# Patient Record
Sex: FEMALE | Race: WHITE | NOT HISPANIC OR LATINO | URBAN - METROPOLITAN AREA
[De-identification: names, ages, dates, MRNs, and addresses within clinical notes are randomized per-mention and may not be internally consistent; named-entity substitution may affect disease eponyms.]

---

## 2016-11-29 RX ORDER — BACLOFEN 100 %
1 POWDER (GRAM) MISCELLANEOUS
Qty: 0 | Refills: 0 | COMMUNITY
Start: 2016-11-29

## 2017-03-22 ENCOUNTER — OUTPATIENT (OUTPATIENT)
Dept: OUTPATIENT SERVICES | Facility: HOSPITAL | Age: 67
LOS: 1 days | End: 2017-03-22
Payer: MEDICARE

## 2017-03-22 DIAGNOSIS — S91.309A UNSPECIFIED OPEN WOUND, UNSPECIFIED FOOT, INITIAL ENCOUNTER: ICD-10-CM

## 2017-03-22 DIAGNOSIS — Z98.890 OTHER SPECIFIED POSTPROCEDURAL STATES: Chronic | ICD-10-CM

## 2017-03-22 PROCEDURE — 97602 WOUND(S) CARE NON-SELECTIVE: CPT

## 2017-03-22 PROCEDURE — G0463: CPT | Mod: 25

## 2017-03-23 DIAGNOSIS — Z88.8 ALLERGY STATUS TO OTHER DRUGS, MEDICAMENTS AND BIOLOGICAL SUBSTANCES STATUS: ICD-10-CM

## 2017-03-23 DIAGNOSIS — Z79.899 OTHER LONG TERM (CURRENT) DRUG THERAPY: ICD-10-CM

## 2017-03-23 DIAGNOSIS — L84 CORNS AND CALLOSITIES: ICD-10-CM

## 2017-03-23 DIAGNOSIS — L89.622 PRESSURE ULCER OF LEFT HEEL, STAGE 2: ICD-10-CM

## 2017-03-23 DIAGNOSIS — Z87.891 PERSONAL HISTORY OF NICOTINE DEPENDENCE: ICD-10-CM

## 2017-03-23 DIAGNOSIS — Z90.11 ACQUIRED ABSENCE OF RIGHT BREAST AND NIPPLE: ICD-10-CM

## 2017-03-23 DIAGNOSIS — R01.1 CARDIAC MURMUR, UNSPECIFIED: ICD-10-CM

## 2017-03-23 DIAGNOSIS — G35 MULTIPLE SCLEROSIS: ICD-10-CM

## 2017-03-23 DIAGNOSIS — M81.0 AGE-RELATED OSTEOPOROSIS WITHOUT CURRENT PATHOLOGICAL FRACTURE: ICD-10-CM

## 2017-03-23 DIAGNOSIS — Z85.3 PERSONAL HISTORY OF MALIGNANT NEOPLASM OF BREAST: ICD-10-CM

## 2017-03-23 DIAGNOSIS — Z90.49 ACQUIRED ABSENCE OF OTHER SPECIFIED PARTS OF DIGESTIVE TRACT: ICD-10-CM

## 2017-03-23 DIAGNOSIS — I10 ESSENTIAL (PRIMARY) HYPERTENSION: ICD-10-CM

## 2017-03-23 DIAGNOSIS — Z98.890 OTHER SPECIFIED POSTPROCEDURAL STATES: ICD-10-CM

## 2017-04-07 ENCOUNTER — OUTPATIENT (OUTPATIENT)
Dept: OUTPATIENT SERVICES | Facility: HOSPITAL | Age: 67
LOS: 1 days | End: 2017-04-07
Payer: MEDICARE

## 2017-04-07 DIAGNOSIS — E11.69 TYPE 2 DIABETES MELLITUS WITH OTHER SPECIFIED COMPLICATION: ICD-10-CM

## 2017-04-07 DIAGNOSIS — Z98.890 OTHER SPECIFIED POSTPROCEDURAL STATES: Chronic | ICD-10-CM

## 2017-04-07 DIAGNOSIS — L89.622 PRESSURE ULCER OF LEFT HEEL, STAGE 2: ICD-10-CM

## 2017-04-07 LAB
ALBUMIN SERPL ELPH-MCNC: 3.4 G/DL — SIGNIFICANT CHANGE UP (ref 3.3–5)
ALP SERPL-CCNC: 59 U/L — SIGNIFICANT CHANGE UP (ref 40–120)
ALT FLD-CCNC: 23 U/L — SIGNIFICANT CHANGE UP (ref 12–78)
ANION GAP SERPL CALC-SCNC: 8 MMOL/L — SIGNIFICANT CHANGE UP (ref 5–17)
AST SERPL-CCNC: 20 U/L — SIGNIFICANT CHANGE UP (ref 15–37)
BASOPHILS # BLD AUTO: 0.1 K/UL — SIGNIFICANT CHANGE UP (ref 0–0.2)
BASOPHILS NFR BLD AUTO: 1.2 % — SIGNIFICANT CHANGE UP (ref 0–2)
BILIRUB SERPL-MCNC: 0.2 MG/DL — SIGNIFICANT CHANGE UP (ref 0.2–1.2)
BUN SERPL-MCNC: 37 MG/DL — HIGH (ref 7–23)
CALCIUM SERPL-MCNC: 9.3 MG/DL — SIGNIFICANT CHANGE UP (ref 8.5–10.1)
CHLORIDE SERPL-SCNC: 101 MMOL/L — SIGNIFICANT CHANGE UP (ref 96–108)
CO2 SERPL-SCNC: 29 MMOL/L — SIGNIFICANT CHANGE UP (ref 22–31)
CREAT SERPL-MCNC: 0.91 MG/DL — SIGNIFICANT CHANGE UP (ref 0.5–1.3)
EOSINOPHIL # BLD AUTO: 0.1 K/UL — SIGNIFICANT CHANGE UP (ref 0–0.5)
EOSINOPHIL NFR BLD AUTO: 1.5 % — SIGNIFICANT CHANGE UP (ref 0–6)
ERYTHROCYTE [SEDIMENTATION RATE] IN BLOOD: 27 MM/HR — HIGH (ref 0–20)
GLUCOSE SERPL-MCNC: 96 MG/DL — SIGNIFICANT CHANGE UP (ref 70–99)
HCT VFR BLD CALC: 36 % — SIGNIFICANT CHANGE UP (ref 34.5–45)
HGB BLD-MCNC: 11.5 G/DL — SIGNIFICANT CHANGE UP (ref 11.5–15.5)
LYMPHOCYTES # BLD AUTO: 2.6 K/UL — SIGNIFICANT CHANGE UP (ref 1–3.3)
LYMPHOCYTES # BLD AUTO: 41.8 % — SIGNIFICANT CHANGE UP (ref 13–44)
MCHC RBC-ENTMCNC: 27.8 PG — SIGNIFICANT CHANGE UP (ref 27–34)
MCHC RBC-ENTMCNC: 32 GM/DL — SIGNIFICANT CHANGE UP (ref 32–36)
MCV RBC AUTO: 86.9 FL — SIGNIFICANT CHANGE UP (ref 80–100)
MONOCYTES # BLD AUTO: 0.5 K/UL — SIGNIFICANT CHANGE UP (ref 0–0.9)
MONOCYTES NFR BLD AUTO: 7.7 % — SIGNIFICANT CHANGE UP (ref 1–9)
NEUTROPHILS # BLD AUTO: 3 K/UL — SIGNIFICANT CHANGE UP (ref 1.8–7.4)
NEUTROPHILS NFR BLD AUTO: 47.8 % — SIGNIFICANT CHANGE UP (ref 43–77)
PLATELET # BLD AUTO: 398 K/UL — SIGNIFICANT CHANGE UP (ref 150–400)
POTASSIUM SERPL-MCNC: 3.9 MMOL/L — SIGNIFICANT CHANGE UP (ref 3.5–5.3)
POTASSIUM SERPL-SCNC: 3.9 MMOL/L — SIGNIFICANT CHANGE UP (ref 3.5–5.3)
PREALB SERPL-MCNC: 27.6 MG/DL — SIGNIFICANT CHANGE UP (ref 20–40)
PROT SERPL-MCNC: 7.1 G/DL — SIGNIFICANT CHANGE UP (ref 6–8.3)
RBC # BLD: 4.15 M/UL — SIGNIFICANT CHANGE UP (ref 3.8–5.2)
RBC # FLD: 16 % — HIGH (ref 10.3–14.5)
SODIUM SERPL-SCNC: 138 MMOL/L — SIGNIFICANT CHANGE UP (ref 135–145)
WBC # BLD: 6.2 K/UL — SIGNIFICANT CHANGE UP (ref 3.8–10.5)
WBC # FLD AUTO: 6.2 K/UL — SIGNIFICANT CHANGE UP (ref 3.8–10.5)

## 2017-04-07 PROCEDURE — 85027 COMPLETE CBC AUTOMATED: CPT

## 2017-04-07 PROCEDURE — 85652 RBC SED RATE AUTOMATED: CPT

## 2017-04-07 PROCEDURE — 97602 WOUND(S) CARE NON-SELECTIVE: CPT

## 2017-04-07 PROCEDURE — 80053 COMPREHEN METABOLIC PANEL: CPT

## 2017-04-07 PROCEDURE — 83036 HEMOGLOBIN GLYCOSYLATED A1C: CPT

## 2017-04-07 PROCEDURE — 84134 ASSAY OF PREALBUMIN: CPT

## 2017-04-08 DIAGNOSIS — Z90.11 ACQUIRED ABSENCE OF RIGHT BREAST AND NIPPLE: ICD-10-CM

## 2017-04-08 DIAGNOSIS — L89.622 PRESSURE ULCER OF LEFT HEEL, STAGE 2: ICD-10-CM

## 2017-04-08 DIAGNOSIS — M81.0 AGE-RELATED OSTEOPOROSIS WITHOUT CURRENT PATHOLOGICAL FRACTURE: ICD-10-CM

## 2017-04-08 DIAGNOSIS — Z85.3 PERSONAL HISTORY OF MALIGNANT NEOPLASM OF BREAST: ICD-10-CM

## 2017-04-08 DIAGNOSIS — R01.1 CARDIAC MURMUR, UNSPECIFIED: ICD-10-CM

## 2017-04-08 DIAGNOSIS — Z98.890 OTHER SPECIFIED POSTPROCEDURAL STATES: ICD-10-CM

## 2017-04-08 DIAGNOSIS — I10 ESSENTIAL (PRIMARY) HYPERTENSION: ICD-10-CM

## 2017-04-08 DIAGNOSIS — Z88.8 ALLERGY STATUS TO OTHER DRUGS, MEDICAMENTS AND BIOLOGICAL SUBSTANCES STATUS: ICD-10-CM

## 2017-04-08 DIAGNOSIS — Z79.899 OTHER LONG TERM (CURRENT) DRUG THERAPY: ICD-10-CM

## 2017-04-08 DIAGNOSIS — Z90.49 ACQUIRED ABSENCE OF OTHER SPECIFIED PARTS OF DIGESTIVE TRACT: ICD-10-CM

## 2017-04-08 DIAGNOSIS — G35 MULTIPLE SCLEROSIS: ICD-10-CM

## 2017-04-08 DIAGNOSIS — Z87.891 PERSONAL HISTORY OF NICOTINE DEPENDENCE: ICD-10-CM

## 2017-04-08 LAB — HBA1C BLD-MCNC: 5.5 % — SIGNIFICANT CHANGE UP (ref 4–5.6)

## 2017-04-11 ENCOUNTER — OUTPATIENT (OUTPATIENT)
Dept: OUTPATIENT SERVICES | Facility: HOSPITAL | Age: 67
LOS: 1 days | End: 2017-04-11
Payer: MEDICARE

## 2017-04-11 DIAGNOSIS — Z98.890 OTHER SPECIFIED POSTPROCEDURAL STATES: Chronic | ICD-10-CM

## 2017-04-11 DIAGNOSIS — M86.9 OSTEOMYELITIS, UNSPECIFIED: ICD-10-CM

## 2017-04-11 PROCEDURE — 73718 MRI LOWER EXTREMITY W/O DYE: CPT

## 2017-04-11 PROCEDURE — A9579: CPT

## 2017-04-11 PROCEDURE — 73720 MRI LWR EXTREMITY W/O&W/DYE: CPT

## 2017-04-11 PROCEDURE — 73720 MRI LWR EXTREMITY W/O&W/DYE: CPT | Mod: 26,LT

## 2017-04-14 ENCOUNTER — OUTPATIENT (OUTPATIENT)
Dept: OUTPATIENT SERVICES | Facility: HOSPITAL | Age: 67
LOS: 1 days | End: 2017-04-14
Payer: MEDICARE

## 2017-04-14 DIAGNOSIS — Z98.890 OTHER SPECIFIED POSTPROCEDURAL STATES: Chronic | ICD-10-CM

## 2017-04-14 DIAGNOSIS — L89.622 PRESSURE ULCER OF LEFT HEEL, STAGE 2: ICD-10-CM

## 2017-04-14 PROCEDURE — G0463: CPT

## 2017-04-15 DIAGNOSIS — R01.1 CARDIAC MURMUR, UNSPECIFIED: ICD-10-CM

## 2017-04-15 DIAGNOSIS — L89.622 PRESSURE ULCER OF LEFT HEEL, STAGE 2: ICD-10-CM

## 2017-04-15 DIAGNOSIS — Z90.49 ACQUIRED ABSENCE OF OTHER SPECIFIED PARTS OF DIGESTIVE TRACT: ICD-10-CM

## 2017-04-15 DIAGNOSIS — Z79.899 OTHER LONG TERM (CURRENT) DRUG THERAPY: ICD-10-CM

## 2017-04-15 DIAGNOSIS — I10 ESSENTIAL (PRIMARY) HYPERTENSION: ICD-10-CM

## 2017-04-15 DIAGNOSIS — Z98.890 OTHER SPECIFIED POSTPROCEDURAL STATES: ICD-10-CM

## 2017-04-15 DIAGNOSIS — Z90.11 ACQUIRED ABSENCE OF RIGHT BREAST AND NIPPLE: ICD-10-CM

## 2017-04-15 DIAGNOSIS — Z88.8 ALLERGY STATUS TO OTHER DRUGS, MEDICAMENTS AND BIOLOGICAL SUBSTANCES STATUS: ICD-10-CM

## 2017-04-15 DIAGNOSIS — G35 MULTIPLE SCLEROSIS: ICD-10-CM

## 2017-04-15 DIAGNOSIS — Z85.3 PERSONAL HISTORY OF MALIGNANT NEOPLASM OF BREAST: ICD-10-CM

## 2017-04-15 DIAGNOSIS — M81.0 AGE-RELATED OSTEOPOROSIS WITHOUT CURRENT PATHOLOGICAL FRACTURE: ICD-10-CM

## 2017-04-15 DIAGNOSIS — Z87.891 PERSONAL HISTORY OF NICOTINE DEPENDENCE: ICD-10-CM

## 2017-05-04 ENCOUNTER — OUTPATIENT (OUTPATIENT)
Dept: OUTPATIENT SERVICES | Facility: HOSPITAL | Age: 67
LOS: 1 days | End: 2017-05-04
Payer: MEDICARE

## 2017-05-04 DIAGNOSIS — L89.622 PRESSURE ULCER OF LEFT HEEL, STAGE 2: ICD-10-CM

## 2017-05-04 DIAGNOSIS — Z98.890 OTHER SPECIFIED POSTPROCEDURAL STATES: Chronic | ICD-10-CM

## 2017-05-04 PROCEDURE — G0463: CPT

## 2017-05-06 DIAGNOSIS — L89.622 PRESSURE ULCER OF LEFT HEEL, STAGE 2: ICD-10-CM

## 2017-05-06 DIAGNOSIS — R01.1 CARDIAC MURMUR, UNSPECIFIED: ICD-10-CM

## 2017-05-06 DIAGNOSIS — Z87.891 PERSONAL HISTORY OF NICOTINE DEPENDENCE: ICD-10-CM

## 2017-05-06 DIAGNOSIS — Z90.11 ACQUIRED ABSENCE OF RIGHT BREAST AND NIPPLE: ICD-10-CM

## 2017-05-06 DIAGNOSIS — Z79.899 OTHER LONG TERM (CURRENT) DRUG THERAPY: ICD-10-CM

## 2017-05-06 DIAGNOSIS — Z85.3 PERSONAL HISTORY OF MALIGNANT NEOPLASM OF BREAST: ICD-10-CM

## 2017-05-06 DIAGNOSIS — Z98.890 OTHER SPECIFIED POSTPROCEDURAL STATES: ICD-10-CM

## 2017-05-06 DIAGNOSIS — M81.0 AGE-RELATED OSTEOPOROSIS WITHOUT CURRENT PATHOLOGICAL FRACTURE: ICD-10-CM

## 2017-05-06 DIAGNOSIS — Z90.49 ACQUIRED ABSENCE OF OTHER SPECIFIED PARTS OF DIGESTIVE TRACT: ICD-10-CM

## 2017-05-06 DIAGNOSIS — G35 MULTIPLE SCLEROSIS: ICD-10-CM

## 2017-05-06 DIAGNOSIS — Z88.8 ALLERGY STATUS TO OTHER DRUGS, MEDICAMENTS AND BIOLOGICAL SUBSTANCES STATUS: ICD-10-CM

## 2017-05-06 DIAGNOSIS — I10 ESSENTIAL (PRIMARY) HYPERTENSION: ICD-10-CM

## 2017-05-11 ENCOUNTER — INPATIENT (INPATIENT)
Facility: HOSPITAL | Age: 67
LOS: 5 days | Discharge: HOME HEALTH | DRG: 389 | End: 2017-05-17
Attending: INTERNAL MEDICINE | Admitting: INTERNAL MEDICINE
Payer: MEDICARE

## 2017-05-11 VITALS
HEART RATE: 70 BPM | RESPIRATION RATE: 16 BRPM | TEMPERATURE: 97 F | OXYGEN SATURATION: 98 % | SYSTOLIC BLOOD PRESSURE: 124 MMHG | DIASTOLIC BLOOD PRESSURE: 64 MMHG

## 2017-05-11 DIAGNOSIS — Z98.890 OTHER SPECIFIED POSTPROCEDURAL STATES: Chronic | ICD-10-CM

## 2017-05-11 LAB
ALBUMIN SERPL ELPH-MCNC: 3.2 G/DL — LOW (ref 3.3–5)
ALP SERPL-CCNC: 63 U/L — SIGNIFICANT CHANGE UP (ref 40–120)
ALT FLD-CCNC: 23 U/L — SIGNIFICANT CHANGE UP (ref 12–78)
ANION GAP SERPL CALC-SCNC: 9 MMOL/L — SIGNIFICANT CHANGE UP (ref 5–17)
AST SERPL-CCNC: 33 U/L — SIGNIFICANT CHANGE UP (ref 15–37)
BASOPHILS # BLD AUTO: 0.1 K/UL — SIGNIFICANT CHANGE UP (ref 0–0.2)
BASOPHILS NFR BLD AUTO: 0.6 % — SIGNIFICANT CHANGE UP (ref 0–2)
BILIRUB SERPL-MCNC: 0.2 MG/DL — SIGNIFICANT CHANGE UP (ref 0.2–1.2)
BUN SERPL-MCNC: 42 MG/DL — HIGH (ref 7–23)
CALCIUM SERPL-MCNC: 9.7 MG/DL — SIGNIFICANT CHANGE UP (ref 8.5–10.1)
CHLORIDE SERPL-SCNC: 97 MMOL/L — SIGNIFICANT CHANGE UP (ref 96–108)
CO2 SERPL-SCNC: 29 MMOL/L — SIGNIFICANT CHANGE UP (ref 22–31)
CREAT SERPL-MCNC: 0.97 MG/DL — SIGNIFICANT CHANGE UP (ref 0.5–1.3)
EOSINOPHIL # BLD AUTO: 0 K/UL — SIGNIFICANT CHANGE UP (ref 0–0.5)
EOSINOPHIL NFR BLD AUTO: 0 % — SIGNIFICANT CHANGE UP (ref 0–6)
GLUCOSE SERPL-MCNC: 117 MG/DL — HIGH (ref 70–99)
HCT VFR BLD CALC: 37.3 % — SIGNIFICANT CHANGE UP (ref 34.5–45)
HGB BLD-MCNC: 12.8 G/DL — SIGNIFICANT CHANGE UP (ref 11.5–15.5)
LIDOCAIN IGE QN: 217 U/L — SIGNIFICANT CHANGE UP (ref 73–393)
LYMPHOCYTES # BLD AUTO: 0.8 K/UL — LOW (ref 1–3.3)
LYMPHOCYTES # BLD AUTO: 6.6 % — LOW (ref 13–44)
MCHC RBC-ENTMCNC: 28.4 PG — SIGNIFICANT CHANGE UP (ref 27–34)
MCHC RBC-ENTMCNC: 34.3 GM/DL — SIGNIFICANT CHANGE UP (ref 32–36)
MCV RBC AUTO: 82.9 FL — SIGNIFICANT CHANGE UP (ref 80–100)
MONOCYTES # BLD AUTO: 0.8 K/UL — SIGNIFICANT CHANGE UP (ref 0–0.9)
MONOCYTES NFR BLD AUTO: 7 % — SIGNIFICANT CHANGE UP (ref 1–9)
NEUTROPHILS # BLD AUTO: 10.3 K/UL — HIGH (ref 1.8–7.4)
NEUTROPHILS NFR BLD AUTO: 85.7 % — HIGH (ref 43–77)
PLATELET # BLD AUTO: 404 K/UL — HIGH (ref 150–400)
POTASSIUM SERPL-MCNC: 4 MMOL/L — SIGNIFICANT CHANGE UP (ref 3.5–5.3)
POTASSIUM SERPL-SCNC: 4 MMOL/L — SIGNIFICANT CHANGE UP (ref 3.5–5.3)
PROT SERPL-MCNC: 7 G/DL — SIGNIFICANT CHANGE UP (ref 6–8.3)
RBC # BLD: 4.5 M/UL — SIGNIFICANT CHANGE UP (ref 3.8–5.2)
RBC # FLD: 15.1 % — HIGH (ref 10.3–14.5)
SODIUM SERPL-SCNC: 135 MMOL/L — SIGNIFICANT CHANGE UP (ref 135–145)
WBC # BLD: 12 K/UL — HIGH (ref 3.8–10.5)
WBC # FLD AUTO: 12 K/UL — HIGH (ref 3.8–10.5)

## 2017-05-11 PROCEDURE — 74020: CPT | Mod: 26

## 2017-05-11 RX ORDER — SODIUM CHLORIDE 9 MG/ML
1000 INJECTION INTRAMUSCULAR; INTRAVENOUS; SUBCUTANEOUS ONCE
Qty: 0 | Refills: 0 | Status: COMPLETED | OUTPATIENT
Start: 2017-05-11 | End: 2017-05-11

## 2017-05-11 RX ORDER — ONDANSETRON 8 MG/1
4 TABLET, FILM COATED ORAL ONCE
Qty: 0 | Refills: 0 | Status: COMPLETED | OUTPATIENT
Start: 2017-05-11 | End: 2017-05-11

## 2017-05-11 RX ORDER — METOCLOPRAMIDE HCL 10 MG
10 TABLET ORAL ONCE
Qty: 0 | Refills: 0 | Status: COMPLETED | OUTPATIENT
Start: 2017-05-11 | End: 2017-05-11

## 2017-05-11 RX ADMIN — SODIUM CHLORIDE 2000 MILLILITER(S): 9 INJECTION INTRAMUSCULAR; INTRAVENOUS; SUBCUTANEOUS at 22:38

## 2017-05-11 RX ADMIN — Medication 10 MILLIGRAM(S): at 23:54

## 2017-05-11 RX ADMIN — ONDANSETRON 4 MILLIGRAM(S): 8 TABLET, FILM COATED ORAL at 22:38

## 2017-05-11 NOTE — ED ADULT NURSE NOTE - OBJECTIVE STATEMENT
pt c/o n/v, pelvic pain, dysuria and frquency x a few days. seen at md today, started on ceftin for a UTI, took first dose at 6pm tonite. wasn't given anything for nausea.

## 2017-05-12 DIAGNOSIS — K56.69 OTHER INTESTINAL OBSTRUCTION: ICD-10-CM

## 2017-05-12 DIAGNOSIS — Z29.9 ENCOUNTER FOR PROPHYLACTIC MEASURES, UNSPECIFIED: ICD-10-CM

## 2017-05-12 DIAGNOSIS — I10 ESSENTIAL (PRIMARY) HYPERTENSION: ICD-10-CM

## 2017-05-12 DIAGNOSIS — D72.829 ELEVATED WHITE BLOOD CELL COUNT, UNSPECIFIED: ICD-10-CM

## 2017-05-12 DIAGNOSIS — G35 MULTIPLE SCLEROSIS: ICD-10-CM

## 2017-05-12 DIAGNOSIS — Z92.89 PERSONAL HISTORY OF OTHER MEDICAL TREATMENT: Chronic | ICD-10-CM

## 2017-05-12 DIAGNOSIS — N39.0 URINARY TRACT INFECTION, SITE NOT SPECIFIED: ICD-10-CM

## 2017-05-12 DIAGNOSIS — R33.9 RETENTION OF URINE, UNSPECIFIED: ICD-10-CM

## 2017-05-12 LAB
APPEARANCE UR: CLEAR — SIGNIFICANT CHANGE UP
BILIRUB UR-MCNC: NEGATIVE — SIGNIFICANT CHANGE UP
COLOR SPEC: YELLOW — SIGNIFICANT CHANGE UP
DIFF PNL FLD: ABNORMAL
GLUCOSE UR QL: NEGATIVE — SIGNIFICANT CHANGE UP
KETONES UR-MCNC: NEGATIVE — SIGNIFICANT CHANGE UP
LEUKOCYTE ESTERASE UR-ACNC: NEGATIVE — SIGNIFICANT CHANGE UP
NITRITE UR-MCNC: NEGATIVE — SIGNIFICANT CHANGE UP
PH UR: 5 — SIGNIFICANT CHANGE UP (ref 5–8)
PROT UR-MCNC: 25 MG/DL
SP GR SPEC: 1.01 — SIGNIFICANT CHANGE UP (ref 1.01–1.02)
UROBILINOGEN FLD QL: NEGATIVE — SIGNIFICANT CHANGE UP

## 2017-05-12 PROCEDURE — 99281 EMR DPT VST MAYX REQ PHY/QHP: CPT

## 2017-05-12 PROCEDURE — 74177 CT ABD & PELVIS W/CONTRAST: CPT | Mod: 26

## 2017-05-12 PROCEDURE — 93010 ELECTROCARDIOGRAM REPORT: CPT

## 2017-05-12 RX ORDER — LATANOPROST 0.05 MG/ML
1 SOLUTION/ DROPS OPHTHALMIC; TOPICAL AT BEDTIME
Qty: 0 | Refills: 0 | Status: DISCONTINUED | OUTPATIENT
Start: 2017-05-12 | End: 2017-05-17

## 2017-05-12 RX ORDER — BACLOFEN 100 %
10 POWDER (GRAM) MISCELLANEOUS
Qty: 0 | Refills: 0 | Status: DISCONTINUED | OUTPATIENT
Start: 2017-05-12 | End: 2017-05-12

## 2017-05-12 RX ORDER — SODIUM CHLORIDE 9 MG/ML
1000 INJECTION INTRAMUSCULAR; INTRAVENOUS; SUBCUTANEOUS
Qty: 0 | Refills: 0 | Status: DISCONTINUED | OUTPATIENT
Start: 2017-05-12 | End: 2017-05-16

## 2017-05-12 RX ORDER — INTERFERON BETA-1A 22 UG/.5ML
30 INJECTION, SOLUTION SUBCUTANEOUS
Qty: 0 | Refills: 0 | Status: DISCONTINUED | OUTPATIENT
Start: 2017-05-15 | End: 2017-05-16

## 2017-05-12 RX ORDER — CEFUROXIME AXETIL 250 MG
0 TABLET ORAL
Qty: 0 | Refills: 0 | COMMUNITY

## 2017-05-12 RX ORDER — INTERFERON BETA-1A 22 UG/.5ML
30 INJECTION, SOLUTION SUBCUTANEOUS
Qty: 0 | Refills: 0 | Status: DISCONTINUED | OUTPATIENT
Start: 2017-05-12 | End: 2017-05-12

## 2017-05-12 RX ORDER — LISINOPRIL 2.5 MG/1
20 TABLET ORAL DAILY
Qty: 0 | Refills: 0 | Status: DISCONTINUED | OUTPATIENT
Start: 2017-05-12 | End: 2017-05-12

## 2017-05-12 RX ORDER — OXYBUTYNIN CHLORIDE 5 MG
10 TABLET ORAL DAILY
Qty: 0 | Refills: 0 | Status: DISCONTINUED | OUTPATIENT
Start: 2017-05-12 | End: 2017-05-12

## 2017-05-12 RX ORDER — SODIUM CHLORIDE 9 MG/ML
1000 INJECTION INTRAMUSCULAR; INTRAVENOUS; SUBCUTANEOUS
Qty: 0 | Refills: 0 | Status: DISCONTINUED | OUTPATIENT
Start: 2017-05-12 | End: 2017-05-12

## 2017-05-12 RX ORDER — IOHEXOL 300 MG/ML
30 INJECTION, SOLUTION INTRAVENOUS ONCE
Qty: 0 | Refills: 0 | Status: COMPLETED | OUTPATIENT
Start: 2017-05-12 | End: 2017-05-12

## 2017-05-12 RX ADMIN — SODIUM CHLORIDE 125 MILLILITER(S): 9 INJECTION INTRAMUSCULAR; INTRAVENOUS; SUBCUTANEOUS at 09:54

## 2017-05-12 RX ADMIN — LATANOPROST 1 DROP(S): 0.05 SOLUTION/ DROPS OPHTHALMIC; TOPICAL at 22:18

## 2017-05-12 RX ADMIN — IOHEXOL 30 MILLILITER(S): 300 INJECTION, SOLUTION INTRAVENOUS at 01:15

## 2017-05-12 RX ADMIN — SODIUM CHLORIDE 75 MILLILITER(S): 9 INJECTION INTRAMUSCULAR; INTRAVENOUS; SUBCUTANEOUS at 22:19

## 2017-05-12 NOTE — ED PROVIDER NOTE - MEDICAL DECISION MAKING DETAILS
Pt is a 68 yo female hx ms, bowel resection sp sbo in past. pt pw 2 days of no bm and since yesterday at 4pm, n/v, obstipation and abd pain.  ct with sbo, labs mild wbc,  ivf, surg consult, no vomiting in ed, hold ngt pending surg eval as per dr tam

## 2017-05-12 NOTE — H&P ADULT - PROBLEM SELECTOR PLAN 2
-chronic   cont baclofen, avonex ( patient takes it weekly, last dose taken this Monday) -chronic   -hold the PO meds while on NG tube suctioning - as per Dr Oropeza   - ones off suctioning will cont baclofen, avonex ( patient takes it weekly, last dose taken this Monday)

## 2017-05-12 NOTE — ED PROVIDER NOTE - ENMT, MLM
Airway patent, Nasal mucosa clear. Mm dry. Throat has no vesicles, no oropharyngeal exudates and uvula is midline.

## 2017-05-12 NOTE — ED ADULT NURSE REASSESSMENT NOTE - ANCILLARY STATUS
awaiting radiology/started drinking po contrast for abd ct
bed assignment
pt taken for CT/awaiting radiology/radiology results pending

## 2017-05-12 NOTE — H&P ADULT - HISTORY OF PRESENT ILLNESS
A 66 y.o female with PMH of R Breast CA s/p R Mastectomy, MS, Small bowel resection in 2016 2/2 SBO, Osteoporosis is presenting today with an abdominal pain, nausea and vomiting since last night. Pt states that yesterday at aroud 4 pm, she started having a stomach ache, reports laying down and feeling nauseous and vomiting brown fluid. States that the vomiting was copious, liquidy, brown color, non-bloody, non-projectile, non-billious. Pt states that symptoms continued for the whole night  and than continuing in the am .Hence patient decided to go to ER. Denies fever, admits to chills, states that her pain was in mid abd, 5/10, dull, achy, intermittent pain. Admits to mild radiation to the back, states that nothing was making the pain worse, states that "burping was easing the pain for a few seconds ". Pt also admits to constipation, states that her last bm on Tuesday. Denies any abd pain at the moment. Denies HA, cp, sob, dizziness or lightheadedness. Furthermore, this week patient was diagnosed with UTI by her PCP and was started on cefuroxime 500 1 tab po bid, reports taking one tablet so far. A 66 y.o female with PMH of R Breast CA s/p R Mastectomy, MS, Small bowel resection in 2016 2/2 SBO, Osteoporosis is presenting today with an abdominal pain, nausea and vomiting since last night. Pt states that yesterday at aroud 4 pm, she started having a stomach ache, reports laying down and feeling nauseous and vomiting brown fluid. States that the vomiting was copious, liquidy, brown color, non-bloody, non-projectile, non-billious. Pt states that symptoms continued for the whole night  and than continuing in the am .Hence patient decided to go to ER. Denies fever, admits to chills, states that her pain was in mid abd, 5/10, dull, achy, intermittent pain. Admits to mild radiation to the back, states that nothing was making the pain worse, states that "burping was easing the pain for a few seconds ". Pt also admits to constipation, states that her last bm on Tuesday. Denies any abd pain at the moment. Denies HA, cp, sob, dizziness or lightheadedness. Furthermore, this week patient was diagnosed with UTI by her PCP and was started on cefuroxime 500 1 tab po bid, reports taking one tablet so far.   Med rec has been done after reviewing and verifying the med list, including the dosages that the patient had with her.

## 2017-05-12 NOTE — H&P ADULT - NSHPPHYSICALEXAM_GEN_ALL_CORE
Constitutional: WDWN resting comfortably in bed; NAD  Head: NC/AT  Eyes: PERRL, EOMI, anicteric sclera  ENT: no nasal discharge; uvula midline, no oropharyngeal erythema or exudates; MMM  Neck: supple; no JVD or thyromegaly  Respiratory: CTA B/L; no W/R/R, no retractions  Cardiac: +S1/S2; RRR; no M/R/G; PMI non-displaced  Gastrointestinal: soft, NT/ND; , absent bowel sounds, midline scar from a previous surgery noted   Back: spine midline, no bony tenderness or step-offs; no CVAT B/L  Extremities: WWP, no clubbing or cyanosis; no peripheral edema  Musculoskeletal:; no joint swelling, tenderness or erythema  Vascular: 2+ radial, femoral, DP/PT pulses B/L  Dermatologic: skin warm, dry and intact; no rashes, wounds, or scars  Lymphatic: no submandibular or cervical LAD  Neurologic: AAOx3; CNII-XII grossly intact; no focal deficits  Psychiatric: affect and characteristics of appearance, verbalizations, behaviors are appropriate

## 2017-05-12 NOTE — H&P ADULT - ASSESSMENT
A 66 y.o female with PMH of R Breast CA s/p R Mastectomy, MS, Small bowel resection in 2016 2/2 SBO, Osteoporosis is being admitted for an SBO

## 2017-05-12 NOTE — ED ADULT NURSE REASSESSMENT NOTE - GENERAL PATIENT STATE
comfortable appearance
comfortable appearance/no change observed/cooperative
comfortable appearance/cooperative/no change observed
cooperative/comfortable appearance

## 2017-05-12 NOTE — H&P ADULT - PROBLEM SELECTOR PLAN 1
Admit to F  Surgery consult Dr Gee saw the patient   as per Surgery :Will watch her closely, no abd pain, no tachycardia at the moment  -NGT to LWS  -IVF  -morning labs Admit to F  Surgery consult Dr Gee saw the patient   as per Surgery :Will watch her closely, no abd pain, no tachycardia at the moment  -NGT to LWS  -IVF  - Will keep the patient NPO including the meds while NG tube is connected to suctioning  -morning labs

## 2017-05-12 NOTE — H&P ADULT - PROBLEM SELECTOR PLAN 3
chronic  cont lisinopril, chlorthalidone   BP checks per routine chronic  hold the PO meds while on NG tube suctioning - as per Dr Oropeza   will order vasotec 1.5 IV push Q8 prn for sbp above 140- discussed with pharmacy    BP checks per routine

## 2017-05-12 NOTE — H&P ADULT - NSHPLABSRESULTS_GEN_ALL_CORE
12.8   12.0  )-----------( 404      ( 11 May 2017 23:22 )             37.3   05-11    135  |  97  |  42<H>  ----------------------------<  117<H>  4.0   |  29  |  0.97    Ca    9.7      11 May 2017 23:22    TPro  7.0  /  Alb  3.2<L>  /  TBili  0.2  /  DBili  x   /  AST  33  /  ALT  23  /  AlkPhos  63  05-11     CT ABDOMEN AND PELVIS OC IC:  Proximal small bowel obstruction with transition in the proximal jejunum   in the right hemiabdomen, adjacent to a small bowel anastomosis.

## 2017-05-12 NOTE — ED ADULT NURSE REASSESSMENT NOTE - NS ED NURSE REASSESS COMMENT FT1
Dr Frederick aware of pts temp of 100.6 orally and that pt needs to have order for straight cath as she self catherizes at home for urination
called intern Dr Canales to ask about orders, he is writing them now. also made him aware pt has started on ceftin yesterday for UTI
pt taken for U/S.
NGT placed to lws. pt tolerated procedure well. approximately 1.5 liters of dark brown liquid return to suction container.

## 2017-05-12 NOTE — H&P ADULT - NSHPREVIEWOFSYSTEMS_GEN_ALL_CORE
CONSTITUTIONAL: No weakness, fevers or chills  EYES/ENT: No visual changes;  No vertigo or throat pain   NECK: No pain or stiffness  RESPIRATORY: No cough, wheezing, hemoptysis; No shortness of breath  CARDIOVASCULAR: No chest pain or palpitations  GASTROINTESTINAL:  Abd pain, nausea, vomiting,   GENITOURINARY: No dysuria, frequency or hematuria  NEUROLOGICAL: No numbness or weakness  SKIN: No itching, burning, rashes, or lesions   All other review of systems is negative unless indicated above.

## 2017-05-12 NOTE — CONSULT NOTE ADULT - ASSESSMENT
67 yo fem with MS, SBO  -Will watch her closely, no abd pain, no tachycardia at the moment  -NGT to LWS  -IVF  -NPO

## 2017-05-12 NOTE — ED ADULT NURSE REASSESSMENT NOTE - COMFORT CARE
plan of care explained/wait time explained
wait time explained/plan of care explained
plan of care explained/wait time explained
wait time explained

## 2017-05-13 LAB
ANION GAP SERPL CALC-SCNC: 10 MMOL/L — SIGNIFICANT CHANGE UP (ref 5–17)
BUN SERPL-MCNC: 25 MG/DL — HIGH (ref 7–23)
CALCIUM SERPL-MCNC: 8.7 MG/DL — SIGNIFICANT CHANGE UP (ref 8.5–10.1)
CHLORIDE SERPL-SCNC: 100 MMOL/L — SIGNIFICANT CHANGE UP (ref 96–108)
CO2 SERPL-SCNC: 31 MMOL/L — SIGNIFICANT CHANGE UP (ref 22–31)
CREAT SERPL-MCNC: 0.83 MG/DL — SIGNIFICANT CHANGE UP (ref 0.5–1.3)
CULTURE RESULTS: NO GROWTH — SIGNIFICANT CHANGE UP
GLUCOSE SERPL-MCNC: 71 MG/DL — SIGNIFICANT CHANGE UP (ref 70–99)
HCT VFR BLD CALC: 31.4 % — LOW (ref 34.5–45)
HGB BLD-MCNC: 10.3 G/DL — LOW (ref 11.5–15.5)
LACTATE SERPL-SCNC: 0.7 MMOL/L — SIGNIFICANT CHANGE UP (ref 0.7–2)
MCHC RBC-ENTMCNC: 27.9 PG — SIGNIFICANT CHANGE UP (ref 27–34)
MCHC RBC-ENTMCNC: 32.7 GM/DL — SIGNIFICANT CHANGE UP (ref 32–36)
MCV RBC AUTO: 85.2 FL — SIGNIFICANT CHANGE UP (ref 80–100)
PLATELET # BLD AUTO: 278 K/UL — SIGNIFICANT CHANGE UP (ref 150–400)
POTASSIUM SERPL-MCNC: 3.1 MMOL/L — LOW (ref 3.5–5.3)
POTASSIUM SERPL-SCNC: 3.1 MMOL/L — LOW (ref 3.5–5.3)
RBC # BLD: 3.69 M/UL — LOW (ref 3.8–5.2)
RBC # FLD: 15.3 % — HIGH (ref 10.3–14.5)
SODIUM SERPL-SCNC: 141 MMOL/L — SIGNIFICANT CHANGE UP (ref 135–145)
SPECIMEN SOURCE: SIGNIFICANT CHANGE UP
WBC # BLD: 7.9 K/UL — SIGNIFICANT CHANGE UP (ref 3.8–10.5)
WBC # FLD AUTO: 7.9 K/UL — SIGNIFICANT CHANGE UP (ref 3.8–10.5)

## 2017-05-13 RX ORDER — ENOXAPARIN SODIUM 100 MG/ML
40 INJECTION SUBCUTANEOUS DAILY
Qty: 0 | Refills: 0 | Status: DISCONTINUED | OUTPATIENT
Start: 2017-05-13 | End: 2017-05-17

## 2017-05-13 RX ORDER — POTASSIUM CHLORIDE 20 MEQ
10 PACKET (EA) ORAL
Qty: 0 | Refills: 0 | Status: COMPLETED | OUTPATIENT
Start: 2017-05-13 | End: 2017-05-13

## 2017-05-13 RX ORDER — CEFTRIAXONE 500 MG/1
INJECTION, POWDER, FOR SOLUTION INTRAMUSCULAR; INTRAVENOUS
Qty: 0 | Refills: 0 | Status: DISCONTINUED | OUTPATIENT
Start: 2017-05-13 | End: 2017-05-15

## 2017-05-13 RX ORDER — CEFTRIAXONE 500 MG/1
1 INJECTION, POWDER, FOR SOLUTION INTRAMUSCULAR; INTRAVENOUS EVERY 24 HOURS
Qty: 0 | Refills: 0 | Status: DISCONTINUED | OUTPATIENT
Start: 2017-05-14 | End: 2017-05-15

## 2017-05-13 RX ORDER — CEFTRIAXONE 500 MG/1
1 INJECTION, POWDER, FOR SOLUTION INTRAMUSCULAR; INTRAVENOUS ONCE
Qty: 0 | Refills: 0 | Status: COMPLETED | OUTPATIENT
Start: 2017-05-13 | End: 2017-05-13

## 2017-05-13 RX ADMIN — Medication 100 MILLIEQUIVALENT(S): at 16:58

## 2017-05-13 RX ADMIN — Medication 100 MILLIEQUIVALENT(S): at 11:48

## 2017-05-13 RX ADMIN — ENOXAPARIN SODIUM 40 MILLIGRAM(S): 100 INJECTION SUBCUTANEOUS at 11:48

## 2017-05-13 RX ADMIN — CEFTRIAXONE 100 GRAM(S): 500 INJECTION, POWDER, FOR SOLUTION INTRAMUSCULAR; INTRAVENOUS at 10:24

## 2017-05-13 RX ADMIN — SODIUM CHLORIDE 75 MILLILITER(S): 9 INJECTION INTRAMUSCULAR; INTRAVENOUS; SUBCUTANEOUS at 13:15

## 2017-05-13 RX ADMIN — LATANOPROST 1 DROP(S): 0.05 SOLUTION/ DROPS OPHTHALMIC; TOPICAL at 21:47

## 2017-05-13 RX ADMIN — Medication 100 MILLIEQUIVALENT(S): at 14:03

## 2017-05-13 NOTE — CONSULT NOTE ADULT - ASSESSMENT
The patient is a 66 year old female with a history of HTN, HL, multiple sclerosis, recent hospitalization for SBO with course c/b MICH, anemia, elevated cardiac enzymes who is admitted with recurrent SBO.    Plan:  - Continue enalaprilat IV 1.25 mg q8h prn. Transition to lisinopril 20 mg daily when able to take PO.  - Holding chlorthalidone  - There are no active cardiac issues at the current time. If plan is for surgery to relieve SBO, then patient may proceed to OR without additional cardiac testing/intervention.

## 2017-05-13 NOTE — PROGRESS NOTE ADULT - PROBLEM SELECTOR PLAN 3
chronic  well controlled  hold the PO meds while on NG tube suctioning    will order vasotec 1.5 IV push Q8 prn for sbp above 140- discussed with pharmacy    BP checks per routine

## 2017-05-13 NOTE — PROGRESS NOTE ADULT - SUBJECTIVE AND OBJECTIVE BOX
65 yo fem wit SBO, no abd pain, no flatus, NGT 600ml    Abd soft, NT  NGT in place              Vital Signs Last 24 Hrs  T(C): 36.9, Max: 38.1 (05-12 @ 11:14)  T(F): 98.4, Max: 100.6 (05-12 @ 11:14)  HR: 64 (64 - 93)  BP: 112/68 (99/56 - 145/78)  BP(mean): --  RR: 17 (15 - 17)  SpO2: 99% (93% - 99%)                          10.3   7.9   )-----------( 278      ( 13 May 2017 06:33 )             31.4       05-13    141  |  100  |  25<H>  ----------------------------<  71  3.1<L>   |  31  |  0.83    Ca    8.7      13 May 2017 06:33    TPro  7.0  /  Alb  3.2<L>  /  TBili  0.2  /  DBili  x   /  AST  33  /  ALT  23  /  AlkPhos  63  05-11      LIVER FUNCTIONS - ( 11 May 2017 23:22 )  Alb: 3.2 g/dL / Pro: 7.0 g/dL / ALK PHOS: 63 U/L / ALT: 23 U/L / AST: 33 U/L / GGT: x             MEDICATIONS  (STANDING):  latanoprost 0.005% Ophthalmic Solution 1Drop(s) Both EYES at bedtime  interferon beta-1a Injectable (AVONEX) 30MICROGram(s) IntraMuscular every 7 days  sodium chloride 0.9%. 1000milliLiter(s) IV Continuous <Continuous>  cefTRIAXone   IVPB  IV Intermittent   cefTRIAXone   IVPB 1Gram(s) IV Intermittent once    MEDICATIONS  (PRN):  enalaprilat Injectable 1.25milliGRAM(s) IV Push every 8 hours PRN To be given for SBP above 140      MEDICATIONS  (STANDING):  latanoprost 0.005% Ophthalmic Solution 1Drop(s) Both EYES at bedtime  interferon beta-1a Injectable (AVONEX) 30MICROGram(s) IntraMuscular every 7 days  sodium chloride 0.9%. 1000milliLiter(s) IV Continuous <Continuous>  cefTRIAXone   IVPB  IV Intermittent   cefTRIAXone   IVPB 1Gram(s) IV Intermittent once

## 2017-05-13 NOTE — CONSULT NOTE ADULT - SUBJECTIVE AND OBJECTIVE BOX
65 yo fem h/o multiple sclerosis, SBO with 240cm of dead small bowel due to internal hernia Nov 2016 who started vomiting and having abd pain yesterday. Abdominal pain resolved after placing NGT. No fever.    HPI:      PAST MEDICAL & SURGICAL HISTORY:  Multiple sclerosis  S/P mastectomy, right  Breast CA  Osteoporosis  MS (mitral stenosis)  H/O breast surgery    PSH: small bowel resection, SBO NOV 2016      REVIEW OF SYSTEMS:    CONSTITUTIONAL: No weakness, fevers or chills  EYES/ENT: No visual changes;  No vertigo or throat pain   NECK: No pain or stiffness  RESPIRATORY: No cough, wheezing, hemoptysis; No shortness of breath  CARDIOVASCULAR: No chest pain or palpitations  GASTROINTESTINAL: Abd pain, nausea, vomiting,   GENITOURINARY: No dysuria, frequency or hematuria  NEUROLOGICAL: No numbness or weakness  SKIN: No itching, burning, rashes, or lesions   All other review of systems is negative unless indicated above.    MEDICATIONS  (STANDING):    See chart      Allergies    Sudafed (Other)    Intolerances        SOCIAL HISTORY:    FAMILY HISTORY:  No pertinent family history in first degree relatives      Vital Signs Last 24 Hrs  T(C): 37.4, Max: 37.4 (05-12 @ 07:52)  T(F): 99.3, Max: 99.3 (05-12 @ 07:52)  HR: 86 (68 - 86)  BP: 108/68 (108/68 - 124/64)  BP(mean): --  RR: 15 (14 - 16)  SpO2: 95% (95% - 98%)    .  VITAL SIGNS:  T(C): 37.4, Max: 37.4 (05-12 @ 07:52)  T(F): 99.3, Max: 99.3 (05-12 @ 07:52)  HR: 86 (68 - 86)  BP: 108/68 (108/68 - 124/64)  BP(mean): --  RR: 15 (14 - 16)  SpO2: 95% (95% - 98%)  Wt(kg): --    PHYSICAL EXAM:    Constitutional: WDWN resting comfortably in bed; NAD  Head: NC/AT  Eyes: PERRL, EOMI, anicteric sclera  ENT: no nasal discharge; uvula midline, no oropharyngeal erythema or exudates; MMM  Neck: supple; no JVD or thyromegaly  Respiratory: CTA B/L; no W/R/R, no retractions  Cardiac: +S1/S2; RRR; no M/R/G; PMI non-displaced  Gastrointestinal: soft, NT/ND; , midline scar  Back: spine midline, no bony tenderness or step-offs; no CVAT B/L  Extremities: WWP, no clubbing or cyanosis; no peripheral edema  Musculoskeletal:; no joint swelling, tenderness or erythema  Vascular: 2+ radial, femoral, DP/PT pulses B/L  Dermatologic: skin warm, dry and intact; no rashes, wounds, or scars  Lymphatic: no submandibular or cervical LAD  Neurologic: AAOx3; CNII-XII grossly intact; no focal deficits  Psychiatric: affect and characteristics of appearance, verbalizations, behaviors are appropriate    LABS:                        12.8   12.0  )-----------( 404      ( 11 May 2017 23:22 )             37.3       05-11    135  |  97  |  42<H>  ----------------------------<  117<H>  4.0   |  29  |  0.97    Ca    9.7      11 May 2017 23:22    TPro  7.0  /  Alb  3.2<L>  /  TBili  0.2  /  DBili  x   /  AST  33  /  ALT  23  /  AlkPhos  63  05-11              RADIOLOGY & ADDITIONAL STUDIES:  FINDINGS:    LOWER CHEST: Bibasilar subsegmental atelectasis. Status post right   mastectomy.    LIVER: Within normal limits.  BILE DUCTS: Normal caliber.  GALLBLADDER: Cholelithiasis.  SPLEEN: Within normal limits.  PANCREAS: Within normal limits.  ADRENALS: Within normal limits.  KIDNEYS/URETERS: Low-attenuation lesion within the right kidney too small   to accurately characterize. No hydronephrosis. Symmetric parenchymal   enhancement.    BLADDER: Within normal limits.  REPRODUCTIVE ORGANS: Calcified uterine fibroid.    BOWEL: Marked fluid distention of the stomach as well as dilatation of   the duodenum, and proximal jejunum with transition in the right   hemiabdomen, adjacent to a small bowel anastomosis. No pneumatosis or   portal venous gas. Appendix normal. Small hiatal hernia.  PERITONEUM: No ascites or pneumoperitoneum.  VESSELS:  Mild calcified atherosclerotic disease of the abdominal aorta.  RETROPERITONEUM: No lymphadenopathy.    ABDOMINAL WALL: Midline anterior abdominal wall postsurgical change.   BONES: Multilevel degenerative changes of the spine. Healed right   superior and inferior pubic rami fractures.    IMPRESSION:    Proximal small bowel obstruction with transition in the proximal jejunum   in the right hemiabdomen, adjacent to a small bowel anastomosis.
History of Present Illness: The patient is a 66 year old female with a history of HTN, HL, multiple sclerosis, recent hospitalization for SBO with course c/b MICH, anemia, elevated cardiac enzymes who presents with recurrent SBO. She notes improvement in abdominal pain, nausea with NGT placed. She denies chest pain or shortness of breath. Her last hospitalization she had mildly elevated cardiac enzymes in the setting of demand ischemia. An echocardiogram revealed normal LV systolic function and no significant valvular abnormalities.    Past Medical/Surgical History:  HTN, HL, MS, SBO    Medications:  MEDICATIONS  (STANDING):  latanoprost 0.005% Ophthalmic Solution 1Drop(s) Both EYES at bedtime  interferon beta-1a Injectable (AVONEX) 30MICROGram(s) IntraMuscular every 7 days  sodium chloride 0.9%. 1000milliLiter(s) IV Continuous <Continuous>  cefTRIAXone   IVPB  IV Intermittent   cefTRIAXone   IVPB 1Gram(s) IV Intermittent once    MEDICATIONS  (PRN):  enalaprilat Injectable 1.25milliGRAM(s) IV Push every 8 hours PRN To be given for SBP above 140      Family History: Non-contributory family history of premature cardiovascular atherosclerotic disease    Social History: No tobacco, alcohol or drug use    Review of Systems:  General: No fevers, chills, weight loss or gain  Skin: No rashes, color changes  Cardiovascular: No chest pain, orthopnea  Respiratory: No shortness of breath, cough  Gastrointestinal: No nausea, abdominal pain  Genitourinary: No incontinence, pain with urination  Musculoskeletal: No pain, swelling, decreased range of motion  Neurological: No headache, weakness  Psychiatric: No depression, anxiety  Endocrine: No weight loss or gain, increased thirst  All other systems are comprehensively negative.    Physical Exam:  Vitals:        Vital Signs Last 24 Hrs  T(C): 36.9, Max: 38.1 (05-12 @ 11:14)  T(F): 98.4, Max: 100.6 (05-12 @ 11:14)  HR: 64 (64 - 93)  BP: 112/68 (99/56 - 145/78)  BP(mean): --  RR: 17 (15 - 17)  SpO2: 99% (93% - 99%)  General: NAD  Neck: No JVD, no carotid bruit  Lungs: CTAB  CV: RRR, nl S1/S2, no M/R/G  Abdomen: S/NT/ND, +BS  Extremities: No LE edema, no cyanosis    Labs:                        10.3   7.9   )-----------( 278      ( 13 May 2017 06:33 )             31.4     05-13    141  |  100  |  25<H>  ----------------------------<  71  3.1<L>   |  31  |  0.83    Ca    8.7      13 May 2017 06:33    TPro  7.0  /  Alb  3.2<L>  /  TBili  0.2  /  DBili  x   /  AST  33  /  ALT  23  /  AlkPhos  63  05-11    ECG: NSR, normal axis, T wave flattening lateral leads

## 2017-05-13 NOTE — PROGRESS NOTE ADULT - PROBLEM SELECTOR PLAN 1
-Continue NGT to LWS  -IVF  - Will keep the patient NPO including the meds while NG tube is connected to suctioning  -morning labs  -AXR in AM

## 2017-05-13 NOTE — PROGRESS NOTE ADULT - SUBJECTIVE AND OBJECTIVE BOX
Patient is a 66y old  Female who presents with a chief complaint of Nausea and vomiting (12 May 2017 16:16)      INTERVAL HPI/OVERNIGHT EVENTS: Patient seen and examined. NAD. No complaints. No n/v. No flatus/BM.      Vital Signs Last 24 Hrs  T(C): 36.9, Max: 37.9 ( @ 13:31)  T(F): 98.4, Max: 100.3 ( @ 13:31)  HR: 64 (64 - 78)  BP: 112/68 (112/68 - 145/78)  BP(mean): --  RR: 17 (17 - 17)  SpO2: 99% (95% - 99%)I&O's Summary  I & Os for 24h ending 13 May 2017 07:00  =============================================  IN: 1500 ml / OUT: 1800 ml / NET: -300 ml    I & Os for current day (as of 13 May 2017 12:48)  =============================================  IN: 240 ml / OUT: 0 ml / NET: 240 ml      LABS:                        10.3   7.9   )-----------( 278      ( 13 May 2017 06:33 )             31.4     05-13    141  |  100  |  25<H>  ----------------------------<  71  3.1<L>   |  31  |  0.83    Ca    8.7      13 May 2017 06:33    TPro  7.0  /  Alb  3.2<L>  /  TBili  0.2  /  DBili  x   /  AST  33  /  ALT  23  /  AlkPhos  63  05-11      Urinalysis Basic - ( 12 May 2017 20:42 )    Color: Yellow / Appearance: Clear / S.015 / pH: x  Gluc: x / Ketone: Negative  / Bili: Negative / Urobili: Negative   Blood: x / Protein: 25 mg/dL / Nitrite: Negative   Leuk Esterase: Negative / RBC: 0-2 /HPF / WBC 6-10   Sq Epi: x / Non Sq Epi: Few / Bacteria: Occasional      CAPILLARY BLOOD GLUCOSE          latanoprost 0.005% Ophthalmic Solution 1Drop(s) Both EYES at bedtime  interferon beta-1a Injectable (AVONEX) 30MICROGram(s) IntraMuscular every 7 days  sodium chloride 0.9%. 1000milliLiter(s) IV Continuous <Continuous>  enalaprilat Injectable 1.25milliGRAM(s) IV Push every 8 hours PRN  cefTRIAXone   IVPB  IV Intermittent   enoxaparin Injectable 40milliGRAM(s) SubCutaneous daily  potassium chloride  10 mEq/100 mL IVPB 10milliEquivalent(s) IV Intermittent every 1 hour      REVIEW OF SYSTEMS:  CONSTITUTIONAL: No fever, weight loss, or fatigue  NECK: No pain or stiffness  RESPIRATORY: No cough, wheezing, chills or hemoptysis; No shortness of breath  CARDIOVASCULAR: No chest pain, palpitations, dizziness, or leg swelling  GASTROINTESTINAL: No abdominal or epigastric pain. No nausea, vomiting, or hematemesis; No diarrhea or constipation. No melena or hematochezia.  GENITOURINARY: No dysuria, frequency, hematuria, or incontinence  NEUROLOGICAL: No headaches, loss of strength, numbness, or tremors  SKIN: No itching, burning  MUSCULOSKELETAL: No joint pain or swelling; No muscle, back, or extremity pain  PSYCHIATRIC: No depression, mood swings, HEME/LYMPH: No easy bruising, or bleeding gums  ALLERY AND IMMUNOLOGIC: No hives     RADIOLOGY & ADDITIONAL TESTS:    Imaging Personally Reviewed:  [ ] YES  [ ] NO    Consultant(s) Notes Reviewed:  [x ] YES  [ ] NO    PHYSICAL EXAM:  GENERAL: NAD, well-groomed, well-developed  HEAD:  Atraumatic, Normocephalic, + NGT  EYES: EOMI, PERRLA, conjunctiva and sclera clear  ENMT: No tonsillar erythema, exudates, or enlargement; Moist mucous membranes  NECK: Supple, No JVD  NERVOUS SYSTEM:  Awake & alert, Good concentration;  CHEST/LUNG: Clear to auscultation bilaterally; No rales, rhonchi, wheezing,  HEART: Regular rate and rhythm  ABDOMEN: Soft, Nontender, Nondistended; Bowel sounds hypoactive  EXTREMITIES:  No clubbing, cyanosis, or edema  LYMPH: No lymphadenopathy noted  SKIN: No rashes    Care Discussed with Consultants/Other Providers [ ] YES  [ ] NO

## 2017-05-13 NOTE — PROGRESS NOTE ADULT - PROBLEM SELECTOR PLAN 2
-chronic   -hold the PO meds while on NG tube suctioning  - ones off suctioning will cont baclofen, avonex ( patient takes it weekly, last dose taken this Monday)

## 2017-05-13 NOTE — PROGRESS NOTE ADULT - ASSESSMENT
65 yo fem SBO, MS  -Awaiting for bowel function  -IVF  -OK ice chips  -AXR in am  -KCL 65 yo fem SBO, MS  -Awaiting for bowel function  -IVF  -OK ice chips  -AXR in am  -KCL 10meq kcl  -Lovenox

## 2017-05-14 LAB
ANION GAP SERPL CALC-SCNC: 13 MMOL/L — SIGNIFICANT CHANGE UP (ref 5–17)
BUN SERPL-MCNC: 26 MG/DL — HIGH (ref 7–23)
CALCIUM SERPL-MCNC: 8.9 MG/DL — SIGNIFICANT CHANGE UP (ref 8.5–10.1)
CHLORIDE SERPL-SCNC: 99 MMOL/L — SIGNIFICANT CHANGE UP (ref 96–108)
CO2 SERPL-SCNC: 29 MMOL/L — SIGNIFICANT CHANGE UP (ref 22–31)
CREAT SERPL-MCNC: 0.77 MG/DL — SIGNIFICANT CHANGE UP (ref 0.5–1.3)
GLUCOSE SERPL-MCNC: 78 MG/DL — SIGNIFICANT CHANGE UP (ref 70–99)
HCT VFR BLD CALC: 32.4 % — LOW (ref 34.5–45)
HGB BLD-MCNC: 10.3 G/DL — LOW (ref 11.5–15.5)
MAGNESIUM SERPL-MCNC: 1.8 MG/DL — SIGNIFICANT CHANGE UP (ref 1.6–2.6)
MCHC RBC-ENTMCNC: 26.7 PG — LOW (ref 27–34)
MCHC RBC-ENTMCNC: 30.9 GM/DL — LOW (ref 32–36)
MCV RBC AUTO: 86.3 FL — SIGNIFICANT CHANGE UP (ref 80–100)
PLATELET # BLD AUTO: 319 K/UL — SIGNIFICANT CHANGE UP (ref 150–400)
POTASSIUM SERPL-MCNC: 3.2 MMOL/L — LOW (ref 3.5–5.3)
POTASSIUM SERPL-MCNC: 3.5 MMOL/L — SIGNIFICANT CHANGE UP (ref 3.5–5.3)
POTASSIUM SERPL-SCNC: 3.2 MMOL/L — LOW (ref 3.5–5.3)
POTASSIUM SERPL-SCNC: 3.5 MMOL/L — SIGNIFICANT CHANGE UP (ref 3.5–5.3)
RBC # BLD: 3.75 M/UL — LOW (ref 3.8–5.2)
RBC # FLD: 15.6 % — HIGH (ref 10.3–14.5)
SODIUM SERPL-SCNC: 141 MMOL/L — SIGNIFICANT CHANGE UP (ref 135–145)
WBC # BLD: 8.7 K/UL — SIGNIFICANT CHANGE UP (ref 3.8–10.5)
WBC # FLD AUTO: 8.7 K/UL — SIGNIFICANT CHANGE UP (ref 3.8–10.5)

## 2017-05-14 PROCEDURE — 74020: CPT | Mod: 26

## 2017-05-14 RX ORDER — POTASSIUM CHLORIDE 20 MEQ
10 PACKET (EA) ORAL
Qty: 0 | Refills: 0 | Status: COMPLETED | OUTPATIENT
Start: 2017-05-14 | End: 2017-05-14

## 2017-05-14 RX ORDER — MAGNESIUM SULFATE 500 MG/ML
2 VIAL (ML) INJECTION ONCE
Qty: 0 | Refills: 0 | Status: COMPLETED | OUTPATIENT
Start: 2017-05-14 | End: 2017-05-15

## 2017-05-14 RX ADMIN — Medication 100 MILLIEQUIVALENT(S): at 21:37

## 2017-05-14 RX ADMIN — ENOXAPARIN SODIUM 40 MILLIGRAM(S): 100 INJECTION SUBCUTANEOUS at 11:32

## 2017-05-14 RX ADMIN — Medication 100 MILLIEQUIVALENT(S): at 23:39

## 2017-05-14 RX ADMIN — Medication 100 MILLIEQUIVALENT(S): at 12:16

## 2017-05-14 RX ADMIN — Medication 100 MILLIEQUIVALENT(S): at 11:32

## 2017-05-14 RX ADMIN — LATANOPROST 1 DROP(S): 0.05 SOLUTION/ DROPS OPHTHALMIC; TOPICAL at 22:38

## 2017-05-14 RX ADMIN — Medication 100 MILLIEQUIVALENT(S): at 20:49

## 2017-05-14 RX ADMIN — CEFTRIAXONE 100 GRAM(S): 500 INJECTION, POWDER, FOR SOLUTION INTRAMUSCULAR; INTRAVENOUS at 05:58

## 2017-05-14 RX ADMIN — Medication 100 MILLIEQUIVALENT(S): at 15:31

## 2017-05-14 NOTE — PROGRESS NOTE ADULT - SUBJECTIVE AND OBJECTIVE BOX
Chief Complaint: SBO    Interval Events: No complaints. No events.    Review of Systems:  General: No fevers, chills, weight loss or gain  Skin: No rashes, color changes  Cardiovascular: No chest pain, orthopnea  Respiratory: No shortness of breath, cough  Gastrointestinal: No nausea, abdominal pain  Genitourinary: No incontinence, pain with urination  Musculoskeletal: No pain, swelling, decreased range of motion  Neurological: No headache, weakness  Psychiatric: No depression, anxiety  Endocrine: No weight loss or gain, increased thirst  All other systems are comprehensively negative.    Physical Exam:  Vitals:        Vital Signs Last 24 Hrs  T(C): 36.9, Max: 37.4 (05-13 @ 22:09)  T(F): 98.4, Max: 99.3 (05-13 @ 22:09)  HR: 78 (73 - 82)  BP: 133/64 (118/66 - 134/64)  BP(mean): --  RR: 17 (16 - 17)  SpO2: 98% (92% - 99%)  General: NAD  Neck: No JVD, no carotid bruit  Lungs: CTAB  CV: RRR, nl S1/S2, no M/R/G  Abdomen: S/NT/ND, +BS  Extremities: No LE edema, no cyanosis    Labs:                        10.3   8.7   )-----------( 319      ( 14 May 2017 07:42 )             32.4     05-13    141  |  100  |  25<H>  ----------------------------<  71  3.1<L>   |  31  |  0.83    Ca    8.7      13 May 2017 06:33

## 2017-05-14 NOTE — PROGRESS NOTE ADULT - SUBJECTIVE AND OBJECTIVE BOX
67 yo fem MS, SBO, NGT in place, no flatus, no BM    Hypokalemia    Abd soft, NT              Vital Signs Last 24 Hrs  T(C): 36.6, Max: 37.4 (05-13 @ 22:09)  T(F): 97.9, Max: 99.3 (05-13 @ 22:09)  HR: 60 (60 - 82)  BP: 122/69 (122/69 - 134/64)  BP(mean): --  RR: 18 (16 - 18)  SpO2: 98% (92% - 98%)                          10.3   8.7   )-----------( 319      ( 14 May 2017 07:42 )             32.4       05-14    x   |  x   |  x   ----------------------------<  x   3.5   |  x   |  x     Ca    8.9      14 May 2017 07:42  Mg     1.8     05-14            MEDICATIONS  (STANDING):  latanoprost 0.005% Ophthalmic Solution 1Drop(s) Both EYES at bedtime  interferon beta-1a Injectable (AVONEX) 30MICROGram(s) IntraMuscular every 7 days  sodium chloride 0.9%. 1000milliLiter(s) IV Continuous <Continuous>  cefTRIAXone   IVPB  IV Intermittent   cefTRIAXone   IVPB 1Gram(s) IV Intermittent every 24 hours  enoxaparin Injectable 40milliGRAM(s) SubCutaneous daily  potassium chloride  10 mEq/100 mL IVPB 10milliEquivalent(s) IV Intermittent every 1 hour  magnesium sulfate  IVPB 2Gram(s) IV Intermittent once    MEDICATIONS  (PRN):  enalaprilat Injectable 1.25milliGRAM(s) IV Push every 8 hours PRN To be given for SBP above 140      MEDICATIONS  (STANDING):  latanoprost 0.005% Ophthalmic Solution 1Drop(s) Both EYES at bedtime  interferon beta-1a Injectable (AVONEX) 30MICROGram(s) IntraMuscular every 7 days  sodium chloride 0.9%. 1000milliLiter(s) IV Continuous <Continuous>  cefTRIAXone   IVPB  IV Intermittent   cefTRIAXone   IVPB 1Gram(s) IV Intermittent every 24 hours  enoxaparin Injectable 40milliGRAM(s) SubCutaneous daily  potassium chloride  10 mEq/100 mL IVPB 10milliEquivalent(s) IV Intermittent every 1 hour  magnesium sulfate  IVPB 2Gram(s) IV Intermittent once

## 2017-05-14 NOTE — PROGRESS NOTE ADULT - ASSESSMENT
65 yo fem SBO  -KCL 10meq x3  -Mg sulfate 2g  -Awaiting for bowel function  -Might do ex lap in the next 24-48hrs if no improvement seen

## 2017-05-14 NOTE — PROGRESS NOTE ADULT - PROBLEM SELECTOR PLAN 1
-Continue NGT to LWS  -IVF  - Will keep the patient NPO including the meds while NG tube is connected to suctioning  -morning labs  -AXR in AM  -Surgery f/u -Continue NGT to LWS  -IVF  - Will keep the patient NPO including the meds while NG tube is connected to suctioning  -morning labs  -AXR in AM  -Surgery f/u  -May proceed to the OR if needed

## 2017-05-14 NOTE — PROGRESS NOTE ADULT - ASSESSMENT
The patient is a 66 year old female with a history of HTN, HL, multiple sclerosis, recent hospitalization for SBO with course c/b MICH, anemia, elevated cardiac enzymes who is admitted with recurrent SBO.    Plan:  - BP well-controlled. Continue enalaprilat IV 1.25 mg q8h prn. Transition to lisinopril 20 mg daily when able to take PO.  - Holding chlorthalidone  - There are no active cardiac issues at the current time. If plan is for surgery, then patient may proceed to OR without additional cardiac testing/intervention.

## 2017-05-15 LAB
ANION GAP SERPL CALC-SCNC: 14 MMOL/L — SIGNIFICANT CHANGE UP (ref 5–17)
BUN SERPL-MCNC: 24 MG/DL — HIGH (ref 7–23)
CALCIUM SERPL-MCNC: 9.1 MG/DL — SIGNIFICANT CHANGE UP (ref 8.5–10.1)
CHLORIDE SERPL-SCNC: 96 MMOL/L — SIGNIFICANT CHANGE UP (ref 96–108)
CO2 SERPL-SCNC: 29 MMOL/L — SIGNIFICANT CHANGE UP (ref 22–31)
CREAT SERPL-MCNC: 0.62 MG/DL — SIGNIFICANT CHANGE UP (ref 0.5–1.3)
GLUCOSE SERPL-MCNC: 84 MG/DL — SIGNIFICANT CHANGE UP (ref 70–99)
HCT VFR BLD CALC: 33.1 % — LOW (ref 34.5–45)
HGB BLD-MCNC: 10.9 G/DL — LOW (ref 11.5–15.5)
INR BLD: 0.94 RATIO — SIGNIFICANT CHANGE UP (ref 0.88–1.16)
MAGNESIUM SERPL-MCNC: 2.7 MG/DL — HIGH (ref 1.6–2.6)
MCHC RBC-ENTMCNC: 28.1 PG — SIGNIFICANT CHANGE UP (ref 27–34)
MCHC RBC-ENTMCNC: 32.8 GM/DL — SIGNIFICANT CHANGE UP (ref 32–36)
MCV RBC AUTO: 85.6 FL — SIGNIFICANT CHANGE UP (ref 80–100)
PLATELET # BLD AUTO: 319 K/UL — SIGNIFICANT CHANGE UP (ref 150–400)
POTASSIUM SERPL-MCNC: 3.4 MMOL/L — LOW (ref 3.5–5.3)
POTASSIUM SERPL-SCNC: 3.4 MMOL/L — LOW (ref 3.5–5.3)
PROTHROM AB SERPL-ACNC: 10.2 SEC — SIGNIFICANT CHANGE UP (ref 9.8–12.7)
RBC # BLD: 3.86 M/UL — SIGNIFICANT CHANGE UP (ref 3.8–5.2)
RBC # FLD: 15 % — HIGH (ref 10.3–14.5)
SODIUM SERPL-SCNC: 139 MMOL/L — SIGNIFICANT CHANGE UP (ref 135–145)
WBC # BLD: 9.9 K/UL — SIGNIFICANT CHANGE UP (ref 3.8–10.5)
WBC # FLD AUTO: 9.9 K/UL — SIGNIFICANT CHANGE UP (ref 3.8–10.5)

## 2017-05-15 PROCEDURE — 74178 CT ABD&PLV WO CNTR FLWD CNTR: CPT | Mod: 26

## 2017-05-15 RX ORDER — POTASSIUM CHLORIDE 20 MEQ
20 PACKET (EA) ORAL ONCE
Qty: 0 | Refills: 0 | Status: DISCONTINUED | OUTPATIENT
Start: 2017-05-15 | End: 2017-05-15

## 2017-05-15 RX ORDER — BACLOFEN 100 %
10 POWDER (GRAM) MISCELLANEOUS DAILY
Qty: 0 | Refills: 0 | Status: DISCONTINUED | OUTPATIENT
Start: 2017-05-15 | End: 2017-05-17

## 2017-05-15 RX ORDER — POTASSIUM CHLORIDE 20 MEQ
10 PACKET (EA) ORAL
Qty: 0 | Refills: 0 | Status: COMPLETED | OUTPATIENT
Start: 2017-05-15 | End: 2017-05-15

## 2017-05-15 RX ORDER — IOHEXOL 300 MG/ML
500 INJECTION, SOLUTION INTRAVENOUS
Qty: 0 | Refills: 0 | Status: COMPLETED | OUTPATIENT
Start: 2017-05-15 | End: 2017-05-15

## 2017-05-15 RX ORDER — MAGNESIUM HYDROXIDE 400 MG/1
30 TABLET, CHEWABLE ORAL ONCE
Qty: 0 | Refills: 0 | Status: COMPLETED | OUTPATIENT
Start: 2017-05-15 | End: 2017-05-15

## 2017-05-15 RX ADMIN — MAGNESIUM HYDROXIDE 30 MILLILITER(S): 400 TABLET, CHEWABLE ORAL at 18:48

## 2017-05-15 RX ADMIN — Medication 10 MILLIGRAM(S): at 13:30

## 2017-05-15 RX ADMIN — IOHEXOL 500 MILLILITER(S): 300 INJECTION, SOLUTION INTRAVENOUS at 13:19

## 2017-05-15 RX ADMIN — CEFTRIAXONE 100 GRAM(S): 500 INJECTION, POWDER, FOR SOLUTION INTRAMUSCULAR; INTRAVENOUS at 06:06

## 2017-05-15 RX ADMIN — LATANOPROST 1 DROP(S): 0.05 SOLUTION/ DROPS OPHTHALMIC; TOPICAL at 21:07

## 2017-05-15 RX ADMIN — SODIUM CHLORIDE 75 MILLILITER(S): 9 INJECTION INTRAMUSCULAR; INTRAVENOUS; SUBCUTANEOUS at 06:07

## 2017-05-15 RX ADMIN — Medication 50 GRAM(S): at 01:09

## 2017-05-15 RX ADMIN — Medication 100 MILLIEQUIVALENT(S): at 09:55

## 2017-05-15 RX ADMIN — IOHEXOL 500 MILLILITER(S): 300 INJECTION, SOLUTION INTRAVENOUS at 14:26

## 2017-05-15 RX ADMIN — ENOXAPARIN SODIUM 40 MILLIGRAM(S): 100 INJECTION SUBCUTANEOUS at 11:46

## 2017-05-15 RX ADMIN — Medication 100 MILLIEQUIVALENT(S): at 14:26

## 2017-05-15 NOTE — CHART NOTE - NSCHARTNOTEFT_GEN_A_CORE
Called by RN for "weird dreams." Patient states she has been having weird dreams that wake her from sleep x 2 weeks. No changes in homes medications in the past 2 weeks. Denies taking ambien or opoids. Admits being under a lot of stress lately at home the last 6 months. Denies HA, SOB, fever, chills, change in vision, CP, abd pain. Patient is AAO x3. These dreams likely 2/2 stress less likely hospital or medication induced.

## 2017-05-15 NOTE — DIETITIAN INITIAL EVALUATION ADULT. - OTHER INFO
patient is NPO with NGT. states regular diet followed at home with episode of SBO in Nov-December. weight stable PTA. from wound care with scab ulcer on toe

## 2017-05-15 NOTE — PROGRESS NOTE ADULT - PROBLEM SELECTOR PLAN 1
- Continue NGT to LWS  - IVF  - Patient NPO. NG tube drained 500ml Bile colored fluid.   - AXR - some persistent, dilated central, air-filled loops of small bowel, with air and stool in the colon, nonspecific may represent partial small bowel obstruction.  - Repeat CT abdomen today .  - Possible OR tomorrow if obstruction still persists.   - 's(surgery) recs appreciated.

## 2017-05-15 NOTE — PROGRESS NOTE ADULT - PROBLEM SELECTOR PLAN 2
- Chronic   - Hold the PO meds while on NG tube suctioning  - ones off suctioning will cont baclofen, avonex ( patient takes it weekly, last dose taken this Monday) - Chronic   resume baclofen

## 2017-05-15 NOTE — PROGRESS NOTE ADULT - SUBJECTIVE AND OBJECTIVE BOX
Chief Complaint: SBO    Interval Events: No significant changes.    Review of Systems:  General: No fevers, chills, weight loss or gain  Skin: No rashes, color changes  Cardiovascular: No chest pain, orthopnea  Respiratory: No shortness of breath, cough  Gastrointestinal: No nausea, abdominal pain  Genitourinary: No incontinence, pain with urination  Musculoskeletal: No pain, swelling, decreased range of motion  Neurological: No headache, weakness  Psychiatric: No depression, anxiety  Endocrine: No weight loss or gain, increased thirst  All other systems are comprehensively negative.    Physical Exam:  Vital Signs Last 24 Hrs  T(C): 36.8, Max: 36.8 (05-15 @ 04:46)  T(F): 98.3, Max: 98.3 (05-15 @ 04:46)  HR: 61 (60 - 87)  BP: 153/85 (122/69 - 153/85)  BP(mean): --  RR: 17 (17 - 18)  SpO2: 99% (94% - 99%)  General: NAD  Neck: No JVD, no carotid bruit  Lungs: CTAB  CV: RRR, nl S1/S2, no M/R/G  Abdomen: S/NT/ND, +BS  Extremities: No LE edema, no cyanosis    Labs:             05-15    139  |  96  |  24<H>  ----------------------------<  84  3.4<L>   |  29  |  0.62    Ca    9.1      15 May 2017 06:01  Mg     2.7     05-15                          10.9   9.9   )-----------( 319      ( 15 May 2017 06:01 )             33.1

## 2017-05-15 NOTE — PROGRESS NOTE ADULT - PROBLEM SELECTOR PLAN 3
chronic  well controlled  holding the PO meds while on NG tube suctioning    Vasotec 1.5 IV push Q8 prn for SBP above 140- discussed with pharmacy    BP checks per routine chronic  well controlled    Vasotec 1.5 IV push Q8 prn for SBP above 140- discussed with pharmacy    BP checks per routine

## 2017-05-15 NOTE — PROGRESS NOTE ADULT - ASSESSMENT
66 y.o female with PMH of R Breast CA s/p R Mastectomy, MS, Small bowel resection in 2016 2/2 SBO, Osteoporosis is presented with abdominal pain , nausea , vomiting and admitted for recurrent SBO.

## 2017-05-15 NOTE — PROGRESS NOTE ADULT - PROBLEM SELECTOR PLAN 5
patient is currently asymptomatic    IV Rocephin (? D/C since cultures are negative.   Urine cultures negative.

## 2017-05-15 NOTE — PROGRESS NOTE ADULT - ASSESSMENT
The patient is a 66 year old female with a history of HTN, HL, multiple sclerosis, recent hospitalization for SBO with course c/b MICH, anemia, elevated cardiac enzymes who is admitted with recurrent SBO.    Plan:  - Continue enalaprilat IV 1.25 mg q8h prn. Transition to lisinopril 20 mg daily when able to take PO.  - Holding chlorthalidone  - There are no active cardiac issues at the current time. If plan is for surgery, then patient may proceed to OR without additional cardiac testing/intervention.

## 2017-05-15 NOTE — PROGRESS NOTE ADULT - SUBJECTIVE AND OBJECTIVE BOX
68 yo fem with SBO, feels OK, no flatus, NGT output 500ml    Abd soft, NT +Bowel sounds              Vital Signs Last 24 Hrs  T(C): 36.8, Max: 36.8 (05-15 @ 04:46)  T(F): 98.3, Max: 98.3 (05-15 @ 04:46)  HR: 61 (60 - 87)  BP: 153/85 (122/69 - 153/85)  BP(mean): --  RR: 17 (17 - 18)  SpO2: 99% (94% - 99%)                          10.9   9.9   )-----------( 319      ( 15 May 2017 06:01 )             33.1       05-15    139  |  96  |  24<H>  ----------------------------<  84  3.4<L>   |  29  |  0.62    Ca    9.1      15 May 2017 06:01  Mg     2.7     05-15            MEDICATIONS  (STANDING):  latanoprost 0.005% Ophthalmic Solution 1Drop(s) Both EYES at bedtime  interferon beta-1a Injectable (AVONEX) 30MICROGram(s) IntraMuscular every 7 days  sodium chloride 0.9%. 1000milliLiter(s) IV Continuous <Continuous>  cefTRIAXone   IVPB  IV Intermittent   cefTRIAXone   IVPB 1Gram(s) IV Intermittent every 24 hours  enoxaparin Injectable 40milliGRAM(s) SubCutaneous daily  potassium chloride  10 mEq/100 mL IVPB 10milliEquivalent(s) IV Intermittent every 1 hour    MEDICATIONS  (PRN):  enalaprilat Injectable 1.25milliGRAM(s) IV Push every 8 hours PRN To be given for SBP above 140      MEDICATIONS  (STANDING):  latanoprost 0.005% Ophthalmic Solution 1Drop(s) Both EYES at bedtime  interferon beta-1a Injectable (AVONEX) 30MICROGram(s) IntraMuscular every 7 days  sodium chloride 0.9%. 1000milliLiter(s) IV Continuous <Continuous>  cefTRIAXone   IVPB  IV Intermittent   cefTRIAXone   IVPB 1Gram(s) IV Intermittent every 24 hours  enoxaparin Injectable 40milliGRAM(s) SubCutaneous daily  potassium chloride  10 mEq/100 mL IVPB 10milliEquivalent(s) IV Intermittent every 1 hour

## 2017-05-15 NOTE — PROGRESS NOTE ADULT - SUBJECTIVE AND OBJECTIVE BOX
Patient is a 67y old  Female who presents with a chief complaint of Nausea and vomiting (12 May 2017 16:16)      INTERVAL HPI/OVERNIGHT EVENTS: 66 y.o female with PMH of R Breast CA s/p R Mastectomy, MS, Small bowel resection in 2016 2/2 SBO, Osteoporosis is presented with abdominal pain , nausea , vomiting and admitted for recurrent SBO. NG tube in place.     No acute events overnight. Patient seen and examined bedside. Denies nausea, vomiting, abdominal pain. Last BM- Last Tuesday No flatus. NG tube drained 500ml bile colored fluid.       T(C): 36.8, Max: 36.8 (05-15 @ 04:46)  HR: 61 (60 - 87)  BP: 153/85 (122/69 - 153/85)  RR: 17 (17 - 18)  SpO2: 99% (94% - 99%)  Wt(kg): --  I&O's Summary  I & Os for 24h ending 15 May 2017 07:00  =============================================  IN: 1050 ml / OUT: 1900 ml / NET: -850 ml    I & Os for current day (as of 15 May 2017 10:49)  =============================================  IN: 100 ml / OUT: 0 ml / NET: 100 ml      LABS:                        10.9   9.9   )-----------( 319      ( 15 May 2017 06:01 )             33.1     05-15    139  |  96  |  24<H>  ----------------------------<  84  3.4<L>   |  29  |  0.62    Ca    9.1      15 May 2017 06:01  Mg     2.7     05-15      PT/INR - ( 15 May 2017 06:01 )   PT: 10.2 sec;   INR: 0.94 ratio             CAPILLARY BLOOD GLUCOSE            MEDICATIONS  (STANDING):  latanoprost 0.005% Ophthalmic Solution 1Drop(s) Both EYES at bedtime  interferon beta-1a Injectable (AVONEX) 30MICROGram(s) IntraMuscular every 7 days  sodium chloride 0.9%. 1000milliLiter(s) IV Continuous <Continuous>  cefTRIAXone   IVPB  IV Intermittent   cefTRIAXone   IVPB 1Gram(s) IV Intermittent every 24 hours  enoxaparin Injectable 40milliGRAM(s) SubCutaneous daily  potassium chloride  10 mEq/100 mL IVPB 10milliEquivalent(s) IV Intermittent every 1 hour    MEDICATIONS  (PRN):  enalaprilat Injectable 1.25milliGRAM(s) IV Push every 8 hours PRN To be given for SBP above 140      REVIEW OF SYSTEMS:  CONSTITUTIONAL: No fever, weight loss, or fatigue  EYES: No eye pain, visual disturbances, or discharge  ENMT:  No difficulty hearing, tinnitus, vertigo; No sinus or throat pain  NECK: No pain or stiffness  RESPIRATORY: No cough, wheezing, chills or hemoptysis; No shortness of breath  CARDIOVASCULAR: No chest pain, palpitations, dizziness, or leg swelling  GASTROINTESTINAL: No abdominal or epigastric pain. No nausea, vomiting, or hematemesis; No diarrhea or constipation. No melena or hematochezia.  GENITOURINARY: No dysuria, frequency, hematuria, or incontinence  NEUROLOGICAL: No headaches, memory loss, loss of strength, numbness, or tremors  SKIN: No itching, burning, rashes, or lesions   LYMPH NODES: No enlarged glands  ENDOCRINE: No heat or cold intolerance; No hair loss  MUSCULOSKELETAL: No joint pain or swelling; No muscle, back, or extremity pain  PSYCHIATRIC: No depression, anxiety, mood swings, or difficulty sleeping  HEME/LYMPH: No easy bruising, or bleeding gums  ALLERY AND IMMUNOLOGIC: No hives or eczema    RADIOLOGY & ADDITIONAL TESTS:    Imaging Personally Reviewed:  [x ] YES  [ ] NO    Consultant(s) Notes Reviewed:  [x ] YES  [ ] NO    PHYSICAL EXAM:  GENERAL: NAD, well-groomed, well-developed  HEAD:  Atraumatic, Normocephalic  EYES: EOMI, PERRLA, conjunctiva and sclera clear  ENMT: No tonsillar erythema, exudates, or enlargement; Moist mucous membranes, Good dentition, No lesions. NG tube in place.   NECK: Supple, No JVD, Normal thyroid  NERVOUS SYSTEM:  Alert & Oriented X3, Good concentration; Motor Strength 5/5 B/L upper and lower extremities; DTRs 2+ intact and symmetric  CHEST/LUNG: Clear to percussion bilaterally; No rales, rhonchi, wheezing, or rubs  HEART: Regular rate and rhythm; No murmurs, rubs, or gallops  ABDOMEN: Soft, Nontender, Nondistended; positive bowel sounds. Laparoscopic scars in the abdomen.   EXTREMITIES:  2+ Peripheral Pulses, No clubbing, cyanosis, or edema  LYMPH: No lymphadenopathy noted  SKIN: No rashes or lesions    Care Discussed with Consultants/Other Providers [x ] YES  [ ] NO

## 2017-05-16 LAB
ANION GAP SERPL CALC-SCNC: 5 MMOL/L — SIGNIFICANT CHANGE UP (ref 5–17)
ANION GAP SERPL CALC-SCNC: 9 MMOL/L — SIGNIFICANT CHANGE UP (ref 5–17)
BUN SERPL-MCNC: 15 MG/DL — SIGNIFICANT CHANGE UP (ref 7–23)
BUN SERPL-MCNC: 17 MG/DL — SIGNIFICANT CHANGE UP (ref 7–23)
CALCIUM SERPL-MCNC: 8.4 MG/DL — LOW (ref 8.5–10.1)
CALCIUM SERPL-MCNC: 8.6 MG/DL — SIGNIFICANT CHANGE UP (ref 8.5–10.1)
CHLORIDE SERPL-SCNC: 94 MMOL/L — LOW (ref 96–108)
CHLORIDE SERPL-SCNC: 94 MMOL/L — LOW (ref 96–108)
CO2 SERPL-SCNC: 34 MMOL/L — HIGH (ref 22–31)
CO2 SERPL-SCNC: 35 MMOL/L — HIGH (ref 22–31)
CREAT SERPL-MCNC: 0.58 MG/DL — SIGNIFICANT CHANGE UP (ref 0.5–1.3)
CREAT SERPL-MCNC: 0.89 MG/DL — SIGNIFICANT CHANGE UP (ref 0.5–1.3)
GLUCOSE SERPL-MCNC: 125 MG/DL — HIGH (ref 70–99)
GLUCOSE SERPL-MCNC: 99 MG/DL — SIGNIFICANT CHANGE UP (ref 70–99)
HCT VFR BLD CALC: 36.6 % — SIGNIFICANT CHANGE UP (ref 34.5–45)
HGB BLD-MCNC: 12.3 G/DL — SIGNIFICANT CHANGE UP (ref 11.5–15.5)
MCHC RBC-ENTMCNC: 28.4 PG — SIGNIFICANT CHANGE UP (ref 27–34)
MCHC RBC-ENTMCNC: 33.6 GM/DL — SIGNIFICANT CHANGE UP (ref 32–36)
MCV RBC AUTO: 84.7 FL — SIGNIFICANT CHANGE UP (ref 80–100)
PLATELET # BLD AUTO: 406 K/UL — HIGH (ref 150–400)
POTASSIUM SERPL-MCNC: 3.2 MMOL/L — LOW (ref 3.5–5.3)
POTASSIUM SERPL-MCNC: 4 MMOL/L — SIGNIFICANT CHANGE UP (ref 3.5–5.3)
POTASSIUM SERPL-SCNC: 3.2 MMOL/L — LOW (ref 3.5–5.3)
POTASSIUM SERPL-SCNC: 4 MMOL/L — SIGNIFICANT CHANGE UP (ref 3.5–5.3)
RBC # BLD: 4.33 M/UL — SIGNIFICANT CHANGE UP (ref 3.8–5.2)
RBC # FLD: 14.8 % — HIGH (ref 10.3–14.5)
SODIUM SERPL-SCNC: 134 MMOL/L — LOW (ref 135–145)
SODIUM SERPL-SCNC: 137 MMOL/L — SIGNIFICANT CHANGE UP (ref 135–145)
WBC # BLD: 10.4 K/UL — SIGNIFICANT CHANGE UP (ref 3.8–10.5)
WBC # FLD AUTO: 10.4 K/UL — SIGNIFICANT CHANGE UP (ref 3.8–10.5)

## 2017-05-16 PROCEDURE — 74020: CPT | Mod: 26

## 2017-05-16 PROCEDURE — 71020: CPT | Mod: 26

## 2017-05-16 RX ORDER — INTERFERON BETA-1A 22 UG/.5ML
30 INJECTION, SOLUTION SUBCUTANEOUS
Qty: 0 | Refills: 0 | Status: DISCONTINUED | OUTPATIENT
Start: 2017-05-16 | End: 2017-05-17

## 2017-05-16 RX ORDER — OXYBUTYNIN CHLORIDE 5 MG
10 TABLET ORAL DAILY
Qty: 0 | Refills: 0 | Status: DISCONTINUED | OUTPATIENT
Start: 2017-05-16 | End: 2017-05-17

## 2017-05-16 RX ORDER — CEFUROXIME AXETIL 250 MG
1 TABLET ORAL
Qty: 0 | Refills: 0 | COMMUNITY

## 2017-05-16 RX ORDER — LISINOPRIL 2.5 MG/1
20 TABLET ORAL DAILY
Qty: 0 | Refills: 0 | Status: DISCONTINUED | OUTPATIENT
Start: 2017-05-16 | End: 2017-05-17

## 2017-05-16 RX ORDER — SODIUM CHLORIDE 9 MG/ML
1000 INJECTION INTRAMUSCULAR; INTRAVENOUS; SUBCUTANEOUS
Qty: 0 | Refills: 0 | Status: DISCONTINUED | OUTPATIENT
Start: 2017-05-16 | End: 2017-05-16

## 2017-05-16 RX ORDER — POTASSIUM CHLORIDE 20 MEQ
10 PACKET (EA) ORAL
Qty: 0 | Refills: 0 | Status: COMPLETED | OUTPATIENT
Start: 2017-05-16 | End: 2017-05-16

## 2017-05-16 RX ADMIN — ENOXAPARIN SODIUM 40 MILLIGRAM(S): 100 INJECTION SUBCUTANEOUS at 12:00

## 2017-05-16 RX ADMIN — LATANOPROST 1 DROP(S): 0.05 SOLUTION/ DROPS OPHTHALMIC; TOPICAL at 22:20

## 2017-05-16 RX ADMIN — Medication 10 MILLIGRAM(S): at 13:40

## 2017-05-16 RX ADMIN — Medication 100 MILLIEQUIVALENT(S): at 11:00

## 2017-05-16 RX ADMIN — Medication 100 MILLIEQUIVALENT(S): at 13:00

## 2017-05-16 RX ADMIN — Medication 1.25 MILLIGRAM(S): at 06:15

## 2017-05-16 RX ADMIN — Medication 100 MILLIEQUIVALENT(S): at 15:00

## 2017-05-16 RX ADMIN — SODIUM CHLORIDE 75 MILLILITER(S): 9 INJECTION INTRAMUSCULAR; INTRAVENOUS; SUBCUTANEOUS at 13:48

## 2017-05-16 RX ADMIN — LISINOPRIL 20 MILLIGRAM(S): 2.5 TABLET ORAL at 18:02

## 2017-05-16 NOTE — DISCHARGE NOTE ADULT - CARE PLAN
Principal Discharge DX:	SBO (small bowel obstruction)  Goal:	prevent recurrence  Secondary Diagnosis:	Essential hypertension  Instructions for follow-up, activity and diet:	Continue lisinopril and chlorthalidone as before  Secondary Diagnosis:	Multiple sclerosis  Instructions for follow-up, activity and diet:	Continue baclofen , Interferon as before  Secondary Diagnosis:	Urinary retention  Instructions for follow-up, activity and diet:	continue oxybutnin Principal Discharge DX:	SBO (small bowel obstruction)  Goal:	prevent recurrence  Secondary Diagnosis:	Essential hypertension  Instructions for follow-up, activity and diet:	Continue lisinopril and chlorthalidone as before  Secondary Diagnosis:	Multiple sclerosis  Instructions for follow-up, activity and diet:	Continue baclofen , Interferon as before  Secondary Diagnosis:	Urinary retention  Instructions for follow-up, activity and diet:	continue oxybutynin Principal Discharge DX:	SBO (small bowel obstruction)  Goal:	continue improvment  Instructions for follow-up, activity and diet:	diet as above  follow up as out pt with dr tam  if any nausea or vomiting go right to emergency room  Secondary Diagnosis:	Essential hypertension  Instructions for follow-up, activity and diet:	Continue lisinopril and chlorthalidone as before  Secondary Diagnosis:	Multiple sclerosis  Instructions for follow-up, activity and diet:	Continue baclofen , Interferon as before   all meds as above  Secondary Diagnosis:	Urinary retention  Instructions for follow-up, activity and diet:	chronic urinary retention  continue to catheterize your self at home as before  continue oxybutynin Principal Discharge DX:	SBO (small bowel obstruction)  Goal:	continue improvement  Instructions for follow-up, activity and diet:	diet as above  follow up as out pt with dr tam  if any nausea or vomiting go right to emergency room  Secondary Diagnosis:	Essential hypertension  Instructions for follow-up, activity and diet:	Continue lisinopril and chlorthalidone as before  Secondary Diagnosis:	Multiple sclerosis  Instructions for follow-up, activity and diet:	Continue baclofen , Interferon as before   all meds as above  Secondary Diagnosis:	Urinary retention  Instructions for follow-up, activity and diet:	chronic urinary retention  continue to catheterize your self at home as before  continue oxybutynin  Secondary Diagnosis:	Abnormal CAT scan  Instructions for follow-up, activity and diet:	? pneumonia  Take Levaquin 500mg per mouth for 6 more days.  Follow up with PMD after discharge. Principal Discharge DX:	SBO (small bowel obstruction)  Goal:	continue improvement  Instructions for follow-up, activity and diet:	diet as above  follow up as out pt with dr tam  if any nausea or vomiting go right to emergency room  Secondary Diagnosis:	Essential hypertension  Instructions for follow-up, activity and diet:	Continue lisinopril and chlorthalidone as before  Secondary Diagnosis:	Multiple sclerosis  Instructions for follow-up, activity and diet:	all meds as above  Secondary Diagnosis:	Urinary retention  Instructions for follow-up, activity and diet:	chronic urinary retention  continue to catheterize your self at home as before  continue oxybutynin  Secondary Diagnosis:	Abnormal CAT scan  Instructions for follow-up, activity and diet:	possible pneumonia  Take Levaquin 500mg per mouth for 6 more days.  Follow up with PMD after discharge.  Secondary Diagnosis:	Advanced care planning/counseling discussion  Instructions for follow-up, activity and diet:	health care proxy in place always bring copy of it with you to hospital

## 2017-05-16 NOTE — PROGRESS NOTE ADULT - ASSESSMENT
66 yo fem SBO/ Right lower lobe consolidation on CT (pneumonia?)  -Cont full liquid diet  -Will remove NGT if she has another BM  -High risk of developing pneumonia having NGT, will leave it up to medical doctor to decide if she needs more antibiotics  -KCL 10meq x3

## 2017-05-16 NOTE — DISCHARGE NOTE ADULT - SECONDARY DIAGNOSIS.
Essential hypertension Multiple sclerosis Urinary retention Abnormal CAT scan Advanced care planning/counseling discussion

## 2017-05-16 NOTE — DISCHARGE NOTE ADULT - MEDICATION SUMMARY - MEDICATIONS TO TAKE
I will START or STAY ON the medications listed below when I get home from the hospital:    Tylenol 500 mg oral tablet  -- 1  by mouth every 4 hours, As Needed for pain  -- Indication: For Pain    lisinopril 20 mg oral tablet  -- 1 tab(s) by mouth once a day  -- Indication: For HTN (hypertension)    loperamide 2 mg oral capsule  -- 1 cap(s) by mouth every 8 hours prn diarrhea  -- Indication: For diarrhea    Santyl 250 units/g topical ointment  -- Apply on skin to affected area once a day  -- Indication: For topical    chlorthalidone 25 mg oral tablet  -- 1 tab(s) by mouth once a day  -- Indication: For HTN (hypertension)    Avonex Prefilled Syringe 30 mcg/0.5 mL intramuscular kit  -- 30 microgram(s) intramuscular once a week on Tuesday  -- last dose given 11/15/16, missed this week's  -- Indication: For Multiple sclerosis    tiZANidine 2 mg oral tablet  -- 1 tab(s) by mouth once a day  -- check frequency  -- Indication: For Muscle relaxant    baclofen 10 mg oral tablet  -- 1 tab(s) by mouth once a day  -- Indication: For Muscle relaxant    latanoprost 0.005% ophthalmic solution  -- 1 drop(s) to each affected eye once a day (in the evening)  -- Indication: For glaucoma    methenamine hippurate 1 g oral tablet  -- 1 tab(s) by mouth 2 times a day  -- Indication: For Urinary infection prevention    oxybutynin 10 mg/24 hr oral tablet, extended release  -- 1 tab(s) by mouth once a day  -- Indication: For Urinary retention I will START or STAY ON the medications listed below when I get home from the hospital:    Tylenol 500 mg oral tablet  -- 1  by mouth every 4 hours, As Needed for pain  -- Indication: For Pain    lisinopril 20 mg oral tablet  -- 1 tab(s) by mouth once a day  -- Indication: For HTN (hypertension)    loperamide 2 mg oral capsule  -- 1 cap(s) by mouth every 8 hours prn diarrhea  -- Indication: For diarrhea    Santyl 250 units/g topical ointment  -- Apply on skin to affected area once a day  -- Indication: For topical    chlorthalidone 25 mg oral tablet  -- 1 tab(s) by mouth once a day  -- Indication: For HTN (hypertension)    Avonex Prefilled Syringe 30 mcg/0.5 mL intramuscular kit  -- 30 microgram(s) intramuscular once a week on Tuesday  -- last dose given 11/15/16, missed this week's  -- Indication: For Multiple sclerosis    tiZANidine 2 mg oral tablet  -- 1 tab(s) by mouth once a day  -- check frequency  -- Indication: For Muscle relaxant    baclofen 10 mg oral tablet  -- 1 tab(s) by mouth once a day  -- Indication: For Muscle relaxant    latanoprost 0.005% ophthalmic solution  -- 1 drop(s) to both eyes once a day (in the evening)  -- Indication: For Home med    methenamine hippurate 1 g oral tablet  -- 1 tab(s) by mouth 2 times a day  -- Indication: For Urinary infection prevention    oxybutynin 10 mg/24 hr oral tablet, extended release  -- 1 tab(s) by mouth once a day  -- Indication: For Urinary retention chronic I will START or STAY ON the medications listed below when I get home from the hospital:    Tylenol 500 mg oral tablet  -- 1  by mouth every 4 hours, As Needed for pain  -- Indication: For Pain    lisinopril 20 mg oral tablet  -- 1 tab(s) by mouth once a day  -- Indication: For HTN (hypertension)    loperamide 2 mg oral capsule  -- 1 cap(s) by mouth every 8 hours prn diarrhea  -- Indication: For diarrhea    Santyl 250 units/g topical ointment  -- Apply on skin to affected area once a day  -- Indication: For topical    chlorthalidone 25 mg oral tablet  -- 1 tab(s) by mouth once a day  -- Indication: For HTN (hypertension)    Avonex Prefilled Syringe 30 mcg/0.5 mL intramuscular kit  -- 30 microgram(s) intramuscular once a week on Tuesday  -- last dose given 11/15/16, missed this week's  -- Indication: For Multiple sclerosis    tiZANidine 2 mg oral tablet  -- 1 tab(s) by mouth once a day  -- check frequency  -- Indication: For Muscle relaxant    baclofen 10 mg oral tablet  -- 1 tab(s) by mouth once a day  -- Indication: For Muscle relaxant    latanoprost 0.005% ophthalmic solution  -- 1 drop(s) to both eyes once a day (in the evening)  -- Indication: For Home med    levoFLOXacin 500 mg oral tablet  -- 1 tab(s) by mouth every 24 hours  -- Indication: For Pneumonia/atelectasis    methenamine hippurate 1 g oral tablet  -- 1 tab(s) by mouth 2 times a day  -- Indication: For Urinary infection prevention    oxybutynin 10 mg/24 hr oral tablet, extended release  -- 1 tab(s) by mouth once a day  -- Indication: For Urinary retention chronic

## 2017-05-16 NOTE — DISCHARGE NOTE ADULT - CARE PROVIDER_API CALL
Milton Cervantes), ColonRectal Surgery; Surgery  [address] [place] [address]  Phone: [phone]  Fax: [phone]    ruth ann delgado  Phone: (   )    -  Fax: (   )    -

## 2017-05-16 NOTE — DISCHARGE NOTE ADULT - HOSPITAL COURSE
66 y.o female with PMH of R Breast CA s/p R Mastectomy, MS, Small bowel resection in 2016 2/2 SBO, Osteoporosis  presented with abdominal pain , nausea , vomiting and admitted for recurrent SBO. Ct abdomen was done which showed Proximal small bowel obstruction with transition in the proximal jejunum in the right joseph abdomen adjacent to a small bowel anastomosis. Patient NPO. NG tube placed with LSW. Draining bile stained fluid. Repeat CT scan 2 days later showed partial bowel obstruction with contrast in colon. Patient tolerated liquid diet and had BM. Patient's home meds restarted.  Incidental new consolidation right lower lung noted.  consulted. 66 y.o female with PMH of R Breast CA s/p R Mastectomy, MS, Small bowel resection in 2016 2/2 SBO, Osteoporosis  presented with abdominal pain , nausea , vomiting and admitted for recurrent SBO. Ct abdomen was done which showed Proximal small bowel obstruction with transition in the proximal jejunum in the right joseph abdomen adjacent to a small bowel anastomosis. Patient made NPO and  NG tube placed with LSW. Draining bile stained fluid. Repeat CT scan 2 days later showed partial bowel obstruction with contrast in colon. condition slowly improved with conservative measures. diet slowly advanced and pt subsequently  had BM. Patient's home meds restarted.  Incidental new consolidation right lower lung noted on ct a/p in absence of any pulm sx.     consulted. advised likely atelectasis and not pneumonia   encouraged incentive spirometer.    after seen by phys therapy and once cleared for dc by dr tam pt was dc home. 66 y.o female with PMH of R Breast CA s/p R Mastectomy, MS, Small bowel resection in 2016 2/2 SBO, Osteoporosis  presented with abdominal pain , nausea , vomiting and admitted for recurrent SBO. Ct abdomen was done which showed Proximal small bowel obstruction with transition in the proximal jejunum in the right joseph abdomen adjacent to a small bowel anastomosis. Patient made NPO and  NG tube placed with LSW. Draining bile stained fluid. Repeat CT scan 2 days later showed partial bowel obstruction with contrast in colon. condition slowly improved with conservative measures. diet slowly advanced and pt subsequently  had BM. Patient's home meds restarted.  Incidental new consolidation right lower lung noted on ct a/p in absence of any pulm sx.    consulted. Started on IV Levaquin and encouraged incentive spirometer. Patient is stable for discharge and sent home on PO Levaquin for 6 more days. Patient cleared by  for discharge. Patient to follow up with  and PMD after discharge.

## 2017-05-16 NOTE — DISCHARGE NOTE ADULT - MEDICATION SUMMARY - MEDICATIONS TO STOP TAKING
I will STOP taking the medications listed below when I get home from the hospital:    Macrobid  --  by mouth    ciprofloxacin 500 mg oral tablet  -- 1 tab(s) by mouth every 12 hours    amoxicillin-clavulanate 875 mg-125 mg oral tablet  -- 1 tab(s) by mouth 2 times a day for 7 days    Bacid (LAC) oral capsule  -- 1 tab(s) by mouth 3 times a day for 2 weeks    Ceftin 125 mg oral tablet  --  by mouth    cefuroxime 500 mg oral tablet  -- 1 tab(s) by mouth every 12 hours

## 2017-05-16 NOTE — PROGRESS NOTE ADULT - PROBLEM SELECTOR PLAN 8
New consolidation RLL on CT abdomen done yesterday.   Afebrile and Normal WBC count.  Aspiration precautions- Head end elevated.  Will Monitor. New consolidation RLL on CT abdomen done yesterday.   Afebrile and Normal WBC count.  Aspiration precautions- Head end elevated.   consulted.  Will Monitor. New consolidation RLL on CT abdomen done yesterday.   Afebrile and Normal WBC count.  Aspiration precautions- Head end elevated.   consulted discussed with him. not likely pneumonia  monitor off antibitoics  follow up cxr  Will Monitor.

## 2017-05-16 NOTE — DISCHARGE NOTE ADULT - ADDITIONAL INSTRUCTIONS
1 dr Mclaughlin 1 week  2 dr logan 2-3 days have potassium level checked 1 dr Mclaughlin 1 week  2 dr logan 2-3 days have potassium level checked . 1 dr Mclaughlin 1 week  2 dr logan 2-3 days have potassium level checked .  show cxr to dr logan if follow up is needed you saw dr gotti in the hospital

## 2017-05-16 NOTE — PROGRESS NOTE ADULT - SUBJECTIVE AND OBJECTIVE BOX
Chief Complaint: SBO    Interval Events: No significant changes.    Review of Systems:  General: No fevers, chills, weight loss or gain  Skin: No rashes, color changes  Cardiovascular: No chest pain, orthopnea  Respiratory: No shortness of breath, cough  Gastrointestinal: No nausea, abdominal pain  Genitourinary: No incontinence, pain with urination  Musculoskeletal: No pain, swelling, decreased range of motion  Neurological: No headache, weakness  Psychiatric: No depression, anxiety  Endocrine: No weight loss or gain, increased thirst  All other systems are comprehensively negative.    Physical Exam:  Vital Signs Last 24 Hrs  T(C): 36.8, Max: 37 (05-15 @ 19:27)  T(F): 98.3, Max: 98.6 (05-15 @ 19:27)  HR: 89 (64 - 89)  BP: 161/76 (130/74 - 161/76)  BP(mean): --  RR: 17 (17 - 18)  SpO2: 95% (94% - 98%)  General: NAD  Neck: No JVD, no carotid bruit  Lungs: CTAB  CV: RRR, nl S1/S2, no M/R/G  Abdomen: S/NT/ND, +BS  Extremities: No LE edema, no cyanosis    Labs:             05-15    139  |  96  |  24<H>  ----------------------------<  84  3.4<L>   |  29  |  0.62    Ca    9.1      15 May 2017 06:01  Mg     2.7     05-15                          10.9   9.9   )-----------( 319      ( 15 May 2017 06:01 )             33.1

## 2017-05-16 NOTE — PROGRESS NOTE ADULT - SUBJECTIVE AND OBJECTIVE BOX
66 yo fem with SBO, repeat CT showed contrast in colon, had a BM last night on liquid diet    Abd soft, NT              Vital Signs Last 24 Hrs  T(C): 36.8, Max: 37 (05-15 @ 19:27)  T(F): 98.3, Max: 98.6 (05-15 @ 19:27)  HR: 74 (64 - 74)  BP: 148/86 (130/74 - 150/90)  BP(mean): --  RR: 18 (18 - 18)  SpO2: 94% (94% - 98%)                          12.3   10.4  )-----------( 406      ( 16 May 2017 07:04 )             36.6       05-16    137  |  94<L>  |  17  ----------------------------<  125<H>  3.2<L>   |  34<H>  |  0.58    Ca    8.6      16 May 2017 07:04  Mg     2.7     05-15            MEDICATIONS  (STANDING):  latanoprost 0.005% Ophthalmic Solution 1Drop(s) Both EYES at bedtime  interferon beta-1a Injectable (AVONEX) 30MICROGram(s) IntraMuscular every 7 days  sodium chloride 0.9%. 1000milliLiter(s) IV Continuous <Continuous>  enoxaparin Injectable 40milliGRAM(s) SubCutaneous daily  baclofen 10milliGRAM(s) Oral daily  potassium chloride  10 mEq/100 mL IVPB 10milliEquivalent(s) IV Intermittent every 1 hour    MEDICATIONS  (PRN):  enalaprilat Injectable 1.25milliGRAM(s) IV Push every 8 hours PRN To be given for SBP above 140      MEDICATIONS  (STANDING):  latanoprost 0.005% Ophthalmic Solution 1Drop(s) Both EYES at bedtime  interferon beta-1a Injectable (AVONEX) 30MICROGram(s) IntraMuscular every 7 days  sodium chloride 0.9%. 1000milliLiter(s) IV Continuous <Continuous>  enoxaparin Injectable 40milliGRAM(s) SubCutaneous daily  baclofen 10milliGRAM(s) Oral daily  potassium chloride  10 mEq/100 mL IVPB 10milliEquivalent(s) IV Intermittent every 1 hour

## 2017-05-16 NOTE — DISCHARGE NOTE ADULT - PATIENT PORTAL LINK FT
“You can access the [employer] Patient Portal, offered by [facility], by registering with the following website: [email or web address]”

## 2017-05-16 NOTE — CHART NOTE - NSCHARTNOTEFT_GEN_A_CORE
consult dictated:    Impression:    RLL infiltrate - no clinical evidence of pneumonia  Bowel Obstruction  Hx Multiple Sclerosis  Hx Breast CA - S/P Right Mastectomy    Plan:    Chest xray PA and lateral ordered  No indication for antibiotics

## 2017-05-16 NOTE — PROGRESS NOTE ADULT - PROBLEM SELECTOR PLAN 1
- Repeat CT abdomen- Partial SBO with contrast in colon.   - Patient Tolerating liquid diet . Had One BM. NG tube to be clamped after one more   BM   - Hypokalemia repleted.   - 's(surgery) recs appreciated. - Repeat CT abdomen- Partial SBO with contrast in colon.   - Patient Tolerating liquid diet . Had One BM. NG tube to be d/demarco after one more   BM   - Hypokalemia repleted.   - 's(surgery) recs appreciated.

## 2017-05-16 NOTE — PROGRESS NOTE ADULT - PROBLEM SELECTOR PLAN 3
chronic  - Switching to PO meds as patient tolerating diet.  -  Lisinopril 20 added ( chlorthalidone 25- patient Home med)  - IV Vasoctec PRN for SBP > 140     Vasotec 1.5 IV push Q8 prn for SBP above 140- discussed with pharmacy    BP checks per routine chronic  - Switching to PO meds as patient tolerating diet.  -  Lisinopril 20 added ( chlorthalidone 25- patient Home med- not added)  - IV Vasoctec PRN for SBP > 140     Vasotec 1.5 IV push Q8 prn for SBP above 140- discussed with pharmacy    BP checks per routine

## 2017-05-16 NOTE — DISCHARGE NOTE ADULT - PLAN OF CARE
prevent recurrence Continue lisinopril and chlorthalidone as before Continue baclofen , Interferon as before continue oxybutnin continue oxybutynin diet as above  follow up as out pt with dr tam  if any nausea or vomiting go right to emergency room continue improvment Continue baclofen , Interferon as before   all meds as above chronic urinary retention  continue to catheterize your self at home as before  continue oxybutynin ? pneumonia  Take Levaquin 500mg per mouth for 6 more days.  Follow up with PMD after discharge. continue improvement health care proxy in place always bring copy of it with you to hospital all meds as above possible pneumonia  Take Levaquin 500mg per mouth for 6 more days.  Follow up with PMD after discharge.

## 2017-05-16 NOTE — PROGRESS NOTE ADULT - SUBJECTIVE AND OBJECTIVE BOX
Patient is a 67y old  Female who presents with a chief complaint of Nausea and vomiting (12 May 2017 16:16)      INTERVAL HPI/OVERNIGHT EVENTS: 66 y.o female with PMH of R Breast CA s/p R Mastectomy, MS, Small bowel resection in 2016 2/2 SBO, Osteoporosis is presented with abdominal pain , nausea , vomiting and admitted for recurrent SBO. NG tube in place.     No acute events overnight. Patient seen and examined bedside. Denies nausea, vomiting, abdominal pain. One bowel movement yesterday on liquid diet.     MEDICATIONS  (STANDING):  latanoprost 0.005% Ophthalmic Solution 1Drop(s) Both EYES at bedtime  interferon beta-1a Injectable (AVONEX) 30MICROGram(s) IntraMuscular every 7 days  sodium chloride 0.9%. 1000milliLiter(s) IV Continuous <Continuous>  enoxaparin Injectable 40milliGRAM(s) SubCutaneous daily  baclofen 10milliGRAM(s) Oral daily  potassium chloride  10 mEq/100 mL IVPB 10milliEquivalent(s) IV Intermittent every 1 hour    MEDICATIONS  (PRN):  enalaprilat Injectable 1.25milliGRAM(s) IV Push every 8 hours PRN To be given for SBP above 140      Allergies    Sudafed (Other)    Intolerances        REVIEW OF SYSTEMS:  CONSTITUTIONAL: No fever, No chills, No fatigue, No myalgia,No Body ache  EYES: No eye pain, visual disturbances, or discharge  ENMT:  No ear pain, No nose bleed, No vertigo; No sinus or throat pain  NECK: No pain, No stiffness  RESPIRATORY: No cough, wheezing, No  hemoptysis; No shortness of breath  CARDIOVASCULAR: No chest pain, palpitations, leg swelling  GASTROINTESTINAL: No abdominal or epigastric pain. No nausea, No vomiting; No diarrhea or constipation. [x ] BM  GENITOURINARY: No dysuria, No frequency, No urgency, No hematuria, or incontinence  NEUROLOGICAL: alert and oriented x 3,  No headaches, No dizziness, No numbness,  SKIN:   No itching, burning, rashes, or lesions   MUSCULOSKELETAL: No joint pain or swelling; No muscle pain, No back pain, No extremity pain  PSYCHIATRIC: No depression, anxiety, mood swings, or difficulty sleeping  ROS  [ ] Unable to obtain   REST OF REVIEW Of SYSTEM - [ x] Normal       Vital Signs Last 24 Hrs  T(C): 36.8, Max: 37 (05-15 @ 19:27)  T(F): 98.3, Max: 98.6 (05-15 @ 19:27)  HR: 74 (64 - 74)  BP: 148/86 (130/74 - 150/90)  BP(mean): --  RR: 18 (18 - 18)  SpO2: 94% (94% - 98%)  [ ] room air   [ ] 02    PHYSICAL EXAM:  GENERAL:  No acute distress well appearing, [ ] Agitated, [ ] Lethargy, [ ] confused   HEAD:  normal  ENMT: normal  NECK:  normal    NERVOUS SYSTEM:  Alert & Oriented X3, no focal deficits [ ]Confusion  [ ] Encephalopathic [ ] Sedated [ ] Other  CHEST/LUNG: Clear to auscultation bilaterally,  [ ] decreased breath sounds at bases  [ ] wheezing   [ ] rhonchi  [ ] crackles  HEART:  Regular rate and rhythm, No murmurs, rubs, or gallops,  [ ] irregular   ABDOMEN:  soft, nontender, nondistended, positive bowel sounds   [ ] obese  EXTREMITIES: No clubbing, cyanosis or edema  SKIN: [ ] venous stasis skin changes    LABS:                        12.3   10.4  )-----------( 406      ( 16 May 2017 07:04 )             36.6     16 May 2017 07:04    137    |  94     |  17     ----------------------------<  125    3.2     |  34     |  0.58     Ca    8.6        16 May 2017 07:04      PT/INR - ( 15 May 2017 06:01 )   PT: 10.2 sec;   INR: 0.94 ratio           Hemoglobin A1C, Whole Blood: 5.5 % (04-08 @ 01:57)      CAPILLARY BLOOD GLUCOSE    Cultures  Culture Results:   No growth (05-13 @ 00:05)          RADIOLOGY & ADDITIONAL TESTS:   EXAM:  CT ABDOMEN AND PELVIS [facility]                            PROCEDURE DATE:  05/15/2017        INTERPRETATION:  EXAMINATION DATE: May 15, 2017 at 1554 hours.  CLINICAL INFORMATION: Bowel obstruction, history of small bowel resection   6 monthsago.    COMPARISON: May 12, 2017.    PROCEDURE:   CT of the Abdomen and Pelvis was performed without intravenous contrast.   Intravenous contrast: None.  Oral contrast: positive contrast was administered.  Sagittal and coronal reformats were performed.    FINDINGS:    LOWER CHEST: Trace bilateral pleural effusions. New consolidation in the   right lower lobe, suspicious for pneumonia. Trace pericardial effusion.    LIVER: Within normal limits.  BILE DUCTS: Normal caliber.   GALLBLADDER: Within normal limits.  SPLEEN: Within normal limits.  PANCREAS: Within normal limits.  ADRENALS: Within normal limits.  KIDNEYS/URETERS: Within normal limits.     BLADDER: Contains air and a Starks catheter balloon.  REPRODUCTIVE ORGANS: Calcified uterine leiomyoma.    BOWEL: Enteric tube with tip in the stomach. Duodenum and proximal   jejunum with change in caliber in the proximal jejunum in the mid   abdomen. Contrast is noted within the more distal small bowel, suggestive   of partial small bowel obstruction. Contrast is present within the colon,   probably from prior contrast administration. Small bowel anastomotic   sutures. Normal appendix.  PERITONEUM: No ascites.  VESSELS:  Atherosclerotic change of the abdominal aorta and its branches.  RETROPERITONEUM: No lymphadenopathy.    ABDOMINAL WALL: Small fat-containing umbilical hernia with ventral wall   defect measuring 0.8 cm in transverse dimension.  BONES: Old fractures of the right superior and inferior pubic rami.    IMPRESSION:   1.  Partial small bowel obstruction with change in caliber in the   proximal jejunum in the mid abdomen.  2.  Since May 12, 2017, new consolidation in the right lower lobe,   suspicious for pneumonia.        LILIANA HERNANDEZ M.D., ATTENDING RADIOLOGIST  This document has been electronically signed. May 15 2017  4:10PM                  Care Discussed with [X] Consultants  [x ] Patient  [ ] Family  [X]   /   [ ] Other; RN  DVT prophylaxis [x ] lovenox   [ ] subq heparin  [ ] coumadin  [ ] venodynes [ ] ambulating frequently at how risk for vte and no pharm         or  mechanical prophylaxis required    [ ] other   Advanced directive:    [ ]pt has hcp     [ ] pt declined to assign hcp  Discussed with pt @ bedside Patient is a 67y old  Female who presents with a chief complaint of Nausea and vomiting (12 May 2017 16:16)      INTERVAL HPI/OVERNIGHT EVENTS: 66 y.o female with PMH of R Breast CA s/p R Mastectomy, MS, Small bowel resection in 2016 2/2 SBO, Osteoporosis is presented with abdominal pain , nausea , vomiting and admitted for recurrent SBO. NG tube clamped.     No acute events overnight. Patient seen and examined bedside. Denies nausea, vomiting, abdominal pain. One bowel movement yesterday on liquid diet.     MEDICATIONS  (STANDING):  latanoprost 0.005% Ophthalmic Solution 1Drop(s) Both EYES at bedtime  interferon beta-1a Injectable (AVONEX) 30MICROGram(s) IntraMuscular every 7 days  sodium chloride 0.9%. 1000milliLiter(s) IV Continuous <Continuous>  enoxaparin Injectable 40milliGRAM(s) SubCutaneous daily  baclofen 10milliGRAM(s) Oral daily  potassium chloride  10 mEq/100 mL IVPB 10milliEquivalent(s) IV Intermittent every 1 hour    MEDICATIONS  (PRN):  enalaprilat Injectable 1.25milliGRAM(s) IV Push every 8 hours PRN To be given for SBP above 140      Allergies    Sudafed (Other)    Intolerances        REVIEW OF SYSTEMS:  CONSTITUTIONAL: No fever, No chills, No fatigue, No myalgia,No Body ache  EYES: No eye pain, visual disturbances, or discharge  ENMT:  No ear pain, No nose bleed, No vertigo; No sinus or throat pain  NECK: No pain, No stiffness  RESPIRATORY: No cough, wheezing, No  hemoptysis; No shortness of breath  CARDIOVASCULAR: No chest pain, palpitations, leg swelling  GASTROINTESTINAL: No abdominal or epigastric pain. No nausea, No vomiting; No diarrhea or constipation. [x ] BM  GENITOURINARY: No dysuria, No frequency, No urgency, No hematuria, or incontinence  NEUROLOGICAL: alert and oriented x 3,  No headaches, No dizziness, No numbness,  SKIN:   No itching, burning, rashes, or lesions   MUSCULOSKELETAL: No joint pain or swelling; No muscle pain, No back pain, No extremity pain  PSYCHIATRIC: No depression, anxiety, mood swings, or difficulty sleeping  ROS  [ ] Unable to obtain   REST OF REVIEW Of SYSTEM - [ x] Normal       Vital Signs Last 24 Hrs  T(C): 36.8, Max: 37 (05-15 @ 19:27)  T(F): 98.3, Max: 98.6 (05-15 @ 19:27)  HR: 74 (64 - 74)  BP: 148/86 (130/74 - 150/90)  BP(mean): --  RR: 18 (18 - 18)  SpO2: 94% (94% - 98%)  [ ] room air   [ ] 02    PHYSICAL EXAM:  GENERAL:  No acute distress well appearing, [ ] Agitated, [ ] Lethargy, [ ] confused   HEAD:  normal  ENMT: normal  NECK:  normal    NERVOUS SYSTEM:  Alert & Oriented X3, no focal deficits [ ]Confusion  [ ] Encephalopathic [ ] Sedated [ ] Other  CHEST/LUNG: Clear to auscultation bilaterally,  [ ] decreased breath sounds at bases  [ ] wheezing   [ ] rhonchi  [ ] crackles  HEART:  Regular rate and rhythm, No murmurs, rubs, or gallops,  [ ] irregular   ABDOMEN:  soft, nontender, nondistended, positive bowel sounds   [ ] obese  EXTREMITIES: No clubbing, cyanosis or edema  SKIN: [ ] venous stasis skin changes    LABS:                        12.3   10.4  )-----------( 406      ( 16 May 2017 07:04 )             36.6     16 May 2017 07:04    137    |  94     |  17     ----------------------------<  125    3.2     |  34     |  0.58     Ca    8.6        16 May 2017 07:04      PT/INR - ( 15 May 2017 06:01 )   PT: 10.2 sec;   INR: 0.94 ratio           Hemoglobin A1C, Whole Blood: 5.5 % (04-08 @ 01:57)      CAPILLARY BLOOD GLUCOSE    Cultures  Culture Results:   No growth (05-13 @ 00:05)          RADIOLOGY & ADDITIONAL TESTS:   EXAM:  CT ABDOMEN AND PELVIS [facility]                            PROCEDURE DATE:  05/15/2017        INTERPRETATION:  EXAMINATION DATE: May 15, 2017 at 1554 hours.  CLINICAL INFORMATION: Bowel obstruction, history of small bowel resection   6 monthsago.    COMPARISON: May 12, 2017.    PROCEDURE:   CT of the Abdomen and Pelvis was performed without intravenous contrast.   Intravenous contrast: None.  Oral contrast: positive contrast was administered.  Sagittal and coronal reformats were performed.    FINDINGS:    LOWER CHEST: Trace bilateral pleural effusions. New consolidation in the   right lower lobe, suspicious for pneumonia. Trace pericardial effusion.    LIVER: Within normal limits.  BILE DUCTS: Normal caliber.   GALLBLADDER: Within normal limits.  SPLEEN: Within normal limits.  PANCREAS: Within normal limits.  ADRENALS: Within normal limits.  KIDNEYS/URETERS: Within normal limits.     BLADDER: Contains air and a Starks catheter balloon.  REPRODUCTIVE ORGANS: Calcified uterine leiomyoma.    BOWEL: Enteric tube with tip in the stomach. Duodenum and proximal   jejunum with change in caliber in the proximal jejunum in the mid   abdomen. Contrast is noted within the more distal small bowel, suggestive   of partial small bowel obstruction. Contrast is present within the colon,   probably from prior contrast administration. Small bowel anastomotic   sutures. Normal appendix.  PERITONEUM: No ascites.  VESSELS:  Atherosclerotic change of the abdominal aorta and its branches.  RETROPERITONEUM: No lymphadenopathy.    ABDOMINAL WALL: Small fat-containing umbilical hernia with ventral wall   defect measuring 0.8 cm in transverse dimension.  BONES: Old fractures of the right superior and inferior pubic rami.    IMPRESSION:   1.  Partial small bowel obstruction with change in caliber in the   proximal jejunum in the mid abdomen.  2.  Since May 12, 2017, new consolidation in the right lower lobe,   suspicious for pneumonia.        LILIANA HERNANDEZ M.D., ATTENDING RADIOLOGIST  This document has been electronically signed. May 15 2017  4:10PM                  Care Discussed with [X] Consultants  [x ] Patient  [ ] Family  [X]   /   [ ] Other; RN  DVT prophylaxis [x ] lovenox   [ ] subq heparin  [ ] coumadin  [ ] venodynes [ ] ambulating frequently at how risk for vte and no pharm         or  mechanical prophylaxis required    [ ] other   Advanced directive:    [ ]pt has hcp     [ ] pt declined to assign hcp  Discussed with pt @ bedside Patient is a 67y old  Female who presents with a chief complaint of Nausea and vomiting (12 May 2017 16:16)      INTERVAL HPI/OVERNIGHT EVENTS: 66 y.o female with PMH of R Breast CA s/p R Mastectomy, MS, Small bowel resection in 2016 2/2 SBO, Osteoporosis is presented with abdominal pain , nausea , vomiting and admitted for recurrent SBO. NG tube clamped.     No acute events overnight. Patient seen and examined bedside. Denies nausea, vomiting, abdominal pain. One bowel movement yesterday on liquid diet. c/o mild discomfort in her throat and hoarsness in voice.     MEDICATIONS  (STANDING):  latanoprost 0.005% Ophthalmic Solution 1Drop(s) Both EYES at bedtime  interferon beta-1a Injectable (AVONEX) 30MICROGram(s) IntraMuscular every 7 days  sodium chloride 0.9%. 1000milliLiter(s) IV Continuous <Continuous>  enoxaparin Injectable 40milliGRAM(s) SubCutaneous daily  baclofen 10milliGRAM(s) Oral daily  potassium chloride  10 mEq/100 mL IVPB 10milliEquivalent(s) IV Intermittent every 1 hour    MEDICATIONS  (PRN):  enalaprilat Injectable 1.25milliGRAM(s) IV Push every 8 hours PRN To be given for SBP above 140      Allergies    Sudafed (Other)    Intolerances        REVIEW OF SYSTEMS:  CONSTITUTIONAL: No fever, No chills, No fatigue, No myalgia,No Body ache  EYES: No eye pain, visual disturbances, or discharge  ENMT:  No ear pain, No nose bleed, No vertigo; Throat pain present.   NECK: No pain, No stiffness  RESPIRATORY: No cough, wheezing, No  hemoptysis; No shortness of breath  CARDIOVASCULAR: No chest pain, palpitations, leg swelling  GASTROINTESTINAL: No abdominal or epigastric pain. No nausea, No vomiting; No diarrhea or constipation. [x ] BM  GENITOURINARY: No dysuria, No frequency, No urgency, No hematuria, or incontinence  NEUROLOGICAL: alert and oriented x 3,  No headaches, No dizziness, No numbness,  SKIN:   No itching, burning, rashes, or lesions   MUSCULOSKELETAL: No joint pain or swelling; No muscle pain, No back pain, No extremity pain  PSYCHIATRIC: No depression, anxiety, mood swings, or difficulty sleeping  ROS  [ ] Unable to obtain   REST OF REVIEW Of SYSTEM - [ x] Normal       Vital Signs Last 24 Hrs  T(C): 36.8, Max: 37 (05-15 @ 19:27)  T(F): 98.3, Max: 98.6 (05-15 @ 19:27)  HR: 74 (64 - 74)  BP: 148/86 (130/74 - 150/90)  BP(mean): --  RR: 18 (18 - 18)  SpO2: 94% (94% - 98%)  [ ] room air   [ ] 02    PHYSICAL EXAM:  GENERAL:  No acute distress well appearing, [ ] Agitated, [ ] Lethargy, [ ] confused   HEAD:  normal  ENMT: normal  NECK:  normal    NERVOUS SYSTEM:  Alert & Oriented X3, no focal deficits [ ]Confusion  [ ] Encephalopathic [ ] Sedated [ ] Other  CHEST/LUNG: Clear to auscultation bilaterally,  [ ] decreased breath sounds at bases  [ ] wheezing   [ ] rhonchi  [ ] crackles  HEART:  Regular rate and rhythm, No murmurs, rubs, or gallops,  [ ] irregular   ABDOMEN:  soft, nontender, nondistended, positive bowel sounds   [ ] obese  EXTREMITIES: No clubbing, cyanosis or edema  SKIN: [ ] venous stasis skin changes    LABS:                        12.3   10.4  )-----------( 406      ( 16 May 2017 07:04 )             36.6     16 May 2017 07:04    137    |  94     |  17     ----------------------------<  125    3.2     |  34     |  0.58     Ca    8.6        16 May 2017 07:04      PT/INR - ( 15 May 2017 06:01 )   PT: 10.2 sec;   INR: 0.94 ratio           Hemoglobin A1C, Whole Blood: 5.5 % (04-08 @ 01:57)      CAPILLARY BLOOD GLUCOSE    Cultures  Culture Results:   No growth (05-13 @ 00:05)          RADIOLOGY & ADDITIONAL TESTS:   EXAM:  CT ABDOMEN AND PELVIS [facility]                            PROCEDURE DATE:  05/15/2017        INTERPRETATION:  EXAMINATION DATE: May 15, 2017 at 1554 hours.  CLINICAL INFORMATION: Bowel obstruction, history of small bowel resection   6 monthsago.    COMPARISON: May 12, 2017.    PROCEDURE:   CT of the Abdomen and Pelvis was performed without intravenous contrast.   Intravenous contrast: None.  Oral contrast: positive contrast was administered.  Sagittal and coronal reformats were performed.    FINDINGS:    LOWER CHEST: Trace bilateral pleural effusions. New consolidation in the   right lower lobe, suspicious for pneumonia. Trace pericardial effusion.    LIVER: Within normal limits.  BILE DUCTS: Normal caliber.   GALLBLADDER: Within normal limits.  SPLEEN: Within normal limits.  PANCREAS: Within normal limits.  ADRENALS: Within normal limits.  KIDNEYS/URETERS: Within normal limits.     BLADDER: Contains air and a Starks catheter balloon.  REPRODUCTIVE ORGANS: Calcified uterine leiomyoma.    BOWEL: Enteric tube with tip in the stomach. Duodenum and proximal   jejunum with change in caliber in the proximal jejunum in the mid   abdomen. Contrast is noted within the more distal small bowel, suggestive   of partial small bowel obstruction. Contrast is present within the colon,   probably from prior contrast administration. Small bowel anastomotic   sutures. Normal appendix.  PERITONEUM: No ascites.  VESSELS:  Atherosclerotic change of the abdominal aorta and its branches.  RETROPERITONEUM: No lymphadenopathy.    ABDOMINAL WALL: Small fat-containing umbilical hernia with ventral wall   defect measuring 0.8 cm in transverse dimension.  BONES: Old fractures of the right superior and inferior pubic rami.    IMPRESSION:   1.  Partial small bowel obstruction with change in caliber in the   proximal jejunum in the mid abdomen.  2.  Since May 12, 2017, new consolidation in the right lower lobe,   suspicious for pneumonia.        LILIANA HERNANDEZ M.D., ATTENDING RADIOLOGIST  This document has been electronically signed. May 15 2017  4:10PM                  Care Discussed with [X] Consultants  [x ] Patient  [ ] Family  [X]   /   [ ] Other; RN  DVT prophylaxis [x ] lovenox   [ ] subq heparin  [ ] coumadin  [ ] venodynes [ ] ambulating frequently at how risk for vte and no pharm         or  mechanical prophylaxis required    [ ] other   Advanced directive:    [ ]pt has hcp     [ ] pt declined to assign hcp  Discussed with pt @ bedside Patient is a 67y old  Female who presents with a chief complaint of Nausea and vomiting (12 May 2017 16:16)      INTERVAL HPI/OVERNIGHT EVENTS: 66 y.o female with PMH of R Breast CA s/p R Mastectomy, MS, Small bowel resection in 2016 2/2 SBO, Osteoporosis is presented with abdominal pain , nausea , vomiting and admitted for recurrent SBO. NG tube clamped.     No acute events overnight. Patient seen and examined bedside. Denies nausea, vomiting, abdominal pain. One bowel movement yesterday on liquid diet and states that she might have had bowel movement this AM as her sheets were dirty. c/o mild discomfort in her throat and hoarseness in voice.     MEDICATIONS  (STANDING):  latanoprost 0.005% Ophthalmic Solution 1Drop(s) Both EYES at bedtime  interferon beta-1a Injectable (AVONEX) 30MICROGram(s) IntraMuscular every 7 days  sodium chloride 0.9%. 1000milliLiter(s) IV Continuous <Continuous>  enoxaparin Injectable 40milliGRAM(s) SubCutaneous daily  baclofen 10milliGRAM(s) Oral daily  potassium chloride  10 mEq/100 mL IVPB 10milliEquivalent(s) IV Intermittent every 1 hour    MEDICATIONS  (PRN):  enalaprilat Injectable 1.25milliGRAM(s) IV Push every 8 hours PRN To be given for SBP above 140      Allergies    Sudafed (Other)    Intolerances        REVIEW OF SYSTEMS:  CONSTITUTIONAL: No fever, No chills, No fatigue, No myalgia,No Body ache  EYES: No eye pain, visual disturbances, or discharge  ENMT:  No ear pain, No nose bleed, No vertigo; Throat pain present.   NECK: No pain, No stiffness  RESPIRATORY: No cough, wheezing, No  hemoptysis; No shortness of breath  CARDIOVASCULAR: No chest pain, palpitations, leg swelling  GASTROINTESTINAL: No abdominal or epigastric pain. No nausea, No vomiting; No diarrhea or constipation. [x ] BM  GENITOURINARY: No dysuria, No frequency, No urgency, No hematuria, or incontinence  NEUROLOGICAL: alert and oriented x 3,  No headaches, No dizziness, No numbness,  SKIN:   No itching, burning, rashes, or lesions   MUSCULOSKELETAL: No joint pain or swelling; No muscle pain, No back pain, No extremity pain  PSYCHIATRIC: No depression, anxiety, mood swings, or difficulty sleeping  ROS  [ ] Unable to obtain   REST OF REVIEW Of SYSTEM - [ x] Normal       Vital Signs Last 24 Hrs  T(C): 36.8, Max: 37 (05-15 @ 19:27)  T(F): 98.3, Max: 98.6 (05-15 @ 19:27)  HR: 74 (64 - 74)  BP: 148/86 (130/74 - 150/90)  BP(mean): --  RR: 18 (18 - 18)  SpO2: 94% (94% - 98%)  [ ] room air   [ ] 02    PHYSICAL EXAM:  GENERAL:  No acute distress well appearing, [ ] Agitated, [ ] Lethargy, [ ] confused   HEAD:  normal  ENMT: normal  NECK:  normal    NERVOUS SYSTEM:  Alert & Oriented X3, no focal deficits [ ]Confusion  [ ] Encephalopathic [ ] Sedated [ ] Other  CHEST/LUNG: Clear to auscultation bilaterally,  [ ] decreased breath sounds at bases  [ ] wheezing   [ ] rhonchi  [ ] crackles  HEART:  Regular rate and rhythm, No murmurs, rubs, or gallops,  [ ] irregular   ABDOMEN:  soft, nontender, nondistended, positive bowel sounds   [ ] obese  EXTREMITIES: No clubbing, cyanosis or edema  SKIN: [ ] venous stasis skin changes    LABS:                        12.3   10.4  )-----------( 406      ( 16 May 2017 07:04 )             36.6     16 May 2017 07:04    137    |  94     |  17     ----------------------------<  125    3.2     |  34     |  0.58     Ca    8.6        16 May 2017 07:04      PT/INR - ( 15 May 2017 06:01 )   PT: 10.2 sec;   INR: 0.94 ratio           Hemoglobin A1C, Whole Blood: 5.5 % (04-08 @ 01:57)      CAPILLARY BLOOD GLUCOSE    Cultures  Culture Results:   No growth (05-13 @ 00:05)          RADIOLOGY & ADDITIONAL TESTS:   EXAM:  CT ABDOMEN AND PELVIS [facility]                            PROCEDURE DATE:  05/15/2017        INTERPRETATION:  EXAMINATION DATE: May 15, 2017 at 1554 hours.  CLINICAL INFORMATION: Bowel obstruction, history of small bowel resection   6 monthsago.    COMPARISON: May 12, 2017.    PROCEDURE:   CT of the Abdomen and Pelvis was performed without intravenous contrast.   Intravenous contrast: None.  Oral contrast: positive contrast was administered.  Sagittal and coronal reformats were performed.    FINDINGS:    LOWER CHEST: Trace bilateral pleural effusions. New consolidation in the   right lower lobe, suspicious for pneumonia. Trace pericardial effusion.    LIVER: Within normal limits.  BILE DUCTS: Normal caliber.   GALLBLADDER: Within normal limits.  SPLEEN: Within normal limits.  PANCREAS: Within normal limits.  ADRENALS: Within normal limits.  KIDNEYS/URETERS: Within normal limits.     BLADDER: Contains air and a Starks catheter balloon.  REPRODUCTIVE ORGANS: Calcified uterine leiomyoma.    BOWEL: Enteric tube with tip in the stomach. Duodenum and proximal   jejunum with change in caliber in the proximal jejunum in the mid   abdomen. Contrast is noted within the more distal small bowel, suggestive   of partial small bowel obstruction. Contrast is present within the colon,   probably from prior contrast administration. Small bowel anastomotic   sutures. Normal appendix.  PERITONEUM: No ascites.  VESSELS:  Atherosclerotic change of the abdominal aorta and its branches.  RETROPERITONEUM: No lymphadenopathy.    ABDOMINAL WALL: Small fat-containing umbilical hernia with ventral wall   defect measuring 0.8 cm in transverse dimension.  BONES: Old fractures of the right superior and inferior pubic rami.    IMPRESSION:   1.  Partial small bowel obstruction with change in caliber in the   proximal jejunum in the mid abdomen.  2.  Since May 12, 2017, new consolidation in the right lower lobe,   suspicious for pneumonia.        LILIANA HERNANDEZ M.D., ATTENDING RADIOLOGIST  This document has been electronically signed. May 15 2017  4:10PM                  Care Discussed with [X] Consultants  [x ] Patient  [ ] Family  [X]   /   [ ] Other; RN  DVT prophylaxis [x ] lovenox   [ ] subq heparin  [ ] coumadin  [ ] venodynes [ ] ambulating frequently at how risk for vte and no pharm         or  mechanical prophylaxis required    [ ] other   Advanced directive:    [ ]pt has hcp     [ ] pt declined to assign hcp  Discussed with pt @ bedside Patient is a 67y old  Female who presents with a chief complaint of Nausea and vomiting (12 May 2017 16:16)      INTERVAL HPI/OVERNIGHT EVENTS: 66 y.o female with PMH of R Breast CA s/p R Mastectomy, MS, Small bowel resection in 2016 2/2 SBO, Osteoporosis is presented with abdominal pain , nausea , vomiting and admitted for recurrent SBO. NG tube clamped.     No acute events overnight. Patient seen and examined bedside. Denies nausea, vomiting, abdominal pain. One bowel movement yesterday on liquid diet and states that she might have had bowel movement this AM as her sheets were dirty. c/o mild discomfort in her throat and hoarseness in voice.     MEDICATIONS  (STANDING):  latanoprost 0.005% Ophthalmic Solution 1Drop(s) Both EYES at bedtime  interferon beta-1a Injectable (AVONEX) 30MICROGram(s) IntraMuscular every 7 days  sodium chloride 0.9%. 1000milliLiter(s) IV Continuous <Continuous>  enoxaparin Injectable 40milliGRAM(s) SubCutaneous daily  baclofen 10milliGRAM(s) Oral daily  potassium chloride  10 mEq/100 mL IVPB 10milliEquivalent(s) IV Intermittent every 1 hour    MEDICATIONS  (PRN):  enalaprilat Injectable 1.25milliGRAM(s) IV Push every 8 hours PRN To be given for SBP above 140      Allergies    Sudafed (Other)    Intolerances        REVIEW OF SYSTEMS:  CONSTITUTIONAL: No fever, No chills, No fatigue, No myalgia,No Body ache  EYES: No eye pain, visual disturbances, or discharge  ENMT:  No ear pain, No nose bleed, No vertigo; Throat pain present.   NECK: No pain, No stiffness  RESPIRATORY: No cough, wheezing, No  hemoptysis; No shortness of breath  CARDIOVASCULAR: No chest pain, palpitations, leg swelling  GASTROINTESTINAL: No abdominal or epigastric pain. No nausea, No vomiting; No diarrhea or constipation. [x ] BM  GENITOURINARY: No dysuria, No frequency, No urgency, No hematuria, or incontinence  NEUROLOGICAL: alert and oriented x 3,  No headaches, No dizziness, No numbness,  SKIN:   No itching, burning, rashes, or lesions   MUSCULOSKELETAL: No joint pain or swelling; No muscle pain, No back pain, No extremity pain  PSYCHIATRIC: No depression, anxiety, mood swings, or difficulty sleeping  ROS  [ ] Unable to obtain   REST OF REVIEW Of SYSTEM - [ x] Normal       Vital Signs Last 24 Hrs  T(C): 36.8, Max: 37 (05-15 @ 19:27)  T(F): 98.3, Max: 98.6 (05-15 @ 19:27)  HR: 74 (64 - 74)  BP: 148/86 (130/74 - 150/90)  BP(mean): --  RR: 18 (18 - 18)  SpO2: 94% (94% - 98%)  [ x] room air   [ ] 02    PHYSICAL EXAM:  GENERAL:  No acute distress well appearing, [ ] Agitated, [ ] Lethargy, [ ] confused   HEAD:  normal  ENMT: normal  NECK:  normal    NERVOUS SYSTEM:  Alert & Oriented X3, no focal deficits [ ]Confusion  [ ] Encephalopathic [ ] Sedated [ ] Other  CHEST/LUNG: Clear to auscultation bilaterally,  [ ] decreased breath sounds at bases  [ ] wheezing   [ ] rhonchi  [ ] crackles  HEART:  Regular rate and rhythm, No murmurs, rubs, or gallops,  [ ] irregular   ABDOMEN:  soft, nontender, nondistended, positive bowel sounds   [ ] obese  EXTREMITIES: No clubbing, cyanosis or edema  SKIN: [ ] venous stasis skin changes    LABS:                        12.3   10.4  )-----------( 406      ( 16 May 2017 07:04 )             36.6     16 May 2017 07:04    137    |  94     |  17     ----------------------------<  125    3.2     |  34     |  0.58     Ca    8.6        16 May 2017 07:04      PT/INR - ( 15 May 2017 06:01 )   PT: 10.2 sec;   INR: 0.94 ratio           Hemoglobin A1C, Whole Blood: 5.5 % (04-08 @ 01:57)      CAPILLARY BLOOD GLUCOSE    Cultures  Culture Results:   No growth (05-13 @ 00:05)          RADIOLOGY & ADDITIONAL TESTS:   EXAM:  CT ABDOMEN AND PELVIS [facility]                            PROCEDURE DATE:  05/15/2017        INTERPRETATION:  EXAMINATION DATE: May 15, 2017 at 1554 hours.  CLINICAL INFORMATION: Bowel obstruction, history of small bowel resection   6 monthsago.    COMPARISON: May 12, 2017.    PROCEDURE:   CT of the Abdomen and Pelvis was performed without intravenous contrast.   Intravenous contrast: None.  Oral contrast: positive contrast was administered.  Sagittal and coronal reformats were performed.    FINDINGS:    LOWER CHEST: Trace bilateral pleural effusions. New consolidation in the   right lower lobe, suspicious for pneumonia. Trace pericardial effusion.    LIVER: Within normal limits.  BILE DUCTS: Normal caliber.   GALLBLADDER: Within normal limits.  SPLEEN: Within normal limits.  PANCREAS: Within normal limits.  ADRENALS: Within normal limits.  KIDNEYS/URETERS: Within normal limits.     BLADDER: Contains air and a Starks catheter balloon.  REPRODUCTIVE ORGANS: Calcified uterine leiomyoma.    BOWEL: Enteric tube with tip in the stomach. Duodenum and proximal   jejunum with change in caliber in the proximal jejunum in the mid   abdomen. Contrast is noted within the more distal small bowel, suggestive   of partial small bowel obstruction. Contrast is present within the colon,   probably from prior contrast administration. Small bowel anastomotic   sutures. Normal appendix.  PERITONEUM: No ascites.  VESSELS:  Atherosclerotic change of the abdominal aorta and its branches.  RETROPERITONEUM: No lymphadenopathy.    ABDOMINAL WALL: Small fat-containing umbilical hernia with ventral wall   defect measuring 0.8 cm in transverse dimension.  BONES: Old fractures of the right superior and inferior pubic rami.    IMPRESSION:   1.  Partial small bowel obstruction with change in caliber in the   proximal jejunum in the mid abdomen.  2.  Since May 12, 2017, new consolidation in the right lower lobe,   suspicious for pneumonia.        LILIANA HERNANDEZ M.D., ATTENDING RADIOLOGIST  This document has been electronically signed. May 15 2017  4:10PM                  Care Discussed with [X] Consultants  [x ] Patient  [ ] Family  [X]   /   [ ] Other; RN  DVT prophylaxis [x ] lovenox   [ ] subq heparin  [ ] coumadin  [ ] venodynes [ ] ambulating frequently at how risk for vte and no pharm         or  mechanical prophylaxis required    [ ] other   Advanced directive:    [ ]pt has hcp     [ ] pt declined to assign hcp  Discussed with pt @ bedside

## 2017-05-16 NOTE — DISCHARGE NOTE ADULT - NS AS ACTIVITY OBS
Walking-Indoors allowed/Walking-Outdoors allowed/Showering allowed Do not make important decisions/Do not drive or operate machinery/No Heavy lifting/straining Do not make important decisions/Do not drive or operate machinery/No Heavy lifting/straining/w assist/Bathing allowed

## 2017-05-16 NOTE — DISCHARGE NOTE ADULT - INSTRUCTIONS
advance diet as tolerated, soft foods. advance diet as tolerated, soft foods.  low salt 2000 miligrams of sodium a day advance diet as tolerated, soft foods.  low salt 2000 milligrams of sodium a day

## 2017-05-16 NOTE — PROGRESS NOTE ADULT - PROBLEM SELECTOR PLAN 5
patient is currently asymptomatic   Urine cultures negative.  Rocephin D/demarco patient is currently asymptomatic   Urine cultures negative but was on antbx out pt  now completed course of tx antbx dc

## 2017-05-16 NOTE — PROGRESS NOTE ADULT - ASSESSMENT
The patient is a 66 year old female with a history of HTN, HL, multiple sclerosis, recent hospitalization for SBO with course c/b MICH, anemia, elevated cardiac enzymes who is admitted with recurrent SBO.    Plan:  - Continue enalaprilat IV 1.25 mg q8h prn. If NGT removed, discontinue enalapril and start lisinopril 20 mg PO daily  - Holding chlorthalidone. Can restart at discharge.  - No plan for surgery at the current time. If course changes and plan is for surgery, then patient may proceed to OR without additional cardiac testing/intervention.

## 2017-05-17 VITALS
TEMPERATURE: 98 F | SYSTOLIC BLOOD PRESSURE: 128 MMHG | DIASTOLIC BLOOD PRESSURE: 84 MMHG | RESPIRATION RATE: 16 BRPM | OXYGEN SATURATION: 95 % | HEART RATE: 74 BPM

## 2017-05-17 LAB
ANION GAP SERPL CALC-SCNC: 4 MMOL/L — LOW (ref 5–17)
BUN SERPL-MCNC: 16 MG/DL — SIGNIFICANT CHANGE UP (ref 7–23)
CALCIUM SERPL-MCNC: 8.5 MG/DL — SIGNIFICANT CHANGE UP (ref 8.5–10.1)
CHLORIDE SERPL-SCNC: 98 MMOL/L — SIGNIFICANT CHANGE UP (ref 96–108)
CO2 SERPL-SCNC: 35 MMOL/L — HIGH (ref 22–31)
CREAT SERPL-MCNC: 0.84 MG/DL — SIGNIFICANT CHANGE UP (ref 0.5–1.3)
GLUCOSE SERPL-MCNC: 96 MG/DL — SIGNIFICANT CHANGE UP (ref 70–99)
HCT VFR BLD CALC: 31.8 % — LOW (ref 34.5–45)
HGB BLD-MCNC: 10.3 G/DL — LOW (ref 11.5–15.5)
MAGNESIUM SERPL-MCNC: 1.7 MG/DL — SIGNIFICANT CHANGE UP (ref 1.6–2.6)
MCHC RBC-ENTMCNC: 27.8 PG — SIGNIFICANT CHANGE UP (ref 27–34)
MCHC RBC-ENTMCNC: 32.6 GM/DL — SIGNIFICANT CHANGE UP (ref 32–36)
MCV RBC AUTO: 85.6 FL — SIGNIFICANT CHANGE UP (ref 80–100)
PLATELET # BLD AUTO: 345 K/UL — SIGNIFICANT CHANGE UP (ref 150–400)
POTASSIUM SERPL-MCNC: 5.1 MMOL/L — SIGNIFICANT CHANGE UP (ref 3.5–5.3)
POTASSIUM SERPL-SCNC: 5.1 MMOL/L — SIGNIFICANT CHANGE UP (ref 3.5–5.3)
RBC # BLD: 3.71 M/UL — LOW (ref 3.8–5.2)
RBC # FLD: 15.2 % — HIGH (ref 10.3–14.5)
SODIUM SERPL-SCNC: 137 MMOL/L — SIGNIFICANT CHANGE UP (ref 135–145)
WBC # BLD: 11.8 K/UL — HIGH (ref 3.8–10.5)
WBC # FLD AUTO: 11.8 K/UL — HIGH (ref 3.8–10.5)

## 2017-05-17 PROCEDURE — 83735 ASSAY OF MAGNESIUM: CPT

## 2017-05-17 PROCEDURE — 74177 CT ABD & PELVIS W/CONTRAST: CPT

## 2017-05-17 PROCEDURE — 81001 URINALYSIS AUTO W/SCOPE: CPT

## 2017-05-17 PROCEDURE — 71046 X-RAY EXAM CHEST 2 VIEWS: CPT

## 2017-05-17 PROCEDURE — 83605 ASSAY OF LACTIC ACID: CPT

## 2017-05-17 PROCEDURE — 74020: CPT

## 2017-05-17 PROCEDURE — 85610 PROTHROMBIN TIME: CPT

## 2017-05-17 PROCEDURE — 96376 TX/PRO/DX INJ SAME DRUG ADON: CPT

## 2017-05-17 PROCEDURE — 96374 THER/PROPH/DIAG INJ IV PUSH: CPT | Mod: 59

## 2017-05-17 PROCEDURE — 80053 COMPREHEN METABOLIC PANEL: CPT

## 2017-05-17 PROCEDURE — 96375 TX/PRO/DX INJ NEW DRUG ADDON: CPT

## 2017-05-17 PROCEDURE — 74178 CT ABD&PLV WO CNTR FLWD CNTR: CPT

## 2017-05-17 PROCEDURE — 87086 URINE CULTURE/COLONY COUNT: CPT

## 2017-05-17 PROCEDURE — 93005 ELECTROCARDIOGRAM TRACING: CPT

## 2017-05-17 PROCEDURE — 85027 COMPLETE CBC AUTOMATED: CPT

## 2017-05-17 PROCEDURE — 83690 ASSAY OF LIPASE: CPT

## 2017-05-17 PROCEDURE — 99285 EMERGENCY DEPT VISIT HI MDM: CPT | Mod: 25

## 2017-05-17 PROCEDURE — 84132 ASSAY OF SERUM POTASSIUM: CPT

## 2017-05-17 PROCEDURE — 80048 BASIC METABOLIC PNL TOTAL CA: CPT

## 2017-05-17 RX ADMIN — Medication 10 MILLIGRAM(S): at 13:33

## 2017-05-17 RX ADMIN — ENOXAPARIN SODIUM 40 MILLIGRAM(S): 100 INJECTION SUBCUTANEOUS at 13:33

## 2017-05-17 NOTE — PHYSICAL THERAPY INITIAL EVALUATION ADULT - ADDITIONAL COMMENTS
Patient lives in a private home with ramp to get in home. Patient is able to transfer herself using furniture to pull herself up from the bed and pivot over to her motorized w/c. Son assist with getting into the shower. Son does the shopping and errands.

## 2017-05-17 NOTE — PROGRESS NOTE ADULT - PROBLEM SELECTOR PLAN 1
- Repeat CT abdomen- Partial SBO with contrast in colon.   - Patient Tolerating soft  diet . NG tube removed. Patient having BM's  - Hypokalemia resolved.  - 's(surgery) recs appreciated.

## 2017-05-17 NOTE — PROGRESS NOTE ADULT - ATTENDING COMMENTS
pt seen w housestaff and agree w above  cardio and surg f/u noted  for repeat ct today  no medical contraindications to OR
pt seen and examined  follow up w dr gotti noted  added levaquin for possible pneumonia  see surmaximilian dc for full a/p for today and full dc note
pt seen and examined with resident.  agree with above as edited and outlined together with her.

## 2017-05-17 NOTE — PROGRESS NOTE ADULT - SUBJECTIVE AND OBJECTIVE BOX
Patient is a 67y old  Female who presents with a chief complaint of Nausea and vomiting (16 May 2017 11:15)      INTERVAL HPI/OVERNIGHT EVENTS:    Denies cough, shortness of breath, or sputum production. Chest xray reveals patchy RLL infiltrate  MEDICATIONS  (STANDING):  latanoprost 0.005% Ophthalmic Solution 1Drop(s) Both EYES at bedtime  enoxaparin Injectable 40milliGRAM(s) SubCutaneous daily  baclofen 10milliGRAM(s) Oral daily  lisinopril 20milliGRAM(s) Oral daily  oxybutynin 10milliGRAM(s) Oral daily  interferon beta-1a Injectable (AVONEX) 30MICROGram(s) IntraMuscular every 7 days  levoFLOXacin IVPB  IV Intermittent       MEDICATIONS  (PRN):      Allergies    Sudafed (Other)    Intolerances        PAST MEDICAL & SURGICAL HISTORY:  Multiple sclerosis  S/P mastectomy, right  Breast CA  Osteoporosis  MS (mitral stenosis)  History of bowel resection  H/O breast surgery      Vital Signs Last 24 Hrs  T(C): 37, Max: 37.7 (05-16 @ 13:29)  T(F): 98.6, Max: 99.9 (05-16 @ 13:29)  HR: 78 (70 - 83)  BP: 104/68 (98/62 - 158/82)  BP(mean): --  RR: 17 (16 - 18)  SpO2: 97% (96% - 97%)    PHYSICAL EXAMINATION:    GENERAL: The patient is awake and alert in no apparent distress.     HEENT: Head is normocephalic and atraumatic. Extraocular muscles are intact. Mucous membranes are moist.    NECK: Supple.    LUNGS: Clear to auscultation without wheezing, rales or rhonchi; respirations unlabored    HEART: Regular rate and rhythm without murmur.    ABDOMEN: Soft, nontender, and nondistended.      EXTREMITIES: Without any cyanosis, clubbing, rash, lesions or edema.    NEUROLOGIC: Grossly intact.    SKIN: No ulceration or induration present.      LABS:                        10.3   11.8  )-----------( 345      ( 17 May 2017 06:15 )             31.8     05-17    137  |  98  |  16  ----------------------------<  96  5.1   |  35<H>  |  0.84    Ca    8.5      17 May 2017 06:15  Mg     1.7     05-17                          MICROBIOLOGY:      RADIOLOGY & ADDITIONAL STUDIES:    Assessment:    S/P Bowel Obstruction  RLL infiltrate with no clinical signs of pneumonia  Multiple Sclerosis    Plan:    Discharge with oral Levaquin  Repeat Chest xray following discharge

## 2017-05-17 NOTE — PROGRESS NOTE ADULT - PROBLEM SELECTOR PLAN 5
patient is currently asymptomatic   Urine cultures negative but was on antbx out pt  now completed course of tx antbx dc

## 2017-05-17 NOTE — PROGRESS NOTE ADULT - PROBLEM SELECTOR PLAN 4
2/2 MS   patient self-catheterizes at home   will order straight cath while inpatient
2/2 MS   patient self-catheterizes at home   will order straight cath while inpatient
2/2 MS   patient self-catheterizes at home   Starks in place.

## 2017-05-17 NOTE — PROGRESS NOTE ADULT - ASSESSMENT
The patient is a 66 year old female with a history of HTN, HL, multiple sclerosis, recent hospitalization for SBO with course c/b MICH, anemia, elevated cardiac enzymes who is admitted with recurrent SBO, improved.    Plan:  - Continue lisinopril 20 mg daily  - Holding chlorthalidone (BP on low side). Can restart at discharge.  - Pulmonary follow-up  - No plan for surgery at the current time. If course changes and plan is for surgery, then patient may proceed to OR without additional cardiac testing/intervention.

## 2017-05-17 NOTE — PROGRESS NOTE ADULT - PROBLEM SELECTOR PROBLEM 5
UTI (urinary tract infection)

## 2017-05-17 NOTE — PROGRESS NOTE ADULT - PROBLEM SELECTOR PLAN 3
- Chronic , controlled.  - continue Lisinopril 20  ( chlorthalidone 25- patient Home med- not added)

## 2017-05-17 NOTE — PROGRESS NOTE ADULT - PROBLEM/PLAN-1
DISPLAY PLAN FREE TEXT
Breath sounds clear and equal bilaterally.

## 2017-05-17 NOTE — PROGRESS NOTE ADULT - PROBLEM SELECTOR PROBLEM 1
SBO (small bowel obstruction)

## 2017-05-17 NOTE — PHYSICAL THERAPY INITIAL EVALUATION ADULT - RANGE OF MOTION EXAMINATION, REHAB EVAL
Left LE ROM was WFL (within functional limits)/bilateral upper extremity ROM was WFL (within functional limits)

## 2017-05-17 NOTE — PROGRESS NOTE ADULT - SUBJECTIVE AND OBJECTIVE BOX
Patient is a 67y old  Female who presents with a chief complaint of Nausea and vomiting (16 May 2017 11:15)      INTERVAL HPI/OVERNIGHT EVENTS: 66 y.o female with PMH of R Breast CA s/p R Mastectomy, MS, Small bowel resection in 2016 2/2 SBO, Osteoporosis is presented with abdominal pain , nausea , vomiting and admitted for recurrent SBO. NG tube removed. Patient has been having Bowel movements.     No acute events overnight. Patient seen and examined bedside. Denies nausea, vomiting, abdominal pain. Mild discomfort in her throat.    MEDICATIONS  (STANDING):  latanoprost 0.005% Ophthalmic Solution 1Drop(s) Both EYES at bedtime  enoxaparin Injectable 40milliGRAM(s) SubCutaneous daily  baclofen 10milliGRAM(s) Oral daily  lisinopril 20milliGRAM(s) Oral daily  oxybutynin 10milliGRAM(s) Oral daily  interferon beta-1a Injectable (AVONEX) 30MICROGram(s) IntraMuscular every 7 days  levoFLOXacin IVPB  IV Intermittent     MEDICATIONS  (PRN):  enalaprilat Injectable 1.25milliGRAM(s) IV Push every 8 hours PRN To be given for SBP above 140      Allergies    Sudafed (Other)    Intolerances        REVIEW OF SYSTEMS:  CONSTITUTIONAL: No fever, No chills, No fatigue, No myalgia, No Body ache  EYES: No eye pain, visual disturbances, or discharge  ENMT:  No ear pain, No nose bleed, No vertigo; mild throat pain.  NECK: No pain, No stiffness  RESPIRATORY: No cough, wheezing, No  hemoptysis; No shortness of breath  CARDIOVASCULAR: No chest pain, palpitations, leg swelling  GASTROINTESTINAL: No abdominal or epigastric pain. No nausea, No vomiting; No diarrhea or constipation. [x ] BM  GENITOURINARY: No dysuria, No frequency, No urgency, No hematuria, or incontinence  NEUROLOGICAL: alert and oriented x 3,  No headaches, No dizziness, No numbness,  SKIN:   No itching, burning, rashes, or lesions   MUSCULOSKELETAL: No joint pain or swelling; No muscle pain, No back pain, No extremity pain  PSYCHIATRIC: No depression, anxiety, mood swings, or difficulty sleeping  ROS  [ ] Unable to obtain   REST OF REVIEW Of SYSTEM - [ x] Normal       Vital Signs Last 24 Hrs  T(C): 37, Max: 37.7 (05-16 @ 13:29)  T(F): 98.6, Max: 99.9 (05-16 @ 13:29)  HR: 78 (70 - 83)  BP: 104/68 (98/62 - 158/82)  BP(mean): --  RR: 17 (16 - 18)  SpO2: 97% (96% - 97%)  [ ] room air   [ ] 02    PHYSICAL EXAM:  GENERAL:  No acute distress well appearing, [ ] Agitated, [ ] Lethargy, [ ] confused   HEAD:  normal  ENMT: normal  NECK:  normal    NERVOUS SYSTEM:  Alert & Oriented X3, no focal deficits [ ]Confusion  [ ] Encephalopathic [ ] Sedated [ ] Other  CHEST/LUNG: Clear to auscultation bilaterally,  [ ] decreased breath sounds at bases  [ ] wheezing   [ ] rhonchi  [ ] crackles  HEART:  Regular rate and rhythm, No murmurs, rubs, or gallops,  [ ] irregular   ABDOMEN:  soft, nontender, nondistended, positive bowel sounds   [ ] obese  EXTREMITIES: No clubbing, cyanosis or edema  SKIN: [ ] venous stasis skin changes    LABS:                        10.3   11.8  )-----------( 345      ( 17 May 2017 06:15 )             31.8     17 May 2017 06:15    137    |  98     |  16     ----------------------------<  96     5.1     |  35     |  0.84     Ca    8.5        17 May 2017 06:15  Mg     1.7       17 May 2017 06:15        Hemoglobin A1C, Whole Blood: 5.5 % (04-08 @ 01:57)      CAPILLARY BLOOD GLUCOSE    Cultures  Culture Results:   No growth (05-13 @ 00:05)          RADIOLOGY & ADDITIONAL TESTS:    EXAM:  CHEST PA & LAT                            PROCEDURE DATE:  05/16/2017        INTERPRETATION:  Right lower lobe infiltrate.  PA lateral. Prior 12/1/2016.  Nasogastric tube in position. Borderline to mild cardiomegaly.   Symmetrically hyperinflated lungs. Patchy airspace infiltrate right lower   lobe presumptive pneumonia in the appropriate clinical setting. No   pleural collection. Compared to prior study there is interval clearing at   the left base    Impression: Right lower lobe infiltrate. Please follow to resolution.   Hyperinflated lungs.              RENA WADE M.D., ATTENDING RADIOLOGIST  This document has been electronically signed. May 16 2017  2:23PM      Care Discussed with [X] Consultants  [ x] Patient  [ ] Family  [X]   /   [ ] Other; RN  DVT prophylaxis [x ] lovenox   [ ] subq heparin  [ ] coumadin  [ ] venodynes [ ] ambulating frequently at how risk for vte and no pharm         or  mechanical prophylaxis required    [ ] other   Advanced directive:    [ ]pt has hcp     [ ] pt declined to assign hcp  Discussed with pt @ bedside Patient is a 67y old  Female who presents with a chief complaint of Nausea and vomiting (16 May 2017 11:15)      INTERVAL HPI/OVERNIGHT EVENTS: 66 y.o female with PMH of R Breast CA s/p R Mastectomy, MS, Small bowel resection in 2016 2/2 SBO, Osteoporosis is presented with abdominal pain , nausea , vomiting and admitted for recurrent SBO. NG tube removed. Patient has been having Bowel movements.     No acute events overnight. Patient seen and examined bedside. Denies nausea, vomiting, abdominal pain. Mild discomfort in her throat.    MEDICATIONS  (STANDING):  latanoprost 0.005% Ophthalmic Solution 1Drop(s) Both EYES at bedtime  enoxaparin Injectable 40milliGRAM(s) SubCutaneous daily  baclofen 10milliGRAM(s) Oral daily  lisinopril 20milliGRAM(s) Oral daily  oxybutynin 10milliGRAM(s) Oral daily  interferon beta-1a Injectable (AVONEX) 30MICROGram(s) IntraMuscular every 7 days  levoFLOXacin IVPB  IV Intermittent     MEDICATIONS  (PRN):  enalaprilat Injectable 1.25milliGRAM(s) IV Push every 8 hours PRN To be given for SBP above 140      Allergies    Sudafed (Other)    Intolerances        REVIEW OF SYSTEMS:  CONSTITUTIONAL: No fever, No chills, No fatigue, No myalgia, No Body ache  EYES: No eye pain, visual disturbances, or discharge  ENMT:  No ear pain, No nose bleed, No vertigo; mild throat pain.  NECK: No pain, No stiffness  RESPIRATORY: No cough, wheezing, No  hemoptysis; No shortness of breath  CARDIOVASCULAR: No chest pain, palpitations, leg swelling  GASTROINTESTINAL: No abdominal or epigastric pain. No nausea, No vomiting; No diarrhea or constipation. [x ] BM  GENITOURINARY: No dysuria, No frequency, No urgency, No hematuria, or incontinence  NEUROLOGICAL: alert and oriented x 3,  No headaches, No dizziness, No numbness,  SKIN:   No itching, burning, rashes, or lesions   MUSCULOSKELETAL: No joint pain or swelling; No muscle pain, No back pain, No extremity pain  PSYCHIATRIC: No depression, anxiety, mood swings, or difficulty sleeping  ROS  [ ] Unable to obtain   REST OF REVIEW Of SYSTEM - [ x] Normal       Vital Signs Last 24 Hrs  T(C): 37, Max: 37.7 (05-16 @ 13:29)  T(F): 98.6, Max: 99.9 (05-16 @ 13:29)  HR: 78 (70 - 83)  BP: 104/68 (98/62 - 158/82)  BP(mean): --  RR: 17 (16 - 18)  SpO2: 97% (96% - 97%)  [x ] room air   [ ] 02    PHYSICAL EXAM:  GENERAL:  No acute distress well appearing, [ ] Agitated, [ ] Lethargy, [ ] confused   HEAD:  normal  ENMT: normal  NECK:  normal    NERVOUS SYSTEM:  Alert & Oriented X3, no focal deficits [ ]Confusion  [ ] Encephalopathic [ ] Sedated [ ] Other  CHEST/LUNG: Clear to auscultation bilaterally,  [ ] decreased breath sounds at bases  [ ] wheezing   [ ] rhonchi  [ ] crackles  HEART:  Regular rate and rhythm, No murmurs, rubs, or gallops,  [ ] irregular   ABDOMEN:  soft, nontender, nondistended, positive bowel sounds   [ ] obese  EXTREMITIES: No clubbing, cyanosis or edema  SKIN: [ ] venous stasis skin changes    LABS:                        10.3   11.8  )-----------( 345      ( 17 May 2017 06:15 )             31.8     17 May 2017 06:15    137    |  98     |  16     ----------------------------<  96     5.1     |  35     |  0.84     Ca    8.5        17 May 2017 06:15  Mg     1.7       17 May 2017 06:15        Hemoglobin A1C, Whole Blood: 5.5 % (04-08 @ 01:57)      CAPILLARY BLOOD GLUCOSE    Cultures  Culture Results:   No growth (05-13 @ 00:05)          RADIOLOGY & ADDITIONAL TESTS:    EXAM:  CHEST PA & LAT                            PROCEDURE DATE:  05/16/2017        INTERPRETATION:  Right lower lobe infiltrate.  PA lateral. Prior 12/1/2016.  Nasogastric tube in position. Borderline to mild cardiomegaly.   Symmetrically hyperinflated lungs. Patchy airspace infiltrate right lower   lobe presumptive pneumonia in the appropriate clinical setting. No   pleural collection. Compared to prior study there is interval clearing at   the left base    Impression: Right lower lobe infiltrate. Please follow to resolution.   Hyperinflated lungs.              RENA WADE M.D., ATTENDING RADIOLOGIST  This document has been electronically signed. May 16 2017  2:23PM      Care Discussed with [X] Consultants  [ x] Patient  [ ] Family  [X]   /   [ ] Other; RN  DVT prophylaxis [x ] lovenox   [ ] subq heparin  [ ] coumadin  [ ] venodynes [ ] ambulating frequently at how risk for vte and no pharm         or  mechanical prophylaxis required    [ ] other   Advanced directive:    [ ]pt has hcp     [ ] pt declined to assign hcp  Discussed with pt @ bedside

## 2017-05-17 NOTE — PROGRESS NOTE ADULT - ASSESSMENT
66 y.o female with PMH of R Breast CA s/p R Mastectomy, MS, Small bowel resection in 2016 2/2 SBO, Osteoporosis is presented with abdominal pain , nausea , vomiting and admitted for recurrent SBO. NG tube removed. Tolerating soft diet well. 66 y.o female with PMH of R Breast CA s/p R Mastectomy, MS, Small bowel resection in 2016 2/2 SBO, Osteoporosis is presented with abdominal pain , nausea , vomiting and admitted for recurrent SBO. NG tube removed. Tolerating soft diet well. D/c today.

## 2017-05-17 NOTE — PROGRESS NOTE ADULT - SUBJECTIVE AND OBJECTIVE BOX
Chief Complaint: SBO    Interval Events: NGT removed.    Review of Systems:  General: No fevers, chills, weight loss or gain  Skin: No rashes, color changes  Cardiovascular: No chest pain, orthopnea  Respiratory: No shortness of breath, cough  Gastrointestinal: No nausea, abdominal pain  Genitourinary: No incontinence, pain with urination  Musculoskeletal: No pain, swelling, decreased range of motion  Neurological: No headache, weakness  Psychiatric: No depression, anxiety  Endocrine: No weight loss or gain, increased thirst  All other systems are comprehensively negative.    Physical Exam:  Vital Signs Last 24 Hrs  T(C): 37, Max: 37.7 (05-16 @ 13:29)  T(F): 98.6, Max: 99.9 (05-16 @ 13:29)  HR: 78 (70 - 89)  BP: 104/68 (98/62 - 161/76)  BP(mean): --  RR: 17 (16 - 18)  SpO2: 97% (95% - 97%)  General: NAD  Neck: No JVD, no carotid bruit  Lungs: CTAB  CV: RRR, nl S1/S2, no M/R/G  Abdomen: S/NT/ND, +BS  Extremities: No LE edema, no cyanosis    Labs:             05-17    137  |  98  |  16  ----------------------------<  96  5.1   |  35<H>  |  0.84    Ca    8.5      17 May 2017 06:15  Mg     1.7     05-17                          10.3   11.8  )-----------( 345      ( 17 May 2017 06:15 )             31.8

## 2017-05-17 NOTE — PROGRESS NOTE ADULT - PROBLEM SELECTOR PLAN 8
New consolidation RLL on CT abdomen done.   Afebrile, WBC count- 11.8  Aspiration precautions- Head end elevated.  Dr.Gordon corona appreciated. Started on  Levoquin IV.    Will Monitor. New consolidation RLL on CT abdomen done.   Afebrile, WBC count- 11.8  Aspiration precautions- Head end elevated.   recs appreciated. Started on  Levaquin IV.  D/c today on PO Levaquin.  Will Monitor. New consolidation RLL on CT abdomen done.   Afebrile, WBC count- 11.8  Aspiration precautions- Head end elevated.   recs appreciated. Started on  Levaquin IV.  D/c today on PO Levaquin.  out pt follow up with dr marie

## 2017-05-17 NOTE — PROGRESS NOTE ADULT - SUBJECTIVE AND OBJECTIVE BOX
66 yo fem with resolved SBO, tolerating diet, having BMs, no abdominal pain    Abd soft, NT, ND              Vital Signs Last 24 Hrs  T(C): 37, Max: 37.7 (05-16 @ 13:29)  T(F): 98.6, Max: 99.9 (05-16 @ 13:29)  HR: 78 (70 - 89)  BP: 104/68 (98/62 - 161/76)  BP(mean): --  RR: 17 (16 - 18)  SpO2: 97% (95% - 97%)                          10.3   11.8  )-----------( 345      ( 17 May 2017 06:15 )             31.8       05-17    137  |  98  |  16  ----------------------------<  96  5.1   |  35<H>  |  0.84    Ca    8.5      17 May 2017 06:15  Mg     1.7     05-17            MEDICATIONS  (STANDING):  latanoprost 0.005% Ophthalmic Solution 1Drop(s) Both EYES at bedtime  enoxaparin Injectable 40milliGRAM(s) SubCutaneous daily  baclofen 10milliGRAM(s) Oral daily  lisinopril 20milliGRAM(s) Oral daily  oxybutynin 10milliGRAM(s) Oral daily  interferon beta-1a Injectable (AVONEX) 30MICROGram(s) IntraMuscular every 7 days  levoFLOXacin IVPB  IV Intermittent     MEDICATIONS  (PRN):  enalaprilat Injectable 1.25milliGRAM(s) IV Push every 8 hours PRN To be given for SBP above 140      MEDICATIONS  (STANDING):  latanoprost 0.005% Ophthalmic Solution 1Drop(s) Both EYES at bedtime  enoxaparin Injectable 40milliGRAM(s) SubCutaneous daily  baclofen 10milliGRAM(s) Oral daily  lisinopril 20milliGRAM(s) Oral daily  oxybutynin 10milliGRAM(s) Oral daily  interferon beta-1a Injectable (AVONEX) 30MICROGram(s) IntraMuscular every 7 days  levoFLOXacin IVPB  IV Intermittent

## 2017-05-17 NOTE — PROGRESS NOTE ADULT - PROVIDER SPECIALTY LIST ADULT
Cardiology
Hospitalist
Internal Medicine
Pulmonology
Surgery
Hospitalist

## 2017-05-19 DIAGNOSIS — I10 ESSENTIAL (PRIMARY) HYPERTENSION: ICD-10-CM

## 2017-05-19 DIAGNOSIS — E87.6 HYPOKALEMIA: ICD-10-CM

## 2017-05-19 DIAGNOSIS — Z85.3 PERSONAL HISTORY OF MALIGNANT NEOPLASM OF BREAST: ICD-10-CM

## 2017-05-19 DIAGNOSIS — D64.9 ANEMIA, UNSPECIFIED: ICD-10-CM

## 2017-05-19 DIAGNOSIS — I05.0 RHEUMATIC MITRAL STENOSIS: ICD-10-CM

## 2017-05-19 DIAGNOSIS — R49.0 DYSPHONIA: ICD-10-CM

## 2017-05-19 DIAGNOSIS — G35 MULTIPLE SCLEROSIS: ICD-10-CM

## 2017-05-19 DIAGNOSIS — J98.11 ATELECTASIS: ICD-10-CM

## 2017-05-19 DIAGNOSIS — Z90.11 ACQUIRED ABSENCE OF RIGHT BREAST AND NIPPLE: ICD-10-CM

## 2017-05-19 DIAGNOSIS — K59.00 CONSTIPATION, UNSPECIFIED: ICD-10-CM

## 2017-05-19 DIAGNOSIS — Z88.6 ALLERGY STATUS TO ANALGESIC AGENT: ICD-10-CM

## 2017-05-19 DIAGNOSIS — R33.9 RETENTION OF URINE, UNSPECIFIED: ICD-10-CM

## 2017-05-19 DIAGNOSIS — M81.0 AGE-RELATED OSTEOPOROSIS WITHOUT CURRENT PATHOLOGICAL FRACTURE: ICD-10-CM

## 2017-05-19 DIAGNOSIS — K56.60 UNSPECIFIED INTESTINAL OBSTRUCTION: ICD-10-CM

## 2017-05-19 DIAGNOSIS — F43.9 REACTION TO SEVERE STRESS, UNSPECIFIED: ICD-10-CM

## 2017-05-19 DIAGNOSIS — N39.0 URINARY TRACT INFECTION, SITE NOT SPECIFIED: ICD-10-CM

## 2017-05-27 ENCOUNTER — INPATIENT (INPATIENT)
Facility: HOSPITAL | Age: 67
LOS: 5 days | Discharge: NURSING, REHAB OR LONG-TERM CARE | DRG: 871 | End: 2017-06-02
Attending: INTERNAL MEDICINE | Admitting: INTERNAL MEDICINE
Payer: MEDICARE

## 2017-05-27 VITALS
WEIGHT: 145.06 LBS | TEMPERATURE: 98 F | HEIGHT: 66 IN | HEART RATE: 96 BPM | DIASTOLIC BLOOD PRESSURE: 34 MMHG | SYSTOLIC BLOOD PRESSURE: 60 MMHG | RESPIRATION RATE: 14 BRPM | OXYGEN SATURATION: 98 %

## 2017-05-27 DIAGNOSIS — D72.820 LYMPHOCYTOSIS (SYMPTOMATIC): ICD-10-CM

## 2017-05-27 DIAGNOSIS — I95.9 HYPOTENSION, UNSPECIFIED: ICD-10-CM

## 2017-05-27 DIAGNOSIS — Z92.89 PERSONAL HISTORY OF OTHER MEDICAL TREATMENT: Chronic | ICD-10-CM

## 2017-05-27 DIAGNOSIS — Z98.890 OTHER SPECIFIED POSTPROCEDURAL STATES: Chronic | ICD-10-CM

## 2017-05-27 DIAGNOSIS — G35 MULTIPLE SCLEROSIS: ICD-10-CM

## 2017-05-27 DIAGNOSIS — R33.9 RETENTION OF URINE, UNSPECIFIED: ICD-10-CM

## 2017-05-27 DIAGNOSIS — A41.9 SEPSIS, UNSPECIFIED ORGANISM: ICD-10-CM

## 2017-05-27 LAB
ALBUMIN SERPL ELPH-MCNC: 2.6 G/DL — LOW (ref 3.3–5)
ALP SERPL-CCNC: 59 U/L — SIGNIFICANT CHANGE UP (ref 40–120)
ALT FLD-CCNC: 18 U/L — SIGNIFICANT CHANGE UP (ref 12–78)
ANION GAP SERPL CALC-SCNC: 11 MMOL/L — SIGNIFICANT CHANGE UP (ref 5–17)
APPEARANCE UR: ABNORMAL
APTT BLD: 25.4 SEC — LOW (ref 27.5–37.4)
AST SERPL-CCNC: 24 U/L — SIGNIFICANT CHANGE UP (ref 15–37)
BILIRUB SERPL-MCNC: 0.3 MG/DL — SIGNIFICANT CHANGE UP (ref 0.2–1.2)
BILIRUB UR-MCNC: NEGATIVE — SIGNIFICANT CHANGE UP
BUN SERPL-MCNC: 27 MG/DL — HIGH (ref 7–23)
CALCIUM SERPL-MCNC: 8.7 MG/DL — SIGNIFICANT CHANGE UP (ref 8.5–10.1)
CHLORIDE SERPL-SCNC: 105 MMOL/L — SIGNIFICANT CHANGE UP (ref 96–108)
CO2 SERPL-SCNC: 23 MMOL/L — SIGNIFICANT CHANGE UP (ref 22–31)
COLOR SPEC: SIGNIFICANT CHANGE UP
CREAT SERPL-MCNC: 1 MG/DL — SIGNIFICANT CHANGE UP (ref 0.5–1.3)
DIFF PNL FLD: NEGATIVE — SIGNIFICANT CHANGE UP
EPI CELLS # UR: SIGNIFICANT CHANGE UP
GLUCOSE SERPL-MCNC: 90 MG/DL — SIGNIFICANT CHANGE UP (ref 70–99)
GLUCOSE UR QL: NEGATIVE — SIGNIFICANT CHANGE UP
HCT VFR BLD CALC: 32.8 % — LOW (ref 34.5–45)
HGB BLD-MCNC: 10.5 G/DL — LOW (ref 11.5–15.5)
INR BLD: 0.98 RATIO — SIGNIFICANT CHANGE UP (ref 0.88–1.16)
KETONES UR-MCNC: NEGATIVE — SIGNIFICANT CHANGE UP
LACTATE SERPL-SCNC: 1.6 MMOL/L — SIGNIFICANT CHANGE UP (ref 0.7–2)
LACTATE SERPL-SCNC: 3.4 MMOL/L — HIGH (ref 0.7–2)
LEUKOCYTE ESTERASE UR-ACNC: NEGATIVE — SIGNIFICANT CHANGE UP
MCHC RBC-ENTMCNC: 28 PG — SIGNIFICANT CHANGE UP (ref 27–34)
MCHC RBC-ENTMCNC: 32.1 GM/DL — SIGNIFICANT CHANGE UP (ref 32–36)
MCV RBC AUTO: 87.2 FL — SIGNIFICANT CHANGE UP (ref 80–100)
NITRITE UR-MCNC: NEGATIVE — SIGNIFICANT CHANGE UP
PH UR: 7 — SIGNIFICANT CHANGE UP (ref 5–8)
PLATELET # BLD AUTO: 508 K/UL — HIGH (ref 150–400)
POTASSIUM SERPL-MCNC: 3.9 MMOL/L — SIGNIFICANT CHANGE UP (ref 3.5–5.3)
POTASSIUM SERPL-SCNC: 3.9 MMOL/L — SIGNIFICANT CHANGE UP (ref 3.5–5.3)
PROT SERPL-MCNC: 6.2 G/DL — SIGNIFICANT CHANGE UP (ref 6–8.3)
PROT UR-MCNC: NEGATIVE — SIGNIFICANT CHANGE UP
PROTHROM AB SERPL-ACNC: 10.7 SEC — SIGNIFICANT CHANGE UP (ref 9.8–12.7)
RAPID RVP RESULT: SIGNIFICANT CHANGE UP
RBC # BLD: 3.76 M/UL — LOW (ref 3.8–5.2)
RBC # FLD: 16.1 % — HIGH (ref 10.3–14.5)
SODIUM SERPL-SCNC: 139 MMOL/L — SIGNIFICANT CHANGE UP (ref 135–145)
SP GR SPEC: 1 — LOW (ref 1.01–1.02)
UROBILINOGEN FLD QL: NEGATIVE — SIGNIFICANT CHANGE UP
WBC # BLD: 18.9 K/UL — HIGH (ref 3.8–10.5)
WBC # FLD AUTO: 18.9 K/UL — HIGH (ref 3.8–10.5)
WBC UR QL: SIGNIFICANT CHANGE UP

## 2017-05-27 PROCEDURE — 71010: CPT | Mod: 26

## 2017-05-27 PROCEDURE — 93010 ELECTROCARDIOGRAM REPORT: CPT

## 2017-05-27 PROCEDURE — 93970 EXTREMITY STUDY: CPT | Mod: 26

## 2017-05-27 PROCEDURE — 99285 EMERGENCY DEPT VISIT HI MDM: CPT

## 2017-05-27 RX ORDER — SODIUM CHLORIDE 9 MG/ML
1000 INJECTION INTRAMUSCULAR; INTRAVENOUS; SUBCUTANEOUS ONCE
Qty: 0 | Refills: 0 | Status: COMPLETED | OUTPATIENT
Start: 2017-05-27 | End: 2017-05-27

## 2017-05-27 RX ORDER — LATANOPROST 0.05 MG/ML
1 SOLUTION/ DROPS OPHTHALMIC; TOPICAL AT BEDTIME
Qty: 0 | Refills: 0 | Status: DISCONTINUED | OUTPATIENT
Start: 2017-05-27 | End: 2017-06-02

## 2017-05-27 RX ORDER — ENOXAPARIN SODIUM 100 MG/ML
40 INJECTION SUBCUTANEOUS DAILY
Qty: 0 | Refills: 0 | Status: DISCONTINUED | OUTPATIENT
Start: 2017-05-27 | End: 2017-06-02

## 2017-05-27 RX ORDER — MIDODRINE HYDROCHLORIDE 2.5 MG/1
5 TABLET ORAL ONCE
Qty: 0 | Refills: 0 | Status: DISCONTINUED | OUTPATIENT
Start: 2017-05-27 | End: 2017-05-27

## 2017-05-27 RX ORDER — PIPERACILLIN AND TAZOBACTAM 4; .5 G/20ML; G/20ML
3.38 INJECTION, POWDER, LYOPHILIZED, FOR SOLUTION INTRAVENOUS EVERY 8 HOURS
Qty: 0 | Refills: 0 | Status: DISCONTINUED | OUTPATIENT
Start: 2017-05-27 | End: 2017-05-30

## 2017-05-27 RX ORDER — MIDODRINE HYDROCHLORIDE 2.5 MG/1
10 TABLET ORAL ONCE
Qty: 0 | Refills: 0 | Status: COMPLETED | OUTPATIENT
Start: 2017-05-27 | End: 2017-05-27

## 2017-05-27 RX ORDER — VANCOMYCIN HCL 1 G
1000 VIAL (EA) INTRAVENOUS EVERY 12 HOURS
Qty: 0 | Refills: 0 | Status: DISCONTINUED | OUTPATIENT
Start: 2017-05-28 | End: 2017-05-30

## 2017-05-27 RX ORDER — MIDODRINE HYDROCHLORIDE 2.5 MG/1
5 TABLET ORAL EVERY 8 HOURS
Qty: 0 | Refills: 0 | Status: DISCONTINUED | OUTPATIENT
Start: 2017-05-27 | End: 2017-05-27

## 2017-05-27 RX ORDER — ACETAMINOPHEN 500 MG
650 TABLET ORAL EVERY 6 HOURS
Qty: 0 | Refills: 0 | Status: DISCONTINUED | OUTPATIENT
Start: 2017-05-27 | End: 2017-06-02

## 2017-05-27 RX ORDER — SODIUM CHLORIDE 9 MG/ML
1000 INJECTION INTRAMUSCULAR; INTRAVENOUS; SUBCUTANEOUS
Qty: 0 | Refills: 0 | Status: DISCONTINUED | OUTPATIENT
Start: 2017-05-27 | End: 2017-05-31

## 2017-05-27 RX ORDER — VANCOMYCIN HCL 1 G
1000 VIAL (EA) INTRAVENOUS ONCE
Qty: 0 | Refills: 0 | Status: COMPLETED | OUTPATIENT
Start: 2017-05-27 | End: 2017-05-27

## 2017-05-27 RX ORDER — MIDODRINE HYDROCHLORIDE 2.5 MG/1
10 TABLET ORAL EVERY 8 HOURS
Qty: 0 | Refills: 0 | Status: DISCONTINUED | OUTPATIENT
Start: 2017-05-27 | End: 2017-06-01

## 2017-05-27 RX ORDER — BACLOFEN 100 %
10 POWDER (GRAM) MISCELLANEOUS DAILY
Qty: 0 | Refills: 0 | Status: DISCONTINUED | OUTPATIENT
Start: 2017-05-27 | End: 2017-06-02

## 2017-05-27 RX ORDER — OXYBUTYNIN CHLORIDE 5 MG
5 TABLET ORAL
Qty: 0 | Refills: 0 | Status: DISCONTINUED | OUTPATIENT
Start: 2017-05-27 | End: 2017-06-02

## 2017-05-27 RX ORDER — VANCOMYCIN HCL 1 G
VIAL (EA) INTRAVENOUS
Qty: 0 | Refills: 0 | Status: DISCONTINUED | OUTPATIENT
Start: 2017-05-28 | End: 2017-05-30

## 2017-05-27 RX ORDER — SODIUM CHLORIDE 9 MG/ML
1000 INJECTION INTRAMUSCULAR; INTRAVENOUS; SUBCUTANEOUS ONCE
Qty: 0 | Refills: 0 | Status: DISCONTINUED | OUTPATIENT
Start: 2017-05-27 | End: 2017-06-02

## 2017-05-27 RX ORDER — PIPERACILLIN AND TAZOBACTAM 4; .5 G/20ML; G/20ML
3.38 INJECTION, POWDER, LYOPHILIZED, FOR SOLUTION INTRAVENOUS ONCE
Qty: 0 | Refills: 0 | Status: COMPLETED | OUTPATIENT
Start: 2017-05-27 | End: 2017-05-27

## 2017-05-27 RX ADMIN — SODIUM CHLORIDE 2000 MILLILITER(S): 9 INJECTION INTRAMUSCULAR; INTRAVENOUS; SUBCUTANEOUS at 14:21

## 2017-05-27 RX ADMIN — SODIUM CHLORIDE 100 MILLILITER(S): 9 INJECTION INTRAMUSCULAR; INTRAVENOUS; SUBCUTANEOUS at 18:07

## 2017-05-27 RX ADMIN — SODIUM CHLORIDE 2000 MILLILITER(S): 9 INJECTION INTRAMUSCULAR; INTRAVENOUS; SUBCUTANEOUS at 21:12

## 2017-05-27 RX ADMIN — PIPERACILLIN AND TAZOBACTAM 200 GRAM(S): 4; .5 INJECTION, POWDER, LYOPHILIZED, FOR SOLUTION INTRAVENOUS at 15:25

## 2017-05-27 RX ADMIN — SODIUM CHLORIDE 2000 MILLILITER(S): 9 INJECTION INTRAMUSCULAR; INTRAVENOUS; SUBCUTANEOUS at 14:00

## 2017-05-27 RX ADMIN — MIDODRINE HYDROCHLORIDE 10 MILLIGRAM(S): 2.5 TABLET ORAL at 22:52

## 2017-05-27 RX ADMIN — ENOXAPARIN SODIUM 40 MILLIGRAM(S): 100 INJECTION SUBCUTANEOUS at 20:08

## 2017-05-27 NOTE — ED PROVIDER NOTE - CONSTITUTIONAL, MLM
normal... Well appearing, well nourished, awake, alert, oriented to person, place, time/situation and in no apparent distress. +hypotension BP 77 systolic.

## 2017-05-27 NOTE — ED ADULT NURSE REASSESSMENT NOTE - NS ED NURSE REASSESS COMMENT FT1
cath drained 1200 cc urine .  500cc drained  500cc drained then 200cc drained to prevent further hypotension.  Comfort measures taken.  pt inco of stool skin care provided stage 1 on butt present

## 2017-05-27 NOTE — ED PROVIDER NOTE - CARE PLAN
Principal Discharge DX:	Urinary retention  Secondary Diagnosis:	Hypotension Principal Discharge DX:	Urinary retention  Secondary Diagnosis:	Hypotension  Secondary Diagnosis:	Leukocytosis

## 2017-05-27 NOTE — CONSULT NOTE ADULT - PROBLEM SELECTOR RECOMMENDATION 3
all seems likely related to infection-will follow cultures and may require abd imaging if remains unclear etiology.    Thank you for consulting us and involving us in the management of this most interesting and challenging case.     We will follow along in the care of this patient.

## 2017-05-27 NOTE — CONSULT NOTE ADULT - PROBLEM SELECTOR RECOMMENDATION 9
no clear localization but very concerning with elevated lactate, leukocytosis and hypotension    -will add Vanco to abx, patient will need close monitoring and may require a higher level of care.  Discussed with nurse having ICU evaluate patient and will communicate with Dr Muñoz and ICU physician

## 2017-05-27 NOTE — ED PROVIDER NOTE - OBJECTIVE STATEMENT
Patient came into the ED c/o weakness x 2 days, unable to urinate (patient cath's herself secondary to MS and couldn't pass the catheter today). has urge to urinate. no fever. no n/v/d. no cough. +appetite, ate normally yesterday. no travel/sick contacts.

## 2017-05-27 NOTE — ED ADULT NURSE REASSESSMENT NOTE - NS ED NURSE REASSESS COMMENT FT1
Dr Barrientos ordered foly cath. He will eval catheter in am.  14f foly catheter draining cl yellow urine

## 2017-05-27 NOTE — CHART NOTE - NSCHARTNOTEFT_GEN_A_CORE
S: RR was called due to hypotensive 72/42.  RR team and ICU presented at bed side. Pt was asymptomatic denies of any CP/palpitation/SOB/dizziness/blurry vision. Bed side Ultrasound was performed by ICU PA which showed around  2cm IVC and lower volume of LV. After started fluid, Pt 's repeat BP came back to 86/53     CONSTITUTIONAL: No weakness, fevers or chills  EYES/ENT: No visual changes;  No vertigo or throat pain   NECK: No pain or stiffness  RESPIRATORY: No cough, wheezing, hemoptysis; No shortness of breath  CARDIOVASCULAR: No chest pain or palpitations  GASTROINTESTINAL: No abdominal or epigastric pain. No nausea, vomiting, or hematemesis; No diarrhea or constipation. No melena or hematochezia.  GENITOURINARY: No dysuria, frequency or hematuria  NEUROLOGICAL: No numbness or weakness  All other review of systems is negative unless indicated above.      O: General: Well developed, well nourished, NAD, pale  HEENT: PERRLA, EOMI bl, moist mucous membranes   Neck: Supple, nontender, no mass  Neurology: A&Ox3, nonfocal, CN II-XII grossly intact, sensation intact, no gait abnormalities   Respiratory: CTA B/L, No W/R/R  CV: RRR, +S1/S2, no murmurs, rubs or gallops  Abdominal: Soft, NT, ND +BSx4  Extremities: No C/C/E, + peripheral pulses  Vascular: capillary refelx <2      Vital Signs Last 24 Hrs  T(C): 36.4, Max: 36.8 ( @ 13:55)  T(F): 97.5, Max: 98.3 ( @ 13:55)  HR: 90 (87 - 105)  BP: 70/58 (60/34 - 97/46)  BP(mean): --  RR: 19 (14 - 19)  SpO2: 95% (95% - 100%)                          10.5   18.9  )-----------( 508      ( 27 May 2017 14:09 )             32.8         139  |  105  |  27<H>  ----------------------------<  90  3.9   |  23  |  1.00    Ca    8.7      27 May 2017 15:10    TPro  6.2  /  Alb  2.6<L>  /  TBili  0.3  /  DBili  x   /  AST  24  /  ALT  18  /  AlkPhos  59  05-    PT/INR - ( 27 May 2017 15:10 )   PT: 10.7 sec;   INR: 0.98 ratio         PTT - ( 27 May 2017 15:10 )  PTT:25.4 sec  Urinalysis Basic - ( 27 May 2017 14:12 )    Color: Pale Yellow / Appearance: Slightly Turbid / S.005 / pH: x  Gluc: x / Ketone: Negative  / Bili: Negative / Urobili: Negative   Blood: x / Protein: Negative / Nitrite: Negative   Leuk Esterase: Negative / RBC: x / WBC 0-2   Sq Epi: x / Non Sq Epi: Occasional / Bacteria: x            A/P: 67 F with PMH of MS, HTN, self-catherterizes who came in c/o weakness x 2 days.  Unable to urinate (patient cath's herself secondary to MS and couldn't pass the catheter today) had hypotension  -stat lactate and procalcitonin  -1L NS bolus  -Midodrine 10mg x1 stat  -repeat BP within 1 hrs  -US LE to r/o DVT  -continue with monitor

## 2017-05-27 NOTE — ED ADULT NURSE REASSESSMENT NOTE - NS ED NURSE REASSESS COMMENT FT1
reportede BP 82/45 to Dr LARA Barrientos he sttses give 250cc NS bolus now. Pt can go to tele with bP in the 80s

## 2017-05-27 NOTE — ED PROVIDER NOTE - PROGRESS NOTE DETAILS
results reviewed with patient and daughter at bedside. BP now upper 90s systolic. Patient states she had been on levaquin as an outpatient -- possibly masking infection vs. viral cause vs. bacteremia. Case d/w Dr. Muñoz for medical admission.

## 2017-05-27 NOTE — H&P ADULT - HISTORY OF PRESENT ILLNESS
This is a 67 F with PMH of MS, HTN, self-catherterizes who came in c/o weakness x 2 days.  Unable to urinate (patient cath's herself secondary to MS and couldn't pass the catheter today). Has urge to urinate. No fever. No n/v/d. No cough. +appetite, ate normally yesterday. no travel/sick contacts. + Chills. Patient was admitted a couple of weeks ago with SBO (which resolved without surgery). At that time she was discharged home on levaquin for possible PNA. She was also started on two anti-hypertensives. Visiting RN this week had also noted low BP and advised her to stop taking one of them. She stopped only for 1 day and then resumed both meds.

## 2017-05-28 DIAGNOSIS — Z71.89 OTHER SPECIFIED COUNSELING: ICD-10-CM

## 2017-05-28 LAB
ANION GAP SERPL CALC-SCNC: 10 MMOL/L — SIGNIFICANT CHANGE UP (ref 5–17)
BUN SERPL-MCNC: 29 MG/DL — HIGH (ref 7–23)
CALCIUM SERPL-MCNC: 8.3 MG/DL — LOW (ref 8.5–10.1)
CHLORIDE SERPL-SCNC: 109 MMOL/L — HIGH (ref 96–108)
CO2 SERPL-SCNC: 23 MMOL/L — SIGNIFICANT CHANGE UP (ref 22–31)
CREAT SERPL-MCNC: 1.1 MG/DL — SIGNIFICANT CHANGE UP (ref 0.5–1.3)
CULTURE RESULTS: NO GROWTH — SIGNIFICANT CHANGE UP
GLUCOSE SERPL-MCNC: 107 MG/DL — HIGH (ref 70–99)
HCT VFR BLD CALC: 29.3 % — LOW (ref 34.5–45)
HGB BLD-MCNC: 9.6 G/DL — LOW (ref 11.5–15.5)
LACTATE SERPL-SCNC: 1.9 MMOL/L — SIGNIFICANT CHANGE UP (ref 0.7–2)
MAGNESIUM SERPL-MCNC: 1.9 MG/DL — SIGNIFICANT CHANGE UP (ref 1.6–2.6)
MCHC RBC-ENTMCNC: 28.6 PG — SIGNIFICANT CHANGE UP (ref 27–34)
MCHC RBC-ENTMCNC: 32.9 GM/DL — SIGNIFICANT CHANGE UP (ref 32–36)
MCV RBC AUTO: 86.9 FL — SIGNIFICANT CHANGE UP (ref 80–100)
PLATELET # BLD AUTO: 446 K/UL — HIGH (ref 150–400)
POTASSIUM SERPL-MCNC: 4 MMOL/L — SIGNIFICANT CHANGE UP (ref 3.5–5.3)
POTASSIUM SERPL-SCNC: 4 MMOL/L — SIGNIFICANT CHANGE UP (ref 3.5–5.3)
PROCALCITONIN SERPL-MCNC: 0.27 NG/ML — HIGH (ref 0–0.05)
RBC # BLD: 3.37 M/UL — LOW (ref 3.8–5.2)
RBC # FLD: 16.2 % — HIGH (ref 10.3–14.5)
SODIUM SERPL-SCNC: 142 MMOL/L — SIGNIFICANT CHANGE UP (ref 135–145)
SPECIMEN SOURCE: SIGNIFICANT CHANGE UP
WBC # BLD: 8.9 K/UL — SIGNIFICANT CHANGE UP (ref 3.8–10.5)
WBC # FLD AUTO: 8.9 K/UL — SIGNIFICANT CHANGE UP (ref 3.8–10.5)

## 2017-05-28 RX ORDER — CALCIUM CARBONATE 500(1250)
1 TABLET ORAL DAILY
Qty: 0 | Refills: 0 | Status: DISCONTINUED | OUTPATIENT
Start: 2017-05-28 | End: 2017-06-02

## 2017-05-28 RX ADMIN — Medication 5 MILLIGRAM(S): at 17:52

## 2017-05-28 RX ADMIN — PIPERACILLIN AND TAZOBACTAM 25 GRAM(S): 4; .5 INJECTION, POWDER, LYOPHILIZED, FOR SOLUTION INTRAVENOUS at 21:26

## 2017-05-28 RX ADMIN — PIPERACILLIN AND TAZOBACTAM 25 GRAM(S): 4; .5 INJECTION, POWDER, LYOPHILIZED, FOR SOLUTION INTRAVENOUS at 09:02

## 2017-05-28 RX ADMIN — SODIUM CHLORIDE 100 MILLILITER(S): 9 INJECTION INTRAMUSCULAR; INTRAVENOUS; SUBCUTANEOUS at 05:45

## 2017-05-28 RX ADMIN — MIDODRINE HYDROCHLORIDE 10 MILLIGRAM(S): 2.5 TABLET ORAL at 14:04

## 2017-05-28 RX ADMIN — LATANOPROST 1 DROP(S): 0.05 SOLUTION/ DROPS OPHTHALMIC; TOPICAL at 21:26

## 2017-05-28 RX ADMIN — Medication 10 MILLIGRAM(S): at 12:12

## 2017-05-28 RX ADMIN — PIPERACILLIN AND TAZOBACTAM 25 GRAM(S): 4; .5 INJECTION, POWDER, LYOPHILIZED, FOR SOLUTION INTRAVENOUS at 01:41

## 2017-05-28 RX ADMIN — MIDODRINE HYDROCHLORIDE 10 MILLIGRAM(S): 2.5 TABLET ORAL at 05:42

## 2017-05-28 RX ADMIN — PIPERACILLIN AND TAZOBACTAM 25 GRAM(S): 4; .5 INJECTION, POWDER, LYOPHILIZED, FOR SOLUTION INTRAVENOUS at 14:04

## 2017-05-28 RX ADMIN — LATANOPROST 1 DROP(S): 0.05 SOLUTION/ DROPS OPHTHALMIC; TOPICAL at 00:50

## 2017-05-28 RX ADMIN — ENOXAPARIN SODIUM 40 MILLIGRAM(S): 100 INJECTION SUBCUTANEOUS at 12:13

## 2017-05-28 RX ADMIN — SODIUM CHLORIDE 100 MILLILITER(S): 9 INJECTION INTRAMUSCULAR; INTRAVENOUS; SUBCUTANEOUS at 00:18

## 2017-05-28 RX ADMIN — MIDODRINE HYDROCHLORIDE 10 MILLIGRAM(S): 2.5 TABLET ORAL at 21:26

## 2017-05-28 RX ADMIN — Medication 1 TABLET(S): at 23:00

## 2017-05-28 RX ADMIN — Medication 250 MILLIGRAM(S): at 12:13

## 2017-05-28 RX ADMIN — Medication 5 MILLIGRAM(S): at 05:45

## 2017-05-28 RX ADMIN — Medication 250 MILLIGRAM(S): at 23:01

## 2017-05-28 NOTE — PROGRESS NOTE ADULT - SUBJECTIVE AND OBJECTIVE BOX
Patient is a 67y old  Female who presents with a chief complaint of light headed and weak (27 May 2017 20:46)    PAST MEDICAL & SURGICAL HISTORY:  Multiple sclerosis  S/P mastectomy, right  Breast CA  Osteoporosis  MS (mitral stenosis)  History of bowel resection  H/O breast surgery    GEORGE STANLEY   67y    Female    BRIEF HOSPITAL COURSE:    Admit with sepsis criteria to floor.  Rapid response for low BP      Review of Systems:  Leg weakness chronic    Dizziness PTA not now    All other ROS are negative.    ICU Vital Signs Last 24 Hrs  T(C): 36.7, Max: 36.8 ( @ 13:55)  T(F): 98.1, Max: 98.3 ( @ 13:55)  HR: 87 (87 - 105)  BP: 80/50 (60/34 - 97/46)  BP(mean): --  ABP: --  ABP(mean): --  RR: 17 (14 - 19)  SpO2: 97% (95% - 100%)    Physical Examination:    General:   No distress    HEENT: no JVD or icterus    PULM: bilateral BS clear lungs    CVS: s1 s2 reg    ABD: soft NT BS pos     EXT: no edema     SKIN:  Warm sscab L leg not infected    Neuro: Alert awake Legs weak/speech somewhat mildly garbled  (chronic) otherwise non focal. upper ext ok.        LABS:                        10.5   18.9  )-----------( 508      ( 27 May 2017 14:09 )             32.8         139  |  105  |  27<H>  ----------------------------<  90  3.9   |  23  |  1.00    Ca    8.7      27 May 2017 15:10    TPro  6.2  /  Alb  2.6<L>  /  TBili  0.3  /  DBili  x   /  AST  24  /  ALT  18  /  AlkPhos  59        PT/INR - ( 27 May 2017 15:10 )   PT: 10.7 sec;   INR: 0.98 ratio         PTT - ( 27 May 2017 15:10 )  PTT:25.4 sec  Urinalysis Basic - ( 27 May 2017 14:12 )    Color: Pale Yellow / Appearance: Slightly Turbid / S.005 / pH: x  Gluc: x / Ketone: Negative  / Bili: Negative / Urobili: Negative   Blood: x / Protein: Negative / Nitrite: Negative   Leuk Esterase: Negative / RBC: x / WBC 0-2   Sq Epi: x / Non Sq Epi: Occasional / Bacteria: x      CULTURES:  Rapid RVP Result: [employer] ( @ 16:29)      Medications:  piperacillin/tazobactam IVPB. 3.375Gram(s) IV Intermittent every 8 hours  vancomycin  IVPB 1000milliGRAM(s) IV Intermittent every 12 hours  vancomycin  IVPB  IV Intermittent   midodrine 10milliGRAM(s) Oral every 8 hours  baclofen 10milliGRAM(s) Oral daily  acetaminophen   Tablet 650milliGRAM(s) Oral every 6 hours PRN  acetaminophen   Tablet. 650milliGRAM(s) Oral every 6 hours PRN  enoxaparin Injectable 40milliGRAM(s) SubCutaneous daily  oxybutynin 5milliGRAM(s) Oral two times a day  sodium chloride 0.9%. 1000milliLiter(s) IV Continuous <Continuous>  sodium chloride 0.9% Bolus 1000milliLiter(s) IV Bolus once  latanoprost 0.005% Ophthalmic Solution 1Drop(s) Both EYES at bedtime      RADIOLOGY/IMAGING/ECHO      CXR without infiltrate.        Critical care point of care ultrasound:     IVC > 2cm       Assessment/Plan:    67 F with PMH of MS, HTN, self-catherterizes does not walk, gets around in a scooter,  who came in c/o weakness x 2 days.  Unable to urinate not able to catheterize due to weakness, Recently admitted,  few weeks ago with SBO (which resolved without surgery).  She was discharged home on levaquin for possible PNA.. Visiting RN this week had also noted low BP and advised her to stop taking one of them. She stopped only for 1 day and then resumed both meds.    Persistent hypotension on the floor rapid response called  patient does not look unwell she has no complaints.  Put on standing midodrine.  May give more volume    Elevated WBC without fever + PCT with lactic acidosis.    No obvious source,  CXR is clear u/u unimpressive, but was on ABX PTA ? partially treated uti?? on Vanco/ Zosyn.  If BP not improving, CT abd pelvis     Now s/p 4 liters of crystalloid and a dose of oral midodrine.  BP purported to be in low 80's    No exposure to steroids in years.    Admit to ICU     Minutes of Critical Care time: 42  (Reviewing data, imaging, discussing with multidisciplinary team, non inclusive of procedures, discussing goals of care with patient/family)

## 2017-05-28 NOTE — PROGRESS NOTE ADULT - SUBJECTIVE AND OBJECTIVE BOX
infectious diseases progress note:    GEORGE STANLEY is a 67y y. o. Female patient    Patient reports: she feels much better    ROS:    EYES:  Negative  blurry vision or double vision  GASTROINTESTINAL:  Negative for nausea, vomiting, diarrhea  -otherwise negative except for subjective    Allergies    Sudafed (Other)    Intolerances        ANTIBIOTICS/RELEVANT:  antimicrobials  piperacillin/tazobactam IVPB. 3.375Gram(s) IV Intermittent every 8 hours  vancomycin  IVPB 1000milliGRAM(s) IV Intermittent every 12 hours  vancomycin  IVPB  IV Intermittent     immunologic:    OTHER:  baclofen 10milliGRAM(s) Oral daily  latanoprost 0.005% Ophthalmic Solution 1Drop(s) Both EYES at bedtime  oxybutynin 5milliGRAM(s) Oral two times a day  sodium chloride 0.9%. 1000milliLiter(s) IV Continuous <Continuous>  acetaminophen   Tablet 650milliGRAM(s) Oral every 6 hours PRN  acetaminophen   Tablet. 650milliGRAM(s) Oral every 6 hours PRN  enoxaparin Injectable 40milliGRAM(s) SubCutaneous daily  sodium chloride 0.9% Bolus 1000milliLiter(s) IV Bolus once  midodrine 10milliGRAM(s) Oral every 8 hours      Objective:  Last 24-Vital Signs Last 24 Hrs  T(C): 36.6, Max: 36.9 ( @ 04:01)  T(F): 97.9, Max: 98.4 ( @ 04:01)  HR: 75 (74 - 105)  BP: 94/65 (60/34 - 107/59)  BP(mean): 68 (60 - 74)  RR: 16 (8 - 19)  SpO2: 95% (94% - 100%)    T(C): 36.6, Max: 36.9 ( @ 04:01)  T(F): 97.9, Max: 98.4 ( 04:01)  T(C): 36.6, Max: 36.9 ( @ 04:01)  T(F): 97.9, Max: 98.4 ( 04:01)  T(C): 36.6, Max: 36.9 ( 04:01)  T(F): 97.9, Max: 98.4 (05-28 @ 04:01)    PHYSICAL EXAM:  Constitutional:Well-developed, well nourished  Eyes:PERRLA, EOMI  Ear/Nose/Throat: oropharynx normal	  Neck:no JVD, no lymphadenopathy, supple  Respiratory: no accessory muscle use, lung fields bilaterally clear  Cardiovascular:RRR, normal S1, S2 no m/r/g  Gastrointestinal:soft, NT, no HSM, BS-normal  Extremities:no clubbing, no cyanosis, edema absent  Neuro-patient alert, oriented and appropriate  Skin-no sig lesions      LABS:                        9.6    8.9   )-----------( 446      ( 28 May 2017 05:55 )             29.3       WBC 8.9   @ 05:55  WBC 18.9   @ 14:09          142  |  109<H>  |  29<H>  ----------------------------<  107<H>  4.0   |  23  |  1.10    Ca    8.3<L>      28 May 2017 05:55  Mg     1.9         TPro  6.2  /  Alb  2.6<L>  /  TBili  0.3  /  DBili  x   /  AST  24  /  ALT  18  /  AlkPhos  59      PT/INR - ( 27 May 2017 15:10 )   PT: 10.7 sec;   INR: 0.98 ratio         PTT - ( 27 May 2017 15:10 )  PTT:25.4 sec  Urinalysis Basic - ( 27 May 2017 14:12 )    Color: Pale Yellow / Appearance: Slightly Turbid / S.005 / pH: x  Gluc: x / Ketone: Negative  / Bili: Negative / Urobili: Negative   Blood: x / Protein: Negative / Nitrite: Negative   Leuk Esterase: Negative / RBC: x / WBC 0-2   Sq Epi: x / Non Sq Epi: Occasional / Bacteria: x          MICROBIOLOGY:    neg to date    RADIOLOGY & ADDITIONAL STUDIES:

## 2017-05-28 NOTE — PROGRESS NOTE ADULT - SUBJECTIVE AND OBJECTIVE BOX
f/u    lactate normalized.  BP stable for hours.    Transfer back to [facility].

## 2017-05-28 NOTE — CHART NOTE - NSCHARTNOTEFT_GEN_A_CORE
S:Pt seen and examed at bedside. Pt had no complaints. Denies of CP/SOB/DIZZINESS/PALPITATION/BLURRY VISION/ NUMBNESS OF TINGLING OF EXTs.        CONSTITUTIONAL: No weakness, fevers or chills  EYES/ENT: No visual changes;  No vertigo or throat pain   NECK: No pain or stiffness  RESPIRATORY: No cough, wheezing, hemoptysis; No shortness of breath  CARDIOVASCULAR: No chest pain or palpitations  GASTROINTESTINAL: No abdominal or epigastric pain. No nausea, vomiting, or hematemesis; No diarrhea or constipation. No melena or hematochezia.  GENITOURINARY: No dysuria, frequency or hematuria  NEUROLOGICAL: No numbness or weakness  SKIN: No itching, burning, rashes, or lesions   All other review of systems is negative unless indicated above.    O:General: Well developed, well nourished, NAD  HEENT: NCAT, PERRLA, EOMI bl, moist mucous membranes   Neck: Supple, nontender, no mass  Neurology: A&Ox3, nonfocal, CN II-XII grossly intact, sensation intact, no gait abnormalities   Respiratory: CTA B/L, No W/R/R  CV: RRR, +S1/S2, no murmurs, rubs or gallops  Abdominal: Soft, NT, ND +BSx4  Extremities: No C/C/E, + peripheral pulses  MSK: Normal ROM, no joint erythema or warmth, no joint swelling   Skin: warm, dry, normal color, no rash or abnormal lesions      Vital Signs Last 24 Hrs  T(C): 36.7, Max: 36.8 ( @ 13:55)  T(F): 98.1, Max: 98.3 ( @ 13:55)  HR: 87 (87 - 105)  BP: 80/50 (60/34 - 97/46)  BP(mean): --  RR: 17 (14 - 19)  SpO2: 97% (95% - 100%)                            10.5   18.9  )-----------( 508      ( 27 May 2017 14:09 )             32.8         139  |  105  |  27<H>  ----------------------------<  90  3.9   |  23  |  1.00    Ca    8.7      27 May 2017 15:10    TPro  6.2  /  Alb  2.6<L>  /  TBili  0.3  /  DBili  x   /  AST  24  /  ALT  18  /  AlkPhos  59  -    PT/INR - ( 27 May 2017 15:10 )   PT: 10.7 sec;   INR: 0.98 ratio           PTT - ( 27 May 2017 15:10 )  PTT:25.4 sec  Urinalysis Basic - ( 27 May 2017 14:12 )    Color: Pale Yellow / Appearance: Slightly Turbid / S.005 / pH: x  Gluc: x / Ketone: Negative  / Bili: Negative / Urobili: Negative   Blood: x / Protein: Negative / Nitrite: Negative   Leuk Esterase: Negative / RBC: x / WBC 0-2   Sq Epi: x / Non Sq Epi: Occasional / Bacteria: x    Lactate 1.6, Procalcitonin 0.27     US Lower extremities no DVT       A/P: 7 F with PMH of MS, HTN, self-catherterizes who came in c/o weakness x 2 days.  Unable to urinate (patient cath's herself secondary to MS and couldn't pass the catheter today)  hypotension mildly improved    -S/p 1 L NS bolus and 10 mg Midodrinex1, Pt 's hypotension mildly improved but still low  -case discussed with ICU PA, ICU PA recommended pt to be transfer to ICU  -continue with monitor         A/P

## 2017-05-28 NOTE — PROGRESS NOTE ADULT - SUBJECTIVE AND OBJECTIVE BOX
Patient is a 67y old  Female who presents with a chief complaint of light headed and weak (27 May 2017 20:46)      INTERVAL HPI/OVERNIGHT EVENTS: events noted.  feels well currently  T(C): 36.6, Max: 36.9 ( @ 04:01)  HR: 75 (74 - 105)  BP: 94/65 (70/58 - 107/59)  RR: 16 (8 - 19)  SpO2: 95% (94% - 100%)  Wt(kg): --  I&O's Summary    I & Os for current day (as of 28 May 2017 13:45)  =============================================  IN: 1050 ml / OUT: 750 ml / NET: 300 ml      LABS:                        9.6    8.9   )-----------( 446      ( 28 May 2017 05:55 )             29.3         142  |  109<H>  |  29<H>  ----------------------------<  107<H>  4.0   |  23  |  1.10    Ca    8.3<L>      28 May 2017 05:55  Mg     1.9         TPro  6.2  /  Alb  2.6<L>  /  TBili  0.3  /  DBili  x   /  AST  24  /  ALT  18  /  AlkPhos  59      PT/INR - ( 27 May 2017 15:10 )   PT: 10.7 sec;   INR: 0.98 ratio         PTT - ( 27 May 2017 15:10 )  PTT:25.4 sec  Urinalysis Basic - ( 27 May 2017 14:12 )    Color: Pale Yellow / Appearance: Slightly Turbid / S.005 / pH: x  Gluc: x / Ketone: Negative  / Bili: Negative / Urobili: Negative   Blood: x / Protein: Negative / Nitrite: Negative   Leuk Esterase: Negative / RBC: x / WBC 0-2   Sq Epi: x / Non Sq Epi: Occasional / Bacteria: x      CAPILLARY BLOOD GLUCOSE        Urinalysis Basic - ( 27 May 2017 14:12 )    Color: Pale Yellow / Appearance: Slightly Turbid / S.005 / pH: x  Gluc: x / Ketone: Negative  / Bili: Negative / Urobili: Negative   Blood: x / Protein: Negative / Nitrite: Negative   Leuk Esterase: Negative / RBC: x / WBC 0-2   Sq Epi: x / Non Sq Epi: Occasional / Bacteria: x        MEDICATIONS  (STANDING):  baclofen 10milliGRAM(s) Oral daily  latanoprost 0.005% Ophthalmic Solution 1Drop(s) Both EYES at bedtime  oxybutynin 5milliGRAM(s) Oral two times a day  sodium chloride 0.9%. 1000milliLiter(s) IV Continuous <Continuous>  enoxaparin Injectable 40milliGRAM(s) SubCutaneous daily  piperacillin/tazobactam IVPB. 3.375Gram(s) IV Intermittent every 8 hours  vancomycin  IVPB 1000milliGRAM(s) IV Intermittent every 12 hours  vancomycin  IVPB  IV Intermittent   sodium chloride 0.9% Bolus 1000milliLiter(s) IV Bolus once  midodrine 10milliGRAM(s) Oral every 8 hours    MEDICATIONS  (PRN):  acetaminophen   Tablet 650milliGRAM(s) Oral every 6 hours PRN For Temp greater than 38 C (100.4 F)  acetaminophen   Tablet. 650milliGRAM(s) Oral every 6 hours PRN Mild Pain (1 - 3)      REVIEW OF SYSTEMS:  CONSTITUTIONAL: No fever, weight loss, or fatigue  EYES: No eye pain, visual disturbances, or discharge  ENMT:  No difficulty hearing, tinnitus, vertigo; No sinus or throat pain  NECK: No pain or stiffness  RESPIRATORY: No cough, wheezing, chills or hemoptysis; No shortness of breath  CARDIOVASCULAR: No chest pain, palpitations, dizziness, or leg swelling  GASTROINTESTINAL: No abdominal or epigastric pain. No nausea, vomiting, or hematemesis; No diarrhea or constipation. No melena or hematochezia.  GENITOURINARY: No dysuria, frequency, hematuria, or incontinence  NEUROLOGICAL: No headaches, memory loss,  SKIN: No itching, burning, rashes, or lesions   LYMPH NODES: No enlarged glands  ENDOCRINE: No heat or cold intolerance; No hair loss  MUSCULOSKELETAL: No joint pain or swelling; No muscle, back, or extremity pain  PSYCHIATRIC: No depression, anxiety, mood swings, or difficulty sleeping  HEME/LYMPH: No easy bruising, or bleeding gums  ALLERY AND IMMUNOLOGIC: No hives or eczema    RADIOLOGY & ADDITIONAL TESTS:    Imaging Personally Reviewed:  [ x] YES  [ ] NO    Consultant(s) Notes Reviewed:  x[ ] YES  [ ] NO    PHYSICAL EXAM:  GENERAL: NAD, well-groomed, well-developed  HEAD:  Atraumatic, Normocephalic  EYES: EOMI, PERRLA, conjunctiva and sclera clear  ENMT: No tonsillar erythema, exudates, or enlargement; Moist mucous membranes, Good dentition, No lesions  NECK: Supple, No JVD, Normal thyroid  NERVOUS SYSTEM:  Alert & Oriented X3, Good concentration; Motor Strength 5/5 B/L upper and lower extremities; DTRs 2+ intact and symmetric  CHEST/LUNG: Clear to percussion bilaterally; No rales, rhonchi, wheezing, or rubs  HEART: Regular rate and rhythm; No murmurs, rubs, or gallops  ABDOMEN: Soft, Nontender, Nondistended; Bowel sounds present  EXTREMITIES:  2+ Peripheral Pulses, No clubbing, cyanosis, or edema  LYMPH: No lymphadenopathy noted  SKIN: No rashes or lesions    Care Discussed with Consultants/Other Providers [ ] YES  [ ] NO

## 2017-05-29 LAB
ALBUMIN SERPL ELPH-MCNC: 1.9 G/DL — LOW (ref 3.3–5)
ALP SERPL-CCNC: 95 U/L — SIGNIFICANT CHANGE UP (ref 40–120)
ALT FLD-CCNC: 48 U/L — SIGNIFICANT CHANGE UP (ref 12–78)
ANION GAP SERPL CALC-SCNC: 11 MMOL/L — SIGNIFICANT CHANGE UP (ref 5–17)
AST SERPL-CCNC: 44 U/L — HIGH (ref 15–37)
BASOPHILS # BLD AUTO: 0 K/UL — SIGNIFICANT CHANGE UP (ref 0–0.2)
BASOPHILS NFR BLD AUTO: 0.4 % — SIGNIFICANT CHANGE UP (ref 0–2)
BILIRUB SERPL-MCNC: 0.4 MG/DL — SIGNIFICANT CHANGE UP (ref 0.2–1.2)
BUN SERPL-MCNC: 15 MG/DL — SIGNIFICANT CHANGE UP (ref 7–23)
CALCIUM SERPL-MCNC: 7.1 MG/DL — LOW (ref 8.5–10.1)
CHLORIDE SERPL-SCNC: 107 MMOL/L — SIGNIFICANT CHANGE UP (ref 96–108)
CO2 SERPL-SCNC: 22 MMOL/L — SIGNIFICANT CHANGE UP (ref 22–31)
CREAT SERPL-MCNC: 0.86 MG/DL — SIGNIFICANT CHANGE UP (ref 0.5–1.3)
EOSINOPHIL # BLD AUTO: 0 K/UL — SIGNIFICANT CHANGE UP (ref 0–0.5)
EOSINOPHIL NFR BLD AUTO: 0.2 % — SIGNIFICANT CHANGE UP (ref 0–6)
GLUCOSE SERPL-MCNC: 205 MG/DL — HIGH (ref 70–99)
HCT VFR BLD CALC: 27.1 % — LOW (ref 34.5–45)
HGB BLD-MCNC: 8.8 G/DL — LOW (ref 11.5–15.5)
LYMPHOCYTES # BLD AUTO: 1.1 K/UL — SIGNIFICANT CHANGE UP (ref 1–3.3)
LYMPHOCYTES # BLD AUTO: 10.5 % — LOW (ref 13–44)
MCHC RBC-ENTMCNC: 28.2 PG — SIGNIFICANT CHANGE UP (ref 27–34)
MCHC RBC-ENTMCNC: 32.5 GM/DL — SIGNIFICANT CHANGE UP (ref 32–36)
MCV RBC AUTO: 86.9 FL — SIGNIFICANT CHANGE UP (ref 80–100)
MONOCYTES # BLD AUTO: 0.7 K/UL — SIGNIFICANT CHANGE UP (ref 0–0.9)
MONOCYTES NFR BLD AUTO: 6.4 % — SIGNIFICANT CHANGE UP (ref 1–9)
NEUTROPHILS # BLD AUTO: 8.9 K/UL — HIGH (ref 1.8–7.4)
NEUTROPHILS NFR BLD AUTO: 82.5 % — HIGH (ref 43–77)
PLATELET # BLD AUTO: 422 K/UL — HIGH (ref 150–400)
POTASSIUM SERPL-MCNC: 3.6 MMOL/L — SIGNIFICANT CHANGE UP (ref 3.5–5.3)
POTASSIUM SERPL-SCNC: 3.6 MMOL/L — SIGNIFICANT CHANGE UP (ref 3.5–5.3)
PROT SERPL-MCNC: 5.2 G/DL — LOW (ref 6–8.3)
RBC # BLD: 3.12 M/UL — LOW (ref 3.8–5.2)
RBC # FLD: 16.2 % — HIGH (ref 10.3–14.5)
SODIUM SERPL-SCNC: 140 MMOL/L — SIGNIFICANT CHANGE UP (ref 135–145)
VANCOMYCIN TROUGH SERPL-MCNC: 18.5 UG/ML — SIGNIFICANT CHANGE UP (ref 10–20)
WBC # BLD: 10.8 K/UL — HIGH (ref 3.8–10.5)
WBC # FLD AUTO: 10.8 K/UL — HIGH (ref 3.8–10.5)

## 2017-05-29 RX ADMIN — PIPERACILLIN AND TAZOBACTAM 25 GRAM(S): 4; .5 INJECTION, POWDER, LYOPHILIZED, FOR SOLUTION INTRAVENOUS at 13:22

## 2017-05-29 RX ADMIN — MIDODRINE HYDROCHLORIDE 10 MILLIGRAM(S): 2.5 TABLET ORAL at 21:17

## 2017-05-29 RX ADMIN — ENOXAPARIN SODIUM 40 MILLIGRAM(S): 100 INJECTION SUBCUTANEOUS at 11:08

## 2017-05-29 RX ADMIN — Medication 5 MILLIGRAM(S): at 05:36

## 2017-05-29 RX ADMIN — Medication 10 MILLIGRAM(S): at 11:08

## 2017-05-29 RX ADMIN — Medication 5 MILLIGRAM(S): at 17:14

## 2017-05-29 RX ADMIN — Medication 250 MILLIGRAM(S): at 12:44

## 2017-05-29 RX ADMIN — LATANOPROST 1 DROP(S): 0.05 SOLUTION/ DROPS OPHTHALMIC; TOPICAL at 21:18

## 2017-05-29 RX ADMIN — MIDODRINE HYDROCHLORIDE 10 MILLIGRAM(S): 2.5 TABLET ORAL at 05:36

## 2017-05-29 RX ADMIN — PIPERACILLIN AND TAZOBACTAM 25 GRAM(S): 4; .5 INJECTION, POWDER, LYOPHILIZED, FOR SOLUTION INTRAVENOUS at 21:17

## 2017-05-29 RX ADMIN — PIPERACILLIN AND TAZOBACTAM 25 GRAM(S): 4; .5 INJECTION, POWDER, LYOPHILIZED, FOR SOLUTION INTRAVENOUS at 05:36

## 2017-05-29 RX ADMIN — SODIUM CHLORIDE 100 MILLILITER(S): 9 INJECTION INTRAMUSCULAR; INTRAVENOUS; SUBCUTANEOUS at 12:45

## 2017-05-29 RX ADMIN — MIDODRINE HYDROCHLORIDE 10 MILLIGRAM(S): 2.5 TABLET ORAL at 13:19

## 2017-05-29 NOTE — PROGRESS NOTE ADULT - SUBJECTIVE AND OBJECTIVE BOX
Patient is a 67y old  Female who presents with a chief complaint of light headed and weak (27 May 2017 20:46)      INTERVAL HPI/OVERNIGHT EVENTS: no distress. complains of mild upper back discomfort, positional  T(C): 37.6, Max: 37.6 ( @ 11:55)  HR: 88 (67 - 88)  BP: 108/72 (100/72 - 136/81)  RR: 17 (16 - 17)  SpO2: 94% (90% - 98%)  Wt(kg): --  I&O's Summary  I & Os for 24h ending 29 May 2017 07:00  =============================================  IN: 445 ml / OUT: 1750 ml / NET: -1305 ml    I & Os for current day (as of 29 May 2017 13:52)  =============================================  IN: 100 ml / OUT: 0 ml / NET: 100 ml      LABS:                        8.8    10.8  )-----------( 422      ( 29 May 2017 07:04 )             27.1         140  |  107  |  15  ----------------------------<  205<H>  3.6   |  22  |  0.86    Ca    7.1<L>      29 May 2017 07:04  Mg     1.9         TPro  5.2<L>  /  Alb  1.9<L>  /  TBili  0.4  /  DBili  x   /  AST  44<H>  /  ALT  48  /  AlkPhos  95      PT/INR - ( 27 May 2017 15:10 )   PT: 10.7 sec;   INR: 0.98 ratio         PTT - ( 27 May 2017 15:10 )  PTT:25.4 sec  Urinalysis Basic - ( 27 May 2017 14:12 )    Color: Pale Yellow / Appearance: Slightly Turbid / S.005 / pH: x  Gluc: x / Ketone: Negative  / Bili: Negative / Urobili: Negative   Blood: x / Protein: Negative / Nitrite: Negative   Leuk Esterase: Negative / RBC: x / WBC 0-2   Sq Epi: x / Non Sq Epi: Occasional / Bacteria: x      CAPILLARY BLOOD GLUCOSE        Urinalysis Basic - ( 27 May 2017 14:12 )    Color: Pale Yellow / Appearance: Slightly Turbid / S.005 / pH: x  Gluc: x / Ketone: Negative  / Bili: Negative / Urobili: Negative   Blood: x / Protein: Negative / Nitrite: Negative   Leuk Esterase: Negative / RBC: x / WBC 0-2   Sq Epi: x / Non Sq Epi: Occasional / Bacteria: x        MEDICATIONS  (STANDING):  baclofen 10milliGRAM(s) Oral daily  latanoprost 0.005% Ophthalmic Solution 1Drop(s) Both EYES at bedtime  oxybutynin 5milliGRAM(s) Oral two times a day  sodium chloride 0.9%. 1000milliLiter(s) IV Continuous <Continuous>  enoxaparin Injectable 40milliGRAM(s) SubCutaneous daily  piperacillin/tazobactam IVPB. 3.375Gram(s) IV Intermittent every 8 hours  vancomycin  IVPB 1000milliGRAM(s) IV Intermittent every 12 hours  vancomycin  IVPB  IV Intermittent   sodium chloride 0.9% Bolus 1000milliLiter(s) IV Bolus once  midodrine 10milliGRAM(s) Oral every 8 hours    MEDICATIONS  (PRN):  acetaminophen   Tablet 650milliGRAM(s) Oral every 6 hours PRN For Temp greater than 38 C (100.4 F)  acetaminophen   Tablet. 650milliGRAM(s) Oral every 6 hours PRN Mild Pain (1 - 3)  calcium carbonate 500 mG (Tums) Chewable 1Tablet(s) Chew daily PRN Gas      REVIEW OF SYSTEMS:  CONSTITUTIONAL: No fever, weight loss, or fatigue  EYES: No eye pain, visual disturbances, or discharge  ENMT:  No difficulty hearing, tinnitus, vertigo; No sinus or throat pain  NECK: No pain or stiffness  RESPIRATORY: No cough, wheezing, chills or hemoptysis; No shortness of breath  CARDIOVASCULAR: No chest pain, palpitations, dizziness, or leg swelling  GASTROINTESTINAL: No abdominal or epigastric pain. No nausea, vomiting, or hematemesis; No diarrhea or constipation. No melena or hematochezia.  GENITOURINARY: No dysuria, frequency, hematuria, or incontinence  NEUROLOGICAL: weakness related to MS  SKIN: No itching, burning, rashes, or lesions   LYMPH NODES: No enlarged glands  ENDOCRINE: No heat or cold intolerance; No hair loss  MUSCULOSKELETAL: No joint pain or swelling; No muscle, mild upper back discomfort  PSYCHIATRIC: No depression, anxiety, mood swings, or difficulty sleeping  HEME/LYMPH: No easy bruising, or bleeding gums  ALLERY AND IMMUNOLOGIC: No hives or eczema    RADIOLOGY & ADDITIONAL TESTS:    Imaging Personally Reviewed:  [xx ] YES  [ ] NO    Consultant(s) Notes Reviewed:  [x ] YES  [ ] NO    PHYSICAL EXAM:  GENERAL: NAD, well-groomed, well-developed  HEAD:  Atraumatic, Normocephalic  EYES: EOMI, PERRLA, conjunctiva and sclera clear  ENMT: No tonsillar erythema, exudates, or enlargement; Moist mucous membranes, Good dentition, No lesions  NECK: Supple, No JVD, Normal thyroid  NERVOUS SYSTEM:  Alert & Oriented X3, Good concentration; CHEST/LUNG: Clear to percussion bilaterally; No rales, rhonchi, wheezing, or rubs  HEART: Regular rate and rhythm; No murmurs, rubs, or gallops  ABDOMEN: Soft, Nontender, Nondistended; Bowel sounds present  EXTREMITIES:  2+ Peripheral Pulses, No clubbing, cyanosis, or edema  LYMPH: No lymphadenopathy noted  SKIN: No rashes or lesions    Care Discussed with Consultants/Other Providers [x ] YES  [ ] NO

## 2017-05-29 NOTE — PROGRESS NOTE ADULT - SUBJECTIVE AND OBJECTIVE BOX
infectious diseases progress note:    GEORGE STANLEY is a 67y y. o. Female patient    Patient reports: no new issues    ROS:    EYES:  Negative  blurry vision or double vision  GASTROINTESTINAL:  Negative for nausea, vomiting, diarrhea  -otherwise negative except for subjective    Allergies    Sudafed (Other)    Intolerances        ANTIBIOTICS/RELEVANT:  antimicrobials  piperacillin/tazobactam IVPB. 3.375Gram(s) IV Intermittent every 8 hours  vancomycin  IVPB 1000milliGRAM(s) IV Intermittent every 12 hours  vancomycin  IVPB  IV Intermittent     immunologic:    OTHER:  baclofen 10milliGRAM(s) Oral daily  latanoprost 0.005% Ophthalmic Solution 1Drop(s) Both EYES at bedtime  oxybutynin 5milliGRAM(s) Oral two times a day  sodium chloride 0.9%. 1000milliLiter(s) IV Continuous <Continuous>  acetaminophen   Tablet 650milliGRAM(s) Oral every 6 hours PRN  acetaminophen   Tablet. 650milliGRAM(s) Oral every 6 hours PRN  enoxaparin Injectable 40milliGRAM(s) SubCutaneous daily  sodium chloride 0.9% Bolus 1000milliLiter(s) IV Bolus once  midodrine 10milliGRAM(s) Oral every 8 hours  calcium carbonate 500 mG (Tums) Chewable 1Tablet(s) Chew daily PRN      Objective:  Last 24-Vital Signs Last 24 Hrs  T(C): 37.6, Max: 37.6 (-29 @ 11:55)  T(F): 99.7, Max: 99.7 (05-29 @ 11:55)  HR: 88 (67 - 88)  BP: 108/72 (100/72 - 136/81)  BP(mean): --  RR: 17 (16 - 17)  SpO2: 94% (90% - 98%)    T(C): 37.6, Max: 37.6 (05-29 @ 11:55)  T(F): 99.7, Max: 99.7 (-29 @ 11:55)  T(C): 37.6, Max: 37.6 (-29 @ 11:55)  T(F): 99.7, Max: 99.7 (-29 @ 11:55)  T(C): 37.6, Max: 37.6 (05-29 @ 11:55)  T(F): 99.7, Max: 99.7 ( @ 11:55)    PHYSICAL EXAM:  Constitutional:Well-developed, well nourished  Eyes:PERRLA, EOMI  Ear/Nose/Throat: oropharynx normal	  Neck:no JVD, no lymphadenopathy, supple  Respiratory: no accessory muscle use, lung fields bilaterally clear  Cardiovascular:RRR, normal S1, S2 no m/r/g  Gastrointestinal:soft, NT, no HSM, BS-normal  Extremities:no clubbing, no cyanosis, edema absent  Neuro-patient alert, oriented and appropriate  Skin-no sig lesions      LABS:                        8.8    10.8  )-----------( 422      ( 29 May 2017 07:04 )             27.1       WBC 10.8   @ 07:04  WBC 8.9   @ 05:55  WBC 18.9   @ 14:09          140  |  107  |  15  ----------------------------<  205<H>  3.6   |  22  |  0.86    Ca    7.1<L>      29 May 2017 07:04  Mg     1.9         TPro  5.2<L>  /  Alb  1.9<L>  /  TBili  0.4  /  DBili  x   /  AST  44<H>  /  ALT  48  /  AlkPhos  95      PT/INR - ( 27 May 2017 15:10 )   PT: 10.7 sec;   INR: 0.98 ratio         PTT - ( 27 May 2017 15:10 )  PTT:25.4 sec  Urinalysis Basic - ( 27 May 2017 14:12 )    Color: Pale Yellow / Appearance: Slightly Turbid / S.005 / pH: x  Gluc: x / Ketone: Negative  / Bili: Negative / Urobili: Negative   Blood: x / Protein: Negative / Nitrite: Negative   Leuk Esterase: Negative / RBC: x / WBC 0-2   Sq Epi: x / Non Sq Epi: Occasional / Bacteria: x          MICROBIOLOGY:        RADIOLOGY & ADDITIONAL STUDIES:

## 2017-05-30 LAB
BASOPHILS # BLD AUTO: 0.1 K/UL — SIGNIFICANT CHANGE UP (ref 0–0.2)
BASOPHILS NFR BLD AUTO: 1.1 % — SIGNIFICANT CHANGE UP (ref 0–2)
EOSINOPHIL # BLD AUTO: 0 K/UL — SIGNIFICANT CHANGE UP (ref 0–0.5)
EOSINOPHIL NFR BLD AUTO: 0.5 % — SIGNIFICANT CHANGE UP (ref 0–6)
HCT VFR BLD CALC: 28 % — LOW (ref 34.5–45)
HGB BLD-MCNC: 8.9 G/DL — LOW (ref 11.5–15.5)
LYMPHOCYTES # BLD AUTO: 2 K/UL — SIGNIFICANT CHANGE UP (ref 1–3.3)
LYMPHOCYTES # BLD AUTO: 22.3 % — SIGNIFICANT CHANGE UP (ref 13–44)
MCHC RBC-ENTMCNC: 27.2 PG — SIGNIFICANT CHANGE UP (ref 27–34)
MCHC RBC-ENTMCNC: 31.7 GM/DL — LOW (ref 32–36)
MCV RBC AUTO: 85.8 FL — SIGNIFICANT CHANGE UP (ref 80–100)
MONOCYTES # BLD AUTO: 0.8 K/UL — SIGNIFICANT CHANGE UP (ref 0–0.9)
MONOCYTES NFR BLD AUTO: 8.5 % — SIGNIFICANT CHANGE UP (ref 1–9)
NEUTROPHILS # BLD AUTO: 6.1 K/UL — SIGNIFICANT CHANGE UP (ref 1.8–7.4)
NEUTROPHILS NFR BLD AUTO: 67.7 % — SIGNIFICANT CHANGE UP (ref 43–77)
PLATELET # BLD AUTO: 428 K/UL — HIGH (ref 150–400)
RBC # BLD: 3.27 M/UL — LOW (ref 3.8–5.2)
RBC # FLD: 16 % — HIGH (ref 10.3–14.5)
WBC # BLD: 9.1 K/UL — SIGNIFICANT CHANGE UP (ref 3.8–10.5)
WBC # FLD AUTO: 9.1 K/UL — SIGNIFICANT CHANGE UP (ref 3.8–10.5)

## 2017-05-30 RX ADMIN — Medication 5 MILLIGRAM(S): at 17:23

## 2017-05-30 RX ADMIN — Medication 1 TABLET(S): at 17:23

## 2017-05-30 RX ADMIN — ENOXAPARIN SODIUM 40 MILLIGRAM(S): 100 INJECTION SUBCUTANEOUS at 11:06

## 2017-05-30 RX ADMIN — MIDODRINE HYDROCHLORIDE 10 MILLIGRAM(S): 2.5 TABLET ORAL at 21:34

## 2017-05-30 RX ADMIN — SODIUM CHLORIDE 100 MILLILITER(S): 9 INJECTION INTRAMUSCULAR; INTRAVENOUS; SUBCUTANEOUS at 11:54

## 2017-05-30 RX ADMIN — Medication 10 MILLIGRAM(S): at 11:06

## 2017-05-30 RX ADMIN — LATANOPROST 1 DROP(S): 0.05 SOLUTION/ DROPS OPHTHALMIC; TOPICAL at 21:34

## 2017-05-30 RX ADMIN — PIPERACILLIN AND TAZOBACTAM 25 GRAM(S): 4; .5 INJECTION, POWDER, LYOPHILIZED, FOR SOLUTION INTRAVENOUS at 05:43

## 2017-05-30 RX ADMIN — Medication 5 MILLIGRAM(S): at 05:42

## 2017-05-30 RX ADMIN — MIDODRINE HYDROCHLORIDE 10 MILLIGRAM(S): 2.5 TABLET ORAL at 05:42

## 2017-05-30 RX ADMIN — Medication 250 MILLIGRAM(S): at 01:05

## 2017-05-30 RX ADMIN — MIDODRINE HYDROCHLORIDE 10 MILLIGRAM(S): 2.5 TABLET ORAL at 13:08

## 2017-05-30 NOTE — PHYSICAL THERAPY INITIAL EVALUATION ADULT - PERTINENT HX OF CURRENT PROBLEM, REHAB EVAL
68 y/o female adm 5/27 with weakness. Pt recently d/c'd home post treatment for PNA. Dopplers: negative DVT BLE.

## 2017-05-30 NOTE — PROGRESS NOTE ADULT - SUBJECTIVE AND OBJECTIVE BOX
[facility], Division of Infectious Diseases  CHRISTEN Werner A. Lee  [phone]    Name: GEORGE STANLEY  Age: 67y  Gender: Female  MRN: [id number]    Interval History--  Notes reviewed  overnight, no complaints,  denies chest pain  no cough, has campos catheter  no diarrhea    Past Medical History--  Multiple sclerosis  S/P mastectomy, right  Breast CA  Osteoporosis  MS (mitral stenosis)  History of bowel resection  H/O breast surgery    Allergies    Sudafed (Other)        Medications--  Antibiotics:  piperacillin/tazobactam IVPB. 3.375Gram(s) IV Intermittent every 8 hours  vancomycin  IVPB 1000milliGRAM(s) IV Intermittent every 12 hours  vancomycin  IVPB  IV Intermittent     Immunologic:    Other:  baclofen  latanoprost 0.005% Ophthalmic Solution  oxybutynin  sodium chloride 0.9%.  acetaminophen   Tablet PRN  acetaminophen   Tablet. PRN  enoxaparin Injectable  sodium chloride 0.9% Bolus  midodrine  calcium carbonate 500 mG (Tums) Chewable PRN      Review of Systems--  unchanged    Physical Examination--  General: Nontoxic-appearing Female in no acute distress.  Vital Signs: T(F): 98.6, Max: 99.9 (05-29 @ 23:35)  HR: 72  BP: 126/80  RR: 16  SpO2: 93%  Wt(kg): --  HEENT: AT/NC. Dentition fair.  Neck: Not rigid. No sense of mass.  Nodes: None palpable.  Lungs: Clear bilaterally without rales, wheezing or rhonchi  Heart: Regular rate and rhythm. No Murmur.   Abdomen: Bowel sounds present and normoactive. Soft. Nondistended.   Extremities: No cyanosis or clubbing. trace edema.   Skin: Warm. Dry. Good turgor. No rash. No vasculitic stigmata.  Psychiatric: Appropriate affect and mood for situation.   Gu campos catheter      Laboratory Studies--  CBC                        8.9    9.1   )-----------( 428      ( 30 May 2017 06:03 )             28.0       Chemistries  05-29    140  |  107  |  15  ----------------------------<  205<H>  3.6   |  22  |  0.86    Ca    7.1<L>      29 May 2017 07:04    TPro  5.2<L>  /  Alb  1.9<L>  /  TBili  0.4  /  DBili  x   /  AST  44<H>  /  ALT  48  /  AlkPhos  95  05-29      Culture Data  .Blood Blood  05-27 @ 18:46   No growth to date.  --  --      .Urine Catheterized  05-27 @ 18:43   No growth  --  --      .Urine Clean Catch (Midstream)  05-13 @ 00:05   No growth  --  --

## 2017-05-30 NOTE — SWALLOW BEDSIDE ASSESSMENT ADULT - COMMENTS
This is a 67 F with PMH of MS, HTN, self-catherterizes who came in c/o weakness x 2 days.  Unable to urinateon levaquin for possible PNA.   no overt si/sx of aspiartion

## 2017-05-30 NOTE — PHYSICAL THERAPY INITIAL EVALUATION ADULT - ADDITIONAL COMMENTS
Pt lives with her son in a house, no steps. Pt ambulates with rolling walker and her home physical therapist and son provides assistance for ADLs.

## 2017-05-30 NOTE — PROGRESS NOTE ADULT - SUBJECTIVE AND OBJECTIVE BOX
Patient is a 67y old  Female who presents with a chief complaint of light headed and weak (27 May 2017 20:46)      INTERVAL HPI/OVERNIGHT EVENTS: feels well, await pt eval.id noted    MEDICATIONS  (STANDING):  baclofen 10milliGRAM(s) Oral daily  latanoprost 0.005% Ophthalmic Solution 1Drop(s) Both EYES at bedtime  oxybutynin 5milliGRAM(s) Oral two times a day  sodium chloride 0.9%. 1000milliLiter(s) IV Continuous <Continuous>  enoxaparin Injectable 40milliGRAM(s) SubCutaneous daily  sodium chloride 0.9% Bolus 1000milliLiter(s) IV Bolus once  midodrine 10milliGRAM(s) Oral every 8 hours  amoxicillin  875 milliGRAM(s)/clavulanate 1Tablet(s) Oral two times a day    MEDICATIONS  (PRN):  acetaminophen   Tablet 650milliGRAM(s) Oral every 6 hours PRN For Temp greater than 38 C (100.4 F)  acetaminophen   Tablet. 650milliGRAM(s) Oral every 6 hours PRN Mild Pain (1 - 3)  calcium carbonate 500 mG (Tums) Chewable 1Tablet(s) Chew daily PRN Gas      Allergies    Sudafed (Other)    Intolerances        REVIEW OF SYSTEMS:  CONSTITUTIONAL: No fever, weight loss, or fatigue  EYES: No eye pain, visual disturbances  ENMT:  No difficulty hearing, tinnitus, vertigo; No sinus or throat pain  NECK: No pain or stiffness  RESPIRATORY: No cough, wheezing, chills or hemoptysis; No shortness of breath  CARDIOVASCULAR: No chest pain, palpitations, dizziness  GASTROINTESTINAL: No abdominal or epigastric pain. No nausea, vomiting, or hematemesis; No diarrhea or constipation. No melena or hematochezia.  GENITOURINARY:wants to straight cath   NEUROLOGICAL: No headaches, memory loss, loss of strength, numbness, or tremors  SKIN: No itching, burning  LYMPH NODES: No enlarged glands  MUSCULOSKELETAL: No joint pain or swelling; No muscle, back, or extremity pain  PSYCHIATRIC: No depression, mood swings  HEME/LYMPH: No easy bruising, or bleeding gums  ALLERGY AND IMMUNOLOGIC: No hives    Vital Signs Last 24 Hrs  T(C): 37, Max: 37.7 (05-29 @ 23:35)  T(F): 98.6, Max: 99.9 (05-29 @ 23:35)  HR: 72 (72 - 87)  BP: 126/80 (106/71 - 129/83)  BP(mean): --  RR: 16 (16 - 18)  SpO2: 93% (92% - 93%)    PHYSICAL EXAM:  GENERAL: NAD, well-groomed, well-developed  HEAD:  Atraumatic, Normocephalic  EYES: EOMI, PERRLA, conjunctiva and sclera clear  ENMT: No tonsillar erythema, exudates, or enlargement   NECK: Supple, No JVD  NERVOUS SYSTEM:  Alert & Oriented X3, Good concentration;   CHEST/LUNG: Clear to auscultation bilaterally; No rales, rhonchi, wheezing  HEART: Regular rate and rhythm  ABDOMEN: Soft, Nontender, Nondistended; Bowel sounds present  EXTREMITIES:  2+ Peripheral Pulses   LYMPH: No lymphadenopathy noted  SKIN: No rashes     LABS:                        8.9    9.1   )-----------( 428      ( 30 May 2017 06:03 )             28.0       Ca    7.1        29 May 2017 07:04          CAPILLARY BLOOD GLUCOSE    blood culture --   No growth to date.   05-27 @ 18:46    blood culture --   No growth   05-27 @ 18:43      urine culture --  05-27 @ 18:46  results   No growth to date. 05-27 @ 18:46  urine culture --  05-27 @ 18:43  results   No growth 05-27 @ 18:43    wound with gram statin --    05-27 @ 18:46  organism  --   05-27 @ 18:46  specimen source .Blood Blood  05-27 @ 18:46  wound with gram statin --    05-27 @ 18:43  organism  --   05-27 @ 18:43  specimen source .Urine Catheterized  05-27 @ 18:43      RADIOLOGY & ADDITIONAL TESTS:    EXAM:  US DPLX LWR EXT VEINS COMPL BI                            PROCEDURE DATE:  05/27/2017        INTERPRETATION:  CLINICAL HISTORY: Lower extremity edema. Rule out DVT.    COMPARISON: None.    TECHNIQUE: Bilateral lower extremity venous duplex ultrasound was   performed and submitted for interpretation. Color and spectral Doppler   images were obtained.    FINDINGS:    RIGHT:    Common femoral vein: Unremarkable  Femoral vein: Unremarkable  Popliteal vein: Unremarkable.    LEFT:    Common femoral vein: Unremarkable  Femoral vein: Unremarkable  Popliteal vein: Unremarkable     Imaged veins demonstrate no evidence of thrombosis by either gray scale   or Doppler evaluation.    IMPRESSION:    No evidence of lower extremity DVT.              ADRYAN ROMO M.D., ATTENDING RADIOLOGIST  This document has been electronically signed. May 28 2017 12:00AM      Consultant(s) Notes Reviewed:  [ x] YES  [ ] NO    Care Discussed with Consultants/Other Providers [x ] YES  [ ] NO

## 2017-05-30 NOTE — SWALLOW BEDSIDE ASSESSMENT ADULT - SWALLOW EVAL: RECOMMENDED FEEDING/EATING TECHNIQUES
alternate food with liquid/check mouth frequently for oral residue/pocketing/crush medication (when feasible)

## 2017-05-31 RX ADMIN — MIDODRINE HYDROCHLORIDE 10 MILLIGRAM(S): 2.5 TABLET ORAL at 14:20

## 2017-05-31 RX ADMIN — Medication 5 MILLIGRAM(S): at 05:36

## 2017-05-31 RX ADMIN — MIDODRINE HYDROCHLORIDE 10 MILLIGRAM(S): 2.5 TABLET ORAL at 05:36

## 2017-05-31 RX ADMIN — Medication 1 TABLET(S): at 18:39

## 2017-05-31 RX ADMIN — MIDODRINE HYDROCHLORIDE 10 MILLIGRAM(S): 2.5 TABLET ORAL at 21:47

## 2017-05-31 RX ADMIN — Medication 1 TABLET(S): at 05:36

## 2017-05-31 RX ADMIN — ENOXAPARIN SODIUM 40 MILLIGRAM(S): 100 INJECTION SUBCUTANEOUS at 11:30

## 2017-05-31 RX ADMIN — Medication 10 MILLIGRAM(S): at 11:30

## 2017-05-31 RX ADMIN — LATANOPROST 1 DROP(S): 0.05 SOLUTION/ DROPS OPHTHALMIC; TOPICAL at 21:47

## 2017-05-31 RX ADMIN — Medication 5 MILLIGRAM(S): at 18:39

## 2017-05-31 NOTE — PROGRESS NOTE ADULT - SUBJECTIVE AND OBJECTIVE BOX
Patient is a 67y old  Female who presents with a chief complaint of light headed and weak (27 May 2017 20:46)      INTERVAL HPI/OVERNIGHT EVENTS: feels well, pt and speech eval noted, pt agrees for dc to jeromy    MEDICATIONS  (STANDING):  baclofen 10milliGRAM(s) Oral daily  latanoprost 0.005% Ophthalmic Solution 1Drop(s) Both EYES at bedtime  oxybutynin 5milliGRAM(s) Oral two times a day  sodium chloride 0.9%. 1000milliLiter(s) IV Continuous <Continuous>  enoxaparin Injectable 40milliGRAM(s) SubCutaneous daily  sodium chloride 0.9% Bolus 1000milliLiter(s) IV Bolus once  midodrine 10milliGRAM(s) Oral every 8 hours  amoxicillin  875 milliGRAM(s)/clavulanate 1Tablet(s) Oral two times a day    MEDICATIONS  (PRN):  acetaminophen   Tablet 650milliGRAM(s) Oral every 6 hours PRN For Temp greater than 38 C (100.4 F)  acetaminophen   Tablet. 650milliGRAM(s) Oral every 6 hours PRN Mild Pain (1 - 3)  calcium carbonate 500 mG (Tums) Chewable 1Tablet(s) Chew daily PRN Gas      Allergies    Sudafed (Other)    Intolerances        REVIEW OF SYSTEMS:  CONSTITUTIONAL: No fever, weight loss, or fatigue  EYES: No eye pain, visual disturbances  ENMT:  No difficulty hearing, tinnitus, vertigo; No sinus or throat pain  NECK: No pain or stiffness  RESPIRATORY: No cough, wheezing, chills or hemoptysis; No shortness of breath  CARDIOVASCULAR: No chest pain, palpitations, dizziness  GASTROINTESTINAL: No abdominal or epigastric pain. No nausea, vomiting, or hematemesis; No diarrhea or constipation. No melena or hematochezia.  GENITOURINARY:incontinent  NEUROLOGICAL: No headaches, memory loss, loss of strength, numbness, or tremors  SKIN: No itching, burning  LYMPH NODES: No enlarged glands  MUSCULOSKELETAL: No joint pain or swelling; No muscle, back, or extremity pain  PSYCHIATRIC: No depression, mood swings  HEME/LYMPH: No easy bruising, or bleeding gums  ALLERGY AND IMMUNOLOGIC: No hives    Vital Signs Last 24 Hrs  T(C): 37.1, Max: 37.7 (05-30 @ 17:30)  T(F): 98.8, Max: 99.8 (05-30 @ 17:30)  HR: 78 (52 - 87)  BP: 132/85 (113/74 - 140/85)  BP(mean): --  RR: 16 (16 - 17)  SpO2: 92% (92% - 95%)    PHYSICAL EXAM:  GENERAL: NAD, well-groomed, well-developed  HEAD:  Atraumatic, Normocephalic  EYES: EOMI, PERRLA, conjunctiva and sclera clear  ENMT: No tonsillar erythema, exudates, or enlargement   NECK: Supple, No JVD  NERVOUS SYSTEM:  Alert & Oriented X3, Good concentration;  CHEST/LUNG: Clear to auscultation bilaterally; No rales, rhonchi, wheezing  HEART: Regular rate and rhythm  ABDOMEN: Soft, Nontender, Nondistended; Bowel sounds present  EXTREMITIES:  2+ Peripheral Pulses   LYMPH: No lymphadenopathy noted  SKIN: No rashes     LABS:              CAPILLARY BLOOD GLUCOSE    blood culture --   No growth to date.   05-27 @ 18:46    blood culture --   No growth   05-27 @ 18:43      urine culture --  05-27 @ 18:46  results   No growth to date. 05-27 @ 18:46  urine culture --  05-27 @ 18:43  results   No growth 05-27 @ 18:43    wound with gram statin --    05-27 @ 18:46  organism  --   05-27 @ 18:46  specimen source .Blood Blood  05-27 @ 18:46  wound with gram statin --    05-27 @ 18:43  organism  --   05-27 @ 18:43  specimen source .Urine Catheterized  05-27 @ 18:43      RADIOLOGY & ADDITIONAL TESTS:  no new      Consultant(s) Notes Reviewed:  [x ] YES  [ ] NO    Care Discussed with Consultants/Other Providers [x ] YES  [ ] NO

## 2017-05-31 NOTE — PROGRESS NOTE ADULT - SUBJECTIVE AND OBJECTIVE BOX
infectious diseases progress note:    GEORGE STANLEY is a 67y y. o. Female patient    Patient reports: she feels fine except she thinks her stools are too soft. Today she would like to get up to the chair    ROS:    EYES:  Negative  blurry vision or double vision  GASTROINTESTINAL:  Negative for nausea, vomiting  -otherwise negative except for subjective    Allergies    Sudafed (Other)    Intolerances        ANTIBIOTICS/RELEVANT:  antimicrobials  amoxicillin  875 milliGRAM(s)/clavulanate 1Tablet(s) Oral two times a day    immunologic:    OTHER:  baclofen 10milliGRAM(s) Oral daily  latanoprost 0.005% Ophthalmic Solution 1Drop(s) Both EYES at bedtime  oxybutynin 5milliGRAM(s) Oral two times a day  sodium chloride 0.9%. 1000milliLiter(s) IV Continuous <Continuous>  acetaminophen   Tablet 650milliGRAM(s) Oral every 6 hours PRN  acetaminophen   Tablet. 650milliGRAM(s) Oral every 6 hours PRN  enoxaparin Injectable 40milliGRAM(s) SubCutaneous daily  sodium chloride 0.9% Bolus 1000milliLiter(s) IV Bolus once  midodrine 10milliGRAM(s) Oral every 8 hours  calcium carbonate 500 mG (Tums) Chewable 1Tablet(s) Chew daily PRN      Objective:  Vital Signs Last 24 Hrs  T(C): 37.1, Max: 37.7 (05-30 @ 17:30)  T(F): 98.8, Max: 99.8 (05-30 @ 17:30)  HR: 78 (52 - 87)  BP: 132/85 (113/74 - 140/85)  BP(mean): --  RR: 16 (16 - 17)  SpO2: 92% (92% - 95%)    T(C): 37.1, Max: 37.7 (05-29 @ 23:35)  T(C): 37.1, Max: 37.7 (05-29 @ 23:35)  T(C): 37.1, Max: 37.7 (05-29 @ 23:35)    PHYSICAL EXAM:  Constitutional:Well-developed, well nourished  Eyes:PERRLA, EOMI  Ear/Nose/Throat: oropharynx normal	  Neck:no JVD, no lymphadenopathy, supple  Respiratory: no accessory muscle use  Cardiovascular:RRR,   Gastrointestinal:soft, NT  Extremities:no clubbing, no cyanosis, edema absent      LABS:                        8.9    9.1   )-----------( 428      ( 30 May 2017 06:03 )             28.0       9.1 05-30 @ 06:03  10.8 05-29 @ 07:04  8.9 05-28 @ 05:55  18.9 05-27 @ 14:09      MICROBIOLOGY:          RADIOLOGY & ADDITIONAL STUDIES:

## 2017-06-01 LAB
CULTURE RESULTS: SIGNIFICANT CHANGE UP
CULTURE RESULTS: SIGNIFICANT CHANGE UP
SPECIMEN SOURCE: SIGNIFICANT CHANGE UP
SPECIMEN SOURCE: SIGNIFICANT CHANGE UP

## 2017-06-01 RX ORDER — ENOXAPARIN SODIUM 100 MG/ML
40 INJECTION SUBCUTANEOUS
Qty: 0 | Refills: 0 | COMMUNITY
Start: 2017-06-01

## 2017-06-01 RX ORDER — ACETAMINOPHEN 500 MG
2 TABLET ORAL
Qty: 0 | Refills: 0 | COMMUNITY
Start: 2017-06-01

## 2017-06-01 RX ORDER — CHLORTHALIDONE 50 MG
1 TABLET ORAL
Qty: 0 | Refills: 0 | COMMUNITY

## 2017-06-01 RX ADMIN — Medication 1 TABLET(S): at 17:50

## 2017-06-01 RX ADMIN — ENOXAPARIN SODIUM 40 MILLIGRAM(S): 100 INJECTION SUBCUTANEOUS at 11:46

## 2017-06-01 RX ADMIN — Medication 10 MILLIGRAM(S): at 11:46

## 2017-06-01 RX ADMIN — LATANOPROST 1 DROP(S): 0.05 SOLUTION/ DROPS OPHTHALMIC; TOPICAL at 21:39

## 2017-06-01 RX ADMIN — Medication 5 MILLIGRAM(S): at 05:12

## 2017-06-01 RX ADMIN — Medication 5 MILLIGRAM(S): at 17:50

## 2017-06-01 RX ADMIN — Medication 1 TABLET(S): at 05:12

## 2017-06-01 NOTE — DISCHARGE NOTE ADULT - HOSPITAL COURSE
recent SBO, treated conservatively, re admitted with hypotension, sepsis syndrome  ?aspiration pneumonitis.  seen by id, to complete augmentin course 6/2.  dc plans to rehab recent SBO, treated conservatively, re admitted with hypotension, sepsis syndrome  ?aspiration pneumonitis.  seen by id, to complete augmentin course 6/2. cultures are negative 5/27/17. Lower ext US was neg for DVT.   dc plans to rehab

## 2017-06-01 NOTE — DISCHARGE NOTE ADULT - PROVIDER TOKENS
FREE:[LAST:[cynthia],FIRST:[ruth ann],PHONE:[(   )    -],FAX:[(   )    -]] FREE:[LAST:[cynthia],FIRST:[ruth ann],PHONE:[phone]],FAX:[(   )    -]]

## 2017-06-01 NOTE — DISCHARGE NOTE ADULT - CONDITIONS AT DISCHARGE
pt is stable for d/c continue meds as writtee pt to follow up with pcp dr kwok after d/c from rehab.  no obvious source of sepsis was detected.   pt completed course of asugmentin as per ID rec

## 2017-06-01 NOTE — PROGRESS NOTE ADULT - PROBLEM SELECTOR PROBLEM 1
R/O Sepsis
Sepsis, due to unspecified organism

## 2017-06-01 NOTE — PROGRESS NOTE ADULT - ASSESSMENT
66 yo female with fever, chills, leukocytosis appearing septic with no clear localization
66 yo female with fever, chills, leukocytosis appearing septic with no clear localization improving now and continuing to do so after change to PO abx.
67F with MS self cath at home was recently discharged here with weakness found to be hypotensive and have leukocytosis  without any clear source as cx neg.  no s/s of pneumonia and chest xray clear.  clinically much improved
68 yo female with fever, chills, leukocytosis appearing septic with no clear localization
This is a 67 F comes with weakness, inability to catherterize and low BP with pna on imaging with hx of ms
This is a 67 F comes with weakness, inability to catherterize and low BP.
This is a 67 F comes with weakness, inability to catherterize and low BP with pna on imaging with hx of ms

## 2017-06-01 NOTE — PROGRESS NOTE ADULT - PROBLEM SELECTOR PLAN 1
-Admit  -pan-culture  -iv zosyn  -ID consult Dr Tabares  -IVF
-Admit  -pan-culture  -iv zosyn  -ID consult Dr Tabares appreciated  -IVF  PT eval, may need rehab
-Admit  id fu noted, off vanco and zosyn not  changed to po abx until 6/2/17  all cultures thus far neg  -IVF  PT eval, may need rehab - reordered
-unknown source; possibly RLL infiltrate as per CT on 5/15--potentially aspiration pneumonitis as per ID eval-- altho no symptoms of pna  -ID f/u noted, pt now off of IV vanco and zosyn   -c/w PO Augmentin, 1 tab BID until 6/2/17  -blood cx x2: NGTD  -UCx: NGTD  -IVF stopped  -D/C planning to [facility]
id yannick noted, off vanco and zosyn now  changed to po abx until 6/2/17  all cultures thus far neg  ivf stopped  dc planning to [facility]
plan to continue Augmentin until 6/2/2017     Further hospitalization based on overall status and timing of safe discharge.
recommend continuing current abx pending clarification of cause of sepsis    if patient continues to improve will look at converting to PO abx on 5/30 as etiology and what we are treating remains unclear. but pt is improving and now hemodynamically stable after hypotension and rapid being called on day of admission
Currently day 4 of antibx  cx negative, leukocytosis resolved. no signs or symptoms of pneumonia  although ct on 5/15 with rll infiltrate.  ? aspiration pneumonitis  consider speech and swallow  encourage out of bed  can d/c vancomycin today  and change to augmentin 875 bid to complete 7 days which will complete 6/2/2017
recommend continuing current abx pending clarification of cause of sepsis

## 2017-06-01 NOTE — DISCHARGE NOTE ADULT - CARE PLAN
Principal Discharge DX:	Sepsis  Goal:	treatment  Instructions for follow-up, activity and diet:	no clear etiology, ?asp pneumonitis  Secondary Diagnosis:	Multiple sclerosis  Instructions for follow-up, activity and diet:	pt follow up, dc planning for [facility] Principal Discharge DX:	Sepsis  Goal:	treatment  Instructions for follow-up, activity and diet:	no clear etiology, possibly RLL infiltrate as per CT on 5/15--potentially aspiration pneumonitis as per ID eval-- altho no symptoms of pna  -ID f/u noted, pt now off of IV vanco and zosyn   -c/w PO Augmentin, 1 tab BID until 6/2/17  -blood cx x2: NGTD  -UCx: NGTD  -IVF stopped  Secondary Diagnosis:	Multiple sclerosis  Goal:	chronic, prevent from worsening  Instructions for follow-up, activity and diet:	con't avonex  - swallow eval noted, no signs of aspiration    pt follow up, dc planning for [facility]  Secondary Diagnosis:	Urinary retention  Goal:	to void  Instructions for follow-up, activity and diet:	-campos out now  -nurse to straight cath pt 4-5 times daily  -c/w Oxybutynin 5mg BID. Principal Discharge DX:	Sepsis  Goal:	treatment  Instructions for follow-up, activity and diet:	no clear etiology, possibly RLL infiltrate as per CT on 5/15--potentially aspiration pneumonitis as per ID eval-- altho no symptoms of pna  -ID f/u noted, pt now off of IV vanco and zosyn   -c/w PO Augmentin, 1 tab BID until 6/2/17 ( done today )  -blood cx x2: NGTD  -UCx: NGTD  -IVF stopped  Secondary Diagnosis:	Multiple sclerosis  Goal:	chronic, prevent from worsening  Instructions for follow-up, activity and diet:	con't avonex  - swallow eval noted, no signs of aspiration    pt follow up, dc planning for [facility]  Secondary Diagnosis:	Urinary retention  Goal:	to void  Instructions for follow-up, activity and diet:	-campos out now  -nurse to straight cath pt 4-5 times daily  -c/w Oxybutynin 5mg BID.

## 2017-06-01 NOTE — PROGRESS NOTE ADULT - PROBLEM SELECTOR PROBLEM 2
Hypotension, unspecified hypotension type
Urinary retention
Lymphocytosis
Urinary retention

## 2017-06-01 NOTE — PROGRESS NOTE ADULT - PROBLEM SELECTOR PLAN 5
discussed with patient by Attending, including long term care plans.  she will bring health care proxy
discussed with patient, including long term care plans.  she will bring health care proxy

## 2017-06-01 NOTE — PROGRESS NOTE ADULT - SUBJECTIVE AND OBJECTIVE BOX
Patient is a 67y old  Female who presents with a chief complaint of light headed and weak (27 May 2017 20:46)      INTERVAL HPI/OVERNIGHT EVENTS:  67 F with PMH of MS, HTN, self-catherterizes who came in c/o weakness x 2 days.    Pt had been admitted for sepsis, unknown etiology.     Today, pt seen and assessed at bedside, pt states that she feels:       MEDICATIONS  (STANDING):  baclofen 10milliGRAM(s) Oral daily  latanoprost 0.005% Ophthalmic Solution 1Drop(s) Both EYES at bedtime  oxybutynin 5milliGRAM(s) Oral two times a day  enoxaparin Injectable 40milliGRAM(s) SubCutaneous daily  sodium chloride 0.9% Bolus 1000milliLiter(s) IV Bolus once  midodrine 10milliGRAM(s) Oral every 8 hours  amoxicillin  875 milliGRAM(s)/clavulanate 1Tablet(s) Oral two times a day    MEDICATIONS  (PRN):  acetaminophen   Tablet 650milliGRAM(s) Oral every 6 hours PRN For Temp greater than 38 C (100.4 F)  acetaminophen   Tablet. 650milliGRAM(s) Oral every 6 hours PRN Mild Pain (1 - 3)  calcium carbonate 500 mG (Tums) Chewable 1Tablet(s) Chew daily PRN Gas      Allergies    Sudafed (Other)    Intolerances        REVIEW OF SYSTEMS:  CONSTITUTIONAL: No fever, No chills, No fatigue, No myalgia, No Body ache  EYES: No eye pain, visual disturbances, or discharge  ENMT:  No ear pain, No nose bleed, No vertigo; No sinus or throat pain  NECK: No pain, No stiffness  RESPIRATORY: No cough, wheezing, No  hemoptysis; No shortness of breath  CARDIOVASCULAR: No chest pain, palpitations, leg swelling  GASTROINTESTINAL: No abdominal or epigastric pain. No nausea, No vomiting; No diarrhea or constipation. [ ] BM  GENITOURINARY: Starks cath in place; No dysuria, No frequency, No urgency, No hematuria, or incontinence  NEUROLOGICAL: alert and oriented x 3,  No headaches, No dizziness, No numbness,  SKIN:   No itching, burning, rashes, or lesions   MUSCULOSKELETAL: No joint pain or swelling; No muscle pain, No back pain, No extremity pain  PSYCHIATRIC: No depression, anxiety, mood swings, or difficulty sleeping  ROS  [ ] Unable to obtain   REST OF REVIEW Of SYSTEM - [x ] Normal     Height (cm): 167.6 (05-28 @ 01:08), 167.6 (05-17 @ 13:22)  Weight (kg): 71.4 (05-28 @ 01:08), 65.8 (05-17 @ 13:22)  BMI (kg/m2): 25.4 (05-28 @ 01:08), 23.4 (05-17 @ 13:22)  BSA (m2): 1.81 (05-28 @ 01:08), 1.74 (05-17 @ 13:22)  Vital Signs Last 24 Hrs  T(C): 37.4, Max: 37.4 (05-31 @ 23:43)  T(F): 99.3, Max: 99.4 (05-31 @ 23:43)  HR: 80 (80 - 86)  BP: 133/84 (117/77 - 161/100)  BP(mean): --  RR: 17 (16 - 19)  SpO2: 92% (90% - 98%)  [ ] room air   [ ] 02    PHYSICAL EXAM:  GENERAL:  No acute distresss, well appearing, [ ] Agitated, [ ] Lethargy, [ ] confused   HEAD:  normal  ENMT: normal  NECK:  normal    NERVOUS SYSTEM:  Alert & Oriented X3, no focal deficits [ ]Confusion  [ ] Encephalopathic [ ] Sedated [ ] Other  CHEST/LUNG: Clear to auscultation bilaterally,  [ ] decreased breath sounds at bases  [ ] wheezing   [ ] rhonchi  [ ] crackles  HEART:  Regular rate and rhythm, No murmurs, rubs, or gallops,  [ ] irregular   ABDOMEN:  soft, nontender, nondistended, positive bowel sounds   [ ] obese  EXTREMITIES: No clubbing, cyanosis or edema  SKIN: [ ] venous stasis skin changes  Starks in place;     LABS:            Hemoglobin A1C, Whole Blood: 5.5 % (04-08 @ 01:57)      CAPILLARY BLOOD GLUCOSE    Cultures  Culture Results:   No growth to date. (05-27 @ 18:46)  Culture Results:   No growth to date. (05-27 @ 18:46)  Culture Results:   No growth (05-27 @ 18:43)          RADIOLOGY & ADDITIONAL TESTS:      Care Discussed with [X] Consultants  [ ] Patient  [ ] Family  [X]   /   [ ] Other; RN  DVT prophylaxis [x ] lovenox   [ ] subq heparin  [ ] coumadin  [ ] venodynes [ ] ambulating frequently at how risk for vte and no pharm         or  mechanical prophylaxis required    [ ] other   Advanced directive:    [ ]pt has hcp     [ ] pt declined to assign hcp  Discussed with pt @ bedside Patient is a 67y old  Female who presents with a chief complaint of light headed and weak (27 May 2017 20:46)      INTERVAL HPI/OVERNIGHT EVENTS:  67 F with PMH of MS, HTN, self-catherterizes who came in c/o weakness x 2 days.    Pt had been admitted for sepsis, unknown etiology.     Today, pt seen and assessed at bedside, pt states that she feels well; pt denied any acute complaints; pt states that she feels read y to go to rehab, but awaiting placement at rehab of choice.  Pt denied any fever, chills, SOB, cough, abd pain, N/V/D/C, urianry sym (get straight cath with RN few times a day), or any other issues.       MEDICATIONS  (STANDING):  baclofen 10milliGRAM(s) Oral daily  latanoprost 0.005% Ophthalmic Solution 1Drop(s) Both EYES at bedtime  oxybutynin 5milliGRAM(s) Oral two times a day  enoxaparin Injectable 40milliGRAM(s) SubCutaneous daily  sodium chloride 0.9% Bolus 1000milliLiter(s) IV Bolus once  midodrine 10milliGRAM(s) Oral every 8 hours  amoxicillin  875 milliGRAM(s)/clavulanate 1Tablet(s) Oral two times a day    MEDICATIONS  (PRN):  acetaminophen   Tablet 650milliGRAM(s) Oral every 6 hours PRN For Temp greater than 38 C (100.4 F)  acetaminophen   Tablet. 650milliGRAM(s) Oral every 6 hours PRN Mild Pain (1 - 3)  calcium carbonate 500 mG (Tums) Chewable 1Tablet(s) Chew daily PRN Gas      Allergies    Sudafed (Other)    Intolerances        REVIEW OF SYSTEMS:  CONSTITUTIONAL: No fever, No chills, No fatigue, No myalgia, No Body ache  EYES: No eye pain, visual disturbances, or discharge  ENMT:  No ear pain, No nose bleed, No vertigo; No sinus or throat pain  NECK: No pain, No stiffness  RESPIRATORY: No cough, wheezing, No  hemoptysis; No shortness of breath  CARDIOVASCULAR: No chest pain, palpitations, leg swelling  GASTROINTESTINAL: No abdominal or epigastric pain. No nausea, No vomiting; No diarrhea or constipation. [ ] BM  GENITOURINARY: No dysuria, No frequency, No urgency, No hematuria, or incontinence  NEUROLOGICAL: alert and oriented x 3,  No headaches, No dizziness, No numbness,  SKIN:   No itching, burning, rashes, or lesions   MUSCULOSKELETAL: No joint pain or swelling; No muscle pain, No back pain, No extremity pain  PSYCHIATRIC: No depression, anxiety, mood swings, or difficulty sleeping  ROS  [ ] Unable to obtain   REST OF REVIEW Of SYSTEM - [x ] Normal     Height (cm): 167.6 (05-28 @ 01:08), 167.6 (05-17 @ 13:22)  Weight (kg): 71.4 (05-28 @ 01:08), 65.8 (05-17 @ 13:22)  BMI (kg/m2): 25.4 (05-28 @ 01:08), 23.4 (05-17 @ 13:22)  BSA (m2): 1.81 (05-28 @ 01:08), 1.74 (05-17 @ 13:22)  Vital Signs Last 24 Hrs  T(C): 37.4, Max: 37.4 (05-31 @ 23:43)  T(F): 99.3, Max: 99.4 (05-31 @ 23:43)  HR: 80 (80 - 86)  BP: 133/84 (117/77 - 161/100)  BP(mean): --  RR: 17 (16 - 19)  SpO2: 92% (90% - 98%)  [ ] room air   [ ] 02    PHYSICAL EXAM:  GENERAL:  Pleasant, No acute distresss, well appearing, [ ] Agitated, [ ] Lethargy, [ ] confused   HEAD:  normal  ENMT: normal  NECK:  normal    NERVOUS SYSTEM:  Alert & Oriented X3, no focal deficits [ ]Confusion  [ ] Encephalopathic [ ] Sedated [ ] Other  CHEST/LUNG: Clear to auscultation bilaterally,  [ ] decreased breath sounds at bases  [ ] wheezing   [ ] rhonchi  [ ] crackles  HEART:  Regular rate and rhythm, No murmurs, rubs, or gallops,  [ ] irregular   ABDOMEN:  soft, nontender, nondistended, positive bowel sounds   [ ] obese  EXTREMITIES: No clubbing, cyanosis or edema  SKIN: [ ] venous stasis skin changes   : pt gets straight cath few times a day by RN; no distention or abd pain   LABS:        Hemoglobin A1C, Whole Blood: 5.5 % (04-08 @ 01:57)      CAPILLARY BLOOD GLUCOSE    Cultures  Culture Results:   No growth to date. (05-27 @ 18:46)  Culture Results:   No growth to date. (05-27 @ 18:46)  Culture Results:   No growth (05-27 @ 18:43)          RADIOLOGY & ADDITIONAL TESTS:      Care Discussed with [X] Consultants  [ ] Patient  [ ] Family  [X]   /   [ ] Other; RN  DVT prophylaxis [x ] lovenox   [ ] subq heparin  [ ] coumadin  [ ] venodynes [ ] ambulating frequently at how risk for vte and no pharm         or  mechanical prophylaxis required    [ ] other   Advanced directive:    [ ]pt has hcp     [ ] pt declined to assign hcp  Discussed with pt @ bedside

## 2017-06-01 NOTE — DISCHARGE NOTE ADULT - MEDICATION SUMMARY - MEDICATIONS TO CHANGE
I will SWITCH the dose or number of times a day I take the medications listed below when I get home from the hospital:    methenamine hippurate 1 g oral tablet  -- 1 tab(s) by mouth 2 times a day

## 2017-06-01 NOTE — PROGRESS NOTE ADULT - PROBLEM SELECTOR PROBLEM 3
Lymphocytosis
Hypotension
Lymphocytosis
Lymphocytosis
Multiple sclerosis

## 2017-06-01 NOTE — PROGRESS NOTE ADULT - PROBLEM SELECTOR PLAN 2
-continue campos cath for now
-campos out now  -nurse to straight cath pt 4-5 times daily  -c/w Oxybutynin 5mg BID
-continue campos cath for now
campos out now  nurse to straight cath pt 4-5 times daily
carli campos today  nurse to straight cath pt
improved fluids and abx
improved fluids and abx
improved
improved fluids and abx

## 2017-06-01 NOTE — PROGRESS NOTE ADULT - PROBLEM SELECTOR PROBLEM 5
Advance care planning

## 2017-06-01 NOTE — DISCHARGE NOTE ADULT - CARE PROVIDER_API CALL
ruth ann marie  Phone: (   )    -  Fax: (   )    - ruth ann marie  Phone: [phone]  Fax: (       -

## 2017-06-01 NOTE — DISCHARGE NOTE ADULT - MEDICATION SUMMARY - MEDICATIONS TO TAKE
I will START or STAY ON the medications listed below when I get home from the hospital:    acetaminophen 325 mg oral tablet  -- 2 tab(s) by mouth every 6 hours, As needed, For Temp greater than 38 C (100.4 F)  -- Indication: For pain    acetaminophen 325 mg oral tablet  -- 2 tab(s) by mouth every 6 hours, As needed, Mild Pain (1 - 3)  -- Indication: For pain    lisinopril 20 mg oral tablet  -- 1 tab(s) by mouth once a day- please continue to hold off this for now, as BP was low due to septic process.  may need to gradually resume  -- Indication: For Htn - on hold for now    enoxaparin  -- 40 milligram(s) subcutaneous once a day  -- Indication: For dvt proph    chlorthalidone 25 mg oral tablet  -- 1 tab(s) by mouth once a day- please continue to hold this as well  -- Indication: For Htn- on hold for now    Avonex Prefilled Syringe 30 mcg/0.5 mL intramuscular kit  -- 30 microgram(s) intramuscular once a week on Tuesday  -- last dose given 11/15/16, missed this week's  -- Indication: For Multiple sclerosis    midodrine 2.5 mg oral tablet  -- 1 tab(s) by mouth 3 times a day- please DC this within next 1-2 days if bp remains stable  -- Indication: For Hypotenion- please dc this if possible     tiZANidine 2 mg oral tablet  -- 1 tab(s) by mouth once a day  -- check frequency  -- Indication: For Multiple sclerosis    baclofen 10 mg oral tablet  -- 1 tab(s) by mouth once a day  -- Indication: For Multiple sclerosis    latanoprost 0.005% ophthalmic solution  -- 1 drop(s) to both eyes once a day (in the evening)  -- Indication: For glaucom    amoxicillin-clavulanate 875 mg-125 mg oral tablet  -- 1 tab(s) by mouth 2 times a day- thru 6/2  -- Indication: For Sepsis    Bacid (LAC) oral capsule  -- 1 cap(s) by mouth 3 times a day for 1 week  -- Indication: For probiotic    oxybutynin 10 mg/24 hr oral tablet, extended release  -- 1 tab(s) by mouth once a day  -- Indication: For Multiple sclerosis I will START or STAY ON the medications listed below when I get home from the hospital:    acetaminophen 325 mg oral tablet  -- 2 tab(s) by mouth every 6 hours, As needed, For Temp greater than 38 C (100.4 F)  -- Indication: For pain    acetaminophen 325 mg oral tablet  -- 2 tab(s) by mouth every 6 hours, As needed, Mild Pain (1 - 3)  -- Indication: For pain    lisinopril 20 mg oral tablet  -- 1 tab(s) by mouth once a day- please continue to hold off this for now, as BP was low due to septic process.  may need to gradually resume  -- Indication: For Htn - on hold for now    enoxaparin  -- 40 milligram(s) subcutaneous once a day  -- Indication: For dvt proph    chlorthalidone 25 mg oral tablet  -- 1 tab(s) by mouth once a day- please continue to hold this as well  -- Indication: For Htn- on hold for now    Avonex Prefilled Syringe 30 mcg/0.5 mL intramuscular kit  -- 30 microgram(s) intramuscular once a week on Tuesday  -- last dose given 11/15/16, missed this week's  -- Indication: For Multiple sclerosis    midodrine 2.5 mg oral tablet  -- 1 tab(s) by mouth 3 times a day- please DC this within next 1-2 days if bp remains stable  -- Indication: For Hypotenion- please dc this if possible     tiZANidine 2 mg oral tablet  -- 1 tab(s) by mouth once a day  -- check frequency  -- Indication: For Multiple sclerosis    baclofen 10 mg oral tablet  -- 1 tab(s) by mouth once a day  -- Indication: For Multiple sclerosis    latanoprost 0.005% ophthalmic solution  -- 1 drop(s) to both eyes once a day (in the evening)  -- Indication: For glaucom    Bacid (LAC) oral capsule  -- 1 cap(s) by mouth 3 times a day for 1 week  -- Indication: For probiotic    oxybutynin 10 mg/24 hr oral tablet, extended release  -- 1 tab(s) by mouth once a day  -- Indication: For Multiple sclerosis    Multiple Vitamins with Minerals oral tablet  -- 1 tab(s) by mouth once a day  -- Indication: For vitamins

## 2017-06-01 NOTE — DISCHARGE NOTE ADULT - PATIENT PORTAL LINK FT
“You can access the [facility] Patient Portal, offered by [facility], by registering with the following website: [email or web address]”

## 2017-06-01 NOTE — DISCHARGE NOTE ADULT - PLAN OF CARE
treatment no clear etiology, ?asp pneumonitis pt follow up, dc planning for BALDO chronic, prevent from worsening to void -campos out now  -nurse to straight cath pt 4-5 times daily  -c/w Oxybutynin 5mg BID. no clear etiology, possibly RLL infiltrate as per CT on 5/15--potentially aspiration pneumonitis as per ID eval-- altho no symptoms of pna  -ID f/u noted, pt now off of IV vanco and zosyn   -c/w PO Augmentin, 1 tab BID until 6/2/17  -blood cx x2: NGTD  -UCx: NGTD  -IVF stopped con't avonex  - swallow eval noted, no signs of aspiration    pt follow up, dc planning for BALDO no clear etiology, possibly RLL infiltrate as per CT on 5/15--potentially aspiration pneumonitis as per ID eval-- altho no symptoms of pna  -ID f/u noted, pt now off of IV vanco and zosyn   -c/w PO Augmentin, 1 tab BID until 6/2/17 ( done today )  -blood cx x2: NGTD  -UCx: NGTD  -IVF stopped

## 2017-06-01 NOTE — DISCHARGE NOTE ADULT - MEDICATION SUMMARY - MEDICATIONS TO STOP TAKING
I will STOP taking the medications listed below when I get home from the hospital:    lisinopril 20 mg oral tablet  -- 1 tab(s) by mouth once a day    chlorthalidone 25 mg oral tablet  -- 1 tab(s) by mouth once a day    levoFLOXacin 500 mg oral tablet  -- 1 tab(s) by mouth every 24 hours

## 2017-06-02 VITALS
RESPIRATION RATE: 17 BRPM | DIASTOLIC BLOOD PRESSURE: 83 MMHG | HEART RATE: 81 BPM | SYSTOLIC BLOOD PRESSURE: 151 MMHG | OXYGEN SATURATION: 96 % | TEMPERATURE: 98 F

## 2017-06-02 PROCEDURE — 97110 THERAPEUTIC EXERCISES: CPT

## 2017-06-02 PROCEDURE — 81001 URINALYSIS AUTO W/SCOPE: CPT

## 2017-06-02 PROCEDURE — 87486 CHLMYD PNEUM DNA AMP PROBE: CPT

## 2017-06-02 PROCEDURE — 87633 RESP VIRUS 12-25 TARGETS: CPT

## 2017-06-02 PROCEDURE — 99285 EMERGENCY DEPT VISIT HI MDM: CPT | Mod: 25

## 2017-06-02 PROCEDURE — 84145 PROCALCITONIN (PCT): CPT

## 2017-06-02 PROCEDURE — 71045 X-RAY EXAM CHEST 1 VIEW: CPT

## 2017-06-02 PROCEDURE — 87798 DETECT AGENT NOS DNA AMP: CPT

## 2017-06-02 PROCEDURE — 83605 ASSAY OF LACTIC ACID: CPT

## 2017-06-02 PROCEDURE — 80048 BASIC METABOLIC PNL TOTAL CA: CPT

## 2017-06-02 PROCEDURE — 97162 PT EVAL MOD COMPLEX 30 MIN: CPT

## 2017-06-02 PROCEDURE — 85027 COMPLETE CBC AUTOMATED: CPT

## 2017-06-02 PROCEDURE — 96365 THER/PROPH/DIAG IV INF INIT: CPT

## 2017-06-02 PROCEDURE — 96372 THER/PROPH/DIAG INJ SC/IM: CPT | Mod: XU

## 2017-06-02 PROCEDURE — 87581 M.PNEUMON DNA AMP PROBE: CPT

## 2017-06-02 PROCEDURE — 87040 BLOOD CULTURE FOR BACTERIA: CPT

## 2017-06-02 PROCEDURE — 80202 ASSAY OF VANCOMYCIN: CPT

## 2017-06-02 PROCEDURE — 85730 THROMBOPLASTIN TIME PARTIAL: CPT

## 2017-06-02 PROCEDURE — 97530 THERAPEUTIC ACTIVITIES: CPT

## 2017-06-02 PROCEDURE — 93970 EXTREMITY STUDY: CPT

## 2017-06-02 PROCEDURE — 93005 ELECTROCARDIOGRAM TRACING: CPT

## 2017-06-02 PROCEDURE — 83735 ASSAY OF MAGNESIUM: CPT

## 2017-06-02 PROCEDURE — 87086 URINE CULTURE/COLONY COUNT: CPT

## 2017-06-02 PROCEDURE — 80053 COMPREHEN METABOLIC PANEL: CPT

## 2017-06-02 PROCEDURE — 85610 PROTHROMBIN TIME: CPT

## 2017-06-02 RX ORDER — MULTIVIT-MIN/FERROUS GLUCONATE 9 MG/15 ML
1 LIQUID (ML) ORAL DAILY
Qty: 0 | Refills: 0 | Status: CANCELLED | OUTPATIENT
Start: 2017-06-02 | End: 2017-06-02

## 2017-06-02 RX ORDER — MULTIVIT-MIN/FERROUS GLUCONATE 9 MG/15 ML
1 LIQUID (ML) ORAL
Qty: 0 | Refills: 0 | COMMUNITY
Start: 2017-06-02

## 2017-06-02 RX ADMIN — Medication 1 TABLET(S): at 17:15

## 2017-06-02 RX ADMIN — Medication 10 MILLIGRAM(S): at 11:06

## 2017-06-02 RX ADMIN — Medication 5 MILLIGRAM(S): at 05:31

## 2017-06-02 RX ADMIN — ENOXAPARIN SODIUM 40 MILLIGRAM(S): 100 INJECTION SUBCUTANEOUS at 11:06

## 2017-06-02 RX ADMIN — Medication 5 MILLIGRAM(S): at 17:15

## 2017-06-02 RX ADMIN — Medication 1 TABLET(S): at 05:31

## 2017-06-02 NOTE — DIETITIAN INITIAL EVALUATION ADULT. - PERTINENT LABORATORY DATA
( 5-27-17) albumin 2.6- c/w moderate depletion likely due to stress of illness. H/H: 10.5/32.8( L) mcv 87.2 - would do an anemia w/u to r/o a nutritional causes. noted, recommendations of slp are for pt to have foods cut-up

## 2017-06-02 NOTE — DIETITIAN INITIAL EVALUATION ADULT. - OTHER INFO
Pt not sure of any recent wt change but suspects not, at home takes VitD, mvi with minerals, fish oil, nka to food, follows a sodium reduced diet, normal BM's at present per CNA. Pt with pmhx as below. Pt admitted for sepsis w/u and treatment for PNA. she is awaiting BALDO placement. she has stage I bilat buttock pressure injury. Pt is WD/WN appearing. MVI with minerals and Vit D are appropriate and should be ordered

## 2017-06-13 DIAGNOSIS — Z85.3 PERSONAL HISTORY OF MALIGNANT NEOPLASM OF BREAST: ICD-10-CM

## 2017-06-13 DIAGNOSIS — A41.9 SEPSIS, UNSPECIFIED ORGANISM: ICD-10-CM

## 2017-06-13 DIAGNOSIS — R33.8 OTHER RETENTION OF URINE: ICD-10-CM

## 2017-06-13 DIAGNOSIS — D72.820 LYMPHOCYTOSIS (SYMPTOMATIC): ICD-10-CM

## 2017-06-13 DIAGNOSIS — Z90.11 ACQUIRED ABSENCE OF RIGHT BREAST AND NIPPLE: ICD-10-CM

## 2017-06-13 DIAGNOSIS — I10 ESSENTIAL (PRIMARY) HYPERTENSION: ICD-10-CM

## 2017-06-13 DIAGNOSIS — J69.0 PNEUMONITIS DUE TO INHALATION OF FOOD AND VOMIT: ICD-10-CM

## 2017-06-13 DIAGNOSIS — I95.9 HYPOTENSION, UNSPECIFIED: ICD-10-CM

## 2017-06-13 DIAGNOSIS — M81.0 AGE-RELATED OSTEOPOROSIS WITHOUT CURRENT PATHOLOGICAL FRACTURE: ICD-10-CM

## 2017-06-13 DIAGNOSIS — G35 MULTIPLE SCLEROSIS: ICD-10-CM

## 2018-01-01 NOTE — ED PROVIDER NOTE - OBJECTIVE STATEMENT
2018 21:48 Pt is a 65 yo female hx of ms, partial bowel resection sp sbo a few years ago. pt pw 2 days of no bm, then yesterday afternoon began with n/v and abd  pain, pt states vomitus brown in color. pt has had multiple espisoded of emesis overnight and today and unable to hold pos.  +obstipation, no f/c, cp, sob

## 2018-08-26 NOTE — ED ADULT NURSE NOTE - ED STAT RN HAND OFF
<Rui Salomon - Last Filed: 08/26/18 16:04>





Subjective





- Date & Time of Evaluation


Date of Evaluation: 08/26/18


Time of Evaluation: 14:42





- Subjective


Subjective: 





Rui Salomon PGY1 Progress Note for Jason Menendez





Ms. Wiley was examined at bedside this morning. She reports having trouble 

sleeping last night due to long standing insomnia. She reports improvement of 

her leg swelling. She has no other complaints. She denies dizziness, shortness 

of breath, abdominal pain, chest pain, nausea, vomiting, diarrhea. 





Objective





- Vital Signs/Intake and Output


Vital Signs (last 24 hours): 


 











Temp Pulse Resp BP Pulse Ox


 


 98.5 F   73   20   144/90   95 


 


 08/26/18 07:00  08/26/18 10:23  08/26/18 07:00  08/26/18 10:23  08/26/18 07:00








Intake and Output: 


 











 08/26/18 08/26/18





 06:59 18:59


 


Intake Total 200 


 


Balance 200 














- Medications


Medications: 


 Current Medications





Amitriptyline HCl (Elavil)  25 mg PO HS [facility]


   Last Admin: 08/25/18 21:06 Dose:  25 mg


Amlodipine Besylate (Norvasc)  5 mg PO DAILY [facility]


   Last Admin: 08/26/18 10:23 Dose:  5 mg


Arformoterol Tartrate (Brovana)  15 mcg IH [id number] [facility]


   Last Admin: 08/26/18 12:10 Dose:  15 mcg


Atenolol (Tenormin)  25 mg PO BID [facility]


   Last Admin: 08/26/18 09:52 Dose:  25 mg


Budesonide (Pulmicort Respules)  0.5 mg IH [id number] [facility]


   Last Admin: 08/26/18 12:10 Dose:  0.5 mg


Docusate Sodium (Colace)  100 mg PO DAILY [facility]


   Last Admin: 08/26/18 09:52 Dose:  100 mg


Enoxaparin Sodium (Lovenox)  100 mg SC Q12H [facility]


   PRN Reason: Protocol


   Last Admin: 08/26/18 06:08 Dose:  100 mg


Cefazolin Sodium/Dextrose (Ancef Iv 2 Gm Duplex)  2 gm in 50 mls @ 50 mls/hr 

IVPB Q8 [facility]


   Last Admin: 08/26/18 13:17 Dose:  50 mls/hr


Lorazepam (Ativan)  0.5 mg PO BID PRN; Protocol


   PRN Reason: Anxiety


   Last Admin: 08/25/18 22:41 Dose:  0.5 mg


Losartan Potassium (Cozaar)  100 mg PO DAILY [facility]


   Last Admin: 08/26/18 09:52 Dose:  100 mg


Montelukast Sodium (Singulair)  10 mg PO DAILY [facility]


   Last Admin: 08/26/18 09:52 Dose:  10 mg


Oxycodone HCl (Oxycodone Immediate Release Tab)  30 mg PO Q8H [facility]


   PRN Reason: Pain, severe (8-10)


   Last Admin: 08/26/18 13:08 Dose:  30 mg


Pantoprazole Sodium (Protonix Ec Tab)  40 mg PO ACB [facility]


   Last Admin: 08/26/18 07:52 Dose:  40 mg


Triamterene/HCTZ (Dyazide 25 Mg-37.5 Mg)  1 cap PO DAILY [facility]


   Last Admin: 08/26/18 09:51 Dose:  1 cap


Zolpidem Tartrate (Ambien)  5 mg PO HS PRN; Protocol


   PRN Reason: Insomnia











- Labs


Labs: 


 





 08/26/18 06:00 





 08/26/18 06:00 











- Constitutional


Appears: Well, No Acute Distress





- Head Exam


Head Exam: ATRAUMATIC, NORMOCEPHALIC





- ENT Exam


ENT Exam: Mucous Membranes Moist





- Respiratory Exam


Respiratory Exam: Clear to Ausculation Bilateral, NORMAL BREATHING PATTERN.  

absent: Rales, Rhonchi, Wheezes





- Cardiovascular Exam


Cardiovascular Exam: REGULAR RHYTHM, +S1, +S2





- GI/Abdominal Exam


GI & Abdominal Exam: Soft, Normal Bowel Sounds.  absent: Tenderness





- Extremities Exam


Extremities Exam: Pedal Edema


Additional comments: 





improved LE edema from yesterday








- Neurological Exam


Neurological Exam: Alert, Awake





- Psychiatric Exam


Psychiatric exam: Normal Affect, Normal Mood





- Skin


Additional comments: 





improved erythematous scaly papules on anterior L upper extremity





Assessment and Plan





- Assessment and Plan (Free Text)


Assessment: 





46 year old female with past medical history of hypertension, gram positive 

coccemia, asthma, obesity, osteoarthritis, chronic back pain, and insomnia 

presents with malfunctioning PICC. Patient will be admitted for ruling out 

thrombus and for replacement of PICC line for MSSA.





Plan: 





MSSA Bacteremia


- On week 4 ancef 1g q8h as per previous d/c plan


- BCx: negative


- rpt BCx: negative


- catheter tip Cx: pending


- pt continues to be afebrile


- contiue ancef as per ID


- d/c with levaquin PO daily x 2weeks


- ID consulted, Dr. Moss - recs appreciated





LUE Thrombus


- LUE US: occlusive thrombus in L vasilic vein above elbow, superficial 

thrombophlebitis


- continue lovenox 100


- Heme/Onc Consulted, Dr. Pisano - f/u recs





LE Edema


- pt reports b/l LE edema prior to admission, improved today


- LE doppler ordered: f/u results





HTN


- BP: 148/94


- Continue home dose of atenolol, losartan, hydrochlorothiazide


- start norvasc 5


- Continue to monitor





Normocytic Anemia 2/2 likely to anemia of chronic disease


- Hb (8/26): 9.1


- Continue to monitor





Asthma


- O2 saturation: 95% on room air


- continue singulair daily


- Advair PRN





Anxiety


- Continue home ativan





Depression


- Continue home amitriptyline





Chronic pain syndrome


- start oxycodone 30 q8h, confirmed with pt pharmacy


- Patient counseled regarding use of opiods





Insomnia


- start ambien 5 qhs 





PPx:


GI: protonix 40 mg daily





Patient plan discussed with Dr. Gomez.





<Kathy Gomez - Last Filed: 08/27/18 09:04>





Objective





- Vital Signs/Intake and Output


Vital Signs (last 24 hours): 


 











Temp Pulse Resp BP Pulse Ox


 


 98.4 F   72   20   158/93 H  100 


 


 08/27/18 06:00  08/27/18 06:00  08/27/18 06:00  08/27/18 06:00  08/27/18 06:00








Intake and Output: 


 











 08/27/18 08/27/18





 06:59 18:59


 


Intake Total 1080 


 


Balance 1080 














- Medications


Medications: 


 Current Medications





Amitriptyline HCl (Elavil)  25 mg PO HS [facility]


   Last Admin: 08/26/18 21:14 Dose:  25 mg


Amlodipine Besylate (Norvasc)  5 mg PO DAILY [facility]


   Last Admin: 08/26/18 10:23 Dose:  5 mg


Arformoterol Tartrate (Brovana)  15 mcg IH [id number] [facility]


   Last Admin: 08/27/18 07:18 Dose:  15 mcg


Atenolol (Tenormin)  25 mg PO BID [facility]


   Last Admin: 08/26/18 17:00 Dose:  25 mg


Budesonide (Pulmicort Respules)  0.5 mg IH [id number] [facility]


   Last Admin: 08/27/18 07:18 Dose:  0.5 mg


Docusate Sodium (Colace)  100 mg PO DAILY [facility]


   Last Admin: 08/26/18 09:52 Dose:  100 mg


Enoxaparin Sodium (Lovenox)  100 mg SC Q12H [facility]


   PRN Reason: Protocol


   Last Admin: 08/27/18 05:07 Dose:  100 mg


Cefazolin Sodium/Dextrose (Ancef Iv 2 Gm Duplex)  2 gm in 50 mls @ 50 mls/hr 

IVPB Q8 [facility]


   Last Admin: 08/27/18 05:08 Dose:  50 mls/hr


Lorazepam (Ativan)  0.5 mg PO BID PRN; Protocol


   PRN Reason: Anxiety


   Last Admin: 08/26/18 19:11 Dose:  0.5 mg


Losartan Potassium (Cozaar)  100 mg PO DAILY [facility]


   Last Admin: 08/26/18 09:52 Dose:  100 mg


Montelukast Sodium (Singulair)  10 mg PO DAILY [facility]


   Last Admin: 08/26/18 09:52 Dose:  10 mg


Oxycodone HCl (Oxycodone Immediate Release Tab)  30 mg PO Q8H [facility]


   PRN Reason: Pain, severe (8-10)


   Last Admin: 08/27/18 05:07 Dose:  30 mg


Pantoprazole Sodium (Protonix Ec Tab)  40 mg PO ACB [facility]


   Last Admin: 08/26/18 07:52 Dose:  40 mg


Triamterene/HCTZ (Dyazide 25 Mg-37.5 Mg)  1 cap PO DAILY [facility]


   Last Admin: 08/26/18 09:51 Dose:  1 cap


Zolpidem Tartrate (Ambien)  5 mg PO HS PRN; Protocol


   PRN Reason: Insomnia


   Last Admin: 08/26/18 21:14 Dose:  5 mg











- Labs


Labs: 


 





 08/27/18 06:30 





 08/27/18 06:30 











Attending/Attestation





- Attestation


I have personally seen and examined this patient.: Yes


I have fully participated in the care of the patient.: Yes


I have reviewed all pertinent clinical information, including history, physical 

exam and plan: Yes


Notes (Text): 


Patient seen and examined by me at 10:00AM with resident 8/26/18. Case 

including HPI, physical exam, and assessment and plan discussed with resident. 

Agree with above with following additions/corrections.


Patient is a 46 year old female with past medical history significant for 

hypertension, asthma, obesity, osteoarthritis, chronic back pain, insomnia, and 

gram positive bacteremia that presented to the emergency room with 

malfunctioning PICC line.  


Patient states is feeling better. Patient states her LUE looks improved. Denies 

any pain in LUE. She denies any chest pain or shortness of breath. No headache 

or dizziness. No fevers or chills. No nausea, vomiting, or abdominal pain. 


Physical exam:


Gen: Awake and alert sitting up in bed in no acute distress


HEENT: Normocephalic, atraumatic. Extraocular muscles intact, pupils equal 

reactive. No scleral icterus.  Oropharynx is pink and moist. Neck is supple. 


Cardiovascular: Normal rhythm.  Normal S1, S2. No murmurs, rubs, or gallops 

appreciated


Pulmonary: Normal respiratory effort. No rhonchi, rales, or wheezing 

appreciated. 


Gastrointestinal: Soft, nontender, nondistended. Positive bowel sounds all 4 

quadrants, no guarding. 


Musculoskeletal: Moves all extremities.  No calf tenderness. Positive hard area 

felt at sight of previous PICC line, improved erythema. Positive bilateral 

lower extremity edema. 


Central nervous system:  AAO x 3. 


Dermatologic:  Skin warm and dry


Assessment and plan: Patient is a 46 year old female with past medical history 

significant for hypertension, asthma, obesity, osteoarthritis, chronic back pain

, insomnia, and gram positive bacteremia that presented to the emergency room 

with malfunctioning PICC line


1. Malfunctioning PICC line. PICC line removed. ID following, recommendations 

appreciated. Patient to complete antibiotics with PO Levaquin per ID.


2. MSSA Bacteremia. One blood culture positive for staph aureus 7/27/18. 

Patient is on week 4 of Ancef. ID following, recommendations appreciated


3. LUE thrombus. LUE ultrasound positive for occlusive thrombus of left basilic 

vein above the left elbow, superficial thrombophlebitis. Continue therapeutic 

lovenox. Hem/onc consulted, follow up recommendations. 


4. Bilateral lower extremity edema. Pending lower extremity dopplers results to 

rule out DVT


5. Hypertension. Continue atenolol, losartan, Triamterene/HCTZ. Will add 

Norvasc. 


6. Anemia. Appears to be chronic. No signs of bleeding. Asymptomatic. Continue 

to monitor.


7. Asthma. Not in acute exacerbation. Continue Brovana, Singulair, and pulmicort


8. Depression and anxiety. Continue  Elavil and ativan prn.


9. Chronic pain syndrome. Continue home oxycodone 


Case was discussed in detail with the patient regarding current diagnosis and 

treatment plan. Handoff

## 2019-03-09 ENCOUNTER — INPATIENT (INPATIENT)
Facility: HOSPITAL | Age: 69
LOS: 9 days | Discharge: NURSING, REHAB OR LONG-TERM CARE | DRG: 603 | End: 2019-03-19
Attending: INTERNAL MEDICINE | Admitting: INTERNAL MEDICINE
Payer: MEDICARE

## 2019-03-09 VITALS
TEMPERATURE: 100 F | OXYGEN SATURATION: 95 % | RESPIRATION RATE: 14 BRPM | DIASTOLIC BLOOD PRESSURE: 80 MMHG | WEIGHT: 145.06 LBS | SYSTOLIC BLOOD PRESSURE: 145 MMHG | HEART RATE: 90 BPM

## 2019-03-09 DIAGNOSIS — R09.89 OTHER SPECIFIED SYMPTOMS AND SIGNS INVOLVING THE CIRCULATORY AND RESPIRATORY SYSTEMS: ICD-10-CM

## 2019-03-09 DIAGNOSIS — S82.891A OTHER FRACTURE OF RIGHT LOWER LEG, INITIAL ENCOUNTER FOR CLOSED FRACTURE: ICD-10-CM

## 2019-03-09 DIAGNOSIS — G35 MULTIPLE SCLEROSIS: ICD-10-CM

## 2019-03-09 DIAGNOSIS — I10 ESSENTIAL (PRIMARY) HYPERTENSION: ICD-10-CM

## 2019-03-09 DIAGNOSIS — L03.115 CELLULITIS OF RIGHT LOWER LIMB: ICD-10-CM

## 2019-03-09 DIAGNOSIS — N32.81 OVERACTIVE BLADDER: ICD-10-CM

## 2019-03-09 DIAGNOSIS — R29.6 REPEATED FALLS: ICD-10-CM

## 2019-03-09 DIAGNOSIS — Z29.9 ENCOUNTER FOR PROPHYLACTIC MEASURES, UNSPECIFIED: ICD-10-CM

## 2019-03-09 DIAGNOSIS — Z92.89 PERSONAL HISTORY OF OTHER MEDICAL TREATMENT: Chronic | ICD-10-CM

## 2019-03-09 DIAGNOSIS — Z98.890 OTHER SPECIFIED POSTPROCEDURAL STATES: Chronic | ICD-10-CM

## 2019-03-09 LAB
ALBUMIN SERPL ELPH-MCNC: 2.7 G/DL — LOW (ref 3.3–5)
ALP SERPL-CCNC: 109 U/L — SIGNIFICANT CHANGE UP (ref 40–120)
ALT FLD-CCNC: 21 U/L — SIGNIFICANT CHANGE UP (ref 12–78)
ANION GAP SERPL CALC-SCNC: 8 MMOL/L — SIGNIFICANT CHANGE UP (ref 5–17)
APPEARANCE UR: CLEAR — SIGNIFICANT CHANGE UP
AST SERPL-CCNC: 42 U/L — HIGH (ref 15–37)
BACTERIA # UR AUTO: ABNORMAL
BASOPHILS # BLD AUTO: 0.02 K/UL — SIGNIFICANT CHANGE UP (ref 0–0.2)
BASOPHILS NFR BLD AUTO: 0.3 % — SIGNIFICANT CHANGE UP (ref 0–2)
BILIRUB SERPL-MCNC: 0.6 MG/DL — SIGNIFICANT CHANGE UP (ref 0.2–1.2)
BILIRUB UR-MCNC: NEGATIVE — SIGNIFICANT CHANGE UP
BUN SERPL-MCNC: 33 MG/DL — HIGH (ref 7–23)
CALCIUM SERPL-MCNC: 8.5 MG/DL — SIGNIFICANT CHANGE UP (ref 8.5–10.1)
CHLORIDE SERPL-SCNC: 102 MMOL/L — SIGNIFICANT CHANGE UP (ref 96–108)
CO2 SERPL-SCNC: 26 MMOL/L — SIGNIFICANT CHANGE UP (ref 22–31)
COLOR SPEC: YELLOW — SIGNIFICANT CHANGE UP
CREAT SERPL-MCNC: 1 MG/DL — SIGNIFICANT CHANGE UP (ref 0.5–1.3)
DIFF PNL FLD: ABNORMAL
EOSINOPHIL # BLD AUTO: 0.04 K/UL — SIGNIFICANT CHANGE UP (ref 0–0.5)
EOSINOPHIL NFR BLD AUTO: 0.6 % — SIGNIFICANT CHANGE UP (ref 0–6)
EPI CELLS # UR: SIGNIFICANT CHANGE UP
GLUCOSE SERPL-MCNC: 96 MG/DL — SIGNIFICANT CHANGE UP (ref 70–99)
GLUCOSE UR QL: NEGATIVE — SIGNIFICANT CHANGE UP
HCT VFR BLD CALC: 34.6 % — SIGNIFICANT CHANGE UP (ref 34.5–45)
HGB BLD-MCNC: 11.5 G/DL — SIGNIFICANT CHANGE UP (ref 11.5–15.5)
IMM GRANULOCYTES NFR BLD AUTO: 0.3 % — SIGNIFICANT CHANGE UP (ref 0–1.5)
KETONES UR-MCNC: ABNORMAL
LACTATE SERPL-SCNC: 0.9 MMOL/L — SIGNIFICANT CHANGE UP (ref 0.7–2)
LACTATE SERPL-SCNC: 2.2 MMOL/L — HIGH (ref 0.7–2)
LEUKOCYTE ESTERASE UR-ACNC: ABNORMAL
LYMPHOCYTES # BLD AUTO: 1.99 K/UL — SIGNIFICANT CHANGE UP (ref 1–3.3)
LYMPHOCYTES # BLD AUTO: 28.5 % — SIGNIFICANT CHANGE UP (ref 13–44)
MCHC RBC-ENTMCNC: 28.6 PG — SIGNIFICANT CHANGE UP (ref 27–34)
MCHC RBC-ENTMCNC: 33.2 GM/DL — SIGNIFICANT CHANGE UP (ref 32–36)
MCV RBC AUTO: 86.1 FL — SIGNIFICANT CHANGE UP (ref 80–100)
MONOCYTES # BLD AUTO: 0.59 K/UL — SIGNIFICANT CHANGE UP (ref 0–0.9)
MONOCYTES NFR BLD AUTO: 8.4 % — SIGNIFICANT CHANGE UP (ref 2–14)
NEUTROPHILS # BLD AUTO: 4.33 K/UL — SIGNIFICANT CHANGE UP (ref 1.8–7.4)
NEUTROPHILS NFR BLD AUTO: 61.9 % — SIGNIFICANT CHANGE UP (ref 43–77)
NITRITE UR-MCNC: NEGATIVE — SIGNIFICANT CHANGE UP
NRBC # BLD: 0 /100 WBCS — SIGNIFICANT CHANGE UP (ref 0–0)
PH UR: 5 — SIGNIFICANT CHANGE UP (ref 5–8)
PLATELET # BLD AUTO: 343 K/UL — SIGNIFICANT CHANGE UP (ref 150–400)
POTASSIUM SERPL-MCNC: 4.1 MMOL/L — SIGNIFICANT CHANGE UP (ref 3.5–5.3)
POTASSIUM SERPL-SCNC: 4.1 MMOL/L — SIGNIFICANT CHANGE UP (ref 3.5–5.3)
PROT SERPL-MCNC: 7.6 G/DL — SIGNIFICANT CHANGE UP (ref 6–8.3)
PROT UR-MCNC: 25 MG/DL
RBC # BLD: 4.02 M/UL — SIGNIFICANT CHANGE UP (ref 3.8–5.2)
RBC # FLD: 13.8 % — SIGNIFICANT CHANGE UP (ref 10.3–14.5)
RBC CASTS # UR COMP ASSIST: ABNORMAL /HPF (ref 0–4)
SODIUM SERPL-SCNC: 136 MMOL/L — SIGNIFICANT CHANGE UP (ref 135–145)
SP GR SPEC: 1.01 — SIGNIFICANT CHANGE UP (ref 1.01–1.02)
UROBILINOGEN FLD QL: NEGATIVE — SIGNIFICANT CHANGE UP
WBC # BLD: 6.99 K/UL — SIGNIFICANT CHANGE UP (ref 3.8–10.5)
WBC # FLD AUTO: 6.99 K/UL — SIGNIFICANT CHANGE UP (ref 3.8–10.5)
WBC UR QL: ABNORMAL

## 2019-03-09 PROCEDURE — 73030 X-RAY EXAM OF SHOULDER: CPT | Mod: 26,50

## 2019-03-09 PROCEDURE — 73630 X-RAY EXAM OF FOOT: CPT | Mod: 26,RT

## 2019-03-09 PROCEDURE — 73562 X-RAY EXAM OF KNEE 3: CPT | Mod: 26,LT

## 2019-03-09 PROCEDURE — 73610 X-RAY EXAM OF ANKLE: CPT | Mod: 26,RT,77

## 2019-03-09 PROCEDURE — 73590 X-RAY EXAM OF LOWER LEG: CPT | Mod: 26,RT

## 2019-03-09 PROCEDURE — 73610 X-RAY EXAM OF ANKLE: CPT | Mod: 26,RT

## 2019-03-09 PROCEDURE — 99285 EMERGENCY DEPT VISIT HI MDM: CPT

## 2019-03-09 PROCEDURE — 93971 EXTREMITY STUDY: CPT | Mod: 26,RT

## 2019-03-09 RX ORDER — LACTOBACILLUS ACIDOPHILUS 100MM CELL
1 CAPSULE ORAL
Qty: 0 | Refills: 0 | COMMUNITY

## 2019-03-09 RX ORDER — VANCOMYCIN HCL 1 G
1000 VIAL (EA) INTRAVENOUS ONCE
Qty: 0 | Refills: 0 | Status: COMPLETED | OUTPATIENT
Start: 2019-03-09 | End: 2019-03-09

## 2019-03-09 RX ORDER — OXYBUTYNIN CHLORIDE 5 MG
10 TABLET ORAL DAILY
Qty: 0 | Refills: 0 | Status: DISCONTINUED | OUTPATIENT
Start: 2019-03-09 | End: 2019-03-19

## 2019-03-09 RX ORDER — VANCOMYCIN HCL 1 G
1000 VIAL (EA) INTRAVENOUS EVERY 12 HOURS
Qty: 0 | Refills: 0 | Status: DISCONTINUED | OUTPATIENT
Start: 2019-03-10 | End: 2019-03-14

## 2019-03-09 RX ORDER — CHLORTHALIDONE 50 MG
1 TABLET ORAL
Qty: 0 | Refills: 0 | COMMUNITY

## 2019-03-09 RX ORDER — TIZANIDINE 4 MG/1
1 TABLET ORAL
Qty: 0 | Refills: 0 | COMMUNITY

## 2019-03-09 RX ORDER — METHENAMINE MANDELATE 1 G
0 TABLET ORAL
Qty: 0 | Refills: 0 | COMMUNITY

## 2019-03-09 RX ORDER — MORPHINE SULFATE 50 MG/1
2 CAPSULE, EXTENDED RELEASE ORAL EVERY 4 HOURS
Qty: 0 | Refills: 0 | Status: DISCONTINUED | OUTPATIENT
Start: 2019-03-09 | End: 2019-03-13

## 2019-03-09 RX ORDER — MULTIVIT-MIN/FERROUS GLUCONATE 9 MG/15 ML
1 LIQUID (ML) ORAL DAILY
Qty: 0 | Refills: 0 | Status: DISCONTINUED | OUTPATIENT
Start: 2019-03-09 | End: 2019-03-19

## 2019-03-09 RX ORDER — TIZANIDINE 4 MG/1
0 TABLET ORAL
Qty: 0 | Refills: 0 | COMMUNITY

## 2019-03-09 RX ORDER — BACLOFEN 100 %
10 POWDER (GRAM) MISCELLANEOUS DAILY
Qty: 0 | Refills: 0 | Status: DISCONTINUED | OUTPATIENT
Start: 2019-03-09 | End: 2019-03-10

## 2019-03-09 RX ORDER — MIDODRINE HYDROCHLORIDE 2.5 MG/1
1 TABLET ORAL
Qty: 0 | Refills: 0 | COMMUNITY

## 2019-03-09 RX ORDER — SODIUM CHLORIDE 9 MG/ML
1000 INJECTION INTRAMUSCULAR; INTRAVENOUS; SUBCUTANEOUS
Qty: 0 | Refills: 0 | Status: COMPLETED | OUTPATIENT
Start: 2019-03-09 | End: 2019-03-09

## 2019-03-09 RX ORDER — LATANOPROST 0.05 MG/ML
1 SOLUTION/ DROPS OPHTHALMIC; TOPICAL AT BEDTIME
Qty: 0 | Refills: 0 | Status: DISCONTINUED | OUTPATIENT
Start: 2019-03-09 | End: 2019-03-19

## 2019-03-09 RX ORDER — ACETAMINOPHEN 500 MG
650 TABLET ORAL EVERY 6 HOURS
Qty: 0 | Refills: 0 | Status: DISCONTINUED | OUTPATIENT
Start: 2019-03-09 | End: 2019-03-14

## 2019-03-09 RX ORDER — LISINOPRIL 2.5 MG/1
20 TABLET ORAL DAILY
Qty: 0 | Refills: 0 | Status: DISCONTINUED | OUTPATIENT
Start: 2019-03-09 | End: 2019-03-19

## 2019-03-09 RX ADMIN — LATANOPROST 1 DROP(S): 0.05 SOLUTION/ DROPS OPHTHALMIC; TOPICAL at 23:05

## 2019-03-09 RX ADMIN — SODIUM CHLORIDE 1000 MILLILITER(S): 9 INJECTION INTRAMUSCULAR; INTRAVENOUS; SUBCUTANEOUS at 17:01

## 2019-03-09 RX ADMIN — Medication 250 MILLIGRAM(S): at 16:04

## 2019-03-09 RX ADMIN — SODIUM CHLORIDE 1000 MILLILITER(S): 9 INJECTION INTRAMUSCULAR; INTRAVENOUS; SUBCUTANEOUS at 17:00

## 2019-03-09 NOTE — H&P ADULT - PROBLEM SELECTOR PLAN 4
Chronic - doubt acute exacerbation, falls likely mechanical  - continue baclofen  - continue tizanidine, avonex (once/week on Tuesday, pt to bring both from home)  - hx of frequent UTI, will hold methenamine for now, monitor for any urinary symptoms while admitted

## 2019-03-09 NOTE — CONSULT NOTE ADULT - ASSESSMENT
A/P: 68y F w/ R Medial Malleolus fracture      Patient seen and examined at bedside with Dr. Reyez. Due to MS history, cellulitis and ambulatory status, patient is not a surgical candidate at this time. Given the fracture is minimally displaced, plan for non operative treatment.   -Patient is to be non weight bearing in posterior slab  -Posterior slab was placed in order to gain access to the anterior skin, to examine and follow the cellulitis along with abx treatment. Ace bandage may be taken off to examine the skin as needed, and put back on  -Once cellulitis is resolving/resolved, plan is to place patient into Right short leg cast.   Analgesia  DVT ppx, per primary team  PT/OT  patient may benefit from rehab placement  No further orthopedic surgical interventions at this time  ortho stable  Attending aware and agrees with plan

## 2019-03-09 NOTE — H&P ADULT - ATTENDING COMMENTS
pt s/p fall with non displace fracture of the right ankle.  also has cellulitios of the the same extremity,  contine Iv abx  ortho conuslt  will continue to follow

## 2019-03-09 NOTE — H&P ADULT - ASSESSMENT
69 yo wm f pmx breast CA, multiple sclerosis, htn presents to the ED s/p multiple falls and weakness. Admitted for cellulitis/right ankle fx

## 2019-03-09 NOTE — H&P ADULT - PROBLEM SELECTOR PLAN 1
Admit med/surg - cellulitis likely 2/2 trauma from fall with some ascending lymphangitis. Mildly elevated lactate with no other evidence of systemic infection. Possible DVT remains a concern, will get dopplers to r/o  - continue vanco  - IVF with ns bolus  - repeat lactate after fluids  - cultures  - Tylenol prn Admit med/surg - cellulitis likely 2/2 trauma from fall with some ascending lymphangitis. Mildly elevated lactate with no other evidence of systemic infection. Possible DVT remains a concern, will get dopplers to r/o  - continue vanco  - IVF with ns bolus  - repeat lactate after fluids  - cultures  - Tylenol prn  - pain regimen

## 2019-03-09 NOTE — H&P ADULT - NSHPSOCIALHISTORY_GEN_ALL_CORE
Lives at home with daughter, wheelchair bound 2/2 MS, has guard railings and shower chair at home, no stairs in house  denies smoking, etoh and drug abuse

## 2019-03-09 NOTE — ED PROVIDER NOTE - PROGRESS NOTE DETAILS
ortho paged ortho paged is in OR, but made aware of patient for consult discussed case with Dr. Oropeza, will admit

## 2019-03-09 NOTE — H&P ADULT - PROBLEM SELECTOR PLAN 2
s/p mechanical fall, imaging reviewed, mildly displaced right mal fx, some periosteum reaction seen, ?surgical intervention given present ambulation status from MS  - ortho consulted, apprec recs  - non weight bearing for now  - will keep NPO for possible OR intervention

## 2019-03-09 NOTE — ED ADULT NURSE NOTE - NSIMPLEMENTINTERV_GEN_ALL_ED
Implemented All Fall with Harm Risk Interventions:  Bourg to call system. Call bell, personal items and telephone within reach. Instruct patient to call for assistance. Room bathroom lighting operational. Non-slip footwear when patient is off stretcher. Physically safe environment: no spills, clutter or unnecessary equipment. Stretcher in lowest position, wheels locked, appropriate side rails in place. Provide visual cue, wrist band, yellow gown, etc. Monitor gait and stability. Monitor for mental status changes and reorient to person, place, and time. Review medications for side effects contributing to fall risk. Reinforce activity limits and safety measures with patient and family. Provide visual clues: red socks.

## 2019-03-09 NOTE — H&P ADULT - PROBLEM SELECTOR PLAN 3
Likely mechanical given hx and dynamics of the falls, tho acute MS exacerbation is possible  - will hold off on steroids for now, can consider along with neuro consult if clinical status warrants  - bed rest for now  - fall risk protocol  - PT consult

## 2019-03-09 NOTE — CONSULT NOTE ADULT - SUBJECTIVE AND OBJECTIVE BOX
68 y F w/ pmh of multiple sclerosis presents the the ED for right ankle pain s/p multiple falls. Pt states that on saturday while going to the bathroom, she missed the railing and landed onto her right ankle/right side. Pt was able to bear weight, however has alot of pain and tenderness. Pt did not see anyone for the right ankle pain and over the course of the week her symptoms increased. Patient does not ambulate much at baseline, uses a wheelchair and assistive devices to perform ADLs. Patient denies any numbness or tingling to the RLE. Denies fever, chills. No other complaints at this time.       PAST MEDICAL & SURGICAL HISTORY:  Multiple sclerosis  S/P mastectomy, right  Breast CA  Osteoporosis  MS (mitral stenosis)  History of bowel resection  H/O breast surgery      Home Medications:  acetaminophen 325 mg oral tablet: 2 tab(s) orally every 6 hours, As needed, For Temp greater than 38 C (100.4 F) (09 Mar 2019 16:58)  baclofen 10 mg oral tablet: 1 tab(s) orally once a day (09 Mar 2019 16:58)  latanoprost 0.005% ophthalmic solution: 1 drop(s) to both eyes once a day (in the evening) (09 Mar 2019 16:58)  lisinopril 20 mg oral tablet: 1 tab(s) orally once a day- please continue to hold off this for now, as BP was low due to septic process.  may need to gradually resume (09 Mar 2019 16:58)  methenamine hippurate 1 g oral tablet: 1 tab(s) orally 2 times a day (09 Mar 2019 16:58)  Multiple Vitamins with Minerals oral tablet: 1 tab(s) orally once a day (09 Mar 2019 16:58)  oxybutynin 10 mg/24 hr oral tablet, extended release: 1 tab(s) orally once a day (09 Mar 2019 16:58)  tiZANidine 4 mg oral tablet: 1 tab(s) orally once a day (09 Mar 2019 16:58)      Allergies    Sudafed (Other)    Intolerances          Imaging;     R ankle Xray: R non displaced medial malleolus fracture             PE:    GEN: A&O x 3, NAD    RLE:  Skin intact, diffuse erythema over the anterior shin from the ankle to just below the knee  SILT L3-S1  pt foot in slight equinus, due to MS chronic contracutres  + foot drop, chronic  FHL/GSC intact 5/5  - EHL  DP pulse 2+  compartments soft and compressible                Procedure:   Posterior slab splint  verbal consent obtained. patient was placed into a posterior slab without any reduction maneuver. patient tolerated well and n/v intact post procedure

## 2019-03-09 NOTE — H&P ADULT - NSHPPHYSICALEXAM_GEN_ALL_CORE
Vital Signs Last 24 Hrs  T(C): 37.7 (09 Mar 2019 13:46), Max: 37.7 (09 Mar 2019 13:46)  T(F): 99.8 (09 Mar 2019 13:46), Max: 99.8 (09 Mar 2019 13:46)  HR: 90 (09 Mar 2019 13:46) (90 - 90)  BP: 145/80 (09 Mar 2019 13:46) (145/80 - 145/80)  BP(mean): --  RR: 14 (09 Mar 2019 13:46) (14 - 14)  SpO2: 95% (09 Mar 2019 13:46) (95% - 95%)    Physical Exam:  General: Well developed, well nourished, NAD  HEENT: NCAT, PERRLA, EOMI bl, dry mucous membranes   Neck: Supple, nontender, no mass  Neurology: A&Ox3, nonfocal, CN II-XII grossly intact, sensation intact  Respiratory: grossly CTA B/L, No W/R/R  CV: RRR, +S1/S2, no murmurs, rubs or gallops  Abdominal: Soft, NT, ND +BSx4  Extremities: right LE warm, TTP, swelling, plantar flexed  MSK: Normal ROM, no joint erythema or warmth, no joint swelling   Skin: warm, dry, normal color expect on right LE as above

## 2019-03-09 NOTE — ED PROVIDER NOTE - NS ED ROS FT
Constitutional: - Fever, - Chills, - Anorexia, - Fatigue, - Night sweats  Eyes: - Discharge, - Irritation, - Redness, - Visual changes, - Light sensitivity, - Pain  EARS: - Ear Pain, - Tinnitus, - Decreased hearing  NOSE: - Congestion, - Bloody nose  MOUTH/THROAT: - Vocal Changes, - Drooling, - Sore throat  NECK: - Lumps, - Stiffness, - Pain  CV: - Palpitations, - Chest Pain, - Edema, - Syncope  RESP:  - Cough, - Shortness of Breath, - Dyspnea on Exertion, - Trouble speaking, - Pleuritic pain - Wheezing  GI: - Diarrhea, - Constipation, - Bloody stools, - Nausea, - Vomiting, - Abdominal Pain  : - Dysuria, -Frequency, - Hematuria, - Hesitancy, - Incontinence, - Saddle Anesthesia, - Abnormal discharge  MSK: - Myalgias, - Arthralgias, - Weakness, - Deformities, +right ankle pain  SKIN: - Color change, + Rash, +Swelling, - Ecchymosis, - Abrasion, - laceration  NEURO: - Change in behavior, - Dec. Alertness, - Headache, - Dizziness, - Change in speech, - Weakness, - Seizure-like activity, - Difficulty ambulating

## 2019-03-09 NOTE — ED ADULT NURSE NOTE - NS ED NOTE ABUSE SUSPICION NEGLECT YN
After Visit Summary   11/27/2018    Yola Amin    MRN: [id number]           Patient Information     Date Of Birth          1963        Visit Information        Provider Department      11/27/2018 2:00 PM Milo Clayton AuD [facility]        Today's Diagnoses     Sensory hearing loss, bilateral    -  1    Hearing loss, unspecified hearing loss type, unspecified laterality           Follow-ups after your visit        Your next 10 appointments already scheduled     Bienvenido 15, 2019  9:30 AM CST   (Arrive by 9:15 AM)   Return Visit with AKIRA Vizcarra MD   [facility] ([facility])    [address]  [address]  [place] [address] [phone]              Who to contact     Please call your clinic at [phone] to:    Ask questions about your health    Make or cancel appointments    Discuss your medicines    Learn about your test results    Speak to your doctor            Additional Information About Your Visit        MyChart Information     [employer] gives you secure access to your electronic health record. If you see a primary care provider, you can also send messages to your care team and make appointments. If you have questions, please call your primary care clinic.  If you do not have a primary care provider, please call [phone] and they will assist you.      [employer] is an electronic gateway that provides easy, online access to your medical records. With [employer], you can request a clinic appointment, read your test results, renew a prescription or communicate with your care team.     To access your existing account, please contact your [facility] or call [phone] for assistance.        Care EveryWhere ID     This is your Care EveryWhere ID. This could be used by other organizations to access your [place] medical records  [id number]        Your Vitals Were     Last Period                    02/01/2015            Blood Pressure from Last 3 Encounters:   10/22/18 134/86   04/03/18 120/79   03/22/18 (!) 140/91    Weight from Last 3 Encounters:   11/27/18 97.5 kg (215 lb)   10/22/18 93.9 kg (207 lb)   04/03/18 90.9 kg (200 lb 8 oz)              We Performed the Following     AUDIOGRAM/TYMPANOGRAM - INTERFACE     AUDIOLOGY ADULT REFERRAL     [facility] ([id number])     Tymps / Reflex   ([id number])        Primary Care Provider Office Phone # Fax #    Falguni GRACIE Kasper PA-C [phone]       [address] [place] [address]        Equal Access to Services     ALFRED WILLIS : Hadii tae fernandezo Sorebecca, waaxda luqadaha, qaybta kaalmada adeegyada, jerod ren . So [facility] [phone].    ATENCIÓN: Si habla español, tiene a augustine disposición servicios gratuitos de asistencia lingüística. [facility] [phone].    We comply with applicable federal civil rights laws and [place] laws. We do not discriminate on the basis of race, color, national origin, age, disability, sex, sexual orientation, or gender identity.            Thank you!     Thank you for choosing [facility]  for your care. Our goal is always to provide you with excellent care. Hearing back from our patients is one way we can continue to improve our services. Please take a few minutes to complete the written survey that you may receive in the mail after your visit with us. Thank you!             Your Updated Medication List - Protect others around you: Learn how to safely use, store and throw away your medicines at www.disposemymeds.org.          This list is accurate as of 11/27/18  4:06 PM.  Always use your most recent med list.                   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           No

## 2019-03-09 NOTE — ED PROVIDER NOTE - CARE PLAN
Principal Discharge DX:	Closed fracture of right ankle, initial encounter  Secondary Diagnosis:	Frequent falls  Secondary Diagnosis:	Cellulitis of right lower extremity

## 2019-03-09 NOTE — ED PROVIDER NOTE - PHYSICAL EXAMINATION
Gen: Alert, NAD  Head/eyes: NC/AT, PERRL, EOMI, normal lids/conjunctiva, no scleral icterus  ENT: airway patent  Neck: supple, no tenderness/meningismus/JVD, Trachea midline  Pulm/lung: Bilateral clear BS, normal resp effort, no wheeze/stridor/retractions  CV/heart: RRR, no M/R/G, +2 dist pulses (radial, pedal DP/PT, popliteal)  GI/Abd: soft, NT/ND, +BS, no guarding/rebound tenderness  Musculoskeletal: no edema/erythema/cyanosis, right ankle, +ttp medial and lateral malleolus, no fibular head ttp, no base of 5th MTP ttp  Skin: +erythema and warmth over right foot/ankle/tib-fib with streaking up to right knee  Neuro: AAOx3, CN 2-12 intact, normal sensation, 5/5 motor strength in all extremities, normal gait, no dysmetria

## 2019-03-09 NOTE — ED PROVIDER NOTE - OBJECTIVE STATEMENT
69 yo female hx of breast CA, multiple sclerosis BIBEMS c/o frequent falls, fell 4x this week, no head injury c/o b/l shoulder pain right lower leg/ankle pain, states her ankle got caught onto something at home during one of her falls.  No fever/chills.  PMD Dr. Andujar

## 2019-03-09 NOTE — ED PROVIDER NOTE - CLINICAL SUMMARY MEDICAL DECISION MAKING FREE TEXT BOX
frequent falls, +right medial malleolus fx on xray with component of cellulitis, IV abx, admit, labs

## 2019-03-09 NOTE — H&P ADULT - HISTORY OF PRESENT ILLNESS
67 yo wm f pmx breast CA, multiple sclerosis, htn presents to the ED s/p multiple falls and weakness. Pt reports last sat while in bathroom was attempting to get after shower and miss the grab rail and fell on her right side/ankle.  Was able to bear weight immediately after but reported tenderness/pain.  Over the week, pt felt symptoms worsen with increased weakness due to pain/poor balance on that side and half multiple soft falls.  No LOC or head trauma in any falls but 2 days ago reported the same ankle was caught during a fall and exacerbated the pain immensely.  Report last day the right LE pain continued to worsen with now warmth and erythema. Denies cp, sob, palpitations, upper extremity weakness, headaches, changes in vision, n/v    In the ED, vitals: stable, labs: lactate 2.2, xray noted for fracture right medial malleolus. Given vanco.

## 2019-03-09 NOTE — H&P ADULT - NSHPREVIEWOFSYSTEMS_GEN_ALL_CORE
Constitutional: denies fever, chills, diaphoresis   HEENT: denies blurry vision, difficulty hearing  Respiratory: denies SOB, JACKSON, cough, sputum production, wheezing, hemoptysis  Cardiovascular: denies CP, palpitations, edema  Gastrointestinal: denies nausea, vomiting, diarrhea, constipation, abdominal pain, melena, hematochezia   Genitourinary: denies dysuria, frequency, urgency, hematuria   Skin/Breast: denies rash, itching  Musculoskeletal: reports right LE pain, warmth, tenderness, denies myalgias, joint swelling  Neurologic: denies headache, weakness, dizziness, paresthesias, numbness/tingling  Psychiatric: denies feeling anxious, depressed, suicidal, homicidal thoughts  Hematology/Oncology: denies bruising, tender or enlarged lymph nodes   ROS negative except as noted above

## 2019-03-09 NOTE — H&P ADULT - PROBLEM SELECTOR PLAN 7
IMPROVE VTE Individual Risk Assessment        RISK                                                          Points  [  ] Previous VTE                                                3    [  ] Thrombophilia                                             2  [ x ] Lower limb paralysis                                   2        (unable to hold up >15 seconds)    [  ] Current Cancer                                             2         (within 6 months)  [ x ] Immobilization > 24 hrs                              1  [  ] ICU/CCU stay > 24 hours                             1  [ x ] Age > 60                                                         1  IMPROVE VTE Score: 4  DVT ppx: will hold AC for now pending possible OR

## 2019-03-10 LAB
ANION GAP SERPL CALC-SCNC: 8 MMOL/L — SIGNIFICANT CHANGE UP (ref 5–17)
BASOPHILS # BLD AUTO: 0.02 K/UL — SIGNIFICANT CHANGE UP (ref 0–0.2)
BASOPHILS NFR BLD AUTO: 0.3 % — SIGNIFICANT CHANGE UP (ref 0–2)
BUN SERPL-MCNC: 25 MG/DL — HIGH (ref 7–23)
CALCIUM SERPL-MCNC: 7.9 MG/DL — LOW (ref 8.5–10.1)
CHLORIDE SERPL-SCNC: 106 MMOL/L — SIGNIFICANT CHANGE UP (ref 96–108)
CO2 SERPL-SCNC: 25 MMOL/L — SIGNIFICANT CHANGE UP (ref 22–31)
CREAT SERPL-MCNC: 0.71 MG/DL — SIGNIFICANT CHANGE UP (ref 0.5–1.3)
EOSINOPHIL # BLD AUTO: 0.17 K/UL — SIGNIFICANT CHANGE UP (ref 0–0.5)
EOSINOPHIL NFR BLD AUTO: 2.9 % — SIGNIFICANT CHANGE UP (ref 0–6)
GLUCOSE SERPL-MCNC: 109 MG/DL — HIGH (ref 70–99)
HCT VFR BLD CALC: 29.5 % — LOW (ref 34.5–45)
HCV AB S/CO SERPL IA: 0.11 S/CO — SIGNIFICANT CHANGE UP (ref 0–0.79)
HCV AB SERPL-IMP: SIGNIFICANT CHANGE UP
HGB BLD-MCNC: 9.6 G/DL — LOW (ref 11.5–15.5)
IMM GRANULOCYTES NFR BLD AUTO: 0.2 % — SIGNIFICANT CHANGE UP (ref 0–1.5)
LYMPHOCYTES # BLD AUTO: 1.8 K/UL — SIGNIFICANT CHANGE UP (ref 1–3.3)
LYMPHOCYTES # BLD AUTO: 31.1 % — SIGNIFICANT CHANGE UP (ref 13–44)
MCHC RBC-ENTMCNC: 28.2 PG — SIGNIFICANT CHANGE UP (ref 27–34)
MCHC RBC-ENTMCNC: 32.5 GM/DL — SIGNIFICANT CHANGE UP (ref 32–36)
MCV RBC AUTO: 86.8 FL — SIGNIFICANT CHANGE UP (ref 80–100)
MONOCYTES # BLD AUTO: 0.57 K/UL — SIGNIFICANT CHANGE UP (ref 0–0.9)
MONOCYTES NFR BLD AUTO: 9.9 % — SIGNIFICANT CHANGE UP (ref 2–14)
NEUTROPHILS # BLD AUTO: 3.21 K/UL — SIGNIFICANT CHANGE UP (ref 1.8–7.4)
NEUTROPHILS NFR BLD AUTO: 55.6 % — SIGNIFICANT CHANGE UP (ref 43–77)
NRBC # BLD: 0 /100 WBCS — SIGNIFICANT CHANGE UP (ref 0–0)
PLATELET # BLD AUTO: 304 K/UL — SIGNIFICANT CHANGE UP (ref 150–400)
POTASSIUM SERPL-MCNC: 3.7 MMOL/L — SIGNIFICANT CHANGE UP (ref 3.5–5.3)
POTASSIUM SERPL-SCNC: 3.7 MMOL/L — SIGNIFICANT CHANGE UP (ref 3.5–5.3)
RBC # BLD: 3.4 M/UL — LOW (ref 3.8–5.2)
RBC # FLD: 13.6 % — SIGNIFICANT CHANGE UP (ref 10.3–14.5)
SODIUM SERPL-SCNC: 139 MMOL/L — SIGNIFICANT CHANGE UP (ref 135–145)
WBC # BLD: 5.78 K/UL — SIGNIFICANT CHANGE UP (ref 3.8–10.5)
WBC # FLD AUTO: 5.78 K/UL — SIGNIFICANT CHANGE UP (ref 3.8–10.5)

## 2019-03-10 RX ORDER — BACLOFEN 100 %
10 POWDER (GRAM) MISCELLANEOUS EVERY 12 HOURS
Qty: 0 | Refills: 0 | Status: DISCONTINUED | OUTPATIENT
Start: 2019-03-10 | End: 2019-03-19

## 2019-03-10 RX ORDER — ZOLPIDEM TARTRATE 10 MG/1
5 TABLET ORAL AT BEDTIME
Qty: 0 | Refills: 0 | Status: DISCONTINUED | OUTPATIENT
Start: 2019-03-10 | End: 2019-03-10

## 2019-03-10 RX ADMIN — Medication 250 MILLIGRAM(S): at 16:28

## 2019-03-10 RX ADMIN — LATANOPROST 1 DROP(S): 0.05 SOLUTION/ DROPS OPHTHALMIC; TOPICAL at 22:51

## 2019-03-10 RX ADMIN — Medication 1 TABLET(S): at 11:28

## 2019-03-10 RX ADMIN — Medication 10 MILLIGRAM(S): at 11:28

## 2019-03-10 RX ADMIN — LISINOPRIL 20 MILLIGRAM(S): 2.5 TABLET ORAL at 05:11

## 2019-03-10 RX ADMIN — Medication 250 MILLIGRAM(S): at 05:11

## 2019-03-10 NOTE — PROGRESS NOTE ADULT - ATTENDING COMMENTS
pt doijng well cellulitis improved as per staff.  continue IV abx  pt ordered non weightr bwearing of the right lower extremitry

## 2019-03-10 NOTE — PROGRESS NOTE ADULT - PROBLEM SELECTOR PLAN 1
Admit med/surg - cellulitis likely 2/2 trauma from fall with some ascending lymphangitis. Mildly elevated lactate with no other evidence of systemic infection. Possible DVT remains a concern, will get dopplers to r/o  - continue vanco  - IVF with ns bolus  - repeat lactate after fluids  - cultures  - Tylenol prn  - pain regimen

## 2019-03-10 NOTE — PROGRESS NOTE ADULT - ASSESSMENT
A/P: 68F w/ R Med Mal Fx  PLan to transition to [facility] on d/c  IV Abx per Med/ID for RLE Cell  Analgesia  DVT ppx  NWB RLE  PT/OT  Encourage incentive spirometry  No acute orthopedic surgical intervention  Ortho stable for d/c  Will discuss with attending and advise if plan changes

## 2019-03-10 NOTE — PHYSICAL THERAPY INITIAL EVALUATION ADULT - CRITERIA FOR SKILLED THERAPEUTIC INTERVENTIONS
impairments found/therapy frequency/anticipated discharge recommendation/anticipated equipment needs at discharge/predicted duration of therapy intervention/risk reduction/prevention/rehab potential/functional limitations in following categories

## 2019-03-10 NOTE — PROGRESS NOTE ADULT - SUBJECTIVE AND OBJECTIVE BOX
Patient seen and examined at bedside. Reports no acute complaints at this time. Pain is well controlled. No acute events overnight.    PHYSICAL EXAM:  Vital Signs Last 24 Hrs  T(C): 37.2 (10 Mar 2019 04:28), Max: 37.7 (09 Mar 2019 13:46)  T(F): 98.9 (10 Mar 2019 04:28), Max: 99.8 (09 Mar 2019 13:46)  HR: 70 (10 Mar 2019 04:28) (70 - 90)  BP: 117/74 (10 Mar 2019 04:28) (117/74 - 145/80)  BP(mean): --  RR: 18 (10 Mar 2019 04:28) (14 - 18)  SpO2: 97% (10 Mar 2019 04:28) (95% - 97%)    Gen: NAD, AAOx3    Right Lower Extremity:  Splint clean dry intact  +EHL/FHL  SILT L3-S1  +Cap Refill < 2 sec  Compartments soft  No calf TTP B/L

## 2019-03-10 NOTE — PHYSICAL THERAPY INITIAL EVALUATION ADULT - PERTINENT HX OF CURRENT PROBLEM, REHAB EVAL
Pt is 68 y.o. F with hx MS who presented s/p multiple falls and weakness, admit for cellulitis and R ankle fx.

## 2019-03-10 NOTE — PROGRESS NOTE ADULT - ASSESSMENT
67 yo wm f pmx breast CA, multiple sclerosis, htn presents to the ED s/p multiple falls and weakness. Admitted for cellulitis/right ankle fx

## 2019-03-10 NOTE — PHYSICAL THERAPY INITIAL EVALUATION ADULT - ADDITIONAL COMMENTS
Pt states she lives with son in [facility] with basement access she does not use, has ramp entrance and has been w/c bound for 2 years. Pt states she was independent with bed mobility and all functional transfers and ADL's and has been unable to ambulate any distances. She states son is on disability and does not have HHA.

## 2019-03-10 NOTE — PHYSICAL THERAPY INITIAL EVALUATION ADULT - RANGE OF MOTION EXAMINATION, REHAB EVAL
except R foot/ankle/bilateral upper extremity ROM was WFL (within functional limits)/bilateral lower extremity ROM was WFL (within functional limits)

## 2019-03-10 NOTE — PROGRESS NOTE ADULT - SUBJECTIVE AND OBJECTIVE BOX
Patient is a 68y old  Female who presents with a chief complaint of cellulitis/ankle fx (10 Mar 2019 07:15)      INTERVAL HPI/OVERNIGHT EVENTS:  T(C): 37.9 (03-10-19 @ 16:33), Max: 37.9 (03-10-19 @ 16:33)  HR: 70 (03-10-19 @ 16:33) (66 - 70)  BP: 125/63 (03-10-19 @ 16:33) (117/74 - 134/76)  RR: 17 (03-10-19 @ 16:33) (16 - 18)  SpO2: 96% (03-10-19 @ 16:33) (96% - 99%)  Wt(kg): --  I&O's Summary    09 Mar 2019 06:  -  10 Mar 2019 07:00  --------------------------------------------------------  IN: 250 mL / OUT: 750 mL / NET: -500 mL    10 Mar 2019 07:01  -  10 Mar 2019 19:53  --------------------------------------------------------  IN: 0 mL / OUT: 500 mL / NET: -500 mL        LABS:                        9.6    5.78  )-----------( 304      ( 10 Mar 2019 08:05 )             29.5     03-10    139  |  106  |  25<H>  ----------------------------<  109<H>  3.7   |  25  |  0.71    Ca    7.9<L>      10 Mar 2019 08:05    TPro  7.6  /  Alb  2.7<L>  /  TBili  0.6  /  DBili  x   /  AST  42<H>  /  ALT  21  /  AlkPhos  109  03-09      Urinalysis Basic - ( 09 Mar 2019 16:43 )    Color: Yellow / Appearance: Clear / S.015 / pH: x  Gluc: x / Ketone: Small  / Bili: Negative / Urobili: Negative   Blood: x / Protein: 25 mg/dL / Nitrite: Negative   Leuk Esterase: Moderate / RBC: 3-5 /HPF / WBC 11-25   Sq Epi: x / Non Sq Epi: Occasional / Bacteria: Moderate      CAPILLARY BLOOD GLUCOSE            Urinalysis Basic - ( 09 Mar 2019 16:43 )    Color: Yellow / Appearance: Clear / S.015 / pH: x  Gluc: x / Ketone: Small  / Bili: Negative / Urobili: Negative   Blood: x / Protein: 25 mg/dL / Nitrite: Negative   Leuk Esterase: Moderate / RBC: 3-5 /HPF / WBC 11-25   Sq Epi: x / Non Sq Epi: Occasional / Bacteria: Moderate        MEDICATIONS  (STANDING):  baclofen 10 milliGRAM(s) Oral every 12 hours  latanoprost 0.005% Ophthalmic Solution 1 Drop(s) Both EYES at bedtime  lisinopril 20 milliGRAM(s) Oral daily  multivitamin/minerals 1 Tablet(s) Oral daily  oxybutynin 10 milliGRAM(s) Oral daily  vancomycin  IVPB 1000 milliGRAM(s) IV Intermittent every 12 hours    MEDICATIONS  (PRN):  acetaminophen   Tablet .. 650 milliGRAM(s) Oral every 6 hours PRN Temp greater or equal to 38C (100.4F)  morphine  - Injectable 2 milliGRAM(s) IV Push every 4 hours PRN Moderate Pain (4 - 6)  zolpidem 5 milliGRAM(s) Oral at bedtime PRN Insomnia      REVIEW OF SYSTEMS:  CONSTITUTIONAL: No fever, weight loss, or fatigue  EYES: No eye pain, visual disturbances, or discharge  ENMT:  No difficulty hearing, tinnitus, vertigo; No sinus or throat pain  NECK: No pain or stiffness  RESPIRATORY: No cough, wheezing, chills or hemoptysis; No shortness of breath  CARDIOVASCULAR: No chest pain, palpitations, dizziness, or leg swelling  GASTROINTESTINAL: No abdominal or epigastric pain. No nausea, vomiting, or hematemesis; No diarrhea or constipation. No melena or hematochezia.  GENITOURINARY: No dysuria, frequency, hematuria, or incontinence  NEUROLOGICAL: No headaches, memory loss, loss of strength, numbness, or tremors  ENDOCRINE: No heat or cold intolerance; No hair loss  MUSCULOSKELETAL: No joint pain or swelling; No muscle, back, or extremity pain      RADIOLOGY & ADDITIONAL TESTS:    Imaging Personally Reviewed:  [ ] YES  [ ] NO    Consultant(s) Notes Reviewed:  [ ] YES  [ ] NO    PHYSICAL EXAM:  GENERAL: NAD, well-groomed, well-developed  HEAD:  Atraumatic, Normocephalic  EYES: EOMI, PERRLA, conjunctiva and sclera clear  ENMT: No tonsillar erythema, exudates, or enlargement; Moist mucous membranes, Good dentition, No lesions  NECK: Supple, No JVD, Normal thyroid  NERVOUS SYSTEM:  Alert & Oriented X3, Good concentration;  CHEST/LUNG: Clear to percussion bilaterally; No rales, rhonchi, wheezing, or rubs  HEART: Regular rate and rhythm; No murmurs, rubs, or gallops  ABDOMEN: Soft, Nontender, Nondistended; Bowel sounds present  EXTREMITIES:  2+ Peripheral Pulses, No clubbing, cyanosis, or edema      Care Discussed with Consultants/Other Providers [ ] YES  [ ] NO

## 2019-03-11 ENCOUNTER — TRANSCRIPTION ENCOUNTER (OUTPATIENT)
Age: 69
End: 2019-03-11

## 2019-03-11 LAB
ANION GAP SERPL CALC-SCNC: 5 MMOL/L — SIGNIFICANT CHANGE UP (ref 5–17)
BUN SERPL-MCNC: 21 MG/DL — SIGNIFICANT CHANGE UP (ref 7–23)
CALCIUM SERPL-MCNC: 8.6 MG/DL — SIGNIFICANT CHANGE UP (ref 8.5–10.1)
CHLORIDE SERPL-SCNC: 107 MMOL/L — SIGNIFICANT CHANGE UP (ref 96–108)
CO2 SERPL-SCNC: 28 MMOL/L — SIGNIFICANT CHANGE UP (ref 22–31)
CREAT SERPL-MCNC: 0.86 MG/DL — SIGNIFICANT CHANGE UP (ref 0.5–1.3)
GLUCOSE SERPL-MCNC: 106 MG/DL — HIGH (ref 70–99)
HCT VFR BLD CALC: 29.7 % — LOW (ref 34.5–45)
HGB BLD-MCNC: 9.8 G/DL — LOW (ref 11.5–15.5)
MCHC RBC-ENTMCNC: 28.5 PG — SIGNIFICANT CHANGE UP (ref 27–34)
MCHC RBC-ENTMCNC: 33 GM/DL — SIGNIFICANT CHANGE UP (ref 32–36)
MCV RBC AUTO: 86.3 FL — SIGNIFICANT CHANGE UP (ref 80–100)
NRBC # BLD: 0 /100 WBCS — SIGNIFICANT CHANGE UP (ref 0–0)
PLATELET # BLD AUTO: 348 K/UL — SIGNIFICANT CHANGE UP (ref 150–400)
POTASSIUM SERPL-MCNC: 4.5 MMOL/L — SIGNIFICANT CHANGE UP (ref 3.5–5.3)
POTASSIUM SERPL-SCNC: 4.5 MMOL/L — SIGNIFICANT CHANGE UP (ref 3.5–5.3)
RBC # BLD: 3.44 M/UL — LOW (ref 3.8–5.2)
RBC # FLD: 13.7 % — SIGNIFICANT CHANGE UP (ref 10.3–14.5)
SODIUM SERPL-SCNC: 140 MMOL/L — SIGNIFICANT CHANGE UP (ref 135–145)
VANCOMYCIN TROUGH SERPL-MCNC: 13.9 UG/ML — SIGNIFICANT CHANGE UP (ref 10–20)
WBC # BLD: 5.93 K/UL — SIGNIFICANT CHANGE UP (ref 3.8–10.5)
WBC # FLD AUTO: 5.93 K/UL — SIGNIFICANT CHANGE UP (ref 3.8–10.5)

## 2019-03-11 RX ORDER — INTERFERON BETA-1A 22 UG/.5ML
30 INJECTION, SOLUTION SUBCUTANEOUS
Qty: 0 | Refills: 0 | Status: DISCONTINUED | OUTPATIENT
Start: 2019-03-11 | End: 2019-03-19

## 2019-03-11 RX ORDER — LISINOPRIL 2.5 MG/1
1 TABLET ORAL
Qty: 0 | Refills: 0 | COMMUNITY

## 2019-03-11 RX ORDER — ACETAMINOPHEN 500 MG
2 TABLET ORAL
Qty: 0 | Refills: 0 | COMMUNITY
Start: 2019-03-11

## 2019-03-11 RX ORDER — LISINOPRIL 2.5 MG/1
1 TABLET ORAL
Qty: 0 | Refills: 0 | DISCHARGE
Start: 2019-03-11

## 2019-03-11 RX ORDER — ENOXAPARIN SODIUM 100 MG/ML
40 INJECTION SUBCUTANEOUS DAILY
Qty: 0 | Refills: 0 | Status: DISCONTINUED | OUTPATIENT
Start: 2019-03-11 | End: 2019-03-19

## 2019-03-11 RX ADMIN — Medication 250 MILLIGRAM(S): at 17:28

## 2019-03-11 RX ADMIN — ENOXAPARIN SODIUM 40 MILLIGRAM(S): 100 INJECTION SUBCUTANEOUS at 19:32

## 2019-03-11 RX ADMIN — Medication 1 TABLET(S): at 14:27

## 2019-03-11 RX ADMIN — LATANOPROST 1 DROP(S): 0.05 SOLUTION/ DROPS OPHTHALMIC; TOPICAL at 21:54

## 2019-03-11 RX ADMIN — Medication 10 MILLIGRAM(S): at 05:24

## 2019-03-11 RX ADMIN — Medication 10 MILLIGRAM(S): at 17:27

## 2019-03-11 RX ADMIN — LISINOPRIL 20 MILLIGRAM(S): 2.5 TABLET ORAL at 05:24

## 2019-03-11 RX ADMIN — Medication 250 MILLIGRAM(S): at 05:24

## 2019-03-11 RX ADMIN — Medication 10 MILLIGRAM(S): at 14:28

## 2019-03-11 NOTE — DISCHARGE NOTE PROVIDER - CARE PROVIDER_API CALL
Gregorio Arshad)  Orthopaedic Surgery  [address] [place] [address]  Phone: [phone]  Fax: [phone]  Follow Up Time:     cynthia,   Phone: [phone]  Fax: (   )    -  Follow Up Time: Gregorio Arshad)  Orthopaedic Surgery  [address] [place] [address]  Phone: [phone]  Fax: [phone]  Follow Up Time:     cynthia,   Phone: [phone]  Fax: (   )    -  Follow Up Time:     Serena Woo)  Hematology; Medical Oncology  [address] [facility], [address] [place] [address]  Phone: [phone]  Fax: [phone]  Follow Up Time:

## 2019-03-11 NOTE — PROGRESS NOTE ADULT - PROBLEM SELECTOR PLAN 2
-s/p mechanical fall, R ankle Xray: nondisplaced fracture at the medial malleolus, age indeterminate. There is mild widening at the medial clear space  -D/C as per ortho, f/u recs   -Seen by PT, awaiting [facility] bed

## 2019-03-11 NOTE — PROGRESS NOTE ADULT - PROBLEM SELECTOR PLAN 1
- Cellulitis likely 2/2 trauma from fall    -Doppler for DVT of RLE negative for DVT   -Vancomycin Day 2, will D/C on Minocycline 100 BID   - Tylenol prn  - Continue pain regimen

## 2019-03-11 NOTE — DISCHARGE NOTE PROVIDER - HOSPITAL COURSE
adm s/p fall, right med mal fracture.  Seen by ortho and considered non operative fracture.  concern for cellulitis of associated le as well.      Per ortho, NWB rle adm s/p fall, right med mal fracture.  Seen by ortho and considered non operative fracture.  concern for cellulitis of associated le as well.      Per ortho, NWB rle        Patient treated for RLE cellulitis with iv abx with improvement.     Cultures negative    Going to [facility]        >30 minutes spent on discharge 67yo female with pmhx of breast ca, MS, HTN presented to ED s/p multiple falls and weakness. Admitted on 3/9/18 for cellulitis of RLE and right ankle fracture. Patient started on IV abx for cellulitis. Evaluated by orthopedics, recommended no surgical intervention at this time, patient placed in posterior slab splint and non weight bearing. Patient refused weekly dose of avonex on 3/12/19 due to fever. Noted patient to have thrombocytosis while in hospital, patient asymptomatic. Patient switched from IV abx to PO abx while in hospital. Cultures were negative to date. Ortho reevaluated, recommended TTWB for transfers with PT. At this time, patient is stable for discharge to [facility] with close outpatient follow up and routine CBC monitoring.

## 2019-03-11 NOTE — DISCHARGE NOTE PROVIDER - NSDCFUADDINST_GEN_ALL_CORE_FT
- follow up with orthopedics outpatient  - patient and family responsible for scheduling all outpatient appointments - follow up with orthopedics outpatient  - patient and family responsible for scheduling all outpatient appointments  -repeat CBC to be done in [facility] for elevated platelets   -F/u outpatient with heme/onc (Dr. Woo) for elevated platelets

## 2019-03-11 NOTE — PROGRESS NOTE ADULT - ASSESSMENT
69 yo wm f pmx breast CA, multiple sclerosis, htn presents to the ED s/p multiple falls and weakness. Admitted for cellulitis/right ankle fx. D/C planning to [facility]. vancomycin Day 3. Will D/C on minocycline 100 BID.

## 2019-03-11 NOTE — DISCHARGE NOTE PROVIDER - PROVIDER TOKENS
PROVIDER:[TOKEN:[[id number]]],FREE:[LAST:[cynthia],PHONE:[phone]],FAX:[(   )    -]] PROVIDER:[TOKEN:[[id number]]],FREE:[LAST:[jankowski],PHONE:[phone]],FAX:[(   )    -]],PROVIDER:[TOKEN:[[id number]]]

## 2019-03-11 NOTE — PROGRESS NOTE ADULT - SUBJECTIVE AND OBJECTIVE BOX
Patient is a 68y old  Female who presents with a chief complaint of cellulitis/ankle fx (11 Mar 2019 12:45)      INTERVAL HPI/OVERNIGHT EVENTS: Patient seen and examined at bedside.  Denies any fevers/chills. States that R ankle pain has improved.      T(C): 36.9 (19 @ 13:14), Max: 37.9 (03-10-19 @ 16:33)  HR: 66 (19 @ 13:14) (62 - 70)  BP: 119/70 (19 @ 13:14) (101/62 - 143/62)  RR: 15 (19 @ 13:14) (15 - 18)  SpO2: 98% (19 @ 13:14) (95% - 98%)  Wt(kg): --  I&O's Summary    10 Mar 2019 07:01  -  11 Mar 2019 07:00  --------------------------------------------------------  IN: 0 mL / OUT: 1450 mL / NET: -1450 mL        LABS:                        9.8    5.93  )-----------( 348      ( 11 Mar 2019 08:13 )             29.7     03-11    140  |  107  |  21  ----------------------------<  106<H>  4.5   |  28  |  0.86    Ca    8.6      11 Mar 2019 08:13    TPro  7.6  /  Alb  2.7<L>  /  TBili  0.6  /  DBili  x   /  AST  42<H>  /  ALT  21  /  AlkPhos  109  03-09      Urinalysis Basic - ( 09 Mar 2019 16:43 )    Color: Yellow / Appearance: Clear / S.015 / pH: x  Gluc: x / Ketone: Small  / Bili: Negative / Urobili: Negative   Blood: x / Protein: 25 mg/dL / Nitrite: Negative   Leuk Esterase: Moderate / RBC: 3-5 /HPF / WBC 11-25   Sq Epi: x / Non Sq Epi: Occasional / Bacteria: Moderate      CAPILLARY BLOOD GLUCOSE    Urinalysis Basic - ( 09 Mar 2019 16:43 )    Color: Yellow / Appearance: Clear / S.015 / pH: x  Gluc: x / Ketone: Small  / Bili: Negative / Urobili: Negative   Blood: x / Protein: 25 mg/dL / Nitrite: Negative   Leuk Esterase: Moderate / RBC: 3-5 /HPF / WBC 11-25   Sq Epi: x / Non Sq Epi: Occasional / Bacteria: Moderate        MEDICATIONS  (STANDING):  baclofen 10 milliGRAM(s) Oral every 12 hours  enoxaparin Injectable 40 milliGRAM(s) SubCutaneous daily  latanoprost 0.005% Ophthalmic Solution 1 Drop(s) Both EYES at bedtime  lisinopril 20 milliGRAM(s) Oral daily  multivitamin/minerals 1 Tablet(s) Oral daily  oxybutynin 10 milliGRAM(s) Oral daily  vancomycin  IVPB 1000 milliGRAM(s) IV Intermittent every 12 hours    MEDICATIONS  (PRN):  acetaminophen   Tablet .. 650 milliGRAM(s) Oral every 6 hours PRN Temp greater or equal to 38C (100.4F)  morphine  - Injectable 2 milliGRAM(s) IV Push every 4 hours PRN Moderate Pain (4 - 6)  zolpidem 5 milliGRAM(s) Oral at bedtime PRN Insomnia      REVIEW OF SYSTEMS:  CONSTITUTIONAL: No fever, weight loss, or fatigue  RESPIRATORY: No cough, wheezing, chills or hemoptysis; No shortness of breath  CARDIOVASCULAR: No chest pain, palpitations, dizziness  GASTROINTESTINAL: No abdominal or epigastric pain. No nausea, vomiting, or hematemesis; No diarrhea or constipation. No melena or hematochezia.  GENITOURINARY: patient self catheterizes for 15 years   NEUROLOGICAL: No headaches, memory loss, loss of strength, numbness, or tremors      RADIOLOGY & ADDITIONAL TESTS:    Imaging Personally Reviewed:  [ ] YES  [ ] NO    Consultant(s) Notes Reviewed:  [ ] YES  [ ] NO    PHYSICAL EXAM:  GENERAL: NAD, well-groomed, well-developed  HEAD:  Atraumatic, Normocephalic  NERVOUS SYSTEM:  Alert & Oriented X3, Good concentration  CHEST/LUNG: Clear to percussion bilaterally; No rales, rhonchi, wheezing, or rubs  HEART: Regular rate and rhythm; No murmurs, rubs, or gallops  ABDOMEN: Soft, Nontender, Nondistended; Bowel sounds present  EXTREMITIES:  R leg in cast, no visible worsening erythema of R malleolus     Care Discussed with Consultants/Other Providers [ ] YES  [ ] NO

## 2019-03-11 NOTE — PROGRESS NOTE ADULT - PROBLEM SELECTOR PLAN 4
Chronic - doubt acute exacerbation, falls likely mechanical  - continue baclofen  - continue tizanidine, avonex (once/week on Tuesday, pt brought medication from home.    - hx of frequent UTI, will hold methenamine for now, monitor for any urinary symptoms while admitted

## 2019-03-11 NOTE — DISCHARGE NOTE PROVIDER - NSDCCPCAREPLAN_GEN_ALL_CORE_FT
PRINCIPAL DISCHARGE DIAGNOSIS  Problem: Closed fracture of right ankle, initial encounter  Assessment and Plan of Treatment: follow up dr Arshad after rehab.  non weight bearing RLE for now      SECONDARY DISCHARGE DIAGNOSES  Problem: Frequent falls  Assessment and Plan of Treatment: PT follow up    Problem: Cellulitis of right lower extremity  Assessment and Plan of Treatment: 3 more days minocycline PRINCIPAL DISCHARGE DIAGNOSIS  Diagnosis: Closed fracture of right ankle, initial encounter  Assessment and Plan of Treatment: follow up dr Arshad after rehab.  non weight bearing RLE for now      SECONDARY DISCHARGE DIAGNOSES  Diagnosis: Cellulitis of right lower extremity  Assessment and Plan of Treatment: Improved. Completed course of vancomycin and minocycline.    Diagnosis: Frequent falls  Assessment and Plan of Treatment: PT follow up PRINCIPAL DISCHARGE DIAGNOSIS  Diagnosis: Closed fracture of right ankle, initial encounter  Assessment and Plan of Treatment: follow up dr Arshad after rehab.  non weight bearing RLE for now      SECONDARY DISCHARGE DIAGNOSES  Diagnosis: Cellulitis of right lower extremity  Assessment and Plan of Treatment: Improved. Completed course of vancomycin and minocycline.    Diagnosis: Thrombocytosis  Assessment and Plan of Treatment: Patient noted to have elevated platlets. Repeat CBC to be done in rehab to follow trend.    Diagnosis: Frequent falls  Assessment and Plan of Treatment: PT follow up PRINCIPAL DISCHARGE DIAGNOSIS  Diagnosis: Closed fracture of right ankle, initial encounter  Assessment and Plan of Treatment: follow up dr Arshad after rehab.  non weight bearing RLE for now  TTWB for transfer with PT      SECONDARY DISCHARGE DIAGNOSES  Diagnosis: Thrombocytosis  Assessment and Plan of Treatment: Patient noted to have elevated platlets. Repeat CBC to be done in rehab to follow trend.    Diagnosis: Cellulitis of right lower extremity  Assessment and Plan of Treatment: Improved. Completed course of vancomycin and minocycline.    Diagnosis: Multiple sclerosis  Assessment and Plan of Treatment: continue home medications avonex weekly  continue baclofen    Diagnosis: HTN (hypertension)  Assessment and Plan of Treatment: continue lisinopril    Diagnosis: Overactive bladder  Assessment and Plan of Treatment: continue oxybutynin    Diagnosis: Frequent falls  Assessment and Plan of Treatment: PT follow up PRINCIPAL DISCHARGE DIAGNOSIS  Diagnosis: Closed fracture of right ankle, initial encounter  Assessment and Plan of Treatment: follow up dr Arshad after rehab.  non weight bearing RLE for now  TTWB for transfer with PT      SECONDARY DISCHARGE DIAGNOSES  Diagnosis: Elevated platelet count  Assessment and Plan of Treatment: Patient noted to have elevated platlets. Repeat CBC to be done in rehab to follow trend.   -Recommend outpatinet follow-up with heme/onc for elevated platlets (Dr. Woo)    Diagnosis: Cellulitis of right lower extremity  Assessment and Plan of Treatment: Improved. Completed course of vancomycin and minocycline.    Diagnosis: Frequent falls  Assessment and Plan of Treatment: PT follow up    Diagnosis: Overactive bladder  Assessment and Plan of Treatment: continue oxybutynin    Diagnosis: HTN (hypertension)  Assessment and Plan of Treatment: continue lisinopril    Diagnosis: Multiple sclerosis  Assessment and Plan of Treatment: continue home medications avonex weekly  continue baclofen

## 2019-03-12 LAB
ANION GAP SERPL CALC-SCNC: 7 MMOL/L — SIGNIFICANT CHANGE UP (ref 5–17)
BUN SERPL-MCNC: 16 MG/DL — SIGNIFICANT CHANGE UP (ref 7–23)
CALCIUM SERPL-MCNC: 8.3 MG/DL — LOW (ref 8.5–10.1)
CHLORIDE SERPL-SCNC: 106 MMOL/L — SIGNIFICANT CHANGE UP (ref 96–108)
CO2 SERPL-SCNC: 26 MMOL/L — SIGNIFICANT CHANGE UP (ref 22–31)
CREAT SERPL-MCNC: 0.79 MG/DL — SIGNIFICANT CHANGE UP (ref 0.5–1.3)
CULTURE RESULTS: SIGNIFICANT CHANGE UP
GLUCOSE SERPL-MCNC: 109 MG/DL — HIGH (ref 70–99)
HCT VFR BLD CALC: 29.7 % — LOW (ref 34.5–45)
HGB BLD-MCNC: 9.7 G/DL — LOW (ref 11.5–15.5)
MCHC RBC-ENTMCNC: 28.4 PG — SIGNIFICANT CHANGE UP (ref 27–34)
MCHC RBC-ENTMCNC: 32.7 GM/DL — SIGNIFICANT CHANGE UP (ref 32–36)
MCV RBC AUTO: 86.8 FL — SIGNIFICANT CHANGE UP (ref 80–100)
NRBC # BLD: 0 /100 WBCS — SIGNIFICANT CHANGE UP (ref 0–0)
PLATELET # BLD AUTO: 423 K/UL — HIGH (ref 150–400)
POTASSIUM SERPL-MCNC: 4.5 MMOL/L — SIGNIFICANT CHANGE UP (ref 3.5–5.3)
POTASSIUM SERPL-SCNC: 4.5 MMOL/L — SIGNIFICANT CHANGE UP (ref 3.5–5.3)
RBC # BLD: 3.42 M/UL — LOW (ref 3.8–5.2)
RBC # FLD: 13.8 % — SIGNIFICANT CHANGE UP (ref 10.3–14.5)
SODIUM SERPL-SCNC: 139 MMOL/L — SIGNIFICANT CHANGE UP (ref 135–145)
SPECIMEN SOURCE: SIGNIFICANT CHANGE UP
WBC # BLD: 6.43 K/UL — SIGNIFICANT CHANGE UP (ref 3.8–10.5)
WBC # FLD AUTO: 6.43 K/UL — SIGNIFICANT CHANGE UP (ref 3.8–10.5)

## 2019-03-12 RX ORDER — INTERFERON BETA-1A 22 UG/.5ML
0.5 INJECTION, SOLUTION SUBCUTANEOUS
Qty: 0 | Refills: 0 | DISCHARGE
Start: 2019-03-12

## 2019-03-12 RX ADMIN — Medication 10 MILLIGRAM(S): at 05:31

## 2019-03-12 RX ADMIN — Medication 1 TABLET(S): at 12:42

## 2019-03-12 RX ADMIN — LISINOPRIL 20 MILLIGRAM(S): 2.5 TABLET ORAL at 05:31

## 2019-03-12 RX ADMIN — Medication 250 MILLIGRAM(S): at 17:15

## 2019-03-12 RX ADMIN — Medication 10 MILLIGRAM(S): at 17:15

## 2019-03-12 RX ADMIN — Medication 250 MILLIGRAM(S): at 06:59

## 2019-03-12 RX ADMIN — Medication 10 MILLIGRAM(S): at 12:41

## 2019-03-12 RX ADMIN — LATANOPROST 1 DROP(S): 0.05 SOLUTION/ DROPS OPHTHALMIC; TOPICAL at 23:08

## 2019-03-12 RX ADMIN — ENOXAPARIN SODIUM 40 MILLIGRAM(S): 100 INJECTION SUBCUTANEOUS at 12:42

## 2019-03-12 NOTE — PROGRESS NOTE ADULT - SUBJECTIVE AND OBJECTIVE BOX
Patient is a 68y old  Female who presents with a chief complaint of cellulitis/ankle fx (11 Mar 2019 14:24)      INTERVAL HPI/OVERNIGHT EVENTS: Patient seen and examined at bedside.  Denies any fevers/chills. Patient had fever of 100.7 yesterday evening, as per patient and RN temperature in room elevated, repeat temperature wnl, patient was given Tylenol at that time. No other new complaints.     T(C): 37.2 (03-12-19 @ 16:06), Max: 38.1 (03-11-19 @ 23:11)  HR: 65 (03-12-19 @ 16:06) (65 - 73)  BP: 133/65 (03-12-19 @ 16:06) (108/61 - 138/69)  RR: 16 (03-12-19 @ 16:06) (15 - 16)  SpO2: 97% (03-12-19 @ 16:06) (96% - 99%)  Wt(kg): --  I&O's Summary    11 Mar 2019 07:01  -  12 Mar 2019 07:00  --------------------------------------------------------  IN: 250 mL / OUT: 1200 mL / NET: -950 mL        LABS:                        9.7    6.43  )-----------( 423      ( 12 Mar 2019 06:32 )             29.7     03-12    139  |  106  |  16  ----------------------------<  109<H>  4.5   |  26  |  0.79    Ca    8.3<L>      12 Mar 2019 06:32          CAPILLARY BLOOD GLUCOSE      MEDICATIONS  (STANDING):  baclofen 10 milliGRAM(s) Oral every 12 hours  enoxaparin Injectable 40 milliGRAM(s) SubCutaneous daily  interferon beta-1a Injectable (AVONEX) 30 MICROGram(s) IntraMuscular every 7 days  latanoprost 0.005% Ophthalmic Solution 1 Drop(s) Both EYES at bedtime  lisinopril 20 milliGRAM(s) Oral daily  multivitamin/minerals 1 Tablet(s) Oral daily  oxybutynin 10 milliGRAM(s) Oral daily  vancomycin  IVPB 1000 milliGRAM(s) IV Intermittent every 12 hours    MEDICATIONS  (PRN):  acetaminophen   Tablet .. 650 milliGRAM(s) Oral every 6 hours PRN Temp greater or equal to 38C (100.4F)  morphine  - Injectable 2 milliGRAM(s) IV Push every 4 hours PRN Moderate Pain (4 - 6)  zolpidem 5 milliGRAM(s) Oral at bedtime PRN Insomnia      REVIEW OF SYSTEMS:  CONSTITUTIONAL: No fever, weight loss, or fatigue  RESPIRATORY: No cough, wheezing, chills or hemoptysis; No shortness of breath  CARDIOVASCULAR: No chest pain, palpitations, dizziness  GASTROINTESTINAL: No abdominal or epigastric pain. No nausea, vomiting, or hematemesis; No diarrhea or constipation. No melena or hematochezia.  GENITOURINARY: patient self catheterizes for 15 years   NEUROLOGICAL: No headaches, memory loss, loss of strength, numbness, or tremors    RADIOLOGY & ADDITIONAL TESTS: NONE     Imaging Personally Reviewed:  [ ] YES  [ ] NO    Consultant(s) Notes Reviewed:  [ ] YES  [ ] NO    PHYSICAL EXAM:  GENERAL: NAD, well-groomed, well-developed  HEAD:  Atraumatic, Normocephalic  NERVOUS SYSTEM:  Alert & Oriented X3, Good concentration  CHEST/LUNG: Clear to percussion bilaterally; No rales, rhonchi, wheezing, or rubs  HEART: Regular rate and rhythm; No murmurs, rubs, or gallops  ABDOMEN: Soft, Nontender, Nondistended; Bowel sounds present  EXTREMITIES:  R leg in cast, erythema or right malleolus and dorsal R calf     Care Discussed with Consultants/Other Providers [ ] YES  [ ] NO

## 2019-03-12 NOTE — PROGRESS NOTE ADULT - ASSESSMENT
67 yo wm f pmx breast CA, multiple sclerosis, htn presents to the ED s/p multiple falls and weakness. Admitted for cellulitis/right ankle fx. D/C planning to [facility].    Problem: Cellulitis of right lower extremity.  Plan: - Cellulitis likely 2/2 trauma from fall    -Doppler for DVT of RLE negative for DVT   -Vancomycin Day 3  - Tylenol prn  - Continue pain regimem  -Patient for D/C to [facility]        ·  Problem: Closed fracture of right ankle, initial encounter.  Plan: -s/p mechanical fall, R ankle Xray: nondisplaced fracture at the medial malleolus, age indeterminate. There is mild widening at the medial clear space  -D/C as per ortho, f/u recs   --Patient for D/C to [facility]        ·  Problem: Frequent falls.  Plan: -Likely mechanical given  history of MS   - fall risk protocol  -Patient for D/C to [facility]        ·  Problem: Multiple sclerosis.  Plan: Chronic - doubt acute exacerbation, falls likely mechanical  - continue baclofen  - continue tizanidine, avonex (once/week on Tuesday, pt brought medication from home.    - hx of frequent UTI, will hold methenamine for now, monitor for any urinary symptoms while admitted.   -Patient for D/C to [facility]        ·  Problem: HTN (hypertension).  Plan: Controlled - continue Lisinopril.       Problem: Overactive bladder. Plan: Likely 2/2 chronic MS  - continue oxybutynin.  -Patient for D/C to [facility]        ·  Problem: Prophylactic measure.  Plan: IMPROVE VTE Individual Risk Assessment        RISK                                                          Points  [  ] Previous VTE                                                3    [  ] Thrombophilia                                             2  [ x ] Lower limb paralysis                                   2        (unable to hold up >15 seconds)    [  ] Current Cancer                                             2         (within 6 months)  [ x ] Immobilization > 24 hrs                              1  [  ] ICU/CCU stay > 24 hours                             1  [ x ] Age > 60                                                         1  IMPROVE VTE Score: 4  DVT Lovenox for DVT ppx

## 2019-03-13 LAB
ANION GAP SERPL CALC-SCNC: 7 MMOL/L — SIGNIFICANT CHANGE UP (ref 5–17)
BUN SERPL-MCNC: 16 MG/DL — SIGNIFICANT CHANGE UP (ref 7–23)
CALCIUM SERPL-MCNC: 8.5 MG/DL — SIGNIFICANT CHANGE UP (ref 8.5–10.1)
CHLORIDE SERPL-SCNC: 104 MMOL/L — SIGNIFICANT CHANGE UP (ref 96–108)
CO2 SERPL-SCNC: 28 MMOL/L — SIGNIFICANT CHANGE UP (ref 22–31)
CREAT SERPL-MCNC: 0.82 MG/DL — SIGNIFICANT CHANGE UP (ref 0.5–1.3)
GLUCOSE SERPL-MCNC: 105 MG/DL — HIGH (ref 70–99)
HCT VFR BLD CALC: 29.6 % — LOW (ref 34.5–45)
HGB BLD-MCNC: 9.6 G/DL — LOW (ref 11.5–15.5)
MCHC RBC-ENTMCNC: 28 PG — SIGNIFICANT CHANGE UP (ref 27–34)
MCHC RBC-ENTMCNC: 32.4 GM/DL — SIGNIFICANT CHANGE UP (ref 32–36)
MCV RBC AUTO: 86.3 FL — SIGNIFICANT CHANGE UP (ref 80–100)
NRBC # BLD: 0 /100 WBCS — SIGNIFICANT CHANGE UP (ref 0–0)
PLATELET # BLD AUTO: 486 K/UL — HIGH (ref 150–400)
POTASSIUM SERPL-MCNC: 4.4 MMOL/L — SIGNIFICANT CHANGE UP (ref 3.5–5.3)
POTASSIUM SERPL-SCNC: 4.4 MMOL/L — SIGNIFICANT CHANGE UP (ref 3.5–5.3)
RBC # BLD: 3.43 M/UL — LOW (ref 3.8–5.2)
RBC # FLD: 13.7 % — SIGNIFICANT CHANGE UP (ref 10.3–14.5)
SODIUM SERPL-SCNC: 139 MMOL/L — SIGNIFICANT CHANGE UP (ref 135–145)
VANCOMYCIN TROUGH SERPL-MCNC: 19.4 UG/ML — SIGNIFICANT CHANGE UP (ref 10–20)
WBC # BLD: 7.14 K/UL — SIGNIFICANT CHANGE UP (ref 3.8–10.5)
WBC # FLD AUTO: 7.14 K/UL — SIGNIFICANT CHANGE UP (ref 3.8–10.5)

## 2019-03-13 RX ADMIN — Medication 1 TABLET(S): at 11:54

## 2019-03-13 RX ADMIN — Medication 650 MILLIGRAM(S): at 21:00

## 2019-03-13 RX ADMIN — Medication 10 MILLIGRAM(S): at 11:54

## 2019-03-13 RX ADMIN — Medication 250 MILLIGRAM(S): at 18:07

## 2019-03-13 RX ADMIN — ENOXAPARIN SODIUM 40 MILLIGRAM(S): 100 INJECTION SUBCUTANEOUS at 11:54

## 2019-03-13 RX ADMIN — LISINOPRIL 20 MILLIGRAM(S): 2.5 TABLET ORAL at 05:20

## 2019-03-13 RX ADMIN — LATANOPROST 1 DROP(S): 0.05 SOLUTION/ DROPS OPHTHALMIC; TOPICAL at 19:39

## 2019-03-13 RX ADMIN — Medication 10 MILLIGRAM(S): at 17:41

## 2019-03-13 RX ADMIN — Medication 10 MILLIGRAM(S): at 05:20

## 2019-03-13 RX ADMIN — Medication 650 MILLIGRAM(S): at 19:42

## 2019-03-13 RX ADMIN — Medication 250 MILLIGRAM(S): at 05:20

## 2019-03-13 NOTE — PHARMACOTHERAPY INTERVENTION NOTE - COMMENTS
s/w Dr. Castaneda and suggested discontinuation of the morphine since the patient had not used it since the 9th of March. MD accepted
Order for vancomycin entered for patient's cellulitis.  Noted no pre-fourth trough in profile.  Spoke with Dr. Perez - he agreed to entering of pre-fourth trough.
s/w Dr. Perlman to see if we can consider doing PO abx for the ptaient but md wants to continue the IV for now due to patient potentially being discharged later today.      Suggested a pre 4th trough be entered in case the patient is still here. Md accepted

## 2019-03-13 NOTE — PROGRESS NOTE ADULT - SUBJECTIVE AND OBJECTIVE BOX
Patient is a 68y old  Female who presents with a chief complaint of cellulitis/ankle fx (12 Mar 2019 18:02)      INTERVAL HPI/OVERNIGHT EVENTS:  Patient seen and examined at bedside. Denies any new complaints. Awaiting [facility] bed.  No other new complaints.         T(C): 37.4 (03-13-19 @ 08:00), Max: 37.6 (03-13-19 @ 04:23)  HR: 61 (03-13-19 @ 08:00) (61 - 73)  BP: 103/61 (03-13-19 @ 08:00) (100/58 - 144/67)  RR: 16 (03-13-19 @ 08:00) (15 - 17)  SpO2: 97% (03-13-19 @ 08:00) (95% - 99%)  Wt(kg): --  I&O's Summary    12 Mar 2019 07:01  -  13 Mar 2019 07:00  --------------------------------------------------------  IN: 0 mL / OUT: 1650 mL / NET: -1650 mL        LABS:                        9.6    7.14  )-----------( 486      ( 13 Mar 2019 06:31 )             29.6     03-13    139  |  104  |  16  ----------------------------<  105<H>  4.4   |  28  |  0.82    Ca    8.5      13 Mar 2019 06:31          CAPILLARY BLOOD GLUCOSE        MEDICATIONS  (STANDING):  baclofen 10 milliGRAM(s) Oral every 12 hours  enoxaparin Injectable 40 milliGRAM(s) SubCutaneous daily  interferon beta-1a Injectable (AVONEX) 30 MICROGram(s) IntraMuscular every 7 days  latanoprost 0.005% Ophthalmic Solution 1 Drop(s) Both EYES at bedtime  lisinopril 20 milliGRAM(s) Oral daily  multivitamin/minerals 1 Tablet(s) Oral daily  oxybutynin 10 milliGRAM(s) Oral daily  vancomycin  IVPB 1000 milliGRAM(s) IV Intermittent every 12 hours    MEDICATIONS  (PRN):  acetaminophen   Tablet .. 650 milliGRAM(s) Oral every 6 hours PRN Temp greater or equal to 38C (100.4F)  morphine  - Injectable 2 milliGRAM(s) IV Push every 4 hours PRN Moderate Pain (4 - 6)  zolpidem 5 milliGRAM(s) Oral at bedtime PRN Insomnia    REVIEW OF SYSTEMS:  CONSTITUTIONAL: No fever, weight loss, or fatigue  RESPIRATORY: No cough, wheezing, chills or hemoptysis; No shortness of breath  CARDIOVASCULAR: No chest pain, palpitations, dizziness  GASTROINTESTINAL: No abdominal or epigastric pain. No nausea, vomiting, or hematemesis; No diarrhea or constipation. No melena or hematochezia.  GENITOURINARY: patient self catheterizes for 15 years   NEUROLOGICAL: No headaches, memory loss, new loss of strength, numbness, or tremors    RADIOLOGY & ADDITIONAL TESTS: NONE     Imaging Personally Reviewed:  [ ] YES  [ ] NO    Consultant(s) Notes Reviewed:  [ ] YES  [ ] NO    PHYSICAL:   GENERAL: NAD, well-groomed, well-developed  HEAD:  Atraumatic, Normocephalic  NERVOUS SYSTEM:  Alert & Oriented X3, Good concentration  CHEST/LUNG: Clear to percussion bilaterally; No rales, rhonchi, wheezing, or rubs  HEART: Regular rate and rhythm; No murmurs, rubs, or gallops  ABDOMEN: Soft, Nontender, Nondistended; Bowel sounds present  EXTREMITIES:  R leg with splint and ace bandage, erythema unchanged from yesterday, improved from initial markings on leg     Care Discussed with Consultants/Other Providers [ ] YES  [ ] NO

## 2019-03-13 NOTE — PROGRESS NOTE ADULT - ASSESSMENT
67 yo wm f pmx breast CA, multiple sclerosis, htn presents to the ED s/p multiple falls and weakness. Admitted for cellulitis/right ankle fx. D/C planning to [facility]. Vancomycin Day 5 (completed 8 doses).      Problem: Cellulitis of right lower extremity.  Plan: - Cellulitis likely 2/2 trauma from fall    -Doppler for DVT of RLE negative for DVT   -Vancomycin Day 5. (completed 8 doses)   - Tylenol prn  - Continue pain regimen  -Patient for D/C to [facility]      ·  Problem: Closed fracture of right ankle, initial encounter.  Plan: -s/p mechanical fall, R ankle Xray: nondisplaced fracture at the medial malleolus, age indeterminate. There is mild widening at the medial clear space  -D/C as per ortho, f/u recs   --Patient for D/C to [facility]        ·  Problem: Frequent falls.  Plan: -Likely mechanical given  history of MS   - fall risk protocol  -Patient for D/C to [facility]        ·  Problem: Multiple sclerosis.  Plan: Chronic - doubt acute exacerbation, falls likely mechanical  - continue baclofen  - continue tizanidine, avonex (once/week on Tuesday, pt brought medication from home.    - hx of frequent UTI, will hold methenamine for now, monitor for any urinary symptoms while admitted.   -Patient for D/C to [facility]        ·  Problem: HTN (hypertension).  Plan: Controlled - continue Lisinopril.       Problem: Overactive bladder. Plan: Likely 2/2 chronic MS  - continue oxybutynin.  -Patient for D/C to [facility]        ·  Problem: Prophylactic measure.  Plan: IMPROVE VTE Individual Risk Assessment        RISK                                                          Points  [  ] Previous VTE                                                3    [  ] Thrombophilia                                             2  [ x ] Lower limb paralysis                                   2        (unable to hold up >15 seconds)    [  ] Current Cancer                                             2         (within 6 months)  [ x ] Immobilization > 24 hrs                              1  [  ] ICU/CCU stay > 24 hours                             1  [ x ] Age > 60                                                         1  IMPROVE VTE Score: 4  DVT Lovenox for DVT ppx

## 2019-03-14 LAB
ANION GAP SERPL CALC-SCNC: 7 MMOL/L — SIGNIFICANT CHANGE UP (ref 5–17)
BUN SERPL-MCNC: 19 MG/DL — SIGNIFICANT CHANGE UP (ref 7–23)
CALCIUM SERPL-MCNC: 8.5 MG/DL — SIGNIFICANT CHANGE UP (ref 8.5–10.1)
CHLORIDE SERPL-SCNC: 104 MMOL/L — SIGNIFICANT CHANGE UP (ref 96–108)
CO2 SERPL-SCNC: 27 MMOL/L — SIGNIFICANT CHANGE UP (ref 22–31)
CREAT SERPL-MCNC: 0.79 MG/DL — SIGNIFICANT CHANGE UP (ref 0.5–1.3)
GLUCOSE SERPL-MCNC: 102 MG/DL — HIGH (ref 70–99)
HCT VFR BLD CALC: 29.1 % — LOW (ref 34.5–45)
HGB BLD-MCNC: 9.5 G/DL — LOW (ref 11.5–15.5)
MCHC RBC-ENTMCNC: 28.1 PG — SIGNIFICANT CHANGE UP (ref 27–34)
MCHC RBC-ENTMCNC: 32.6 GM/DL — SIGNIFICANT CHANGE UP (ref 32–36)
MCV RBC AUTO: 86.1 FL — SIGNIFICANT CHANGE UP (ref 80–100)
NRBC # BLD: 0 /100 WBCS — SIGNIFICANT CHANGE UP (ref 0–0)
PLATELET # BLD AUTO: 555 K/UL — HIGH (ref 150–400)
POTASSIUM SERPL-MCNC: 4.2 MMOL/L — SIGNIFICANT CHANGE UP (ref 3.5–5.3)
POTASSIUM SERPL-SCNC: 4.2 MMOL/L — SIGNIFICANT CHANGE UP (ref 3.5–5.3)
RBC # BLD: 3.38 M/UL — LOW (ref 3.8–5.2)
RBC # FLD: 13.6 % — SIGNIFICANT CHANGE UP (ref 10.3–14.5)
SODIUM SERPL-SCNC: 138 MMOL/L — SIGNIFICANT CHANGE UP (ref 135–145)
WBC # BLD: 7.42 K/UL — SIGNIFICANT CHANGE UP (ref 3.8–10.5)
WBC # FLD AUTO: 7.42 K/UL — SIGNIFICANT CHANGE UP (ref 3.8–10.5)

## 2019-03-14 RX ORDER — LATANOPROST 0.05 MG/ML
1 SOLUTION/ DROPS OPHTHALMIC; TOPICAL
Qty: 0 | Refills: 0 | COMMUNITY

## 2019-03-14 RX ORDER — OXYBUTYNIN CHLORIDE 5 MG
1 TABLET ORAL
Qty: 0 | Refills: 0 | COMMUNITY

## 2019-03-14 RX ORDER — METHENAMINE MANDELATE 1 G
1 TABLET ORAL
Qty: 0 | Refills: 0 | COMMUNITY

## 2019-03-14 RX ORDER — DOCUSATE SODIUM 100 MG
100 CAPSULE ORAL ONCE
Qty: 0 | Refills: 0 | Status: COMPLETED | OUTPATIENT
Start: 2019-03-14 | End: 2019-03-14

## 2019-03-14 RX ORDER — MINOCYCLINE HYDROCHLORIDE 45 MG/1
100 TABLET, EXTENDED RELEASE ORAL EVERY 12 HOURS
Qty: 0 | Refills: 0 | Status: DISCONTINUED | OUTPATIENT
Start: 2019-03-14 | End: 2019-03-18

## 2019-03-14 RX ORDER — TIZANIDINE 4 MG/1
1 TABLET ORAL
Qty: 0 | Refills: 0 | COMMUNITY

## 2019-03-14 RX ORDER — SODIUM CHLORIDE 9 MG/ML
500 INJECTION INTRAMUSCULAR; INTRAVENOUS; SUBCUTANEOUS ONCE
Qty: 0 | Refills: 0 | Status: COMPLETED | OUTPATIENT
Start: 2019-03-14 | End: 2019-03-14

## 2019-03-14 RX ADMIN — Medication 10 MILLIGRAM(S): at 11:30

## 2019-03-14 RX ADMIN — Medication 10 MILLIGRAM(S): at 05:32

## 2019-03-14 RX ADMIN — LATANOPROST 1 DROP(S): 0.05 SOLUTION/ DROPS OPHTHALMIC; TOPICAL at 21:22

## 2019-03-14 RX ADMIN — Medication 10 MILLIGRAM(S): at 17:05

## 2019-03-14 RX ADMIN — Medication 1 TABLET(S): at 11:30

## 2019-03-14 RX ADMIN — Medication 100 MILLIGRAM(S): at 17:05

## 2019-03-14 RX ADMIN — SODIUM CHLORIDE 1000 MILLILITER(S): 9 INJECTION INTRAMUSCULAR; INTRAVENOUS; SUBCUTANEOUS at 20:52

## 2019-03-14 RX ADMIN — ENOXAPARIN SODIUM 40 MILLIGRAM(S): 100 INJECTION SUBCUTANEOUS at 11:30

## 2019-03-14 RX ADMIN — MINOCYCLINE HYDROCHLORIDE 100 MILLIGRAM(S): 45 TABLET, EXTENDED RELEASE ORAL at 17:05

## 2019-03-14 RX ADMIN — Medication 250 MILLIGRAM(S): at 05:32

## 2019-03-14 NOTE — CHART NOTE - NSCHARTNOTEFT_GEN_A_CORE
called by RN for patients hypotension 82/50 manual. Patient's SBP typically runs between 90's-100's.     Patient asymptomatic, no complaints given.    plan:   - STAT 500ml NS bolus  - Check BP after bolus given  - Discussed with senior resident, no further recommendations  - Will follow, RN to call with any changes

## 2019-03-14 NOTE — PROGRESS NOTE ADULT - ASSESSMENT
67 yo wm f pmx breast CA, multiple sclerosis, htn presents to the ED s/p multiple falls and weakness. Admitted for cellulitis/right ankle fx. D/C planning to [facility]. Vancomycin discontinued today, patient now on minocycline.      Problem: Cellulitis of right lower extremity.  Plan: - Cellulitis likely 2/2 trauma from fall    -Doppler for DVT of RLE negative for DVT   -Vancomycin d/c today. Patient started on vancomycin.    - Continue pain regimen  -Patient for D/C to [facility]      ·  Problem: Closed fracture of right ankle, initial encounter.  Plan: -s/p mechanical fall, R ankle Xray: nondisplaced fracture at the medial malleolus, age indeterminate. There is mild widening at the medial clear space  -D/C as per ortho, f/u recs   --Patient for D/C to [facility]        ·  Problem: Frequent falls.  Plan: -Likely mechanical given  history of MS   - fall risk protocol  -Patient for D/C to [facility]        ·  Problem: Multiple sclerosis.  Plan: Chronic - doubt acute exacerbation, falls likely mechanical  - continue baclofen  - continue tizanidine, avonex (once/week on Tuesday, pt brought medication from home.    - hx of frequent UTI, will hold methenamine for now, monitor for any urinary symptoms while admitted.   -Patient for D/C to [facility]        ·  Problem: HTN (hypertension).  Plan: Controlled - continue Lisinopril.       Problem: Overactive bladder. Plan: Likely 2/2 chronic MS  - continue oxybutynin.  -Patient for D/C to [facility]        ·  Problem: Prophylactic measure.  Plan: IMPROVE VTE Individual Risk Assessment        RISK                                                          Points  [  ] Previous VTE                                                3    [  ] Thrombophilia                                             2  [ x ] Lower limb paralysis                                   2        (unable to hold up >15 seconds)    [  ] Current Cancer                                             2         (within 6 months)  [ x ] Immobilization > 24 hrs                              1  [  ] ICU/CCU stay > 24 hours                             1  [ x ] Age > 60                                                         1  IMPROVE VTE Score: 4  DVT Lovenox for DVT ppx

## 2019-03-15 LAB
ANION GAP SERPL CALC-SCNC: 6 MMOL/L — SIGNIFICANT CHANGE UP (ref 5–17)
BUN SERPL-MCNC: 20 MG/DL — SIGNIFICANT CHANGE UP (ref 7–23)
CALCIUM SERPL-MCNC: 8.3 MG/DL — LOW (ref 8.5–10.1)
CHLORIDE SERPL-SCNC: 107 MMOL/L — SIGNIFICANT CHANGE UP (ref 96–108)
CO2 SERPL-SCNC: 28 MMOL/L — SIGNIFICANT CHANGE UP (ref 22–31)
CREAT SERPL-MCNC: 0.82 MG/DL — SIGNIFICANT CHANGE UP (ref 0.5–1.3)
CULTURE RESULTS: SIGNIFICANT CHANGE UP
CULTURE RESULTS: SIGNIFICANT CHANGE UP
GLUCOSE SERPL-MCNC: 95 MG/DL — SIGNIFICANT CHANGE UP (ref 70–99)
HCT VFR BLD CALC: 27.7 % — LOW (ref 34.5–45)
HGB BLD-MCNC: 9 G/DL — LOW (ref 11.5–15.5)
MCHC RBC-ENTMCNC: 28.4 PG — SIGNIFICANT CHANGE UP (ref 27–34)
MCHC RBC-ENTMCNC: 32.5 GM/DL — SIGNIFICANT CHANGE UP (ref 32–36)
MCV RBC AUTO: 87.4 FL — SIGNIFICANT CHANGE UP (ref 80–100)
NRBC # BLD: 0 /100 WBCS — SIGNIFICANT CHANGE UP (ref 0–0)
PLATELET # BLD AUTO: 611 K/UL — HIGH (ref 150–400)
POTASSIUM SERPL-MCNC: 4.5 MMOL/L — SIGNIFICANT CHANGE UP (ref 3.5–5.3)
POTASSIUM SERPL-SCNC: 4.5 MMOL/L — SIGNIFICANT CHANGE UP (ref 3.5–5.3)
RBC # BLD: 3.17 M/UL — LOW (ref 3.8–5.2)
RBC # FLD: 13.6 % — SIGNIFICANT CHANGE UP (ref 10.3–14.5)
SODIUM SERPL-SCNC: 141 MMOL/L — SIGNIFICANT CHANGE UP (ref 135–145)
SPECIMEN SOURCE: SIGNIFICANT CHANGE UP
SPECIMEN SOURCE: SIGNIFICANT CHANGE UP
WBC # BLD: 6.79 K/UL — SIGNIFICANT CHANGE UP (ref 3.8–10.5)
WBC # FLD AUTO: 6.79 K/UL — SIGNIFICANT CHANGE UP (ref 3.8–10.5)

## 2019-03-15 RX ORDER — LATANOPROST 0.05 MG/ML
1 SOLUTION/ DROPS OPHTHALMIC; TOPICAL
Qty: 0 | Refills: 0 | DISCHARGE
Start: 2019-03-15

## 2019-03-15 RX ORDER — ZOLPIDEM TARTRATE 10 MG/1
1 TABLET ORAL
Qty: 0 | Refills: 0 | DISCHARGE
Start: 2019-03-15

## 2019-03-15 RX ORDER — DOCUSATE SODIUM 100 MG
100 CAPSULE ORAL ONCE
Qty: 0 | Refills: 0 | Status: COMPLETED | OUTPATIENT
Start: 2019-03-15 | End: 2019-03-15

## 2019-03-15 RX ORDER — OXYBUTYNIN CHLORIDE 5 MG
1 TABLET ORAL
Qty: 0 | Refills: 0 | DISCHARGE
Start: 2019-03-15

## 2019-03-15 RX ORDER — MULTIVIT-MIN/FERROUS GLUCONATE 9 MG/15 ML
1 LIQUID (ML) ORAL
Qty: 0 | Refills: 0 | DISCHARGE
Start: 2019-03-15

## 2019-03-15 RX ORDER — BACLOFEN 100 %
2 POWDER (GRAM) MISCELLANEOUS
Qty: 0 | Refills: 0 | DISCHARGE
Start: 2019-03-15

## 2019-03-15 RX ORDER — BACLOFEN 100 %
1 POWDER (GRAM) MISCELLANEOUS
Qty: 0 | Refills: 0 | DISCHARGE
Start: 2019-03-15

## 2019-03-15 RX ORDER — METHENAMINE MANDELATE 1 G
1 TABLET ORAL
Qty: 0 | Refills: 0 | DISCHARGE
Start: 2019-03-15

## 2019-03-15 RX ADMIN — LATANOPROST 1 DROP(S): 0.05 SOLUTION/ DROPS OPHTHALMIC; TOPICAL at 21:56

## 2019-03-15 RX ADMIN — Medication 10 MILLIGRAM(S): at 17:14

## 2019-03-15 RX ADMIN — Medication 1 TABLET(S): at 11:33

## 2019-03-15 RX ADMIN — ENOXAPARIN SODIUM 40 MILLIGRAM(S): 100 INJECTION SUBCUTANEOUS at 11:33

## 2019-03-15 RX ADMIN — Medication 10 MILLIGRAM(S): at 11:33

## 2019-03-15 RX ADMIN — Medication 10 MILLIGRAM(S): at 05:29

## 2019-03-15 RX ADMIN — MINOCYCLINE HYDROCHLORIDE 100 MILLIGRAM(S): 45 TABLET, EXTENDED RELEASE ORAL at 05:29

## 2019-03-15 RX ADMIN — MINOCYCLINE HYDROCHLORIDE 100 MILLIGRAM(S): 45 TABLET, EXTENDED RELEASE ORAL at 17:14

## 2019-03-15 RX ADMIN — Medication 100 MILLIGRAM(S): at 22:16

## 2019-03-15 NOTE — PROGRESS NOTE ADULT - ASSESSMENT
67 yo wm f pmx breast CA, multiple sclerosis, htn presents to the ED s/p multiple falls and weakness. Admitted for cellulitis/right ankle fx. D/C planning to [facility]. Vancomycin discontinued today, patient now on minocycline.      Problem: Cellulitis of right lower extremity.  Plan: - Cellulitis likely 2/2 trauma from fall    -Doppler for DVT of RLE negative for DVT   -Vancomycin d/c today. Patient started on minocin.    - Continue pain regimen  -Patient for D/C to [facility]      ·  Problem: Closed fracture of right ankle, initial encounter.  Plan: -s/p mechanical fall, R ankle Xray: nondisplaced fracture at the medial malleolus, age indeterminate. There is mild widening at the medial clear space  -D/C as per ortho, f/u recs   --Patient for D/C to [facility]        ·  Problem: Frequent falls.  Plan: -Likely mechanical given  history of MS   - fall risk protocol  -Patient for D/C to [facility]        ·  Problem: Multiple sclerosis.  Plan: Chronic - doubt acute exacerbation, falls likely mechanical  - continue baclofen  - continue tizanidine, avonex (once/week on Tuesday, pt brought medication from home.    - hx of frequent UTI, will hold methenamine for now, monitor for any urinary symptoms while admitted.   -Patient for D/C to [facility]        ·  Problem: HTN (hypertension).  Plan: Controlled - continue Lisinopril.       Problem: Overactive bladder. Plan: Likely 2/2 chronic MS  - continue oxybutynin.  -Patient for D/C to [facility]        ·  Problem: Prophylactic measure.  Plan: IMPROVE VTE Individual Risk Assessment        RISK                                                          Points  [  ] Previous VTE                                                3    [  ] Thrombophilia                                             2  [ x ] Lower limb paralysis                                   2        (unable to hold up >15 seconds)    [  ] Current Cancer                                             2         (within 6 months)  [ x ] Immobilization > 24 hrs                              1  [  ] ICU/CCU stay > 24 hours                             1  [ x ] Age > 60                                                         1  IMPROVE VTE Score: 4  DVT Lovenox for DVT ppx

## 2019-03-15 NOTE — PROGRESS NOTE ADULT - SUBJECTIVE AND OBJECTIVE BOX
Patient is a 68y old  Female who presents with a chief complaint of cellulitis/ankle fx (13 Mar 2019 10:02)      INTERVAL HPI/OVERNIGHT EVENTS: Patient seen and examined at bedside. Patient had one time fever 100.5 that since resolved. Denies chills, worsening leg pain or any other complaints.      Vital Signs Last 24 Hrs  T(C): 36.9 (15 Mar 2019 08:00), Max: 37.7 (14 Mar 2019 20:07)  T(F): 98.5 (15 Mar 2019 08:00), Max: 99.8 (14 Mar 2019 20:07)  HR: 58 (15 Mar 2019 08:00) (58 - 62)  BP: 122/71 (15 Mar 2019 08:00) (82/50 - 122/71)  BP(mean): --  RR: 16 (15 Mar 2019 08:00) (16 - 17)  SpO2: 97% (15 Mar 2019 08:00) (94% - 97%)    03-15    141  |  107  |  20  ----------------------------<  95  4.5   |  28  |  0.82    Ca    8.3<L>      15 Mar 2019 06:13                            9.0    6.79  )-----------( 611      ( 15 Mar 2019 06:13 )             27.7       CAPILLARY BLOOD GLUCOSE                  baclofen 10 milliGRAM(s) Oral every 12 hours  enoxaparin Injectable 40 milliGRAM(s) SubCutaneous daily  interferon beta-1a Injectable (AVONEX) 30 MICROGram(s) IntraMuscular every 7 days  latanoprost 0.005% Ophthalmic Solution 1 Drop(s) Both EYES at bedtime  lisinopril 20 milliGRAM(s) Oral daily  minocycline 100 milliGRAM(s) Oral every 12 hours  multivitamin/minerals 1 Tablet(s) Oral daily  oxybutynin 10 milliGRAM(s) Oral daily  zolpidem 5 milliGRAM(s) Oral at bedtime PRN        REVIEW OF SYSTEMS:  CONSTITUTIONAL: No fever, weight loss, or fatigue  RESPIRATORY: No cough, wheezing, chills or hemoptysis; No shortness of breath  CARDIOVASCULAR: No chest pain, palpitations, dizziness  GASTROINTESTINAL: No abdominal or epigastric pain. No nausea, vomiting, or hematemesis; No diarrhea or constipation. No melena or hematochezia.  GENITOURINARY: patient self catheterizes for 15 years   NEUROLOGICAL: No headaches, memory loss, new loss of strength, numbness, or tremor    RADIOLOGY & ADDITIONAL TESTS:    Imaging Personally Reviewed:  [ ] YES  [ ] NO    Consultant(s) Notes Reviewed:  [X ] YES  [ ] NO    PHYSICAL EXAM:  GENERAL: NAD, well-groomed, well-developed  HEAD:  Atraumatic, Normocephalic  NERVOUS SYSTEM:  Alert & Oriented X3, Good concentration  CHEST/LUNG: Clear to percussion bilaterally; No rales, rhonchi, wheezing, or rubs  HEART: Regular rate and rhythm; No murmurs, rubs, or gallops  ABDOMEN: Soft, Nontender, Nondistended; Bowel sounds present  EXTREMITIES:  R leg with brace, erythema improved from yesterday  Care Discussed with Consultants/Other Providers [ ] YES  [ ] NO

## 2019-03-16 LAB
ANION GAP SERPL CALC-SCNC: 7 MMOL/L — SIGNIFICANT CHANGE UP (ref 5–17)
BUN SERPL-MCNC: 25 MG/DL — HIGH (ref 7–23)
CALCIUM SERPL-MCNC: 8.5 MG/DL — SIGNIFICANT CHANGE UP (ref 8.5–10.1)
CHLORIDE SERPL-SCNC: 106 MMOL/L — SIGNIFICANT CHANGE UP (ref 96–108)
CO2 SERPL-SCNC: 28 MMOL/L — SIGNIFICANT CHANGE UP (ref 22–31)
CREAT SERPL-MCNC: 0.84 MG/DL — SIGNIFICANT CHANGE UP (ref 0.5–1.3)
GLUCOSE SERPL-MCNC: 87 MG/DL — SIGNIFICANT CHANGE UP (ref 70–99)
HCT VFR BLD CALC: 28.7 % — LOW (ref 34.5–45)
HGB BLD-MCNC: 9.2 G/DL — LOW (ref 11.5–15.5)
MCHC RBC-ENTMCNC: 28.1 PG — SIGNIFICANT CHANGE UP (ref 27–34)
MCHC RBC-ENTMCNC: 32.1 GM/DL — SIGNIFICANT CHANGE UP (ref 32–36)
MCV RBC AUTO: 87.8 FL — SIGNIFICANT CHANGE UP (ref 80–100)
NRBC # BLD: 0 /100 WBCS — SIGNIFICANT CHANGE UP (ref 0–0)
PLATELET # BLD AUTO: 668 K/UL — HIGH (ref 150–400)
POTASSIUM SERPL-MCNC: 4.7 MMOL/L — SIGNIFICANT CHANGE UP (ref 3.5–5.3)
POTASSIUM SERPL-SCNC: 4.7 MMOL/L — SIGNIFICANT CHANGE UP (ref 3.5–5.3)
RBC # BLD: 3.27 M/UL — LOW (ref 3.8–5.2)
RBC # FLD: 13.9 % — SIGNIFICANT CHANGE UP (ref 10.3–14.5)
SODIUM SERPL-SCNC: 141 MMOL/L — SIGNIFICANT CHANGE UP (ref 135–145)
WBC # BLD: 6.47 K/UL — SIGNIFICANT CHANGE UP (ref 3.8–10.5)
WBC # FLD AUTO: 6.47 K/UL — SIGNIFICANT CHANGE UP (ref 3.8–10.5)

## 2019-03-16 RX ORDER — DOCUSATE SODIUM 100 MG
100 CAPSULE ORAL ONCE
Qty: 0 | Refills: 0 | Status: COMPLETED | OUTPATIENT
Start: 2019-03-16 | End: 2019-03-16

## 2019-03-16 RX ADMIN — Medication 1 TABLET(S): at 11:36

## 2019-03-16 RX ADMIN — LISINOPRIL 20 MILLIGRAM(S): 2.5 TABLET ORAL at 06:15

## 2019-03-16 RX ADMIN — Medication 100 MILLIGRAM(S): at 23:05

## 2019-03-16 RX ADMIN — Medication 10 MILLIGRAM(S): at 11:36

## 2019-03-16 RX ADMIN — ENOXAPARIN SODIUM 40 MILLIGRAM(S): 100 INJECTION SUBCUTANEOUS at 11:36

## 2019-03-16 RX ADMIN — MINOCYCLINE HYDROCHLORIDE 100 MILLIGRAM(S): 45 TABLET, EXTENDED RELEASE ORAL at 17:58

## 2019-03-16 RX ADMIN — Medication 10 MILLIGRAM(S): at 17:58

## 2019-03-16 RX ADMIN — MINOCYCLINE HYDROCHLORIDE 100 MILLIGRAM(S): 45 TABLET, EXTENDED RELEASE ORAL at 06:15

## 2019-03-16 RX ADMIN — LATANOPROST 1 DROP(S): 0.05 SOLUTION/ DROPS OPHTHALMIC; TOPICAL at 21:06

## 2019-03-16 RX ADMIN — Medication 10 MILLIGRAM(S): at 06:15

## 2019-03-16 NOTE — PROGRESS NOTE ADULT - SUBJECTIVE AND OBJECTIVE BOX
Patient is a 68y old  Female who presents with a chief complaint of cellulitis/ankle fx (15 Mar 2019 12:01)      INTERVAL HPI/OVERNIGHT EVENTS: Patient seen and examined. NAD. No complaints.    Vital Signs Last 24 Hrs  T(C): 37.2 (16 Mar 2019 08:00), Max: 37.2 (16 Mar 2019 00:14)  T(F): 98.9 (16 Mar 2019 08:00), Max: 99 (16 Mar 2019 00:14)  HR: 55 (16 Mar 2019 08:00) (55 - 70)  BP: 95/56 (16 Mar 2019 08:00) (88/54 - 97/62)  BP(mean): --  RR: 15 (16 Mar 2019 08:00) (15 - 16)  SpO2: 97% (16 Mar 2019 08:00) (93% - 99%)    03-16    141  |  106  |  25<H>  ----------------------------<  87  4.7   |  28  |  0.84    Ca    8.5      16 Mar 2019 06:45                            9.2    6.47  )-----------( 668      ( 16 Mar 2019 06:45 )             28.7       CAPILLARY BLOOD GLUCOSE                  baclofen 10 milliGRAM(s) Oral every 12 hours  enoxaparin Injectable 40 milliGRAM(s) SubCutaneous daily  interferon beta-1a Injectable (AVONEX) 30 MICROGram(s) IntraMuscular every 7 days  latanoprost 0.005% Ophthalmic Solution 1 Drop(s) Both EYES at bedtime  lisinopril 20 milliGRAM(s) Oral daily  minocycline 100 milliGRAM(s) Oral every 12 hours  multivitamin/minerals 1 Tablet(s) Oral daily  oxybutynin 10 milliGRAM(s) Oral daily  zolpidem 5 milliGRAM(s) Oral at bedtime PRN              REVIEW OF SYSTEMS:  CONSTITUTIONAL: No fever, no weight loss, or no fatigue  NECK: No pain, no stiffness  RESPIRATORY: No cough, no wheezing, no chills, no hemoptysis, No shortness of breath  CARDIOVASCULAR: No chest pain, no palpitations, no dizziness, no leg swelling  GASTROINTESTINAL: No abdominal pain. No nausea, no vomiting, no hematemesis; No diarrhea, no constipation. No melena, no hematochezia.  GENITOURINARY: No dysuria, no frequency, no hematuria, no incontinence  NEUROLOGICAL: No headaches, no loss of strength, no numbness, no tremors  SKIN: No itching, no burning  MUSCULOSKELETAL: No joint pain, no swelling; No muscle, no back, no extremity pain  PSYCHIATRIC: No depression, no mood swings,   HEME/LYMPH: No easy bruising, no bleeding gums  ALLERY AND IMMUNOLOGIC: No hives       Consultant(s) Notes Reviewed:  [X] YES  [ ] NO    PHYSICAL EXAM:  GENERAL: NAD  HEAD:  Atraumatic, Normocephalic  EYES: EOMI, PERRLA, conjunctiva and sclera clear  ENMT: No tonsillar erythema, exudates, or enlargement; Moist mucous membranes  NECK: Supple, No JVD  NERVOUS SYSTEM:  Awake & alert  CHEST/LUNG: Clear to auscultation bilaterally; No rales, rhonchi, wheezing,  HEART: Regular rate and rhythm  ABDOMEN: Soft, Nontender, Nondistended; Bowel sounds present  EXTREMITIES:  No clubbing, cyanosis, or edema  LYMPH: No lymphadenopathy noted  SKIN: No rashes      Advanced care planning discussed with patient/family [X] YES   [ ] NO    Advanced care planning discussed with patient/family. Advanced care planning forms reviewed/discussed/completed. 20 minutes spent.

## 2019-03-16 NOTE — PROGRESS NOTE ADULT - ASSESSMENT
69 yo wm f pmx breast CA, multiple sclerosis, htn presents to the ED s/p multiple falls and weakness. Admitted for cellulitis/right ankle fx. D/C planning to [facility]. Vancomycin discontinued today, patient now on minocycline.      Problem: Cellulitis of right lower extremity.  Plan: - Cellulitis likely 2/2 trauma from fall    -Doppler for DVT of RLE negative for DVT   -Vancomycin d/c today. Patient started on minocin.    - Continue pain regimen  -Patient for D/C to [facility]      ·  Problem: Closed fracture of right ankle, initial encounter.  Plan: -s/p mechanical fall, R ankle Xray: nondisplaced fracture at the medial malleolus, age indeterminate. There is mild widening at the medial clear space  -D/C as per ortho, f/u recs   --Patient for D/C to [facility]        ·  Problem: Frequent falls.  Plan: -Likely mechanical given  history of MS   - fall risk protocol  -Patient for D/C to [facility]        ·  Problem: Multiple sclerosis.  Plan: Chronic - doubt acute exacerbation, falls likely mechanical  - continue baclofen  - continue tizanidine, avonex (once/week on Tuesday, pt brought medication from home.    - hx of frequent UTI, will hold methenamine for now, monitor for any urinary symptoms while admitted.   -Patient for D/C to [facility]        ·  Problem: HTN (hypertension).  Plan: Controlled - continue Lisinopril.       Problem: Overactive bladder. Plan: Likely 2/2 chronic MS  - continue oxybutynin.  -Patient for D/C to [facility]        ·  Problem: Prophylactic measure.  Plan: IMPROVE VTE Individual Risk Assessment        RISK                                                          Points  [  ] Previous VTE                                                3    [  ] Thrombophilia                                             2  [ x ] Lower limb paralysis                                   2        (unable to hold up >15 seconds)    [  ] Current Cancer                                             2         (within 6 months)  [ x ] Immobilization > 24 hrs                              1  [  ] ICU/CCU stay > 24 hours                             1  [ x ] Age > 60                                                         1  IMPROVE VTE Score: 4  DVT Lovenox for DVT ppx

## 2019-03-17 RX ADMIN — LATANOPROST 1 DROP(S): 0.05 SOLUTION/ DROPS OPHTHALMIC; TOPICAL at 21:18

## 2019-03-17 RX ADMIN — ENOXAPARIN SODIUM 40 MILLIGRAM(S): 100 INJECTION SUBCUTANEOUS at 11:25

## 2019-03-17 RX ADMIN — Medication 10 MILLIGRAM(S): at 11:26

## 2019-03-17 RX ADMIN — MINOCYCLINE HYDROCHLORIDE 100 MILLIGRAM(S): 45 TABLET, EXTENDED RELEASE ORAL at 05:16

## 2019-03-17 RX ADMIN — Medication 10 MILLIGRAM(S): at 05:16

## 2019-03-17 RX ADMIN — Medication 1 TABLET(S): at 12:41

## 2019-03-17 RX ADMIN — Medication 10 MILLIGRAM(S): at 17:04

## 2019-03-17 RX ADMIN — MINOCYCLINE HYDROCHLORIDE 100 MILLIGRAM(S): 45 TABLET, EXTENDED RELEASE ORAL at 18:36

## 2019-03-17 NOTE — PROGRESS NOTE ADULT - SUBJECTIVE AND OBJECTIVE BOX
Patient is a 68y old  Female who presents with a chief complaint of cellulitis/ankle fx (16 Mar 2019 11:00)      INTERVAL HPI/OVERNIGHT EVENTS: Patient seen and examined. NAD. No complaints.    Vital Signs Last 24 Hrs  T(C): 36.9 (17 Mar 2019 08:55), Max: 37 (17 Mar 2019 04:45)  T(F): 98.5 (17 Mar 2019 08:55), Max: 98.6 (17 Mar 2019 04:45)  HR: 60 (17 Mar 2019 08:55) (52 - 60)  BP: 100/58 (17 Mar 2019 08:55) (90/56 - 122/72)  BP(mean): --  RR: 16 (17 Mar 2019 08:55) (15 - 19)  SpO2: 98% (17 Mar 2019 08:55) (96% - 98%)    03-16    141  |  106  |  25<H>  ----------------------------<  87  4.7   |  28  |  0.84    Ca    8.5      16 Mar 2019 06:45                            9.2    6.47  )-----------( 668      ( 16 Mar 2019 06:45 )             28.7       CAPILLARY BLOOD GLUCOSE                  baclofen 10 milliGRAM(s) Oral every 12 hours  enoxaparin Injectable 40 milliGRAM(s) SubCutaneous daily  interferon beta-1a Injectable (AVONEX) 30 MICROGram(s) IntraMuscular every 7 days  latanoprost 0.005% Ophthalmic Solution 1 Drop(s) Both EYES at bedtime  lisinopril 20 milliGRAM(s) Oral daily  minocycline 100 milliGRAM(s) Oral every 12 hours  multivitamin/minerals 1 Tablet(s) Oral daily  oxybutynin 10 milliGRAM(s) Oral daily  zolpidem 5 milliGRAM(s) Oral at bedtime PRN              REVIEW OF SYSTEMS:  CONSTITUTIONAL: No fever, no weight loss, or no fatigue  NECK: No pain, no stiffness  RESPIRATORY: No cough, no wheezing, no chills, no hemoptysis, No shortness of breath  CARDIOVASCULAR: No chest pain, no palpitations, no dizziness, no leg swelling  GASTROINTESTINAL: No abdominal pain. No nausea, no vomiting, no hematemesis; No diarrhea, no constipation. No melena, no hematochezia.  GENITOURINARY: No dysuria, no frequency, no hematuria, no incontinence  NEUROLOGICAL: No headaches, no loss of strength, no numbness, no tremors  SKIN: No itching, no burning  MUSCULOSKELETAL: No joint pain, no swelling; No muscle, no back, no extremity pain  PSYCHIATRIC: No depression, no mood swings,   HEME/LYMPH: No easy bruising, no bleeding gums  ALLERY AND IMMUNOLOGIC: No hives       Consultant(s) Notes Reviewed:  [X] YES  [ ] NO    PHYSICAL EXAM:  GENERAL: NAD  HEAD:  Atraumatic, Normocephalic  EYES: EOMI, PERRLA, conjunctiva and sclera clear  ENMT: No tonsillar erythema, exudates, or enlargement; Moist mucous membranes  NECK: Supple, No JVD  NERVOUS SYSTEM:  Awake & alert  CHEST/LUNG: Clear to auscultation bilaterally; No rales, rhonchi, wheezing,  HEART: Regular rate and rhythm  ABDOMEN: Soft, Nontender, Nondistended; Bowel sounds present  EXTREMITIES:  No clubbing, cyanosis, or edema  LYMPH: No lymphadenopathy noted  SKIN: No rashes      Advanced care planning discussed with patient/family [X] YES   [ ] NO    Advanced care planning discussed with patient/family. Advanced care planning forms reviewed/discussed/completed. 20 minutes spent.

## 2019-03-18 LAB
ANION GAP SERPL CALC-SCNC: 6 MMOL/L — SIGNIFICANT CHANGE UP (ref 5–17)
BUN SERPL-MCNC: 29 MG/DL — HIGH (ref 7–23)
CALCIUM SERPL-MCNC: 8.8 MG/DL — SIGNIFICANT CHANGE UP (ref 8.5–10.1)
CHLORIDE SERPL-SCNC: 105 MMOL/L — SIGNIFICANT CHANGE UP (ref 96–108)
CO2 SERPL-SCNC: 29 MMOL/L — SIGNIFICANT CHANGE UP (ref 22–31)
CREAT SERPL-MCNC: 0.89 MG/DL — SIGNIFICANT CHANGE UP (ref 0.5–1.3)
GLUCOSE SERPL-MCNC: 92 MG/DL — SIGNIFICANT CHANGE UP (ref 70–99)
HCT VFR BLD CALC: 29.3 % — LOW (ref 34.5–45)
HGB BLD-MCNC: 9.5 G/DL — LOW (ref 11.5–15.5)
MCHC RBC-ENTMCNC: 28.4 PG — SIGNIFICANT CHANGE UP (ref 27–34)
MCHC RBC-ENTMCNC: 32.4 GM/DL — SIGNIFICANT CHANGE UP (ref 32–36)
MCV RBC AUTO: 87.5 FL — SIGNIFICANT CHANGE UP (ref 80–100)
NRBC # BLD: 0 /100 WBCS — SIGNIFICANT CHANGE UP (ref 0–0)
PLATELET # BLD AUTO: 737 K/UL — HIGH (ref 150–400)
POTASSIUM SERPL-MCNC: 4.9 MMOL/L — SIGNIFICANT CHANGE UP (ref 3.5–5.3)
POTASSIUM SERPL-SCNC: 4.9 MMOL/L — SIGNIFICANT CHANGE UP (ref 3.5–5.3)
RBC # BLD: 3.35 M/UL — LOW (ref 3.8–5.2)
RBC # FLD: 13.9 % — SIGNIFICANT CHANGE UP (ref 10.3–14.5)
SODIUM SERPL-SCNC: 140 MMOL/L — SIGNIFICANT CHANGE UP (ref 135–145)
WBC # BLD: 7.14 K/UL — SIGNIFICANT CHANGE UP (ref 3.8–10.5)
WBC # FLD AUTO: 7.14 K/UL — SIGNIFICANT CHANGE UP (ref 3.8–10.5)

## 2019-03-18 RX ORDER — MINOCYCLINE HYDROCHLORIDE 45 MG/1
1 TABLET, EXTENDED RELEASE ORAL
Qty: 0 | Refills: 0 | COMMUNITY

## 2019-03-18 RX ORDER — INTERFERON BETA-1A 22 UG/.5ML
30 INJECTION, SOLUTION SUBCUTANEOUS ONCE
Qty: 0 | Refills: 0 | Status: COMPLETED | OUTPATIENT
Start: 2019-03-18 | End: 2019-03-18

## 2019-03-18 RX ADMIN — MINOCYCLINE HYDROCHLORIDE 100 MILLIGRAM(S): 45 TABLET, EXTENDED RELEASE ORAL at 05:06

## 2019-03-18 RX ADMIN — Medication 10 MILLIGRAM(S): at 05:06

## 2019-03-18 RX ADMIN — Medication 10 MILLIGRAM(S): at 17:35

## 2019-03-18 RX ADMIN — LATANOPROST 1 DROP(S): 0.05 SOLUTION/ DROPS OPHTHALMIC; TOPICAL at 22:36

## 2019-03-18 RX ADMIN — Medication 10 MILLIGRAM(S): at 12:36

## 2019-03-18 RX ADMIN — Medication 1 TABLET(S): at 12:36

## 2019-03-18 RX ADMIN — ENOXAPARIN SODIUM 40 MILLIGRAM(S): 100 INJECTION SUBCUTANEOUS at 12:37

## 2019-03-18 NOTE — PROGRESS NOTE ADULT - ASSESSMENT
69 yo wm f pmx breast CA, multiple sclerosis, htn presents to the ED s/p multiple falls and weakness. Admitted for cellulitis/right ankle fx. D/C planning to [facility]. Vancomycin discontinued today, patient now on minocycline.      Problem: Cellulitis of right lower extremity.  Plan: - Cellulitis likely 2/2 trauma from fall. Improved.    -Doppler for DVT of RLE negative for DVT   -Completed course of vancomycin. Minocycline (Day 4), will discontinue today.     - Continue pain regimen  -Patient for D/C to [facility]      Problem: Closed fracture of right ankle, initial encounter.  Plan: -s/p mechanical fall, R ankle Xray: nondisplaced fracture at the medial malleolus, age indeterminate. There is mild widening at the medial clear space  -D/C as per ortho, f/u recs   --Patient for D/C to [facility]        Problem: Frequent falls.  Plan: -Likely mechanical given  history of MS   - fall risk protocol  -Patient for D/C to [facility]        Problem: Multiple sclerosis.  Plan: Chronic - doubt acute exacerbation, falls likely mechanical  - continue baclofen  - continue tizanidine, avonex (once/week on Tuesday, pt brought medication from home) Will give avnoxex today, patient missed dose previous week due to fever.    - hx of frequent UTI, will hold methenamine for now, monitor for any urinary symptoms while admitted.   -Patient for D/C to [facility]         Problem: HTN (hypertension).  Plan: Controlled - continue Lisinopril.       Problem: Overactive bladder. Plan: Likely 2/2 chronic MS  - continue oxybutynin.  -Patient for D/C to [facility]        Problem: Prophylactic measure.  Plan: IMPROVE VTE Individual Risk Assessment        RISK                                                          Points  [  ] Previous VTE                                                3    [  ] Thrombophilia                                             2  [ x ] Lower limb paralysis                                   2        (unable to hold up >15 seconds)    [  ] Current Cancer                                             2         (within 6 months)  [ x ] Immobilization > 24 hrs                              1  [  ] ICU/CCU stay > 24 hours                             1  [ x ] Age > 60                                                         1  IMPROVE VTE Score: 4  DVT Lovenox for DVT ppx 69 yo wm f pmx breast CA, multiple sclerosis, htn presents to the ED s/p multiple falls and weakness. Admitted for cellulitis/right ankle fx. D/C planning to [facility]. Vancomycin discontinued today, patient now on minocycline, D/C today.     Problem: Cellulitis of right lower extremity.  Plan: - Cellulitis likely 2/2 trauma from fall. Improved.    -Doppler for DVT of RLE negative for DVT   -Completed course of vancomycin. Minocycline (Day 4), will discontinue today.     - Continue pain regimen  -Patient for D/C to [facility]      Problem: Closed fracture of right ankle, initial encounter.  Plan: -s/p mechanical fall, R ankle Xray: nondisplaced fracture at the medial malleolus, age indeterminate. There is mild widening at the medial clear space  -D/C as per ortho, f/u recs   --Patient for D/C to [facility]        Problem: Frequent falls.  Plan: -Likely mechanical given  history of MS   - fall risk protocol  -Patient for D/C to [facility]        Problem: Multiple sclerosis.  Plan: Chronic - doubt acute exacerbation, falls likely mechanical  - continue baclofen  - continue tizanidine, avonex (once/week on Tuesday, pt brought medication from home) Attempted to give avnoxex today, patient missed dose previous week due to fever, patient refused wants Avonex tomorrow   -Patient with thrombocytosis, patient to have repeat CBC at rehab   - hx of frequent UTI, will hold methenamine for now, monitor for any urinary symptoms while admitted.   -Patient for D/C to [facility]         Problem: HTN (hypertension).  Plan: Controlled - continue Lisinopril.       Problem: Overactive bladder. Plan: Likely 2/2 chronic MS  - continue oxybutynin.  -Patient for D/C to [facility]        Problem: Prophylactic measure.  Plan: IMPROVE VTE Individual Risk Assessment        RISK                                                          Points  [  ] Previous VTE                                                3    [  ] Thrombophilia                                             2  [ x ] Lower limb paralysis                                   2        (unable to hold up >15 seconds)    [  ] Current Cancer                                             2         (within 6 months)  [ x ] Immobilization > 24 hrs                              1  [  ] ICU/CCU stay > 24 hours                             1  [ x ] Age > 60                                                         1  IMPROVE VTE Score: 4  DVT Lovenox for DVT ppx

## 2019-03-18 NOTE — PROGRESS NOTE ADULT - SUBJECTIVE AND OBJECTIVE BOX
Patient is a 68y old  Female who presents with a chief complaint of cellulitis/ankle fx (17 Mar 2019 11:08)      INTERVAL HPI/OVERNIGHT EVENTS: Patient seen and examined at bedside. Denies any current complaints. Awaiting [facility].       T(C): 36.6 (03-18-19 @ 08:00), Max: 37.1 (03-17-19 @ 15:36)  HR: 50 (03-18-19 @ 08:00) (50 - 60)  BP: 107/61 (03-18-19 @ 08:00) (99/59 - 110/70)  RR: 15 (03-18-19 @ 08:00) (15 - 19)  SpO2: 98% (03-18-19 @ 08:00) (97% - 99%)  Wt(kg): --  I&O's Summary    17 Mar 2019 07:01  -  18 Mar 2019 07:00  --------------------------------------------------------  IN: 200 mL / OUT: 1900 mL / NET: -1700 mL        LABS:                        9.5    7.14  )-----------( 737      ( 18 Mar 2019 07:35 )             29.3     03-18    140  |  105  |  29<H>  ----------------------------<  92  4.9   |  29  |  0.89    Ca    8.8      18 Mar 2019 07:35          CAPILLARY BLOOD GLUCOSE    MEDICATIONS  (STANDING):  baclofen 10 milliGRAM(s) Oral every 12 hours  enoxaparin Injectable 40 milliGRAM(s) SubCutaneous daily  interferon beta-1a Injectable (AVONEX) 30 MICROGram(s) IntraMuscular every 7 days  latanoprost 0.005% Ophthalmic Solution 1 Drop(s) Both EYES at bedtime  lisinopril 20 milliGRAM(s) Oral daily  minocycline 100 milliGRAM(s) Oral every 12 hours  multivitamin/minerals 1 Tablet(s) Oral daily  oxybutynin 10 milliGRAM(s) Oral daily    MEDICATIONS  (PRN):      REVIEW OF SYSTEMS:  CONSTITUTIONAL: No fever, weight loss, or fatigue  RESPIRATORY: No cough, wheezing, chills or hemoptysis; No shortness of breath  CARDIOVASCULAR: No chest pain, palpitations, dizziness  GASTROINTESTINAL: No abdominal or epigastric pain. No nausea, vomiting, or hematemesis; No diarrhea or constipation. No melena or hematochezia.  GENITOURINARY: patient self catheterizes for 15 years   NEUROLOGICAL: No headaches, memory loss, new loss of strength, numbness, or tremor    RADIOLOGY & ADDITIONAL TESTS: NONE     Imaging Personally Reviewed:  [ ] YES  [ ] NO    Consultant(s) Notes Reviewed:  [X ] YES  [ ] NO    PHYSICAL EXAM:  GENERAL: NAD, well-groomed, well-developed  HEAD:  Atraumatic, Normocephalic  EYES: EOMI, PERRLA, conjunctiva and sclera clear  NERVOUS SYSTEM:  Alert & Oriented X3, Good concentration  CHEST/LUNG: Clear to percussion bilaterally; No rales, rhonchi, wheezing, or rubs  HEART: Regular rate and rhythm; No murmurs, rubs, or gallops  ABDOMEN: Soft, Nontender, Nondistended; Bowel sounds present  EXTREMITIES: No clubbing, cyanosis, or edema, RLE in brace, RLE erythema resolved since admission    Care Discussed with Consultants/Other Providers [ ] YES  [ ] NO

## 2019-03-19 ENCOUNTER — TRANSCRIPTION ENCOUNTER (OUTPATIENT)
Age: 69
End: 2019-03-19

## 2019-03-19 VITALS
RESPIRATION RATE: 17 BRPM | SYSTOLIC BLOOD PRESSURE: 113 MMHG | OXYGEN SATURATION: 99 % | DIASTOLIC BLOOD PRESSURE: 67 MMHG | HEART RATE: 50 BPM | TEMPERATURE: 98 F

## 2019-03-19 LAB
ANION GAP SERPL CALC-SCNC: 9 MMOL/L — SIGNIFICANT CHANGE UP (ref 5–17)
BUN SERPL-MCNC: 31 MG/DL — HIGH (ref 7–23)
CALCIUM SERPL-MCNC: 8.6 MG/DL — SIGNIFICANT CHANGE UP (ref 8.5–10.1)
CHLORIDE SERPL-SCNC: 105 MMOL/L — SIGNIFICANT CHANGE UP (ref 96–108)
CO2 SERPL-SCNC: 26 MMOL/L — SIGNIFICANT CHANGE UP (ref 22–31)
CREAT SERPL-MCNC: 0.83 MG/DL — SIGNIFICANT CHANGE UP (ref 0.5–1.3)
GLUCOSE SERPL-MCNC: 81 MG/DL — SIGNIFICANT CHANGE UP (ref 70–99)
HCT VFR BLD CALC: 30.2 % — LOW (ref 34.5–45)
HGB BLD-MCNC: 9.9 G/DL — LOW (ref 11.5–15.5)
MCHC RBC-ENTMCNC: 28.6 PG — SIGNIFICANT CHANGE UP (ref 27–34)
MCHC RBC-ENTMCNC: 32.8 GM/DL — SIGNIFICANT CHANGE UP (ref 32–36)
MCV RBC AUTO: 87.3 FL — SIGNIFICANT CHANGE UP (ref 80–100)
NRBC # BLD: 0 /100 WBCS — SIGNIFICANT CHANGE UP (ref 0–0)
PLATELET # BLD AUTO: 754 K/UL — HIGH (ref 150–400)
POTASSIUM SERPL-MCNC: 4.6 MMOL/L — SIGNIFICANT CHANGE UP (ref 3.5–5.3)
POTASSIUM SERPL-SCNC: 4.6 MMOL/L — SIGNIFICANT CHANGE UP (ref 3.5–5.3)
RBC # BLD: 3.46 M/UL — LOW (ref 3.8–5.2)
RBC # FLD: 14.1 % — SIGNIFICANT CHANGE UP (ref 10.3–14.5)
SODIUM SERPL-SCNC: 140 MMOL/L — SIGNIFICANT CHANGE UP (ref 135–145)
WBC # BLD: 6.36 K/UL — SIGNIFICANT CHANGE UP (ref 3.8–10.5)
WBC # FLD AUTO: 6.36 K/UL — SIGNIFICANT CHANGE UP (ref 3.8–10.5)

## 2019-03-19 PROCEDURE — 73590 X-RAY EXAM OF LOWER LEG: CPT

## 2019-03-19 PROCEDURE — 85027 COMPLETE CBC AUTOMATED: CPT

## 2019-03-19 PROCEDURE — 73630 X-RAY EXAM OF FOOT: CPT

## 2019-03-19 PROCEDURE — 73030 X-RAY EXAM OF SHOULDER: CPT

## 2019-03-19 PROCEDURE — 80048 BASIC METABOLIC PNL TOTAL CA: CPT

## 2019-03-19 PROCEDURE — 80202 ASSAY OF VANCOMYCIN: CPT

## 2019-03-19 PROCEDURE — 87086 URINE CULTURE/COLONY COUNT: CPT

## 2019-03-19 PROCEDURE — 80053 COMPREHEN METABOLIC PANEL: CPT

## 2019-03-19 PROCEDURE — 86803 HEPATITIS C AB TEST: CPT

## 2019-03-19 PROCEDURE — 97116 GAIT TRAINING THERAPY: CPT

## 2019-03-19 PROCEDURE — 93971 EXTREMITY STUDY: CPT

## 2019-03-19 PROCEDURE — 81001 URINALYSIS AUTO W/SCOPE: CPT

## 2019-03-19 PROCEDURE — 73610 X-RAY EXAM OF ANKLE: CPT

## 2019-03-19 PROCEDURE — 87040 BLOOD CULTURE FOR BACTERIA: CPT

## 2019-03-19 PROCEDURE — 73562 X-RAY EXAM OF KNEE 3: CPT

## 2019-03-19 PROCEDURE — 97530 THERAPEUTIC ACTIVITIES: CPT

## 2019-03-19 PROCEDURE — 99285 EMERGENCY DEPT VISIT HI MDM: CPT | Mod: 25

## 2019-03-19 PROCEDURE — 36415 COLL VENOUS BLD VENIPUNCTURE: CPT

## 2019-03-19 PROCEDURE — 83605 ASSAY OF LACTIC ACID: CPT

## 2019-03-19 RX ADMIN — Medication 10 MILLIGRAM(S): at 05:35

## 2019-03-19 RX ADMIN — Medication 10 MILLIGRAM(S): at 11:45

## 2019-03-19 RX ADMIN — LISINOPRIL 20 MILLIGRAM(S): 2.5 TABLET ORAL at 05:35

## 2019-03-19 RX ADMIN — ENOXAPARIN SODIUM 40 MILLIGRAM(S): 100 INJECTION SUBCUTANEOUS at 11:45

## 2019-03-19 NOTE — PROGRESS NOTE ADULT - NSICDXPROBLEM_GEN_ALL_CORE_FT
PROBLEM DIAGNOSES  Problem: Cellulitis of right lower extremity  Assessment and Plan:     Problem: Closed fracture of right ankle, initial encounter  Assessment and Plan:     Problem: Frequent falls  Assessment and Plan:     Problem: Multiple sclerosis  Assessment and Plan:     Problem: Overactive bladder  Assessment and Plan:     Problem: HTN (hypertension)  Assessment and Plan:     Problem: Prophylactic measure  Assessment and Plan:

## 2019-03-19 NOTE — DISCHARGE NOTE NURSING/CASE MANAGEMENT/SOCIAL WORK - NSDCDPATPORTLINK_GEN_ALL_CORE
You can access the [facility] Patient Portal, offered by [facility], by registering with the following website: [email or web address]

## 2019-03-19 NOTE — PROGRESS NOTE ADULT - REASON FOR ADMISSION
cellulitis/ankle fx

## 2019-03-19 NOTE — PROGRESS NOTE ADULT - ASSESSMENT
69 yo wm f pmx breast CA, multiple sclerosis, htn presents to the ED s/p multiple falls and weakness. Admitted for cellulitis/right ankle fx. D/C planning to [facility]. Completed course of antibiotics.      Problem: Cellulitis of right lower extremity.  Plan: - Cellulitis likely 2/2 trauma from fall. Improved.    -Doppler for DVT of RLE negative for DVT   -Completed course of vancomycin and Minocycline.   - Continue pain regimen  -Patient for D/C to [facility]      Problem: Closed fracture of right ankle, initial encounter.  Plan: -s/p mechanical fall, R ankle Xray: nondisplaced fracture at the medial malleolus, age indeterminate. There is mild widening at the medial clear space  -D/C as per ortho, f/u recs   --Patient for D/C to [facility]        Problem: Frequent falls.  Plan: -Likely mechanical given  history of MS   - fall risk protocol  -Patient for D/C to [facility]        Problem: Multiple sclerosis.  Plan: Chronic - doubt acute exacerbation, falls likely mechanical  - continue baclofen  - continue tizanidine, avonex (once/week on Tuesday, pt brought medication from home)  -Patient with thrombocytosis, patient to have repeat CBC at rehab   - hx of frequent UTI, will hold methenamine for now, monitor for any urinary symptoms while admitted.   -Patient for D/C to [facility]         Problem: HTN (hypertension).  Plan: Controlled - continue Lisinopril.       Problem: Overactive bladder. Plan: Likely 2/2 chronic MS  - continue oxybutynin.  -Patient for D/C to [facility]        Problem: Prophylactic measure.  Plan: IMPROVE VTE Individual Risk Assessment        RISK                                                          Points  [  ] Previous VTE                                                3    [  ] Thrombophilia                                             2  [ x ] Lower limb paralysis                                   2        (unable to hold up >15 seconds)    [  ] Current Cancer                                             2         (within 6 months)  [ x ] Immobilization > 24 hrs                              1  [  ] ICU/CCU stay > 24 hours                             1  [ x ] Age > 60                                                         1  IMPROVE VTE Score: 4  DVT Lovenox for DVT ppx 67 yo wm f pmx breast CA, multiple sclerosis, htn presents to the ED s/p multiple falls and weakness. Admitted for cellulitis/right ankle fx. D/C planning to [facility]. Completed course of antibiotics.      Problem: Cellulitis of right lower extremity.  Plan: - Cellulitis likely 2/2 trauma from fall. Improved.    -Doppler for DVT of RLE negative for DVT   -Completed course of vancomycin and Minocycline.   - Continue pain regimen  -Patient for D/C to [facility]      Problem: Closed fracture of right ankle, initial encounter.  Plan: -s/p mechanical fall, R ankle Xray: nondisplaced fracture at the medial malleolus, age indeterminate. There is mild widening at the medial clear space  -D/C as per ortho, f/u recs   --Patient for D/C to [facility]        Problem: Frequent falls.  Plan: -Likely mechanical given  history of MS   - fall risk protocol  -Patient for D/C to [facility]        Problem: Multiple sclerosis.  Plan: Chronic - doubt acute exacerbation, falls likely mechanical  - continue baclofen  - continue tizanidine, avonex (once/week on Tuesday, pt brought medication from home)  -Patient with thrombocytosis, patient to have repeat CBC at rehab   - hx of frequent UTI, will hold methenamine for now, monitor for any urinary symptoms while admitted.   -Patient for D/C to [facility]         Problem: HTN (hypertension).  Plan: Controlled - continue Lisinopril.       Problem: Overactive bladder. Plan: Likely 2/2 chronic MS  - continue oxybutynin.  -Patient for D/C to [facility]    advance care planning discussed and pt to provide health care proxy forms w appointed health care agent listed        Problem: Prophylactic measure.  Plan: IMPROVE VTE Individual Risk Assessment        RISK                                                          Points  [  ] Previous VTE                                                3    [  ] Thrombophilia                                             2  [ x ] Lower limb paralysis                                   2        (unable to hold up >15 seconds)    [  ] Current Cancer                                             2         (within 6 months)  [ x ] Immobilization > 24 hrs                              1  [  ] ICU/CCU stay > 24 hours                             1  [ x ] Age > 60                                                         1  IMPROVE VTE Score: 4  DVT Lovenox for DVT ppx

## 2019-03-19 NOTE — PROGRESS NOTE ADULT - SUBJECTIVE AND OBJECTIVE BOX
Patient is a 68y old  Female who presents with a chief complaint of cellulitis/ankle fx (18 Mar 2019 08:48)      INTERVAL HPI/OVERNIGHT EVENTS: Patient seen and examined at bedside. Denies any current complaints. For [facility] today.       T(C): 36.6 (03-19-19 @ 08:00), Max: 37.1 (03-19-19 @ 04:57)  HR: 50 (03-19-19 @ 08:00) (50 - 64)  BP: 113/67 (03-19-19 @ 08:00) (94/62 - 115/63)  RR: 17 (03-19-19 @ 08:00) (15 - 18)  SpO2: 99% (03-19-19 @ 08:00) (97% - 99%)  Wt(kg): --  I&O's Summary    18 Mar 2019 07:01  -  19 Mar 2019 07:00  --------------------------------------------------------  IN: 0 mL / OUT: 1825 mL / NET: -1825 mL        LABS:                        9.9    6.36  )-----------( 754      ( 19 Mar 2019 06:50 )             30.2     03-19    140  |  105  |  31<H>  ----------------------------<  81  4.6   |  26  |  0.83    Ca    8.6      19 Mar 2019 06:50          CAPILLARY BLOOD GLUCOSE    MEDICATIONS  (STANDING):  baclofen 10 milliGRAM(s) Oral every 12 hours  enoxaparin Injectable 40 milliGRAM(s) SubCutaneous daily  interferon beta-1a Injectable (AVONEX) 30 MICROGram(s) IntraMuscular every 7 days  latanoprost 0.005% Ophthalmic Solution 1 Drop(s) Both EYES at bedtime  lisinopril 20 milliGRAM(s) Oral daily  multivitamin/minerals 1 Tablet(s) Oral daily  oxybutynin 10 milliGRAM(s) Oral daily    MEDICATIONS  (PRN):      REVIEW OF SYSTEMS:  CONSTITUTIONAL: No fever, weight loss, or fatigue  RESPIRATORY: No cough, wheezing, chills or hemoptysis; No shortness of breath  CARDIOVASCULAR: No chest pain, palpitations, dizziness  GASTROINTESTINAL: No abdominal or epigastric pain. No nausea, vomiting, or hematemesis; No diarrhea or constipation. No melena or hematochezia.  GENITOURINARY: patient self catheterizes for 15 years   NEUROLOGICAL: No headaches, memory loss, new loss of strength, numbness, or tremor    RADIOLOGY & ADDITIONAL TESTS: NONE     Imaging Personally Reviewed:  [ ] YES  [ ] NO    Consultant(s) Notes Reviewed:  [X ] YES  [ ] NO    PHYSICAL EXAM:  GENERAL: NAD, well-groomed, well-developed  HEAD:  Atraumatic, Normocephalic  EYES: EOMI, PERRLA, conjunctiva and sclera clear  NERVOUS SYSTEM:  Alert & Oriented X3, Good concentration  CHEST/LUNG: Clear to percussion bilaterally; No rales, rhonchi, wheezing, or rubs  HEART: Regular rate and rhythm; No murmurs, rubs, or gallops  ABDOMEN: Soft, Nontender, Nondistended; Bowel sounds present  EXTREMITIES: No clubbing, cyanosis, or edema, RLE in brace, RLE erythema resolved since admission    Care Discussed with Consultants/Other Providers [ ] YES  [ ] NO

## 2019-06-12 NOTE — PATIENT PROFILE ADULT. - NS PRO LACT YNNA
Anesthesia POST Procedure Evaluation    Patient: Suhail Mustafa   MRN:     [id number] Gender:   male   Age:    48 year old :      1970        Preoperative Diagnosis: Otorrhea   Procedure(s):  Laser Myringotomy with Diode Laser and T-tube Placement   Postop Comments: No value filed.       Anesthesia Type:  General  No value filed.    Reportable Event: NO     PAIN: Uncomplicated   Sign Out status: Comfortable, Well controlled pain     PONV: No PONV   Sign Out status:  No Nausea or Vomiting     Neuro/Psych: Uneventful perioperative course   Sign Out Status: Preoperative baseline; Age appropriate mentation     Airway/Resp.: Uneventful perioperative course   Sign Out Status: Non labored breathing, age appropriate RR; Resp. Status within EXPECTED Parameters     CV: Uneventful perioperative course   Sign Out status: Appropriate BP and perfusion indices; Appropriate HR/Rhythm     Disposition:   Sign Out in:  PACU  Disposition:  Phase II; Home  Recovery Course: Uneventful  Follow-Up: Not required     Comments/Narrative:  Patient doing well post-operatively.  No significant issues.  Hemodynamically stable, pain well controlled, nausea well controlled.  Stable for discharge from the PACU             Last Anesthesia Record Vitals:  CRNA VITALS  2019 0723 - 2019 0823      2019             Pulse:  79    SpO2:  100 %    Resp Rate (observed):  11          Last PACU Vitals:  Vitals Value Taken Time   /69 2019  8:10 AM   Temp 36.5  C (97.7  F) 2019  8:10 AM   Pulse     Resp 16 2019  8:10 AM   SpO2 99 % 2019  8:10 AM   Temp src     NIBP     Pulse     SpO2     Resp     Temp     Ht Rate     Temp 2           Electronically Signed By: To Granda MD, 2019, 9:46 AM   no

## 2019-06-24 NOTE — ED ADULT TRIAGE NOTE - PAIN: PRESENCE, MLM
"Spoke with patient on the phone on Wednesday, 6/19/19.  She told me that she took \"some pain pill\" that wasn't hers because of a \"bad tooth.\"  When asked about alprazolam, she said that she has been prescribed those for years.  I explained to her that she has to have a clear UDS and only take medicine prescribed to her before we can consider her for treatment.  I tried to arrange appointment with her for repeat UDS and she said she would have to call me back.  I tried contacting patient on 6/20/19,  there was no answer and I could not leave her a message.    "
It is ok for pt to be taking alprazolam if prescribed. It is on [facility] last Rx in April 2019 but she did not report it on med list. Pt can repeat when ready. From your message, it said pt was going to call us back.   
Please inquire with pt about Norco on drug screen but not on list or [facility]. It looks like she is prescribed alprazolam as well, on drug screen but not on our med list.  
complains of pain/discomfort

## 2019-08-31 NOTE — PATIENT PROFILE ADULT. - PRO INTERPRETER NEED 2
Subjective


Date of Service: 08/31/19 (Hospitalist)


Chief Complaint: COPD exacerbation





Pain is 58 years of age COPD is a former smoker admitted with worse dyspnea he 

is seen by a pulmonologist in [place].  Patient is on Symbicort twice a day 

and nebulized ipratropium he quit smoking 3 weeks ago came in became worse did 

not qualify for oxygen





Review of Systems


General: Weakness


Respiratory: Shortness of Breath





Physical Examination





- Vital Signs


Temperature: 98.3 F


Blood Pressure: 135/79


Pulse: 108


Respirations: 16


Pulse Ox (%): 91





- Physical Exam


General: Alert, In no apparent distress, Oriented x3


Respiratory: Expiratory wheezes


Cardiovascular: No edema, Normal pulses





- Studies


Laboratory Data (last 24 hrs)





08/30/19 22:35: PT 13.7 H, INR 1.17


08/30/19 22:35: WBC 8.2, Hgb 16.9, Hct 48.6, Plt Count 135 L


08/30/19 22:35: Sodium 138, Potassium 4.3, BUN 9, Creatinine 1.21, Glucose 135 H

, Magnesium 2.0, Total Bilirubin 2.0 H, AST 33, ALT 31, Alkaline Phosphatase 107








Assessment & Plan





- Problems (Diagnosis)


(1) COPD with acute exacerbation


Current Visit: Yes   Status: Acute   


Plan: 


Patient is 58 years of age admitted with an acute exacerbation of his 

underlying chronic obstructive pulmonary disease history of liver cirrhosis he 

does uses Symbicort at home and not take p.o. prednisone can tolerate IV 

steroids chest x-ray shows COPD changes BNP is normal no evidence of sepsis 

patient has additional Flovent at home he will benefit from an addition of an 

anticholinergic possible discharge tomorrow
English

## 2019-10-09 ENCOUNTER — INPATIENT (INPATIENT)
Facility: HOSPITAL | Age: 69
LOS: 6 days | Discharge: HOME | DRG: 478 | End: 2019-10-16
Attending: INTERNAL MEDICINE | Admitting: INTERNAL MEDICINE
Payer: MEDICARE

## 2019-10-09 VITALS
WEIGHT: 139.99 LBS | TEMPERATURE: 98 F | HEART RATE: 65 BPM | SYSTOLIC BLOOD PRESSURE: 132 MMHG | HEIGHT: 66 IN | OXYGEN SATURATION: 100 % | RESPIRATION RATE: 18 BRPM | DIASTOLIC BLOOD PRESSURE: 82 MMHG

## 2019-10-09 DIAGNOSIS — Z98.890 OTHER SPECIFIED POSTPROCEDURAL STATES: Chronic | ICD-10-CM

## 2019-10-09 DIAGNOSIS — Z92.89 PERSONAL HISTORY OF OTHER MEDICAL TREATMENT: Chronic | ICD-10-CM

## 2019-10-09 DIAGNOSIS — M89.9 DISORDER OF BONE, UNSPECIFIED: ICD-10-CM

## 2019-10-09 DIAGNOSIS — G35 MULTIPLE SCLEROSIS: ICD-10-CM

## 2019-10-09 DIAGNOSIS — N32.81 OVERACTIVE BLADDER: ICD-10-CM

## 2019-10-09 DIAGNOSIS — S32.020A WEDGE COMPRESSION FRACTURE OF SECOND LUMBAR VERTEBRA, INITIAL ENCOUNTER FOR CLOSED FRACTURE: ICD-10-CM

## 2019-10-09 DIAGNOSIS — I10 ESSENTIAL (PRIMARY) HYPERTENSION: ICD-10-CM

## 2019-10-09 DIAGNOSIS — Z29.9 ENCOUNTER FOR PROPHYLACTIC MEASURES, UNSPECIFIED: ICD-10-CM

## 2019-10-09 LAB
ALBUMIN SERPL ELPH-MCNC: 2.5 G/DL — LOW (ref 3.3–5)
ALP SERPL-CCNC: 151 U/L — HIGH (ref 40–120)
ALT FLD-CCNC: 23 U/L — SIGNIFICANT CHANGE UP (ref 12–78)
ANION GAP SERPL CALC-SCNC: 9 MMOL/L — SIGNIFICANT CHANGE UP (ref 5–17)
APTT BLD: 29.9 SEC — SIGNIFICANT CHANGE UP (ref 28.5–37)
AST SERPL-CCNC: 40 U/L — HIGH (ref 15–37)
BASOPHILS # BLD AUTO: 0.04 K/UL — SIGNIFICANT CHANGE UP (ref 0–0.2)
BASOPHILS NFR BLD AUTO: 0.5 % — SIGNIFICANT CHANGE UP (ref 0–2)
BILIRUB SERPL-MCNC: 0.2 MG/DL — SIGNIFICANT CHANGE UP (ref 0.2–1.2)
BUN SERPL-MCNC: 26 MG/DL — HIGH (ref 7–23)
CALCIUM SERPL-MCNC: 9.1 MG/DL — SIGNIFICANT CHANGE UP (ref 8.5–10.1)
CHLORIDE SERPL-SCNC: 106 MMOL/L — SIGNIFICANT CHANGE UP (ref 96–108)
CO2 SERPL-SCNC: 23 MMOL/L — SIGNIFICANT CHANGE UP (ref 22–31)
CREAT SERPL-MCNC: 0.78 MG/DL — SIGNIFICANT CHANGE UP (ref 0.5–1.3)
EOSINOPHIL # BLD AUTO: 0.1 K/UL — SIGNIFICANT CHANGE UP (ref 0–0.5)
EOSINOPHIL NFR BLD AUTO: 1.2 % — SIGNIFICANT CHANGE UP (ref 0–6)
GLUCOSE SERPL-MCNC: 113 MG/DL — HIGH (ref 70–99)
HCT VFR BLD CALC: 28.9 % — LOW (ref 34.5–45)
HGB BLD-MCNC: 9.2 G/DL — LOW (ref 11.5–15.5)
IMM GRANULOCYTES NFR BLD AUTO: 0.4 % — SIGNIFICANT CHANGE UP (ref 0–1.5)
INR BLD: 1.14 RATIO — SIGNIFICANT CHANGE UP (ref 0.88–1.16)
LYMPHOCYTES # BLD AUTO: 2.67 K/UL — SIGNIFICANT CHANGE UP (ref 1–3.3)
LYMPHOCYTES # BLD AUTO: 31.5 % — SIGNIFICANT CHANGE UP (ref 13–44)
MCHC RBC-ENTMCNC: 25.8 PG — LOW (ref 27–34)
MCHC RBC-ENTMCNC: 31.8 GM/DL — LOW (ref 32–36)
MCV RBC AUTO: 81.2 FL — SIGNIFICANT CHANGE UP (ref 80–100)
MONOCYTES # BLD AUTO: 0.6 K/UL — SIGNIFICANT CHANGE UP (ref 0–0.9)
MONOCYTES NFR BLD AUTO: 7.1 % — SIGNIFICANT CHANGE UP (ref 2–14)
NEUTROPHILS # BLD AUTO: 5.03 K/UL — SIGNIFICANT CHANGE UP (ref 1.8–7.4)
NEUTROPHILS NFR BLD AUTO: 59.3 % — SIGNIFICANT CHANGE UP (ref 43–77)
NRBC # BLD: 0 /100 WBCS — SIGNIFICANT CHANGE UP (ref 0–0)
PLATELET # BLD AUTO: 561 K/UL — HIGH (ref 150–400)
POTASSIUM SERPL-MCNC: 4.4 MMOL/L — SIGNIFICANT CHANGE UP (ref 3.5–5.3)
POTASSIUM SERPL-SCNC: 4.4 MMOL/L — SIGNIFICANT CHANGE UP (ref 3.5–5.3)
PROT SERPL-MCNC: 7.3 G/DL — SIGNIFICANT CHANGE UP (ref 6–8.3)
PROTHROM AB SERPL-ACNC: 12.9 SEC — SIGNIFICANT CHANGE UP (ref 10–12.9)
RBC # BLD: 3.56 M/UL — LOW (ref 3.8–5.2)
RBC # FLD: 16.3 % — HIGH (ref 10.3–14.5)
SODIUM SERPL-SCNC: 138 MMOL/L — SIGNIFICANT CHANGE UP (ref 135–145)
WBC # BLD: 8.47 K/UL — SIGNIFICANT CHANGE UP (ref 3.8–10.5)
WBC # FLD AUTO: 8.47 K/UL — SIGNIFICANT CHANGE UP (ref 3.8–10.5)

## 2019-10-09 PROCEDURE — 73610 X-RAY EXAM OF ANKLE: CPT | Mod: 26,RT

## 2019-10-09 PROCEDURE — 99285 EMERGENCY DEPT VISIT HI MDM: CPT

## 2019-10-09 PROCEDURE — 72110 X-RAY EXAM L-2 SPINE 4/>VWS: CPT | Mod: 26

## 2019-10-09 PROCEDURE — 72192 CT PELVIS W/O DYE: CPT | Mod: 26

## 2019-10-09 PROCEDURE — 71045 X-RAY EXAM CHEST 1 VIEW: CPT | Mod: 26

## 2019-10-09 PROCEDURE — 73564 X-RAY EXAM KNEE 4 OR MORE: CPT | Mod: 26,50

## 2019-10-09 PROCEDURE — 93010 ELECTROCARDIOGRAM REPORT: CPT

## 2019-10-09 PROCEDURE — 72131 CT LUMBAR SPINE W/O DYE: CPT | Mod: 26

## 2019-10-09 PROCEDURE — 73522 X-RAY EXAM HIPS BI 3-4 VIEWS: CPT | Mod: 26

## 2019-10-09 RX ORDER — ENOXAPARIN SODIUM 100 MG/ML
40 INJECTION SUBCUTANEOUS
Qty: 0 | Refills: 0 | DISCHARGE

## 2019-10-09 NOTE — H&P ADULT - PROBLEM SELECTOR PLAN 2
XR performed of LS spine, b/l knees, and hip/pelvis revealing L2 compression fracture and Chronic right-sided superior and inferior pubic rami fractures. Also seen multiple lytic bone lesions on all 3 studies c/w metastatic disease  -Patient's chronic gutierrez could be 2/2 metastatic disease  -Heme/Onc - Dr Woo consulted -- appreciate recs

## 2019-10-09 NOTE — ED ADULT NURSE NOTE - CHIEF COMPLAINT QUOTE
pt fell from [facility] 7/17 in [facility] had xray with unknown results. c/o b/l leg back knee pain

## 2019-10-09 NOTE — ED PROVIDER NOTE - PHYSICAL EXAMINATION
Head- NC/AT. No davidson signs, no raccoon eyes, no hemotympanum, no contusions, no hematoma, no dental injuries.  Spine- no midline or paraspinal tenderness of cspine or thoracic spine. +mild midline and BL paraspinal tenderness of lumbar spine. No signs of back trauma, no masses, no abrasions, no lacerations, no redness, no bruising.  Neck- supple, no midline tenderness to palpation, + FROM, NEXUS negative, no abrasions, no ecchymosis.  Neuro- EOM intact, no nystagmus. Pelvis stable. Pt able to straight leg raise BL. NVI, good distal pulses x 4 extremities, capillary refill <2 sec x 4 extremities, sensation intact throughout, 5/5 motor x 4 extremities. DTRs normal x 4 extremities.  Chest- no chest wall tenderness, equal expansion BL. No clavicular tenderness BL. No bruising, no deformity, no redness, no abrasions.  Extremities- No bony tenderness of upper or lower extremities BL except where noted. FROM shoulders, elbows, wrists, hips, knees, ankles. NVI, good distal pulses x 4 extremities, capillary refill <2 sec x 4 extremities, sensation intact throughout, 5/5 motor x 4 extremities. No calf tenderness BL. No swelling, no warmth, no redness, no deformity, no local signs of infection.  BL knees- negative anterior/posterior draw. negative mcmurrays

## 2019-10-09 NOTE — H&P ADULT - PROBLEM SELECTOR PLAN 3
Chronic, stable  -advanced in recent months as patient having frequent falls in 3/2019 per chart review, now has been non-ambulatory  -Interferon - continue

## 2019-10-09 NOTE — H&P ADULT - NSICDXFAMILYHX_GEN_ALL_CORE_FT
FAMILY HISTORY:  No pertinent family history in first degree relatives FAMILY HISTORY:  Mother  Still living? Unknown  FHx: hyperlipidemia, Age at diagnosis: Age Unknown

## 2019-10-09 NOTE — H&P ADULT - NSHPPHYSICALEXAM_GEN_ALL_CORE
Vital Signs Last 24 Hrs  T(C): 36.8 (09 Oct 2019 14:51), Max: 36.8 (09 Oct 2019 14:51)  T(F): 98.2 (09 Oct 2019 14:51), Max: 98.2 (09 Oct 2019 14:51)  HR: 65 (09 Oct 2019 14:51) (65 - 65)  BP: 132/82 (09 Oct 2019 14:51) (132/82 - 132/82)  BP(mean): --  RR: 18 (09 Oct 2019 14:51) (18 - 18)  SpO2: 100% (09 Oct 2019 14:51) (100% - 100%) Vital Signs Last 24 Hrs  T(C): 36.8 (09 Oct 2019 14:51), Max: 36.8 (09 Oct 2019 14:51)  T(F): 98.2 (09 Oct 2019 14:51), Max: 98.2 (09 Oct 2019 14:51)  HR: 65 (09 Oct 2019 14:51) (65 - 65)  BP: 132/82 (09 Oct 2019 14:51) (132/82 - 132/82)  BP(mean): --  RR: 18 (09 Oct 2019 14:51) (18 - 18)  SpO2: 100% (09 Oct 2019 14:51) (100% - 100%)    Physical Exam:  General: Well developed, well nourished, NAD  HEENT: NCAT, PERRLA, EOMI bl, moist mucous membranes   Neck: Supple  Neurology: A&Ox3, nonfocal, CN II-XII grossly intact, sensation  Respiratory: Clear to auscultation b/l  CV: RRR, S1/S2, no murmurs, rubs or gallops  Abdominal: Soft, NT, ND BS in 4 quadrants  Extremities: No LE edema, 2+ peripheral pulses -  b/l radial, PT, DP  MSK: Normal ROM, no joint erythema or warmth, no joint swelling. No midline spinal tenderness, decreased strength in b/l LE  Skin: warm, dry

## 2019-10-09 NOTE — H&P ADULT - NSHPREVIEWOFSYSTEMS_GEN_ALL_CORE
Constitutional: denies fever, chills, diaphoresis   HEENT: denies blurry vision, difficulty hearing  Respiratory: denies SOB, JACKSON, cough, sputum production, wheezing, hemoptysis  Cardiovascular: denies chest pain, palpitations, edema  Gastrointestinal: denies nausea, vomiting, diarrhea,  abdominal pain, admits to constipation  Genitourinary: denies dysuria, frequency, urgency, hematuria, admits to incontinence, admits to campos cath  Skin/Breast: denies rash, itching  Musculoskeletal: denies myalgias, joint swelling, admits to muscle weakness LE>UE. Admits to lower back, hip b/l LE pain  Neurologic: denies headache, dizziness, admits to weakness and paresthesias  Hematology/Oncology: denies bruising, tender or enlarged lymph nodes

## 2019-10-09 NOTE — PATIENT PROFILE ADULT - NSPROSPHOSPCHAPLAINYN_GEN_A_NUR
Pt in clear respiratory distress upon arrival though saturating well on 8L O2 vis trach collar, ENT paged to exchange trach for cuffed trach in order to facilitate BiPAP. Given resp distress and tachycardia w/recent PNA and hospitalization, covering for HCAP w/vanc and zosyn.
no

## 2019-10-09 NOTE — ED ADULT NURSE REASSESSMENT NOTE - NS ED NURSE REASSESS COMMENT FT1
Pt admitted to medicine for L1 compression fx- pt's f/c changed as ordered - pt resting comfortable with family at bedside - awaits orders and room assignment -

## 2019-10-09 NOTE — ED PROVIDER NOTE - ATTENDING CONTRIBUTION TO CARE
68 yo female hx of breast CA, MS, osteoporosis BIB family c/o low back pain b/l hip and knee pain.  Unable to ambulate at baseline.  States she was dropped with jame lift on 7/17 at [facility].  No other trauma since then.  Has had indwelling campos placed since her stay at nursing home. No fever/chills.  No urinary/bowel changes    Gen: Alert, NAD  Head/eyes: NC/AT, PERRL, EOMI  ENT: airway patent  Neck: supple, no tenderness/meningismus/JVD, Trachea midline  Pulm/lung: Bilateral clear BS, normal resp effort, no wheeze/stridor/retractions  CV/heart: RRR, no M/R/G, +2 dist pulses (radial, pedal DP/PT, popliteal)  GI/Abd: soft, NT/ND, +BS, no guarding/rebound tenderness  Musculoskeletal: no edema/erythema/cyanosis, +mild ttp midline lower lumbar spine  Skin: no rash, no vesicles, no petechaie, no ecchymosis, no swelling  Neuro: AAOx3, CN 2-12 intact, normal sensation    Xrays show age indeterminate L2 compression fx, chronic right sided sacral fx and osteoblastic metastatic disease, discussed case with social work and Dr. SONALI Esparza for admission, ortho consulted

## 2019-10-09 NOTE — H&P ADULT - PROBLEM SELECTOR PLAN 1
Patient complaining of lower back, b/l knee pain over 2-3 months, has hx of a fall from jame lift 7/17/2019 at rehab facility  -Found to have age-indeterminate compressive fracture of L2 on XR along with multiple lytic lesions  -Has hx of Breast Ca s/p R mastectomy, ?source/primary?  -Pain control with low dose opioids and tylenol  -PT/OT of limited utility given patient's non-ambulatory status Patient complaining of lower back, b/l knee pain over 2-3 months, has hx of a fall from jame lift 7/17/2019 at rehab facility  -Found to have age-indeterminate compressive fracture of L2 on XR along with multiple lytic lesions  -Has hx of Breast Ca s/p R mastectomy, ?source/primary?  -Pain control with low dose opioids and tylenol. Has received lidocaine topical patch for pain relief in rehab with significant improvement, will trial in hospital.  -PT/OT of limited utility given patient's non-ambulatory status Patient complaining of lower back, b/l knee pain over 2-3 months, has hx of a fall from jame lift 7/17/2019 at rehab facility  -Found to have age-indeterminate compressive fracture of L2 on XR along with multiple lytic lesions  -Has hx of Breast Ca s/p R mastectomy, ?source/primary?  -Pain control with low dose opioids and tylenol. Has received lidocaine topical patch for pain relief in rehab with significant improvement, will trial in hospital.  -Orthopedics following - CT L spine and pelvis, MR Cervical, thoracic, lumbar spine ordered f/u results.  -PT/OT of limited utility given patient's non-ambulatory status

## 2019-10-09 NOTE — H&P ADULT - PROBLEM SELECTOR PLAN 4
Chronic, stable  Home meds include - lisinopril with hold parameters  -Routine hemodynamic monitoring

## 2019-10-09 NOTE — H&P ADULT - HISTORY OF PRESENT ILLNESS
Patient is a 69/F, non-ambulatory, with pmhx significant for multiple sclerosis, Breast Cancer s/p R mastectomy, Osteoporosis, Mitral stenosis, and right ankle fracture presenting to the ED for evaluation of lower back and b/l knee pain over the past 2-3 months. patient was recently discharged from rehab facility s/p right ankle fracture. She notes that she was dropped from [facility] lift 7/17/2019, but denies any resultant LOC, or head trauma. She reports XR were performed at the rehab facility but results were not shared with her/pts family. Patient reports the pain is achey in quality, and rated 5/10 in intensity. No otc medications for pain. Denies any new onset of numbness/tingling in the LE. Patient is non-ambulatory, however since coming home from rehab facility she is unable to transfer and requires assistance. Additionally, she has campos cath for incontinence    ED Vitals T 98.2F HR 65 /82 RR 18 SpO2 100% on room air  Labs significant for anemia, hb 9.2 (near baseline of ~9.5), plt 561, CMP wnl.   XR LS spine - age-indeterminate L2 fx, c/w pathologic fx  XR b/l knees - no fx or dislocation, vague areas of sclerosis and lucency  XR Hip and Pelvis b/l -  Osteoblastic metastatic disease throughout the pelvis and proximal femurs. No acute fractures or dislocations. Chronic right-sided superior and inferior pubic rami fractures.  CXR- no obvious infiltrates/effusions  EKG - Patient is a 69/F, non-ambulatory, with pmhx significant for multiple sclerosis, Breast Cancer s/p R mastectomy, Osteoporosis, Mitral stenosis, and right ankle fracture presenting to the ED for evaluation of lower back and b/l knee pain over the past 2-3 months. patient was recently discharged from rehab facility s/p right ankle fracture. She notes that she was dropped from [facility] 7/17/2019, but denies any resultant LOC, or head trauma. She reports XR were performed at the rehab facility but results were not shared with her/pts family. Patient reports the pain is achey in quality, and rated 5/10 in intensity. Lower back pain worse when attempting to stand.  No otc medications for pain. Denies any new onset of numbness/tingling in the LE. Patient is non-ambulatory, however since coming home from rehab facility she is unable to transfer and requires assistance. Additionally, she has campos cath for incontinence while at [facility].     ED Vitals T 98.2F HR 65 /82 RR 18 SpO2 100% on room air  Labs significant for anemia, hb 9.2 (near baseline of ~9.5), plt 561, CMP wnl.   XR LS spine - age-indeterminate L2 fx, c/w pathologic fx  XR b/l knees - no fx or dislocation, vague areas of sclerosis and lucency  XR Hip and Pelvis b/l -  Osteoblastic metastatic disease throughout the pelvis and proximal femurs. No acute fractures or dislocations. Chronic right-sided superior and inferior pubic rami fractures.  CXR- no obvious infiltrates/effusions  EKG - 63 NSR

## 2019-10-09 NOTE — ED PROVIDER NOTE - OBJECTIVE STATEMENT
68 y/o female presents to ED c/o lower back and BL knee pain s/p fall from jame at nursing home on 7/17/19. Denies head trauma. Denies LOC. As per pt and pts family, pt had xrays done but was never given the results. States she saw her pcp today who advised her to come to ED for xrays. Pt non-ambulatory baseline, PMH MS. Pt rates pain 5/10, worse with movement, no radiation of pain, gradual onset. Denies any other complaints. States otherwise feels good. Denies n/v, f/c, chest pain, sob, numbness, tingling, headache, lightheadedness, dizziness, visual changes.

## 2019-10-09 NOTE — H&P ADULT - PROBLEM SELECTOR PLAN 6
IMPROVE VTE Individual Risk Assessment          RISK                                                          Points  [  ] Previous VTE                                                3  [  ] Thrombophilia                                            2  [x  ] Lower limb paralysis                                  2        (unable to hold up >15 seconds)    [ x ] Current Cancer                                            2         (within 6 months)  [x  ] Immobilization > 24 hrs                             1  [  ] ICU/CCU stay > 24 hours                           1  [ x ] Age > 60                                                        1    IMPROVE VTE Score:  6  VTE ppx with lovenox 40mg qd

## 2019-10-09 NOTE — ED PROVIDER NOTE - CARE PLAN
Principal Discharge DX:	Compression fracture of L2, closed, initial encounter  Secondary Diagnosis:	Metastasis

## 2019-10-09 NOTE — H&P ADULT - NSHPOUTPATIENTPROVIDERS_GEN_ALL_CORE
PMD Dr Andujar  Heme/Onc Dr Woo PMD Dr Andujar  Heme/Onc Dr Woo  Oncologist - Dr Matthew Fairbanks  Neuro - Dr David Philippe

## 2019-10-09 NOTE — H&P ADULT - NSHPSOCIALHISTORY_GEN_ALL_CORE
TobaccO: Denies  EtOH: denies  Recreational drug use: Denies  Lives: with son  Occupation: Real estate (retired)  Ambulates: non-ambulatory  ADLs: requires assistance  Vaccines: declines flu, has received PNA, cannot receive shingles due to immunocompromised status

## 2019-10-09 NOTE — ED PROVIDER NOTE - CHPI ED SYMPTOMS NEG
no confusion/no numbness/no fever/no bleeding/no deformity/no abrasion/no loss of consciousness/no vomiting/no tingling/no weakness

## 2019-10-09 NOTE — H&P ADULT - ASSESSMENT
Patient is a 69/F, non-ambulatory, with pmhx significant for multiple sclerosis, Breast Cancer s/p R mastectomy, Osteoporosis, Mitral stenosis, and right ankle fracture presenting to the ED for evaluation of lower back and b/l knee pain over the past 2-3 months, found to have multiple lytic lesions of the spine, pelvis, admit for further management.

## 2019-10-09 NOTE — ED ADULT NURSE NOTE - NSIMPLEMENTINTERV_GEN_ALL_ED
Implemented All Fall Risk Interventions:  [place] to call system. Call bell, personal items and telephone within reach. Instruct patient to call for assistance. Room bathroom lighting operational. Non-slip footwear when patient is off stretcher. Physically safe environment: no spills, clutter or unnecessary equipment. Stretcher in lowest position, wheels locked, appropriate side rails in place. Provide visual cue, wrist band, yellow gown, etc. Monitor gait and stability. Monitor for mental status changes and reorient to person, place, and time. Review medications for side effects contributing to fall risk. Reinforce activity limits and safety measures with patient and family.
Alert and oriented to person, place, time/situation. normal mood and affect. no apparent risk to self or others.

## 2019-10-09 NOTE — PATIENT PROFILE ADULT - TOBACCO USE
checked as delegate on 1/24/19  Last fill on adderall was 01/07/2019  # 60 for a 30 day supply       Will notify patient he must see new provider for any further refills when I call him to  script Never smoker

## 2019-10-09 NOTE — CONSULT NOTE ADULT - SUBJECTIVE AND OBJECTIVE BOX
68 y/o nonambulatory female patient w/ pmhx significant for right ankle fx (3/17/19), MS (wears BLLE AFO), Breast Ca s/p rsxn + tamoxifen presents to [facility] ED c/o of lower back pain, BLLE pain. Patient's family at bedside. Patient has been in and out of a [facility] facility since March 2019 due to right ankle fx and was sent home 4 days ago. Patient acknowledges that she has had chronic "aching" musculoskeletal pains throughout her BLLE and RUE for years however, she was dropped from a lift at the [facility] in July and has since had worsening pain of the BLLE and back since. Patient localizes pain to BL ankles/knees/hips. Patient wears diaper due to inability to ambulate and transfer to the bathroom and Starks in place due to chronic urinary incontinence otherwise denies changes in bowel or bladder. Denies numbness/tingling/burning or radiation of pain elsewhere. Patient has been Wheelchair bound since 2007 but has been unable to transfer since the fall.    PAST MEDICAL & SURGICAL HISTORY:  Multiple sclerosis  S/P mastectomy, right  Breast CA  Osteoporosis  MS (mitral stenosis)  History of bowel resection  H/O breast surgery    Vital Signs Last 24 Hrs  T(C): 36.8 (09 Oct 2019 14:51), Max: 36.8 (09 Oct 2019 14:51)  T(F): 98.2 (09 Oct 2019 14:51), Max: 98.2 (09 Oct 2019 14:51)  HR: 65 (09 Oct 2019 14:51) (65 - 65)  BP: 132/82 (09 Oct 2019 14:51) (132/82 - 132/82)  BP(mean): --  RR: 18 (09 Oct 2019 14:51) (18 - 18)  SpO2: 100% (09 Oct 2019 14:51) (100% - 100%)  I&O's Detail    PE:  Gen: Awake and alert, Oriented x 3, following commands  Hips/Pelvis:   Able to actively SLR RLE, unable to actively SLR LLE, no pain on passive SLR 0-60 BL.  Negative log roll  No pain to axial load  Spine:  No TTP over spinous processes or paraspinal muscles at C/T/L spine. No palpable step off.              Motor exam:   Right Upper extremity: Delt 5/5, Biceps 5/5, Triceps 5/5, Wrist Ext 5/5, Wrist Flexion 5/5,  Strength 5/5  SILT C5-S1,   +RP, Compartments soft    Left Upper extremity: Delt 5/5, Biceps 5/5, Triceps 5/5, Wrist Ext 5/5, Wrist Flexion 5/5,  Strength 5/5  SILT C5-S1,   +RP, Compartments soft    Right Lower Extremity: Hip Flexion 2/5, Hip Extension 2/5, Knee Flexion 4/5, Knee Extension 4/5, Ankle Dorsiflexion 4/5, Ankle Plantar Flexion 4/5, Extensor hallucis Longus 4/5  SILT L2-S1  No pain with SLR  Negative Clonus  +DP  Compartments soft           Left Lower Extremity: Hip Flexion 4/5, Hip Extension 4/5, Knee Flexion 4/5, Knee Extension 4/5, Ankle Dorsiflexion 4/5, Ankle Plantar Flexion 4/5, Extensor hallucis Longus 4/5  SILT L2-S1  No pain with SLR  Negative Clonus  +DP  Compartments soft    +active/passive rectal tone  Negative saddle anesthesia    Secondary Survey:   No TTP over bony prominences, SILT, palpable pulses, full/painless A/PROM, compartments soft. No other injuries or complaints.                                          Imaging:  XR Pelvis BL hips, numerous blastic lesions throughout the pelvis and chronic inf pub terry fx,   XR LSp demonstrates VCF at L2 of indeterminate age, blastic lesions throughout the lumbar spine

## 2019-10-09 NOTE — ED ADULT TRIAGE NOTE - CHIEF COMPLAINT QUOTE
pt fell from [facility] 7/17 in [facility] had xray with unknown results. c/o b/l leg back knee pain

## 2019-10-09 NOTE — ED PROVIDER NOTE - PROGRESS NOTE DETAILS
pain medication deferred by pt, states she is ok spoke with ortho who will see pt. Dr. Perlman will admit now

## 2019-10-09 NOTE — H&P ADULT - NSICDXPASTMEDICALHX_GEN_ALL_CORE_FT
PAST MEDICAL HISTORY:  Breast CA     MS (mitral stenosis)     Multiple sclerosis     Osteoporosis     S/P mastectomy, right

## 2019-10-09 NOTE — H&P ADULT - PROBLEM SELECTOR PLAN 5
-Continue oxybutynin 10mg ER qd, methenamine hippurate 1g BID  -Starks cath replaced in ED at the time of admission

## 2019-10-10 LAB
ALBUMIN SERPL ELPH-MCNC: 2.4 G/DL — LOW (ref 3.3–5)
ALP SERPL-CCNC: 155 U/L — HIGH (ref 40–120)
ALT FLD-CCNC: 23 U/L — SIGNIFICANT CHANGE UP (ref 12–78)
ANION GAP SERPL CALC-SCNC: 9 MMOL/L — SIGNIFICANT CHANGE UP (ref 5–17)
AST SERPL-CCNC: 33 U/L — SIGNIFICANT CHANGE UP (ref 15–37)
BASOPHILS # BLD AUTO: 0.05 K/UL — SIGNIFICANT CHANGE UP (ref 0–0.2)
BASOPHILS NFR BLD AUTO: 0.6 % — SIGNIFICANT CHANGE UP (ref 0–2)
BILIRUB SERPL-MCNC: 0.2 MG/DL — SIGNIFICANT CHANGE UP (ref 0.2–1.2)
BUN SERPL-MCNC: 25 MG/DL — HIGH (ref 7–23)
CALCIUM SERPL-MCNC: 8.8 MG/DL — SIGNIFICANT CHANGE UP (ref 8.5–10.1)
CHLORIDE SERPL-SCNC: 106 MMOL/L — SIGNIFICANT CHANGE UP (ref 96–108)
CO2 SERPL-SCNC: 25 MMOL/L — SIGNIFICANT CHANGE UP (ref 22–31)
CREAT SERPL-MCNC: 0.84 MG/DL — SIGNIFICANT CHANGE UP (ref 0.5–1.3)
EOSINOPHIL # BLD AUTO: 0.2 K/UL — SIGNIFICANT CHANGE UP (ref 0–0.5)
EOSINOPHIL NFR BLD AUTO: 2.6 % — SIGNIFICANT CHANGE UP (ref 0–6)
GLUCOSE SERPL-MCNC: 109 MG/DL — HIGH (ref 70–99)
HCT VFR BLD CALC: 27.7 % — LOW (ref 34.5–45)
HGB BLD-MCNC: 8.8 G/DL — LOW (ref 11.5–15.5)
IMM GRANULOCYTES NFR BLD AUTO: 0.4 % — SIGNIFICANT CHANGE UP (ref 0–1.5)
LYMPHOCYTES # BLD AUTO: 2.11 K/UL — SIGNIFICANT CHANGE UP (ref 1–3.3)
LYMPHOCYTES # BLD AUTO: 27.1 % — SIGNIFICANT CHANGE UP (ref 13–44)
MCHC RBC-ENTMCNC: 25.7 PG — LOW (ref 27–34)
MCHC RBC-ENTMCNC: 31.8 GM/DL — LOW (ref 32–36)
MCV RBC AUTO: 80.8 FL — SIGNIFICANT CHANGE UP (ref 80–100)
MONOCYTES # BLD AUTO: 0.71 K/UL — SIGNIFICANT CHANGE UP (ref 0–0.9)
MONOCYTES NFR BLD AUTO: 9.1 % — SIGNIFICANT CHANGE UP (ref 2–14)
NEUTROPHILS # BLD AUTO: 4.68 K/UL — SIGNIFICANT CHANGE UP (ref 1.8–7.4)
NEUTROPHILS NFR BLD AUTO: 60.2 % — SIGNIFICANT CHANGE UP (ref 43–77)
NRBC # BLD: 0 /100 WBCS — SIGNIFICANT CHANGE UP (ref 0–0)
PLATELET # BLD AUTO: 524 K/UL — HIGH (ref 150–400)
POTASSIUM SERPL-MCNC: 4.5 MMOL/L — SIGNIFICANT CHANGE UP (ref 3.5–5.3)
POTASSIUM SERPL-SCNC: 4.5 MMOL/L — SIGNIFICANT CHANGE UP (ref 3.5–5.3)
PROT SERPL-MCNC: 7.1 G/DL — SIGNIFICANT CHANGE UP (ref 6–8.3)
RBC # BLD: 3.43 M/UL — LOW (ref 3.8–5.2)
RBC # FLD: 16.4 % — HIGH (ref 10.3–14.5)
SODIUM SERPL-SCNC: 140 MMOL/L — SIGNIFICANT CHANGE UP (ref 135–145)
WBC # BLD: 7.78 K/UL — SIGNIFICANT CHANGE UP (ref 3.8–10.5)
WBC # FLD AUTO: 7.78 K/UL — SIGNIFICANT CHANGE UP (ref 3.8–10.5)

## 2019-10-10 PROCEDURE — 72148 MRI LUMBAR SPINE W/O DYE: CPT | Mod: 26

## 2019-10-10 PROCEDURE — 72146 MRI CHEST SPINE W/O DYE: CPT | Mod: 26

## 2019-10-10 PROCEDURE — 72141 MRI NECK SPINE W/O DYE: CPT | Mod: 26

## 2019-10-10 RX ORDER — LATANOPROST 0.05 MG/ML
1 SOLUTION/ DROPS OPHTHALMIC; TOPICAL AT BEDTIME
Refills: 0 | Status: DISCONTINUED | OUTPATIENT
Start: 2019-10-10 | End: 2019-10-16

## 2019-10-10 RX ORDER — LIDOCAINE 4 G/100G
1 CREAM TOPICAL DAILY
Refills: 0 | Status: DISCONTINUED | OUTPATIENT
Start: 2019-10-10 | End: 2019-10-16

## 2019-10-10 RX ORDER — BACLOFEN 100 %
20 POWDER (GRAM) MISCELLANEOUS
Refills: 0 | Status: DISCONTINUED | OUTPATIENT
Start: 2019-10-10 | End: 2019-10-16

## 2019-10-10 RX ORDER — INTERFERON BETA-1A 22 UG/.5ML
30 INJECTION, SOLUTION SUBCUTANEOUS
Refills: 0 | Status: DISCONTINUED | OUTPATIENT
Start: 2019-10-10 | End: 2019-10-16

## 2019-10-10 RX ORDER — ACETAMINOPHEN 500 MG
650 TABLET ORAL EVERY 6 HOURS
Refills: 0 | Status: DISCONTINUED | OUTPATIENT
Start: 2019-10-10 | End: 2019-10-16

## 2019-10-10 RX ORDER — ENOXAPARIN SODIUM 100 MG/ML
40 INJECTION SUBCUTANEOUS DAILY
Refills: 0 | Status: DISCONTINUED | OUTPATIENT
Start: 2019-10-10 | End: 2019-10-13

## 2019-10-10 RX ORDER — MULTIVIT-MIN/FERROUS GLUCONATE 9 MG/15 ML
1 LIQUID (ML) ORAL DAILY
Refills: 0 | Status: DISCONTINUED | OUTPATIENT
Start: 2019-10-10 | End: 2019-10-16

## 2019-10-10 RX ORDER — OXYCODONE AND ACETAMINOPHEN 5; 325 MG/1; MG/1
1 TABLET ORAL EVERY 4 HOURS
Refills: 0 | Status: DISCONTINUED | OUTPATIENT
Start: 2019-10-10 | End: 2019-10-16

## 2019-10-10 RX ORDER — OXYBUTYNIN CHLORIDE 5 MG
10 TABLET ORAL DAILY
Refills: 0 | Status: DISCONTINUED | OUTPATIENT
Start: 2019-10-10 | End: 2019-10-16

## 2019-10-10 RX ORDER — INTERFERON BETA-1A 22 UG/.5ML
30 INJECTION, SOLUTION SUBCUTANEOUS
Refills: 0 | Status: DISCONTINUED | OUTPATIENT
Start: 2019-10-16 | End: 2019-10-10

## 2019-10-10 RX ORDER — LISINOPRIL 2.5 MG/1
20 TABLET ORAL DAILY
Refills: 0 | Status: DISCONTINUED | OUTPATIENT
Start: 2019-10-10 | End: 2019-10-16

## 2019-10-10 RX ADMIN — Medication 1 TABLET(S): at 12:17

## 2019-10-10 RX ADMIN — LATANOPROST 1 DROP(S): 0.05 SOLUTION/ DROPS OPHTHALMIC; TOPICAL at 21:33

## 2019-10-10 RX ADMIN — LISINOPRIL 20 MILLIGRAM(S): 2.5 TABLET ORAL at 07:09

## 2019-10-10 RX ADMIN — INTERFERON BETA-1A 30 MICROGRAM(S): 22 INJECTION, SOLUTION SUBCUTANEOUS at 16:18

## 2019-10-10 RX ADMIN — Medication 625 MILLIGRAM(S): at 15:37

## 2019-10-10 RX ADMIN — ENOXAPARIN SODIUM 40 MILLIGRAM(S): 100 INJECTION SUBCUTANEOUS at 12:17

## 2019-10-10 RX ADMIN — Medication 625 MILLIGRAM(S): at 16:40

## 2019-10-10 RX ADMIN — Medication 20 MILLIGRAM(S): at 17:39

## 2019-10-10 RX ADMIN — Medication 10 MILLIGRAM(S): at 12:17

## 2019-10-10 RX ADMIN — Medication 20 MILLIGRAM(S): at 07:09

## 2019-10-10 RX ADMIN — LIDOCAINE 1 PATCH: 4 CREAM TOPICAL at 14:44

## 2019-10-10 NOTE — PROGRESS NOTE ADULT - PROBLEM SELECTOR PLAN 5
-Continue oxybutynin 10mg ER qd, methenamine hippurate 1g BID  -Starks cath replaced in ED at the time of admission -Continue oxybutynin 10mg ER qd, methenamine hippurate 1g BID  - C/W Starks cath replaced in ED (patient states she straight caths at home)

## 2019-10-10 NOTE — PROGRESS NOTE ADULT - ASSESSMENT
Patient is a 69/F, non-ambulatory, with pmhx significant for multiple sclerosis, Breast Cancer s/p R mastectomy, Osteoporosis, Mitral stenosis, and right ankle fracture presenting to the ED for evaluation of lower back and b/l knee pain over the past 2-3 months, found to have multiple lytic lesions of the spine, pelvis, admit for further management. Patient is a 69/F, non-ambulatory, with pmhx significant for multiple sclerosis, Breast Cancer s/p R mastectomy, Osteoporosis, Mitral stenosis, and right ankle fracture presenting to the ED for evaluation of lower back and b/l knee pain over the past 2-3 months, found to have multiple lytic lesions of the spine and  pelvis, admit for further management.

## 2019-10-10 NOTE — PROGRESS NOTE ADULT - PROBLEM SELECTOR PLAN 1
Patient complaining of lower back, b/l knee pain over 2-3 months, has hx of a fall from [facility] lift 7/17/2019 at rehab facility  -Found to have age-indeterminate compressive fracture of L2 on XR along with multiple lytic lesions  -Has hx of Breast Ca s/p R mastectomy, ?source/primary?  -Pain control with low dose opioids and tylenol. Has received lidocaine topical patch for pain relief in rehab with significant improvement, will trial in hospital.  -Orthopedics following - CT L spine and pelvis, MR Cervical, thoracic, lumbar spine ordered f/u results.  -PT/OT of limited utility given patient's non-ambulatory status Patient complaining of lower back, b/l knee pain over 2-3 months, has hx of a fall from jame lift 7/17/2019 at rehab facility  -Found to have age-indeterminate compressive fracture of L2 on XR along with multiple lytic lesions  -Has hx of Breast Ca s/p R mastectomy, ?source/primary?  -Pain control with lidocaine patch and oxycodone  -Orthopedics following   - CT L spine and pelvis: Diffuse sclerotic metastatic disease to the lumbar spine, iliac bones, and sacrum. Small sclerotic foci are seen in bilateral femoral bones.  Old fracture of the superior and inferior right pubic rim, stable.  - CT lumbar: Diffuse sclerotic mets and acute fracture of L2. There is   posterior retropulsion resulting in mild-to-moderate spinal canal   stenosis.  - MR Cervical: osseous mets. No cord compression. Multilevel degenerative. Nonspecific high STIR signal surrounding the right lateral paraspinal   space at the level of C1-C2   -  F/u MR thoracic, lumbar spine results

## 2019-10-10 NOTE — PROGRESS NOTE ADULT - PROBLEM SELECTOR PLAN 2
XR performed of LS spine, b/l knees, and hip/pelvis revealing L2 compression fracture and Chronic right-sided superior and inferior pubic rami fractures. Also seen multiple lytic bone lesions on all 3 studies c/w metastatic disease  -Patient's chronic gutierrez could be 2/2 metastatic disease  -Heme/Onc - Dr Woo consulted -- appreciate recs XR performed of LS spine, b/l knees, and hip/pelvis revealing L2 compression fracture and Chronic right-sided superior and inferior pubic rami fractures. Also seen multiple lytic bone lesions on all 3 studies c/w metastatic disease  -Heme/Onc - Dr Woo consulted

## 2019-10-10 NOTE — CONSULT NOTE ADULT - SUBJECTIVE AND OBJECTIVE BOX
INCOMPLETE NOTE.  Documentation in Progress  PT SEEN AND EVALUATED.     FULL/ADDITIONAL RECOMMENDATIONS TO FOLLOW   ***************************************************************  *************************************************************** Patient is a 69y old  Female who presents with a chief complaint of Low back and knee pain (10 Oct 2019 13:57)    HPI:  Patient is a 69/F, non-ambulatory, with pmhx significant for multiple sclerosis, Breast Cancer s/p R mastectomy, Osteoporosis, Mitral stenosis, and right ankle fracture presenting to the ED for evaluation of lower back and b/l knee pain over the past 2-3 months. patient was recently discharged from rehab facility s/p right ankle fracture. She notes that she was dropped from [facility] 7/17/2019, but denies any resultant LOC, or head trauma. She reports XR were performed at the rehab facility but results were not shared with her/pts family. Patient reports the pain is achey in quality, and rated 5/10 in intensity. Lower back pain worse when attempting to stand.  No otc medications for pain. Denies any new onset of numbness/tingling in the LE. Patient is non-ambulatory, however since coming home from rehab facility she is unable to transfer and requires assistance. Additionally, she has campos cath for incontinence while at [facility].     ED Vitals T 98.2F HR 65 /82 RR 18 SpO2 100% on room air  Labs significant for anemia, hb 9.2 (near baseline of ~9.5), plt 561, CMP wnl.   XR LS spine - age-indeterminate L2 fx, c/w pathologic fx  XR b/l knees - no fx or dislocation, vague areas of sclerosis and lucency  XR Hip and Pelvis b/l -  Osteoblastic metastatic disease throughout the pelvis and proximal femurs. No acute fractures or dislocations. Chronic right-sided superior and inferior pubic rami fractures.  CXR- no obvious infiltrates/effusions  EKG - 63 NSR (09 Oct 2019 22:44)    heme-onc asked to see pt for abn bone lesions.   hx of breast cancer, stage I, LN negative, hormone positive, dx ~2007. s/p R mastectomy.   no adjuvant chemo, nor XRT. Was on tamoxifen for 10yrs.   Follows with Dr Matthew Decker.   Last seen >9mo ago.   Had not seen of late due to falls, ankle fx, and had been in rehab.   Last mammogram 08/2018  last Coloscopy ~2016, Cologuard 2018  EGD 2016  Pap smear 08/2018    Pt with hx MS. Wheelchair bound since 2007.  Unable to transfer since fall 2018    Workup now with bone lesions, concerning for metastasis.   Seen by orthopaedics this admission, Dr Desai.         PAST MEDICAL & SURGICAL HISTORY:  Multiple sclerosis  S/P mastectomy, right  Breast CA  Osteoporosis  MS (mitral stenosis)  History of bowel resection  H/O breast surgery      HEALTH ISSUES - PROBLEM Dx:  Need for prophylactic measure: Need for prophylactic measure  Overactive bladder: Overactive bladder  Essential hypertension: Essential hypertension  Multiple sclerosis: Multiple sclerosis  Lytic bone lesions on xray: Lytic bone lesions on xray  Compression fracture of L2, closed, initial encounter: Compression fracture of L2, closed, initial encounter          Wedge compression fracture of second lumbar vertebra, initial encounter for closed fracture (S32.020A)  Multiple sclerosis (G35)  S/P mastectomy, right (Z90.11)  Breast CA (C50.919)  Osteoporosis (M81.0)  MS (mitral stenosis) (I05.0)  History of bowel resection (Z92.89)  H/O breast surgery (Z98.890)  Metastasis (C79.9)      FAMILY HISTORY:  FHx: hyperlipidemia (Mother)        [SOCIAL HISTORY: ]     smoking:  denies     EtOH:  denies     illicit drugs:  denies     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"     marital status:   x19yrs     Other: 1son, 1 dtr      [ALLERGIES/INTOLERANCES:]  Allergies     Sudafed (Other)  Intolerances          [MEDICATIONS]  MEDICATIONS  (STANDING):  baclofen 20 milliGRAM(s) Oral two times a day  enoxaparin Injectable 40 milliGRAM(s) SubCutaneous daily  interferon beta-1a Injectable (AVONEX) 30 MICROGram(s) IntraMuscular every 7 days  latanoprost 0.005% Ophthalmic Solution 1 Drop(s) Both EYES at bedtime  lidocaine   Patch 1 Patch Transdermal daily  lisinopril 20 milliGRAM(s) Oral daily  multivitamin/minerals 1 Tablet(s) Oral daily  oxybutynin 10 milliGRAM(s) Oral daily    MEDICATIONS  (PRN):  acetaminophen   Tablet .. 650 milliGRAM(s) Oral every 6 hours PRN Temp greater or equal to 38C (100.4F), Mild Pain (1 - 3)  oxyCODONE    5 mG/acetaminophen 325 mG 1 Tablet(s) Oral every 4 hours PRN Moderate Pain (4 - 6)        [REVIEW OF SYSTEMS: ]  CONSTITUTIONAL: +pain, no fever, no shakes, no chills   EYES: No eye pain, no visual disturbances, no discharge  ENMT:  no discharge  NECK: No pain, no stiffness  BREASTS: No pain, no masses, no nipple discharge  RESPIRATORY: No cough, no wheezing, no chills, no hemoptysis; No shortness of breath  CARDIOVASCULAR: No chest pain, no palpitations, no dizziness, no leg swelling  GASTROINTESTINAL: No abdominal, no epigastric pain. No nausea, no vomiting, no hematemesis; No diarrhea , no constipation. No melena, no hematochezia.  GENITOURINARY: No dysuria, no frequency, no hematuria, no incontinence  NEUROLOGICAL: No headaches, no memory loss, no loss of strength, no numbness, no tremors  SKIN: No itching, no burning, no rashes, no lesions   LYMPH NODES: No enlarged glands  ENDOCRINE: No heat or cold intolerance; No hair loss  MUSCULOSKELETAL: No joint pain or swelling; No muscle, no back, no extremity pain  PSYCHIATRIC: No depression, no anxiety, no mood swings, no difficulty sleeping  HEME/LYMPH: No easy bruising, no bleeding gums      [VITALS SIGNS 24hrs]  Vital Signs Last 24 Hrs  T(C): 37.1 (10 Oct 2019 15:19), Max: 37.4 (10 Oct 2019 00:00)  T(F): 98.7 (10 Oct 2019 15:19), Max: 99.4 (10 Oct 2019 00:00)  HR: 67 (10 Oct 2019 15:19) (61 - 78)  BP: 160/80 (10 Oct 2019 15:19) (127/59 - 160/80)  BP(mean): --  RR: 18 (10 Oct 2019 15:19) (16 - 19)  SpO2: 98% (10 Oct 2019 16:13) (91% - 100%)    [PHYSICAL EXAM]  General: adult in NAD,  WN,  WD.  HEENT: clear oropharynx, anicteric sclera, pink conjunctivae.  Neck: supple, no masses.  CV: normal S1S2, no murmur, no rubs, no gallops.  Lungs: clear to auscultation, no wheezes, no rales, no rhonchi.  Abdomen: soft, non-tender, non-distended, no hepatosplenomegaly, normal BS, no guarding.  Ext: no clubbing, no cyanosis, no edema.  Skin: no rashes,  no petechiae, no venous stasis changes.  Neuro: alert and oriented X3, no focal motor deficits.  LN: no SC LAURA.      [LABS:]                        8.8    7.78  )-----------( 524      ( 10 Oct 2019 06:48 )             27.7     CBC Full  -  ( 10 Oct 2019 06:48 )  WBC Count : 7.78 K/uL  RBC Count : 3.43 M/uL  Hemoglobin : 8.8 g/dL  Hematocrit : 27.7 %  Platelet Count - Automated : 524 K/uL  Mean Cell Volume : 80.8 fl  Mean Cell Hemoglobin : 25.7 pg  Mean Cell Hemoglobin Concentration : 31.8 gm/dL  Auto Neutrophil # : 4.68 K/uL  Auto Lymphocyte # : 2.11 K/uL  Auto Monocyte # : 0.71 K/uL  Auto Eosinophil # : 0.20 K/uL  Auto Basophil # : 0.05 K/uL  Auto Neutrophil % : 60.2 %  Auto Lymphocyte % : 27.1 %  Auto Monocyte % : 9.1 %  Auto Eosinophil % : 2.6 %  Auto Basophil % : 0.6 %    10-10    140  |  106  |  25<H>  ----------------------------<  109<H>  4.5   |  25  |  0.84    Ca    8.8      10 Oct 2019 06:48    TPro  7.1  /  Alb  2.4<L>  /  TBili  0.2  /  DBili  x   /  AST  33  /  ALT  23  /  AlkPhos  155<H>  10-10    PT/INR - ( 09 Oct 2019 19:28 )   PT: 12.9 sec;   INR: 1.14 ratio         PTT - ( 09 Oct 2019 19:28 )  PTT:29.9 sec  LIVER FUNCTIONS - ( 10 Oct 2019 06:48 )  Alb: 2.4 g/dL / Pro: 7.1 g/dL / ALK PHOS: 155 U/L / ALT: 23 U/L / AST: 33 U/L / GGT: x                   WBC  TREND (5 Days)  WBC Count: 7.78 K/uL (10-10 @ 06:48)  WBC Count: 8.47 K/uL (10-09 @ 19:28)    HGB  TREND (5 Days)  Hemoglobin: 8.8 g/dL (10-10 @ 06:48)  Hemoglobin: 9.2 g/dL (10-09 @ 19:28)    HCT  TREND (5 Days)  Hematocrit: 27.7 % (10-10 @ 06:48)  Hematocrit: 28.9 % (10-09 @ 19:28)    PLT  TREND (5 Days)  Platelet Count - Automated: 524 K/uL (10-10 @ 06:48)  Platelet Count - Automated: 561 K/uL (10-09 @ 19:28)         [RADIOLOGY & ADDITIONAL STUDIES:]    < from: CT Pelvis No Cont (10.09.19 @ 21:12) >  EXAM:  CT PELVIS ONLY                        PROCEDURE DATE:  10/09/2019    INTERPRETATION:  [facility] RADIOLOGIST PRELIMINARY REPORT  PROCEDURE INFORMATION:   Exam: CT Pelvis Without Contrast; Skeletal   Exam date and time: 10/9/2019 8:50 PM   Clinical history: 69 years old, female; Injury or trauma; Fall; Initial encounter; Fracture of pelvis and hip; Bilateral; Pathological fracture due to other disease; Acetabulum; Not specified     TECHNIQUE:   Imaging protocol: Computed tomography images of the pelvis without contrast.   Exam focused on the skeletal structures.     COMPARISON:   CT ABDOMEN AND PELVIS WITH ORAL WITHOUT AND WITH IV CONTRAST 5/15/2017 3:54 PM     FINDINGS:   Stomach and bowel: Large fecal loading in the rectum and moderate fecal loading of the visualized colon.   Bladder: Urinary bladder is decompressed with Campos.   Reproductive: Fibroid uterus.   Lymph nodes: Borderline enlarged bilateral inguinal lymph nodes.   Bones/joints: Diffuse sclerotic metastatic disease to the lumbar spine, iliac bones, and sacrum. Small sclerotic foci are seen in bilateral femoral bones.   Old fracture of the superior and inferior right pubic rim, stable.    IMPRESSION:   Diffuse sclerotic metastatic disease to the lumbar spine, iliac bones, and sacrum. Small sclerotic foci are seen in bilateral femoral bones.   Old fracture of the superior and inferior right pubic rim, stable.  This study was preliminary reported by [facility].  < end of copied text >        < from: CT Lumbar Spine No Cont (10.09.19 @ 21:11) >  EXAM:  CT LUMBAR SPINE                        PROCEDURE DATE:  10/09/2019    INTERPRETATION:  [facility] RADIOLOGIST PRELIMINARY REPORT  PROCEDURE INFORMATION:   Exam: CT Lumbar Spine Without Contrast   Exam date and time: 10/9/2019 8:41 PM   Clinical history: 69 years old, female; Injury or trauma; Fall; Initial encounter; Fracture, pathological and fracture, traumatic injury; Not specified; Cancer or neoplasm   TECHNIQUE:   Imaging protocol: Computed tomography images of the lumbar spine without contrast.     COMPARISON:   CR XR LUMBAR SPINE 10/9/2019 4:06 PM     FINDINGS:   Vertebrae: Diffuse marked sclerotic lesions in the visualized bones including the sacrum, bilateral iliac bones, and bilateral ribs representing metastatic disease.     Burst fracture of the L2 vertebral body with loss of 50% height and 5 mm retropulsion of the superior endplate of L2 vertebral body causing mild narrowing of the central canal. No other fracture is seen. Disc vacuum phenomenon at multiple levels. Loss of disc space at L5/S1. Multilevel facet joint arthropathy.   Discs/Spinal canal/Neural foramina: Multilevel posterior disc bulges causing mild central spinal canal stenosis. Mild to moderate multilevel neural foraminal narrowing.  Lungs: Hyperplasia versus nodule in the left adrenal gland.   Liver: Liver is enlarged.   Kidneys and ureters: Fullness in the right kidney partially seen.   Reproductive: Calcified fibroids in the uterus.   Vasculature: Multiple phleboliths in the pelvis. Atherosclerosis.   Soft tissues: No significant prevertebral soft tissue swelling.     IMPRESSION:   Diffuse marked sclerotic lesions in the visualized bones including the sacrum,   bilateral iliac bones, and bilateral ribs representing metastatic disease.     Burst fracture of the L2 vertebral body with loss of 50% height and 5 mm retropulsion of the superior endplate of L2 vertebral body causing mild narrowing of the central canal. No other fracture is seen. No prevertebral soft tissue swelling.    Official report:  CLINICAL STATEMENT: Metastasis  TECHNIQUE: CT of the lumbar spine was performed without contrast. 3-D MIP images obtained  COMPARISON: None.    FINDINGS:  Diffuse sclerotic metastasis is noted. There is acute fracture of L2 vertebral body resulting in moderate compression deformity. There is posterior retropulsion resulting in mild-to-moderate spinal canal stenosis.  Sagittal alignment intact.  The evaluation of the spinal canal is limited on a CT exam.  Multilevel degenerative changes noted.  Calcifications noted in the uterus which may represent fibroid. Small pelvic free fluid noted. Adrenal nonspecific nodular thickening.   Nonspecific urothelial thickening right extrarenal pelvis. Correlate with CT urogram as clinically warranted    IMPRESSION:  Diffuse sclerotic metastases.  Acute fracture L2 vertebral body resulting in moderate compression deformity and posterior retropulsion resulting in mild-to-moderate spinal canal stenosis.  Additional findings as described above  < end of copied text >            < from: MR Lumbar Spine No Cont (10.10.19 @ 11:26) >  EXAM:  MR SPINE THORACIC                        EXAM:  MR SPINE LUMBAR                        PROCEDURE DATE:  10/10/2019    INTERPRETATION:  CLINICAL INDICATION: Breast cancer metastasis. Lumbar   fracture.    TECHNIQUE: Multiplanar multisequence MRI of the thoracic and lumbar spine   was performed without the administration of intravenous contrast,   according to standard protocol.    COMPARISON: CT lumbar spine 10/9/2019.    FINDINGS:    There are innumerable T1, T2 hypointense lesions throughout the thoracic   and lumbar spine, and also in the sacrum and visualized pelvis,   corresponding to diffuse sclerotic osseous metastases visualized on   recent CT lumbar spine 10/9/2019.     Redemonstrated is a compression deformity ofthe L2 superior endplate,   resulting in approximately 50% vertebral body height loss anteriorly.   There is associated mild posterior retropulsion of the posterior superior   fracture fragment, resulting in focal canal stenosis at this level.     ALIGNMENT: There is a normal mild thoracic kyphosis. There is a mild   dextroconvex curvature to the thoracolumbar spine with the apex at L2   level.    VERTEBRAE: Compression fracture at L2 level. Otherwise, the thoracic and   lumbar vertebral bodies are normal in height. There is no evidence to   suggest acute fracture. Innumerable sclerotic metastases throughout the   thoracic and lumbar spine and also in the sacrum and visualized pelvis.    DISCS: There is mild degenerative loss of intervertebral disc space   height at the mid thoracic spine.    CONUS MEDULLARIS AND CAUDA EQUINA: There is no intrinsic cord signal   abnormality in the thoracic spine. The conus medullaris terminates at L1.   There is normal appearance of the conus medullaris and cauda equina.    PARAVERTEBRAL SOFT TISSUES AND VISUALIZED RETROPERITONEUM: There is fatty   infiltration of the lower lumbosacral paravertebral/paraspinal   musculature, which may be part be related to disuse atrophy.    There is a right-sided extrarenal pelvis, incidental finding.    There is nodularity of the left adrenal gland, incompletely evaluated.    EVALUATION OF INDIVIDUAL LEVELS:     There is no canal stenosis or neural foraminal narrowing on the thoracic   spine.    L1-2: Disc bulgeand degenerative changes of the facet joints resulting   in mild neuroforaminal narrowing bilaterally. No canal stenosis.    L2-3: Degenerative changes of the bilateral facet joints. No spinal canal   or neuroforaminal stenosis.    L3-4: Disc bulge, degenerative changes of the bilateral facet joints and   thickening of the ligamentum flavum, resulting in mild neuroforaminal   narrowing bilaterally. No canal stenosis.    L4-5: Disc bulge eccentric to the left and degenerative changes of the   bilateral facet joints resulting in mild to moderate neuroforaminal   narrowing bilaterally. No canal stenosis.    L5-S1: Change changes of the bilateral facet joints resulting in moderate   left and mild right neural foraminal narrowing. No canal stenosis.    LIMITED EVALUATION OF UPPER SACRUM AND SACROILIAC JOINTS: Mild   degenerative changes of the sacroiliac joints.      IMPRESSION:   Innumerable hypointense lesions throughout the thoracic and lumbar spine, and also the sacrum and visualized pelvis, corresponding to diffuse sclerotic osseous metastases visualized on recent CT lumbar spine 10/9/2019.  Compression fracture of L2 superior endplate, with associated mild retropulsion resulting in focal canal stenosis.  < end of copied text >          < from: MR Cervical Spine No Cont (10.10.19 @ 11:01) >  EXAM:  MR SPINE CERVICAL                        PROCEDURE DATE:  10/10/2019    INTERPRETATION:  CLINICAL STATEMENT: Breast cancer Metastases. Pain. Lumbar fracture  TECHNIQUE: MRI of the cervical spine was performed without gadolinium.   COMPARISON: CT lumbar spine 10/9/2019    FINDINGS:  Cervical spine:  There is diffuse metastases. The cervical vertebral body heights are intact.  Grade 1 retrolisthesis C5 on C6.  There is no cord compression or abnormal cord edema.  Multilevel osteophytes. Multilevel degenerative disc disease with loss of signal. Mild disc space narrowing C4-5. Moderate disc space narrowing C5-C6.  On last image of the STIR sequence series 5 image 14, there is nonspecific high STIR signal surrounding the right lateral paraspinal space at the level of C1-C2 surrounding the right vertebral artery.   Adjacent to the right lateral masses.  C3-4: Mild disc bulge and facet/uncovertebral hypertrophy noted resulting in mild bilateral neural foraminal narrowing. No significant spinal canal stenosis.  C4-5: Disc bulge and facet/uncovertebral hypertrophy noted resulting in mild bilateral neural foraminal narrowing. No significant spinal canal stenosis.  C5-C6: Disc osteophyte complex and facet/uncovertebral hypertrophy noted resulting in mild spinal canal stenosis.. Severe right, moderate left neural foraminal narrowing.  C6-7: Incidental tiny nerve root cysts at the left neural foramen.    IMPRESSION:  Diffuse osseous metastases. No cord compression.  Multilevel degenerative changes as described above.  Nonspecific high STIR signal surrounding the right lateral paraspinal space at the level of C1-C2 as described above. If there is pain in this region, consider CT exam as clinically warranted  < end of copied text >

## 2019-10-10 NOTE — CONSULT NOTE ADULT - ASSESSMENT
A/P:  69y female w/ blastic lesions of the lumbar spine, pelvis and L2 VCF    -Clinical presentation and physical exam not consistent w/ acute cord compression or cauda equina. No red flags, no changes in bowel/bladder, no fevers/chills, +active/passive rectal tone, negative saddle anesthesia.  -Admit to medicine  -Medical management per primary team  -Multimodal analgesia - recommend low dose opioids and acetaminophen as tolerated  -PT/OT  -WBAT  -FU MRI C/T/L sp  -FU CT L sp and pelvis  -FU R ankle xr  -FU am labs  -All patient and families questions answered. Patient acknowledges understanding and agreement w/ above plan.  -Discussed w/ Dr. Desai who is aware and agrees w/ above plan.
[ASSESSMENT and  PLAN]  Hx of breast cancer, early stage, reportedly. Now presenting with bone pain with diffuse bone metastasis  Will need biopsy to confirm dx, of recurrence and re-eval receptor status.     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   Colace BID  Senna QD PRN constipation    Will need biopsy of lesions.     Transfuse PRBC as clinically indicated.   Transfuse PRBC if Hgb <7.0 or if symptomatic.   Follow CBC    Check Anemia studies.   Check SIFE, UIFE  Check B12, Folate, Ferritin    check CEA, CA27-29    DVT Prophyalxis: Lovenox    Thank you for consulting us.     I have discussed the above plan of care with patient/family in detail. They expressed understanding of the treatment plan . Risks, benefits and alternatives discussed in detail. I have asked if they have any questions or concerns and appropriately addressed them; all questions answered to their satisfactions and in lay terms.

## 2019-10-10 NOTE — PROGRESS NOTE ADULT - SUBJECTIVE AND OBJECTIVE BOX
Patient seen and evaluated at bedside, resting comfortably. No acute overnight events. Denies fevers/chills/CP/SOB. No other complaints at this time                          9.2    8.47  )-----------( 561      ( 09 Oct 2019 19:28 )             28.9   10-09    138  |  106  |  26<H>  ----------------------------<  113<H>  4.4   |  23  |  0.78    Ca    9.1      09 Oct 2019 19:28    TPro  7.3  /  Alb  2.5<L>  /  TBili  0.2  /  DBili  x   /  AST  40<H>  /  ALT  23  /  AlkPhos  151<H>  10-09  Vital Signs Last 24 Hrs  T(C): 37.3 (10 Oct 2019 01:59), Max: 37.4 (10 Oct 2019 00:00)  T(F): 99.2 (10 Oct 2019 01:59), Max: 99.4 (10 Oct 2019 00:00)  HR: 68 (10 Oct 2019 01:59) (60 - 70)  BP: 152/63 (10 Oct 2019 01:59) (127/59 - 152/63)  BP(mean): --  RR: 17 (10 Oct 2019 01:59) (16 - 18)  SpO2: 99% (10 Oct 2019 01:59) (98% - 100%)      PE:   Gen: Awake and alert, Oriented x 3, following commands  Spine:  Right Upper extremity: Delt 5/5, Biceps 5/5, Triceps 5/5, Wrist Ext 5/5, Wrist Flexion 5/5,  Strength 5/5  SILT C5-S1,   +RP, Compartments soft    Left Upper extremity: Delt 5/5, Biceps 5/5, Triceps 5/5, Wrist Ext 5/5, Wrist Flexion 5/5,  Strength 5/5  SILT C5-S1,   +RP, Compartments soft    Right Lower Extremity: Hip Flexion 2/5, Hip Extension 2/5, Knee Flexion 4/5, Knee Extension 4/5, Ankle Dorsiflexion 4/5, Ankle Plantar Flexion 4/5, Extensor hallucis Longus 4/5  SILT L2-S1  No pain with SLR  Negative Clonus  +DP  Compartments soft           Left Lower Extremity: Hip Flexion 4/5, Hip Extension 4/5, Knee Flexion 4/5, Knee Extension 4/5, Ankle Dorsiflexion 4/5, Ankle Plantar Flexion 4/5, Extensor hallucis Longus 4/5  SILT L2-S1  No pain with SLR  Negative Clonus  +DP  Compartments soft

## 2019-10-10 NOTE — PROGRESS NOTE ADULT - ASSESSMENT
A/P:  69y female w/ blastic lesions of the lumbar spine, pelvis and L2 VCF    -Medical management per primary team  -Multimodal analgesia - recommend low dose opioids and acetaminophen as tolerated  -DVT ppx per primary  -SCDs  -PT/OT  -WBAT  -FU MRI C/T/L sp  -FU CT L sp and pelvis  -FU R ankle xr  -FU am labs  -Will discuss w/ attending and advise if plan changes

## 2019-10-10 NOTE — PROGRESS NOTE ADULT - SUBJECTIVE AND OBJECTIVE BOX
Patient is a 69y old  Female who presents with a chief complaint of Low back and knee pain (10 Oct 2019 06:19)      INTERVAL HPI/OVERNIGHT EVENTS:    MEDICATIONS  (STANDING):  baclofen 20 milliGRAM(s) Oral two times a day  enoxaparin Injectable 40 milliGRAM(s) SubCutaneous daily  interferon beta-1a Injectable (AVONEX) 30 MICROGram(s) IntraMuscular every 7 days  latanoprost 0.005% Ophthalmic Solution 1 Drop(s) Both EYES at bedtime  lidocaine   Patch 1 Patch Transdermal daily  lisinopril 20 milliGRAM(s) Oral daily  multivitamin/minerals 1 Tablet(s) Oral daily  oxybutynin 10 milliGRAM(s) Oral daily    MEDICATIONS  (PRN):  acetaminophen   Tablet .. 650 milliGRAM(s) Oral every 6 hours PRN Temp greater or equal to 38C (100.4F), Mild Pain (1 - 3)  oxyCODONE    5 mG/acetaminophen 325 mG 1 Tablet(s) Oral every 4 hours PRN Moderate Pain (4 - 6)      Allergies    Sudafed (Other)    Intolerances        REVIEW OF SYSTEMS:  CONSTITUTIONAL: No fever, No chills,No fatigue,No myalgia,No Body ache  EYES: No eye pain, visual disturbances, or discharge  ENMT:  No ear pain, No nose bleed, No vertigo; No sinus or throat pain  NECK: No pain, No stiffness  RESPIRATORY: No cough, wheezing, No  hemoptysis; No shortness of breath  CARDIOVASCULAR: No chest pain, palpitations, leg swelling  GASTROINTESTINAL: No abdominal or epigastric pain. No nausea, No vomiting; No diarrhea or constipation. [ ] BM  GENITOURINARY: No dysuria, No frequency, No urgency, No hematuria, or incontinence  NEUROLOGICAL: alert and oriented x 3,  No headaches, No dizziness, No numbness,  SKIN:   No itching, burning, rashes, or lesions   MUSCULOSKELETAL: No joint pain or swelling; No muscle pain, No back pain, No extremity pain  PSYCHIATRIC: No depression, anxiety, mood swings, or difficulty sleeping  ROS  [ ] Unable to obtain   REST OF REVIEW Of SYSTEM - [ ] Normal     Height (cm): 167.64 (10-09 @ 14:51)  Weight (kg): 63.5 (10-09 @ 14:51)  BMI (kg/m2): 22.6 (10-09 @ 14:51)  BSA (m2): 1.72 (10-09 @ 14:51)  Vital Signs Last 24 Hrs  T(C): 37.3 (10 Oct 2019 07:51), Max: 37.4 (10 Oct 2019 00:00)  T(F): 99.1 (10 Oct 2019 07:51), Max: 99.4 (10 Oct 2019 00:00)  HR: 61 (10 Oct 2019 07:51) (60 - 78)  BP: 138/80 (10 Oct 2019 07:51) (127/59 - 152/63)  BP(mean): --  RR: 19 (10 Oct 2019 07:51) (16 - 19)  SpO2: 100% (10 Oct 2019 07:51) (98% - 100%)  [ ] room air   [ ] 02    PHYSICAL EXAM:  GENERAL:  No acute distresss, well appearing, [ ] Agitated, [ ] Lethargy, [ ] confused   HEAD:  normal  ENMT: normal  NECK:  normal    NERVOUS SYSTEM:  Alert & Oriented X3, no focal deficits [ ]Confusion  [ ] Encephalopathic [ ] Sedated [ ] Other  CHEST/LUNG: Clear to auscultation bilaterally,  [ ] decreased breath sounds at bases  [ ] wheezing   [ ] rhonchi  [ ] crackles  HEART:  Regular rate and rhythm, No murmurs, rubs, or gallops,  [ ] irregular   ABDOMEN:  soft, nontender, nondistended, positive bowel sounds   [ ] obese  EXTREMITIES: No clubbing, cyanosis or edema  SKIN: [ ] venous stasis skin changes    LABS:                        8.8    7.78  )-----------( 524      ( 10 Oct 2019 06:48 )             27.7     10 Oct 2019 06:48    140    |  106    |  25     ----------------------------<  109    4.5     |  25     |  0.84     Ca    8.8        10 Oct 2019 06:48    TPro  7.1    /  Alb  2.4    /  TBili  0.2    /  DBili  x      /  AST  33     /  ALT  23     /  AlkPhos  155    10 Oct 2019 06:48    PT/INR - ( 09 Oct 2019 19:28 )   PT: 12.9 sec;   INR: 1.14 ratio         PTT - ( 09 Oct 2019 19:28 )  PTT:29.9 sec      CAPILLARY BLOOD GLUCOSE        Cultures          RADIOLOGY & ADDITIONAL TESTS:      Care Discussed with [X] Consultants  [ ] Patient  [ ] Family  [X]   /   [ ] Other; RN  DVT prophylaxis [ ] lovenox   [ ] subq heparin  [ ] coumadin  [ ] venodynes [ ] ambulating frequently at how risk for vte and no pharm         or  mechanical prophylaxis required    [ ] other   Advanced directive:    [ ]pt has hcp     [ ] pt declined to assign hcp  Discussed with pt @ bedside Patient is a 69y old  Female who presents with a chief complaint of Low back and knee pain (10 Oct 2019 06:19)      INTERVAL HPI:  Patient seen and examined at beside, appears comfortable. Patient reports discomfort in lower back and b/l knees; pain reported 5/10. Patient reports living with her son. She uses a motorized scooter as her form of ambulation. Needs assistance getting from scooter to bed. Patient denies any chest pain, SOB, N/V, abdominal pain, constipation, and diarrhea.     MEDICATIONS  (STANDING)  baclofen 20 milliGRAM(s) Oral two times a day  enoxaparin Injectable 40 milliGRAM(s) SubCutaneous daily  interferon beta-1a Injectable (AVONEX) 30 MICROGram(s) IntraMuscular every 7 days  latanoprost 0.005% Ophthalmic Solution 1 Drop(s) Both EYES at bedtime  lidocaine   Patch 1 Patch Transdermal daily  lisinopril 20 milliGRAM(s) Oral daily  multivitamin/minerals 1 Tablet(s) Oral daily  oxybutynin 10 milliGRAM(s) Oral daily    MEDICATIONS  (PRN):  acetaminophen   Tablet .. 650 milliGRAM(s) Oral every 6 hours PRN Temp greater or equal to 38C (100.4F), Mild Pain (1 - 3)  oxyCODONE    5 mG/acetaminophen 325 mG 1 Tablet(s) Oral every 4 hours PRN Moderate Pain (4 - 6)      Allergies    Sudafed (Other)    Intolerances        REVIEW OF SYSTEMS:  CONSTITUTIONAL: No fever, No chills, No fatigue, No Body ache  EYES: No eye pain, visual disturbances, or discharge  ENMT:  No ear pain, No sinus or throat pain  NECK: No pain, No stiffness  RESPIRATORY: No cough, wheezing, no shortness of breath  CARDIOVASCULAR: No chest pain, palpitations, leg swelling  GASTROINTESTINAL: No abdominal or epigastric pain. No nausea, No vomiting; No diarrhea or constipation. [ ] BM  GENITOURINARY: No dysuria, No frequency, No urgency, No hematuria, or incontinence  NEUROLOGICAL: alert and oriented x 3,  No headaches, No dizziness, No numbness,  SKIN:   No itching, burning, rashes, or lesions   MUSCULOSKELETAL: +back and b/l knee pain No joint pain or swelling; No muscle pain,  No extremity pain  PSYCHIATRIC: No depression, anxiety, mood swings, or difficulty sleeping  ROS  [ ] Unable to obtain   REST OF REVIEW Of SYSTEM - [ ] Normal     Height (cm): 167.64 (10-09 @ 14:51)  Weight (kg): 63.5 (10-09 @ 14:51)  BMI (kg/m2): 22.6 (10-09 @ 14:51)  BSA (m2): 1.72 (10-09 @ 14:51)  Vital Signs Last 24 Hrs  T(C): 37.3 (10 Oct 2019 07:51), Max: 37.4 (10 Oct 2019 00:00)  T(F): 99.1 (10 Oct 2019 07:51), Max: 99.4 (10 Oct 2019 00:00)  HR: 61 (10 Oct 2019 07:51) (60 - 78)  BP: 138/80 (10 Oct 2019 07:51) (127/59 - 152/63)  BP(mean): --  RR: 19 (10 Oct 2019 07:51) (16 - 19)  SpO2: 100% (10 Oct 2019 07:51) (98% - 100%)  [ ] room air   [ ] 02    PHYSICAL EXAM:  GENERAL:  No acute distress, well appearing  HEAD:  AT/NC  ENMT: EOMI, PERRL, moist mucus membranes  NECK: supple, no JVD   NERVOUS SYSTEM:  Alert & Oriented X3, unable to lift her lower extremities bilaterally (chronic per patient), grossly moves upper extremities  CHEST/LUNG: Clear to auscultation bilaterally, no wheezing  HEART:  Regular rate and rhythm, No murmurs, rubs, or gallops   ABDOMEN:  soft, nontender, nondistended, positive bowel sounds  EXTREMITIES: No clubbing, cyanosis or edema  SKIN: no venous stasis skin changes    LABS:                        8.8    7.78  )-----------( 524      ( 10 Oct 2019 06:48 )             27.7     10 Oct 2019 06:48    140    |  106    |  25     ----------------------------<  109    4.5     |  25     |  0.84     Ca    8.8        10 Oct 2019 06:48    TPro  7.1    /  Alb  2.4    /  TBili  0.2    /  DBili  x      /  AST  33     /  ALT  23     /  AlkPhos  155    10 Oct 2019 06:48    PT/INR - ( 09 Oct 2019 19:28 )   PT: 12.9 sec;   INR: 1.14 ratio         PTT - ( 09 Oct 2019 19:28 )  PTT:29.9 sec      CAPILLARY BLOOD GLUCOSE        Cultures          RADIOLOGY & ADDITIONAL TESTS:      Care Discussed with [X] Consultants  [ ] Patient  [ ] Family  [X]   /   [ ] Other; RN  DVT prophylaxis [x ] lovenox   [ ] subq heparin  [ ] coumadin  [ ] venodynes [ ] ambulating frequently at how risk for vte and no pharm         or  mechanical prophylaxis required    [ ] other   Advanced directive:    [ ]pt has hcp     [ ] pt declined to assign hcp  Discussed with pt @ bedside

## 2019-10-11 ENCOUNTER — TRANSCRIPTION ENCOUNTER (OUTPATIENT)
Age: 69
End: 2019-10-11

## 2019-10-11 DIAGNOSIS — C79.9 SECONDARY MALIGNANT NEOPLASM OF UNSPECIFIED SITE: ICD-10-CM

## 2019-10-11 LAB
ANION GAP SERPL CALC-SCNC: 4 MMOL/L — LOW (ref 5–17)
BCA 255 TISS QL IMSTN: 607 U/ML — HIGH
BUN SERPL-MCNC: 23 MG/DL — SIGNIFICANT CHANGE UP (ref 7–23)
CALCIUM SERPL-MCNC: 8.7 MG/DL — SIGNIFICANT CHANGE UP (ref 8.5–10.1)
CANCER AG125 SERPL-ACNC: 27 U/ML — SIGNIFICANT CHANGE UP
CANCER AG27-29 SERPL-ACNC: 572 U/ML — HIGH (ref 0–38.6)
CEA SERPL-MCNC: 29.6 NG/ML — HIGH (ref 0–3.8)
CHLORIDE SERPL-SCNC: 109 MMOL/L — HIGH (ref 96–108)
CO2 SERPL-SCNC: 26 MMOL/L — SIGNIFICANT CHANGE UP (ref 22–31)
CREAT SERPL-MCNC: 0.86 MG/DL — SIGNIFICANT CHANGE UP (ref 0.5–1.3)
ERYTHROCYTE [SEDIMENTATION RATE] IN BLOOD: 107 MM/HR — HIGH (ref 0–20)
FERRITIN SERPL-MCNC: 447 NG/ML — HIGH (ref 15–150)
FOLATE SERPL-MCNC: 12.2 NG/ML — SIGNIFICANT CHANGE UP
GLUCOSE SERPL-MCNC: 95 MG/DL — SIGNIFICANT CHANGE UP (ref 70–99)
HCT VFR BLD CALC: 28.3 % — LOW (ref 34.5–45)
HGB BLD-MCNC: 8.8 G/DL — LOW (ref 11.5–15.5)
INTERPRETATION SERPL IFE-IMP: SIGNIFICANT CHANGE UP
IRON SATN MFR SERPL: 13 % — LOW (ref 14–50)
IRON SATN MFR SERPL: 27 UG/DL — LOW (ref 30–160)
MCHC RBC-ENTMCNC: 25.6 PG — LOW (ref 27–34)
MCHC RBC-ENTMCNC: 31.1 GM/DL — LOW (ref 32–36)
MCV RBC AUTO: 82.3 FL — SIGNIFICANT CHANGE UP (ref 80–100)
NRBC # BLD: 0 /100 WBCS — SIGNIFICANT CHANGE UP (ref 0–0)
PLATELET # BLD AUTO: 486 K/UL — HIGH (ref 150–400)
POTASSIUM SERPL-MCNC: 4.8 MMOL/L — SIGNIFICANT CHANGE UP (ref 3.5–5.3)
POTASSIUM SERPL-SCNC: 4.8 MMOL/L — SIGNIFICANT CHANGE UP (ref 3.5–5.3)
RBC # BLD: 3.44 M/UL — LOW (ref 3.8–5.2)
RBC # FLD: 16.7 % — HIGH (ref 10.3–14.5)
SODIUM SERPL-SCNC: 139 MMOL/L — SIGNIFICANT CHANGE UP (ref 135–145)
TIBC SERPL-MCNC: 209 UG/DL — LOW (ref 220–430)
UIBC SERPL-MCNC: 182 UG/DL — SIGNIFICANT CHANGE UP (ref 110–370)
VIT B12 SERPL-MCNC: 401 PG/ML — SIGNIFICANT CHANGE UP (ref 232–1245)
WBC # BLD: 5.01 K/UL — SIGNIFICANT CHANGE UP (ref 3.8–10.5)
WBC # FLD AUTO: 5.01 K/UL — SIGNIFICANT CHANGE UP (ref 3.8–10.5)

## 2019-10-11 RX ADMIN — LIDOCAINE 1 PATCH: 4 CREAM TOPICAL at 02:33

## 2019-10-11 RX ADMIN — Medication 20 MILLIGRAM(S): at 06:06

## 2019-10-11 RX ADMIN — Medication 10 MILLIGRAM(S): at 12:18

## 2019-10-11 RX ADMIN — LIDOCAINE 1 PATCH: 4 CREAM TOPICAL at 12:18

## 2019-10-11 RX ADMIN — Medication 1 TABLET(S): at 12:18

## 2019-10-11 RX ADMIN — LIDOCAINE 1 PATCH: 4 CREAM TOPICAL at 06:53

## 2019-10-11 RX ADMIN — ENOXAPARIN SODIUM 40 MILLIGRAM(S): 100 INJECTION SUBCUTANEOUS at 12:21

## 2019-10-11 RX ADMIN — LIDOCAINE 1 PATCH: 4 CREAM TOPICAL at 18:21

## 2019-10-11 RX ADMIN — LATANOPROST 1 DROP(S): 0.05 SOLUTION/ DROPS OPHTHALMIC; TOPICAL at 21:15

## 2019-10-11 RX ADMIN — Medication 20 MILLIGRAM(S): at 18:28

## 2019-10-11 NOTE — CHART NOTE - NSCHARTNOTEFT_GEN_A_CORE
Order received from Dr Desai to provide LSO for patient while OOB for support and comfort.  Pt measured and fit with LSO  Demonstration done for patient and relative present.  Written instructions left at bedside  Follow up if needed        Sony Leyva CO  [place] [phone]

## 2019-10-11 NOTE — DISCHARGE NOTE PROVIDER - NSDCCPCAREPLAN_GEN_ALL_CORE_FT
PRINCIPAL DISCHARGE DIAGNOSIS  Diagnosis: Metastasis  Assessment and Plan of Treatment: You were found to have metastic bone lesions. You had a biopsy which showed  -Follow up with your primary care doctor within the week. Follow up with your hematologist and oncologist within the week.      SECONDARY DISCHARGE DIAGNOSES  Diagnosis: Overactive bladder  Assessment and Plan of Treatment: Continue home medication: Oxybutynin    Diagnosis: Essential hypertension  Assessment and Plan of Treatment: Continue home medication: Lisinopril    Diagnosis: Multiple sclerosis  Assessment and Plan of Treatment: Continue home medication Avonex with Aleve prior to injection. Continue Baclofen. Follow up with your neurologist. PRINCIPAL DISCHARGE DIAGNOSIS  Diagnosis: Metastasis  Assessment and Plan of Treatment: You were found to have metastic bone lesions. You had a biopsy which results are still pending. Please follow up with your heme onc physician Dr. Fairbanks within 1 week.   -Follow up with your primary care doctor within the week.      SECONDARY DISCHARGE DIAGNOSES  Diagnosis: Essential hypertension  Assessment and Plan of Treatment: Continue home medication: Lisinopril, follow up with your primary care doctor within 1 week    Diagnosis: Overactive bladder  Assessment and Plan of Treatment: Continue home medication: Oxybutynin,  follow up with your primary care doctor within 1 week    Diagnosis: Multiple sclerosis  Assessment and Plan of Treatment: Continue home medication Avonex with Aleve prior to injection. Continue Baclofen. Follow up with your neurologist at your next visit PRINCIPAL DISCHARGE DIAGNOSIS  Diagnosis: Metastasis  Assessment and Plan of Treatment: You were found to have metastic bone lesions on imaging. You had a biopsy and the results showed marrow fibrosis/crushed artifact. Your blood work showed high cancer markers. It is very important to follow up with your heme onc physician Dr. Fairbanks within the week to discuss the results and further treatment plan.   -Follow up with your primary care doctor within the week.      SECONDARY DISCHARGE DIAGNOSES  Diagnosis: Overactive bladder  Assessment and Plan of Treatment: Continue home medication: Oxybutynin,  follow up with your primary care doctor within 1 week    Diagnosis: Essential hypertension  Assessment and Plan of Treatment: Continue home medication: Lisinopril, follow up with your primary care doctor within 1 week    Diagnosis: Multiple sclerosis  Assessment and Plan of Treatment: Continue home medication Avonex with Aleve prior to injection. Continue Baclofen. Follow up with your neurologist at your next visit

## 2019-10-11 NOTE — PROGRESS NOTE ADULT - PROBLEM SELECTOR PLAN 2
XR performed of LS spine, b/l knees, and hip/pelvis revealing L2 compression fracture and Chronic right-sided superior and inferior pubic rami fractures. Also seen multiple lytic bone lesions on all 3 studies c/w metastatic disease  -Heme/Onc - Dr Woo consulted, recommendations appreciated  -Management as above

## 2019-10-11 NOTE — PROGRESS NOTE ADULT - PROBLEM SELECTOR PLAN 3
Chronic, stable  Advanced in recent months as patient having frequent falls in 3/2019 per chart review, non-ambulatory, uses motor scooter to ambulate (chronic)  -Continue home medication Interferon q weekly on Wednesdays  -Patient takes Aleve 660mg (3 tabs 220mg) prior to injection

## 2019-10-11 NOTE — PHYSICAL THERAPY INITIAL EVALUATION ADULT - ADDITIONAL COMMENTS
Pt states she lives with son in [facility] with basement access she does not use, has ramp entrance and has been w/c bound for 2 years.  Pt stated that she is non-ambulatory and has been getting to her W/C with her son performing a stand pivot transfer.

## 2019-10-11 NOTE — DISCHARGE NOTE PROVIDER - HOSPITAL COURSE
Patient is a 69/F, non-ambulatory, with pmhx significant for multiple sclerosis, Breast Cancer s/p R mastectomy, Osteoporosis, Mitral stenosis, and right ankle fracture presenting to the ED for evaluation of lower back and b/l knee pain over the past 2-3 months, found to have multiple lytic lesions of the spine, pelvis, admit for further management. Patient with history of MS, wheelchair bound, has had difficulty pivoting and lifting herself from bed/chair while on wheelchair. XR performed of LS spine, b/l knees, and hip/pelvis revealing L2 compression fracture and Chronic right-sided superior and inferior pubic rami fractures. Also seen multiple lytic bone lesions on all 3 studies c/w metastatic disease. Ortho, Dr. Desai, evaluated patient. CT L spine and pelvis: Diffuse sclerotic metastatic disease to the lumbar spine, iliac bones, and sacrum. Small sclerotic foci are seen in bilateral femoral bones.  Old fracture of the superior and inferior right pubic rim, stable. CT lumbar: Diffuse sclerotic mets and acute fracture of L2. There is posterior retropulsion resulting in mild-to-moderate spinal canal stenosis. MR Cervical: osseous mets. No cord compression. Multilevel degenerative. Nonspecific high STIR signal surrounding the right lateral paraspinal space at the level of C1-C2. MRI Lumbar and Thoracic showed Innumerable hypointense lesions throughout the thoracic and lumbar spine, and also the sacrum and visualized pelvis, corresponding to diffuse sclerotic osseous metastases. Compression fracture of L2 superior endplate, with associated mild retropulsion resulting in focal canal stenosis. Heme/Onc, Dr. Perez, evaluated patient recommended bone biopsy. Bone biopsy performed with IR showed ___________. Patient is a 69/F, non-ambulatory, with pmhx significant for multiple sclerosis, breast cancer s/p R mastectomy, Osteoporosis, Mitral stenosis, and right ankle fracture presenting to the ED for evaluation of lower back and b/l knee pain over the past 2-3 months, found to have multiple lytic lesions of the spine, pelvis, admit for further management. Patient with history of MS, wheelchair bound, has had difficulty pivoting and lifting herself from bed/chair while on wheelchair. XR performed of LS spine, b/l knees, and hip/pelvis revealing L2 compression fracture and Chronic right-sided superior and inferior pubic rami fractures. Also seen multiple lytic bone lesions on all 3 studies c/w metastatic disease. Ortho, Dr. Desai, evaluated patient. CT L spine and pelvis: Diffuse sclerotic metastatic disease to the lumbar spine, iliac bones, and sacrum. Small sclerotic foci are seen in bilateral femoral bones. Old fracture of the superior and inferior right pubic rim, stable. CT lumbar: Diffuse sclerotic mets and acute fracture of L2. There is posterior retropulsion resulting in mild-to-moderate spinal canal stenosis. MR Cervical: osseous mets. No cord compression. Multilevel degenerative. Nonspecific high STIR signal surrounding the right lateral paraspinal space at the level of C1-C2. MRI Lumbar and Thoracic showed Innumerable hypointense lesions throughout the thoracic and lumbar spine, and also the sacrum and visualized pelvis, corresponding to diffuse sclerotic osseous metastases. Compression fracture of L2 superior endplate, with associated mild retropulsion resulting in focal canal stenosis. Heme/Onc, Dr. Perez, evaluated patient recommended bone biopsy. Bone biopsy performed with IR. Results pending. Pt will follow up with Dr. Fairbanks within 1 week for results.          Pt saw pt and recommended home with assist and manual lift. Pt safe to discharge home with assist and manual lift. Patient is a 69/F, non-ambulatory, with pmhx significant for multiple sclerosis, breast cancer s/p R mastectomy, Osteoporosis, Mitral stenosis, and right ankle fracture presenting to the ED for evaluation of lower back and b/l knee pain over the past 2-3 months, found to have multiple lytic lesions of the spine, pelvis, admit for further management. Patient with history of MS, wheelchair bound, has had difficulty pivoting and lifting herself from bed/chair while on wheelchair. XR performed of LS spine, b/l knees, and hip/pelvis revealing L2 compression fracture and Chronic right-sided superior and inferior pubic rami fractures. Also seen multiple lytic bone lesions on all 3 studies c/w metastatic disease. Ortho, Dr. Desai, evaluated patient. CT L spine and pelvis: Diffuse sclerotic metastatic disease to the lumbar spine, iliac bones, and sacrum. Small sclerotic foci are seen in bilateral femoral bones. Old fracture of the superior and inferior right pubic rim, stable. CT lumbar: Diffuse sclerotic mets and acute fracture of L2. There is posterior retropulsion resulting in mild-to-moderate spinal canal stenosis. MR Cervical: osseous mets. No cord compression. Multilevel degenerative. Nonspecific high STIR signal surrounding the right lateral paraspinal space at the level of C1-C2. MRI Lumbar and Thoracic showed Innumerable hypointense lesions throughout the thoracic and lumbar spine, and also the sacrum and visualized pelvis, corresponding to diffuse sclerotic osseous metastases. Compression fracture of L2 superior endplate, with associated mild retropulsion resulting in focal canal stenosis. Heme/Onc, Dr. Perez, evaluated patient recommended bone biopsy. Bone biopsy performed with IR. Post IR bx bone, heme/onc spoke w pathology only found w marrow fibrosis/crushed artifact. However, CA 27.29,  and CEA all elevated, in view of hx and bone findings, highly suspicious for met breast ca to bones. Pt not very symptomatic, she prefers to f/u w own Oncologist Dr Matthew Fairbanks. instructed to see him ASAP after discharge.         Patient given copy of labs/pathology/imaging reports.     Pt saw pt and recommended home with assist. Pt safe to discharge home with assist with close outpatient follow up.             Vital Signs Last 24 Hrs    T(C): 36.8 (15 Oct 2019 07:47), Max: 37.7 (14 Oct 2019 23:39)    T(F): 98.2 (15 Oct 2019 07:47), Max: 99.9 (14 Oct 2019 23:39)    HR: 64 (15 Oct 2019 07:47) (58 - 67)    BP: 127/64 (15 Oct 2019 07:47) (109/71 - 151/75)    BP(mean): --    RR: 14 (15 Oct 2019 07:47) (14 - 18)    SpO2: 97% (15 Oct 2019 07:47) (96% - 99%)        PHYSICAL EXAM:    GENERAL: NAD, well-groomed, well-developed, lying in bed comfortably     HEAD: AT/NC    EYES: EOMI, PERRL, conjunctiva and sclera clear    ENMT: No tonsillar erythema, exudates, or enlargement; Moist mucous membranes    NERVOUS SYSTEM: Alert & Oriented X3, Good concentration; Good motor strength in B/L upper extremities, chronic weakness in lower extremities (wheelchair bound)    CHEST/LUNG: Clear to percussion bilaterally; No rales, rhonchi, wheezing, or rubs    HEART: Regular rate and rhythm; No murmurs, rubs, or gallops    ABDOMEN: Soft, NT, Nondistended; Bowel sounds present    EXTREMITIES: 2+ Peripheral Pulses, No clubbing, cyanosis, or edema    SKIN: Warm, dry, well-perfused Patient is a 69/F, non-ambulatory, with pmhx significant for multiple sclerosis, breast cancer s/p R mastectomy, Osteoporosis, Mitral stenosis, and right ankle fracture presenting to the ED for evaluation of lower back and b/l knee pain over the past 2-3 months, found to have multiple lytic lesions of the spine, pelvis, admit for further management. Patient with history of MS, wheelchair bound, has had difficulty pivoting and lifting herself from bed/chair while on wheelchair. XR performed of LS spine, b/l knees, and hip/pelvis revealing L2 compression fracture and Chronic right-sided superior and inferior pubic rami fractures. Also seen multiple lytic bone lesions on all 3 studies c/w metastatic disease. Ortho, Dr. Desai, evaluated patient. CT L spine and pelvis: Diffuse sclerotic metastatic disease to the lumbar spine, iliac bones, and sacrum. Small sclerotic foci are seen in bilateral femoral bones. Old fracture of the superior and inferior right pubic rim, stable. CT lumbar: Diffuse sclerotic mets and acute fracture of L2. There is posterior retropulsion resulting in mild-to-moderate spinal canal stenosis. MR Cervical: osseous mets. No cord compression. Multilevel degenerative. Nonspecific high STIR signal surrounding the right lateral paraspinal space at the level of C1-C2. MRI Lumbar and Thoracic showed Innumerable hypointense lesions throughout the thoracic and lumbar spine, and also the sacrum and visualized pelvis, corresponding to diffuse sclerotic osseous metastases. Compression fracture of L2 superior endplate, with associated mild retropulsion resulting in focal canal stenosis. Heme/Onc, Dr. Perez, evaluated patient recommended bone biopsy. Bone biopsy performed with IR. Post IR bx bone, heme/onc spoke w pathology only found w marrow fibrosis/crushed artifact. However, CA 27.29,  and CEA all elevated, in view of hx and bone findings, highly suspicious for met breast ca to bones. Pt not very symptomatic, she prefers to f/u w own Oncologist Dr Matthew Fairbanks instructed to see him ASAP after discharge.         Patient given copy of labs/pathology/imaging reports.     Physical therapy evaluated patient and recommended home with assist. Pt safe to discharge home with assist with close outpatient follow up.             Vital Signs Last 24 Hrs    T(C): 36.5 (16 Oct 2019 07:27), Max: 36.9 (15 Oct 2019 17:22)    T(F): 97.7 (16 Oct 2019 07:27), Max: 98.5 (15 Oct 2019 17:22)    HR: 54 (16 Oct 2019 07:27) (53 - 65)    BP: 131/70 (16 Oct 2019 07:27) (106/65 - 131/70)    BP(mean): --    RR: 19 (16 Oct 2019 07:27) (16 - 19)    SpO2: 98% (16 Oct 2019 07:27) (97% - 98%)        PHYSICAL EXAM:    GENERAL: NAD, lying in bed comfortably     HEAD: AT/NC    EYES: EOMI, PERRL, conjunctiva and sclera clear    ENMT: No tonsillar erythema, exudates, or enlargement; Moist mucous membranes    NERVOUS SYSTEM: Alert & Oriented X3, Good concentration; Good motor strength in B/L upper extremities, chronic weakness in lower extremities (wheelchair bound)    CHEST/LUNG: Clear to percussion bilaterally; No rales, rhonchi, wheezing, or rubs    HEART: Regular rate and rhythm; No murmurs, rubs, or gallops    ABDOMEN: Soft, NT, Nondistended; Bowel sounds present    EXTREMITIES: 2+ Peripheral Pulses, No clubbing, cyanosis, or edema    SKIN: Warm, dry, well-perfused

## 2019-10-11 NOTE — DISCHARGE NOTE PROVIDER - CARE PROVIDER_API CALL
Juice Andujar)  Physician Assistant Services  [address] [place] [address]  Phone: [phone]  Fax: [phone]  Follow Up Time:     Serena Woo)  Hematology; Medical Oncology  [address] [facility], [address]  [place] [address]  Phone: [phone]  Fax: [phone]  Follow Up Time:     Matthew Fairbanks)  Internal Medicine; Medical Oncology  [address] [facility], [address]  [place] [address]  Phone: [phone]  Fax: [phone]  Follow Up Time:     David Philippe)  Neurology  [address] [place] [id number]  Phone: [phone]  Fax: [phone]  Follow Up Time: Juice Andujar)  Physician Assistant Services  [address] [place] [id number]  Phone: [phone]  Fax: [phone]  Follow Up Time: 1 week    Serena Woo)  Hematology; Medical Oncology  [address] [facility], [address]  [place] [address]  Phone: [phone]  Fax: [phone]  Follow Up Time: 1 week    Matthew Fairbanks)  Internal Medicine; Medical Oncology  [address] [facility], [address]  [place] [address]  Phone: [phone]  Fax: [phone]  Follow Up Time: 1 week    David Philippe)  Neurology  [address] [place] [id number]  Phone: [phone]  Fax: [phone]  Follow Up Time: 1 week Juice Andujar)  Physician Assistant Services  [address] [place] [address]  Phone: [phone]  Fax: [phone]  Follow Up Time: 1 week    Serena Woo)  Hematology; Medical Oncology  [address] [facility], [address]  [place] [address]  Phone: [phone]  Fax: [phone]  Follow Up Time:     Matthew Fairbanks)  Internal Medicine; Medical Oncology  [address] [facility], [address]  [place] [address]  Phone: [phone]  Fax: [phone]  Follow Up Time: 1-3 days    David Philippe)  Neurology  [address] [place] [id number]  Phone: [phone]  Fax: [phone]  Follow Up Time:

## 2019-10-11 NOTE — DISCHARGE NOTE PROVIDER - NSDCFUADDINST_GEN_ALL_CORE_FT
Please follow up with raul Willis/onc ASAP within the week.   Follow up with your Primary care Physician within the week.

## 2019-10-11 NOTE — PROGRESS NOTE ADULT - PROBLEM SELECTOR PLAN 1
Patient complaining of lower back, b/l knee pain over 2-3 months, has hx of a fall from jame lift 7/17/2019 at rehab facility  -Found to have age-indeterminate compressive fracture of L2 on XR along with multiple lytic lesions  -Has hx of Breast Ca s/p R mastectomy, ?source/primary?  -Pain control with lidocaine patch and oxycodone  -Orthopedics following   - CT L spine and pelvis: Diffuse sclerotic metastatic disease to the lumbar spine, iliac bones, and sacrum. Small sclerotic foci are seen in bilateral femoral bones.  Old fracture of the superior and inferior right pubic rim, stable.  - CT lumbar: Diffuse sclerotic mets and acute fracture of L2. There is   posterior retropulsion resulting in mild-to-moderate spinal canal   stenosis.  - MR Cervical: osseous mets. No cord compression. Multilevel degenerative. Nonspecific high STIR signal surrounding the right lateral paraspinal   space at the level of C1-C2. MRI cervical: Innumerable hypointense lesions throughout the thoracic and lumbar spine, and also the sacrum and visualized pelvis, corresponding to diffuse sclerotic osseous metastases  -Plan for bone biopsy with IR, per heme/onc recommendations Patient complaining of lower back, b/l knee pain over 2-3 months, has hx of a fall from jame lift 7/17/2019 at rehab facility  -Found to have age-indeterminate compressive fracture of L2 on XR along with multiple lytic lesions  -Has hx of Breast Ca s/p R mastectomy, ?source/primary?  -Pain control with lidocaine patch and oxycodone  -Orthopedics following   - CT L spine and pelvis: Diffuse sclerotic metastatic disease to the lumbar spine, iliac bones, and sacrum. Small sclerotic foci are seen in bilateral femoral bones.  Old fracture of the superior and inferior right pubic rim, stable.  - CT lumbar: Diffuse sclerotic mets and acute fracture of L2. There is   posterior retropulsion resulting in mild-to-moderate spinal canal   stenosis.  - MR Cervical: osseous mets. No cord compression. Multilevel degenerative. Nonspecific high STIR signal surrounding the right lateral paraspinal   space at the level of C1-C2. MRI cervical: Innumerable hypointense lesions throughout the thoracic and lumbar spine, and also the sacrum and visualized pelvis, corresponding to diffuse sclerotic osseous metastases  -Plan for bone biopsy with IR on Monday, per heme/onc recommendations. NPO after midnight, hold Lovenox on Sunday. Patient aware and in agreement.

## 2019-10-11 NOTE — PROGRESS NOTE ADULT - PROBLEM SELECTOR PLAN 5
-Continue oxybutynin 10mg ER qd, methenamine hippurate 1g BID  - C/W Starks cath replaced in ED (patient states she straight caths at home)

## 2019-10-11 NOTE — PROGRESS NOTE ADULT - SUBJECTIVE AND OBJECTIVE BOX
Patient is a 69y old  Female who presents with a chief complaint of Low back and knee pain (11 Oct 2019 06:25)      INTERVAL HPI/OVERNIGHT EVENTS: Patient seen and examined at bedside, resting comfortable. Wheelchair/bed bound, denies pain. Plan for bone biopsy, per heme/onc recommendation.     MEDICATIONS  (STANDING):  baclofen 20 milliGRAM(s) Oral two times a day  enoxaparin Injectable 40 milliGRAM(s) SubCutaneous daily  interferon beta-1a Injectable (AVONEX) 30 MICROGram(s) IntraMuscular every 7 days  latanoprost 0.005% Ophthalmic Solution 1 Drop(s) Both EYES at bedtime  lidocaine   Patch 1 Patch Transdermal daily  lisinopril 20 milliGRAM(s) Oral daily  multivitamin/minerals 1 Tablet(s) Oral daily  oxybutynin 10 milliGRAM(s) Oral daily    MEDICATIONS  (PRN):  acetaminophen   Tablet .. 650 milliGRAM(s) Oral every 6 hours PRN Temp greater or equal to 38C (100.4F), Mild Pain (1 - 3)  oxyCODONE    5 mG/acetaminophen 325 mG 1 Tablet(s) Oral every 4 hours PRN Moderate Pain (4 - 6)      Allergies    Sudafed (Other)    Intolerances        REVIEW OF SYSTEMS:  CONSTITUTIONAL: No fever, No chills  EYES: No eye pain, visual disturbances, or discharge  ENMT:  No ear pain; No sinus or throat pain  NECK: No pain, No stiffness  RESPIRATORY: No cough, wheezing, No hemoptysis; No shortness of breath  CARDIOVASCULAR: No chest pain, palpitations, leg swelling  GASTROINTESTINAL: No abdominal or epigastric pain. No nausea, No vomiting; No diarrhea or constipation. [ ] BM  GENITOURINARY: No dysuria, No frequency, No urgency, No hematuria, or incontinence  NEUROLOGICAL: alert and oriented x 3,  chronic inability to lift legs  SKIN:  No itching, burning, rashes, or lesions   MUSCULOSKELETAL: No joint pain or swelling, No extremity pain  PSYCHIATRIC: No depression, anxiety, mood swings, or difficulty sleeping  ROS  [ ] Unable to obtain   REST OF REVIEW Of SYSTEM - [ ] Normal     Height (cm): 167.64 (10-09 @ 14:51)  Weight (kg): 63.5 (10-09 @ 14:51)  BMI (kg/m2): 22.6 (10-09 @ 14:51)  BSA (m2): 1.72 (10-09 @ 14:51)  Vital Signs Last 24 Hrs  T(C): 36.6 (11 Oct 2019 08:14), Max: 37.1 (10 Oct 2019 15:19)  T(F): 97.9 (11 Oct 2019 08:14), Max: 98.7 (10 Oct 2019 15:19)  HR: 59 (11 Oct 2019 08:14) (55 - 67)  BP: 127/77 (11 Oct 2019 08:14) (93/56 - 160/80)  BP(mean): --  RR: 18 (11 Oct 2019 08:14) (17 - 18)  SpO2: 100% (11 Oct 2019 08:14) (91% - 100%)  [ ] room air   [ ] 02    PHYSICAL EXAM:  GENERAL:  No acute distress, well appearing   HEAD:  AT/NC  ENMT: EOMI, PERRL, moist mucus membranes  NECK:  supple    NERVOUS SYSTEM:  Alert & Oriented X3, unable to lift her lower extremities bilaterally (chronic per patient), grossly moves upper extremities, able to wiggle her toes  CHEST/LUNG: Clear to auscultation bilaterally, no wheezing  HEART:  Regular rate and rhythm, No murmurs, rubs, or gallops  ABDOMEN:  soft, nontender, nondistended, positive bowel sounds  EXTREMITIES: No clubbing, cyanosis or edema  SKIN: no venous stasis skin changes    LABS:                        8.8    5.01  )-----------( 486      ( 11 Oct 2019 06:45 )             28.3     11 Oct 2019 06:45    139    |  109    |  23     ----------------------------<  95     4.8     |  26     |  0.86     Ca    8.7        11 Oct 2019 06:45      PT/INR - ( 09 Oct 2019 19:28 )   PT: 12.9 sec;   INR: 1.14 ratio         PTT - ( 09 Oct 2019 19:28 )  PTT:29.9 sec      CAPILLARY BLOOD GLUCOSE        Cultures          RADIOLOGY & ADDITIONAL TESTS:      Care Discussed with [X] Consultants  [ ] Patient  [ ] Family  [X]   /   [ ] Other; RN  DVT prophylaxis [x ] lovenox   [ ] subq heparin  [ ] coumadin  [ ] venodynes [ ] ambulating frequently at how risk for vte and no pharm         or  mechanical prophylaxis required    [ ] other   Advanced directive:    [ ]pt has hcp     [ ] pt declined to assign hcp  Discussed with pt @ bedside Patient is a 69y old  Female who presents with a chief complaint of Low back and knee pain (11 Oct 2019 06:25)      INTERVAL HPI/OVERNIGHT EVENTS: Patient seen and examined at bedside, resting comfortable. Wheelchair/bed bound, denies pain. Plan for bone biopsy, per heme/onc recommendation. Discussed with IR Dr Victor, plan for Monday.     MEDICATIONS  (STANDING):  baclofen 20 milliGRAM(s) Oral two times a day  enoxaparin Injectable 40 milliGRAM(s) SubCutaneous daily  interferon beta-1a Injectable (AVONEX) 30 MICROGram(s) IntraMuscular every 7 days  latanoprost 0.005% Ophthalmic Solution 1 Drop(s) Both EYES at bedtime  lidocaine   Patch 1 Patch Transdermal daily  lisinopril 20 milliGRAM(s) Oral daily  multivitamin/minerals 1 Tablet(s) Oral daily  oxybutynin 10 milliGRAM(s) Oral daily    MEDICATIONS  (PRN):  acetaminophen   Tablet .. 650 milliGRAM(s) Oral every 6 hours PRN Temp greater or equal to 38C (100.4F), Mild Pain (1 - 3)  oxyCODONE    5 mG/acetaminophen 325 mG 1 Tablet(s) Oral every 4 hours PRN Moderate Pain (4 - 6)      Allergies    Sudafed (Other)    Intolerances        REVIEW OF SYSTEMS:  CONSTITUTIONAL: No fever, No chills  EYES: No eye pain, visual disturbances, or discharge  ENMT:  No ear pain; No sinus or throat pain  NECK: No pain, No stiffness  RESPIRATORY: No cough, wheezing, No hemoptysis; No shortness of breath  CARDIOVASCULAR: No chest pain, palpitations, leg swelling  GASTROINTESTINAL: No abdominal or epigastric pain. No nausea, No vomiting; No diarrhea or constipation. [ ] BM  GENITOURINARY: No dysuria, No frequency, No urgency, No hematuria, or incontinence  NEUROLOGICAL: alert and oriented x 3,  chronic inability to lift legs  SKIN:  No itching, burning, rashes, or lesions   MUSCULOSKELETAL: No joint pain or swelling, No extremity pain  PSYCHIATRIC: No depression, anxiety, mood swings, or difficulty sleeping  ROS  [ ] Unable to obtain   REST OF REVIEW Of SYSTEM - [ ] Normal     Height (cm): 167.64 (10-09 @ 14:51)  Weight (kg): 63.5 (10-09 @ 14:51)  BMI (kg/m2): 22.6 (10-09 @ 14:51)  BSA (m2): 1.72 (10-09 @ 14:51)  Vital Signs Last 24 Hrs  T(C): 36.6 (11 Oct 2019 08:14), Max: 37.1 (10 Oct 2019 15:19)  T(F): 97.9 (11 Oct 2019 08:14), Max: 98.7 (10 Oct 2019 15:19)  HR: 59 (11 Oct 2019 08:14) (55 - 67)  BP: 127/77 (11 Oct 2019 08:14) (93/56 - 160/80)  BP(mean): --  RR: 18 (11 Oct 2019 08:14) (17 - 18)  SpO2: 100% (11 Oct 2019 08:14) (91% - 100%)  [ ] room air   [ ] 02    PHYSICAL EXAM:  GENERAL:  No acute distress, well appearing   HEAD:  AT/NC  ENMT: EOMI, PERRL, moist mucus membranes  NECK:  supple    NERVOUS SYSTEM:  Alert & Oriented X3, unable to lift her lower extremities bilaterally (chronic per patient), grossly moves upper extremities, able to wiggle her toes  CHEST/LUNG: Clear to auscultation bilaterally, no wheezing  HEART:  Regular rate and rhythm, No murmurs, rubs, or gallops  ABDOMEN:  soft, nontender, nondistended, positive bowel sounds  EXTREMITIES: No clubbing, cyanosis or edema  SKIN: no venous stasis skin changes    LABS:                        8.8    5.01  )-----------( 486      ( 11 Oct 2019 06:45 )             28.3     11 Oct 2019 06:45    139    |  109    |  23     ----------------------------<  95     4.8     |  26     |  0.86     Ca    8.7        11 Oct 2019 06:45      PT/INR - ( 09 Oct 2019 19:28 )   PT: 12.9 sec;   INR: 1.14 ratio         PTT - ( 09 Oct 2019 19:28 )  PTT:29.9 sec      CAPILLARY BLOOD GLUCOSE        Cultures          RADIOLOGY & ADDITIONAL TESTS:      Care Discussed with [X] Consultants  [ ] Patient  [ ] Family  [X]   /   [ ] Other; RN  DVT prophylaxis [x ] lovenox   [ ] subq heparin  [ ] coumadin  [ ] venodynes [ ] ambulating frequently at how risk for vte and no pharm         or  mechanical prophylaxis required    [ ] other   Advanced directive:    [ ]pt has hcp     [ ] pt declined to assign hcp  Discussed with pt @ bedside

## 2019-10-11 NOTE — PROGRESS NOTE ADULT - ASSESSMENT
[ASSESSMENT and  PLAN]  Hx of breast cancer, early stage, reportedly. Now presenting with bone pain with diffuse bone metastasis  Will need biopsy to confirm dx, of recurrence and re-eval receptor status.     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   Colace BID  Senna QD PRN constipation    Will need biopsy of lesions.     Transfuse PRBC as clinically indicated.   Transfuse PRBC if Hgb <7.0 or if symptomatic.   Follow CBC    Check Anemia studies.   Check SIFE, UIFE  Check B12, Folate, Ferritin    check CEA, CA27-29    DVT Prophyalxis: Lovenox    continue present pain management  IR Biopsy scheduled for money

## 2019-10-11 NOTE — PROGRESS NOTE ADULT - ASSESSMENT
A/P:  69y female w/ blastic lesions of the lumbar spine, pelvis and L2 VCF    -Medical management per primary team  -Multimodal analgesia - recommend low dose opioids and acetaminophen as tolerated  -DVT ppx per primary  -SCDs  -Recommend LSO while OOB for comfort  -PT/OT  -WBAT in LSO for comfort  -FU heme/onc recs  -Discharge planning  -Ortho stable for discharge  -All patient's questions answered. Patient acknowledged understanding and agreement w/ plan.  -Follow up w/ Dr. Desai outpatient 1-2 weeks after discharge. Call office to schedule appointment.  -Discussed w/ Dr. Desai who is aware and agrees w/ above plan.

## 2019-10-11 NOTE — PROGRESS NOTE ADULT - SUBJECTIVE AND OBJECTIVE BOX
Patient seen and evaluated at bedside, resting comfortably. Patient feeling well. No acute overnight events. Denies fevers/chills/CP/SOB. No other complaints at this time.                          8.8    7.78  )-----------( 524      ( 10 Oct 2019 06:48 )             27.7   10-10    140  |  106  |  25<H>  ----------------------------<  109<H>  4.5   |  25  |  0.84    Ca    8.8      10 Oct 2019 06:48    TPro  7.1  /  Alb  2.4<L>  /  TBili  0.2  /  DBili  x   /  AST  33  /  ALT  23  /  AlkPhos  155<H>  10-10    Vital Signs Last 24 Hrs  T(C): 36.4 (10 Oct 2019 23:46), Max: 37.3 (10 Oct 2019 07:51)  T(F): 97.6 (10 Oct 2019 23:46), Max: 99.1 (10 Oct 2019 07:51)  HR: 55 (11 Oct 2019 06:05) (55 - 78)  BP: 114/74 (11 Oct 2019 06:05) (93/56 - 160/80)  BP(mean): --  RR: 17 (10 Oct 2019 23:46) (17 - 19)  SpO2: 99% (10 Oct 2019 23:46) (91% - 100%)      PE:   Gen: Awake and alert, Oriented x 3, following commands  Spine:  Right Upper extremity: Delt 5/5, Biceps 5/5, Triceps 5/5, Wrist Ext 5/5, Wrist Flexion 5/5,  Strength 5/5  SILT C5-S1,   +RP, Compartments soft    Left Upper extremity: Delt 5/5, Biceps 5/5, Triceps 5/5, Wrist Ext 5/5, Wrist Flexion 5/5,  Strength 5/5  SILT C5-S1,   +RP, Compartments soft    Right Lower Extremity: Hip Flexion 2/5, Hip Extension 2/5, Knee Flexion 4/5, Knee Extension 4/5, Ankle Dorsiflexion 4/5, Ankle Plantar Flexion 4/5, Extensor hallucis Longus 4/5  SILT L2-S1  No pain with SLR  Negative Clonus  +DP  Compartments soft           Left Lower Extremity: Hip Flexion 4/5, Hip Extension 4/5, Knee Flexion 4/5, Knee Extension 4/5, Ankle Dorsiflexion 4/5, Ankle Plantar Flexion 4/5, Extensor hallucis Longus 4/5  SILT L2-S1  No pain with SLR  Negative Clonus  +DP  Compartments soft

## 2019-10-11 NOTE — DISCHARGE NOTE PROVIDER - PROVIDER TOKENS
PROVIDER:[TOKEN:[[id number]]],PROVIDER:[[id number]]],PROVIDER:[[id number]]],PROVIDER:[TOKEN:[[id number]]] PROVIDER:[TOKEN:[[id number]],FOLLOWUP:[1 week]],PROVIDER:[[id number]],FOLLOWUP:[1 week]],PROVIDER:[[id number]],FOLLOWUP:[1 week]],PROVIDER:[TOKEN:[[id number]],FOLLOWUP:[1 week]] PROVIDER:[TOKEN:[[id number]],FOLLOWUP:[1 week]],PROVIDER:[TOKEN:[[id number]]],PROVIDER:[[id number]],FOLLOWUP:[1-3 days]],PROVIDER:[TOKEN:[[id number]]]

## 2019-10-11 NOTE — PROGRESS NOTE ADULT - SUBJECTIVE AND OBJECTIVE BOX
Interval History:  pain is under control    Chart reviewed and events noted;   Overnight events:    MEDICATIONS  (STANDING):  baclofen 20 milliGRAM(s) Oral two times a day  enoxaparin Injectable 40 milliGRAM(s) SubCutaneous daily  interferon beta-1a Injectable (AVONEX) 30 MICROGram(s) IntraMuscular every 7 days  latanoprost 0.005% Ophthalmic Solution 1 Drop(s) Both EYES at bedtime  lidocaine   Patch 1 Patch Transdermal daily  lisinopril 20 milliGRAM(s) Oral daily  multivitamin/minerals 1 Tablet(s) Oral daily  oxybutynin 10 milliGRAM(s) Oral daily    MEDICATIONS  (PRN):  acetaminophen   Tablet .. 650 milliGRAM(s) Oral every 6 hours PRN Temp greater or equal to 38C (100.4F), Mild Pain (1 - 3)  oxyCODONE    5 mG/acetaminophen 325 mG 1 Tablet(s) Oral every 4 hours PRN Moderate Pain (4 - 6)      Vital Signs Last 24 Hrs  T(C): 36.6 (11 Oct 2019 08:14), Max: 37.1 (10 Oct 2019 15:19)  T(F): 97.9 (11 Oct 2019 08:14), Max: 98.7 (10 Oct 2019 15:19)  HR: 59 (11 Oct 2019 08:14) (55 - 67)  BP: 127/77 (11 Oct 2019 08:14) (93/56 - 160/80)  BP(mean): --  RR: 18 (11 Oct 2019 08:14) (17 - 18)  SpO2: 100% (11 Oct 2019 08:14) (91% - 100%)    PHYSICAL EXAM  General: adult in NAD, chronically ill appearing  HEENT: clear oropharynx, anicteric sclera, pink conjunctivae  Neck: supple  CV: normal S1S2 with no murmur rubs or gallops  Lungs: clear to auscultation, no wheezes, no rhales  Abdomen: soft non-tender non-distended, no hepato/splenomegaly  Ext: no clubbing cyanosis or edema  Skin: no rashes and no petichiae  Neuro: alert and oriented X3 no focal deficits      LABS:  CBC Full  -  ( 11 Oct 2019 06:45 )  WBC Count : 5.01 K/uL  RBC Count : 3.44 M/uL  Hemoglobin : 8.8 g/dL  Hematocrit : 28.3 %  Platelet Count - Automated : 486 K/uL  Mean Cell Volume : 82.3 fl  Mean Cell Hemoglobin : 25.6 pg  Mean Cell Hemoglobin Concentration : 31.1 gm/dL  Auto Neutrophil # : x  Auto Lymphocyte # : x  Auto Monocyte # : x  Auto Eosinophil # : x  Auto Basophil # : x  Auto Neutrophil % : x  Auto Lymphocyte % : x  Auto Monocyte % : x  Auto Eosinophil % : x  Auto Basophil % : x    10-11    139  |  109<H>  |  23  ----------------------------<  95  4.8   |  26  |  0.86    Ca    8.7      11 Oct 2019 06:45    TPro  7.1  /  Alb  2.4<L>  /  TBili  0.2  /  DBili  x   /  AST  33  /  ALT  23  /  AlkPhos  155<H>  10-10    PT/INR - ( 09 Oct 2019 19:28 )   PT: 12.9 sec;   INR: 1.14 ratio         PTT - ( 09 Oct 2019 19:28 )  PTT:29.9 sec    fe studies  Iron - Total Binding Capacity.: 209 ug/dL (10-11 @ 08:36)      WBC trend  5.01 K/uL (10-11-19 @ 06:45)  7.78 K/uL (10-10-19 @ 06:48)  8.47 K/uL (10-09-19 @ 19:28)      Hgb trend  8.8 g/dL (10-11-19 @ 06:45)  8.8 g/dL (10-10-19 @ 06:48)  9.2 g/dL (10-09-19 @ 19:28)      plt trend  486 K/uL (10-11-19 @ 06:45)  524 K/uL (10-10-19 @ 06:48)  561 K/uL (10-09-19 @ 19:28)        RADIOLOGY & ADDITIONAL STUDIES:

## 2019-10-11 NOTE — PHYSICAL THERAPY INITIAL EVALUATION ADULT - PERTINENT HX OF CURRENT PROBLEM, REHAB EVAL
Pt is 68 y.o. F with hx MS who presented with c/o weakness and back pain.  Pt with L2 compression fx CT lumbar spine and diffuse osseus mets on cervical CT.

## 2019-10-11 NOTE — PROGRESS NOTE ADULT - ASSESSMENT
Patient is a 69/F, non-ambulatory, with pmhx significant for multiple sclerosis, Breast Cancer s/p R mastectomy, Osteoporosis, Mitral stenosis, and right ankle fracture presenting to the ED for evaluation of lower back and b/l knee pain over the past 2-3 months, found to have multiple lytic lesions of the spine and  pelvis, admit for further management.

## 2019-10-11 NOTE — PHYSICAL THERAPY INITIAL EVALUATION ADULT - CRITERIA FOR SKILLED THERAPEUTIC INTERVENTIONS
anticipated discharge recommendation/predicted duration of therapy intervention/impairments found/rehab potential/therapy frequency/anticipated equipment needs at discharge/functional limitations in following categories/risk reduction/prevention

## 2019-10-12 LAB
ANION GAP SERPL CALC-SCNC: 5 MMOL/L — SIGNIFICANT CHANGE UP (ref 5–17)
BUN SERPL-MCNC: 18 MG/DL — SIGNIFICANT CHANGE UP (ref 7–23)
CALCIUM SERPL-MCNC: 8.7 MG/DL — SIGNIFICANT CHANGE UP (ref 8.5–10.1)
CHLORIDE SERPL-SCNC: 103 MMOL/L — SIGNIFICANT CHANGE UP (ref 96–108)
CO2 SERPL-SCNC: 28 MMOL/L — SIGNIFICANT CHANGE UP (ref 22–31)
CREAT SERPL-MCNC: 0.71 MG/DL — SIGNIFICANT CHANGE UP (ref 0.5–1.3)
GLUCOSE SERPL-MCNC: 110 MG/DL — HIGH (ref 70–99)
HCT VFR BLD CALC: 28.3 % — LOW (ref 34.5–45)
HGB BLD-MCNC: 9 G/DL — LOW (ref 11.5–15.5)
MCHC RBC-ENTMCNC: 25.6 PG — LOW (ref 27–34)
MCHC RBC-ENTMCNC: 31.8 GM/DL — LOW (ref 32–36)
MCV RBC AUTO: 80.6 FL — SIGNIFICANT CHANGE UP (ref 80–100)
NRBC # BLD: 0 /100 WBCS — SIGNIFICANT CHANGE UP (ref 0–0)
PLATELET # BLD AUTO: 478 K/UL — HIGH (ref 150–400)
POTASSIUM SERPL-MCNC: 4.7 MMOL/L — SIGNIFICANT CHANGE UP (ref 3.5–5.3)
POTASSIUM SERPL-SCNC: 4.7 MMOL/L — SIGNIFICANT CHANGE UP (ref 3.5–5.3)
RBC # BLD: 3.51 M/UL — LOW (ref 3.8–5.2)
RBC # FLD: 16.4 % — HIGH (ref 10.3–14.5)
SODIUM SERPL-SCNC: 136 MMOL/L — SIGNIFICANT CHANGE UP (ref 135–145)
WBC # BLD: 7.74 K/UL — SIGNIFICANT CHANGE UP (ref 3.8–10.5)
WBC # FLD AUTO: 7.74 K/UL — SIGNIFICANT CHANGE UP (ref 3.8–10.5)

## 2019-10-12 RX ORDER — ONDANSETRON 8 MG/1
4 TABLET, FILM COATED ORAL ONCE
Refills: 0 | Status: COMPLETED | OUTPATIENT
Start: 2019-10-12 | End: 2019-10-12

## 2019-10-12 RX ADMIN — Medication 1 TABLET(S): at 11:41

## 2019-10-12 RX ADMIN — LIDOCAINE 1 PATCH: 4 CREAM TOPICAL at 11:41

## 2019-10-12 RX ADMIN — ENOXAPARIN SODIUM 40 MILLIGRAM(S): 100 INJECTION SUBCUTANEOUS at 11:41

## 2019-10-12 RX ADMIN — LIDOCAINE 1 PATCH: 4 CREAM TOPICAL at 23:00

## 2019-10-12 RX ADMIN — Medication 650 MILLIGRAM(S): at 23:44

## 2019-10-12 RX ADMIN — LIDOCAINE 1 PATCH: 4 CREAM TOPICAL at 01:04

## 2019-10-12 RX ADMIN — Medication 10 MILLIGRAM(S): at 11:41

## 2019-10-12 RX ADMIN — LATANOPROST 1 DROP(S): 0.05 SOLUTION/ DROPS OPHTHALMIC; TOPICAL at 21:11

## 2019-10-12 RX ADMIN — Medication 20 MILLIGRAM(S): at 06:16

## 2019-10-12 RX ADMIN — ONDANSETRON 4 MILLIGRAM(S): 8 TABLET, FILM COATED ORAL at 12:33

## 2019-10-12 RX ADMIN — Medication 20 MILLIGRAM(S): at 17:06

## 2019-10-12 RX ADMIN — LISINOPRIL 20 MILLIGRAM(S): 2.5 TABLET ORAL at 06:16

## 2019-10-12 NOTE — PROGRESS NOTE ADULT - PROBLEM SELECTOR PLAN 1
Patient complaining of lower back, b/l knee pain over 2-3 months, has hx of a fall from jame lift 7/17/2019 at rehab facility  -Found to have age-indeterminate compressive fracture of L2 on XR along with multiple lytic lesions  -Has hx of Breast Ca s/p R mastectomy, ?source/primary?  -Pain control with lidocaine patch and oxycodone  -Orthopedics following   - CT L spine and pelvis: Diffuse sclerotic metastatic disease to the lumbar spine, iliac bones, and sacrum. Small sclerotic foci are seen in bilateral femoral bones.  Old fracture of the superior and inferior right pubic rim, stable.  - CT lumbar: Diffuse sclerotic mets and acute fracture of L2. There is   posterior retropulsion resulting in mild-to-moderate spinal canal   stenosis.  - MR Cervical: osseous mets. No cord compression. Multilevel degenerative. Nonspecific high STIR signal surrounding the right lateral paraspinal   space at the level of C1-C2. MRI cervical: Innumerable hypointense lesions throughout the thoracic and lumbar spine, and also the sacrum and visualized pelvis, corresponding to diffuse sclerotic osseous metastases  -Plan for bone biopsy with IR on Monday, per heme/onc recommendations. NPO after midnight, hold Lovenox on Sunday. Patient aware and in agreement.

## 2019-10-12 NOTE — PROGRESS NOTE ADULT - SUBJECTIVE AND OBJECTIVE BOX
Patient is a 69y old  Female who presents with a chief complaint of Low back and knee pain (11 Oct 2019 14:40)      INTERVAL HPI/OVERNIGHT EVENTS:  T(C): 37.7 (10-12-19 @ 06:13), Max: 37.7 (10-12-19 @ 06:13)  HR: 65 (10-12-19 @ 07:38) (59 - 77)  BP: 125/76 (10-12-19 @ 07:38) (117/65 - 156/75)  RR: 18 (10-12-19 @ 06:13) (17 - 18)  SpO2: 97% (10-12-19 @ 06:13) (95% - 100%)  Wt(kg): --  I&O's Summary    11 Oct 2019 07:01  -  12 Oct 2019 07:00  --------------------------------------------------------  IN: 0 mL / OUT: 1100 mL / NET: -1100 mL        LABS:                        9.0    7.74  )-----------( 478      ( 12 Oct 2019 07:59 )             28.3     10-12    136  |  103  |  18  ----------------------------<  110<H>  4.7   |  28  |  0.71    Ca    8.7      12 Oct 2019 07:59          CAPILLARY BLOOD GLUCOSE                MEDICATIONS  (STANDING):  baclofen 20 milliGRAM(s) Oral two times a day  enoxaparin Injectable 40 milliGRAM(s) SubCutaneous daily  interferon beta-1a Injectable (AVONEX) 30 MICROGram(s) IntraMuscular every 7 days  latanoprost 0.005% Ophthalmic Solution 1 Drop(s) Both EYES at bedtime  lidocaine   Patch 1 Patch Transdermal daily  lisinopril 20 milliGRAM(s) Oral daily  multivitamin/minerals 1 Tablet(s) Oral daily  oxybutynin 10 milliGRAM(s) Oral daily    MEDICATIONS  (PRN):  acetaminophen   Tablet .. 650 milliGRAM(s) Oral every 6 hours PRN Temp greater or equal to 38C (100.4F), Mild Pain (1 - 3)  oxyCODONE    5 mG/acetaminophen 325 mG 1 Tablet(s) Oral every 4 hours PRN Moderate Pain (4 - 6)      REVIEW OF SYSTEMS:  CONSTITUTIONAL: No fever, weight loss, or fatigue  EYES: No eye pain, visual disturbances, or discharge  ENMT:  No difficulty hearing, tinnitus, vertigo; No sinus or throat pain  NECK: No pain or stiffness  RESPIRATORY: No cough, wheezing, chills or hemoptysis; No shortness of breath  CARDIOVASCULAR: No chest pain, palpitations, dizziness, or leg swelling  GASTROINTESTINAL: No abdominal or epigastric pain. No nausea, vomiting, or hematemesis; No diarrhea or constipation. No melena or hematochezia.  NEUROLOGICAL: No headaches, memory loss, loss of strength, numbness, or tremors  MUSCULOSKELETAL: No joint pain or swelling; No muscle, back, or extremity pain      RADIOLOGY & ADDITIONAL TESTS:    Imaging Personally Reviewed:  [ ] YES  [ ] NO    Consultant(s) Notes Reviewed:  [ ] YES  [ ] NO    PHYSICAL EXAM:    NECK: Supple, No JVD, Normal thyroid  NERVOUS SYSTEM:  Alert & Oriented X3, Good concentration; Motor Strength 5/5 B/L upper and lower extremities; DTRs 2+ intact and symmetric  CHEST/LUNG: Clear to percussion bilaterally; No rales, rhonchi, wheezing, or rubs  HEART: Regular rate and rhythm; No murmurs, rubs, or gallops  ABDOMEN: Soft, Nontender, Nondistended; Bowel sounds present  EXTREMITIES:  2+ Peripheral Pulses, No clubbing, cyanosis, or edema      Care Discussed with Consultants/Other Providers [ ] YES  [ ] NO

## 2019-10-12 NOTE — PROGRESS NOTE ADULT - SUBJECTIVE AND OBJECTIVE BOX
All interim records and events noted.    awake alert, min back pain  awaiting bone bx Monday      MEDICATIONS  (STANDING):  baclofen 20 milliGRAM(s) Oral two times a day  enoxaparin Injectable 40 milliGRAM(s) SubCutaneous daily  interferon beta-1a Injectable (AVONEX) 30 MICROGram(s) IntraMuscular every 7 days  latanoprost 0.005% Ophthalmic Solution 1 Drop(s) Both EYES at bedtime  lidocaine   Patch 1 Patch Transdermal daily  lisinopril 20 milliGRAM(s) Oral daily  multivitamin/minerals 1 Tablet(s) Oral daily  oxybutynin 10 milliGRAM(s) Oral daily    MEDICATIONS  (PRN):  acetaminophen   Tablet .. 650 milliGRAM(s) Oral every 6 hours PRN Temp greater or equal to 38C (100.4F), Mild Pain (1 - 3)  oxyCODONE    5 mG/acetaminophen 325 mG 1 Tablet(s) Oral every 4 hours PRN Moderate Pain (4 - 6)      Vital Signs Last 24 Hrs  T(C): 37.7 (12 Oct 2019 06:13), Max: 37.7 (12 Oct 2019 06:13)  T(F): 99.9 (12 Oct 2019 06:13), Max: 99.9 (12 Oct 2019 06:13)  HR: 65 (12 Oct 2019 07:38) (59 - 77)  BP: 125/76 (12 Oct 2019 07:38) (117/65 - 156/75)  BP(mean): --  RR: 18 (12 Oct 2019 06:13) (17 - 18)  SpO2: 97% (12 Oct 2019 06:13) (95% - 100%)    PHYSICAL EXAM  General: well developed  well nourished, in no acute distress  Head: atraumatic, normocephalic  ENT: sclera anicteric, buccal mucosa moist  Neck: supple, trachea midline  CV: S1 S2, regular rate and rhythm  Lungs: clear to auscultation, no wheezes/rhonchi  Abdomen: soft, nontender, bowel sounds present, no palpable hepatosplenomegaly  Extrem: no clubbing/cyanosis/edema  Skin: no significant increased ecchymosis/petechiae  Neuro: alert and oriented X3      LABS:             9.0    7.74  )-----------( 478      ( 10-12 @ 07:59 )             28.3                8.8    5.01  )-----------( 486      ( 10-11 @ 06:45 )             28.3                8.8    7.78  )-----------( 524      ( 10-10 @ 06:48 )             27.7                9.2    8.47  )-----------( 561      ( 10-09 @ 19:28 )             28.9       10-12    136  |  103  |  18  ----------------------------<  110<H>  4.7   |  28  |  0.71    Ca    8.7      12 Oct 2019 07:59      10-09 @ 19:28  PT12.9 INR1.14  PTT29.9      RADIOLOGY & ADDITIONAL STUDIES:    IMPRESSION/RECOMMENDATIONS:

## 2019-10-12 NOTE — PROGRESS NOTE ADULT - ASSESSMENT
68 y/o woman w MS and nonambulatory, recently in Rehab. Hx right breast ca post MRM 2007 and 10 years of adj Tamoxifen, under care Dr CHRISTA Fairbanks but not seen for a while. Found w multiple bone mets, for IR bone bx on Monday    Anemia likely due to above, stable  Clinically not very symptomatic  Await bone bx for histology  Continue present acute care

## 2019-10-13 LAB
ANION GAP SERPL CALC-SCNC: 6 MMOL/L — SIGNIFICANT CHANGE UP (ref 5–17)
BUN SERPL-MCNC: 21 MG/DL — SIGNIFICANT CHANGE UP (ref 7–23)
CALCIUM SERPL-MCNC: 8.6 MG/DL — SIGNIFICANT CHANGE UP (ref 8.5–10.1)
CHLORIDE SERPL-SCNC: 101 MMOL/L — SIGNIFICANT CHANGE UP (ref 96–108)
CO2 SERPL-SCNC: 27 MMOL/L — SIGNIFICANT CHANGE UP (ref 22–31)
CREAT SERPL-MCNC: 0.79 MG/DL — SIGNIFICANT CHANGE UP (ref 0.5–1.3)
GLUCOSE SERPL-MCNC: 103 MG/DL — HIGH (ref 70–99)
HCT VFR BLD CALC: 26.5 % — LOW (ref 34.5–45)
HGB BLD-MCNC: 8.3 G/DL — LOW (ref 11.5–15.5)
MCHC RBC-ENTMCNC: 25.5 PG — LOW (ref 27–34)
MCHC RBC-ENTMCNC: 31.3 GM/DL — LOW (ref 32–36)
MCV RBC AUTO: 81.3 FL — SIGNIFICANT CHANGE UP (ref 80–100)
NRBC # BLD: 0 /100 WBCS — SIGNIFICANT CHANGE UP (ref 0–0)
PLATELET # BLD AUTO: 445 K/UL — HIGH (ref 150–400)
POTASSIUM SERPL-MCNC: 4.6 MMOL/L — SIGNIFICANT CHANGE UP (ref 3.5–5.3)
POTASSIUM SERPL-SCNC: 4.6 MMOL/L — SIGNIFICANT CHANGE UP (ref 3.5–5.3)
RBC # BLD: 3.26 M/UL — LOW (ref 3.8–5.2)
RBC # FLD: 16.6 % — HIGH (ref 10.3–14.5)
SODIUM SERPL-SCNC: 134 MMOL/L — LOW (ref 135–145)
WBC # BLD: 7.41 K/UL — SIGNIFICANT CHANGE UP (ref 3.8–10.5)
WBC # FLD AUTO: 7.41 K/UL — SIGNIFICANT CHANGE UP (ref 3.8–10.5)

## 2019-10-13 RX ORDER — SODIUM CHLORIDE 9 MG/ML
500 INJECTION, SOLUTION INTRAVENOUS ONCE
Refills: 0 | Status: COMPLETED | OUTPATIENT
Start: 2019-10-13 | End: 2019-10-13

## 2019-10-13 RX ORDER — SODIUM CHLORIDE 9 MG/ML
1000 INJECTION, SOLUTION INTRAVENOUS
Refills: 0 | Status: DISCONTINUED | OUTPATIENT
Start: 2019-10-13 | End: 2019-10-14

## 2019-10-13 RX ADMIN — SODIUM CHLORIDE 75 MILLILITER(S): 9 INJECTION, SOLUTION INTRAVENOUS at 20:29

## 2019-10-13 RX ADMIN — Medication 650 MILLIGRAM(S): at 00:45

## 2019-10-13 RX ADMIN — SODIUM CHLORIDE 500 MILLILITER(S): 9 INJECTION, SOLUTION INTRAVENOUS at 05:41

## 2019-10-13 RX ADMIN — LIDOCAINE 1 PATCH: 4 CREAM TOPICAL at 12:15

## 2019-10-13 RX ADMIN — Medication 20 MILLIGRAM(S): at 05:41

## 2019-10-13 RX ADMIN — Medication 20 MILLIGRAM(S): at 18:27

## 2019-10-13 RX ADMIN — ENOXAPARIN SODIUM 40 MILLIGRAM(S): 100 INJECTION SUBCUTANEOUS at 12:13

## 2019-10-13 RX ADMIN — Medication 10 MILLIGRAM(S): at 12:13

## 2019-10-13 RX ADMIN — LATANOPROST 1 DROP(S): 0.05 SOLUTION/ DROPS OPHTHALMIC; TOPICAL at 21:49

## 2019-10-13 RX ADMIN — Medication 1 TABLET(S): at 12:13

## 2019-10-13 RX ADMIN — LIDOCAINE 1 PATCH: 4 CREAM TOPICAL at 19:59

## 2019-10-13 NOTE — PROGRESS NOTE ADULT - ASSESSMENT
70 y/o woman w MS and nonambulatory, recently in Rehab. Hx right breast ca post MRM 2007 and 10 years of adj Tamoxifen, under care Dr CHRISTA Fairbanks but not seen for a while. Found w multiple bone mets, for IR bone bx on Monday    Anemia likely due to above, iron studies c/w chronic ds not symptomatic  Will follow up pathology after bone bx  Continue present acute care

## 2019-10-13 NOTE — CHART NOTE - NSCHARTNOTEFT_GEN_A_CORE
RAPID RESPONSE FOLLOW UP    Called by nurse for manual blood pressure of 70/40. Patient seen and examined at bedside, resting comfortably. Denies dizziness, lightheadedness, blurry vision, chest pain, palpitations. Ordered 500cc bolus LR. Then, rapid response called for hypotension of 70/40. Patient seen and examined by the rapid response team, ICU PA. Patient reports she was woken up for a blood pressure check and her blood pressure "runs low." Again denied, chest pain, palpitations, SOB, dizziness, lightheadedness, n/v/c/d.     Most Recent:  Vital Signs Last 24 Hrs  T(C): 36.9 (13 Oct 2019 07:22), Max: 39 (12 Oct 2019 23:40)  T(F): 98.5 (13 Oct 2019 07:22), Max: 102.2 (12 Oct 2019 23:40)  HR: 62 (13 Oct 2019 07:35) (55 - 80)  BP: 96/62 (13 Oct 2019 07:35) (70/40 - 161/85)  BP(mean): --  RR: 18 (13 Oct 2019 07:22) (18 - 18)  SpO2: 99% (13 Oct 2019 07:22) (96% - 99%)      Rapid Response Vitals:  BP: 94/42  HR: 58  Temp: 99F  SpO2: 98% on RA  RR: 16      GENERAL: No acute distress  HEENT: NC/AT, EOMI, Mucous membranes are moist. no JVD  LUNGS: Clear to auscultation B/l without wheezing, rales or rhonchi; respirations unlabored  HEART: Regular rate and rhythm ,+S1/+S2, no murmurs  ABDOMEN: Soft, nontender, and nondistended, no rebound, guarding rigidity, bowel sounds in all 4 quadrants  EXTREMITIES: Without any cyanosis, clubbing, rash, lesions or edema.  NEUROLOGIC: A&Ox4, grossly moves upper extremities, able to wiggle toes, minimally lifting of lower extremities (patient mainly wheelchair bound)               LABS:                        8.3    7.41  )-----------( 445      ( 13 Oct 2019 08:30 )             26.5     10-13    134<L>  |  101  |  21  ----------------------------<  103<H>  4.6   |  27  |  0.79    Ca    8.6      13 Oct 2019 08:30          A/P:  69F, non-ambulatory, with PMH significant for multiple sclerosis, Breast Cancer s/p R mastectomy, Osteoporosis, Mitral stenosis, and right ankle fracture presenting to the ED for evaluation of lower back and b/l knee pain over the past 2-3 months, found to have multiple lytic lesions of the spine and  pelvis. Rapid response called for hypotension.    1. Hypotension  -Patient continues to be hemodynamically stable and comfortable, asymptomatic  -RN to call for any changes, will continue to follow RAPID RESPONSE FOLLOW UP    Called by nurse for manual blood pressure of 70/40. Patient seen and examined at bedside, resting comfortably. Denies dizziness, lightheadedness, blurry vision, chest pain, palpitations. Ordered 500cc bolus LR. Then, rapid response called for hypotension of 70/40. Patient seen and examined by the rapid response team, ICU PA. Patient reports she was woken up for a blood pressure check and her blood pressure "runs low." Again denied, chest pain, palpitations, SOB, dizziness, lightheadedness, n/v/c/d.     INTERVAL HISTORY: Patient was seen for 2 hour follow up. She ate breakfast and feels well. Denied chest pain, palpitations, sob, dizziness, lightheadedness.     Most Recent:  Vital Signs Last 24 Hrs  T(C): 36.9 (13 Oct 2019 07:22), Max: 39 (12 Oct 2019 23:40)  T(F): 98.5 (13 Oct 2019 07:22), Max: 102.2 (12 Oct 2019 23:40)  HR: 62 (13 Oct 2019 07:35) (55 - 80)  BP: 96/62 (13 Oct 2019 07:35) (70/40 - 161/85)  RR: 18 (13 Oct 2019 07:22) (18 - 18)  SpO2: 99% (13 Oct 2019 07:22) (96% - 99%)      Rapid Response Vitals:  BP: 94/42  HR: 58  Temp: 99F  SpO2: 98% on RA  RR: 16      GENERAL: No acute distress  HEENT: NC/AT, EOMI, Mucous membranes are moist. no JVD  LUNGS: Clear to auscultation B/l without wheezing, rales or rhonchi; respirations unlabored  HEART: Regular rate and rhythm ,+S1/+S2, no murmurs  ABDOMEN: Soft, nontender, and nondistended, no rebound, guarding rigidity, bowel sounds in all 4 quadrants  EXTREMITIES: Without any cyanosis, clubbing, rash, lesions or edema.  NEUROLOGIC: A&Ox4, grossly moves upper extremities, able to wiggle toes, minimally lifting of lower extremities (patient mainly wheelchair bound)               LABS:                        8.3    7.41  )-----------( 445      ( 13 Oct 2019 08:30 )             26.5     10-13    134<L>  |  101  |  21  ----------------------------<  103<H>  4.6   |  27  |  0.79    Ca    8.6      13 Oct 2019 08:30          A/P:  69F, non-ambulatory, with PMH significant for multiple sclerosis, Breast Cancer s/p R mastectomy, Osteoporosis, Mitral stenosis, and right ankle fracture presenting to the ED for evaluation of lower back and b/l knee pain over the past 2-3 months, found to have multiple lytic lesions of the spine and  pelvis. Rapid response called for hypotension. Now resolved.     1. Hypotension s/p fluid resuscitation with appropriate response. SBP 90's now. Per chart review, patient has low baseline SBP.   -Patient continues to be hemodynamically stable and comfortable, asymptomatic  -RN to call for any changes, will continue to follow  - Dr. Oropeza aware of event.

## 2019-10-13 NOTE — PROGRESS NOTE ADULT - PROBLEM SELECTOR PLAN 2
XR performed of LS spine, b/l knees, and hip/pelvis revealing L2 compression fracture and Chronic right-sided superior and inferior pubic rami fractures. Also seen multiple lytic bone lesions on all 3 studies c/w metastatic disease  -Heme/Onc - Dr Woo consulted

## 2019-10-13 NOTE — PROGRESS NOTE ADULT - PROBLEM SELECTOR PLAN 1
Patient complaining of lower back, b/l knee pain over 2-3 months, has hx of a fall from jame lift 7/17/2019 at rehab facility  -Found to have age-indeterminate compressive fracture of L2 on XR along with multiple lytic lesions  -Has hx of Breast Ca s/p R mastectomy, ?source/primary?  -Pain control with lidocaine patch and oxycodone  -Orthopedics following   - CT L spine and pelvis: Diffuse sclerotic metastatic disease to the lumbar spine, iliac bones, and sacrum. Small sclerotic foci are seen in bilateral femoral bones.  Old fracture of the superior and inferior right pubic rim, stable.  - CT lumbar: Diffuse sclerotic mets and acute fracture of L2. There is   posterior retropulsion resulting in mild-to-moderate spinal canal   stenosis.  - MR Cervical: osseous mets. No cord compression. Multilevel degenerative. Nonspecific high STIR signal surrounding the right lateral paraspinal   space at the level of C1-C2   -  F/u MR thoracic, lumbar spine results

## 2019-10-13 NOTE — CHART NOTE - NSCHARTNOTEFT_GEN_A_CORE
Called by nurse 5 minutes prior for the rapid call. Patient was seen and examined at the bedside prior to the rapid call, patient was resting comfortably. Patient reports the nurse woke her up for the vitals and states her BP usually runs low. She had no complaints. Denied CP/SOB/N/V/diarrhea/palpitations/dizziness/blurred vision. An order for 500mL LR was placed. Rapid response called for low BP. Patient was again seen and examined at bedside during rapid. Patient gain had no complaints.    Vitals Now: BP: 94/42, Temp: 99F, SpO2: 99%, RR: 17    Vital Signs Last 24 Hrs  T(C): 36.8 (13 Oct 2019 04:38), Max: 39 (12 Oct 2019 23:40)  T(F): 98.3 (13 Oct 2019 04:38), Max: 102.2 (12 Oct 2019 23:40)  HR: 60 (13 Oct 2019 04:38) (60 - 80)  BP: 81/52 (13 Oct 2019 04:38) (81/52 - 161/85)  RR: 18 (13 Oct 2019 04:38) (18 - 18)  SpO2: 98% (13 Oct 2019 04:38) (96% - 99%)    GENERAL: No acute distress  HEENT: Head is normocephalic and atraumatic. Extraocular muscles are intact. Mucous membranes are moist. No throat erythema/exudates no lymphadenopathy, no JVD,   LUNGS: Clear to auscultation BL without wheezing, rales or rhonchi; respirations unlabored  HEART: Regular rate and rhythm ,+S1/+S2, no murmurs, r  ABDOMEN: Soft, nontender, and nondistended, no rebound, guarding rigidity, bowel sounds in all 4 quadrants  EXTREMITIES: Without any cyanosis, clubbing, rash, lesions or edema.  SKIN: No new rashes or lesions.  MSK: strength equal BL  VASCULAR: Radial and Dorsal pedal pulses palpable BL  NEUROLOGIC: Grossly intact.  PSYCH: No new changes.                          9.0    7.74  )-----------( 478      ( 12 Oct 2019 07:59 )             28.3     10-12    136  |  103  |  18  ----------------------------<  110<H>  4.7   |  28  |  0.71    Ca    8.7      12 Oct 2019 07:59        A/P:  69y female a/w vcf now with low BP    -Patient hemodynamically stable  -500mL LR over 1 hr  -Will reassess Called by nurse for manual blood pressure of 70/40. Patient seen and examined at bedside, resting comfortably. Denies dizziness, lightheadedness, blurry vision, chest pain, palpitations. Ordered 500cc bolus LR. Then, rapid response called for hypotension of 70/40. Patient seen and examined by the rapid response team, ICU PA. Patient reports she was woken up for a blood pressure check and her blood pressure "runs low." Again denied, chest pain, palpitations, SOB, dizziness, lightheadedness, n/v/c/d.     Rapid Response Vitals:  BP: 94/42  HR: 58  Temp: 99F  SpO2: 98% on RA  RR: 16    Vital Signs Last 24 Hrs  T(C): 36.8 (13 Oct 2019 04:38), Max: 39 (12 Oct 2019 23:40)  T(F): 98.3 (13 Oct 2019 04:38), Max: 102.2 (12 Oct 2019 23:40)  HR: 60 (13 Oct 2019 04:38) (60 - 80)  BP: 81/52 (13 Oct 2019 04:38) (81/52 - 161/85)  RR: 18 (13 Oct 2019 04:38) (18 - 18)  SpO2: 98% (13 Oct 2019 04:38) (96% - 99%)    GENERAL: No acute distress  HEENT: NC/AT, EOMI, Mucous membranes are moist. no JVD,   LUNGS: Clear to auscultation BL without wheezing, rales or rhonchi; respirations unlabored  HEART: Regular rate and rhythm ,+S1/+S2, no murmurs, r  ABDOMEN: Soft, nontender, and nondistended, no rebound, guarding rigidity, bowel sounds in all 4 quadrants  EXTREMITIES: Without any cyanosis, clubbing, rash, lesions or edema.  NEUROLOGIC: A&Ox4, grossly moves upper extremities, able to wiggle toes, minimally lifting of lower extremities (patient mainly wheelchair bound)                          9.0    7.74  )-----------( 478      ( 12 Oct 2019 07:59 )             28.3     10-12    136  |  103  |  18  ----------------------------<  110<H>  4.7   |  28  |  0.71    Ca    8.7      12 Oct 2019 07:59        A/P:  69F, non-ambulatory, with PMH significant for multiple sclerosis, Breast Cancer s/p R mastectomy, Osteoporosis, Mitral stenosis, and right ankle fracture presenting to the ED for evaluation of lower back and b/l knee pain over the past 2-3 months, found to have multiple lytic lesions of the spine and  pelvis. Rapid response called for hypotension.    1. Hypotension  -Repeat BP after 500cc LR bolus: 99/42  -Patient hemodynamically stable and comfortable  -RN to call for any changes, will continue to follow

## 2019-10-13 NOTE — PROGRESS NOTE ADULT - PROBLEM SELECTOR PLAN 3
Chronic, stable  Advanced in recent months as patient having frequent falls in 3/2019 per chart review, non-ambulatory, uses motor scooter to ambulate (chronic)  -Continue home medication Interferon

## 2019-10-13 NOTE — PROGRESS NOTE ADULT - SUBJECTIVE AND OBJECTIVE BOX
Patient is a 69y old  Female who presents with a chief complaint of Low back and knee pain (12 Oct 2019 11:28)      INTERVAL HPI/OVERNIGHT EVENTS:  T(C): 36.9 (10-13-19 @ 07:22), Max: 39 (10-12-19 @ 23:40)  HR: 62 (10-13-19 @ 07:35) (55 - 80)  BP: 96/62 (10-13-19 @ 07:35) (70/40 - 161/85)  RR: 18 (10-13-19 @ 07:22) (18 - 18)  SpO2: 99% (10-13-19 @ 07:22) (96% - 99%)  Wt(kg): --  I&O's Summary    12 Oct 2019 07:01  -  13 Oct 2019 07:00  --------------------------------------------------------  IN: 0 mL / OUT: 1100 mL / NET: -1100 mL        LABS:                        8.3    7.41  )-----------( 445      ( 13 Oct 2019 08:30 )             26.5     10-13    134<L>  |  101  |  21  ----------------------------<  103<H>  4.6   |  27  |  0.79    Ca    8.6      13 Oct 2019 08:30          CAPILLARY BLOOD GLUCOSE      POCT Blood Glucose.: 108 mg/dL (13 Oct 2019 05:23)            MEDICATIONS  (STANDING):  baclofen 20 milliGRAM(s) Oral two times a day  enoxaparin Injectable 40 milliGRAM(s) SubCutaneous daily  interferon beta-1a Injectable (AVONEX) 30 MICROGram(s) IntraMuscular every 7 days  latanoprost 0.005% Ophthalmic Solution 1 Drop(s) Both EYES at bedtime  lidocaine   Patch 1 Patch Transdermal daily  lisinopril 20 milliGRAM(s) Oral daily  multivitamin/minerals 1 Tablet(s) Oral daily  oxybutynin 10 milliGRAM(s) Oral daily    MEDICATIONS  (PRN):  acetaminophen   Tablet .. 650 milliGRAM(s) Oral every 6 hours PRN Temp greater or equal to 38C (100.4F), Mild Pain (1 - 3)  oxyCODONE    5 mG/acetaminophen 325 mG 1 Tablet(s) Oral every 4 hours PRN Moderate Pain (4 - 6)      REVIEW OF SYSTEMS:  CONSTITUTIONAL: No fever, weight loss, or fatigue  EYES: No eye pain, visual disturbances, or discharge  ENMT:  No difficulty hearing, tinnitus, vertigo; No sinus or throat pain  NECK: No pain or stiffness  RESPIRATORY: No cough, wheezing, chills or hemoptysis; No shortness of breath  CARDIOVASCULAR: No chest pain, palpitations, dizziness, or leg swelling  ENDOCRINE: No heat or cold intolerance; No hair loss  MUSCULOSKELETAL: No joint pain or swelling; No muscle, back, or extremity pain    RADIOLOGY & ADDITIONAL TESTS:    Imaging Personally Reviewed:  [ ] YES  [ ] NO    Consultant(s) Notes Reviewed:  [ ] YES  [ ] NO    PHYSICAL EXAM:  GENERAL: NAD, well-groomed, well-developed  NERVOUS SYSTEM:  Alert & Oriented X3, Good concentration; Motor Strength 5/5 B/L upper and lower extremities; DTRs 2+ intact and symmetric  CHEST/LUNG: Clear to percussion bilaterally; No rales, rhonchi, wheezing, or rubs  HEART: Regular rate and rhythm; No murmurs, rubs, or gallops  ABDOMEN: Soft, Nontender, Nondistended; Bowel sounds present  EXTREMITIES:  2+ Peripheral Pulses, No clubbing, cyanosis, or edema      Care Discussed with Consultants/Other Providers [ ] YES  [ ] NO

## 2019-10-14 ENCOUNTER — RESULT REVIEW (OUTPATIENT)
Age: 69
End: 2019-10-14

## 2019-10-14 DIAGNOSIS — K59.00 CONSTIPATION, UNSPECIFIED: ICD-10-CM

## 2019-10-14 LAB
ANION GAP SERPL CALC-SCNC: 7 MMOL/L — SIGNIFICANT CHANGE UP (ref 5–17)
BUN SERPL-MCNC: 17 MG/DL — SIGNIFICANT CHANGE UP (ref 7–23)
CALCIUM SERPL-MCNC: 8.7 MG/DL — SIGNIFICANT CHANGE UP (ref 8.5–10.1)
CHLORIDE SERPL-SCNC: 105 MMOL/L — SIGNIFICANT CHANGE UP (ref 96–108)
CO2 SERPL-SCNC: 25 MMOL/L — SIGNIFICANT CHANGE UP (ref 22–31)
CREAT SERPL-MCNC: 0.64 MG/DL — SIGNIFICANT CHANGE UP (ref 0.5–1.3)
GLUCOSE SERPL-MCNC: 107 MG/DL — HIGH (ref 70–99)
HCT VFR BLD CALC: 24.6 % — LOW (ref 34.5–45)
HGB BLD-MCNC: 7.8 G/DL — LOW (ref 11.5–15.5)
MCHC RBC-ENTMCNC: 25.4 PG — LOW (ref 27–34)
MCHC RBC-ENTMCNC: 31.7 GM/DL — LOW (ref 32–36)
MCV RBC AUTO: 80.1 FL — SIGNIFICANT CHANGE UP (ref 80–100)
NRBC # BLD: 0 /100 WBCS — SIGNIFICANT CHANGE UP (ref 0–0)
PLATELET # BLD AUTO: 450 K/UL — HIGH (ref 150–400)
POTASSIUM SERPL-MCNC: 4.2 MMOL/L — SIGNIFICANT CHANGE UP (ref 3.5–5.3)
POTASSIUM SERPL-SCNC: 4.2 MMOL/L — SIGNIFICANT CHANGE UP (ref 3.5–5.3)
RBC # BLD: 3.07 M/UL — LOW (ref 3.8–5.2)
RBC # FLD: 16.6 % — HIGH (ref 10.3–14.5)
SODIUM SERPL-SCNC: 137 MMOL/L — SIGNIFICANT CHANGE UP (ref 135–145)
WBC # BLD: 7.13 K/UL — SIGNIFICANT CHANGE UP (ref 3.8–10.5)
WBC # FLD AUTO: 7.13 K/UL — SIGNIFICANT CHANGE UP (ref 3.8–10.5)

## 2019-10-14 PROCEDURE — 88305 TISSUE EXAM BY PATHOLOGIST: CPT | Mod: 26

## 2019-10-14 PROCEDURE — 88334 PATH CONSLTJ SURG CYTO XM EA: CPT | Mod: 26

## 2019-10-14 PROCEDURE — 88333 PATH CONSLTJ SURG CYTO XM 1: CPT | Mod: 26

## 2019-10-14 PROCEDURE — 20225 BONE BIOPSY TROCAR/NDL DEEP: CPT

## 2019-10-14 PROCEDURE — 77012 CT SCAN FOR NEEDLE BIOPSY: CPT | Mod: 26

## 2019-10-14 RX ORDER — POLYETHYLENE GLYCOL 3350 17 G/17G
17 POWDER, FOR SOLUTION ORAL AT BEDTIME
Refills: 0 | Status: DISCONTINUED | OUTPATIENT
Start: 2019-10-14 | End: 2019-10-15

## 2019-10-14 RX ORDER — SENNA PLUS 8.6 MG/1
2 TABLET ORAL AT BEDTIME
Refills: 0 | Status: DISCONTINUED | OUTPATIENT
Start: 2019-10-14 | End: 2019-10-16

## 2019-10-14 RX ORDER — DOCUSATE SODIUM 100 MG
100 CAPSULE ORAL THREE TIMES A DAY
Refills: 0 | Status: DISCONTINUED | OUTPATIENT
Start: 2019-10-14 | End: 2019-10-16

## 2019-10-14 RX ADMIN — Medication 1 TABLET(S): at 14:09

## 2019-10-14 RX ADMIN — Medication 100 MILLIGRAM(S): at 21:18

## 2019-10-14 RX ADMIN — LIDOCAINE 1 PATCH: 4 CREAM TOPICAL at 14:08

## 2019-10-14 RX ADMIN — LIDOCAINE 1 PATCH: 4 CREAM TOPICAL at 00:00

## 2019-10-14 RX ADMIN — Medication 100 MILLIGRAM(S): at 14:10

## 2019-10-14 RX ADMIN — LATANOPROST 1 DROP(S): 0.05 SOLUTION/ DROPS OPHTHALMIC; TOPICAL at 21:18

## 2019-10-14 RX ADMIN — Medication 20 MILLIGRAM(S): at 17:21

## 2019-10-14 RX ADMIN — SENNA PLUS 2 TABLET(S): 8.6 TABLET ORAL at 21:18

## 2019-10-14 RX ADMIN — Medication 10 MILLIGRAM(S): at 14:09

## 2019-10-14 RX ADMIN — POLYETHYLENE GLYCOL 3350 17 GRAM(S): 17 POWDER, FOR SOLUTION ORAL at 21:18

## 2019-10-14 RX ADMIN — LIDOCAINE 1 PATCH: 4 CREAM TOPICAL at 19:50

## 2019-10-14 NOTE — PROGRESS NOTE ADULT - ATTENDING COMMENTS
events noted   pt with episode of hypotension overnight and rapi response called  pt denies any symptoms.  states she was awokwen to all the people around her.  today pt denies any dizzyenss   will hold BP meds for now
pt schedules for spinal biopsy on monday.  pt still with continued lower back pain  compression fractures----possible metastatic disease disease  pt is acceptable risk for the necessary procedure please proceed
above  appreciated  pt scheduled for   spinal biopsy monday  will follow
all consults appreciated  will get spine bx later today  will follow with results  transfise on unit of prbc.  physical therapy.  possible dc tomorrow

## 2019-10-14 NOTE — PROGRESS NOTE ADULT - SUBJECTIVE AND OBJECTIVE BOX
Patient seen and examined in [facility] after bone biopsy  Chart reviewed and events noted;   Daughter at bedside      MEDICATIONS  (STANDING):  baclofen 20 milliGRAM(s) Oral two times a day  docusate sodium 100 milliGRAM(s) Oral three times a day  interferon beta-1a Injectable (AVONEX) 30 MICROGram(s) IntraMuscular every 7 days  latanoprost 0.005% Ophthalmic Solution 1 Drop(s) Both EYES at bedtime  lidocaine   Patch 1 Patch Transdermal daily  lisinopril 20 milliGRAM(s) Oral daily  multivitamin/minerals 1 Tablet(s) Oral daily  oxybutynin 10 milliGRAM(s) Oral daily  polyethylene glycol 3350 17 Gram(s) Oral at bedtime  senna 2 Tablet(s) Oral at bedtime    MEDICATIONS  (PRN):  acetaminophen   Tablet .. 650 milliGRAM(s) Oral every 6 hours PRN Temp greater or equal to 38C (100.4F), Mild Pain (1 - 3)  oxyCODONE    5 mG/acetaminophen 325 mG 1 Tablet(s) Oral every 4 hours PRN Moderate Pain (4 - 6)      Vital Signs Last 24 Hrs  T(C): 37.1 (14 Oct 2019 07:48), Max: 37.2 (13 Oct 2019 23:32)  T(F): 98.7 (14 Oct 2019 07:48), Max: 98.9 (13 Oct 2019 23:32)  HR: 64 (14 Oct 2019 07:48) (64 - 80)  BP: 103/64 (14 Oct 2019 07:48) (88/57 - 108/58)  RR: 18 (14 Oct 2019 07:48) (17 - 18)  SpO2: 96% (14 Oct 2019 07:48) (96% - 99%)    PHYSICAL EXAM  General: adult in NAD  HEENT: clear oropharynx, anicteric sclera, pink conjunctivae  Neck: supple  CV: normal S1S2 with no murmur rubs or gallops  Lungs: clear to auscultation, no wheezes, no rhales  Abdomen: soft non-tender non-distended, no hepato/splenomegaly  Ext: no clubbing cyanosis or edema  Skin: no rashes and no petichiae  Neuro: alert and oriented X3 no focal deficits      LABS:                        7.8    7.13  )-----------( 450      ( 14 Oct 2019 06:26 )             24.6     Hemoglobin: 7.8 g/dL (10-14 @ 06:26)  Hemoglobin: 8.3 g/dL (10-13 @ 08:30)  Hemoglobin: 9.0 g/dL (10-12 @ 07:59)  Hemoglobin: 8.8 g/dL (10-11 @ 06:45)  Hemoglobin: 8.8 g/dL (10-10 @ 06:48)    Platelet Count - Automated: 450 K/uL (10-14 @ 06:26)  Platelet Count - Automated: 445 K/uL (10-13 @ 08:30)  Platelet Count - Automated: 478 K/uL (10-12 @ 07:59)  Platelet Count - Automated: 486 K/uL (10-11 @ 06:45)  Platelet Count - Automated: 524 K/uL (10-10 @ 06:48)    10-14    137  |  105  |  17  ----------------------------<  107<H>  4.2   |  25  |  0.64    Ca    8.7      14 Oct 2019 06:26    Iron with Total Binding Capacity (10.11.19 @ 08:36)    Iron - Total Binding Capacity.: 209 ug/dL    % Saturation, Iron: 13 %    Iron Total, Serum: 27 ug/dL    Unsaturated Iron Binding Capacity: 182 ug/dL  Ferritin, Serum: 447 ng/mL (10.11.19 @ 08:36)

## 2019-10-14 NOTE — PROGRESS NOTE ADULT - SUBJECTIVE AND OBJECTIVE BOX
Patient is a 69y old  Female who presents with a chief complaint of Low back and knee pain (13 Oct 2019 10:04)      INTERVAL HPI/OVERNIGHT EVENTS:    T(C): 37.1 (10-14-19 @ 07:48), Max: 37.2 (10-13-19 @ 23:32)  HR: 64 (10-14-19 @ 07:48) (64 - 80)  BP: 103/64 (10-14-19 @ 07:48) (88/57 - 108/58)  RR: 18 (10-14-19 @ 07:48) (17 - 18)  SpO2: 96% (10-14-19 @ 07:48) (96% - 99%)  Wt(kg): --    I&O's Summary    13 Oct 2019 07:01  -  14 Oct 2019 07:00  --------------------------------------------------------  IN: 1290 mL / OUT: 1850 mL / NET: -560 mL        LABS:                        7.8    7.13  )-----------( 450      ( 14 Oct 2019 06:26 )             24.6     10-14    137  |  105  |  17  ----------------------------<  107<H>  4.2   |  25  |  0.64    Ca    8.7      14 Oct 2019 06:26        CAPILLARY BLOOD GLUCOSE                 MEDICATIONS  (STANDING):  baclofen 20 milliGRAM(s) Oral two times a day  interferon beta-1a Injectable (AVONEX) 30 MICROGram(s) IntraMuscular every 7 days  lactated ringers. 1000 milliLiter(s) (75 mL/Hr) IV Continuous <Continuous>  latanoprost 0.005% Ophthalmic Solution 1 Drop(s) Both EYES at bedtime  lidocaine   Patch 1 Patch Transdermal daily  lisinopril 20 milliGRAM(s) Oral daily  multivitamin/minerals 1 Tablet(s) Oral daily  oxybutynin 10 milliGRAM(s) Oral daily    MEDICATIONS  (PRN):  acetaminophen   Tablet .. 650 milliGRAM(s) Oral every 6 hours PRN Temp greater or equal to 38C (100.4F), Mild Pain (1 - 3)  oxyCODONE    5 mG/acetaminophen 325 mG 1 Tablet(s) Oral every 4 hours PRN Moderate Pain (4 - 6)      REVIEW OF SYSTEMS:  CONSTITUTIONAL: No fever, weight loss, or fatigue  EYES: No eye pain, visual disturbances, or discharge  ENMT: No difficulty hearing, tinnitus, vertigo; No sinus or throat pain  NECK: No pain or stiffness  RESPIRATORY: No cough, wheezing, chills or hemoptysis; No shortness of breath  CARDIOVASCULAR: No chest pain, palpitations, dizziness, or leg swelling  GASTROINTESTINAL: No abdominal or epigastric pain. No nausea, vomiting, or hematemesis; No diarrhea or constipation; No melena or hematochezia  GENITOURINARY: No dysuria, frequency, hematuria, or incontinence  NEUROLOGICAL: No headaches, memory loss, loss of strength, numbness, or tremors  SKIN: No itching, burning, rashes, or lesions   LYMPH NODES: No enlarged glands  ENDOCRINE: No heat or cold intolerance; No hair loss  MUSCULOSKELETAL: No joint pain or swelling; No muscle, back, or extremity pain  PSYCHIATRIC: No depression, anxiety, mood swings, or difficulty sleeping  HEME/LYMPH: No easy bruising, or bleeding gums  ALLERGY AND IMMUNOLOGIC: No hives or eczema    RADIOLOGY & ADDITIONAL TESTS:    Imaging Personally Reviewed:  [ ] YES  [ ] NO    Consultant(s) Notes Reviewed:  [ ] YES  [ ] NO    PHYSICAL EXAM:  GENERAL: NAD, well-groomed, well-developed  HEAD: Atraumatic, Normocephalic  EYES: EOMI, PERRL, conjunctiva and sclera clear  ENMT: No tonsillar erythema, exudates, or enlargement; Moist mucous membranes, Good dentition  NECK: Supple, No JVD  NERVOUS SYSTEM: Alert & Oriented X3, Good concentration; Good motor strength in B/L upper and lower extremities  CHEST/LUNG: Clear to percussion bilaterally; No rales, rhonchi, wheezing, or rubs  HEART: Regular rate and rhythm; No murmurs, rubs, or gallops  ABDOMEN: Soft, Nontender, Nondistended; Bowel sounds present  EXTREMITIES: 2+ Peripheral Pulses, No clubbing, cyanosis, or edema  LYMPH: No lymphadenopathy noted  SKIN: Warm, dry, well-perfused    Care Discussed with Consultants/Other Providers [ ] YES  [ ] NO Patient is a 69y old  Female who presents with a chief complaint of Low back and knee pain (13 Oct 2019 10:04)      INTERVAL HPI/OVERNIGHT EVENTS:    Patient seen and examined at bedside, resting comfortably. Patients pain is under control today. No new complaints today. Patient endorses constipation.  Patient denies any fever, chills, nausea, vomiting diarrhea, abdominal pain, chest pain, and SOB.     T(C): 37.1 (10-14-19 @ 07:48), Max: 37.2 (10-13-19 @ 23:32)  HR: 64 (10-14-19 @ 07:48) (64 - 80)  BP: 103/64 (10-14-19 @ 07:48) (88/57 - 108/58)  RR: 18 (10-14-19 @ 07:48) (17 - 18)  SpO2: 96% (10-14-19 @ 07:48) (96% - 99%)  Wt(kg): --    I&O's Summary    13 Oct 2019 07:01  -  14 Oct 2019 07:00  --------------------------------------------------------  IN: 1290 mL / OUT: 1850 mL / NET: -560 mL        LABS:                        7.8    7.13  )-----------( 450      ( 14 Oct 2019 06:26 )             24.6     10-14    137  |  105  |  17  ----------------------------<  107<H>  4.2   |  25  |  0.64    Ca    8.7      14 Oct 2019 06:26        CAPILLARY BLOOD GLUCOSE                 MEDICATIONS  (STANDING):  baclofen 20 milliGRAM(s) Oral two times a day  interferon beta-1a Injectable (AVONEX) 30 MICROGram(s) IntraMuscular every 7 days  lactated ringers. 1000 milliLiter(s) (75 mL/Hr) IV Continuous <Continuous>  latanoprost 0.005% Ophthalmic Solution 1 Drop(s) Both EYES at bedtime  lidocaine   Patch 1 Patch Transdermal daily  lisinopril 20 milliGRAM(s) Oral daily  multivitamin/minerals 1 Tablet(s) Oral daily  oxybutynin 10 milliGRAM(s) Oral daily    MEDICATIONS  (PRN):  acetaminophen   Tablet .. 650 milliGRAM(s) Oral every 6 hours PRN Temp greater or equal to 38C (100.4F), Mild Pain (1 - 3)  oxyCODONE    5 mG/acetaminophen 325 mG 1 Tablet(s) Oral every 4 hours PRN Moderate Pain (4 - 6)      REVIEW OF SYSTEMS:  CONSTITUTIONAL: No fever, weight loss, or fatigue  EYES: No eye pain, visual disturbances, or discharge  ENMT: No difficulty hearing, tinnitus, vertigo; No sinus or throat pain  NECK: No pain or stiffness  RESPIRATORY: No cough, wheezing, chills or hemoptysis; No shortness of breath  CARDIOVASCULAR: No chest pain, palpitations, dizziness, or leg swelling  GASTROINTESTINAL: No abdominal or epigastric pain. No nausea, vomiting, or hematemesis; No diarrhea or constipation; No melena or hematochezia  NEUROLOGICAL: No headaches, memory loss, loss of strength, numbness, or tremors  SKIN: No itching, burning, rashes, or lesions   ENDOCRINE: No heat or cold intolerance; No hair loss  MUSCULOSKELETAL: No joint pain or swelling; No muscle, back, or extremity pain    RADIOLOGY & ADDITIONAL TESTS:    Imaging Personally Reviewed:  [ ] YES  [ ] NO    Consultant(s) Notes Reviewed:  [ ] YES  [ ] NO    PHYSICAL EXAM:  GENERAL: NAD, well-groomed, well-developed  HEAD: Atraumatic, Normocephalic  EYES: EOMI, PERRL, conjunctiva and sclera clear  ENMT: No tonsillar erythema, exudates, or enlargement; Moist mucous membranes, Good dentition  NECK: Supple, No JVD  NERVOUS SYSTEM: Alert & Oriented X3, Good concentration; Good motor strength in B/L upper and lower extremities  CHEST/LUNG: Clear to percussion bilaterally; No rales, rhonchi, wheezing, or rubs  HEART: Regular rate and rhythm; No murmurs, rubs, or gallops  ABDOMEN: + abdominal tenderness in LUQ and LLQ, Soft, Nontender, Nondistended; Bowel sounds present  EXTREMITIES: 2+ Peripheral Pulses, No clubbing, cyanosis, or edema  LYMPH: No lymphadenopathy noted  SKIN: Warm, dry, well-perfused    Care Discussed with Consultants/Other Providers [ ] YES  [ ] NO Patient is a 69y old  Female who presents with a chief complaint of Low back and knee pain (13 Oct 2019 10:04)      INTERVAL HPI/OVERNIGHT EVENTS:    Patient seen and examined at bedside, resting comfortably. Patients pain is under control today. Pt has continued chronic pain 2/10 in b/l knees and back. No new complaints today. Patient endorses constipation.  Patient denies any fever, chills, nausea, vomiting, diarrhea, abdominal pain, chest pain, and SOB.     T(C): 37.1 (10-14-19 @ 07:48), Max: 37.2 (10-13-19 @ 23:32)  HR: 64 (10-14-19 @ 07:48) (64 - 80)  BP: 103/64 (10-14-19 @ 07:48) (88/57 - 108/58)  RR: 18 (10-14-19 @ 07:48) (17 - 18)  SpO2: 96% (10-14-19 @ 07:48) (96% - 99%)  Wt(kg): --    I&O's Summary    13 Oct 2019 07:01  -  14 Oct 2019 07:00  --------------------------------------------------------  IN: 1290 mL / OUT: 1850 mL / NET: -560 mL        LABS:                        7.8    7.13  )-----------( 450      ( 14 Oct 2019 06:26 )             24.6     10-14    137  |  105  |  17  ----------------------------<  107<H>  4.2   |  25  |  0.64    Ca    8.7      14 Oct 2019 06:26        CAPILLARY BLOOD GLUCOSE                 MEDICATIONS  (STANDING):  baclofen 20 milliGRAM(s) Oral two times a day  interferon beta-1a Injectable (AVONEX) 30 MICROGram(s) IntraMuscular every 7 days  lactated ringers. 1000 milliLiter(s) (75 mL/Hr) IV Continuous <Continuous>  latanoprost 0.005% Ophthalmic Solution 1 Drop(s) Both EYES at bedtime  lidocaine   Patch 1 Patch Transdermal daily  lisinopril 20 milliGRAM(s) Oral daily  multivitamin/minerals 1 Tablet(s) Oral daily  oxybutynin 10 milliGRAM(s) Oral daily    MEDICATIONS  (PRN):  acetaminophen   Tablet .. 650 milliGRAM(s) Oral every 6 hours PRN Temp greater or equal to 38C (100.4F), Mild Pain (1 - 3)  oxyCODONE    5 mG/acetaminophen 325 mG 1 Tablet(s) Oral every 4 hours PRN Moderate Pain (4 - 6)      REVIEW OF SYSTEMS:  CONSTITUTIONAL: No fever, weight loss, or fatigue  EYES: No eye pain, visual disturbances, or discharge  ENMT: No difficulty hearing, tinnitus, vertigo; No sinus or throat pain  NECK: No pain or stiffness  RESPIRATORY: No cough, wheezing, chills or hemoptysis; No shortness of breath  CARDIOVASCULAR: No chest pain, palpitations, dizziness, or leg swelling  GASTROINTESTINAL: No abdominal or epigastric pain. No nausea, vomiting; No diarrhea or constipation  NEUROLOGICAL: No headaches, memory loss   SKIN: No itching, burning, rashes, or lesions   MUSCULOSKELETAL: No joint pain or swelling; No muscle, back, or extremity pain    RADIOLOGY & ADDITIONAL TESTS:    Imaging Personally Reviewed:  [x ] YES  [ ] NO    Consultant(s) Notes Reviewed:  [x ] YES  [ ] NO    PHYSICAL EXAM:  GENERAL: NAD, well-groomed, well-developed, lying in bed comfortably   HEAD: Atraumatic, Normocephalic  EYES: EOMI, PERRL, conjunctiva and sclera clear  ENMT: No tonsillar erythema, exudates, or enlargement; Moist mucous membranes  NERVOUS SYSTEM: Alert & Oriented X3, Good concentration; Good motor strength in B/L upper and lower extremities  CHEST/LUNG: Clear to percussion bilaterally; No rales, rhonchi, wheezing, or rubs  HEART: Regular rate and rhythm; No murmurs, rubs, or gallops  ABDOMEN: + abdominal tenderness in LUQ and LLQ, Soft, Nondistended; Bowel sounds present  EXTREMITIES: 2+ Peripheral Pulses, No clubbing, cyanosis, or edema  SKIN: Warm, dry, well-perfused    Care Discussed with Consultants/Other Providers [x ] YES  [ ] NO Patient is a 69y old  Female who presents with a chief complaint of Low back and knee pain (13 Oct 2019 10:04)      INTERVAL HPI/OVERNIGHT EVENTS:    Patient seen and examined at bedside, resting comfortably. Patients pain is under control today. Pt has continued chronic pain 2/10 in b/l knees and back. Patient endorses constipation. Patient denies any fever, chills, nausea, vomiting, diarrhea, abdominal pain, chest pain, and SOB.     T(C): 37.1 (10-14-19 @ 07:48), Max: 37.2 (10-13-19 @ 23:32)  HR: 64 (10-14-19 @ 07:48) (64 - 80)  BP: 103/64 (10-14-19 @ 07:48) (88/57 - 108/58)  RR: 18 (10-14-19 @ 07:48) (17 - 18)  SpO2: 96% (10-14-19 @ 07:48) (96% - 99%)  Wt(kg): --    I&O's Summary    13 Oct 2019 07:01  -  14 Oct 2019 07:00  --------------------------------------------------------  IN: 1290 mL / OUT: 1850 mL / NET: -560 mL        LABS:                        7.8    7.13  )-----------( 450      ( 14 Oct 2019 06:26 )             24.6     10-14    137  |  105  |  17  ----------------------------<  107<H>  4.2   |  25  |  0.64    Ca    8.7      14 Oct 2019 06:26        CAPILLARY BLOOD GLUCOSE                 MEDICATIONS  (STANDING):  baclofen 20 milliGRAM(s) Oral two times a day  interferon beta-1a Injectable (AVONEX) 30 MICROGram(s) IntraMuscular every 7 days  lactated ringers. 1000 milliLiter(s) (75 mL/Hr) IV Continuous <Continuous>  latanoprost 0.005% Ophthalmic Solution 1 Drop(s) Both EYES at bedtime  lidocaine   Patch 1 Patch Transdermal daily  lisinopril 20 milliGRAM(s) Oral daily  multivitamin/minerals 1 Tablet(s) Oral daily  oxybutynin 10 milliGRAM(s) Oral daily    MEDICATIONS  (PRN):  acetaminophen   Tablet .. 650 milliGRAM(s) Oral every 6 hours PRN Temp greater or equal to 38C (100.4F), Mild Pain (1 - 3)  oxyCODONE    5 mG/acetaminophen 325 mG 1 Tablet(s) Oral every 4 hours PRN Moderate Pain (4 - 6)      REVIEW OF SYSTEMS:  CONSTITUTIONAL: No fever, weight loss, or fatigue  EYES: No eye pain, visual disturbances, or discharge  ENMT: No difficulty hearing, tinnitus, vertigo; No sinus or throat pain  NECK: No pain or stiffness  RESPIRATORY: No cough, wheezing, chills or hemoptysis; No shortness of breath  CARDIOVASCULAR: No chest pain, palpitations, dizziness, or leg swelling  GASTROINTESTINAL: No abdominal or epigastric pain. No nausea, vomiting; No diarrhea or constipation  NEUROLOGICAL: No headaches, memory loss   SKIN: No itching, burning, rashes, or lesions   MUSCULOSKELETAL: No joint pain or swelling; No muscle, back, or extremity pain    RADIOLOGY & ADDITIONAL TESTS:    Imaging Personally Reviewed:  [x ] YES  [ ] NO    Consultant(s) Notes Reviewed:  [x ] YES  [ ] NO    PHYSICAL EXAM:  GENERAL: NAD, well-groomed, well-developed, lying in bed comfortably   HEAD: Atraumatic, Normocephalic  EYES: EOMI, PERRL, conjunctiva and sclera clear  ENMT: No tonsillar erythema, exudates, or enlargement; Moist mucous membranes  NERVOUS SYSTEM: Alert & Oriented X3, Good concentration; Good motor strength in B/L upper and lower extremities  CHEST/LUNG: Clear to percussion bilaterally; No rales, rhonchi, wheezing, or rubs  HEART: Regular rate and rhythm; No murmurs, rubs, or gallops  ABDOMEN: + abdominal tenderness in LUQ and LLQ, Soft, Nondistended; Bowel sounds present  EXTREMITIES: 2+ Peripheral Pulses, No clubbing, cyanosis, or edema  SKIN: Warm, dry, well-perfused    Care Discussed with Consultants/Other Providers [x ] YES  [ ] NO

## 2019-10-14 NOTE — PROGRESS NOTE ADULT - PROBLEM SELECTOR PLAN 6
IMPROVE VTE Individual Risk Assessment          RISK                                                          Points  [  ] Previous VTE                                                3  [  ] Thrombophilia                                            2  [x  ] Lower limb paralysis                                  2        (unable to hold up >15 seconds)    [ x ] Current Cancer                                            2         (within 6 months)  [x  ] Immobilization > 24 hrs                             1  [  ] ICU/CCU stay > 24 hours                           1  [ x ] Age > 60                                                        1    IMPROVE VTE Score:  6  VTE ppx with lovenox 40mg qd -Continue oxybutynin 10mg ER qd, methenamine hippurate 1g BID  - C/W Starks cath replaced in ED (patient states she straight caths at home)

## 2019-10-14 NOTE — PROGRESS NOTE ADULT - PROBLEM SELECTOR PLAN 1
Patient complaining of lower back, b/l knee pain over 2-3 months, has hx of a fall from jame lift 7/17/2019 at rehab facility  -Found to have age-indeterminate compressive fracture of L2 on XR along with multiple lytic lesions  -Has hx of Breast Ca s/p R mastectomy, ?source/primary?  -Pain control with lidocaine patch and oxycodone  -Orthopedics following   - CT L spine and pelvis: Diffuse sclerotic metastatic disease to the lumbar spine, iliac bones, and sacrum. Small sclerotic foci are seen in bilateral femoral bones.  Old fracture of the superior and inferior right pubic rim, stable.  - CT lumbar: Diffuse sclerotic mets and acute fracture of L2. There is   posterior retropulsion resulting in mild-to-moderate spinal canal   stenosis.  - MR Cervical: osseous mets. No cord compression. Multilevel degenerative. Nonspecific high STIR signal surrounding the right lateral paraspinal   space at the level of C1-C2   -  F/u MR thoracic, lumbar spine results -Patient complaining of lower back, b/l knee pain over 2-3 months, has hx of a fall from jame lift 7/17/2019 at rehab facility  -Found to have age-indeterminate compressive fracture of L2 on XR along with multiple lytic lesions  -Has hx of Breast Ca s/p R mastectomy, ?source/primary?  -Pain control with lidocaine patch and oxycodone  -Orthopedics following  - CT L spine and pelvis: Diffuse sclerotic metastatic disease to the lumbar spine, iliac bones, and sacrum. Small sclerotic foci are seen in bilateral femoral bones.  Old fracture of the superior and inferior right pubic rim, stable.  - CT lumbar: Diffuse sclerotic mets and acute fracture of L2. There is   posterior retropulsion resulting in mild-to-moderate spinal canal   stenosis.  - MR Cervical: osseous mets. No cord compression. Multilevel degenerative. Nonspecific high STIR signal surrounding the right lateral paraspinal   space at the level of C1-C2    -MR thoracic, and lumbar: innumerable hypointense lesions throughout the thoracic , lumbar spine, sacrum, pelvis, corresponding to diffuse sclerotic osseous metastases on CT    Compression fracture of L2 superior endplate, with associated mild   retropulsion resulting in focal canal stenosis.

## 2019-10-14 NOTE — PROGRESS NOTE ADULT - PROBLEM SELECTOR PLAN 5
-Continue oxybutynin 10mg ER qd, methenamine hippurate 1g BID  - C/W Starks cath replaced in ED (patient states she straight caths at home) Chronic, stable  Home meds include - lisinopril with hold parameters  -Routine hemodynamic monitoring

## 2019-10-14 NOTE — PROGRESS NOTE ADULT - PROBLEM SELECTOR PLAN 2
XR performed of LS spine, b/l knees, and hip/pelvis revealing L2 compression fracture and Chronic right-sided superior and inferior pubic rami fractures. Also seen multiple lytic bone lesions on all 3 studies c/w metastatic disease  -Heme/Onc - Dr Woo consulted -Plan to biopsy left iliac bone under CT scan guidance today, primary breast cancer with multiple new lytic lesions in pelvis and spine   -XR performed of LS spine, b/l knees, and hip/pelvis: multiple lytic bone lesions on all 3 studies c/w metastatic disease. L2 compression fracture and Chronic right-sided superior and inferior pubic rami fractures.   -Heme/Onc - Dr Woo consulted: appreciate [facility]

## 2019-10-14 NOTE — PROGRESS NOTE ADULT - PROBLEM SELECTOR PLAN 4
Chronic, stable  Home meds include - lisinopril with hold parameters  -Routine hemodynamic monitoring -new constipation today  -start colace, senna, Miralax

## 2019-10-14 NOTE — CHART NOTE - NSCHARTNOTEFT_GEN_A_CORE
Aileen Mai, has multiple sclerotic bone lesions. Plan to biopsy left iliac bone under CT scan guidance. H/O ca breast and mastectomy in past. Also has multiple sclerosis and mitral stenosis. Consent obtained.

## 2019-10-14 NOTE — PROGRESS NOTE ADULT - ASSESSMENT
70 y/o woman w MS and nonambulatory, recently in Rehab. Hx right breast ca post MRM 2007 and 10 years of adj Tamoxifen, under care Dr CHRISTA Fairbanks but not seen for a while. Found w multiple bone lesions suspicious for mets, underwent image guided biopsy in IR on 10/14/19    - Anemia likely due to above, iron studies c/w chronic ds not symptomatic; transfuse if symptomatic  - await pathology from bone biopsy - patient opting to have follow-up with Dr. Fairbanks; requested IR to have pathology send results to Dr. Fairbanks  - Continue present acute care

## 2019-10-14 NOTE — PROGRESS NOTE ADULT - PROBLEM SELECTOR PLAN 7
IMPROVE VTE Individual Risk Assessment          RISK                                                          Points  [  ] Previous VTE                                                3  [  ] Thrombophilia                                            2  [x  ] Lower limb paralysis                                  2        (unable to hold up >15 seconds)    [ x ] Current Cancer                                            2         (within 6 months)  [x  ] Immobilization > 24 hrs                             1  [  ] ICU/CCU stay > 24 hours                           1  [ x ] Age > 60                                                        1    IMPROVE VTE Score:  6  VTE ppx with lovenox 40mg qd, hold lovenox for bone biopsy today IMPROVE VTE Individual Risk Assessment          RISK                                                          Points  [  ] Previous VTE                                                3  [  ] Thrombophilia                                            2  [x  ] Lower limb paralysis                                  2        (unable to hold up >15 seconds)    [ x ] Current Cancer                                            2         (within 6 months)  [x  ] Immobilization > 24 hrs                             1  [  ] ICU/CCU stay > 24 hours                           1  [ x ] Age > 60                                                        1    IMPROVE VTE Score:  6  VTE ppx with lovenox 40mg qd, hold lovenox for bone biopsy today, restart within 48 hours after surgery (thursday 10/17) as per IR

## 2019-10-15 LAB
ANION GAP SERPL CALC-SCNC: 7 MMOL/L — SIGNIFICANT CHANGE UP (ref 5–17)
BUN SERPL-MCNC: 17 MG/DL — SIGNIFICANT CHANGE UP (ref 7–23)
CALCIUM SERPL-MCNC: 8.3 MG/DL — LOW (ref 8.5–10.1)
CHLORIDE SERPL-SCNC: 105 MMOL/L — SIGNIFICANT CHANGE UP (ref 96–108)
CO2 SERPL-SCNC: 26 MMOL/L — SIGNIFICANT CHANGE UP (ref 22–31)
CREAT SERPL-MCNC: 0.7 MG/DL — SIGNIFICANT CHANGE UP (ref 0.5–1.3)
GLUCOSE SERPL-MCNC: 99 MG/DL — SIGNIFICANT CHANGE UP (ref 70–99)
HCT VFR BLD CALC: 24.6 % — LOW (ref 34.5–45)
HGB BLD-MCNC: 7.9 G/DL — LOW (ref 11.5–15.5)
INTERPRETATION 24H UR IFE-IMP: SIGNIFICANT CHANGE UP
INTERPRETATION 24H UR IFE-IMP: SIGNIFICANT CHANGE UP
MCHC RBC-ENTMCNC: 25.7 PG — LOW (ref 27–34)
MCHC RBC-ENTMCNC: 32.1 GM/DL — SIGNIFICANT CHANGE UP (ref 32–36)
MCV RBC AUTO: 80.1 FL — SIGNIFICANT CHANGE UP (ref 80–100)
NRBC # BLD: 0 /100 WBCS — SIGNIFICANT CHANGE UP (ref 0–0)
PLATELET # BLD AUTO: 503 K/UL — HIGH (ref 150–400)
POTASSIUM SERPL-MCNC: 4.4 MMOL/L — SIGNIFICANT CHANGE UP (ref 3.5–5.3)
POTASSIUM SERPL-SCNC: 4.4 MMOL/L — SIGNIFICANT CHANGE UP (ref 3.5–5.3)
RBC # BLD: 3.07 M/UL — LOW (ref 3.8–5.2)
RBC # FLD: 16.6 % — HIGH (ref 10.3–14.5)
SODIUM SERPL-SCNC: 138 MMOL/L — SIGNIFICANT CHANGE UP (ref 135–145)
SURGICAL PATHOLOGY STUDY: SIGNIFICANT CHANGE UP
WBC # BLD: 7.37 K/UL — SIGNIFICANT CHANGE UP (ref 3.8–10.5)
WBC # FLD AUTO: 7.37 K/UL — SIGNIFICANT CHANGE UP (ref 3.8–10.5)

## 2019-10-15 RX ORDER — LISINOPRIL 2.5 MG/1
1 TABLET ORAL
Qty: 30 | Refills: 0
Start: 2019-10-15 | End: 2019-11-13

## 2019-10-15 RX ORDER — ACETAMINOPHEN 500 MG
1 TABLET ORAL
Qty: 4 | Refills: 0
Start: 2019-10-15 | End: 2019-10-15

## 2019-10-15 RX ORDER — POLYETHYLENE GLYCOL 3350 17 G/17G
17 POWDER, FOR SOLUTION ORAL
Refills: 0 | Status: DISCONTINUED | OUTPATIENT
Start: 2019-10-15 | End: 2019-10-15

## 2019-10-15 RX ORDER — POLYETHYLENE GLYCOL 3350 17 G/17G
17 POWDER, FOR SOLUTION ORAL
Refills: 0 | Status: DISCONTINUED | OUTPATIENT
Start: 2019-10-15 | End: 2019-10-16

## 2019-10-15 RX ORDER — ACETAMINOPHEN 500 MG
2 TABLET ORAL
Qty: 0 | Refills: 0 | DISCHARGE
Start: 2019-10-15

## 2019-10-15 RX ORDER — LISINOPRIL 2.5 MG/1
1 TABLET ORAL
Qty: 14 | Refills: 0
Start: 2019-10-15 | End: 2019-10-28

## 2019-10-15 RX ADMIN — SENNA PLUS 2 TABLET(S): 8.6 TABLET ORAL at 21:26

## 2019-10-15 RX ADMIN — Medication 1 TABLET(S): at 13:03

## 2019-10-15 RX ADMIN — LIDOCAINE 1 PATCH: 4 CREAM TOPICAL at 02:00

## 2019-10-15 RX ADMIN — Medication 100 MILLIGRAM(S): at 21:26

## 2019-10-15 RX ADMIN — Medication 10 MILLIGRAM(S): at 13:04

## 2019-10-15 RX ADMIN — Medication 20 MILLIGRAM(S): at 06:24

## 2019-10-15 RX ADMIN — Medication 20 MILLIGRAM(S): at 18:30

## 2019-10-15 RX ADMIN — Medication 100 MILLIGRAM(S): at 06:24

## 2019-10-15 RX ADMIN — Medication 100 MILLIGRAM(S): at 13:01

## 2019-10-15 RX ADMIN — LIDOCAINE 1 PATCH: 4 CREAM TOPICAL at 19:10

## 2019-10-15 RX ADMIN — LISINOPRIL 20 MILLIGRAM(S): 2.5 TABLET ORAL at 06:24

## 2019-10-15 RX ADMIN — LATANOPROST 1 DROP(S): 0.05 SOLUTION/ DROPS OPHTHALMIC; TOPICAL at 21:27

## 2019-10-15 RX ADMIN — LIDOCAINE 1 PATCH: 4 CREAM TOPICAL at 13:00

## 2019-10-15 NOTE — PROGRESS NOTE ADULT - ASSESSMENT
70 y/o woman w MS and nonambulatory, recently in Rehab. Hx right breast ca post MRM 2007 and 10 years of adj Tamoxifen, under care Dr CHRISTA Fairbanks but not seen for a while. Found w multiple bone mets on CT and MRI C/T/L spine and L2 compression fx    -post IR bx bone, spoke w pathology only found w marrow fibrosis/crushed artifact. However, CA 27.29,  and CEA all elevated, in view of hx and bone findings, highly suspicous for met breast ca to bones. Pt not very symptomatic, he prefers to f/u w own Oncologist Dr Matthew Fairbanks. instructed to see him ASAP after discharge,, to obtain imaging reports/pathology report/labs to bring to Dr Fairbanks.    discussed w Medicine    will sign off for now, please call if any questions

## 2019-10-15 NOTE — PROGRESS NOTE ADULT - PROBLEM SELECTOR PLAN 1
-stable, no acute pain   -Patient complaining of lower back, b/l knee pain over 2-3 months, has hx of a fall from jame lift 7/17/2019 at rehab facility  -Found to have age-indeterminate compressive fracture of L2 on XR along with multiple lytic lesions  -Has hx of Breast Ca s/p R mastectomy, ?source/primary?  -cont pain control: lidocaine patch and oxycodone  -Orthopedics following  - CT L spine and pelvis: Diffuse sclerotic metastatic disease to the lumbar spine, iliac bones, and sacrum. Small sclerotic foci are seen in bilateral femoral bones.  Old fracture of the superior and inferior right pubic rim, stable.  - CT lumbar: Diffuse sclerotic mets and acute fracture of L2.   posterior retropulsion resulting in mild-to-moderate spinal canal   stenosis  - MR Cervical: osseous mets. No cord compression. Multilevel degenerative. Nonspecific high STIR signal surrounding the right lateral paraspinal   space at the level of C1-C2    -MR thoracic, and lumbar: innumerable hypointense lesions throughout the thoracic, lumbar spine, sacrum, pelvis, corresponding to diffuse sclerotic osseous metastases on CT. Compression fracture of L2 superior endplate, with associated mild retropulsion resulting in focal canal stenosis.

## 2019-10-15 NOTE — PROGRESS NOTE ADULT - PROBLEM SELECTOR PLAN 7
IMPROVE VTE Individual Risk Assessment          RISK                                                          Points  [  ] Previous VTE                                                3  [  ] Thrombophilia                                            2  [x  ] Lower limb paralysis                                  2        (unable to hold up >15 seconds)    [ x ] Current Cancer                                            2         (within 6 months)  [x  ] Immobilization > 24 hrs                             1  [  ] ICU/CCU stay > 24 hours                           1  [ x ] Age > 60                                                        1    IMPROVE VTE Score:  6  VTE ppx with lovenox 40mg qd, hold lovenox for bone biopsy today, restart within 48 hours after surgery (thursday 10/17) as per IR

## 2019-10-15 NOTE — PROGRESS NOTE ADULT - PROBLEM SELECTOR PLAN 2
-Biopsy left iliac bone results pending  -pt stable/denies pain post biopsy   -XR performed of LS spine, b/l knees, and hip/pelvis: multiple lytic bone lesions on all 3 studies c/w metastatic disease. L2 compression fracture and Chronic right-sided superior and inferior pubic rami fractures.   -Heme/Onc (Dr Woo): transfuse blood if symptomatic, await pathology from bone biopsy; patient opting to have follow-up with Dr. Fairbanks; requested IR to have pathology send results to Dr. Fairbanks

## 2019-10-15 NOTE — PROGRESS NOTE ADULT - SUBJECTIVE AND OBJECTIVE BOX
All interim records and events noted.    awake, alert, min lower back pain      MEDICATIONS  (STANDING):  baclofen 20 milliGRAM(s) Oral two times a day  docusate sodium 100 milliGRAM(s) Oral three times a day  interferon beta-1a Injectable (AVONEX) 30 MICROGram(s) IntraMuscular every 7 days  latanoprost 0.005% Ophthalmic Solution 1 Drop(s) Both EYES at bedtime  lidocaine   Patch 1 Patch Transdermal daily  lisinopril 20 milliGRAM(s) Oral daily  multivitamin/minerals 1 Tablet(s) Oral daily  oxybutynin 10 milliGRAM(s) Oral daily  polyethylene glycol 3350 17 Gram(s) Oral two times a day  senna 2 Tablet(s) Oral at bedtime    MEDICATIONS  (PRN):  acetaminophen   Tablet .. 650 milliGRAM(s) Oral every 6 hours PRN Temp greater or equal to 38C (100.4F), Mild Pain (1 - 3)  oxyCODONE    5 mG/acetaminophen 325 mG 1 Tablet(s) Oral every 4 hours PRN Moderate Pain (4 - 6)      Vital Signs Last 24 Hrs  T(C): 36.8 (15 Oct 2019 07:47), Max: 37.7 (14 Oct 2019 23:39)  T(F): 98.2 (15 Oct 2019 07:47), Max: 99.9 (14 Oct 2019 23:39)  HR: 64 (15 Oct 2019 07:47) (58 - 67)  BP: 127/64 (15 Oct 2019 07:47) (109/71 - 151/75)  BP(mean): --  RR: 14 (15 Oct 2019 07:47) (14 - 18)  SpO2: 97% (15 Oct 2019 07:47) (96% - 99%)    PHYSICAL EXAM  General: well developed  well nourished, in no acute distress  Head: atraumatic, normocephalic  ENT: sclera anicteric, buccal mucosa moist  Neck: supple, trachea midline, no palpable masses  CV: S1 S2, regular rate and rhythm  Lungs: clear to auscultation, no wheezes/rhonchi  Abdomen: soft, nontender, bowel sounds present, no palp masses  Extrem: no clubbing/cyanosis/edema  Skin: no significant increased ecchymosis/petechiae  Neuro: alert and oriented X3,  no focal deficits      LABS:             7.9    7.37  )-----------( 503      ( 10-15 @ 07:01 )             24.6                7.8    7.13  )-----------( 450      ( 10-14 @ 06:26 )             24.6                8.3    7.41  )-----------( 445      ( 10-13 @ 08:30 )             26.5       10-15    138  |  105  |  17  ----------------------------<  99  4.4   |  26  |  0.70    Ca    8.3<L>      15 Oct 2019 07:01    Cancer Antigen, Ca 27.29: 572.0:  Cancer Antigen, Breast Ca 15.3: 607.0:   Carcinoembryonic Antigen: 29.6:  Cancer Antigen, 125: 27:        RADIOLOGY & ADDITIONAL STUDIES:    IMPRESSION/RECOMMENDATIONS:

## 2019-10-15 NOTE — PROGRESS NOTE ADULT - SUBJECTIVE AND OBJECTIVE BOX
Patient is a 69y old  Female who presents with a chief complaint of Low back and knee pain (14 Oct 2019 12:30)      INTERVAL HPI/OVERNIGHT EVENTS:  Patient examined at bedside. Patient reports no pain today post her left post iliac bone sclerotic lesion biopsy done yesterday. Patient continues to endorse constipation, however improved from yesterday. Pt had a bowel movement this morning. Patient denies any fever, chills, SOB, chest pain, diarrhea, and abdominal pain, pain around dressing area.     T(C): 36.8 (10-15-19 @ 07:47), Max: 37.7 (10-14-19 @ 23:39)  HR: 64 (10-15-19 @ 07:47) (58 - 67)  BP: 127/64 (10-15-19 @ 07:47) (109/71 - 151/75)  RR: 14 (10-15-19 @ 07:47) (14 - 18)  SpO2: 97% (10-15-19 @ 07:47) (96% - 99%)  Wt(kg): --    I&O's Summary    14 Oct 2019 07:01  -  15 Oct 2019 07:00  --------------------------------------------------------  IN: 425 mL / OUT: 1300 mL / NET: -875 mL        LABS:                        7.9    7.37  )-----------( 503      ( 15 Oct 2019 07:01 )             24.6     10-15    138  |  105  |  17  ----------------------------<  99  4.4   |  26  |  0.70    Ca    8.3<L>      15 Oct 2019 07:01        CAPILLARY BLOOD GLUCOSE                 MEDICATIONS  (STANDING):  baclofen 20 milliGRAM(s) Oral two times a day  docusate sodium 100 milliGRAM(s) Oral three times a day  interferon beta-1a Injectable (AVONEX) 30 MICROGram(s) IntraMuscular every 7 days  latanoprost 0.005% Ophthalmic Solution 1 Drop(s) Both EYES at bedtime  lidocaine   Patch 1 Patch Transdermal daily  lisinopril 20 milliGRAM(s) Oral daily  multivitamin/minerals 1 Tablet(s) Oral daily  oxybutynin 10 milliGRAM(s) Oral daily  polyethylene glycol 3350 17 Gram(s) Oral at bedtime  senna 2 Tablet(s) Oral at bedtime    MEDICATIONS  (PRN):  acetaminophen   Tablet .. 650 milliGRAM(s) Oral every 6 hours PRN Temp greater or equal to 38C (100.4F), Mild Pain (1 - 3)  oxyCODONE    5 mG/acetaminophen 325 mG 1 Tablet(s) Oral every 4 hours PRN Moderate Pain (4 - 6)      REVIEW OF SYSTEMS:  CONSTITUTIONAL: No fever, weight loss, or fatigue  EYES: No eye pain, visual disturbances, or discharge  ENMT: No difficulty hearing, tinnitus, vertigo; No throat pain  NECK: No pain or stiffness  RESPIRATORY: No cough, wheezing, chills or hemoptysis; No shortness of breath  CARDIOVASCULAR: No chest pain, palpitations, dizziness, or leg swelling  GASTROINTESTINAL: +constipation, no abdominal or epigastric pain. No nausea, vomiting, or hematemesis; No diarrhea   NEUROLOGICAL: No headaches  SKIN: No itching, burning, rashes, or lesions  MUSCULOSKELETAL: No joint pain or swelling; No muscle, back, or extremity pain      RADIOLOGY & ADDITIONAL TESTS:    Imaging Personally Reviewed:  [x ] YES  [ ] NO    Consultant(s) Notes Reviewed:  [x ] YES  [ ] NO    PHYSICAL EXAM:  GENERAL: states she is "doing very good", lying comfortably in bed, NAD, well-groomed  HEAD: Atraumatic, Normocephalic  EYES: EOMI, conjunctiva and sclera clear  ENMT: No tonsillar erythema, exudates, or enlargement; Moist mucous membranes  NERVOUS SYSTEM: Alert & Oriented X3, Good concentration  CHEST/LUNG: CTA bilaterally; No rales, rhonchi, wheezing, or rubs  HEART: Regular rate and rhythm; No murmurs, rubs, or gallops  ABDOMEN: round, TTP LLQ LUQ abdomen, soft, nondistended; bowel sounds present  EXTREMITIES: 2+ Peripheral Pulses, No clubbing, cyanosis, or edema  SKIN: Warm, dry, well-perfused    Care Discussed with Consultants/Other Providers [x ] YES  [ ] NO

## 2019-10-15 NOTE — PROGRESS NOTE ADULT - PROBLEM SELECTOR PLAN 6
-Continue oxybutynin 10mg ER qd, methenamine hippurate 1g BID  - campos in place (patient straight caths at home)

## 2019-10-16 ENCOUNTER — TRANSCRIPTION ENCOUNTER (OUTPATIENT)
Age: 69
End: 2019-10-16

## 2019-10-16 VITALS — WEIGHT: 139.99 LBS

## 2019-10-16 LAB
ANION GAP SERPL CALC-SCNC: 6 MMOL/L — SIGNIFICANT CHANGE UP (ref 5–17)
BUN SERPL-MCNC: 18 MG/DL — SIGNIFICANT CHANGE UP (ref 7–23)
CALCIUM SERPL-MCNC: 8.8 MG/DL — SIGNIFICANT CHANGE UP (ref 8.5–10.1)
CHLORIDE SERPL-SCNC: 105 MMOL/L — SIGNIFICANT CHANGE UP (ref 96–108)
CO2 SERPL-SCNC: 28 MMOL/L — SIGNIFICANT CHANGE UP (ref 22–31)
CREAT SERPL-MCNC: 0.75 MG/DL — SIGNIFICANT CHANGE UP (ref 0.5–1.3)
GLUCOSE SERPL-MCNC: 100 MG/DL — HIGH (ref 70–99)
HCT VFR BLD CALC: 25.5 % — LOW (ref 34.5–45)
HGB BLD-MCNC: 8 G/DL — LOW (ref 11.5–15.5)
MCHC RBC-ENTMCNC: 25.5 PG — LOW (ref 27–34)
MCHC RBC-ENTMCNC: 31.4 GM/DL — LOW (ref 32–36)
MCV RBC AUTO: 81.2 FL — SIGNIFICANT CHANGE UP (ref 80–100)
NRBC # BLD: 0 /100 WBCS — SIGNIFICANT CHANGE UP (ref 0–0)
PLATELET # BLD AUTO: 523 K/UL — HIGH (ref 150–400)
POTASSIUM SERPL-MCNC: 4.6 MMOL/L — SIGNIFICANT CHANGE UP (ref 3.5–5.3)
POTASSIUM SERPL-SCNC: 4.6 MMOL/L — SIGNIFICANT CHANGE UP (ref 3.5–5.3)
RBC # BLD: 3.14 M/UL — LOW (ref 3.8–5.2)
RBC # FLD: 16.6 % — HIGH (ref 10.3–14.5)
SODIUM SERPL-SCNC: 139 MMOL/L — SIGNIFICANT CHANGE UP (ref 135–145)
WBC # BLD: 6.15 K/UL — SIGNIFICANT CHANGE UP (ref 3.8–10.5)
WBC # FLD AUTO: 6.15 K/UL — SIGNIFICANT CHANGE UP (ref 3.8–10.5)

## 2019-10-16 PROCEDURE — 82962 GLUCOSE BLOOD TEST: CPT

## 2019-10-16 PROCEDURE — 72141 MRI NECK SPINE W/O DYE: CPT

## 2019-10-16 PROCEDURE — 20225 BONE BIOPSY TROCAR/NDL DEEP: CPT

## 2019-10-16 PROCEDURE — 82746 ASSAY OF FOLIC ACID SERUM: CPT

## 2019-10-16 PROCEDURE — 82607 VITAMIN B-12: CPT

## 2019-10-16 PROCEDURE — 73564 X-RAY EXAM KNEE 4 OR MORE: CPT

## 2019-10-16 PROCEDURE — 86300 IMMUNOASSAY TUMOR CA 15-3: CPT

## 2019-10-16 PROCEDURE — 85610 PROTHROMBIN TIME: CPT

## 2019-10-16 PROCEDURE — 72192 CT PELVIS W/O DYE: CPT

## 2019-10-16 PROCEDURE — 80048 BASIC METABOLIC PNL TOTAL CA: CPT

## 2019-10-16 PROCEDURE — 86334 IMMUNOFIX E-PHORESIS SERUM: CPT

## 2019-10-16 PROCEDURE — 97162 PT EVAL MOD COMPLEX 30 MIN: CPT

## 2019-10-16 PROCEDURE — 72131 CT LUMBAR SPINE W/O DYE: CPT

## 2019-10-16 PROCEDURE — 86304 IMMUNOASSAY TUMOR CA 125: CPT

## 2019-10-16 PROCEDURE — 82728 ASSAY OF FERRITIN: CPT

## 2019-10-16 PROCEDURE — 36415 COLL VENOUS BLD VENIPUNCTURE: CPT

## 2019-10-16 PROCEDURE — 82378 CARCINOEMBRYONIC ANTIGEN: CPT

## 2019-10-16 PROCEDURE — 85027 COMPLETE CBC AUTOMATED: CPT

## 2019-10-16 PROCEDURE — 84156 ASSAY OF PROTEIN URINE: CPT

## 2019-10-16 PROCEDURE — 77012 CT SCAN FOR NEEDLE BIOPSY: CPT

## 2019-10-16 PROCEDURE — 72148 MRI LUMBAR SPINE W/O DYE: CPT

## 2019-10-16 PROCEDURE — 85652 RBC SED RATE AUTOMATED: CPT

## 2019-10-16 PROCEDURE — 85730 THROMBOPLASTIN TIME PARTIAL: CPT

## 2019-10-16 PROCEDURE — 73522 X-RAY EXAM HIPS BI 3-4 VIEWS: CPT

## 2019-10-16 PROCEDURE — 73610 X-RAY EXAM OF ANKLE: CPT

## 2019-10-16 PROCEDURE — 88305 TISSUE EXAM BY PATHOLOGIST: CPT

## 2019-10-16 PROCEDURE — 72146 MRI CHEST SPINE W/O DYE: CPT

## 2019-10-16 PROCEDURE — 71045 X-RAY EXAM CHEST 1 VIEW: CPT

## 2019-10-16 PROCEDURE — 88334 PATH CONSLTJ SURG CYTO XM EA: CPT

## 2019-10-16 PROCEDURE — 93005 ELECTROCARDIOGRAM TRACING: CPT

## 2019-10-16 PROCEDURE — 83540 ASSAY OF IRON: CPT

## 2019-10-16 PROCEDURE — 83550 IRON BINDING TEST: CPT

## 2019-10-16 PROCEDURE — 99285 EMERGENCY DEPT VISIT HI MDM: CPT | Mod: 25

## 2019-10-16 PROCEDURE — 86335 IMMUNFIX E-PHORSIS/URINE/CSF: CPT

## 2019-10-16 PROCEDURE — 72110 X-RAY EXAM L-2 SPINE 4/>VWS: CPT

## 2019-10-16 PROCEDURE — 88333 PATH CONSLTJ SURG CYTO XM 1: CPT

## 2019-10-16 PROCEDURE — 80053 COMPREHEN METABOLIC PANEL: CPT

## 2019-10-16 RX ADMIN — Medication 100 MILLIGRAM(S): at 05:36

## 2019-10-16 RX ADMIN — Medication 1 TABLET(S): at 13:02

## 2019-10-16 RX ADMIN — Medication 10 MILLIGRAM(S): at 13:02

## 2019-10-16 RX ADMIN — LIDOCAINE 1 PATCH: 4 CREAM TOPICAL at 00:24

## 2019-10-16 RX ADMIN — Medication 100 MILLIGRAM(S): at 13:02

## 2019-10-16 RX ADMIN — Medication 20 MILLIGRAM(S): at 05:36

## 2019-10-16 RX ADMIN — LIDOCAINE 1 PATCH: 4 CREAM TOPICAL at 13:01

## 2019-10-16 NOTE — PROGRESS NOTE ADULT - REASON FOR ADMISSION
Low back and knee pain

## 2019-10-16 NOTE — PROGRESS NOTE ADULT - PROBLEM SELECTOR PROBLEM 2
Lytic bone lesions on xray

## 2019-10-16 NOTE — PROGRESS NOTE ADULT - PROBLEM SELECTOR PLAN 7
IMPROVE VTE Individual Risk Assessment          RISK                                                          Points  [  ] Previous VTE                                                3  [  ] Thrombophilia                                            2  [x  ] Lower limb paralysis                                  2        (unable to hold up >15 seconds)    [ x ] Current Cancer                                            2         (within 6 months)  [x  ] Immobilization > 24 hrs                             1  [  ] ICU/CCU stay > 24 hours                           1  [ x ] Age > 60                                                        1    IMPROVE VTE Score:  6  VTE ppx with lovenox 40mg qd, hold lovenox for bone biopsy today, restart within 48 hours after surgery (thursday 10/17) as per IR IMPROVE VTE Individual Risk Assessment          RISK                                                          Points  [  ] Previous VTE                                                3  [  ] Thrombophilia                                            2  [x  ] Lower limb paralysis                                  2        (unable to hold up >15 seconds)    [ x ] Current Cancer                                            2         (within 6 months)  [x  ] Immobilization > 24 hrs                             1  [  ] ICU/CCU stay > 24 hours                           1  [ x ] Age > 60                                                        1    IMPROVE VTE Score:  6  VTE ppx with lovenox 40mg qd, hold lovenox for bone biopsy today, restart within 48 hours after surgery (1:36 pm monday 10/13) as per IR, restart 10/15 after 1:36 pm

## 2019-10-16 NOTE — DIETITIAN INITIAL EVALUATION ADULT. - PROBLEM SELECTOR PLAN 1
Patient complaining of lower back, b/l knee pain over 2-3 months, has hx of a fall from jame lift 7/17/2019 at rehab facility  -Found to have age-indeterminate compressive fracture of L2 on XR along with multiple lytic lesions  -Has hx of Breast Ca s/p R mastectomy, ?source/primary?  -Pain control with low dose opioids and tylenol. Has received lidocaine topical patch for pain relief in rehab with significant improvement, will trial in hospital.  -Orthopedics following - CT L spine and pelvis, MR Cervical, thoracic, lumbar spine ordered f/u results.  -PT/OT of limited utility given patient's non-ambulatory status

## 2019-10-16 NOTE — DIETITIAN INITIAL EVALUATION ADULT. - OTHER INFO
patient seen LOS 7 days reports with good PO likes hospital food. states no recent weight change. no food allergies. no problems chewing swallowing . states awaiting possible discharge. + BM yesterday. history MS , per MD suspicion for metastatic breast Ca to bones awaiting biopsy . patient appears well nourished.

## 2019-10-16 NOTE — PROGRESS NOTE ADULT - PROBLEM SELECTOR PLAN 2
-Biopsy left iliac bone c/w fibrosis  -pt stable/denies pain post biopsy   -XR performed of LS spine, b/l knees, and hip/pelvis: multiple lytic bone lesions on all 3 studies c/w metastatic disease. L2 compression fracture and Chronic right-sided superior and inferior pubic rami fractures.   -Heme/Onc (Dr Woo): transfuse blood if symptomatic, await pathology from bone biopsy; patient opting to have follow-up with Dr. Fairbanks; requested IR to have pathology send results to Dr. Fairbanks -Biopsy left iliac bone c/w fibrosis  -pt stable/denies pain post biopsy   -see above for imaging results   -Heme/Onc Dr. Woo: transfuse blood if symptomatic, await pathology from bone biopsy; patient opting to have follow-up with Dr. Fairbanks; requested IR to have pathology send results to Dr. Fairbanks

## 2019-10-16 NOTE — PROGRESS NOTE ADULT - PROBLEM SELECTOR PLAN 5
Chronic, stable  Home meds include - lisinopril with hold parameters  -Routine hemodynamic monitoring Chronic, stable  -continue home med lisinopril with hold parameters  -Routine hemodynamic monitoring

## 2019-10-16 NOTE — PROGRESS NOTE ADULT - PROVIDER SPECIALTY LIST ADULT
Heme/Onc
Internal Medicine
Orthopedics
Orthopedics
Heme/Onc
Internal Medicine
Internal Medicine

## 2019-10-16 NOTE — PROGRESS NOTE ADULT - PROBLEM SELECTOR PLAN 4
-improved but continued  -Continue colace, senna, Miralax -resolved   -discontinue colace, Miralax, senna

## 2019-10-16 NOTE — PROGRESS NOTE ADULT - PROBLEM SELECTOR PROBLEM 1
Compression fracture of L2, closed, initial encounter

## 2019-10-16 NOTE — DISCHARGE NOTE NURSING/CASE MANAGEMENT/SOCIAL WORK - PATIENT PORTAL LINK FT
You can access the FollowMyHealth Patient Portal offered by [facility] by registering at the following website: [email or web address]. By joining [facility]’s FollowMyHealth portal, you will also be able to view your health information using other applications (apps) compatible with our system.

## 2019-10-16 NOTE — PROGRESS NOTE ADULT - PROBLEM SELECTOR PLAN 1
-stable, no acute pain at rest- pain with movement however  -Patient complaining of lower back, b/l knee pain over 2-3 months, has hx of a fall from jame lift 7/17/2019 at rehab facility  -Found to have age-indeterminate compressive fracture of L2 on XR along with multiple lytic lesions  -Has hx of Breast Ca s/p R mastectomy, ?source/primary?  -cont pain control: lidocaine patch and oxycodone  -Orthopedics following  - CT L spine and pelvis: Diffuse sclerotic metastatic disease to the lumbar spine, iliac bones, and sacrum. Small sclerotic foci are seen in bilateral femoral bones.  Old fracture of the superior and inferior right pubic rim, stable.  - CT lumbar: Diffuse sclerotic mets and acute fracture of L2.   posterior retropulsion resulting in mild-to-moderate spinal canal   stenosis  - MR Cervical: osseous mets. No cord compression. Multilevel degenerative. Nonspecific high STIR signal surrounding the right lateral paraspinal   space at the level of C1-C2    -MR thoracic, and lumbar: innumerable hypointense lesions throughout the thoracic, lumbar spine, sacrum, pelvis, corresponding to diffuse sclerotic osseous metastases on CT. Compression fracture of L2 superior endplate, with associated mild retropulsion resulting in focal canal stenosis. -stable, no acute pain at rest- pain with movement however  -awaiting son arrival late this afternoon to pick her up and be discharged from hospital   -Patient complaining of lower back, b/l knee pain over 2-3 months, has hx of a fall from jame lift 7/17/2019 at rehab facility  -Found to have age-indeterminate compressive fracture of L2 on XR along with multiple lytic lesions  -Has hx of Breast Ca s/p R mastectomy, ?source/primary?  -cont pain control: lidocaine patch and oxycodone  -Orthopedics following  - CT L spine and pelvis: Diffuse sclerotic metastatic disease to the lumbar spine, iliac bones, and sacrum. Small sclerotic foci are seen in bilateral femoral bones.  Old fracture of the superior and inferior right pubic rim, stable.  - CT lumbar: Diffuse sclerotic mets and acute fracture of L2.   posterior retropulsion resulting in mild-to-moderate spinal canal   stenosis  - MR Cervical: osseous mets. No cord compression. Multilevel degenerative. Nonspecific high STIR signal surrounding the right lateral paraspinal   space at the level of C1-C2    -MR thoracic, and lumbar: innumerable hypointense lesions throughout the thoracic, lumbar spine, sacrum, pelvis, corresponding to diffuse sclerotic osseous metastases on CT. Compression fracture of L2 superior endplate, with associated mild retropulsion resulting in focal canal stenosis. -stable, no acute pain at rest- pain with movement however  -awaiting son arrival late this afternoon to pick her up and be discharged from hospital   - CT L spine and pelvis: diffuse sclerotic metastatic disease to the lumbar spine, iliac bones, and sacrum. Small sclerotic foci in b/l femoral bones, L2 acute fracture, mild Lumbar spinal canal stenosis   -MR thoracic, and lumbar: innumerable hypointense lesions throughout the thoracic, lumbar spine, sacrum, pelvis, corresponding to diffuse sclerotic osseous metastases on CT.      - MR Cervical: osseous mets.   -XR: compressive fracture of L2 w/ multiple lytic lesions  -Has hx of Breast Ca s/p R mastectomy, ? primary source   -cont pain control: lidocaine patch and oxycodone  -Orthopedics following

## 2019-10-16 NOTE — PROGRESS NOTE ADULT - SUBJECTIVE AND OBJECTIVE BOX
Patient is a 69y old  Female who presents with a chief complaint of Low back and knee pain (15 Oct 2019 11:28)  feels well presently, discomfort only with movement      INTERVAL HPI/OVERNIGHT EVENTS:  T(C): 36.5 (10-16-19 @ 07:27), Max: 36.9 (10-15-19 @ 17:22)  HR: 54 (10-16-19 @ 07:27) (53 - 65)  BP: 131/70 (10-16-19 @ 07:27) (106/65 - 131/70)  RR: 19 (10-16-19 @ 07:27) (16 - 19)  SpO2: 98% (10-16-19 @ 07:27) (97% - 98%)  Wt(kg): --  I&O's Summary    15 Oct 2019 07:01  -  16 Oct 2019 07:00  --------------------------------------------------------  IN: 0 mL / OUT: 1350 mL / NET: -1350 mL        LABS:                        8.0    6.15  )-----------( 523      ( 16 Oct 2019 06:04 )             25.5     10-16    139  |  105  |  18  ----------------------------<  100<H>  4.6   |  28  |  0.75    Ca    8.8      16 Oct 2019 06:04          CAPILLARY BLOOD GLUCOSE                MEDICATIONS  (STANDING):  baclofen 20 milliGRAM(s) Oral two times a day  docusate sodium 100 milliGRAM(s) Oral three times a day  interferon beta-1a Injectable (AVONEX) 30 MICROGram(s) IntraMuscular every 7 days  latanoprost 0.005% Ophthalmic Solution 1 Drop(s) Both EYES at bedtime  lidocaine   Patch 1 Patch Transdermal daily  lisinopril 20 milliGRAM(s) Oral daily  multivitamin/minerals 1 Tablet(s) Oral daily  oxybutynin 10 milliGRAM(s) Oral daily  polyethylene glycol 3350 17 Gram(s) Oral two times a day  senna 2 Tablet(s) Oral at bedtime    MEDICATIONS  (PRN):  acetaminophen   Tablet .. 650 milliGRAM(s) Oral every 6 hours PRN Temp greater or equal to 38C (100.4F), Mild Pain (1 - 3)  oxyCODONE    5 mG/acetaminophen 325 mG 1 Tablet(s) Oral every 4 hours PRN Moderate Pain (4 - 6)      REVIEW OF SYSTEMS:  CONSTITUTIONAL: No fever, weight loss, or fatigue  EYES: No eye pain, visual disturbances, or discharge  ENMT:  No difficulty hearing, tinnitus, vertigo; No sinus or throat pain  NECK: No pain or stiffness  RESPIRATORY: No cough, wheezing, chills or hemoptysis; No shortness of breath  CARDIOVASCULAR: No chest pain, palpitations, dizziness, or leg swelling  GASTROINTESTINAL: No abdominal or epigastric pain. No nausea, vomiting, or hematemesis; No diarrhea or constipation. No melena or hematochezia.  GENITOURINARY: No dysuria, frequency, hematuria, or incontinence  NEUROLOGICAL: No headaches, memory loss, loss of strength, numbness, or tremors  SKIN: No itching, burning, rashes, or lesions   LYMPH NODES: No enlarged glands  ENDOCRINE: No heat or cold intolerance; No hair loss  MUSCULOSKELETAL: back pain with movement  PSYCHIATRIC: No depression, anxiety, mood swings, or difficulty sleeping  HEME/LYMPH: No easy bruising, or bleeding gums  ALLERY AND IMMUNOLOGIC: No hives or eczema    RADIOLOGY & ADDITIONAL TESTS:    Imaging Personally Reviewed:  [ ] YES  [ ] NO    Consultant(s) Notes Reviewed:  [ ] YES  [ ] NO    PHYSICAL EXAM:  GENERAL: NAD, well-groomed, well-developed  HEAD:  Atraumatic, Normocephalic  EYES: EOMI, PERRLA, conjunctiva and sclera clear  ENMT: No tonsillar erythema, exudates, or enlargement; Moist mucous membranes, Good dentition, No lesions  NECK: Supple, No JVD, Normal thyroid  NERVOUS SYSTEM:  Alert & Oriented X3, Good concentration; Motor Strength 5/5 B/L upper and lower extremities; DTRs 2+ intact and symmetric  CHEST/LUNG: Clear to percussion bilaterally; No rales, rhonchi, wheezing, or rubs  HEART: Regular rate and rhythm; No murmurs, rubs, or gallops  ABDOMEN: Soft, Nontender, Nondistended; Bowel sounds present  EXTREMITIES:  2+ Peripheral Pulses, No clubbing, cyanosis, or edema  LYMPH: No lymphadenopathy noted  SKIN: No rashes or lesions    Care Discussed with Consultants/Other Providers [ ] YES  [ ] NO Patient is a 69y old  Female who presents with a chief complaint of Low back and knee pain (15 Oct 2019 11:28)  feels well presently, discomfort only with movement      INTERVAL HPI/OVERNIGHT EVENTS:  Patient examined at bedside. Patient states she is "going very good", her son told her he should will come pick her up late afternoon, reports no pain today, her constipation is resolved. Last BM this AM. Patient denies any fever, chills, SOB, chest pain, diarrhea, and abdominal pain, pain around dressing area.       T(C): 36.5 (10-16-19 @ 07:27), Max: 36.9 (10-15-19 @ 17:22)  HR: 54 (10-16-19 @ 07:27) (53 - 65)  BP: 131/70 (10-16-19 @ 07:27) (106/65 - 131/70)  RR: 19 (10-16-19 @ 07:27) (16 - 19)  SpO2: 98% (10-16-19 @ 07:27) (97% - 98%)  Wt(kg): --  I&O's Summary    15 Oct 2019 07:01  -  16 Oct 2019 07:00  --------------------------------------------------------  IN: 0 mL / OUT: 1350 mL / NET: -1350 mL        LABS:                        8.0    6.15  )-----------( 523      ( 16 Oct 2019 06:04 )             25.5     10-16    139  |  105  |  18  ----------------------------<  100<H>  4.6   |  28  |  0.75    Ca    8.8      16 Oct 2019 06:04          CAPILLARY BLOOD GLUCOSE                MEDICATIONS  (STANDING):  baclofen 20 milliGRAM(s) Oral two times a day  docusate sodium 100 milliGRAM(s) Oral three times a day  interferon beta-1a Injectable (AVONEX) 30 MICROGram(s) IntraMuscular every 7 days  latanoprost 0.005% Ophthalmic Solution 1 Drop(s) Both EYES at bedtime  lidocaine   Patch 1 Patch Transdermal daily  lisinopril 20 milliGRAM(s) Oral daily  multivitamin/minerals 1 Tablet(s) Oral daily  oxybutynin 10 milliGRAM(s) Oral daily  polyethylene glycol 3350 17 Gram(s) Oral two times a day  senna 2 Tablet(s) Oral at bedtime    MEDICATIONS  (PRN):  acetaminophen   Tablet .. 650 milliGRAM(s) Oral every 6 hours PRN Temp greater or equal to 38C (100.4F), Mild Pain (1 - 3)  oxyCODONE    5 mG/acetaminophen 325 mG 1 Tablet(s) Oral every 4 hours PRN Moderate Pain (4 - 6)      REVIEW OF SYSTEMS:  CONSTITUTIONAL: No fever, weight loss, or fatigue  EYES: No eye pain, visual disturbances, or discharge  ENMT: No difficulty hearing, tinnitus, vertigo; No throat pain  NECK: No pain or stiffness  RESPIRATORY: No cough, wheezing, chills or hemoptysis; No shortness of breath  CARDIOVASCULAR: No chest pain, palpitations, dizziness, or leg swelling  GASTROINTESTINAL: no constipation, no abdominal or epigastric pain. No nausea, vomiting, or hematemesis; No diarrhea   NEUROLOGICAL: No headaches  SKIN: No itching, burning, rashes, or lesions  MUSCULOSKELETAL: No joint pain or swelling; No muscle, back, or extremity pain    RADIOLOGY & ADDITIONAL TESTS:    Imaging Personally Reviewed:  [x ] YES  [ ] NO    Consultant(s) Notes Reviewed:  [x ] YES  [ ] NO    PHYSICAL EXAM:  GENERAL: states she is "doing very good", lying comfortably in bed, NAD, well-groomed  HEAD: Atraumatic, Normocephalic  EYES: EOMI, conjunctiva and sclera clear  ENMT: No tonsillar erythema, exudates, or enlargement; Moist mucous membranes  NERVOUS SYSTEM: Alert & Oriented X3, Good concentration  CHEST/LUNG: CTA bilaterally; No rales, rhonchi, wheezing, or rubs  HEART: Regular rate and rhythm; No murmurs, rubs, or gallops  ABDOMEN: round, TTP LLQ LUQ abdomen, soft, nondistended; bowel sounds present  EXTREMITIES: 2+ Peripheral Pulses, No clubbing, cyanosis, or edema  SKIN: Warm, dry, well-perfused    Care Discussed with Consultants/Other Providers [x ] YES  [ ] NO Patient is a 69y old  Female who presents with a chief complaint of Low back and knee pain (15 Oct 2019 11:28)  feels well presently, discomfort only with movement      INTERVAL HPI/OVERNIGHT EVENTS:  Patient examined at bedside. Patient states she is "going very good", her son told her he should will come pick her up late afternoon, reports no pain today, her constipation is resolved. Last BM this AM. Patient denies any fever, chills, SOB, chest pain, diarrhea, and abdominal pain, pain around dressing area.     T(C): 36.5 (10-16-19 @ 07:27), Max: 36.9 (10-15-19 @ 17:22)  HR: 54 (10-16-19 @ 07:27) (53 - 65)  BP: 131/70 (10-16-19 @ 07:27) (106/65 - 131/70)  RR: 19 (10-16-19 @ 07:27) (16 - 19)  SpO2: 98% (10-16-19 @ 07:27) (97% - 98%)  Wt(kg): --  I&O's Summary    15 Oct 2019 07:01  -  16 Oct 2019 07:00  --------------------------------------------------------  IN: 0 mL / OUT: 1350 mL / NET: -1350 mL        LABS:                        8.0    6.15  )-----------( 523      ( 16 Oct 2019 06:04 )             25.5     10-16    139  |  105  |  18  ----------------------------<  100<H>  4.6   |  28  |  0.75    Ca    8.8      16 Oct 2019 06:04          CAPILLARY BLOOD GLUCOSE                MEDICATIONS  (STANDING):  baclofen 20 milliGRAM(s) Oral two times a day  docusate sodium 100 milliGRAM(s) Oral three times a day  interferon beta-1a Injectable (AVONEX) 30 MICROGram(s) IntraMuscular every 7 days  latanoprost 0.005% Ophthalmic Solution 1 Drop(s) Both EYES at bedtime  lidocaine   Patch 1 Patch Transdermal daily  lisinopril 20 milliGRAM(s) Oral daily  multivitamin/minerals 1 Tablet(s) Oral daily  oxybutynin 10 milliGRAM(s) Oral daily  polyethylene glycol 3350 17 Gram(s) Oral two times a day  senna 2 Tablet(s) Oral at bedtime    MEDICATIONS  (PRN):  acetaminophen   Tablet .. 650 milliGRAM(s) Oral every 6 hours PRN Temp greater or equal to 38C (100.4F), Mild Pain (1 - 3)  oxyCODONE    5 mG/acetaminophen 325 mG 1 Tablet(s) Oral every 4 hours PRN Moderate Pain (4 - 6)      REVIEW OF SYSTEMS:  CONSTITUTIONAL: No fever, weight loss, or fatigue  EYES: No eye pain, visual disturbances, or discharge  ENMT: No difficulty hearing, tinnitus, vertigo; No throat pain  NECK: No pain or stiffness  RESPIRATORY: No cough, wheezing, chills or hemoptysis; No shortness of breath  CARDIOVASCULAR: No chest pain, palpitations, dizziness, or leg swelling  GASTROINTESTINAL: no constipation, no abdominal or epigastric pain. No nausea, vomiting, or hematemesis; No diarrhea   NEUROLOGICAL: No headaches  SKIN: No itching, burning, rashes, or lesions  MUSCULOSKELETAL: No joint pain or swelling; No muscle, back, or extremity pain    RADIOLOGY & ADDITIONAL TESTS:    Imaging Personally Reviewed:  [x ] YES  [ ] NO    Consultant(s) Notes Reviewed:  [x ] YES  [ ] NO    PHYSICAL EXAM:  GENERAL: states she is "doing very good", lying comfortably in bed, NAD, well-groomed  HEAD: Atraumatic, Normocephalic  EYES: EOMI, conjunctiva and sclera clear  ENMT: No tonsillar erythema, exudates, or enlargement; Moist mucous membranes  NERVOUS SYSTEM: Alert & Oriented X3, Good concentration  CHEST/LUNG: CTA bilaterally; No rales, rhonchi, wheezing, or rubs  HEART: Regular rate and rhythm; No murmurs, rubs, or gallops  ABDOMEN: round, nontender, soft, nondistended; bowel sounds present  EXTREMITIES: 2+ Peripheral Pulses, No clubbing, cyanosis, or edema  SKIN: warm, dry, well-perfused    Care Discussed with Consultants/Other Providers [x ] YES  [ ] NO

## 2020-02-28 ENCOUNTER — EMERGENCY (EMERGENCY)
Facility: HOSPITAL | Age: 70
LOS: 1 days | Discharge: HOME | End: 2020-02-28
Attending: EMERGENCY MEDICINE | Admitting: EMERGENCY MEDICINE
Payer: MEDICARE

## 2020-02-28 VITALS
RESPIRATION RATE: 16 BRPM | SYSTOLIC BLOOD PRESSURE: 115 MMHG | TEMPERATURE: 98 F | DIASTOLIC BLOOD PRESSURE: 74 MMHG | OXYGEN SATURATION: 99 % | HEART RATE: 82 BPM

## 2020-02-28 VITALS
DIASTOLIC BLOOD PRESSURE: 77 MMHG | WEIGHT: 139.99 LBS | OXYGEN SATURATION: 98 % | HEART RATE: 75 BPM | RESPIRATION RATE: 15 BRPM | SYSTOLIC BLOOD PRESSURE: 108 MMHG | HEIGHT: 66 IN | TEMPERATURE: 98 F

## 2020-02-28 DIAGNOSIS — L89.310 PRESSURE ULCER OF RIGHT BUTTOCK, UNSTAGEABLE: ICD-10-CM

## 2020-02-28 DIAGNOSIS — Z92.89 PERSONAL HISTORY OF OTHER MEDICAL TREATMENT: Chronic | ICD-10-CM

## 2020-02-28 DIAGNOSIS — Z98.890 OTHER SPECIFIED POSTPROCEDURAL STATES: Chronic | ICD-10-CM

## 2020-02-28 LAB
ALBUMIN SERPL ELPH-MCNC: 2.5 G/DL — LOW (ref 3.3–5)
ALP SERPL-CCNC: 79 U/L — SIGNIFICANT CHANGE UP (ref 40–120)
ALT FLD-CCNC: 19 U/L — SIGNIFICANT CHANGE UP (ref 12–78)
ANION GAP SERPL CALC-SCNC: 6 MMOL/L — SIGNIFICANT CHANGE UP (ref 5–17)
APPEARANCE UR: ABNORMAL
AST SERPL-CCNC: 20 U/L — SIGNIFICANT CHANGE UP (ref 15–37)
BACTERIA # UR AUTO: ABNORMAL
BASOPHILS # BLD AUTO: 0.09 K/UL — SIGNIFICANT CHANGE UP (ref 0–0.2)
BASOPHILS NFR BLD AUTO: 1.5 % — SIGNIFICANT CHANGE UP (ref 0–2)
BILIRUB SERPL-MCNC: 0.2 MG/DL — SIGNIFICANT CHANGE UP (ref 0.2–1.2)
BILIRUB UR-MCNC: NEGATIVE — SIGNIFICANT CHANGE UP
BUN SERPL-MCNC: 21 MG/DL — SIGNIFICANT CHANGE UP (ref 7–23)
CALCIUM SERPL-MCNC: 9 MG/DL — SIGNIFICANT CHANGE UP (ref 8.5–10.1)
CHLORIDE SERPL-SCNC: 109 MMOL/L — HIGH (ref 96–108)
CO2 SERPL-SCNC: 25 MMOL/L — SIGNIFICANT CHANGE UP (ref 22–31)
COLOR SPEC: YELLOW — SIGNIFICANT CHANGE UP
CREAT SERPL-MCNC: 0.81 MG/DL — SIGNIFICANT CHANGE UP (ref 0.5–1.3)
DIFF PNL FLD: ABNORMAL
EOSINOPHIL # BLD AUTO: 0.12 K/UL — SIGNIFICANT CHANGE UP (ref 0–0.5)
EOSINOPHIL NFR BLD AUTO: 2 % — SIGNIFICANT CHANGE UP (ref 0–6)
EPI CELLS # UR: SIGNIFICANT CHANGE UP
GLUCOSE SERPL-MCNC: 99 MG/DL — SIGNIFICANT CHANGE UP (ref 70–99)
GLUCOSE UR QL: NEGATIVE — SIGNIFICANT CHANGE UP
HCT VFR BLD CALC: 32.3 % — LOW (ref 34.5–45)
HGB BLD-MCNC: 10.6 G/DL — LOW (ref 11.5–15.5)
IMM GRANULOCYTES NFR BLD AUTO: 0.3 % — SIGNIFICANT CHANGE UP (ref 0–1.5)
KETONES UR-MCNC: ABNORMAL
LACTATE SERPL-SCNC: 0.6 MMOL/L — LOW (ref 0.7–2)
LEUKOCYTE ESTERASE UR-ACNC: ABNORMAL
LYMPHOCYTES # BLD AUTO: 1.85 K/UL — SIGNIFICANT CHANGE UP (ref 1–3.3)
LYMPHOCYTES # BLD AUTO: 30.6 % — SIGNIFICANT CHANGE UP (ref 13–44)
MCHC RBC-ENTMCNC: 29 PG — SIGNIFICANT CHANGE UP (ref 27–34)
MCHC RBC-ENTMCNC: 32.8 GM/DL — SIGNIFICANT CHANGE UP (ref 32–36)
MCV RBC AUTO: 88.5 FL — SIGNIFICANT CHANGE UP (ref 80–100)
MONOCYTES # BLD AUTO: 0.66 K/UL — SIGNIFICANT CHANGE UP (ref 0–0.9)
MONOCYTES NFR BLD AUTO: 10.9 % — SIGNIFICANT CHANGE UP (ref 2–14)
NEUTROPHILS # BLD AUTO: 3.31 K/UL — SIGNIFICANT CHANGE UP (ref 1.8–7.4)
NEUTROPHILS NFR BLD AUTO: 54.7 % — SIGNIFICANT CHANGE UP (ref 43–77)
NITRITE UR-MCNC: POSITIVE
NRBC # BLD: 0 /100 WBCS — SIGNIFICANT CHANGE UP (ref 0–0)
PH UR: 5 — SIGNIFICANT CHANGE UP (ref 5–8)
PLATELET # BLD AUTO: 522 K/UL — HIGH (ref 150–400)
POTASSIUM SERPL-MCNC: 4.4 MMOL/L — SIGNIFICANT CHANGE UP (ref 3.5–5.3)
POTASSIUM SERPL-SCNC: 4.4 MMOL/L — SIGNIFICANT CHANGE UP (ref 3.5–5.3)
PROT SERPL-MCNC: 6.2 G/DL — SIGNIFICANT CHANGE UP (ref 6–8.3)
PROT UR-MCNC: 100
RBC # BLD: 3.65 M/UL — LOW (ref 3.8–5.2)
RBC # FLD: 16.3 % — HIGH (ref 10.3–14.5)
RBC CASTS # UR COMP ASSIST: ABNORMAL /HPF (ref 0–4)
SODIUM SERPL-SCNC: 140 MMOL/L — SIGNIFICANT CHANGE UP (ref 135–145)
SP GR SPEC: 1.02 — SIGNIFICANT CHANGE UP (ref 1.01–1.02)
UROBILINOGEN FLD QL: NEGATIVE — SIGNIFICANT CHANGE UP
WBC # BLD: 6.05 K/UL — SIGNIFICANT CHANGE UP (ref 3.8–10.5)
WBC # FLD AUTO: 6.05 K/UL — SIGNIFICANT CHANGE UP (ref 3.8–10.5)
WBC UR QL: ABNORMAL

## 2020-02-28 PROCEDURE — 99283 EMERGENCY DEPT VISIT LOW MDM: CPT

## 2020-02-28 PROCEDURE — 36415 COLL VENOUS BLD VENIPUNCTURE: CPT

## 2020-02-28 PROCEDURE — 87040 BLOOD CULTURE FOR BACTERIA: CPT

## 2020-02-28 PROCEDURE — 99284 EMERGENCY DEPT VISIT MOD MDM: CPT

## 2020-02-28 PROCEDURE — 85027 COMPLETE CBC AUTOMATED: CPT

## 2020-02-28 PROCEDURE — 83605 ASSAY OF LACTIC ACID: CPT

## 2020-02-28 PROCEDURE — 81001 URINALYSIS AUTO W/SCOPE: CPT

## 2020-02-28 PROCEDURE — 80053 COMPREHEN METABOLIC PANEL: CPT

## 2020-02-28 RX ORDER — COLLAGENASE CLOSTRIDIUM HIST. 250 UNIT/G
1 OINTMENT (GRAM) TOPICAL
Qty: 1 | Refills: 0
Start: 2020-02-28

## 2020-02-28 RX ORDER — COLLAGENASE CLOSTRIDIUM HIST. 250 UNIT/G
1 OINTMENT (GRAM) TOPICAL ONCE
Refills: 0 | Status: COMPLETED | OUTPATIENT
Start: 2020-02-28 | End: 2020-02-28

## 2020-02-28 RX ORDER — CEFUROXIME AXETIL 250 MG
500 TABLET ORAL EVERY 12 HOURS
Refills: 0 | Status: DISCONTINUED | OUTPATIENT
Start: 2020-02-28 | End: 2020-03-03

## 2020-02-28 RX ADMIN — Medication 500 MILLIGRAM(S): at 16:00

## 2020-02-28 RX ADMIN — Medication 1 APPLICATION(S): at 16:00

## 2020-02-28 NOTE — ED ADULT NURSE NOTE - NSIMPLEMENTINTERV_GEN_ALL_ED
Implemented All Fall with Harm Risk Interventions:  [place] to call system. Call bell, personal items and telephone within reach. Instruct patient to call for assistance. Room bathroom lighting operational. Non-slip footwear when patient is off stretcher. Physically safe environment: no spills, clutter or unnecessary equipment. Stretcher in lowest position, wheels locked, appropriate side rails in place. Provide visual cue, wrist band, yellow gown, etc. Monitor gait and stability. Monitor for mental status changes and reorient to person, place, and time. Review medications for side effects contributing to fall risk. Reinforce activity limits and safety measures with patient and family. Provide visual clues: red socks.

## 2020-02-28 NOTE — ED PROVIDER NOTE - CARE PROVIDER_API CALL
Hema Medina)  Surgery  [address] [place] [address]  Phone: [phone]  Fax: [phone]  Follow Up Time:     Pop Miranda (JALIL)  Podiatric Medicine and Surgery  [address] [place] [address]  Phone: [phone]  Fax: [phone]  Follow Up Time:

## 2020-02-28 NOTE — ED PROVIDER NOTE - OBJECTIVE STATEMENT
pt is a 68 yo f who has hx of ms is wheel chair bound, she also has stage "2" metastatic breast cancer from r breast to bony pelvis. on po chemo sp mastectomy remotely treated for a sacral wound on her buttocks at wound care. found by her home care aide to have a worsening wound on r sacrum draininga nd oozing. so she called pmd dr Maria Elena Sanches and was directed to er  no fever no chills no cp no sob accompanied by daughter who provided additional hx.  not a smoker or drinker only allergy is to sudafed

## 2020-02-28 NOTE — ED PROVIDER NOTE - SKIN, MLM
Skin normal color for race, warm, dry and unstageable angry wound r sacrum/ischium silver dollar sized

## 2020-02-28 NOTE — ED ADULT NURSE NOTE - NS ED NOTE  TALK SOMEONE YN
Marcial Alatorre [facility] [address]  [address]  [phone]      Medical Progress Note      NAME: Isabel Anderson   :  1961  MRM:  [id number]    Date/Time: 3/17/2019         Subjective:     Chief Complaint:  Patient was seen and examined by me. Chart reviewed. Still has SVT. Off amio gtt       Objective:       Vitals:       Last 24hrs VS reviewed since prior progress note.  Most recent are:    Visit Vitals  /73 (BP 1 Location: Left arm, BP Patient Position: At rest)   Pulse 76   Temp 98.7 °F (37.1 °C)   Resp 23   Ht 5' 5\" (1.651 m)   Wt 64 kg (141 lb 1.5 oz)   SpO2 94%   BMI 23.48 kg/m²     SpO2 Readings from Last 6 Encounters:   19 94%   19 99%   18 99%   18 100%   18 97%   18 97%    O2 Flow Rate (L/min): 1 l/min       Intake/Output Summary (Last 24 hours) at 3/17/2019 0803  Last data filed at 3/17/2019 0001  Gross per 24 hour   Intake 6720 ml   Output 5700 ml   Net 1020 ml        Exam:     Physical Exam:    Gen:  Frail, ill-appearing, NAD  HEENT:  Pink conjunctivae, PERRL, hearing intact to voice, moist mucous membranes  Neck:  Supple, without masses, thyroid non-tender  Resp:  No accessory muscle use, clear breath sounds without wheezes rales or rhonchi  Card:  tachy, 2/6 murmurs, normal S1, S2 without thrills, trace edema  Abd:  Soft, non-tender, non-distended, normoactive bowel sounds are present  Musc:  No cyanosis or clubbing  Skin:  No rashes   Neuro:  Cranial nerves 3-12 are grossly intact, follows commands appropriately  Psych:  Good insight, oriented to person, place and time, alert    Medications Reviewed: (see below)    Lab Data Reviewed: (see below)    ______________________________________________________________________    Medications:     Current Facility-Administered Medications   Medication Dose Route Frequency    amiodarone (CORDARONE) 375 mg in dextrose 5% 250 mL infusion  0.5-1 mg/min IntraVENous TITRATE    Warfarin - Pharmacist dosing   Other PRN    pantoprazole (PROTONIX) tablet 40 mg  40 mg Oral ACB    sevelamer carbonate (RENVELA) tab 1,600 mg  1,600 mg Oral TID WITH MEALS    insulin lispro (HUMALOG) injection   SubCUTAneous AC&HS    glucose chewable tablet 16 g  4 Tab Oral PRN    dextrose (D50W) injection syrg 12.5-25 g  12.5-25 g IntraVENous PRN    glucagon (GLUCAGEN) injection 1 mg  1 mg IntraMUSCular PRN    amiodarone (CORDARONE) tablet 200 mg  200 mg Oral BID WITH MEALS    gentamicin (GARAMYCIN) 0.1 % cream   Topical QHS    heparin (porcine) injection 5,000 Units  5,000 Units SubCUTAneous Q8H    aspirin delayed-release tablet 81 mg  81 mg Oral DAILY    cinacalcet (SENSIPAR) tablet 30 mg  30 mg Oral DAILY WITH DINNER    senna-docusate (PERICOLACE) 8.6-50 mg per tablet 1 Tab  1 Tab Oral DAILY PRN    sodium chloride (NS) flush 5-40 mL  5-40 mL IntraVENous Q8H    sodium chloride (NS) flush 5-40 mL  5-40 mL IntraVENous PRN    ondansetron (ZOFRAN) injection 4 mg  4 mg IntraVENous Q4H PRN    peritoneal dialysis DEXTROSE 1.5% (2.5 mEq/L low calcium) solution 2,000 mL  2,000 mL IntraPERitoneal QID    hydrocortisone (ANUSOL-HC) 2.5 % rectal cream   PeriANAL QID          Lab Review:     Recent Labs     03/17/19  0426 03/16/19  0256 03/15/19  0419   WBC 11.6* 13.9* 15.1*   HGB 10.4* 10.9* 10.9*   HCT 32.1* 33.5* 33.0*   * 123* 110*     Recent Labs     03/17/19  0426 03/16/19  0256 03/15/19  1634 03/15/19  0419   * 135*  --  134*   K 3.3* 3.3*  --  3.6   CL 96* 95*  --  95*   CO2 28 28  --  28   GLU 96 94  --  81   BUN 79* 88*  --  98*   CREA 14.70* 16.10*  --  17.90*   CA 7.0* 7.0*  --  7.3*   MG 2.2 2.2  --  2.2   PHOS 4.6 5.4*  --  6.5*   ALB  --   --   --  1.8*   INR 1.2* 1.2* 1.2*  --      Lab Results   Component Value Date/Time    Glucose (POC) 92 03/16/2019 10:00 PM    Glucose (POC) 128 (H) 03/16/2019 04:13 PM    Glucose (POC) 98 03/16/2019 11:50 AM    Glucose (POC) 121 (H) 03/16/2019 08:01 AM    Glucose (POC) 97 03/15/2019 09:59 PM          Assessment / Plan: Active Problems:    60 yo hx of HTN, Pafib, NICM EF 20-25% s/p ICD, ESRD on PD, presented w/ AMS, SVT, cardiogenic shock, elevated Trop    1) Cardiogenic shock/acute on chronic systolic CHF: EF 65-39%, s/p ICD. BP still low, but improving. Off levophed 03/15. Will cont to monitor closely    2) SVT/Pafib: rate still elevated w/ any exertion. Cont Amio gtt, oral amio, coumadin. Not on metoprolol due to hypotension. Defer to cardiology team    3) Acute metabolic encephalopathy: now resolved. Likely from shock, uremia. Head CT unremarkable. EEG neg. Neuro was following    4) HTN: holding all meds    5) ESRD: on PD. Management per Renal    6) Debility: cont PT/OT.   Will likely need rehab    Total time spent with patient: 30 [address] discussed with: Patient, nursing, intensivist     Discussed:  Care Plan    Prophylaxis:  Coumadin    Disposition:  [facility]/Rehab           ___________________________________________________    Attending Physician: Evelina Rosales MD No

## 2020-02-28 NOTE — ED PROVIDER NOTE - GASTROINTESTINAL, MLM
Abdomen soft, non-tender, no guarding. has well healed scar= from a bowel obstruction surgery and a campos cath draining clear urine

## 2020-02-28 NOTE — ED PROVIDER NOTE - CARE PLAN
Principal Discharge DX:	Sacral decubitus ulcer  Secondary Diagnosis:	Breast CA  Secondary Diagnosis:	Multiple sclerosis Principal Discharge DX:	Sacral decubitus ulcer  Secondary Diagnosis:	Breast CA  Secondary Diagnosis:	Multiple sclerosis  Secondary Diagnosis:	UTI (urinary tract infection) due to urinary indwelling Starks catheter

## 2020-02-28 NOTE — ED ADULT NURSE NOTE - PMH
Breast CA    Hypertension    MS (mitral stenosis)    Multiple sclerosis    Osteoporosis    S/P mastectomy, right

## 2020-02-28 NOTE — ED PROVIDER NOTE - SECONDARY DIAGNOSIS.
Breast CA Multiple sclerosis UTI (urinary tract infection) due to urinary indwelling Starks catheter

## 2020-02-28 NOTE — ED PROVIDER NOTE - PROGRESS NOTE DETAILS
wound care consult called for Dr Medina called seen by dr luna to follow up in wound clinic in a few days lester arita to start wound care  Reevaluated patient at bedside.  Patient feeling much improved.  Discussed the results of all diagnostic testing in ED and copies of all reports given.   An opportunity to ask questions was given.  Discussed the importance of prompt, close medical follow-up.  Patient will return with any changes, concerns or persistent / worsening symptoms.  Understanding of all instructions verbalized.

## 2020-02-28 NOTE — ED PROVIDER NOTE - CONSTITUTIONAL, MLM
normal... Well appearing, awake, alert, oriented to person, place, time/situation and in no apparent distress. pleasant

## 2020-02-28 NOTE — CONSULT NOTE ADULT - SUBJECTIVE AND OBJECTIVE BOX
Chief Complaint: sacral pressure ulcer    HPI:  yo WF, with MS, sent to the ER after noticing worsening/leakage coming from her right ischial pressure ulcer by her visiting RN and was referred in to the ER by her internist.    PAST MEDICAL & SURGICAL HISTORY:  Hypertension  Multiple sclerosis  S/P mastectomy, right  Breast CA  Osteoporosis  MS (mitral stenosis)  History of bowel resection  H/O breast surgery      Allergies    Sudafed (Other)    Intolerances        MEDICATIONS  (STANDING):  collagenase Ointment 1 Application(s) Topical Once    MEDICATIONS  (PRN):      FAMILY HISTORY:  FHx: hyperlipidemia (Mother)          ROS:  CONSTITUTIONAL: No fever, weight loss, or fatigue  EYES: No eye pain, visual disturbances, or discharge  ENMT:  No difficulty hearing, tinnitus, vertigo; No sinus or throat pain  NECK: No pain or stiffness  BREASTS: No pain, masses, or nipple discharge  RESPIRATORY: No cough, wheezing, chills or hemoptysis; No shortness of breath  CARDIOVASCULAR: No chest pain, palpitations, dizziness, or leg swelling  GASTROINTESTINAL: No abdominal or epigastric pain. No nausea, vomiting, or hematemesis; No diarrhea or constipation. No melena or hematochezia.  GENITOURINARY: No dysuria, frequency, hematuria, or incontinence  NEUROLOGICAL: No headaches, memory loss, loss of strength, numbness, or tremors  SKIN: No itching, burning, rashes, or lesions   LYMPH NODES: No enlarged glands  ENDOCRINE: No heat or cold intolerance; No hair loss  MUSCULOSKELETAL: No joint pain or swelling; No muscle, back, or extremity pain  PSYCHIATRIC: No depression, anxiety, mood swings, or difficulty sleeping  HEME/LYMPH: No easy bruising, or bleeding gums  ALLERGY AND IMMUNOLOGIC: No hives or eczema    PHYSICAL EXAM-    Height (cm): 167.64 (02-28 @ 12:41)  Weight (kg): 63.5 (02-28 @ 12:41)  BMI (kg/m2): 22.6 (02-28 @ 12:41)  Vital Signs Last 24 Hrs  T(C): 36.6 (28 Feb 2020 12:41), Max: 36.6 (28 Feb 2020 12:41)  T(F): 97.8 (28 Feb 2020 12:41), Max: 97.8 (28 Feb 2020 12:41)  HR: 75 (28 Feb 2020 12:41) (75 - 75)  BP: 108/77 (28 Feb 2020 12:41) (108/77 - 108/77)  BP(mean): --  RR: 15 (28 Feb 2020 12:41) (15 - 15)  SpO2: 98% (28 Feb 2020 12:41) (98% - 98%)    Constitutional: well developed, well nourished, no apparent distress, alert, oriented x 3.   Pulmonary: no respiratory distress, normal respiratory rhythm and effort, lungs are clear to auscultation/percussion. No CVA tenderness.  Cardiovascular: heart rate normal, normal sinus rhythm; no murmurs, gallops, rubs, heaves or thrills   Abdomen: soft, non-tender, +BS, no guarding/rebound/rigidity.    Both shoulders, elbows, knees, hips, heels- no ulcers; skin intact  sacrum/coccyx-no ulcers; skin intact  right ischium- deep open ulcer; [id number]; covered                                    10.6   6.05  )-----------( 522      ( 28 Feb 2020 14:21 )             32.3             Radiology Chief Complaint: sacral pressure ulcer    HPI:  68 yo WF, with MS, sent to the ER after noticing worsening/leakage coming from her right ischial pressure ulcer by her visiting RN and was referred in to the ER by her internist.    PAST MEDICAL & SURGICAL HISTORY:  Hypertension  Multiple sclerosis  S/P mastectomy, right  Breast CA  Osteoporosis  MS (mitral stenosis)  History of bowel resection  H/O breast surgery      Allergies    Sudafed (Other)    Intolerances        MEDICATIONS  (STANDING):  collagenase Ointment 1 Application(s) Topical Once    MEDICATIONS  (PRN):      FAMILY HISTORY:  FHx: hyperlipidemia (Mother)          ROS:  CONSTITUTIONAL: No fever, weight loss, or fatigue  EYES: No eye pain, visual disturbances, or discharge  ENMT:  No difficulty hearing, tinnitus, vertigo; No sinus or throat pain  NECK: No pain or stiffness  BREASTS: No pain, masses, or nipple discharge  RESPIRATORY: No cough, wheezing, chills or hemoptysis; No shortness of breath  CARDIOVASCULAR: No chest pain, palpitations, dizziness, or leg swelling  GASTROINTESTINAL: No abdominal or epigastric pain. No nausea, vomiting, or hematemesis; No diarrhea or constipation. No melena or hematochezia.  GENITOURINARY: No dysuria, frequency, hematuria, or incontinence  NEUROLOGICAL: No headaches, memory loss, loss of strength, numbness, or tremors  SKIN: No itching, burning, rashes, or lesions   LYMPH NODES: No enlarged glands  ENDOCRINE: No heat or cold intolerance; No hair loss  MUSCULOSKELETAL: No joint pain or swelling; No muscle, back, or extremity pain  PSYCHIATRIC: No depression, anxiety, mood swings, or difficulty sleeping  HEME/LYMPH: No easy bruising, or bleeding gums  ALLERGY AND IMMUNOLOGIC: No hives or eczema    PHYSICAL EXAM-    Height (cm): 167.64 (02-28 @ 12:41)  Weight (kg): 63.5 (02-28 @ 12:41)  BMI (kg/m2): 22.6 (02-28 @ 12:41)  Vital Signs Last 24 Hrs  T(C): 36.6 (28 Feb 2020 12:41), Max: 36.6 (28 Feb 2020 12:41)  T(F): 97.8 (28 Feb 2020 12:41), Max: 97.8 (28 Feb 2020 12:41)  HR: 75 (28 Feb 2020 12:41) (75 - 75)  BP: 108/77 (28 Feb 2020 12:41) (108/77 - 108/77)  BP(mean): --  RR: 15 (28 Feb 2020 12:41) (15 - 15)  SpO2: 98% (28 Feb 2020 12:41) (98% - 98%)    Constitutional: well developed, well nourished, no apparent distress, alert, oriented x 3.   Pulmonary: no respiratory distress, normal respiratory rhythm and effort, lungs are clear to auscultation/percussion. No CVA tenderness.  Cardiovascular: heart rate normal, normal sinus rhythm; no murmurs, gallops, rubs, heaves or thrills   Abdomen: soft, non-tender, +BS, no guarding/rebound/rigidity.    Both shoulders, elbows, knees, hips, heels- no ulcers; skin intact  sacrum/coccyx-no ulcers; skin intact  right ischium- deep open ulcer; [id number]; covered with dark necrotic tissue; no edema/erythema/cellulitis.                                    10.6   6.05  )-----------( 522      ( 28 Feb 2020 14:21 )             32.3             Radiology

## 2020-02-28 NOTE — ED PROVIDER NOTE - PATIENT PORTAL LINK FT
You can access the FollowMyHealth Patient Portal offered by [facility] by registering at the following website: [email or web address]. By joining [employer]’s FollowMyHealth portal, you will also be able to view your health information using other applications (apps) compatible with our system.

## 2020-02-28 NOTE — ED PROVIDER NOTE - CARE PROVIDERS DIRECT ADDRESSES
[email or web address]

## 2020-02-28 NOTE — ED ADULT TRIAGE NOTE - CHIEF COMPLAINT QUOTE
brought in by EMS from home for a wound check; patient bedfast and has a pressure ulcer to the buttocks - as per caregiver who comes to the home each morning reports change of appearance and bleeding from the wound. patient denies fever/chills

## 2020-02-28 NOTE — ED PROVIDER NOTE - NSFOLLOWUPINSTRUCTIONS_ED_ALL_ED_FT
Collaginase apply to wound on ischium once per day then cover with dry sterile dressing  keep clean and dry monitoring for infection bleeding  follow up with dr Landis (sofia/rubin) wound care and your primary care doctor  You have a uti stay well hydrated keep leg bag below bladder and keep empty  start ceftin 1 pill twice per day

## 2020-02-28 NOTE — ED ADULT NURSE NOTE - OBJECTIVE STATEMENT
Pt. received alert and oriented x3 with chief complaint of worsening pressure ulcer noticed by aide at home. Pt. presents w/ stage 2 pressure ulcer to right inner gluteal area. Pt. also presents w/ indwelling campos, instructed to change as per MD Bermeo. Pt. received alert and oriented x3 with chief complaint of worsening pressure ulcer noticed by aide at home. Pt. presents w/ stage 2 pressure ulcer to right inner gluteal area. Pt. also presents w/ indwelling campos, instructed to change as per MD Bermeo. Pt. presents w/ stage 3 pressure ulcer to inner right gluteal area and stage 1 to sacral area.

## 2020-02-29 RX ORDER — CEFUROXIME AXETIL 250 MG
1 TABLET ORAL
Qty: 14 | Refills: 0
Start: 2020-02-29 | End: 2020-03-06

## 2020-02-29 RX ORDER — COLLAGENASE CLOSTRIDIUM HIST. 250 UNIT/G
1 OINTMENT (GRAM) TOPICAL
Qty: 1 | Refills: 0
Start: 2020-02-29

## 2020-03-03 PROBLEM — I10 ESSENTIAL (PRIMARY) HYPERTENSION: Chronic | Status: ACTIVE | Noted: 2020-02-28

## 2020-03-05 ENCOUNTER — OUTPATIENT (OUTPATIENT)
Dept: OUTPATIENT SERVICES | Facility: HOSPITAL | Age: 70
LOS: 1 days | Discharge: HOME | End: 2020-03-05
Payer: MEDICARE

## 2020-03-05 ENCOUNTER — APPOINTMENT (OUTPATIENT)
Dept: WOUND CARE | Facility: HOSPITAL | Age: 70
End: 2020-03-05
Payer: MEDICARE

## 2020-03-05 VITALS
BODY MASS INDEX: 22.5 KG/M2 | HEART RATE: 76 BPM | RESPIRATION RATE: 20 BRPM | TEMPERATURE: 98.5 F | DIASTOLIC BLOOD PRESSURE: 50 MMHG | OXYGEN SATURATION: 98 % | HEIGHT: 66 IN | SYSTOLIC BLOOD PRESSURE: 80 MMHG | WEIGHT: 140 LBS

## 2020-03-05 DIAGNOSIS — Z80.0 FAMILY HISTORY OF MALIGNANT NEOPLASM OF DIGESTIVE ORGANS: ICD-10-CM

## 2020-03-05 DIAGNOSIS — S81.812D LACERATION WITHOUT FOREIGN BODY, LEFT LOWER LEG, SUBSEQUENT ENCOUNTER: ICD-10-CM

## 2020-03-05 DIAGNOSIS — H54.61 UNQUALIFIED VISUAL LOSS, RIGHT EYE, NORMAL VISION LEFT EYE: ICD-10-CM

## 2020-03-05 DIAGNOSIS — Z86.69 PERSONAL HISTORY OF OTHER DISEASES OF THE NERVOUS SYSTEM AND SENSE ORGANS: ICD-10-CM

## 2020-03-05 DIAGNOSIS — S32.009A UNSPECIFIED FRACTURE OF UNSPECIFIED LUMBAR VERTEBRA, INITIAL ENCOUNTER FOR CLOSED FRACTURE: ICD-10-CM

## 2020-03-05 DIAGNOSIS — Z85.3 PERSONAL HISTORY OF MALIGNANT NEOPLASM OF BREAST: ICD-10-CM

## 2020-03-05 DIAGNOSIS — Z86.2 PERSONAL HISTORY OF DISEASES OF THE BLOOD AND BLOOD-FORMING ORGANS AND CERTAIN DISORDERS INVOLVING THE IMMUNE MECHANISM: ICD-10-CM

## 2020-03-05 DIAGNOSIS — Z92.89 PERSONAL HISTORY OF OTHER MEDICAL TREATMENT: Chronic | ICD-10-CM

## 2020-03-05 DIAGNOSIS — Z98.890 OTHER SPECIFIED POSTPROCEDURAL STATES: Chronic | ICD-10-CM

## 2020-03-05 DIAGNOSIS — Z87.39 PERSONAL HISTORY OF OTHER DISEASES OF THE MUSCULOSKELETAL SYSTEM AND CONNECTIVE TISSUE: ICD-10-CM

## 2020-03-05 PROCEDURE — 99204 OFFICE O/P NEW MOD 45 MIN: CPT

## 2020-03-05 PROCEDURE — G0463: CPT | Mod: 25

## 2020-03-05 PROCEDURE — 97602 WOUND(S) CARE NON-SELECTIVE: CPT

## 2020-03-05 PROCEDURE — 99215 OFFICE O/P EST HI 40 MIN: CPT

## 2020-03-05 RX ORDER — INTERFERON BETA-1A 30MCG/.5ML
30 KIT INTRAMUSCULAR
Refills: 0 | Status: ACTIVE | COMMUNITY

## 2020-03-05 RX ORDER — COLLAGENASE SANTYL 250 [ARB'U]/G
250 OINTMENT TOPICAL DAILY
Qty: 1 | Refills: 3 | Status: ACTIVE | COMMUNITY
Start: 2020-03-05 | End: 1900-01-01

## 2020-03-05 RX ORDER — BACLOFEN 10 MG/1
10 TABLET ORAL DAILY
Refills: 0 | Status: ACTIVE | COMMUNITY

## 2020-03-05 RX ORDER — LETROZOLE TABLETS 2.5 MG/1
2.5 TABLET, FILM COATED ORAL DAILY
Refills: 0 | Status: ACTIVE | COMMUNITY

## 2020-03-05 RX ORDER — ONDANSETRON HYDROCHLORIDE 8 MG/1
8 TABLET, FILM COATED ORAL DAILY
Refills: 0 | Status: ACTIVE | COMMUNITY

## 2020-03-05 RX ORDER — LISINOPRIL 20 MG/1
20 TABLET ORAL DAILY
Refills: 0 | Status: ACTIVE | COMMUNITY

## 2020-03-05 RX ORDER — CEFUROXIME AXETIL 500 MG/1
500 TABLET ORAL
Refills: 0 | Status: ACTIVE | COMMUNITY

## 2020-03-05 RX ORDER — TIMOLOL MALEATE/LATANOPROST/PF 0.5-0.005%
0.005-0.5 DROPS OPHTHALMIC (EYE)
Refills: 0 | Status: ACTIVE | COMMUNITY

## 2020-03-05 RX ORDER — RIBOCICLIB 200 MG/1
TABLET, FILM COATED ORAL
Refills: 0 | Status: ACTIVE | COMMUNITY

## 2020-03-05 NOTE — REVIEW OF SYSTEMS
[Eyesight Problems] : eyesight problems [Incontinence] : incontinence [Skin Wound] : skin wound [Limb Weakness] : limb weakness [Difficulty Walking] : difficulty walking [Easy Bleeding] : a tendency for easy bleeding [Easy Bruising] : a tendency for easy bruising [Negative] : Endocrine

## 2020-03-08 DIAGNOSIS — Z85.3 PERSONAL HISTORY OF MALIGNANT NEOPLASM OF BREAST: ICD-10-CM

## 2020-03-08 DIAGNOSIS — Z98.890 OTHER SPECIFIED POSTPROCEDURAL STATES: ICD-10-CM

## 2020-03-08 DIAGNOSIS — L89.313 PRESSURE ULCER OF RIGHT BUTTOCK, STAGE 3: ICD-10-CM

## 2020-03-08 DIAGNOSIS — I10 ESSENTIAL (PRIMARY) HYPERTENSION: ICD-10-CM

## 2020-03-08 DIAGNOSIS — M81.0 AGE-RELATED OSTEOPOROSIS WITHOUT CURRENT PATHOLOGICAL FRACTURE: ICD-10-CM

## 2020-03-08 DIAGNOSIS — Z80.0 FAMILY HISTORY OF MALIGNANT NEOPLASM OF DIGESTIVE ORGANS: ICD-10-CM

## 2020-03-08 DIAGNOSIS — Z79.899 OTHER LONG TERM (CURRENT) DRUG THERAPY: ICD-10-CM

## 2020-03-08 DIAGNOSIS — Z87.891 PERSONAL HISTORY OF NICOTINE DEPENDENCE: ICD-10-CM

## 2020-03-08 DIAGNOSIS — Z88.8 ALLERGY STATUS TO OTHER DRUGS, MEDICAMENTS AND BIOLOGICAL SUBSTANCES STATUS: ICD-10-CM

## 2020-03-08 DIAGNOSIS — Z90.11 ACQUIRED ABSENCE OF RIGHT BREAST AND NIPPLE: ICD-10-CM

## 2020-03-08 DIAGNOSIS — H40.9 UNSPECIFIED GLAUCOMA: ICD-10-CM

## 2020-03-08 DIAGNOSIS — G35 MULTIPLE SCLEROSIS: ICD-10-CM

## 2020-03-09 NOTE — VITALS
[Burning] : burning [Tender] : tender [Occasional] : occasional [] : Yes [FreeTextEntry3] : Right thigh [FreeTextEntry1] : offloading [FreeTextEntry2] : weight bearing [FreeTextEntry4] : offloading

## 2020-03-09 NOTE — PLAN
[FreeTextEntry1] : Collagenase dressings\par Turn and position frequently\par Will attempt to obtain a ROHO cushion and a low air loss mattress.\par Return one week\par \par \par

## 2020-03-09 NOTE — HISTORY OF PRESENT ILLNESS
[FreeTextEntry1] : The wound is located on patient's right ischium.  Patient reports that about 3 weeks ago she had a UTI and was tx with oral antibiotic (Macrobid)  that caused her to have diarrhea.  Patient reports that she is incontinent and believes that  the wound was caused by the diarrhea.  Patient reports that when her antiboitic (Ceftin) was d/c'd and changed to a different antibiotic her diarrhea resolved.  Patient reports that she went to ER on Friday for right ischium wound.  Patient was prescribed Collagenase, dry dressing and d/c'd home.  Patient was instructed to f/u at [facility].

## 2020-03-09 NOTE — PHYSICAL EXAM
[4 x 4] : 4 x 4  [Abdominal Pad] : Abdominal Pad [Normal Breath Sounds] : Normal breath sounds [Normal Heart Sounds] : normal heart sounds [Normal Rate and Rhythm] : normal rate and rhythm [Skin Ulcer] : ulcer [Alert] : alert [Oriented to Person] : oriented to person [Oriented to Place] : oriented to place [Oriented to Time] : oriented to time [Calm] : calm [JVD] : no jugular venous distention  [Abdomen Tenderness] : ~T ~M No abdominal tenderness [Purpura] : no purpura  [Petechiae] : no petechiae [Skin Induration] : no induration [de-identified] : WDWNWF in NAD [de-identified] : Clear [de-identified] : Supple [de-identified] : Ulcer right ischial area. Skin necrotic with some viable soft tissue at base along with some necrotic soft tissue. Bone is not exposed. Tunneling into fatty layer present. Periwound skin intact. [FreeTextEntry1] : Right ischium [FreeTextEntry2] : 4.5 [FreeTextEntry3] : 5.0 [FreeTextEntry4] : 0.1 - 2.3 [de-identified] : serosanguineous [de-identified] : intact [de-identified] : 20% [de-identified] : Collagenase, Alginate [de-identified] : NSC [TWNoteComboBox4] : Moderate [de-identified] : Mild [de-identified] : 50% [de-identified] : 50% [de-identified] : Yes [de-identified] : Daily

## 2020-03-11 ENCOUNTER — OUTPATIENT (OUTPATIENT)
Dept: OUTPATIENT SERVICES | Facility: HOSPITAL | Age: 70
LOS: 1 days | Discharge: HOME | End: 2020-03-11
Payer: MEDICARE

## 2020-03-11 ENCOUNTER — RESULT REVIEW (OUTPATIENT)
Age: 70
End: 2020-03-11

## 2020-03-11 ENCOUNTER — APPOINTMENT (OUTPATIENT)
Dept: SURGERY | Facility: HOSPITAL | Age: 70
End: 2020-03-11
Payer: MEDICARE

## 2020-03-11 VITALS
RESPIRATION RATE: 20 BRPM | SYSTOLIC BLOOD PRESSURE: 147 MMHG | OXYGEN SATURATION: 99 % | WEIGHT: 140 LBS | HEIGHT: 65 IN | DIASTOLIC BLOOD PRESSURE: 75 MMHG | BODY MASS INDEX: 23.32 KG/M2 | TEMPERATURE: 97.7 F | HEART RATE: 65 BPM

## 2020-03-11 DIAGNOSIS — Z92.89 PERSONAL HISTORY OF OTHER MEDICAL TREATMENT: Chronic | ICD-10-CM

## 2020-03-11 DIAGNOSIS — Z98.890 OTHER SPECIFIED POSTPROCEDURAL STATES: Chronic | ICD-10-CM

## 2020-03-11 DIAGNOSIS — L89.309 PRESSURE ULCER OF UNSPECIFIED BUTTOCK, UNSPECIFIED STAGE: ICD-10-CM

## 2020-03-11 PROCEDURE — 88304 TISSUE EXAM BY PATHOLOGIST: CPT | Mod: 26

## 2020-03-11 PROCEDURE — 11042 DBRDMT SUBQ TIS 1ST 20SQCM/<: CPT

## 2020-03-11 PROCEDURE — 11045 DBRDMT SUBQ TISS EACH ADDL: CPT

## 2020-03-11 PROCEDURE — 88304 TISSUE EXAM BY PATHOLOGIST: CPT

## 2020-03-11 RX ORDER — COLLAGENASE SANTYL 250 [ARB'U]/G
250 OINTMENT TOPICAL
Qty: 30 | Refills: 1 | Status: COMPLETED | COMMUNITY
Start: 2020-03-11 | End: 2020-05-10

## 2020-03-11 NOTE — REASON FOR VISIT
[Formal Caregiver] : formal caregiver [FreeTextEntry5] : Right ischial necrotic pressure ulcer [FreeTextEntry4] : Right ischium with necrotic pressure ulcer. [FreeTextEntry3] : Right ischium with necrotic pressure ulcer.  [FreeTextEntry6] : Right ischium with necrotic pressure ulcer.  [FreeTextEntry7] : Right ischium with necrotic pressure ulcer.

## 2020-03-11 NOTE — PROCEDURE
[Saline] : saline [Topical Lidocaine] : topical lidocaine  [Fibrotic] : fibrotic [Necrotic] : necrotic [Scalpel] : scalpel [Sharp scissors] : sharp scissors [Fat] : fat [Subcutaneous tissue] : subcutaneous tissue [Pathologic Exam] : pathologic exam [Clean] : clean [Pressure] : pressure [AgNo3 Cauterization] : AgNo3 cauterization [FreeTextEntry1] : Collagenase, Nuknet, dry dressing.  [] : No [FreeTextEntry9] : 3:50 PM  [de-identified] : Right ischium with necrotic tissue. [de-identified] : BRANDI Medina [de-identified] : None [FreeTextEntry6] : Right ischium with necrotic pressure ulcer.  [FreeTextEntry7] : Right ischium with necrotic pressure ulcer.  [de-identified] : Lidocaine cream [de-identified] : 2cc [de-identified] : Necrotic tissue.

## 2020-03-11 NOTE — ASSESSMENT
[Verbal] : Verbal [Patient] : Patient [Caregiver] : Caregiver [Good - alert, interested, motivated] : Good - alert, interested, motivated [Verbalizes knowledge/Understanding] : Verbalizes knowledge/understanding [Dressing changes] : dressing changes [Skin Care] : skin care [Pressure relief] : pressure relief [Signs and symptoms of infection] : sign and symptoms of infection [How and When to Call] : how and when to call [Off-loading] : off-loading [Patient responsibility to plan of care] : patient responsibility to plan of care [Stable] : stable [Home] : Home [Wheelchair] : Wheelchair [Faxed - Long Term Care/Home Health Agency] : Long Term Care/Home Health Agency: Faxed [] : No [FreeTextEntry2] : Restore Skin Integrity\par Infection Control\par Localized wound care\par Offloading\par Develop Realistic expectations and measurable treatments nursing POC to reduce, eliminate or develop acceptable pain limit tolerance [FreeTextEntry3] : Unchanged [FreeTextEntry4] : Pathology sent to Lab per MD\par No Debridement for next visit at this time\par MD Escribed Collagenase\par Photos Taken\par F/U to [facility] in 1 week [FreeTextEntry1] : SINGH

## 2020-03-11 NOTE — PHYSICAL EXAM
[Normal Breath Sounds] : Normal breath sounds [Normal Heart Sounds] : normal heart sounds [Alert] : alert [Oriented to Person] : oriented to person [Calm] : calm [4 x 4] : 4 x 4  [Abdominal Pad] : Abdominal Pad [JVD] : no jugular venous distention  [de-identified] : WD/WN in no acute distress. [de-identified] : WNL [de-identified] : JUDAHL [de-identified] : Right ischium pressure ulcer is covered with black, necrotic tissue, no drainage, periwound skin is intact with no cellulitis. [de-identified] : Small amount of Blood Loss, No Pain During or Post Procedure, Pt tolerated Well.\par MD applied NuKnit Today to be changed Friday 3/13/2020 by  [FreeTextEntry1] : Right Ischium [FreeTextEntry2] : 5.4 [FreeTextEntry3] : 5.6 [FreeTextEntry4] : 1.5 [de-identified] : Serosanguineous [de-identified] : Intact [de-identified] : Post Debridement Measurements 5.6 x 5.8 x 1.9 [de-identified] : Collagenase [de-identified] : Cleansed with Normal Saline\par  [TWNoteComboBox4] : Moderate [TWNoteComboBox5] : No [de-identified] : No [de-identified] : Moderate [de-identified] : 100% [de-identified] : None [de-identified] : No [de-identified] : 2.5% Lidocaine Topical [TWNoteComboBox7] : Karol [de-identified] : Debridement performed of all devitalized tissue to bleeding viable tissue [de-identified] : Daily

## 2020-03-12 ENCOUNTER — APPOINTMENT (OUTPATIENT)
Dept: WOUND CARE | Facility: HOSPITAL | Age: 70
End: 2020-03-12

## 2020-03-13 LAB — SURGICAL PATHOLOGY STUDY: SIGNIFICANT CHANGE UP

## 2020-03-14 DIAGNOSIS — L89.313 PRESSURE ULCER OF RIGHT BUTTOCK, STAGE 3: ICD-10-CM

## 2020-03-14 DIAGNOSIS — Z88.8 ALLERGY STATUS TO OTHER DRUGS, MEDICAMENTS AND BIOLOGICAL SUBSTANCES STATUS: ICD-10-CM

## 2020-03-14 DIAGNOSIS — H40.9 UNSPECIFIED GLAUCOMA: ICD-10-CM

## 2020-03-14 DIAGNOSIS — Z90.11 ACQUIRED ABSENCE OF RIGHT BREAST AND NIPPLE: ICD-10-CM

## 2020-03-14 DIAGNOSIS — Z98.890 OTHER SPECIFIED POSTPROCEDURAL STATES: ICD-10-CM

## 2020-03-14 DIAGNOSIS — M81.0 AGE-RELATED OSTEOPOROSIS WITHOUT CURRENT PATHOLOGICAL FRACTURE: ICD-10-CM

## 2020-03-14 DIAGNOSIS — I10 ESSENTIAL (PRIMARY) HYPERTENSION: ICD-10-CM

## 2020-03-14 DIAGNOSIS — Z85.3 PERSONAL HISTORY OF MALIGNANT NEOPLASM OF BREAST: ICD-10-CM

## 2020-03-14 DIAGNOSIS — Z80.0 FAMILY HISTORY OF MALIGNANT NEOPLASM OF DIGESTIVE ORGANS: ICD-10-CM

## 2020-03-14 DIAGNOSIS — Z87.891 PERSONAL HISTORY OF NICOTINE DEPENDENCE: ICD-10-CM

## 2020-03-14 DIAGNOSIS — Z79.899 OTHER LONG TERM (CURRENT) DRUG THERAPY: ICD-10-CM

## 2020-03-14 DIAGNOSIS — G35 MULTIPLE SCLEROSIS: ICD-10-CM

## 2020-03-18 ENCOUNTER — OUTPATIENT (OUTPATIENT)
Dept: OUTPATIENT SERVICES | Facility: HOSPITAL | Age: 70
LOS: 1 days | Discharge: HOME | End: 2020-03-18
Payer: MEDICARE

## 2020-03-18 ENCOUNTER — APPOINTMENT (OUTPATIENT)
Dept: SURGERY | Facility: HOSPITAL | Age: 70
End: 2020-03-18
Payer: MEDICARE

## 2020-03-18 VITALS
SYSTOLIC BLOOD PRESSURE: 86 MMHG | DIASTOLIC BLOOD PRESSURE: 57 MMHG | RESPIRATION RATE: 20 BRPM | TEMPERATURE: 98.6 F | HEIGHT: 65 IN | BODY MASS INDEX: 23.32 KG/M2 | WEIGHT: 140 LBS | HEART RATE: 74 BPM | OXYGEN SATURATION: 99 %

## 2020-03-18 VITALS — SYSTOLIC BLOOD PRESSURE: 120 MMHG | DIASTOLIC BLOOD PRESSURE: 76 MMHG

## 2020-03-18 DIAGNOSIS — L89.309 PRESSURE ULCER OF UNSPECIFIED BUTTOCK, UNSPECIFIED STAGE: ICD-10-CM

## 2020-03-18 DIAGNOSIS — Z98.890 OTHER SPECIFIED POSTPROCEDURAL STATES: Chronic | ICD-10-CM

## 2020-03-18 DIAGNOSIS — Z92.89 PERSONAL HISTORY OF OTHER MEDICAL TREATMENT: Chronic | ICD-10-CM

## 2020-03-18 PROCEDURE — 99213 OFFICE O/P EST LOW 20 MIN: CPT

## 2020-03-18 PROCEDURE — 97602 WOUND(S) CARE NON-SELECTIVE: CPT

## 2020-03-19 ENCOUNTER — APPOINTMENT (OUTPATIENT)
Dept: WOUND CARE | Facility: HOSPITAL | Age: 70
End: 2020-03-19

## 2020-03-19 NOTE — PHYSICAL EXAM
[Normal Breath Sounds] : Normal breath sounds [Normal Heart Sounds] : normal heart sounds [Alert] : alert [Oriented to Person] : oriented to person [Calm] : calm [JVD] : no jugular venous distention  [de-identified] : WD/WN in no acute distress. [de-identified] : WNL [de-identified] : JUDAHL [de-identified] : Right ischium pressure ulcer is , thin layer of dry black eschar, no drainage, no infection, periwound skin is intact with no cellulitis. [FreeTextEntry1] : Ischium  [FreeTextEntry2] : 4.5 [FreeTextEntry3] : 4.2 [FreeTextEntry4] : 0.1-3.8 [de-identified] : Serosanguineous  [de-identified] : 1-30% [de-identified] : NSC, Collagenase, Caclcium alginate,DD  [TWNoteComboBox1] : Right [TWNoteComboBox4] : Moderate [TWNoteComboBox5] : No [TWNoteComboBox6] : Pressure [de-identified] : No [de-identified] : Normal [de-identified] : None [de-identified] : 50% [de-identified] : <20% [de-identified] : Yes [de-identified] : 3x Weekly [de-identified] : Secondary Dressing

## 2020-03-19 NOTE — ASSESSMENT
[Verbal] : Verbal [Patient] : Patient [Caregiver] : Caregiver [Good - alert, interested, motivated] : Good - alert, interested, motivated [Verbalizes knowledge/Understanding] : Verbalizes knowledge/understanding [Dressing changes] : dressing changes [Skin Care] : skin care [Pressure relief] : pressure relief [Signs and symptoms of infection] : sign and symptoms of infection [How and When to Call] : how and when to call [Off-loading] : off-loading [Patient responsibility to plan of care] : patient responsibility to plan of care [Stable] : stable [Home] : Home [Wheelchair] : Wheelchair [Faxed - Long Term Care/Home Health Agency] : Long Term Care/Home Health Agency: Faxed [] : No [FreeTextEntry2] : Infection prevention \par Localized wound care\par Offloading/ pressure relief \par Achieving pain tolerance levels within the Pts limits of 0/10.\par Develop Realistic expectations and measurable goals in nursing POC to reduce, eliminate or develop acceptable pain limit tolerance.\par Utilization of offloading, taking deep breaths and guided imagery to reduce discomfort PRN.  [FreeTextEntry4] : MD reviewed pathology with pt \par Auth submitted for debridement next visit \par Pt to follow up to [facility] in two weeks  [FreeTextEntry1] : Right ischium pressure ulcer is stable, no infection, might need debridement next visit. .\par

## 2020-03-21 DIAGNOSIS — G35 MULTIPLE SCLEROSIS: ICD-10-CM

## 2020-03-21 DIAGNOSIS — Z88.8 ALLERGY STATUS TO OTHER DRUGS, MEDICAMENTS AND BIOLOGICAL SUBSTANCES STATUS: ICD-10-CM

## 2020-03-21 DIAGNOSIS — M81.0 AGE-RELATED OSTEOPOROSIS WITHOUT CURRENT PATHOLOGICAL FRACTURE: ICD-10-CM

## 2020-03-21 DIAGNOSIS — L89.313 PRESSURE ULCER OF RIGHT BUTTOCK, STAGE 3: ICD-10-CM

## 2020-03-21 DIAGNOSIS — Z98.890 OTHER SPECIFIED POSTPROCEDURAL STATES: ICD-10-CM

## 2020-03-21 DIAGNOSIS — Z87.891 PERSONAL HISTORY OF NICOTINE DEPENDENCE: ICD-10-CM

## 2020-03-21 DIAGNOSIS — I10 ESSENTIAL (PRIMARY) HYPERTENSION: ICD-10-CM

## 2020-03-21 DIAGNOSIS — Z85.3 PERSONAL HISTORY OF MALIGNANT NEOPLASM OF BREAST: ICD-10-CM

## 2020-03-21 DIAGNOSIS — Z80.0 FAMILY HISTORY OF MALIGNANT NEOPLASM OF DIGESTIVE ORGANS: ICD-10-CM

## 2020-03-21 DIAGNOSIS — H40.9 UNSPECIFIED GLAUCOMA: ICD-10-CM

## 2020-03-21 DIAGNOSIS — Z90.11 ACQUIRED ABSENCE OF RIGHT BREAST AND NIPPLE: ICD-10-CM

## 2020-03-26 ENCOUNTER — APPOINTMENT (OUTPATIENT)
Dept: SURGERY | Facility: HOSPITAL | Age: 70
End: 2020-03-26

## 2020-03-30 ENCOUNTER — APPOINTMENT (OUTPATIENT)
Dept: WOUND CARE | Facility: HOSPITAL | Age: 70
End: 2020-03-30

## 2020-03-30 ENCOUNTER — APPOINTMENT (OUTPATIENT)
Dept: SURGERY | Facility: HOSPITAL | Age: 70
End: 2020-03-30

## 2020-04-01 ENCOUNTER — OUTPATIENT (OUTPATIENT)
Dept: OUTPATIENT SERVICES | Facility: HOSPITAL | Age: 70
LOS: 1 days | Discharge: HOME | End: 2020-04-01
Payer: MEDICARE

## 2020-04-01 ENCOUNTER — APPOINTMENT (OUTPATIENT)
Dept: OBGYN | Facility: HOSPITAL | Age: 70
End: 2020-04-01
Payer: MEDICARE

## 2020-04-01 VITALS
WEIGHT: 140 LBS | SYSTOLIC BLOOD PRESSURE: 93 MMHG | DIASTOLIC BLOOD PRESSURE: 60 MMHG | HEART RATE: 86 BPM | RESPIRATION RATE: 20 BRPM | TEMPERATURE: 97.3 F | BODY MASS INDEX: 22.5 KG/M2 | HEIGHT: 66 IN

## 2020-04-01 DIAGNOSIS — L89.313 PRESSURE ULCER OF RIGHT BUTTOCK, STAGE 3: ICD-10-CM

## 2020-04-01 DIAGNOSIS — Z92.89 PERSONAL HISTORY OF OTHER MEDICAL TREATMENT: Chronic | ICD-10-CM

## 2020-04-01 DIAGNOSIS — Z98.890 OTHER SPECIFIED POSTPROCEDURAL STATES: Chronic | ICD-10-CM

## 2020-04-01 LAB
GRAM STN FLD: SIGNIFICANT CHANGE UP
SPECIMEN SOURCE: SIGNIFICANT CHANGE UP

## 2020-04-01 PROCEDURE — 87186 SC STD MICRODIL/AGAR DIL: CPT

## 2020-04-01 PROCEDURE — 88304 TISSUE EXAM BY PATHOLOGIST: CPT | Mod: 26

## 2020-04-01 PROCEDURE — 11046 DBRDMT MUSC&/FSCA EA ADDL: CPT

## 2020-04-01 PROCEDURE — 11043 DBRDMT MUSC&/FSCA 1ST 20/<: CPT

## 2020-04-01 PROCEDURE — 87075 CULTR BACTERIA EXCEPT BLOOD: CPT

## 2020-04-01 PROCEDURE — 87070 CULTURE OTHR SPECIMN AEROBIC: CPT

## 2020-04-01 PROCEDURE — 88304 TISSUE EXAM BY PATHOLOGIST: CPT

## 2020-04-01 RX ORDER — COLLAGENASE SANTYL 250 [ARB'U]/G
250 OINTMENT TOPICAL DAILY
Qty: 1 | Refills: 0 | Status: ACTIVE | COMMUNITY
Start: 2020-04-01 | End: 1900-01-01

## 2020-04-03 LAB
-  AMPICILLIN: SIGNIFICANT CHANGE UP
-  TETRACYCLINE: SIGNIFICANT CHANGE UP
-  VANCOMYCIN: SIGNIFICANT CHANGE UP
METHOD TYPE: SIGNIFICANT CHANGE UP
SURGICAL PATHOLOGY STUDY: SIGNIFICANT CHANGE UP

## 2020-04-03 NOTE — VITALS
[Pain related to present condition?] : The patient's  pain is not related to present condition. [de-identified] : Patient denies c/o any pains or discomforts at present.  Patient reports that the wound is tender to the touch

## 2020-04-03 NOTE — REASON FOR VISIT
[Family Member] : family member [FreeTextEntry5] : Right ischium [FreeTextEntry4] : Unstageable right ischial pressure ulcer [FreeTextEntry3] : same as above [FreeTextEntry6] : 10.5x8.8cmx7.3cm [FreeTextEntry9] : 11x9.5.7.8cm

## 2020-04-03 NOTE — PROCEDURE
[Saline] : saline [Topical Lidocaine] : topical lidocaine  [Fibrotic] : fibrotic [Slough] : slough [Necrotic] : necrotic [Scalpel] : scalpel [Skin] : skin [Fat] : fat [Muscle] : muscle [Sent to Lab] : which was entirely removed and was sent to the laboratory for [Culture and Sensitivity] : culture and sensitivity [Pathologic Exam] : pathologic exam [Clean] : clean [Pink] : pink [Pressure] : pressure [FreeTextEntry1] : collagenase, Alginate, DD [] : No [FreeTextEntry9] : 3:40 pm [de-identified] : unstageable worsening right ischial pressure ulcer. The ulcer has more than doubled in size and covered completely by dark, necrotic tissue. [de-identified] : Mark Anthony Landis MD [FreeTextEntry6] : Unstageable right ischial press. ulcer [FreeTextEntry7] : same [de-identified] : 10cc 1%lidocaine [de-identified] : 1cc [de-identified] : necrotic tissue

## 2020-04-03 NOTE — PHYSICAL EXAM
[Normal Thyroid] : the thyroid was normal [Normal Breath Sounds] : Normal breath sounds [Normal Heart Sounds] : normal heart sounds [Normal Rate and Rhythm] : normal rate and rhythm [Alert] : alert [Oriented to Person] : oriented to person [Oriented to Place] : oriented to place [Oriented to Time] : oriented to time [Calm] : calm [4 x 4] : 4 x 4  [Abdominal Pad] : Abdominal Pad [JVD] : no jugular venous distention  [Abdomen Masses] : No abdominal massess [Abdomen Tenderness] : ~T ~M No abdominal tenderness [Tender] : nontender [Enlarged] : not enlarged [de-identified] : elderly WF, NAD, thin slender, alert [de-identified] : Sharp debridement - no anesthesia, small amount of bleeding, patient tolerated procedure well.  Post debridement measurement:  11.0 x9.5 x 7.8cm.\par \par  [FreeTextEntry1] : Right ischium [FreeTextEntry2] : 10.5 [FreeTextEntry3] : 8.8 [FreeTextEntry4] : 7.3 [de-identified] : Serosanguineous [de-identified] : intact [de-identified] : >90% [de-identified] : <10% [de-identified] : <5% [de-identified] : Collagenase, Alginate rope [de-identified] : NSC [TWNoteComboBox4] : Moderate [de-identified] : Mild [de-identified] : 1% Lidocaine Injection [TWNoteComboBox7] : Karol [de-identified] : Daily

## 2020-04-03 NOTE — ASSESSMENT
[Verbal] : Verbal [Handout] : Handout [Patient] : Patient [Family member] : Family member [Good - alert, interested, motivated] : Good - alert, interested, motivated [Verbalizes knowledge/Understanding] : Verbalizes knowledge/understanding [Dressing changes] : dressing changes [Skin Care] : skin care [Pressure relief] : pressure relief [Signs and symptoms of infection] : sign and symptoms of infection [How and When to Call] : how and when to call [Labs and Tests] : labs and tests [Off-loading] : off-loading [Home Health] : home health [Patient responsibility to plan of care] : patient responsibility to plan of care [Stable] : stable [Home] : Home [Wheelchair] : Wheelchair [Faxed - Long Term Care/Home Health Agency] : Long Term Care/Home Health Agency: Faxed [] : No [FreeTextEntry2] : Promote optimal skin integrity, offloading, infection prevention\par  [FreeTextEntry3] : Wound increased in size.  Instructed patient to avoid pressure to area. [FreeTextEntry4] : Dr. Landis\par Patient's son reports that he received offloading cushion on Saturday on patient's front door step in a box requiring an air compressor to inflate cushion.  Patient reports that he does not have an air compressor/pump.   Advised patient to call [facility] who provided cushion.  Left phone # ([phone]) for [facility] on patient's voice mail.\par Tissue and Culture pathology done\par Submitted authorization for debridement of wound next visit.\par f/u 1 week [FreeTextEntry1] : [facility]

## 2020-04-04 LAB
-  AMIKACIN: SIGNIFICANT CHANGE UP
-  AZTREONAM: SIGNIFICANT CHANGE UP
-  CEFEPIME: SIGNIFICANT CHANGE UP
-  CEFTAZIDIME: SIGNIFICANT CHANGE UP
-  CIPROFLOXACIN: SIGNIFICANT CHANGE UP
-  GENTAMICIN: SIGNIFICANT CHANGE UP
-  IMIPENEM: SIGNIFICANT CHANGE UP
-  LEVOFLOXACIN: SIGNIFICANT CHANGE UP
-  MEROPENEM: SIGNIFICANT CHANGE UP
-  PIPERACILLIN/TAZOBACTAM: SIGNIFICANT CHANGE UP
-  TOBRAMYCIN: SIGNIFICANT CHANGE UP
METHOD TYPE: SIGNIFICANT CHANGE UP

## 2020-04-05 DIAGNOSIS — I10 ESSENTIAL (PRIMARY) HYPERTENSION: ICD-10-CM

## 2020-04-05 DIAGNOSIS — Z98.890 OTHER SPECIFIED POSTPROCEDURAL STATES: ICD-10-CM

## 2020-04-05 DIAGNOSIS — Z90.11 ACQUIRED ABSENCE OF RIGHT BREAST AND NIPPLE: ICD-10-CM

## 2020-04-05 DIAGNOSIS — G35 MULTIPLE SCLEROSIS: ICD-10-CM

## 2020-04-05 DIAGNOSIS — Z88.8 ALLERGY STATUS TO OTHER DRUGS, MEDICAMENTS AND BIOLOGICAL SUBSTANCES STATUS: ICD-10-CM

## 2020-04-05 DIAGNOSIS — H40.9 UNSPECIFIED GLAUCOMA: ICD-10-CM

## 2020-04-05 DIAGNOSIS — Z85.3 PERSONAL HISTORY OF MALIGNANT NEOPLASM OF BREAST: ICD-10-CM

## 2020-04-05 DIAGNOSIS — Z87.891 PERSONAL HISTORY OF NICOTINE DEPENDENCE: ICD-10-CM

## 2020-04-05 DIAGNOSIS — Z79.899 OTHER LONG TERM (CURRENT) DRUG THERAPY: ICD-10-CM

## 2020-04-05 DIAGNOSIS — L89.313 PRESSURE ULCER OF RIGHT BUTTOCK, STAGE 3: ICD-10-CM

## 2020-04-05 DIAGNOSIS — Z80.0 FAMILY HISTORY OF MALIGNANT NEOPLASM OF DIGESTIVE ORGANS: ICD-10-CM

## 2020-04-05 DIAGNOSIS — M81.0 AGE-RELATED OSTEOPOROSIS WITHOUT CURRENT PATHOLOGICAL FRACTURE: ICD-10-CM

## 2020-04-05 DIAGNOSIS — A49.8 OTHER BACTERIAL INFECTIONS OF UNSPECIFIED SITE: ICD-10-CM

## 2020-04-05 LAB
CULTURE RESULTS: SIGNIFICANT CHANGE UP
ORGANISM # SPEC MICROSCOPIC CNT: SIGNIFICANT CHANGE UP
SPECIMEN SOURCE: SIGNIFICANT CHANGE UP

## 2020-04-08 ENCOUNTER — OUTPATIENT (OUTPATIENT)
Dept: OUTPATIENT SERVICES | Facility: HOSPITAL | Age: 70
LOS: 1 days | Discharge: HOME | End: 2020-04-08
Payer: MEDICARE

## 2020-04-08 ENCOUNTER — APPOINTMENT (OUTPATIENT)
Dept: OBGYN | Facility: HOSPITAL | Age: 70
End: 2020-04-08
Payer: MEDICARE

## 2020-04-08 VITALS
RESPIRATION RATE: 20 BRPM | WEIGHT: 140 LBS | BODY MASS INDEX: 22.5 KG/M2 | HEART RATE: 89 BPM | SYSTOLIC BLOOD PRESSURE: 108 MMHG | TEMPERATURE: 97.9 F | HEIGHT: 66 IN | DIASTOLIC BLOOD PRESSURE: 70 MMHG

## 2020-04-08 DIAGNOSIS — L89.313 PRESSURE ULCER OF RIGHT BUTTOCK, STAGE 3: ICD-10-CM

## 2020-04-08 DIAGNOSIS — Z92.89 PERSONAL HISTORY OF OTHER MEDICAL TREATMENT: Chronic | ICD-10-CM

## 2020-04-08 DIAGNOSIS — Z98.890 OTHER SPECIFIED POSTPROCEDURAL STATES: Chronic | ICD-10-CM

## 2020-04-08 PROCEDURE — G0463: CPT

## 2020-04-08 PROCEDURE — 99213 OFFICE O/P EST LOW 20 MIN: CPT

## 2020-04-09 NOTE — PHYSICAL EXAM
[Normal Thyroid] : the thyroid was normal [Normal Breath Sounds] : Normal breath sounds [Normal Heart Sounds] : normal heart sounds [Normal Rate and Rhythm] : normal rate and rhythm [Alert] : alert [Oriented to Person] : oriented to person [Oriented to Place] : oriented to place [Oriented to Time] : oriented to time [Calm] : calm [4 x 4] : 4 x 4  [Abdominal Pad] : Abdominal Pad [JVD] : no jugular venous distention  [Abdomen Masses] : No abdominal massess [Abdomen Tenderness] : ~T ~M No abdominal tenderness [Tender] : nontender [Enlarged] : not enlarged [de-identified] : adult WF, NAD, WD, WN, alert, Ox3. [FreeTextEntry1] : Ischium  [FreeTextEntry2] : 11.2 [FreeTextEntry3] : 6.8 [FreeTextEntry4] : 5.1-7.5 [de-identified] : Serosanguineous  [de-identified] : 1-25%  [de-identified] : NSC, Collagenase, Calcium alginate [TWNoteComboBox1] : Right [TWNoteComboBox4] : Large [TWNoteComboBox5] : No [de-identified] : No [de-identified] : Normal [de-identified] : Moderate [de-identified] : 50% [de-identified] : <20% [de-identified] : Yes [de-identified] : Daily [de-identified] : Secondary Dressing

## 2020-04-09 NOTE — PLAN
[FreeTextEntry1] : offload/frequent rotation\par roho cushion\par sharp debridement again next week\par collagenase, ag alginate, DD, abd pad\par f/u 1 wk\par

## 2020-04-09 NOTE — HISTORY OF PRESENT ILLNESS
[FreeTextEntry1] : 68 yo WF, here for f/u of a chronic nonhealing right ischial pressure ulcer. Pt finally got a roho cushion and had it inflated, but is not using it. Pt states she does not feel comfortable with it and keep slipping off. Pt states she does not have any one around to help rotate her and change her position throughout the day. Her son only changes her position just once a day. Pt unable to rotate herself or stand.

## 2020-04-09 NOTE — ASSESSMENT
[Verbal] : Verbal [Patient] : Patient [Caregiver] : Caregiver [Good - alert, interested, motivated] : Good - alert, interested, motivated [Verbalizes knowledge/Understanding] : Verbalizes knowledge/understanding [Dressing changes] : dressing changes [Skin Care] : skin care [Pressure relief] : pressure relief [Signs and symptoms of infection] : sign and symptoms of infection [How and When to Call] : how and when to call [Off-loading] : off-loading [Patient responsibility to plan of care] : patient responsibility to plan of care [Stable] : stable [Home] : Home [Wheelchair] : Wheelchair [Faxed - Long Term Care/Home Health Agency] : Long Term Care/Home Health Agency: Faxed [] : No [FreeTextEntry2] : Infection prevention \par Localized wound care\par Offloading/ pressure relief \par Achieving pain tolerance levels within the Pts limits of 0/10.\par Develop Realistic expectations and measurable goals in nursing POC to reduce, eliminate or develop acceptable pain limit tolerance.\par Utilization of offloading, taking deep breaths and guided imagery to reduce discomfort PRN.  [FreeTextEntry3] : More necrotic tissue. Pt unable to turn every 2 hours and did not tolerate ROHO cushion well stating it made her fall out of her seat. [FreeTextEntry4] : MD reviewed pathology with pt \par Auth submitted for debridement next visit and pt to be evaluated for wound vac next visit \par Pt to follow up to [facility] in one week  [FreeTextEntry1] : SINGH

## 2020-04-11 DIAGNOSIS — G35 MULTIPLE SCLEROSIS: ICD-10-CM

## 2020-04-11 DIAGNOSIS — M81.0 AGE-RELATED OSTEOPOROSIS WITHOUT CURRENT PATHOLOGICAL FRACTURE: ICD-10-CM

## 2020-04-11 DIAGNOSIS — Z85.3 PERSONAL HISTORY OF MALIGNANT NEOPLASM OF BREAST: ICD-10-CM

## 2020-04-11 DIAGNOSIS — Z87.891 PERSONAL HISTORY OF NICOTINE DEPENDENCE: ICD-10-CM

## 2020-04-11 DIAGNOSIS — Z88.8 ALLERGY STATUS TO OTHER DRUGS, MEDICAMENTS AND BIOLOGICAL SUBSTANCES STATUS: ICD-10-CM

## 2020-04-11 DIAGNOSIS — I10 ESSENTIAL (PRIMARY) HYPERTENSION: ICD-10-CM

## 2020-04-11 DIAGNOSIS — L89.313 PRESSURE ULCER OF RIGHT BUTTOCK, STAGE 3: ICD-10-CM

## 2020-04-11 DIAGNOSIS — H40.9 UNSPECIFIED GLAUCOMA: ICD-10-CM

## 2020-04-11 DIAGNOSIS — Z79.899 OTHER LONG TERM (CURRENT) DRUG THERAPY: ICD-10-CM

## 2020-04-11 DIAGNOSIS — Z80.0 FAMILY HISTORY OF MALIGNANT NEOPLASM OF DIGESTIVE ORGANS: ICD-10-CM

## 2020-04-11 DIAGNOSIS — Z90.11 ACQUIRED ABSENCE OF RIGHT BREAST AND NIPPLE: ICD-10-CM

## 2020-04-11 DIAGNOSIS — Z98.890 OTHER SPECIFIED POSTPROCEDURAL STATES: ICD-10-CM

## 2020-04-15 ENCOUNTER — APPOINTMENT (OUTPATIENT)
Dept: SURGERY | Facility: HOSPITAL | Age: 70
End: 2020-04-15
Payer: MEDICARE

## 2020-04-15 ENCOUNTER — OUTPATIENT (OUTPATIENT)
Dept: OUTPATIENT SERVICES | Facility: HOSPITAL | Age: 70
LOS: 1 days | Discharge: HOME | End: 2020-04-15
Payer: MEDICARE

## 2020-04-15 DIAGNOSIS — Z98.890 OTHER SPECIFIED POSTPROCEDURAL STATES: Chronic | ICD-10-CM

## 2020-04-15 DIAGNOSIS — Z92.89 PERSONAL HISTORY OF OTHER MEDICAL TREATMENT: Chronic | ICD-10-CM

## 2020-04-15 DIAGNOSIS — L89.313 PRESSURE ULCER OF RIGHT BUTTOCK, STAGE 3: ICD-10-CM

## 2020-04-15 PROCEDURE — 11042 DBRDMT SUBQ TIS 1ST 20SQCM/<: CPT

## 2020-04-15 PROCEDURE — 11045 DBRDMT SUBQ TISS EACH ADDL: CPT

## 2020-04-15 PROCEDURE — 88304 TISSUE EXAM BY PATHOLOGIST: CPT

## 2020-04-15 PROCEDURE — 88304 TISSUE EXAM BY PATHOLOGIST: CPT | Mod: 26

## 2020-04-15 RX ORDER — COLLAGENASE SANTYL 250 [ARB'U]/G
250 OINTMENT TOPICAL
Qty: 30 | Refills: 1 | Status: COMPLETED | COMMUNITY
Start: 2020-04-15 | End: 2020-06-14

## 2020-04-18 DIAGNOSIS — I96 GANGRENE, NOT ELSEWHERE CLASSIFIED: ICD-10-CM

## 2020-04-18 DIAGNOSIS — Z88.8 ALLERGY STATUS TO OTHER DRUGS, MEDICAMENTS AND BIOLOGICAL SUBSTANCES STATUS: ICD-10-CM

## 2020-04-18 DIAGNOSIS — H40.9 UNSPECIFIED GLAUCOMA: ICD-10-CM

## 2020-04-18 DIAGNOSIS — Z90.11 ACQUIRED ABSENCE OF RIGHT BREAST AND NIPPLE: ICD-10-CM

## 2020-04-18 DIAGNOSIS — Z79.899 OTHER LONG TERM (CURRENT) DRUG THERAPY: ICD-10-CM

## 2020-04-18 DIAGNOSIS — Z85.3 PERSONAL HISTORY OF MALIGNANT NEOPLASM OF BREAST: ICD-10-CM

## 2020-04-18 DIAGNOSIS — Z80.0 FAMILY HISTORY OF MALIGNANT NEOPLASM OF DIGESTIVE ORGANS: ICD-10-CM

## 2020-04-18 DIAGNOSIS — I10 ESSENTIAL (PRIMARY) HYPERTENSION: ICD-10-CM

## 2020-04-18 DIAGNOSIS — Z98.890 OTHER SPECIFIED POSTPROCEDURAL STATES: ICD-10-CM

## 2020-04-18 DIAGNOSIS — L89.313 PRESSURE ULCER OF RIGHT BUTTOCK, STAGE 3: ICD-10-CM

## 2020-04-18 DIAGNOSIS — Z87.891 PERSONAL HISTORY OF NICOTINE DEPENDENCE: ICD-10-CM

## 2020-04-18 DIAGNOSIS — M81.0 AGE-RELATED OSTEOPOROSIS WITHOUT CURRENT PATHOLOGICAL FRACTURE: ICD-10-CM

## 2020-04-18 DIAGNOSIS — G35 MULTIPLE SCLEROSIS: ICD-10-CM

## 2020-04-22 ENCOUNTER — APPOINTMENT (OUTPATIENT)
Dept: SURGERY | Facility: HOSPITAL | Age: 70
End: 2020-04-22

## 2020-04-24 ENCOUNTER — APPOINTMENT (OUTPATIENT)
Dept: OBGYN | Facility: HOSPITAL | Age: 70
End: 2020-04-24
Payer: MEDICARE

## 2020-04-24 ENCOUNTER — APPOINTMENT (OUTPATIENT)
Dept: SURGERY | Facility: HOSPITAL | Age: 70
End: 2020-04-24

## 2020-04-24 ENCOUNTER — OUTPATIENT (OUTPATIENT)
Dept: OUTPATIENT SERVICES | Facility: HOSPITAL | Age: 70
LOS: 1 days | Discharge: HOME | End: 2020-04-24
Payer: MEDICARE

## 2020-04-24 VITALS
OXYGEN SATURATION: 97 % | RESPIRATION RATE: 16 BRPM | HEART RATE: 71 BPM | DIASTOLIC BLOOD PRESSURE: 71 MMHG | TEMPERATURE: 100.9 F | SYSTOLIC BLOOD PRESSURE: 113 MMHG

## 2020-04-24 VITALS — TEMPERATURE: 100.5 F

## 2020-04-24 DIAGNOSIS — Z92.89 PERSONAL HISTORY OF OTHER MEDICAL TREATMENT: Chronic | ICD-10-CM

## 2020-04-24 DIAGNOSIS — L89.313 PRESSURE ULCER OF RIGHT BUTTOCK, STAGE 3: ICD-10-CM

## 2020-04-24 DIAGNOSIS — Z98.890 OTHER SPECIFIED POSTPROCEDURAL STATES: Chronic | ICD-10-CM

## 2020-04-24 DIAGNOSIS — R50.9 FEVER, UNSPECIFIED: ICD-10-CM

## 2020-04-24 PROCEDURE — G0463: CPT | Mod: 25

## 2020-04-24 PROCEDURE — 99214 OFFICE O/P EST MOD 30 MIN: CPT

## 2020-04-24 PROCEDURE — 97602 WOUND(S) CARE NON-SELECTIVE: CPT

## 2020-04-24 NOTE — PHYSICAL EXAM
[Normal Thyroid] : the thyroid was normal [Normal Breath Sounds] : Normal breath sounds [Normal Heart Sounds] : normal heart sounds [Normal Rate and Rhythm] : normal rate and rhythm [Alert] : alert [Oriented to Place] : oriented to place [Oriented to Time] : oriented to time [Oriented to Person] : oriented to person [Calm] : calm [4 x 4] : 4 x 4  [Abdominal Pad] : Abdominal Pad [JVD] : no jugular venous distention  [Abdomen Masses] : No abdominal massess [Abdomen Tenderness] : ~T ~M No abdominal tenderness [Tender] : nontender [Enlarged] : not enlarged [de-identified] : elderly, bedridden WF, NAD [FreeTextEntry1] : Ischium  [FreeTextEntry2] : 11.2 [FreeTextEntry3] : 6.8 [FreeTextEntry4] : 5.1-7.5 [de-identified] : Serosanguineous  [de-identified] : 1-25%  [de-identified] : NSC, Collagenase, Calcium alginate [FreeTextEntry7] : Medial knee- Dry eschar  [de-identified] : NSC,DPD [de-identified] : Medial lower leg  [de-identified] : PIPO, JOSHUA [TWNoteComboBox1] : Right [TWNoteComboBox4] : Large [TWNoteComboBox5] : No [de-identified] : No [de-identified] : Normal [de-identified] : Moderate [de-identified] : 50% [de-identified] : <20% [de-identified] : Yes [de-identified] : Daily [de-identified] : Secondary Dressing [TWNoteComboBox9] : Right [de-identified] : Daily [de-identified] : Left

## 2020-04-24 NOTE — PLAN
[FreeTextEntry1] : collagenase, alginate, Dd to right ischium\par DD to right knee/LLE.\par offload\par pillow/cushion between knees.\par f/u 1 wk\par

## 2020-04-24 NOTE — HISTORY OF PRESENT ILLNESS
[FreeTextEntry1] : 68 yo WF, here for f/u of a chronic right ischial pressure ulcer. Pt scheduled for a sharp excisional debridement but is running a fever today of 100.9. Was tx for a UTI last week. Denies any chills, sweats, or shakes.  Has also developed new pressure ulcers on her right medial knee and upper part of the LLE medially.

## 2020-04-24 NOTE — ASSESSMENT
[Verbal] : Verbal [Patient] : Patient [Caregiver] : Caregiver [Good - alert, interested, motivated] : Good - alert, interested, motivated [Verbalizes knowledge/Understanding] : Verbalizes knowledge/understanding [Dressing changes] : dressing changes [Skin Care] : skin care [Pressure relief] : pressure relief [Signs and symptoms of infection] : sign and symptoms of infection [How and When to Call] : how and when to call [Off-loading] : off-loading [Patient responsibility to plan of care] : patient responsibility to plan of care [] : Yes [Home] : Home [Stable] : stable [Wheelchair] : Wheelchair [Faxed - Long Term Care/Home Health Agency] : Long Term Care/Home Health Agency: Faxed [FreeTextEntry2] : Infection prevention \par Localized wound care\par Offloading/ pressure relief \par Achieving pain tolerance levels within the Pts limits of 0/10.\par Develop Realistic expectations and measurable goals in nursing POC to reduce, eliminate or develop acceptable pain limit tolerance.\par Utilization of offloading, taking deep breaths and guided imagery to reduce discomfort PRN.  [FreeTextEntry4] : Debridement held until next visit and pt to be evaluated for wound vac next visit \par Pt to follow up to [facility] in one week  [FreeTextEntry1] : VNS [place]

## 2020-04-26 DIAGNOSIS — L89.890 PRESSURE ULCER OF OTHER SITE, UNSTAGEABLE: ICD-10-CM

## 2020-04-26 DIAGNOSIS — M81.0 AGE-RELATED OSTEOPOROSIS WITHOUT CURRENT PATHOLOGICAL FRACTURE: ICD-10-CM

## 2020-04-26 DIAGNOSIS — Z90.11 ACQUIRED ABSENCE OF RIGHT BREAST AND NIPPLE: ICD-10-CM

## 2020-04-26 DIAGNOSIS — Z98.890 OTHER SPECIFIED POSTPROCEDURAL STATES: ICD-10-CM

## 2020-04-26 DIAGNOSIS — R50.9 FEVER, UNSPECIFIED: ICD-10-CM

## 2020-04-26 DIAGNOSIS — Z87.440 PERSONAL HISTORY OF URINARY (TRACT) INFECTIONS: ICD-10-CM

## 2020-04-26 DIAGNOSIS — Z88.8 ALLERGY STATUS TO OTHER DRUGS, MEDICAMENTS AND BIOLOGICAL SUBSTANCES STATUS: ICD-10-CM

## 2020-04-26 DIAGNOSIS — G35 MULTIPLE SCLEROSIS: ICD-10-CM

## 2020-04-26 DIAGNOSIS — I10 ESSENTIAL (PRIMARY) HYPERTENSION: ICD-10-CM

## 2020-04-26 DIAGNOSIS — Z80.0 FAMILY HISTORY OF MALIGNANT NEOPLASM OF DIGESTIVE ORGANS: ICD-10-CM

## 2020-04-26 DIAGNOSIS — Z85.3 PERSONAL HISTORY OF MALIGNANT NEOPLASM OF BREAST: ICD-10-CM

## 2020-04-26 DIAGNOSIS — L89.313 PRESSURE ULCER OF RIGHT BUTTOCK, STAGE 3: ICD-10-CM

## 2020-04-26 DIAGNOSIS — Z79.899 OTHER LONG TERM (CURRENT) DRUG THERAPY: ICD-10-CM

## 2020-04-26 DIAGNOSIS — Z87.891 PERSONAL HISTORY OF NICOTINE DEPENDENCE: ICD-10-CM

## 2020-04-26 DIAGNOSIS — H40.9 UNSPECIFIED GLAUCOMA: ICD-10-CM

## 2020-05-01 ENCOUNTER — OUTPATIENT (OUTPATIENT)
Dept: OUTPATIENT SERVICES | Facility: HOSPITAL | Age: 70
LOS: 1 days | Discharge: HOME | End: 2020-05-01
Payer: MEDICARE

## 2020-05-01 ENCOUNTER — APPOINTMENT (OUTPATIENT)
Dept: OBGYN | Facility: HOSPITAL | Age: 70
End: 2020-05-01
Payer: MEDICARE

## 2020-05-01 ENCOUNTER — RESULT REVIEW (OUTPATIENT)
Age: 70
End: 2020-05-01

## 2020-05-01 VITALS
HEIGHT: 66 IN | BODY MASS INDEX: 22.5 KG/M2 | SYSTOLIC BLOOD PRESSURE: 96 MMHG | RESPIRATION RATE: 18 BRPM | OXYGEN SATURATION: 97 % | WEIGHT: 140 LBS | TEMPERATURE: 97 F | HEART RATE: 77 BPM | DIASTOLIC BLOOD PRESSURE: 57 MMHG

## 2020-05-01 DIAGNOSIS — Z98.890 OTHER SPECIFIED POSTPROCEDURAL STATES: Chronic | ICD-10-CM

## 2020-05-01 DIAGNOSIS — L89.300 PRESSURE ULCER OF UNSPECIFIED BUTTOCK, UNSTAGEABLE: ICD-10-CM

## 2020-05-01 DIAGNOSIS — L89.313 PRESSURE ULCER OF RIGHT BUTTOCK, STAGE 3: ICD-10-CM

## 2020-05-01 DIAGNOSIS — Z92.89 PERSONAL HISTORY OF OTHER MEDICAL TREATMENT: Chronic | ICD-10-CM

## 2020-05-01 DIAGNOSIS — L89.890 PRESSURE ULCER OF OTHER SITE, UNSTAGEABLE: ICD-10-CM

## 2020-05-01 PROCEDURE — 87075 CULTR BACTERIA EXCEPT BLOOD: CPT

## 2020-05-01 PROCEDURE — 11046 DBRDMT MUSC&/FSCA EA ADDL: CPT

## 2020-05-01 PROCEDURE — 87070 CULTURE OTHR SPECIMN AEROBIC: CPT

## 2020-05-01 PROCEDURE — 87186 SC STD MICRODIL/AGAR DIL: CPT

## 2020-05-01 PROCEDURE — 11042 DBRDMT SUBQ TIS 1ST 20SQCM/<: CPT

## 2020-05-01 PROCEDURE — 88304 TISSUE EXAM BY PATHOLOGIST: CPT

## 2020-05-01 PROCEDURE — 11045 DBRDMT SUBQ TISS EACH ADDL: CPT

## 2020-05-01 PROCEDURE — 88304 TISSUE EXAM BY PATHOLOGIST: CPT | Mod: 26

## 2020-05-01 NOTE — REASON FOR VISIT
[Family Member] : family member [FreeTextEntry3] : Large right ischium with necrotic tissue.  [FreeTextEntry5] : Right ischium pressure ulcer. [FreeTextEntry4] : Large right ischium with necrotic tissue.  [FreeTextEntry7] : Large right ischium with necrotic tissue.  [FreeTextEntry6] : Large right ischium with necrotic tissue.

## 2020-05-01 NOTE — ADDENDUM
[FreeTextEntry1] : Continue Collagenase and dry packing, Wound vac requested, RTO in one week.\par \par 5/1/2020:Vitals system error, Vitals taken 4/15/2020, Pt vitals were in stable Condition WNL. -CO LPN

## 2020-05-01 NOTE — PHYSICAL EXAM
[4 x 4] : 4 x 4  [Abdominal Pad] : Abdominal Pad [Normal Breath Sounds] : Normal breath sounds [Normal Heart Sounds] : normal heart sounds [Alert] : alert [Oriented to Person] : oriented to person [Calm] : calm [de-identified] : WD/WN in no acute distress. [JVD] : no jugular venous distention  [de-identified] : Right ischium pressure ulcer with necrotic tissue, periwound skin is intact with no cellulitis. [de-identified] : JUDAHL [de-identified] : WNL [FreeTextEntry1] : Right Ischium [FreeTextEntry4] : 2.8 [FreeTextEntry2] : 7.8 [FreeTextEntry3] : 12.4 [de-identified] : Serosanguineous [de-identified] : Post Debridement Measurements 7.9 x 12.5 x 2.9 [de-identified] : Intact [TWNoteComboBox4] : Large [de-identified] : Cleansed with Normal Saline\par  [de-identified] : Collagenase, Calcium Alginate [de-identified] : None [TWNoteComboBox5] : No [de-identified] : No [de-identified] : >75% [de-identified] : <20% [de-identified] : Yes [de-identified] : Debridement performed of all devitalized tissue to bleeding viable tissue [de-identified] : 2.5% Lidocaine Topical [de-identified] : Daily [TWNoteComboBox7] : Karol

## 2020-05-01 NOTE — PROCEDURE
[Saline] : saline [Topical Lidocaine] : topical lidocaine  [Fibrotic] : fibrotic [Slough] : slough [Necrotic] : necrotic [Scalpel] : scalpel [Sharp scissors] : sharp scissors [Fat] : fat [Pathologic Exam] : pathologic exam [Clean] : clean [Pressure] : pressure [Subcutaneous tissue] : subcutaneous tissue [FreeTextEntry1] : Collagenase, dry packing.  [] : No [FreeTextEntry9] : 2:50 PM [de-identified] : BRANDI Medina [de-identified] : Large right ischium with necrotic tissue.  [de-identified] : None [FreeTextEntry6] : Large right ischium with necrotic tissue.  [de-identified] : Lidocaine cream [FreeTextEntry7] : Large right ischium with necrotic tissue.  [de-identified] : 2cc [de-identified] : Necrotic tissue.

## 2020-05-01 NOTE — ASSESSMENT
[Stable] : stable [Home] : Home [Verbal] : Verbal [Patient] : Patient [Good - alert, interested, motivated] : Good - alert, interested, motivated [Verbalizes knowledge/Understanding] : Verbalizes knowledge/understanding [Dressing changes] : dressing changes [Skin Care] : skin care [Pressure relief] : pressure relief [Signs and symptoms of infection] : sign and symptoms of infection [How and When to Call] : how and when to call [Off-loading] : off-loading [Patient responsibility to plan of care] : patient responsibility to plan of care [] : Yes [Stretcher] : Stretcher [Faxed - Long Term Care/Home Health Agency] : Long Term Care/Home Health Agency: Faxed [FreeTextEntry2] : Restore Skin Integrity\par Infection Control\par Localized wound care\par Offloading\par Develop Realistic expectations and measurable treatments nursing POC to reduce, eliminate or develop acceptable pain limit tolerance [FreeTextEntry4] : Photos Taken\par Pathology Sent to Lab per MD\par Pt to be evaluated for Wound Vac next visit may use Ready Vac if needed.\par Auth Submitted for Debridement for next visit if needed\par F/U to [facility] in 1 week [FreeTextEntry1] : [facility] [place]

## 2020-05-02 LAB
GRAM STN FLD: SIGNIFICANT CHANGE UP
SPECIMEN SOURCE: SIGNIFICANT CHANGE UP

## 2020-05-03 DIAGNOSIS — Z80.0 FAMILY HISTORY OF MALIGNANT NEOPLASM OF DIGESTIVE ORGANS: ICD-10-CM

## 2020-05-03 DIAGNOSIS — Z85.3 PERSONAL HISTORY OF MALIGNANT NEOPLASM OF BREAST: ICD-10-CM

## 2020-05-03 DIAGNOSIS — G35 MULTIPLE SCLEROSIS: ICD-10-CM

## 2020-05-03 DIAGNOSIS — Z98.890 OTHER SPECIFIED POSTPROCEDURAL STATES: ICD-10-CM

## 2020-05-03 DIAGNOSIS — I10 ESSENTIAL (PRIMARY) HYPERTENSION: ICD-10-CM

## 2020-05-03 DIAGNOSIS — A49.8 OTHER BACTERIAL INFECTIONS OF UNSPECIFIED SITE: ICD-10-CM

## 2020-05-03 DIAGNOSIS — Z87.891 PERSONAL HISTORY OF NICOTINE DEPENDENCE: ICD-10-CM

## 2020-05-03 DIAGNOSIS — M81.0 AGE-RELATED OSTEOPOROSIS WITHOUT CURRENT PATHOLOGICAL FRACTURE: ICD-10-CM

## 2020-05-03 DIAGNOSIS — Z88.8 ALLERGY STATUS TO OTHER DRUGS, MEDICAMENTS AND BIOLOGICAL SUBSTANCES: ICD-10-CM

## 2020-05-03 DIAGNOSIS — Z79.899 OTHER LONG TERM (CURRENT) DRUG THERAPY: ICD-10-CM

## 2020-05-03 DIAGNOSIS — L89.890 PRESSURE ULCER OF OTHER SITE, UNSTAGEABLE: ICD-10-CM

## 2020-05-03 DIAGNOSIS — Z90.11 ACQUIRED ABSENCE OF RIGHT BREAST AND NIPPLE: ICD-10-CM

## 2020-05-03 DIAGNOSIS — H40.9 UNSPECIFIED GLAUCOMA: ICD-10-CM

## 2020-05-03 DIAGNOSIS — L89.313 PRESSURE ULCER OF RIGHT BUTTOCK, STAGE 3: ICD-10-CM

## 2020-05-03 LAB
-  AMIKACIN: SIGNIFICANT CHANGE UP
-  AMIKACIN: SIGNIFICANT CHANGE UP
-  AMOXICILLIN/CLAVULANIC ACID: SIGNIFICANT CHANGE UP
-  AMPICILLIN/SULBACTAM: SIGNIFICANT CHANGE UP
-  AMPICILLIN: SIGNIFICANT CHANGE UP
-  AMPICILLIN: SIGNIFICANT CHANGE UP
-  AZTREONAM: SIGNIFICANT CHANGE UP
-  AZTREONAM: SIGNIFICANT CHANGE UP
-  CEFAZOLIN: SIGNIFICANT CHANGE UP
-  CEFEPIME: SIGNIFICANT CHANGE UP
-  CEFEPIME: SIGNIFICANT CHANGE UP
-  CEFOXITIN: SIGNIFICANT CHANGE UP
-  CEFTAZIDIME: SIGNIFICANT CHANGE UP
-  CEFTRIAXONE: SIGNIFICANT CHANGE UP
-  CIPROFLOXACIN: SIGNIFICANT CHANGE UP
-  CIPROFLOXACIN: SIGNIFICANT CHANGE UP
-  ERTAPENEM: SIGNIFICANT CHANGE UP
-  GENTAMICIN: SIGNIFICANT CHANGE UP
-  GENTAMICIN: SIGNIFICANT CHANGE UP
-  IMIPENEM: SIGNIFICANT CHANGE UP
-  IMIPENEM: SIGNIFICANT CHANGE UP
-  LEVOFLOXACIN: SIGNIFICANT CHANGE UP
-  LEVOFLOXACIN: SIGNIFICANT CHANGE UP
-  MEROPENEM: SIGNIFICANT CHANGE UP
-  MEROPENEM: SIGNIFICANT CHANGE UP
-  PIPERACILLIN/TAZOBACTAM: SIGNIFICANT CHANGE UP
-  PIPERACILLIN/TAZOBACTAM: SIGNIFICANT CHANGE UP
-  TETRACYCLINE: SIGNIFICANT CHANGE UP
-  TOBRAMYCIN: SIGNIFICANT CHANGE UP
-  TOBRAMYCIN: SIGNIFICANT CHANGE UP
-  TRIMETHOPRIM/SULFAMETHOXAZOLE: SIGNIFICANT CHANGE UP
-  VANCOMYCIN: SIGNIFICANT CHANGE UP
CULTURE RESULTS: SIGNIFICANT CHANGE UP
METHOD TYPE: SIGNIFICANT CHANGE UP
ORGANISM # SPEC MICROSCOPIC CNT: SIGNIFICANT CHANGE UP
SPECIMEN SOURCE: SIGNIFICANT CHANGE UP

## 2020-05-05 LAB — SURGICAL PATHOLOGY STUDY: SIGNIFICANT CHANGE UP

## 2020-05-07 ENCOUNTER — INPATIENT (INPATIENT)
Facility: HOSPITAL | Age: 70
LOS: 3 days | Discharge: HOME | DRG: 871 | End: 2020-05-11
Attending: INTERNAL MEDICINE | Admitting: INTERNAL MEDICINE
Payer: MEDICARE

## 2020-05-07 VITALS — HEIGHT: 66 IN | WEIGHT: 110.01 LBS | TEMPERATURE: 98 F

## 2020-05-07 DIAGNOSIS — Z98.890 OTHER SPECIFIED POSTPROCEDURAL STATES: Chronic | ICD-10-CM

## 2020-05-07 DIAGNOSIS — Z92.89 PERSONAL HISTORY OF OTHER MEDICAL TREATMENT: Chronic | ICD-10-CM

## 2020-05-07 LAB
ALBUMIN SERPL ELPH-MCNC: 1.6 G/DL — LOW (ref 3.3–5)
ALP SERPL-CCNC: 102 U/L — SIGNIFICANT CHANGE UP (ref 40–120)
ALT FLD-CCNC: 24 U/L — SIGNIFICANT CHANGE UP (ref 12–78)
ANION GAP SERPL CALC-SCNC: 15 MMOL/L — SIGNIFICANT CHANGE UP (ref 5–17)
AST SERPL-CCNC: 28 U/L — SIGNIFICANT CHANGE UP (ref 15–37)
BILIRUB SERPL-MCNC: 0.9 MG/DL — SIGNIFICANT CHANGE UP (ref 0.2–1.2)
BUN SERPL-MCNC: 28 MG/DL — HIGH (ref 7–23)
CALCIUM SERPL-MCNC: 8.7 MG/DL — SIGNIFICANT CHANGE UP (ref 8.5–10.1)
CHLORIDE SERPL-SCNC: 101 MMOL/L — SIGNIFICANT CHANGE UP (ref 96–108)
CO2 SERPL-SCNC: 18 MMOL/L — LOW (ref 22–31)
CREAT SERPL-MCNC: 1.5 MG/DL — HIGH (ref 0.5–1.3)
GLUCOSE SERPL-MCNC: 149 MG/DL — HIGH (ref 70–99)
HCT VFR BLD CALC: 30.9 % — LOW (ref 34.5–45)
HGB BLD-MCNC: 9.6 G/DL — LOW (ref 11.5–15.5)
LACTATE SERPL-SCNC: 5.1 MMOL/L — CRITICAL HIGH (ref 0.7–2)
LIDOCAIN IGE QN: 44 U/L — LOW (ref 73–393)
MCHC RBC-ENTMCNC: 26.6 PG — LOW (ref 27–34)
MCHC RBC-ENTMCNC: 31.1 GM/DL — LOW (ref 32–36)
MCV RBC AUTO: 85.6 FL — SIGNIFICANT CHANGE UP (ref 80–100)
PLATELET # BLD AUTO: 869 K/UL — HIGH (ref 150–400)
POTASSIUM SERPL-MCNC: 5.1 MMOL/L — SIGNIFICANT CHANGE UP (ref 3.5–5.3)
POTASSIUM SERPL-SCNC: 5.1 MMOL/L — SIGNIFICANT CHANGE UP (ref 3.5–5.3)
PROT SERPL-MCNC: 6.8 G/DL — SIGNIFICANT CHANGE UP (ref 6–8.3)
RBC # BLD: 3.61 M/UL — LOW (ref 3.8–5.2)
RBC # FLD: 18.4 % — HIGH (ref 10.3–14.5)
SODIUM SERPL-SCNC: 134 MMOL/L — LOW (ref 135–145)

## 2020-05-07 RX ORDER — SODIUM CHLORIDE 9 MG/ML
1000 INJECTION INTRAMUSCULAR; INTRAVENOUS; SUBCUTANEOUS ONCE
Refills: 0 | Status: COMPLETED | OUTPATIENT
Start: 2020-05-07 | End: 2020-05-07

## 2020-05-07 RX ORDER — ONDANSETRON 8 MG/1
0 TABLET, FILM COATED ORAL
Qty: 0 | Refills: 0 | DISCHARGE

## 2020-05-07 RX ORDER — LISINOPRIL 2.5 MG/1
0 TABLET ORAL
Qty: 0 | Refills: 0 | DISCHARGE

## 2020-05-07 RX ORDER — ONDANSETRON 8 MG/1
4 TABLET, FILM COATED ORAL ONCE
Refills: 0 | Status: COMPLETED | OUTPATIENT
Start: 2020-05-07 | End: 2020-05-07

## 2020-05-07 RX ADMIN — SODIUM CHLORIDE 1000 MILLILITER(S): 9 INJECTION INTRAMUSCULAR; INTRAVENOUS; SUBCUTANEOUS at 23:09

## 2020-05-07 RX ADMIN — ONDANSETRON 4 MILLIGRAM(S): 8 TABLET, FILM COATED ORAL at 23:39

## 2020-05-07 NOTE — ADDENDUM
[FreeTextEntry1] : xray of right ischium/sacrum.\par get authorization for wound vac\par will need an MRI soon\par f/u 1 wk\par offload, frequent rotation, frequent change of position.

## 2020-05-07 NOTE — ED ADULT NURSE NOTE - NSIMPLEMENTINTERV_GEN_ALL_ED
Implemented All Fall with Harm Risk Interventions:  [place] to call system. Call bell, personal items and telephone within reach. Instruct patient to call for assistance. Room bathroom lighting operational. Non-slip footwear when patient is off stretcher. Physically safe environment: no spills, clutter or unnecessary equipment. Stretcher in lowest position, wheels locked, appropriate side rails in place. Provide visual cue, wrist band, yellow gown, etc. Monitor gait and stability. Monitor for mental status changes and reorient to person, place, and time. Review medications for side effects contributing to fall risk. Reinforce activity limits and safety measures with patient and family. Provide visual clues: red socks.

## 2020-05-07 NOTE — PHYSICAL EXAM
[Normal Thyroid] : the thyroid was normal [Normal Heart Sounds] : normal heart sounds [Normal Rate and Rhythm] : normal rate and rhythm [Normal Breath Sounds] : Normal breath sounds [Alert] : alert [Oriented to Person] : oriented to person [Oriented to Time] : oriented to time [Oriented to Place] : oriented to place [Calm] : calm [Abdominal Pad] : Abdominal Pad [4 x 4] : 4 x 4  [JVD] : no jugular venous distention  [Abdomen Masses] : No abdominal massess [Abdomen Tenderness] : ~T ~M No abdominal tenderness [Tender] : nontender [Enlarged] : not enlarged [de-identified] : elderly WF, NAD, WD, WN, alert, Ox 3. [FreeTextEntry1] : Ischium  [FreeTextEntry2] : 12.5 [de-identified] : Serosanguineous  [FreeTextEntry4] : 5.1-7.5 [FreeTextEntry3] : 8.5 [de-identified] : Post debridment measurement 12.8X8.9X4.2CM [de-identified] : 1-25%  [de-identified] : NSC, Collagenase, Calcium alginate [FreeTextEntry9] : 1.8 [FreeTextEntry8] : 2.1 [FreeTextEntry7] : Medial knee- Dry eschar  [de-identified] : 0.1 [de-identified] : Medial lower leg- Scattered eschar with epithelium within measurement  [de-identified] : NSC, Collagenase,DD  [de-identified] : 5.2 [de-identified] : 2.8 [de-identified] : 0.1 [de-identified] : NSC, Collagenase,DD  [TWNoteComboBox1] : Right [TWNoteComboBox4] : Large [TWNoteComboBox5] : No [de-identified] : No [de-identified] : Normal [de-identified] : Moderate [de-identified] : 50% [de-identified] : <20% [de-identified] : 2.5% Lidocaine Topical [de-identified] : Yes [TWNoteComboBox7] : Karol [de-identified] : Debridement excisional [de-identified] : Secondary Dressing [TWNoteComboBox9] : Right [de-identified] : Daily [de-identified] : None [de-identified] : Pressure [de-identified] : Erythema [de-identified] : No [de-identified] : None [de-identified] : 100% [de-identified] : Daily [de-identified] : Secondary Dressing [de-identified] : Left [de-identified] : No [de-identified] : None [de-identified] : None [de-identified] : No [de-identified] : Normal [de-identified] : None [de-identified] : 100% [de-identified] : No [de-identified] : Daily [de-identified] : Primary Dressing

## 2020-05-07 NOTE — REASON FOR VISIT
[Family Member] : family member [FreeTextEntry5] : right ischial pressure ulcer [FreeTextEntry3] : large, extensive right ischial press. ulcer [FreeTextEntry4] : 68 yo WF, here for f/u of chronic, large right ischial pressure ulcer. Using collagenase. Will be having a sharp excisional debridement today. [FreeTextEntry6] : stage 4 right ischial press. ulcer [FreeTextEntry9] : see RN note

## 2020-05-07 NOTE — PROCEDURE
[Saline] : saline [Injectable Novocain] : injectable novocain [Necrotic] : necrotic [Slough] : slough [Scalpel] : scalpel [Skin] : skin [Sharp scissors] : sharp scissors [Subcutaneous tissue] : subcutaneous tissue [Fat] : fat [Sent to Lab] : which was entirely removed and was sent to the laboratory for [Culture and Sensitivity] : culture and sensitivity [Clean] : clean [Pathologic Exam] : pathologic exam [Pink] : pink [Pressure] : pressure [FreeTextEntry1] : collagenase, alginate, DD [] : No [FreeTextEntry9] : 15.21 [de-identified] : large, deep extensive right ischial stage 4 press. ulcer [de-identified] : none [de-identified] : none [de-identified] : BAILEY Landis MD [de-identified] : 1%lidocaine [FreeTextEntry6] : stage 4 right ischial press. ita [FreeTextEntry7] : same [de-identified] : <1cc [de-identified] : full thickness necrotic tissue/slough

## 2020-05-07 NOTE — ASSESSMENT
[Verbal] : Verbal [Patient] : Patient [Caregiver] : Caregiver [Good - alert, interested, motivated] : Good - alert, interested, motivated [Verbalizes knowledge/Understanding] : Verbalizes knowledge/understanding [Skin Care] : skin care [Dressing changes] : dressing changes [Pressure relief] : pressure relief [Signs and symptoms of infection] : sign and symptoms of infection [How and When to Call] : how and when to call [Off-loading] : off-loading [Patient responsibility to plan of care] : patient responsibility to plan of care [] : Yes [Stable] : stable [Home] : Home [Faxed - Long Term Care/Home Health Agency] : Long Term Care/Home Health Agency: Faxed [Wheelchair] : Wheelchair [FreeTextEntry2] : Infection prevention \par Localized wound care\par Offloading/ pressure relief \par Achieving pain tolerance levels within the Pts limits of 0/10.\par Develop Realistic expectations and measurable goals in nursing POC to reduce, eliminate or develop acceptable pain limit tolerance.\par Pt to sign [facility] proof of delivery form located in hbo chamber room\par Utilization of offloading, taking deep breaths and guided imagery to reduce discomfort PRN.  [FreeTextEntry4] : Pt to have xray of Right ischium to rule out osteo\par Auth submitted for MRI and debridement of all open wound sites\par Path and culture submitted to lab \par Pt to follow up to [facility] in one week  [FreeTextEntry1] : [facility] [place]

## 2020-05-08 ENCOUNTER — RESULT REVIEW (OUTPATIENT)
Age: 70
End: 2020-05-08

## 2020-05-08 ENCOUNTER — APPOINTMENT (OUTPATIENT)
Dept: OBGYN | Facility: HOSPITAL | Age: 70
End: 2020-05-08

## 2020-05-08 ENCOUNTER — TRANSCRIPTION ENCOUNTER (OUTPATIENT)
Age: 70
End: 2020-05-08

## 2020-05-08 DIAGNOSIS — G35 MULTIPLE SCLEROSIS: ICD-10-CM

## 2020-05-08 DIAGNOSIS — A41.9 SEPSIS, UNSPECIFIED ORGANISM: ICD-10-CM

## 2020-05-08 DIAGNOSIS — C50.919 MALIGNANT NEOPLASM OF UNSPECIFIED SITE OF UNSPECIFIED FEMALE BREAST: ICD-10-CM

## 2020-05-08 DIAGNOSIS — N17.9 ACUTE KIDNEY FAILURE, UNSPECIFIED: ICD-10-CM

## 2020-05-08 DIAGNOSIS — N39.0 URINARY TRACT INFECTION, SITE NOT SPECIFIED: ICD-10-CM

## 2020-05-08 DIAGNOSIS — N12 TUBULO-INTERSTITIAL NEPHRITIS, NOT SPECIFIED AS ACUTE OR CHRONIC: ICD-10-CM

## 2020-05-08 DIAGNOSIS — J93.9 PNEUMOTHORAX, UNSPECIFIED: ICD-10-CM

## 2020-05-08 DIAGNOSIS — L98.429 NON-PRESSURE CHRONIC ULCER OF BACK WITH UNSPECIFIED SEVERITY: ICD-10-CM

## 2020-05-08 DIAGNOSIS — L89.314 PRESSURE ULCER OF RIGHT BUTTOCK, STAGE 4: ICD-10-CM

## 2020-05-08 DIAGNOSIS — L89.890 PRESSURE ULCER OF OTHER SITE, UNSTAGEABLE: ICD-10-CM

## 2020-05-08 DIAGNOSIS — Z29.9 ENCOUNTER FOR PROPHYLACTIC MEASURES, UNSPECIFIED: ICD-10-CM

## 2020-05-08 DIAGNOSIS — I10 ESSENTIAL (PRIMARY) HYPERTENSION: ICD-10-CM

## 2020-05-08 LAB
ANION GAP SERPL CALC-SCNC: 9 MMOL/L — SIGNIFICANT CHANGE UP (ref 5–17)
APPEARANCE UR: ABNORMAL
B PERT IGG+IGM PNL SER: ABNORMAL
BASOPHILS # BLD AUTO: 0 K/UL — SIGNIFICANT CHANGE UP (ref 0–0.2)
BASOPHILS # BLD AUTO: 0.02 K/UL — SIGNIFICANT CHANGE UP (ref 0–0.2)
BASOPHILS NFR BLD AUTO: 0 % — SIGNIFICANT CHANGE UP (ref 0–2)
BASOPHILS NFR BLD AUTO: 0.6 % — SIGNIFICANT CHANGE UP (ref 0–2)
BILIRUB UR-MCNC: ABNORMAL
BUN SERPL-MCNC: 28 MG/DL — HIGH (ref 7–23)
CALCIUM SERPL-MCNC: 7.6 MG/DL — LOW (ref 8.5–10.1)
CHLORIDE SERPL-SCNC: 104 MMOL/L — SIGNIFICANT CHANGE UP (ref 96–108)
CO2 SERPL-SCNC: 20 MMOL/L — LOW (ref 22–31)
COLOR FLD: YELLOW — SIGNIFICANT CHANGE UP
COLOR SPEC: YELLOW — SIGNIFICANT CHANGE UP
CREAT SERPL-MCNC: 1.2 MG/DL — SIGNIFICANT CHANGE UP (ref 0.5–1.3)
DIFF PNL FLD: ABNORMAL
EOSINOPHIL # BLD AUTO: 0 K/UL — SIGNIFICANT CHANGE UP (ref 0–0.5)
EOSINOPHIL # BLD AUTO: 0 K/UL — SIGNIFICANT CHANGE UP (ref 0–0.5)
EOSINOPHIL NFR BLD AUTO: 0 % — SIGNIFICANT CHANGE UP (ref 0–6)
EOSINOPHIL NFR BLD AUTO: 0 % — SIGNIFICANT CHANGE UP (ref 0–6)
FLUID INTAKE SUBSTANCE CLASS: SIGNIFICANT CHANGE UP
FLUID SEGMENTED GRANULOCYTES: 75 % — SIGNIFICANT CHANGE UP
GLUCOSE FLD-MCNC: 747 MG/DL — SIGNIFICANT CHANGE UP
GLUCOSE SERPL-MCNC: 154 MG/DL — HIGH (ref 70–99)
GLUCOSE UR QL: NEGATIVE — SIGNIFICANT CHANGE UP
GRAM STN FLD: SIGNIFICANT CHANGE UP
HCT VFR BLD CALC: 28.5 % — LOW (ref 34.5–45)
HGB BLD-MCNC: 8.8 G/DL — LOW (ref 11.5–15.5)
IMM GRANULOCYTES NFR BLD AUTO: 0.6 % — SIGNIFICANT CHANGE UP (ref 0–1.5)
KETONES UR-MCNC: ABNORMAL
LACTATE SERPL-SCNC: 2.7 MMOL/L — HIGH (ref 0.7–2)
LDH SERPL L TO P-CCNC: 561 U/L — SIGNIFICANT CHANGE UP
LEUKOCYTE ESTERASE UR-ACNC: ABNORMAL
LYMPHOCYTES # BLD AUTO: 0.91 K/UL — LOW (ref 1–3.3)
LYMPHOCYTES # BLD AUTO: 1.49 K/UL — SIGNIFICANT CHANGE UP (ref 1–3.3)
LYMPHOCYTES # BLD AUTO: 28.2 % — SIGNIFICANT CHANGE UP (ref 13–44)
LYMPHOCYTES # BLD AUTO: 36 % — SIGNIFICANT CHANGE UP (ref 13–44)
LYMPHOCYTES # FLD: 5 % — SIGNIFICANT CHANGE UP
MCHC RBC-ENTMCNC: 26.3 PG — LOW (ref 27–34)
MCHC RBC-ENTMCNC: 30.9 GM/DL — LOW (ref 32–36)
MCV RBC AUTO: 85.1 FL — SIGNIFICANT CHANGE UP (ref 80–100)
MONOCYTES # BLD AUTO: 0.14 K/UL — SIGNIFICANT CHANGE UP (ref 0–0.9)
MONOCYTES # BLD AUTO: 0.29 K/UL — SIGNIFICANT CHANGE UP (ref 0–0.9)
MONOCYTES NFR BLD AUTO: 4.3 % — SIGNIFICANT CHANGE UP (ref 2–14)
MONOCYTES NFR BLD AUTO: 7 % — SIGNIFICANT CHANGE UP (ref 2–14)
MONOS+MACROS # FLD: 20 % — SIGNIFICANT CHANGE UP
NEUTROPHILS # BLD AUTO: 2.14 K/UL — SIGNIFICANT CHANGE UP (ref 1.8–7.4)
NEUTROPHILS # BLD AUTO: 2.28 K/UL — SIGNIFICANT CHANGE UP (ref 1.8–7.4)
NEUTROPHILS NFR BLD AUTO: 39 % — LOW (ref 43–77)
NEUTROPHILS NFR BLD AUTO: 66.3 % — SIGNIFICANT CHANGE UP (ref 43–77)
NITRITE UR-MCNC: POSITIVE
NRBC # BLD: 0 /100 WBCS — SIGNIFICANT CHANGE UP (ref 0–0)
NRBC # BLD: SIGNIFICANT CHANGE UP /100 WBCS (ref 0–0)
PH UR: 5 — SIGNIFICANT CHANGE UP (ref 5–8)
PLATELET # BLD AUTO: 673 K/UL — HIGH (ref 150–400)
POTASSIUM SERPL-MCNC: 5 MMOL/L — SIGNIFICANT CHANGE UP (ref 3.5–5.3)
POTASSIUM SERPL-SCNC: 5 MMOL/L — SIGNIFICANT CHANGE UP (ref 3.5–5.3)
PROT FLD-MCNC: 1.4 G/DL — SIGNIFICANT CHANGE UP
PROT UR-MCNC: 30 MG/DL
RBC # BLD: 3.35 M/UL — LOW (ref 3.8–5.2)
RBC # FLD: 18.4 % — HIGH (ref 10.3–14.5)
RCV VOL RI: 6000 /UL — HIGH (ref 0–0)
SARS-COV-2 RNA SPEC QL NAA+PROBE: SIGNIFICANT CHANGE UP
SODIUM SERPL-SCNC: 133 MMOL/L — LOW (ref 135–145)
SP GR SPEC: 1.02 — SIGNIFICANT CHANGE UP (ref 1.01–1.02)
SPECIMEN SOURCE: SIGNIFICANT CHANGE UP
TOTAL NUCLEATED CELL COUNT, BODY FLUID: 9069 /UL — SIGNIFICANT CHANGE UP
TUBE TYPE: SIGNIFICANT CHANGE UP
UROBILINOGEN FLD QL: 8
WBC # BLD: 3.23 K/UL — LOW (ref 3.8–10.5)
WBC # BLD: 4.15 K/UL — SIGNIFICANT CHANGE UP (ref 3.8–10.5)
WBC # FLD AUTO: 3.23 K/UL — LOW (ref 3.8–10.5)
WBC # FLD AUTO: 4.15 K/UL — SIGNIFICANT CHANGE UP (ref 3.8–10.5)

## 2020-05-08 PROCEDURE — 88305 TISSUE EXAM BY PATHOLOGIST: CPT | Mod: 26

## 2020-05-08 PROCEDURE — 88108 CYTOPATH CONCENTRATE TECH: CPT | Mod: 26

## 2020-05-08 PROCEDURE — 71045 X-RAY EXAM CHEST 1 VIEW: CPT | Mod: 26

## 2020-05-08 PROCEDURE — 74177 CT ABD & PELVIS W/CONTRAST: CPT | Mod: 26

## 2020-05-08 PROCEDURE — 99292 CRITICAL CARE ADDL 30 MIN: CPT

## 2020-05-08 PROCEDURE — 99291 CRITICAL CARE FIRST HOUR: CPT

## 2020-05-08 RX ORDER — RIBOCICLIB 200 MG/1
200 TABLET, FILM COATED ORAL DAILY
Refills: 0 | Status: DISCONTINUED | OUTPATIENT
Start: 2020-05-08 | End: 2020-05-08

## 2020-05-08 RX ORDER — ACETAMINOPHEN 500 MG
650 TABLET ORAL EVERY 6 HOURS
Refills: 0 | Status: DISCONTINUED | OUTPATIENT
Start: 2020-05-08 | End: 2020-05-11

## 2020-05-08 RX ORDER — LISINOPRIL 2.5 MG/1
20 TABLET ORAL DAILY
Refills: 0 | Status: DISCONTINUED | OUTPATIENT
Start: 2020-05-08 | End: 2020-05-09

## 2020-05-08 RX ORDER — PIPERACILLIN AND TAZOBACTAM 4; .5 G/20ML; G/20ML
3.38 INJECTION, POWDER, LYOPHILIZED, FOR SOLUTION INTRAVENOUS EVERY 8 HOURS
Refills: 0 | Status: DISCONTINUED | OUTPATIENT
Start: 2020-05-08 | End: 2020-05-11

## 2020-05-08 RX ORDER — LETROZOLE 2.5 MG/1
25 TABLET, FILM COATED ORAL
Qty: 0 | Refills: 0 | DISCHARGE

## 2020-05-08 RX ORDER — COLLAGENASE CLOSTRIDIUM HIST. 250 UNIT/G
1 OINTMENT (GRAM) TOPICAL DAILY
Refills: 0 | Status: DISCONTINUED | OUTPATIENT
Start: 2020-05-08 | End: 2020-05-11

## 2020-05-08 RX ORDER — LETROZOLE 2.5 MG/1
2.5 TABLET, FILM COATED ORAL DAILY
Refills: 0 | Status: DISCONTINUED | OUTPATIENT
Start: 2020-05-08 | End: 2020-05-11

## 2020-05-08 RX ORDER — VANCOMYCIN HCL 1 G
750 VIAL (EA) INTRAVENOUS EVERY 24 HOURS
Refills: 0 | Status: DISCONTINUED | OUTPATIENT
Start: 2020-05-09 | End: 2020-05-11

## 2020-05-08 RX ORDER — ENOXAPARIN SODIUM 100 MG/ML
30 INJECTION SUBCUTANEOUS DAILY
Refills: 0 | Status: DISCONTINUED | OUTPATIENT
Start: 2020-05-08 | End: 2020-05-11

## 2020-05-08 RX ORDER — INTERFERON BETA-1A 22 UG/.5ML
30 INJECTION, SOLUTION SUBCUTANEOUS
Refills: 0 | Status: DISCONTINUED | OUTPATIENT
Start: 2020-05-08 | End: 2020-05-08

## 2020-05-08 RX ORDER — COLLAGENASE CLOSTRIDIUM HIST. 250 UNIT/G
1 OINTMENT (GRAM) TOPICAL DAILY
Refills: 0 | Status: DISCONTINUED | OUTPATIENT
Start: 2020-05-08 | End: 2020-05-08

## 2020-05-08 RX ORDER — VANCOMYCIN HCL 1 G
750 VIAL (EA) INTRAVENOUS ONCE
Refills: 0 | Status: COMPLETED | OUTPATIENT
Start: 2020-05-08 | End: 2020-05-08

## 2020-05-08 RX ORDER — ASCORBIC ACID 60 MG
500 TABLET,CHEWABLE ORAL DAILY
Refills: 0 | Status: DISCONTINUED | OUTPATIENT
Start: 2020-05-08 | End: 2020-05-11

## 2020-05-08 RX ORDER — LATANOPROST 0.05 MG/ML
1 SOLUTION/ DROPS OPHTHALMIC; TOPICAL AT BEDTIME
Refills: 0 | Status: DISCONTINUED | OUTPATIENT
Start: 2020-05-08 | End: 2020-05-11

## 2020-05-08 RX ORDER — PIPERACILLIN AND TAZOBACTAM 4; .5 G/20ML; G/20ML
3.38 INJECTION, POWDER, LYOPHILIZED, FOR SOLUTION INTRAVENOUS ONCE
Refills: 0 | Status: COMPLETED | OUTPATIENT
Start: 2020-05-08 | End: 2020-05-08

## 2020-05-08 RX ORDER — VANCOMYCIN HCL 1 G
1000 VIAL (EA) INTRAVENOUS ONCE
Refills: 0 | Status: DISCONTINUED | OUTPATIENT
Start: 2020-05-08 | End: 2020-05-08

## 2020-05-08 RX ORDER — SODIUM CHLORIDE 9 MG/ML
1000 INJECTION INTRAMUSCULAR; INTRAVENOUS; SUBCUTANEOUS
Refills: 0 | Status: DISCONTINUED | OUTPATIENT
Start: 2020-05-08 | End: 2020-05-08

## 2020-05-08 RX ORDER — ONDANSETRON 8 MG/1
4 TABLET, FILM COATED ORAL EVERY 6 HOURS
Refills: 0 | Status: DISCONTINUED | OUTPATIENT
Start: 2020-05-08 | End: 2020-05-11

## 2020-05-08 RX ORDER — MULTIVIT-MIN/FERROUS GLUCONATE 9 MG/15 ML
1 LIQUID (ML) ORAL DAILY
Refills: 0 | Status: DISCONTINUED | OUTPATIENT
Start: 2020-05-08 | End: 2020-05-11

## 2020-05-08 RX ORDER — SODIUM CHLORIDE 9 MG/ML
1000 INJECTION INTRAMUSCULAR; INTRAVENOUS; SUBCUTANEOUS
Refills: 0 | Status: DISCONTINUED | OUTPATIENT
Start: 2020-05-08 | End: 2020-05-09

## 2020-05-08 RX ORDER — FENTANYL CITRATE 50 UG/ML
50 INJECTION INTRAVENOUS ONCE
Refills: 0 | Status: DISCONTINUED | OUTPATIENT
Start: 2020-05-08 | End: 2020-05-08

## 2020-05-08 RX ADMIN — Medication 750 MILLIGRAM(S): at 02:00

## 2020-05-08 RX ADMIN — FENTANYL CITRATE 50 MICROGRAM(S): 50 INJECTION INTRAVENOUS at 02:32

## 2020-05-08 RX ADMIN — LETROZOLE 2.5 MILLIGRAM(S): 2.5 TABLET, FILM COATED ORAL at 13:10

## 2020-05-08 RX ADMIN — PIPERACILLIN AND TAZOBACTAM 25 GRAM(S): 4; .5 INJECTION, POWDER, LYOPHILIZED, FOR SOLUTION INTRAVENOUS at 10:57

## 2020-05-08 RX ADMIN — Medication 250 MILLIGRAM(S): at 01:00

## 2020-05-08 RX ADMIN — SODIUM CHLORIDE 50 MILLILITER(S): 9 INJECTION INTRAMUSCULAR; INTRAVENOUS; SUBCUTANEOUS at 21:21

## 2020-05-08 RX ADMIN — Medication 1 APPLICATION(S): at 17:04

## 2020-05-08 RX ADMIN — SODIUM CHLORIDE 1000 MILLILITER(S): 9 INJECTION INTRAMUSCULAR; INTRAVENOUS; SUBCUTANEOUS at 00:12

## 2020-05-08 RX ADMIN — SODIUM CHLORIDE 50 MILLILITER(S): 9 INJECTION INTRAMUSCULAR; INTRAVENOUS; SUBCUTANEOUS at 06:29

## 2020-05-08 RX ADMIN — PIPERACILLIN AND TAZOBACTAM 25 GRAM(S): 4; .5 INJECTION, POWDER, LYOPHILIZED, FOR SOLUTION INTRAVENOUS at 20:33

## 2020-05-08 RX ADMIN — SODIUM CHLORIDE 1000 MILLILITER(S): 9 INJECTION INTRAMUSCULAR; INTRAVENOUS; SUBCUTANEOUS at 02:41

## 2020-05-08 RX ADMIN — PIPERACILLIN AND TAZOBACTAM 200 GRAM(S): 4; .5 INJECTION, POWDER, LYOPHILIZED, FOR SOLUTION INTRAVENOUS at 02:33

## 2020-05-08 RX ADMIN — ENOXAPARIN SODIUM 30 MILLIGRAM(S): 100 INJECTION SUBCUTANEOUS at 13:11

## 2020-05-08 RX ADMIN — LATANOPROST 1 DROP(S): 0.05 SOLUTION/ DROPS OPHTHALMIC; TOPICAL at 21:20

## 2020-05-08 NOTE — H&P ADULT - PROBLEM SELECTOR PLAN 9
Lovenox 40mg QD    IMPROVE VTE Individual Risk Assessment        RISK                                                          Points  [  ] Previous VTE                                                3  [  ] Thrombophilia                                            2  [x  ] Lower limb paralysis                                  2        (unable to hold up >15 seconds)    [ x ] Current Cancer                                            2         (within 6 months)  [x  ] Immobilization > 24 hrs                             1  [  ] ICU/CCU stay > 24 hours                           1  [ x ] Age > 60                                                        1  IMPROVE VTE Score:  6 Lovenox 30mg QD    IMPROVE VTE Individual Risk Assessment        RISK                                                          Points  [  ] Previous VTE                                                3  [  ] Thrombophilia                                            2  [x  ] Lower limb paralysis                                  2        (unable to hold up >15 seconds)    [ x ] Current Cancer                                            2         (within 6 months)  [x  ] Immobilization > 24 hrs                             1  [  ] ICU/CCU stay > 24 hours                           1  [ x ] Age > 60                                                        1  IMPROVE VTE Score:  6

## 2020-05-08 NOTE — H&P ADULT - PROBLEM SELECTOR PLAN 2
-patient s/p R chest tube placement in the ED on 5/8/2020 with drainage of 1200cc serous fluid  -continue to monitor fluid drainage from chest tube  -follow up CXR showed interval placement of a chest tube into the right hemithorax with marked decrease in pneumothorax  -currently hemodynamically stable -patient s/p R chest tube placement in the ED on 5/8/2020 with drainage of 1200cc serous fluid  -continue to monitor fluid drainage from chest tube  -follow up CXR showed interval placement of a chest tube into the right hemithorax with marked decrease in pneumothorax  -currently hemodynamically stable  -check fluid studies

## 2020-05-08 NOTE — H&P ADULT - PROBLEM SELECTOR PLAN 3
-UA grossly positive however unsure if clean sample  -follow up urine culture  -patient on zosyn and vanco for sepsis for now

## 2020-05-08 NOTE — PROGRESS NOTE ADULT - PROBLEM SELECTOR PLAN 9
Lovenox 30mg QD    IMPROVE VTE Individual Risk Assessment        RISK                                                          Points  [  ] Previous VTE                                                3  [  ] Thrombophilia                                            2  [x  ] Lower limb paralysis                                  2        (unable to hold up >15 seconds)    [ x ] Current Cancer                                            2         (within 6 months)  [x  ] Immobilization > 24 hrs                             1  [  ] ICU/CCU stay > 24 hours                           1  [ x ] Age > 60                                                        1  IMPROVE VTE Score:  6

## 2020-05-08 NOTE — DISCHARGE NOTE PROVIDER - NSDCCPCAREPLAN_GEN_ALL_CORE_FT
PRINCIPAL DISCHARGE DIAGNOSIS  Diagnosis: Severe sepsis  Assessment and Plan of Treatment:       SECONDARY DISCHARGE DIAGNOSES  Diagnosis: Urinary tract infection without hematuria, site unspecified  Assessment and Plan of Treatment:     Diagnosis: Pneumothorax, unspecified type  Assessment and Plan of Treatment: PRINCIPAL DISCHARGE DIAGNOSIS  Diagnosis: Severe sepsis  Assessment and Plan of Treatment: You were admitted for severe sepsis, likely due to an empyema (infection of the pleural space of your lung). This was treated with IV antibiotics and antifungals.   - Follow up with your primary care physician Dr. Sanches in 1 week after discharge.      SECONDARY DISCHARGE DIAGNOSES  Diagnosis: Urinary tract infection without hematuria, site unspecified  Assessment and Plan of Treatment:     Diagnosis: MICH (acute kidney injury)  Assessment and Plan of Treatment: You had acute kidney injury likely related to sepsis, which improved with IV fluid hydration.    Diagnosis: Pressure ulcer of right ischium, stage IV  Assessment and Plan of Treatment: - For stage 4 right ischium pressure ulcer, apply Collagenase daily and cover with dry dressing.  - Supplement with a daily multivitamin, vitamin C 500 mg, and Ensure shakes twice a day, to help with healing.    Diagnosis: Pressure injury of right knee, unstageable  Assessment and Plan of Treatment: For unstageable pressure injury of right knee, apply protective dressing.    Diagnosis: Anemia  Assessment and Plan of Treatment: Follow up with hematologist Dr Fairbanks in 1 week after discharge.    Diagnosis: Hypertension  Assessment and Plan of Treatment: *** Lisinopril 20 mg daily.    Diagnosis: Multiple sclerosis  Assessment and Plan of Treatment: *** Interferon.    Diagnosis: Breast CA  Assessment and Plan of Treatment: *** Letrozole, Kisqali. PRINCIPAL DISCHARGE DIAGNOSIS  Diagnosis: Severe sepsis  Assessment and Plan of Treatment: You were admitted for severe sepsis, due to an empyema (infection of the pleural space of your lung) likely caused by esophageal perforation, found on imaging. You were treated with IV antibiotics and antifungals.  - Transfer to [facility] per cardiothoracic surgeon Dr. Murcia.   - Follow up with your cardiothoracic surgeon Dr. Murcia and primary care physician Dr. Sanches within 1-3 days after discharge.      SECONDARY DISCHARGE DIAGNOSES  Diagnosis: Anemia  Assessment and Plan of Treatment: Received 1 unit of packed red blood cell transfusion on 5/11/2020.  - Repeat H/H at [place].   - Follow up with hematologist Dr Fairbanks in 1 week after discharge.    Diagnosis: MICH (acute kidney injury)  Assessment and Plan of Treatment: You had acute kidney injury likely related to sepsis, which improved with IV fluid hydration.    Diagnosis: Pressure ulcer of right ischium, stage IV  Assessment and Plan of Treatment: - For stage 4 right ischium pressure ulcer, apply Collagenase daily and cover with dry dressing.  - Supplement with a daily multivitamin, vitamin C 500 mg, and Ensure shakes twice a day, to help with healing.    Diagnosis: Pressure injury of right knee, unstageable  Assessment and Plan of Treatment: For unstageable pressure injury of right knee, apply protective dressing.    Diagnosis: Hypertension  Assessment and Plan of Treatment: Holding Lisinopril due to low blood pressure for now, discuss with your primary care physician regarding resuming.    Diagnosis: Multiple sclerosis  Assessment and Plan of Treatment: Holding Interferon, discuss with your primary care physician regarding resuming.    Diagnosis: Breast CA  Assessment and Plan of Treatment: Continue Letrozole. Holding Kisqali, discuss with your primary care physician regarding resuming.

## 2020-05-08 NOTE — DISCHARGE NOTE PROVIDER - CARE PROVIDER_API CALL
KATRINA RIBERA  Internal Medicine  [address] [place] [id number]  Phone: [phone]  Fax: [phone]  Follow Up Time: 1 week

## 2020-05-08 NOTE — PROGRESS NOTE ADULT - PROBLEM SELECTOR PLAN 3
UA grossly positive however pt has chronic Starks, changed in ED  - Continue Vanc/Zosyn for now  - Follow up urine culture

## 2020-05-08 NOTE — H&P ADULT - NSICDXPASTMEDICALHX_GEN_ALL_CORE_FT
PAST MEDICAL HISTORY:  Breast CA     Hypertension     MS (mitral stenosis)     Multiple sclerosis     Osteoporosis     S/P mastectomy, right

## 2020-05-08 NOTE — PROGRESS NOTE ADULT - PROBLEM SELECTOR PLAN 5
-CT abdomen/ pelvis shows interval development of sacral decubitus ulcer adjacent to the right ischial tuberosity and scattered foci of soft tissue gas  -tissue culture from 5/1/2020 shows growth of enterococcus faecalis, E. coli and pseudomonas of R ischial wound  -Wound care, Dr. Landis, consulted, f/u recs Interval development of sacral decub adjacent to the R ischial tuberosity and scattered foci of soft tissue gas on CT  - Collagenase to R sacral decub daily  - Tissue Cx 5/1 with +Enterococcus faecalis, E. coli, Pseudomonas of R ischial wound  - Wound care, Dr. Landis, consulted, f/u recs Interval development of sacral decub adjacent to the R ischial tuberosity and scattered foci of soft tissue gas on CT - cannot r/o osteo  - Collagenase to R sacral decub with dry dressing daily  - Tissue Cx 5/1 with +Enterococcus faecalis, E. coli, Pseudomonas of R ischial wound  - Wound care, Dr. Medina consulted, recs appreciated  - Added multivitamin, vit C and Ensure BID per dietary recs

## 2020-05-08 NOTE — ED ADULT NURSE REASSESSMENT NOTE - NS ED NURSE REASSESS COMMENT FT1
Pt received a chest tube on R side inserted by MD Sahu. Chest tube is hooked up to closed suction. 800 mL output after first insertion. Pt received a chest tube on R side inserted by MD Sahu. Chest tube is hooked up to closed suction. 800 mL output after first insertion. A time out was performed. Pt aware of risks and benefits, verbal consent for procedure.

## 2020-05-08 NOTE — PROGRESS NOTE ADULT - PROBLEM SELECTOR PLAN 2
s/p R chest tube placement on 5/8/2020 with drainage of 1200cc serous fluid  - Monitor fluid drainage from chest tube  - F/u fluid studies s/p R chest tube placement on 5/8/2020 with drainage of 1200cc serous fluid  - Monitor fluid drainage from chest tube  - F/u fluid studies, cytopath

## 2020-05-08 NOTE — H&P ADULT - NSHPSOCIALHISTORY_GEN_ALL_CORE
Tobacco: Denies  	EtOH: denies  	Recreational drug use: Denies  	Lives: with son  	Occupation: Real estate (retired)  	Ambulates: non-ambulatory  	ADLs: requires assistance  Vaccines: declines flu, has received PNA, cannot receive shingles due to immunocompromised status Lives at home with son, ADLs dependent upon son, bedbound  Tobacco: Denies  EtOH: denies  Recreational drug use: Denies  Occupation: Real estate (retired)  Ambulates: non-ambulatory  ADLs: requires assistance  Vaccines: declines flu, has received PNA, cannot receive shingles due to immunocompromised status

## 2020-05-08 NOTE — H&P ADULT - NSHPREVIEWOFSYSTEMS_GEN_ALL_CORE
CONSTITUTIONAL: denies fever, chills  HEENT: denies blurred visions, sore throat  CARDIOVASCULAR: denies chest pain, chest pressure, palpitations  RESPIRATORY: denies shortness of breath, sputum production  GASTROINTESTINAL: admits nausea, vomiting, denies diarrhea, abdominal pain  GENITOURINARY: +campos in place, denies dysuria, discharge  NEUROLOGICAL: denies numbness, headache, focal weakness  MUSCULOSKELETAL: denies new joint pain, muscle aches  HEMATOLOGIC: denies gross bleeding, bruising  LYMPHATICS: denies enlarged lymph nodes, extremity swelling  PSYCHIATRIC: denies recent changes in anxiety, depression  ENDOCRINOLOGIC: denies sweating, cold or heat intolerance

## 2020-05-08 NOTE — DISCHARGE NOTE PROVIDER - INSTRUCTIONS
Consume a low salt, low cholesterol diet. Supplement with a daily multivitamin, vitamin C 500 mg, and Ensure shakes twice a day.

## 2020-05-08 NOTE — PROGRESS NOTE ADULT - PROBLEM SELECTOR PLAN 1
Unclear if 2/2 UTI vs. sacral ulcer  - Continue empiric Vanc/Zosyn for now  - BP remains soft, s/p 2L NS bolus in ED, continue gentle IVF  - UA grossly positive however unsure if reliable, f/u UCx and BCx  - S/p chest tube placement, f/u pleural fluid analysis  - Zofran PRN for nausea Unclear if 2/2 UTI vs. sacral ulcer  - Continue empiric Vanc/Zosyn for now  - BP remains soft, s/p 2L NS bolus in ED, continue gentle IVF  - UA grossly positive however pt with chronic Starks, f/u UCx and BCx  - S/p chest tube placement, f/u pleural fluid analysis  - Zofran PRN for nausea Unclear if 2/2 UTI vs. sacral ulcer with ?osteo vs. pulmonary source  - Continue empiric Vanc/Zosyn for now  - BP remains soft, s/p 2L NS bolus in ED, continue gentle IVF  - UA grossly positive however pt with chronic Starks, f/u UCx and BCx  - S/p chest tube placement, f/u pleural fluid analysis  - Zofran PRN for nausea  - ID (Dr Tabares) consulted, recs appreciated

## 2020-05-08 NOTE — ED PROVIDER NOTE - OBJECTIVE STATEMENT
68 yo with MS breast CA in remission and chronic sacral ulcer presenting with back pain in the midline that has been present for a few months since being dropped from a [employer] lift.  Today however stating she has had persistent NV NBNB with no BM or flatus for a few days.  No abd pain reported.  There is some SOB.  No fevers.  Came in primarily though due to the nausea and vomiting.  No new trauma.

## 2020-05-08 NOTE — PROGRESS NOTE ADULT - PROBLEM SELECTOR PLAN 8
-s/p R mastectomy  -on letrozole and Kisqali at home, continue s/p R mastectomy  - Continue Letrozole and Kisqali s/p R mastectomy  - Continue Letrozole; holding Kisqali

## 2020-05-08 NOTE — ED PROVIDER NOTE - PHYSICAL EXAMINATION
Gen: Chronically ill appearing in NAD  Head: NC/AT  Neck: trachea midline  Chest:  B Masectomy  Resp:  No distress decreased BS on R  CV: RRR  Abd: soft NT ND  Ext: no deformities  Neuro:  A&O appears non focal  Skin:  Warm and dry as visualized except for large sacral decub on R

## 2020-05-08 NOTE — PROGRESS NOTE ADULT - PROBLEM SELECTOR PLAN 7
Chronic, stable  -advanced in recent months as patient having frequent falls in 3/2019 per chart review, now has been non-ambulatory  -patient on Interferon will continue Chronic, advanced in recent months as patient having frequent falls in 3/2019 per chart review, now has been non-ambulatory  - Continue Interferon Chronic, advanced in recent months as patient having frequent falls in 3/2019 per chart review, now has been non-ambulatory  - Holding Interferon

## 2020-05-08 NOTE — H&P ADULT - NSHPOUTPATIENTPROVIDERS_GEN_ALL_CORE
PMD Dr Andujar  Heme/Onc Dr Woo  Oncologist - Dr Matthew Fairbanks  Neuro - Dr David Mckenna PMD Dr Greene  Heme/Onc Dr Woo  Oncologist - Dr Matthew Fairbanks  Neuro - Dr David Mckenna  Wound Care - ?Dr. Aguero

## 2020-05-08 NOTE — CHART NOTE - NSCHARTNOTEFT_GEN_A_CORE
Nutrition Initial Assessment    Nutrition Consult Received: Yes [   ]  No [ x  ]    Reason for Initial Nutrition Assessment:decreased po pta/n/v    Source of Information: Unable to conduct a fact to face interview due to limited contact restrictions related to pt's medical condition and isolation precautions. Information obtained from a phone call with the pt's daughter and from the EMR.    Admitting Diagnosis: 69y Female admitted for Severe sepsis    PAST MEDICAL & SURGICAL HISTORY:  Hypertension  Multiple sclerosis  S/P mastectomy, right  Breast CA  Osteoporosis  MS (mitral stenosis)  History of bowel resection  H/O breast surgery      Subjective Information: Spoke with daughter as pt did not answer phone. Pt with decreased intake, +nausea, possibly from chemo Rx, takes zofran at home with fair effect. Preferences obtained from daughter, dtr agrees that pt would benefit from po supplements, to trial Ensure/mighty shakes. Hx HTN, MS, breast ca, bowel resection. Bedbound, with sacral pressure injury st 4, unstagable wounds thigh/shin.Wt loss likely, #, now 110#. Recommend add mvi, vit c. S/p  pneumothorax with chest tube placed, 1200ml drained.      GI Issues:    Current Nutrition Order:  PO Intake:   Good (%) [   ]    Fair (50-75%) [   ]    Poor (<50%) [ x  ]    Skin Integrity:see above    Labs:   05-08 Na133 mmol/L<L> Glu 154 mg/dL<H> K+ 5.0 mmol/L Cr  1.20 mg/dL BUN 28 mg/dL<H> Phos n/a   Alb n/a   PAB n/a             Medications:  MEDICATIONS  (STANDING):  collagenase Ointment 1 Application(s) Topical daily  collagenase Ointment 1 Application(s) Topical daily  enoxaparin Injectable 30 milliGRAM(s) SubCutaneous daily  latanoprost 0.005% Ophthalmic Solution 1 Drop(s) Both EYES at bedtime  letrozole 2.5 milliGRAM(s) Oral daily  lisinopril 20 milliGRAM(s) Oral daily  piperacillin/tazobactam IVPB.. 3.375 Gram(s) IV Intermittent every 8 hours  sodium chloride 0.9%. 1000 milliLiter(s) (50 mL/Hr) IV Continuous <Continuous>  vancomycin  IVPB 750 milliGRAM(s) IV Intermittent every 24 hours    MEDICATIONS  (PRN):  acetaminophen   Tablet .. 650 milliGRAM(s) Oral every 6 hours PRN Temp greater or equal to 38C (100.4F), Mild Pain (1 - 3)  ondansetron   Disintegrating Tablet 4 milliGRAM(s) Oral every 6 hours PRN Nausea and/or Vomiting    Admitted Anthropometrics:    Height (cm): 167.64 (05-07-20 @ 21:53)  Weight (kg): 49.9 (05-07-20 @ 21:53)  BMI (kg/m2): 17.8 (05-07-20 @ 21:53)    Nutrition Focused Physical Exam: Unable to complete due to limited isolation contact precautions at this time.     Estimated Energy Needs (_30_ kcal/kg- __35_ kcal/kg): 9431=0480 cals  Estimated Protein Needs (__1.2 g/kg- __2_ g/kg):  g pro  Based on weight of:110#    [x  ] Nutrition Diagnosis:increased nutrient needs due to pressure injuries/sepsis  [  ] No active nutrition diagnosis at this time  [  ] Current medical condition precludes nutrition intervention    Goal:pt will meet needs with diet/snacks/supplements    Nutrition Interventions: Ensure BID, mighty shakes, snacks added    Recommendations:Ensure BID, mvi, vit c 500 mg daily      RD to follow-up per protocol.

## 2020-05-08 NOTE — DISCHARGE NOTE PROVIDER - REASON FOR ADMISSION
hydropneumothorax, severe sepsis hydropneumothorax hydropneumothorax - empyema, esophageal perforation

## 2020-05-08 NOTE — H&P ADULT - PROBLEM SELECTOR PLAN 4
-baseline Cr 0.7-0.8, on admission Cr 1.5  -MICH likely prerenal azotemia in setting of shock and severe sepsis  -s/p 2L NS bolus in the ED, received adequate fluid resuscitation, will continue with gentle IVF for now -baseline Cr 0.7-0.8, on admission Cr 1.5  -MICH likely prerenal azotemia in setting of shock and severe sepsis  -s/p 2L NS bolus in the ED, received adequate fluid resuscitation, will continue with gentle IVF for now  -trend renal indices

## 2020-05-08 NOTE — H&P ADULT - NSHPPHYSICALEXAM_GEN_ALL_CORE
T(C): 37.7 (05-07-20 @ 21:58), Max: 37.7 (05-07-20 @ 21:58)  HR: 85 (05-08-20 @ 02:14) (85 - 110)  BP: 116/67 (05-08-20 @ 02:14) (67/48 - 116/67)  RR: 22 (05-08-20 @ 02:14) (22 - 25)  SpO2: 100% (05-08-20 @ 02:14) (93% - 100%)    GENERAL: elderly frail female in NAD  EYES: sclera clear, no exudates  ENMT: oropharynx clear without erythema, no exudates, moist mucous membranes  NECK: supple, soft, no thyromegaly noted  LUNGS: good air entry bilaterally, clear to auscultation, symmetric breath sounds, no wheezing or rhonchi appreciated  HEART: soft S1/S2, regular rate and rhythm, no murmurs noted, no lower extremity edema  GASTROINTESTINAL: abdomen is soft, nontender, nondistended  INTEGUMENT: slightly pale  MUSCULOSKELETAL: no clubbing or cyanosis, no obvious deformity  NEUROLOGIC: awake, alert, oriented x3, good muscle tone in 4 extremities, no obvious sensory deficits  PSYCHIATRIC: mood is good, affect is congruent, linear and logical thought process  HEME/LYMPH: no palpable supraclavicular nodules, no obvious ecchymosis or petechiae

## 2020-05-08 NOTE — H&P ADULT - PROBLEM SELECTOR PLAN 1
-admit to tele   -severe sepsis (SBP <90, lactate elevated, tachypneic) in setting of UTI vs sacral ulcer, however unclear at this time  -lactate 5.0 in the ED, received 2L NS bolus (adequate fluid resuscitation), follow up repeat lactate  -continue with gentle IVF  -UA grossly positive however unsure if reliable, follow up UCx and BCx -admit to tele   -severe sepsis (SBP <90, lactate elevated, tachypneic) in setting of UTI vs sacral ulcer, however unclear at this time, f/u urine cx  -lactate 5.0 in the ED, received 2L NS bolus (adequate fluid resuscitation), follow up repeat lactate 2.7  -continue with gentle IVF  -UA grossly positive however unsure if reliable, follow up UCx and BCx  -zofran prn for nausea

## 2020-05-08 NOTE — PROGRESS NOTE ADULT - PROBLEM SELECTOR PLAN 4
Likely prerenal azotemia in setting of sepsis, improving with IVF  - Baseline Cr 0.7-0.8, on admission Cr 1.5  - Continue gentle IVF for now  - Trend renal indices

## 2020-05-08 NOTE — ED PROVIDER NOTE - CARE PLAN
Principal Discharge DX:	Severe sepsis  Secondary Diagnosis:	Pneumothorax, unspecified type  Secondary Diagnosis:	Urinary tract infection without hematuria, site unspecified

## 2020-05-08 NOTE — ED PROVIDER NOTE - CLINICAL SUMMARY MEDICAL DECISION MAKING FREE TEXT BOX
pt presenting with hypotension and a low grade fever in the setting of NBNB NV and no flatus - intitial concern was a SBO with some kind of ischemia or another source of sepsis.  IVF bolus appropriate for weight as well as broad spectrum ABx given.  Cultures sent.  Elevated WBC count with bands and high lactate.  CT showed lung findings which were unexpected.  CT was placed.  Pt will need admission

## 2020-05-08 NOTE — ED PROVIDER NOTE - PROGRESS NOTE DETAILS
Call from Radiology that patient had hydropneumothorax on R with some signs of tension.  In setting of low BP and hypoxia likely related to that phenomenon and not just sepsis.  Will place chest tube WOB, BP and hypoxia all improved post chest tube placement.  Fluid not consistent with simple effusion.  UA showing UTI. Bump in baseline Cr as well.  This would suggest severe sepsis.  Based on the lactate would qualify as shock but complicated picture due to the tension physiology of the PTx.  Awaiting results of repeat Lactate.  Will need to be admitted at this time.  As BP is stable is ok to go to the floor. Multiple attempts to contact Dr Perlman without success.  residents contacted and they will take admission for him.

## 2020-05-08 NOTE — DISCHARGE NOTE PROVIDER - HOSPITAL COURSE
HPI:    69 year old F non-ambulatory with PMHx significant for multiple sclerosis, Breast Cancer s/p R mastectomy, Osteoporosis, Mitral stenosis, right ankle fracture, chronic sacral ulcer, chronic midline back pain that has been present for past few months since patient was dropped from a [facility] (7/17/2019) presenting with nausea and vomiting.  Patient states that today she developed persistent NBNB emesis for past two weeks. Has been taking an anti-nausea medication with some relief. Exacerbated by eating so patient states that she has not been eating for the past two weeks. Admits loss of appetite as well. Last BM was 3 days ago. Denies constipation. Admits to some abdominal soreness but denies abd pain. No fevers, chills. Currently not nauseous. Patient unable to elaborate on specific details of history and states that she is tired.    Of note, patient admitted 10/9/2019 for lower back and bilateral knee pain, found to have compression fracture of L2 with imaging subsequently found to have multiple lytic lesions on the spine and pelvis.  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 Matthew Fairbanks.        In the ED, T 97.8F, , BP 67/48 --> 116/67, RR 25, SpO2 93% on RA --> 100% on NRB.  Labs significant for WBC 4.15, H/H 9.6/30.9 (baseline 9-10), plt 869, band 16%, BUN/Cr 28/1.5 (baseline Cr 0.8-0.9), glucose 149, lactate 5.1, lipase 44, UA grossly positive, COVID negative.  Patient received zofran x1, vanco x1, fentanyl 50mg IVP x1, zosyn x1, NS bolus x2 in the ED.  Patient had chest tube placed in the ED, with 1200cc serous fluid drainage after placement. EKG: sinus tachycardia     CXR: Moderately large right hydropneumothorax resulting in partial right lung collapse and slight cardiomediastinal shift to the left.    CT abd/pelvis: Partial visualization of a large right hydropneumothorax with mild cardiomediastinal shift to the left.  Interval development of sacral decubitus ulcer adjacent to the right ischial tuberosity. Scattered foci of soft tissue gas. Underlying osteomyelitis cannot be excluded. If strong clinical concern persists, nuclear HIDA scan or MRI are more sensitive. No discrete focal drainable collection. No small bowel obstruction.  Cecum is air filled. Gallstones. Diffuse osseous metastases. (08 May 2020 03:24)            ----    HOSPITAL COURSE:    Patient was admitted for severe sepsis - unclear if 2/2 UTI vs. sacral ulcer, and hydropneumothorax. She underwent R chest tube placement in the ED on 5/8/2020, drained 1200 cc serious fluid initially. She was continued on empiric Vanc/Zosyn and ID (Dr Tabares) was consulted. Pleural fluid analysis showed ____. Pleural cytopathology showed ___. U/A was grossly positive however it was unclear if findings were reliable, patient has chronic Starks. Urine Cx ___ and blood Cx ____. She had MICH, suspected prerenal azotemia, which improved with IVF hydration. Wound care (Dr Landis) was consulted for her chronic sacral decubitus ulcer which showed interval development on CT A/P, _____. HPI:    69 year old F non-ambulatory with PMHx significant for multiple sclerosis, Breast Cancer s/p R mastectomy, Osteoporosis, Mitral stenosis, right ankle fracture, chronic sacral ulcer, chronic midline back pain that has been present for past few months since patient was dropped from a [facility] (7/17/2019) presenting with nausea and vomiting.  Patient states that today she developed persistent NBNB emesis for past two weeks. Has been taking an anti-nausea medication with some relief. Exacerbated by eating so patient states that she has not been eating for the past two weeks. Admits loss of appetite as well. Last BM was 3 days ago. Denies constipation. Admits to some abdominal soreness but denies abd pain. No fevers, chills. Currently not nauseous. Patient unable to elaborate on specific details of history and states that she is tired.    Of note, patient admitted 10/9/2019 for lower back and bilateral knee pain, found to have compression fracture of L2 with imaging subsequently found to have multiple lytic lesions on the spine and pelvis.  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 Matthew Fairbanks.        In the ED, T 97.8F, , BP 67/48 --> 116/67, RR 25, SpO2 93% on RA --> 100% on NRB.  Labs significant for WBC 4.15, H/H 9.6/30.9 (baseline 9-10), plt 869, band 16%, BUN/Cr 28/1.5 (baseline Cr 0.8-0.9), glucose 149, lactate 5.1, lipase 44, UA grossly positive, COVID negative.  Patient received zofran x1, vanco x1, fentanyl 50mg IVP x1, zosyn x1, NS bolus x2 in the ED.  Patient had chest tube placed in the ED, with 1200cc serous fluid drainage after placement. EKG: sinus tachycardia     CXR: Moderately large right hydropneumothorax resulting in partial right lung collapse and slight cardiomediastinal shift to the left.    CT abd/pelvis: Partial visualization of a large right hydropneumothorax with mild cardiomediastinal shift to the left.  Interval development of sacral decubitus ulcer adjacent to the right ischial tuberosity. Scattered foci of soft tissue gas. Underlying osteomyelitis cannot be excluded. If strong clinical concern persists, nuclear HIDA scan or MRI are more sensitive. No discrete focal drainable collection. No small bowel obstruction.  Cecum is air filled. Gallstones. Diffuse osseous metastases. (08 May 2020 03:24)            ----    HOSPITAL COURSE:    Patient was admitted for severe sepsis - unclear if 2/2 UTI vs. sacral ulcer, and hydropneumothorax. She underwent R chest tube placement in the ED on 5/8/2020, drained 1200 cc serious fluid initially. She was continued on empiric Vanc/Zosyn. ID (Dr Tabares) and pulm (Dr Alexandre) were consulted. Pleural fluid analysis showed ____. Pleural cytopathology showed ___. U/A was grossly positive however it was unclear if findings were reliable, patient has chronic Starks. Urine Cx ___ and blood Cx ____. She had MICH, suspected prerenal azotemia, which improved with IVF hydration. Wound care (Dr Lnadis) was consulted for her chronic sacral decubitus ulcer which showed interval development on CT A/P, _____. HPI:    69 year old F non-ambulatory with PMHx significant for multiple sclerosis, Breast Cancer s/p R mastectomy, Osteoporosis, Mitral stenosis, right ankle fracture, chronic sacral ulcer, chronic midline back pain that has been present for past few months since patient was dropped from a [facility] (7/17/2019) presenting with nausea and vomiting.  Patient states that today she developed persistent NBNB emesis for past two weeks. Has been taking an anti-nausea medication with some relief. Exacerbated by eating so patient states that she has not been eating for the past two weeks. Admits loss of appetite as well. Last BM was 3 days ago. Denies constipation. Admits to some abdominal soreness but denies abd pain. No fevers, chills. Currently not nauseous. Patient unable to elaborate on specific details of history and states that she is tired.    Of note, patient admitted 10/9/2019 for lower back and bilateral knee pain, found to have compression fracture of L2 with imaging subsequently found to have multiple lytic lesions on the spine and pelvis.  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 Matthew Fairbanks.        In the ED, T 97.8F, , BP 67/48 --> 116/67, RR 25, SpO2 93% on RA --> 100% on NRB.  Labs significant for WBC 4.15, H/H 9.6/30.9 (baseline 9-10), plt 869, band 16%, BUN/Cr 28/1.5 (baseline Cr 0.8-0.9), glucose 149, lactate 5.1, lipase 44, UA grossly positive, COVID negative.  Patient received zofran x1, vanco x1, fentanyl 50mg IVP x1, zosyn x1, NS bolus x2 in the ED.  Patient had chest tube placed in the ED, with 1200cc serous fluid drainage after placement. EKG: sinus tachycardia     CXR: Moderately large right hydropneumothorax resulting in partial right lung collapse and slight cardiomediastinal shift to the left.    CT abd/pelvis: Partial visualization of a large right hydropneumothorax with mild cardiomediastinal shift to the left.  Interval development of sacral decubitus ulcer adjacent to the right ischial tuberosity. Scattered foci of soft tissue gas. Underlying osteomyelitis cannot be excluded. If strong clinical concern persists, nuclear HIDA scan or MRI are more sensitive. No discrete focal drainable collection. No small bowel obstruction.  Cecum is air filled. Gallstones. Diffuse osseous metastases. (08 May 2020 03:24)            ----    HOSPITAL COURSE:    Patient was admitted for severe sepsis - unclear if 2/2 UTI vs. sacral ulcer, and hydropneumothorax. She underwent R chest tube placement in the ED on 5/8/2020, drained 1200 cc serious fluid initially. ID (Dr Tabares) and pulm (Dr Allen) were consulted. Pleural fluid analysis showed likely exudative effusion 2/2 empyema, with fungal elements, +Strep mitis/oralis. She was continued on empiric Vanc/Zosyn, Caspofungin was added. Pleural fungal Cx __, serum Ag studies ___. Pleural cytopathology showed ___.         U/A was grossly positive however it was unclear if findings were reliable, patient has chronic Starks. Urine Cx showed likely contaminant and blood Cx showed NGTD.         She had MICH, suspected prerenal azotemia, which improved with IVF hydration.         Wound care (Dr Medina) was consulted for her chronic sacral decubitus ulcer which showed interval development on CT A/P, recommended daily Collagenase with dry dressing.         Heme (Dr Garcia) was consulted for patient's anemia - likely acute on chronic disease (ACD), in accordance with anemia studies. Per heme, patient was not a candidate for SOPHY or IV Fe. HPI:    69 year old F non-ambulatory with PMHx significant for multiple sclerosis, Breast Cancer s/p R mastectomy, Osteoporosis, Mitral stenosis, right ankle fracture, chronic sacral ulcer, chronic midline back pain that has been present for past few months since patient was dropped from a [facility] (7/17/2019) presenting with nausea and vomiting.  Patient states that today she developed persistent NBNB emesis for past two weeks. Has been taking an anti-nausea medication with some relief. Exacerbated by eating so patient states that she has not been eating for the past two weeks. Admits loss of appetite as well. Last BM was 3 days ago. Denies constipation. Admits to some abdominal soreness but denies abd pain. No fevers, chills. Currently not nauseous. Patient unable to elaborate on specific details of history and states that she is tired.    Of note, patient admitted 10/9/2019 for lower back and bilateral knee pain, found to have compression fracture of L2 with imaging subsequently found to have multiple lytic lesions on the spine and pelvis.  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 Matthew Fairbanks.        In the ED, T 97.8F, , BP 67/48 --> 116/67, RR 25, SpO2 93% on RA --> 100% on NRB.  Labs significant for WBC 4.15, H/H 9.6/30.9 (baseline 9-10), plt 869, band 16%, BUN/Cr 28/1.5 (baseline Cr 0.8-0.9), glucose 149, lactate 5.1, lipase 44, UA grossly positive, COVID negative.  Patient received zofran x1, vanco x1, fentanyl 50mg IVP x1, zosyn x1, NS bolus x2 in the ED.  Patient had chest tube placed in the ED, with 1200cc serous fluid drainage after placement. EKG: sinus tachycardia     CXR: Moderately large right hydropneumothorax resulting in partial right lung collapse and slight cardiomediastinal shift to the left.    CT abd/pelvis: Partial visualization of a large right hydropneumothorax with mild cardiomediastinal shift to the left.  Interval development of sacral decubitus ulcer adjacent to the right ischial tuberosity. Scattered foci of soft tissue gas. Underlying osteomyelitis cannot be excluded. If strong clinical concern persists, nuclear HIDA scan or MRI are more sensitive. No discrete focal drainable collection. No small bowel obstruction.  Cecum is air filled. Gallstones. Diffuse osseous metastases. (08 May 2020 03:24)            ----    HOSPITAL COURSE:    Patient was admitted for severe sepsis - unclear if 2/2 UTI vs. sacral ulcer, and hydropneumothorax. She underwent R chest tube placement in the ED on 5/8/2020, drained 1200 cc serious fluid initially. ID (Dr Tabares) and pulm (Dr Allen) were consulted. Pleural fluid analysis showed likely exudative effusion 2/2 empyema, with fungal elements, +Strep mitis/oralis. She was continued on empiric Vanc/Zosyn, Caspofungin was added. Pleural fungal Cx __, serum Ag studies ___. Pleural cytopathology showed ___. 2nd chest tube was placed by pulm Dr Allen on 5/10.        U/A was grossly positive however it was unclear if findings were reliable, patient has chronic Starks. Urine Cx showed likely contaminant and blood Cx showed NGTD.         She had MICH, suspected prerenal azotemia, which improved with IVF hydration.         Wound care (Dr Medina) was consulted for her chronic sacral decubitus ulcer which showed interval development on CT A/P, recommended daily Collagenase with dry dressing.         Heme (Dr Garcia) was consulted for patient's anemia - likely acute on chronic disease (ACD), in accordance with anemia studies. Per heme, patient was not a candidate for SOPHY or IV Fe. Hb continued to drop slowly, she was transfused 1 u pRBCs on 5/11 for Hb of 7 - appropriate increase? HPI:    69 year old F non-ambulatory with PMHx significant for multiple sclerosis, Breast Cancer s/p R mastectomy, Osteoporosis, Mitral stenosis, right ankle fracture, chronic sacral ulcer, chronic midline back pain that has been present for past few months since patient was dropped from a [facility] (7/17/2019) presenting with nausea and vomiting.  Patient states that today she developed persistent NBNB emesis for past two weeks. Has been taking an anti-nausea medication with some relief. Exacerbated by eating so patient states that she has not been eating for the past two weeks. Admits loss of appetite as well. Last BM was 3 days ago. Denies constipation. Admits to some abdominal soreness but denies abd pain. No fevers, chills. Currently not nauseous. Patient unable to elaborate on specific details of history and states that she is tired.    Of note, patient admitted 10/9/2019 for lower back and bilateral knee pain, found to have compression fracture of L2 with imaging subsequently found to have multiple lytic lesions on the spine and pelvis.  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 Matthew Fairbanks.        In the ED, T 97.8F, , BP 67/48 --> 116/67, RR 25, SpO2 93% on RA --> 100% on NRB.  Labs significant for WBC 4.15, H/H 9.6/30.9 (baseline 9-10), plt 869, band 16%, BUN/Cr 28/1.5 (baseline Cr 0.8-0.9), glucose 149, lactate 5.1, lipase 44, UA grossly positive, COVID negative.  Patient received zofran x1, vanco x1, fentanyl 50mg IVP x1, zosyn x1, NS bolus x2 in the ED.  Patient had chest tube placed in the ED, with 1200cc serous fluid drainage after placement. EKG: sinus tachycardia     CXR: Moderately large right hydropneumothorax resulting in partial right lung collapse and slight cardiomediastinal shift to the left.    CT abd/pelvis: Partial visualization of a large right hydropneumothorax with mild cardiomediastinal shift to the left.  Interval development of sacral decubitus ulcer adjacent to the right ischial tuberosity. Scattered foci of soft tissue gas. Underlying osteomyelitis cannot be excluded. If strong clinical concern persists, nuclear HIDA scan or MRI are more sensitive. No discrete focal drainable collection. No small bowel obstruction.  Cecum is air filled. Gallstones. Diffuse osseous metastases. (08 May 2020 03:24)            ----    HOSPITAL COURSE:    Patient was admitted for severe sepsis - initially unclear if 2/2 UTI vs. sacral ulcer vs. hydropneumothorax. She underwent R chest tube placement in the ED on 5/8/2020, drained 1200 cc serious fluid initially. ID (Dr Tabares) and pulm (Dr Allen) were consulted. Pleural fluid analysis showed likely exudative effusion 2/2 empyema, with fungal elements, +Strep mitis/oralis, Klebsiella oxytoca. She was continued on empiric Vanc/Zosyn, Caspofungin was added. Serum Ag studies were sent. Pleural cytopathology showed neutrophils and bacteria, negative for malignant cells. 2nd chest tube was placed by pulm Dr Allen on 5/10. CTS (Dr Murcia) consulted. U/A was grossly positive however it was unclear if findings were reliable as patient has chronic Starks. Urine Cx showed likely contaminant and blood Cx showed NGTD. She had MICH, suspected prerenal azotemia, which improved with IVF hydration. Wound care (Dr Medina) was consulted for her chronic sacral decubitus ulcer which showed interval development on CT A/P, recommended daily Collagenase with dry dressing.         Heme (Dr Garcia) was consulted for patient's anemia - likely acute on chronic disease (ACD), in accordance with anemia studies. Per heme, patient was not a candidate for SOPHY or IV Fe. Hb continued to drop slowly, she was ordered for 1 u pRBCs on 5/11 for Hb of 7. Due to drop in Hb, esophagram was ordered and showed leakage of contrast into R pleural space. Esophageal perforation was confirmed with subsequent CT chest which also showed air/fluid within the right pleural space compatible with empyema. After imaging, patient received 1 u pRBCs. Will be transferred to [place] per Cardiothoracic Surgeon Dr Murcia.

## 2020-05-08 NOTE — H&P ADULT - PROBLEM SELECTOR PLAN 7
Chronic, stable  -advanced in recent months as patient having frequent falls in 3/2019 per chart review, now has been non-ambulatory  -patient on Interferon will continue

## 2020-05-08 NOTE — PROGRESS NOTE ADULT - PROBLEM SELECTOR PLAN 6
Chronic, stable  -Patient on lisinopril at home, continue with hold parameters  -Routine hemodynamic monitoring Chronic, stable  -Continue Lisinopril with hold parameters  -Routine hemodynamic monitoring Chronic, stable  - Continue Lisinopril with hold parameters  - Routine hemodynamic monitoring

## 2020-05-08 NOTE — DISCHARGE NOTE PROVIDER - NSDCMRMEDTOKEN_GEN_ALL_CORE_FT
Avonex: intramuscular once a week  Kisqali 200 mg oral tablet: 1 tab(s) orally once a day  letrozole: 2.5 milligram(s) orally once a day  lisinopril: 20 milligram(s) orally once a day  Multiple Vitamins with Minerals oral tablet: 1 tab(s) orally once a day  naproxen 250 mg oral tablet: 1 tab(s) orally  Santyl 250 units/g topical ointment: Apply topically to affected area once a day and cover with dry dressing   Xalatan 0.005% ophthalmic solution: 1 drop(s) to each affected eye once a day (in the evening)  Zofran: orally once a day, As Needed acetaminophen 325 mg oral tablet: 2 tab(s) orally every 6 hours, As needed, Temp greater or equal to 38C (100.4F), Mild Pain (1 - 3)  ascorbic acid 500 mg oral tablet: 1 tab(s) orally once a day  caspofungin: 50 milligram(s) injectable once a day  collagenase 250 units/g topical ointment: 1 application topically once a day, cover with dry dressing  latanoprost 0.005% ophthalmic solution: 1 drop(s) to each affected eye once a day (at bedtime)  letrozole 2.5 mg oral tablet: 1 tab(s) orally once a day  Multiple Vitamins with Minerals oral tablet: 1 tab(s) orally once a day  ondansetron 4 mg oral tablet, disintegratin tab(s) orally every 6 hours, As needed, Nausea and/or Vomiting  piperacillin-tazobactam: 3.375 gram(s) injectable every 8 hours  saccharomyces boulardii lyo 250 mg oral capsule: 1 cap(s) orally 2 times a day  vancomycin 750 mg intravenous injection: 750 milligram(s) intravenous every 24 hours

## 2020-05-08 NOTE — CONSULT NOTE ADULT - PROBLEM SELECTOR RECOMMENDATION 4
as above    Thank you for consulting us and involving us in the management of this most interesting and challenging case.     We will follow along in the care of this patient.

## 2020-05-08 NOTE — H&P ADULT - ASSESSMENT
69 year old F non-ambulatory with pmhx significant for multiple sclerosis, Breast Cancer s/p R mastectomy, Osteoporosis, Mitral stenosis, right ankle fracture, chronic sacral ulcer, chronic midline back pain that has been present for past few months since patient was dropped from a [facility] (7/17/2019) presenting with nausea and vomiting admitted for severe sepsis, UTI, and pneumothorax s/p R chest tube placement on 5/8/2020.

## 2020-05-08 NOTE — H&P ADULT - PROBLEM SELECTOR PLAN 5
-CT abdomen/ pelvis shows interval development of sacral decubitus ulcer adjacent to the right ischial tuberosity and scattered foci of soft tissue gas  -tissue culture from 5/1/2020 shows growth of enterococcus faecalis, E. coli and pseudomonas of R ischial wound  -Wound care, Dr. Landis, consulted, f/u recs

## 2020-05-08 NOTE — CONSULT NOTE ADULT - SUBJECTIVE AND OBJECTIVE BOX
HPI:  69 year old F non-ambulatory with pmhx significant for multiple sclerosis, Breast Cancer s/p R mastectomy, Osteoporosis, Mitral stenosis, right ankle fracture, chronic sacral ulcer, chronic midline back painamd with nausea and vomiting.  Patient developed persistent NBNB emesis for two weeks. Has been taking an anti-nausea medication with some relief. Exacerbated by eating so patient states that she has not been eating for the past two weeks. Admits loss of appetite as well. Last BM was 3 days ago. Denies constipation. Admits to some abdominal soreness but denies abd pain. No fevers, chills. Currently not nauseous. Patient unable to elaborate on specific details of history and states that she is tired.    Of note, patient admitted 10/9/2019 for lower back and bilateral knee pain, found to have compression fracture of L2 with imaging subsequently found to have multiple lytic lesions on the spine and pelvis.  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 Matthew Fairbanks.    In the ED, T 97.8F, , BP 67/48 --> 116/67, RR 25, SpO2 93% on RA --> 100% on NRB.  Labs significant for WBC 4.15, H/H 9.6/30.9 (baseline 9-10), plt 869, band 16%, BUN/Cr 28/1.5 (baseline Cr 0.8-0.9), glucose 149, lactate 5.1, lipase 44, UA grossly positive, COVID negative.  Patient received zofran x1, vanco x1, fentanyl 50mg IVP x1, zosyn x1, NS bolus x2 in the ED.  Patient had chest tube placed in the ED, with 1200cc serous fluid drainage after placement.    EKG: sinus tachycardia   CXR: Moderately large right hydropneumothorax resulting in partial right lung collapse and slight cardiomediastinal shift to the left.  CT abd/pelvis: Partial visualization of a large right hydropneumothorax with mild cardiomediastinal shift to the left.  Interval development of sacral decubitus ulcer adjacent to the right ischial tuberosity. Scattered foci of soft tissue gas. Underlying osteomyelitis cannot be excluded. If strong clinical concern persists, nuclear HIDA scan or MRI are more sensitive. No discrete focal drainable collection. No small bowel obstruction.  Cecum is air filled. Gallstones. Diffuse osseous metastases. (08 May 2020 03:24)      PAST MEDICAL & SURGICAL HISTORY:  Hypertension  Multiple sclerosis  S/P mastectomy, right  Breast CA  Osteoporosis  MS (mitral stenosis)  History of bowel resection  H/O breast surgery      Antimicrobials  piperacillin/tazobactam IVPB.. 3.375 Gram(s) IV Intermittent every 8 hours  vancomycin  IVPB 750 milliGRAM(s) IV Intermittent every 24 hours      Immunological      Other  acetaminophen   Tablet .. 650 milliGRAM(s) Oral every 6 hours PRN  collagenase Ointment 1 Application(s) Topical daily  enoxaparin Injectable 30 milliGRAM(s) SubCutaneous daily  latanoprost 0.005% Ophthalmic Solution 1 Drop(s) Both EYES at bedtime  letrozole 2.5 milliGRAM(s) Oral daily  lisinopril 20 milliGRAM(s) Oral daily  ondansetron   Disintegrating Tablet 4 milliGRAM(s) Oral every 6 hours PRN  sodium chloride 0.9%. 1000 milliLiter(s) IV Continuous <Continuous>      Allergies    Sudafed (Other)    Intolerances        SOCIAL HISTORY:  Social History:  Lives at home with son, ADLs dependent upon son, bedbound  Tobacco: Denies  EtOH: denies  Recreational drug use: Denies  Occupation: Real estate (retired)  Ambulates: non-ambulatory  ADLs: requires assistance  Vaccines: declines flu, has received PNA, cannot receive shingles due to immunocompromised status (08 May 2020 03:24)      FAMILY HISTORY:  FHx: hyperlipidemia      ROS:    limited    Vital Signs Last 24 Hrs  T(C): 36.7 (08 May 2020 09:47), Max: 37.7 (07 May 2020 21:58)  T(F): 98.1 (08 May 2020 09:47), Max: 99.8 (07 May 2020 21:58)  HR: 83 (08 May 2020 09:47) (75 - 110)  BP: 108/74 (08 May 2020 09:47) (67/48 - 116/67)  BP(mean): --  RR: 18 (08 May 2020 09:47) (18 - 25)  SpO2: 98% (08 May 2020 09:47) (93% - 100%)    PE:  WDWN in no distress  HEENT:  NC, PERRL, sclerae anicteric, conjunctivae clear, EOMI.  Sinuses nontender, no nasal exudate.  No buccal or pharyngeal lesions, erythema or exudate  Neck:  Supple, no adenopathy  Lungs:  No accessory muscle use, bilaterally decreased in bases, chest tube on right  Cor:  distant  Abd:  Symmetric, normoactive BS.  Soft, nontender, no masses, guarding or rebound.  Liver and spleen not enlarged  Extrem:  No cyanosis or edema  Skin:  sacral ulcer  Neuro: limited strength worse in LE    LABS:                        8.8    3.23  )-----------( 673      ( 08 May 2020 06:11 )             28.5       WBC Count: 3.23 K/uL (20 @ 06:11)  WBC Count: 4.15 K/uL (20 @ 23:09)    Band Neutrophils %: 16.0 % (20 @ 23:09)    Auto Eosinophil %: 0.0 % (20 @ 06:11)          133<L>  |  104  |  28<H>  ----------------------------<  154<H>  5.0   |  20<L>  |  1.20    Ca    7.6<L>      08 May 2020 06:11    TPro  6.8  /  Alb  1.6<L>  /  TBili  0.9  /  DBili  x   /  AST  28  /  ALT  24  /  AlkPhos  102        Creatinine, Serum: 1.20 mg/dL (20 @ 06:11)  Creatinine, Serum: 1.50 mg/dL (20 @ 23:09)      Urinalysis Basic - ( 08 May 2020 01:07 )    Color: Yellow / Appearance: Turbid / S.025 / pH: x  Gluc: x / Ketone: Trace  / Bili: Moderate / Urobili: 8   Blood: x / Protein: 30 mg/dL / Nitrite: Positive   Leuk Esterase: Moderate / RBC: 3-5 /HPF / WBC >50   Sq Epi: x / Non Sq Epi: Occasional / Bacteria: TNTC    MICROBIOLOGY:      RADIOLOGY & ADDITIONAL STUDIES:    --

## 2020-05-08 NOTE — PROGRESS NOTE ADULT - SUBJECTIVE AND OBJECTIVE BOX
This progress note was completed in part by resident acting as telephonic scribe during COVID-19 crisis. Physical exam and review of systems was completed by attending physician, and findings below are those of the attending physician, and have been reviewed in detail.    Patient is a 69y old  Female who presents with a chief complaint of hydropneumothorax (08 May 2020 03:24)    INTERVAL HPI/OVERNIGHT EVENTS: Pt seen and examined at bedside. BP running soft this AM, on gentle IVF and s/p 2L bolus in ED. 1300 mL drainage from chest tube prior to transfer to floor, per ED RN.    T(C): 36.4 (20 @ 07:40), Max: 37.7 (20 @ 21:58)  HR: 77 (20 @ 07:40) (75 - 110)  BP: 94/71 (20 @ 07:40) (67/48 - 116/67)  RR: 19 (20 @ 07:40) (19 - 25)  SpO2: 100% (20 @ 07:40) (93% - 100%)  Wt(kg): --        LABS:                        8.8    3.23  )-----------( 673      ( 08 May 2020 06:11 )             28.5     05-08    133<L>  |  104  |  28<H>  ----------------------------<  154<H>  5.0   |  20<L>  |  1.20    Ca    7.6<L>      08 May 2020 06:11    TPro  6.8  /  Alb  1.6<L>  /  TBili  0.9  /  DBili  x   /  AST  28  /  ALT  24  /  AlkPhos  102  05-07      Urinalysis Basic - ( 08 May 2020 01:07 )  Color: Yellow / Appearance: Turbid / S.025 / pH: x  Gluc: x / Ketone: Trace  / Bili: Moderate / Urobili: 8   Blood: x / Protein: 30 mg/dL / Nitrite: Positive   Leuk Esterase: Moderate / RBC: 3-5 /HPF / WBC >50   Sq Epi: x / Non Sq Epi: Occasional / Bacteria: TNTC      MEDICATIONS  (STANDING):  collagenase Ointment 1 Application(s) Topical daily  enoxaparin Injectable 30 milliGRAM(s) SubCutaneous daily  interferon beta-1a Injectable (AVONEX) 30 MICROGram(s) IntraMuscular every 7 days  latanoprost 0.005% Ophthalmic Solution 1 Drop(s) Both EYES at bedtime  letrozole 2.5 milliGRAM(s) Oral daily  lisinopril 20 milliGRAM(s) Oral daily  piperacillin/tazobactam IVPB.. 3.375 Gram(s) IV Intermittent every 8 hours  ribociclib 200 milliGRAM(s) Oral daily  sodium chloride 0.9%. 1000 milliLiter(s) (50 mL/Hr) IV Continuous <Continuous>  vancomycin  IVPB 750 milliGRAM(s) IV Intermittent every 24 hours    MEDICATIONS  (PRN):  ondansetron   Disintegrating Tablet 4 milliGRAM(s) Oral every 6 hours PRN Nausea and/or Vomiting      REVIEW OF SYSTEMS:  CONSTITUTIONAL: No fever, weight loss, +fatigue  EYES: No eye pain, visual disturbances, or discharge  ENMT: No difficulty hearing, tinnitus, vertigo; No sinus or throat pain  NECK: No pain or stiffness  RESPIRATORY: No cough, wheezing, chills or hemoptysis; No shortness of breath  CARDIOVASCULAR: No chest pain, palpitations, dizziness, or leg swelling  GASTROINTESTINAL: No abdominal or epigastric pain. No nausea, vomiting, or hematemesis; No diarrhea or constipation; No melena or hematochezia  GENITOURINARY: No dysuria, frequency, hematuria, or incontinence  NEUROLOGICAL: No headaches, memory loss, loss of strength, numbness, or tremors  SKIN: No itching, burning, rashes, or lesions   MUSCULOSKELETAL: No joint pain or swelling; No muscle, back, or extremity pain  HEME/LYMPH: No easy bruising, or bleeding gums      PHYSICAL EXAM:  GENERAL: NAD, elderly, frail  HEAD: Atraumatic, Normocephalic  EYES: EOMI, PERRL, conjunctiva and sclera clear  ENMT: Moist mucous membranes  NECK: Supple, No JVD  NERVOUS SYSTEM: Alert & Oriented X3, Good concentration; Motor strength testing intact in B/L upper and lower extremities  CHEST/LUNG: Clear to percussion bilaterally; No rales, rhonchi, wheezing, or rubs; +R chest tube  HEART: Regular rate and rhythm; No murmurs, rubs, or gallops  ABDOMEN: Soft, Nontender, Nondistended; Bowel sounds present  EXTREMITIES: 2+ Peripheral Pulses, No clubbing, cyanosis, or edema  LYMPH: No lymphadenopathy noted  SKIN: Warm, dry, well-perfused      RADIOLOGY & ADDITIONAL TESTS:    Imaging Personally Reviewed:  [ x ] YES  [ ] NO    Consultant(s) Notes Reviewed:  [ x ] YES  [ ] NO    Care Discussed with Consultants/Other Providers [ x ] YES  [ ] NO This progress note was completed in part by resident acting as telephonic scribe during COVID-19 crisis. Physical exam and review of systems was completed by attending physician, and findings below are those of the attending physician, and have been reviewed in detail.    Patient is a 69y old  Female who presents with a chief complaint of hydropneumothorax (08 May 2020 03:24)    INTERVAL HPI/OVERNIGHT EVENTS: Pt seen and examined at bedside. BP running soft this AM, on gentle IVF and s/p 2L bolus in ED. 1300 mL drainage from chest tube prior to transfer to floor, per ED RN.    T(C): 36.4 (20 @ 07:40), Max: 37.7 (20 @ 21:58)  HR: 77 (20 @ 07:40) (75 - 110)  BP: 94/71 (20 @ 07:40) (67/48 - 116/67)  RR: 19 (20 @ 07:40) (19 - 25)  SpO2: 100% (20 @ 07:40) (93% - 100%)  Wt(kg): --        LABS:                        8.8    3.23  )-----------( 673      ( 08 May 2020 06:11 )             28.5     05-08    133<L>  |  104  |  28<H>  ----------------------------<  154<H>  5.0   |  20<L>  |  1.20    Ca    7.6<L>      08 May 2020 06:11    TPro  6.8  /  Alb  1.6<L>  /  TBili  0.9  /  DBili  x   /  AST  28  /  ALT  24  /  AlkPhos  102  05-07      Urinalysis Basic - ( 08 May 2020 01:07 )  Color: Yellow / Appearance: Turbid / S.025 / pH: x  Gluc: x / Ketone: Trace  / Bili: Moderate / Urobili: 8   Blood: x / Protein: 30 mg/dL / Nitrite: Positive   Leuk Esterase: Moderate / RBC: 3-5 /HPF / WBC >50   Sq Epi: x / Non Sq Epi: Occasional / Bacteria: TNTC      MEDICATIONS  (STANDING):  collagenase Ointment 1 Application(s) Topical daily  enoxaparin Injectable 30 milliGRAM(s) SubCutaneous daily  interferon beta-1a Injectable (AVONEX) 30 MICROGram(s) IntraMuscular every 7 days  latanoprost 0.005% Ophthalmic Solution 1 Drop(s) Both EYES at bedtime  letrozole 2.5 milliGRAM(s) Oral daily  lisinopril 20 milliGRAM(s) Oral daily  piperacillin/tazobactam IVPB.. 3.375 Gram(s) IV Intermittent every 8 hours  ribociclib 200 milliGRAM(s) Oral daily  sodium chloride 0.9%. 1000 milliLiter(s) (50 mL/Hr) IV Continuous <Continuous>  vancomycin  IVPB 750 milliGRAM(s) IV Intermittent every 24 hours    MEDICATIONS  (PRN):  ondansetron   Disintegrating Tablet 4 milliGRAM(s) Oral every 6 hours PRN Nausea and/or Vomiting      REVIEW OF SYSTEMS:  CONSTITUTIONAL: No fever, weight loss, +fatigue  EYES: No eye pain, visual disturbances, or discharge  ENMT: No difficulty hearing, tinnitus, vertigo; No sinus or throat pain  NECK: No pain or stiffness  RESPIRATORY: No cough, wheezing, chills or hemoptysis; No shortness of breath  CARDIOVASCULAR: No chest pain, palpitations, dizziness, or leg swelling  GASTROINTESTINAL: No abdominal or epigastric pain. No nausea, vomiting, or hematemesis; No diarrhea or constipation; No melena or hematochezia  GENITOURINARY: No dysuria, frequency, hematuria, or incontinence  NEUROLOGICAL: No headaches, memory loss, loss of strength, numbness, or tremors  SKIN: No itching, burning, rashes, or lesions   MUSCULOSKELETAL: No joint pain or swelling; No muscle, back, or extremity pain  HEME/LYMPH: No easy bruising, or bleeding gums      PHYSICAL EXAM:  GENERAL: NAD, elderly, frail  HEAD: Atraumatic, Normocephalic  EYES: EOMI, PERRL, conjunctiva and sclera clear  ENMT: Moist mucous membranes  NECK: Supple, No JVD  NERVOUS SYSTEM: Alert & Oriented X3, Good concentration; Motor strength testing intact in B/L upper and lower extremities  CHEST/LUNG: Clear to percussion bilaterally; No rales, rhonchi, wheezing, or rubs; +R chest tube  HEART: Regular rate and rhythm; No murmurs, rubs, or gallops  ABDOMEN: Soft, Nontender, Nondistended; Bowel sounds present  EXTREMITIES: 2+ Peripheral Pulses, No clubbing, cyanosis, or edema  LYMPH: No lymphadenopathy noted  SKIN: Warm, dry, well-perfused, R sacral decub stage IV, R knee multiple small unstageable ulcers      RADIOLOGY & ADDITIONAL TESTS:    Imaging Personally Reviewed:  [ x ] YES  [ ] NO    Consultant(s) Notes Reviewed:  [ x ] YES  [ ] NO    Care Discussed with Consultants/Other Providers [ x ] YES  [ ] NO

## 2020-05-08 NOTE — PROGRESS NOTE ADULT - ASSESSMENT
69 year old F non-ambulatory with PMH of multiple sclerosis, Breast Cancer s/p R mastectomy, Osteoporosis, Mitral stenosis, right ankle fracture, chronic sacral ulcer, chronic midline back pain that has been present for past few months since patient was dropped from a [employer] lift (7/17/2019) presented with nausea and vomiting, admitted for severe sepsis, UTI, and pneumothorax s/p R chest tube placement on 5/8/2020. 69 year old F non-ambulatory with PMH of multiple sclerosis, Breast Cancer s/p R mastectomy, Osteoporosis, Mitral stenosis, right ankle fracture, chronic sacral ulcer, chronic midline back pain that has been present for past few months since patient was dropped from a [employer] lift (7/17/2019) presented with nausea and vomiting, admitted for severe sepsis - unclear if 2/2 UTI vs. sacral decub, and pneumothorax s/p R chest tube placement on 5/8/2020.

## 2020-05-08 NOTE — H&P ADULT - PROBLEM SELECTOR PLAN 6
Chronic, stable  -Patient on lisinopril at home, continue with hold parameters  -Routine hemodynamic monitoring

## 2020-05-08 NOTE — CONSULT NOTE ADULT - ASSESSMENT
69 year old F non-ambulatory with pmhx significant for multiple sclerosis, Breast Cancer s/p R mastectomy, Osteoporosis, Mitral stenosis, right ankle fracture, chronic sacral ulcer, chronic midline back pain add with nausea and vomiting. CXR: Moderately large right hydropneumothorax resulting in partial right lung collapse and slight cardiomediastinal shift to the left. large right hydropneumothorax sacral decubitus ulcer adjacent to the right ischial tuberosity. Scattered foci of soft tissue gas. Underlying osteomyelitis cannot be excluded. Diffuse osseous metastases.

## 2020-05-08 NOTE — CONSULT NOTE ADULT - SUBJECTIVE AND OBJECTIVE BOX
Chief Complaint: Large right ischium pressure ulcer    HPI:  Several month history of right ischium pressure ulcer  PAST MEDICAL & SURGICAL HISTORY:  Hypertension  Multiple sclerosis  S/P mastectomy, right  Breast CA  Osteoporosis  MS (mitral stenosis)  History of bowel resection  H/O breast surgery      Allergies    Sudafed (Other)    Intolerances        MEDICATIONS  (STANDING):  collagenase Ointment 1 Application(s) Topical daily  enoxaparin Injectable 30 milliGRAM(s) SubCutaneous daily  latanoprost 0.005% Ophthalmic Solution 1 Drop(s) Both EYES at bedtime  letrozole 2.5 milliGRAM(s) Oral daily  lisinopril 20 milliGRAM(s) Oral daily  piperacillin/tazobactam IVPB.. 3.375 Gram(s) IV Intermittent every 8 hours  sodium chloride 0.9%. 1000 milliLiter(s) (50 mL/Hr) IV Continuous <Continuous>  vancomycin  IVPB 750 milliGRAM(s) IV Intermittent every 24 hours    MEDICATIONS  (PRN):  acetaminophen   Tablet .. 650 milliGRAM(s) Oral every 6 hours PRN Temp greater or equal to 38C (100.4F), Mild Pain (1 - 3)  ondansetron   Disintegrating Tablet 4 milliGRAM(s) Oral every 6 hours PRN Nausea and/or Vomiting      FAMILY HISTORY:  FHx: hyperlipidemia          ROS:  CONSTITUTIONAL: No fever, weight loss, or fatigue  EYES: No eye pain, visual disturbances, or discharge  ENMT:  No difficulty hearing, tinnitus, vertigo; No sinus or throat pain  NECK: No pain or stiffness  BREASTS: No pain, masses, or nipple discharge  RESPIRATORY: No cough, wheezing, chills or hemoptysis; No shortness of breath  CARDIOVASCULAR: No chest pain, palpitations, dizziness, or leg swelling  GASTROINTESTINAL: No abdominal or epigastric pain. No nausea, vomiting, or hematemesis; No diarrhea or constipation. No melena or hematochezia.  GENITOURINARY: No dysuria, frequency, hematuria, or incontinence  NEUROLOGICAL: No headaches, memory loss, loss of strength, numbness, or tremors  SKIN: No itching, burning, rashes, or lesions   LYMPH NODES: No enlarged glands  ENDOCRINE: No heat or cold intolerance; No hair loss  MUSCULOSKELETAL: No joint pain or swelling; No muscle, back, or extremity pain  PSYCHIATRIC: No depression, anxiety, mood swings, or difficulty sleeping  HEME/LYMPH: No easy bruising, or bleeding gums  ALLERGY AND IMMUNOLOGIC: No hives or eczema    PHYSICAL EXAM-    Height (cm): 167.64 (05-07 @ 21:53)  Weight (kg): 49.9 (05-07 @ 21:53)  BMI (kg/m2): 17.8 (05-07 @ 21:53)  Vital Signs Last 24 Hrs  T(C): 36.7 (08 May 2020 09:47), Max: 37.7 (07 May 2020 21:58)  T(F): 98.1 (08 May 2020 09:47), Max: 99.8 (07 May 2020 21:58)  HR: 83 (08 May 2020 09:47) (75 - 110)  BP: 108/74 (08 May 2020 09:47) (67/48 - 116/67)  BP(mean): --  RR: 18 (08 May 2020 09:47) (18 - 25)  SpO2: 98% (08 May 2020 09:47) (93% - 100%)    Constitutional: well developed, well nourished, no apparent distress, alert, oriented x 3.  Neck: Supple   Pulmonary: no respiratory distress, normal respiratory rhythm and effort, lungs are clear to auscultation/percussion. No CVA tenderness.  Cardiovascular: heart rate normal, normal sinus rhythm; no murmurs, gallops, rubs, heaves or thrills   Abdomen: soft, non-tender, +BS, no guarding/rebound/rigidity.  Vascular: Lower extremities are well perfused.   Extremities: Contracted   Skin: Large stage 4 right ischium ulcer base is red with areas of slough, no acute infection, periwound skin is intact, no cellulitis, patient has multiple small unstagable pressure ulcers on her right knee and left keg they are covered with dry black eschar with no sign of infection.                             8.8    3.23  )-----------( 673      ( 08 May 2020 06:11 )             28.5     05-08    133<L>  |  104  |  28<H>  ----------------------------<  154<H>  5.0   |  20<L>  |  1.20    Ca    7.6<L>      08 May 2020 06:11    TPro  6.8  /  Alb  1.6<L>  /  TBili  0.9  /  DBili  x   /  AST  28  /  ALT  24  /  AlkPhos  102  05-07      Radiology:

## 2020-05-08 NOTE — H&P ADULT - HISTORY OF PRESENT ILLNESS
69 year old F non-ambulatory with pmhx significant for multiple sclerosis, Breast Cancer s/p R mastectomy, Osteoporosis, Mitral stenosis, right ankle fracture, chronic sacral ulcer, chronic midline back pain that has been present for past few months since patient was dropped from a [facility] (7/17/2019) presenting with nausea and vomiting.  Patient states that today she developed persistent NBNB emesis with no BM or flatus for past few days.  Patient denies abdominal pain, no fevers, no chills.  Patient also admits to some SOB.    Of note, patient admitted 10/9/2019 for lower back and bilateral knee pain, found to have compression fracture of L2 with imaging subsequently found to have multiple lytic lesions on the spine and pelvis.  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 Matthew Fairbanks.    In the ED, T 97.8F, , BP 67/48 --> 116/67, RR 25, SpO2 93% on RA --> 100% on NRB.  Labs significant for WBC 4.15, H/H 9.6/30.9, plt 869, band 16%, BUN/Cr 28/1.5, glucose 149, lactate 5.1, lipase 44, UA grossly positive, COVID negative.  Patient received zofran x1, vanco x1, fentanyl 50mg IVP x1, zosyn x1, NS bolus x2 in the ED.  Patient had chest tube placed in the ED, with 1200cc serous fluid drainage after placement.    EKG: ___  CXR: Moderately large right hydropneumothorax resulting in partial right lung collapse and slight cardiomediastinal shift to the left.  CT abd/pelvis: Partial visualization of a large right hydropneumothorax with mild cardiomediastinal shift to the left.  Interval development of sacral decubitus ulcer adjacent to the right ischial tuberosity. Scattered foci of soft tissue gas. Underlying osteomyelitis cannot be excluded. If strong clinical concern persists, nuclear HIDA scan or MRI are more sensitive. No discrete focal drainable collection. No small bowel obstruction.  Cecum is air filled. Gallstones. Diffuse osseous metastases. 69 year old F non-ambulatory with pmhx significant for multiple sclerosis, Breast Cancer s/p R mastectomy, Osteoporosis, Mitral stenosis, right ankle fracture, chronic sacral ulcer, chronic midline back pain that has been present for past few months since patient was dropped from a [facility] (7/17/2019) presenting with nausea and vomiting.  Patient states that today she developed persistent NBNB emesis with no BM or flatus for past few days.  Patient denies abdominal pain, no fevers, no chills.  Patient also admits to some SOB.    Of note, patient admitted 10/9/2019 for lower back and bilateral knee pain, found to have compression fracture of L2 with imaging subsequently found to have multiple lytic lesions on the spine and pelvis.  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 Matthew Fairbanks.    In the ED, T 97.8F, , BP 67/48 --> 116/67, RR 25, SpO2 93% on RA --> 100% on NRB.  Labs significant for WBC 4.15, H/H 9.6/30.9 (baseline 9-10), plt 869, band 16%, BUN/Cr 28/1.5 (baseline Cr 0.8-0.9), glucose 149, lactate 5.1, lipase 44, UA grossly positive, COVID negative.  Patient received zofran x1, vanco x1, fentanyl 50mg IVP x1, zosyn x1, NS bolus x2 in the ED.  Patient had chest tube placed in the ED, with 1200cc serous fluid drainage after placement.    EKG: ___  CXR: Moderately large right hydropneumothorax resulting in partial right lung collapse and slight cardiomediastinal shift to the left.  CT abd/pelvis: Partial visualization of a large right hydropneumothorax with mild cardiomediastinal shift to the left.  Interval development of sacral decubitus ulcer adjacent to the right ischial tuberosity. Scattered foci of soft tissue gas. Underlying osteomyelitis cannot be excluded. If strong clinical concern persists, nuclear HIDA scan or MRI are more sensitive. No discrete focal drainable collection. No small bowel obstruction.  Cecum is air filled. Gallstones. Diffuse osseous metastases. 69 year old F non-ambulatory with pmhx significant for multiple sclerosis, Breast Cancer s/p R mastectomy, Osteoporosis, Mitral stenosis, right ankle fracture, chronic sacral ulcer, chronic midline back pain that has been present for past few months since patient was dropped from a [facility] (7/17/2019) presenting with nausea and vomiting.  Patient states that today she developed persistent NBNB emesis for past two weeks. Has been taking an anti-nausea medication with some relief. Exacerbated by eating so patient states that she has not been eating for the past two weeks. Admits loss of appetite as well. Last BM was 3 days ago. Denies constipation. Admits to some abdominal soreness but denies abd pain. No fevers, chills. Currently not nauseous. Patient unable to elaborate on specific details of history and states that she is tired.    Of note, patient admitted 10/9/2019 for lower back and bilateral knee pain, found to have compression fracture of L2 with imaging subsequently found to have multiple lytic lesions on the spine and pelvis.  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 Matthew Fairbanks.    In the ED, T 97.8F, , BP 67/48 --> 116/67, RR 25, SpO2 93% on RA --> 100% on NRB.  Labs significant for WBC 4.15, H/H 9.6/30.9 (baseline 9-10), plt 869, band 16%, BUN/Cr 28/1.5 (baseline Cr 0.8-0.9), glucose 149, lactate 5.1, lipase 44, UA grossly positive, COVID negative.  Patient received zofran x1, vanco x1, fentanyl 50mg IVP x1, zosyn x1, NS bolus x2 in the ED.  Patient had chest tube placed in the ED, with 1200cc serous fluid drainage after placement.    EKG: sinus tachycardia   CXR: Moderately large right hydropneumothorax resulting in partial right lung collapse and slight cardiomediastinal shift to the left.  CT abd/pelvis: Partial visualization of a large right hydropneumothorax with mild cardiomediastinal shift to the left.  Interval development of sacral decubitus ulcer adjacent to the right ischial tuberosity. Scattered foci of soft tissue gas. Underlying osteomyelitis cannot be excluded. If strong clinical concern persists, nuclear HIDA scan or MRI are more sensitive. No discrete focal drainable collection. No small bowel obstruction.  Cecum is air filled. Gallstones. Diffuse osseous metastases.

## 2020-05-09 DIAGNOSIS — J94.8 OTHER SPECIFIED PLEURAL CONDITIONS: ICD-10-CM

## 2020-05-09 DIAGNOSIS — D64.9 ANEMIA, UNSPECIFIED: ICD-10-CM

## 2020-05-09 LAB
ANION GAP SERPL CALC-SCNC: 13 MMOL/L — SIGNIFICANT CHANGE UP (ref 5–17)
BASOPHILS # BLD AUTO: 0 K/UL — SIGNIFICANT CHANGE UP (ref 0–0.2)
BASOPHILS NFR BLD AUTO: 0 % — SIGNIFICANT CHANGE UP (ref 0–2)
BUN SERPL-MCNC: 28 MG/DL — HIGH (ref 7–23)
CALCIUM SERPL-MCNC: 7.1 MG/DL — LOW (ref 8.5–10.1)
CHLORIDE SERPL-SCNC: 105 MMOL/L — SIGNIFICANT CHANGE UP (ref 96–108)
CO2 SERPL-SCNC: 17 MMOL/L — LOW (ref 22–31)
CREAT SERPL-MCNC: 0.97 MG/DL — SIGNIFICANT CHANGE UP (ref 0.5–1.3)
CULTURE RESULTS: SIGNIFICANT CHANGE UP
EOSINOPHIL # BLD AUTO: 0 K/UL — SIGNIFICANT CHANGE UP (ref 0–0.5)
EOSINOPHIL NFR BLD AUTO: 0 % — SIGNIFICANT CHANGE UP (ref 0–6)
FERRITIN SERPL-MCNC: 808 NG/ML — HIGH (ref 15–150)
FOLATE SERPL-MCNC: 3.9 NG/ML — LOW
GLUCOSE SERPL-MCNC: 122 MG/DL — HIGH (ref 70–99)
HCT VFR BLD CALC: 22.8 % — LOW (ref 34.5–45)
HCT VFR BLD CALC: 23.2 % — LOW (ref 34.5–45)
HGB BLD-MCNC: 7.3 G/DL — LOW (ref 11.5–15.5)
HGB BLD-MCNC: 7.5 G/DL — LOW (ref 11.5–15.5)
IRON SATN MFR SERPL: 15 UG/DL — LOW (ref 30–160)
IRON SATN MFR SERPL: 9 % — LOW (ref 14–50)
LYMPHOCYTES # BLD AUTO: 1.29 K/UL — SIGNIFICANT CHANGE UP (ref 1–3.3)
LYMPHOCYTES # BLD AUTO: 26 % — SIGNIFICANT CHANGE UP (ref 13–44)
MCHC RBC-ENTMCNC: 26.6 PG — LOW (ref 27–34)
MCHC RBC-ENTMCNC: 32 GM/DL — SIGNIFICANT CHANGE UP (ref 32–36)
MCV RBC AUTO: 83.2 FL — SIGNIFICANT CHANGE UP (ref 80–100)
MONOCYTES # BLD AUTO: 0.4 K/UL — SIGNIFICANT CHANGE UP (ref 0–0.9)
MONOCYTES NFR BLD AUTO: 8 % — SIGNIFICANT CHANGE UP (ref 2–14)
NEUTROPHILS # BLD AUTO: 3.27 K/UL — SIGNIFICANT CHANGE UP (ref 1.8–7.4)
NEUTROPHILS NFR BLD AUTO: 57 % — SIGNIFICANT CHANGE UP (ref 43–77)
NRBC # BLD: SIGNIFICANT CHANGE UP /100 WBCS (ref 0–0)
PLATELET # BLD AUTO: 678 K/UL — HIGH (ref 150–400)
POTASSIUM SERPL-MCNC: 4.4 MMOL/L — SIGNIFICANT CHANGE UP (ref 3.5–5.3)
POTASSIUM SERPL-SCNC: 4.4 MMOL/L — SIGNIFICANT CHANGE UP (ref 3.5–5.3)
RBC # BLD: 2.74 M/UL — LOW (ref 3.8–5.2)
RBC # FLD: 18.4 % — HIGH (ref 10.3–14.5)
SODIUM SERPL-SCNC: 135 MMOL/L — SIGNIFICANT CHANGE UP (ref 135–145)
SPECIMEN SOURCE: SIGNIFICANT CHANGE UP
TIBC SERPL-MCNC: 156 UG/DL — LOW (ref 220–430)
TRANSFERRIN SERPL-MCNC: 88 MG/DL — LOW (ref 200–360)
UIBC SERPL-MCNC: 141 UG/DL — SIGNIFICANT CHANGE UP (ref 110–370)
VIT B12 SERPL-MCNC: >2000 PG/ML — HIGH (ref 232–1245)
WBC # BLD: 4.95 K/UL — SIGNIFICANT CHANGE UP (ref 3.8–10.5)
WBC # FLD AUTO: 4.95 K/UL — SIGNIFICANT CHANGE UP (ref 3.8–10.5)

## 2020-05-09 PROCEDURE — 71250 CT THORAX DX C-: CPT | Mod: 26

## 2020-05-09 RX ORDER — CASPOFUNGIN ACETATE 7 MG/ML
INJECTION, POWDER, LYOPHILIZED, FOR SOLUTION INTRAVENOUS
Refills: 0 | Status: DISCONTINUED | OUTPATIENT
Start: 2020-05-09 | End: 2020-05-11

## 2020-05-09 RX ORDER — SODIUM CHLORIDE 9 MG/ML
1000 INJECTION INTRAMUSCULAR; INTRAVENOUS; SUBCUTANEOUS
Refills: 0 | Status: DISCONTINUED | OUTPATIENT
Start: 2020-05-09 | End: 2020-05-10

## 2020-05-09 RX ORDER — CASPOFUNGIN ACETATE 7 MG/ML
50 INJECTION, POWDER, LYOPHILIZED, FOR SOLUTION INTRAVENOUS EVERY 24 HOURS
Refills: 0 | Status: DISCONTINUED | OUTPATIENT
Start: 2020-05-10 | End: 2020-05-11

## 2020-05-09 RX ORDER — CASPOFUNGIN ACETATE 7 MG/ML
70 INJECTION, POWDER, LYOPHILIZED, FOR SOLUTION INTRAVENOUS ONCE
Refills: 0 | Status: COMPLETED | OUTPATIENT
Start: 2020-05-09 | End: 2020-05-09

## 2020-05-09 RX ADMIN — Medication 1 APPLICATION(S): at 10:48

## 2020-05-09 RX ADMIN — LATANOPROST 1 DROP(S): 0.05 SOLUTION/ DROPS OPHTHALMIC; TOPICAL at 20:57

## 2020-05-09 RX ADMIN — Medication 500 MILLIGRAM(S): at 11:44

## 2020-05-09 RX ADMIN — PIPERACILLIN AND TAZOBACTAM 25 GRAM(S): 4; .5 INJECTION, POWDER, LYOPHILIZED, FOR SOLUTION INTRAVENOUS at 18:16

## 2020-05-09 RX ADMIN — CASPOFUNGIN ACETATE 260 MILLIGRAM(S): 7 INJECTION, POWDER, LYOPHILIZED, FOR SOLUTION INTRAVENOUS at 13:24

## 2020-05-09 RX ADMIN — ENOXAPARIN SODIUM 30 MILLIGRAM(S): 100 INJECTION SUBCUTANEOUS at 11:44

## 2020-05-09 RX ADMIN — Medication 250 MILLIGRAM(S): at 00:43

## 2020-05-09 RX ADMIN — PIPERACILLIN AND TAZOBACTAM 25 GRAM(S): 4; .5 INJECTION, POWDER, LYOPHILIZED, FOR SOLUTION INTRAVENOUS at 11:34

## 2020-05-09 RX ADMIN — PIPERACILLIN AND TAZOBACTAM 25 GRAM(S): 4; .5 INJECTION, POWDER, LYOPHILIZED, FOR SOLUTION INTRAVENOUS at 03:41

## 2020-05-09 RX ADMIN — LETROZOLE 2.5 MILLIGRAM(S): 2.5 TABLET, FILM COATED ORAL at 11:44

## 2020-05-09 RX ADMIN — Medication 1 TABLET(S): at 11:42

## 2020-05-09 NOTE — PROGRESS NOTE ADULT - PROBLEM SELECTOR PLAN 8
Chronic, advanced in recent months as patient having frequent falls in 3/2019 per chart review, now has been non-ambulatory  - Holding Interferon

## 2020-05-09 NOTE — PROGRESS NOTE ADULT - PROBLEM SELECTOR PLAN 1
noting multiple isolates in pleural fluid including fungal elements, will add caspofungin and order serum ag tests

## 2020-05-09 NOTE — CONSULT NOTE ADULT - PROBLEM SELECTOR RECOMMENDATION 9
s/p chest tube - put out 1200 cc on insertion - ER provider note reviewed  ID eval noted - covered for fungal and bacterial pathogens - final Cx pending  hx of Breast Ca -   hx of trauma - fall from [facility] -   etiol of HydroPTX - unclear at present - fluid studies pending - initial eval note - likely exudative - although fluid pH is not documented   will check Dedicated CT chest  pt needs to use Incentive Jeffy  will check VBG - pt with MS - to eval co2 retention and hypoventilation  will follow  pleurovac and CT in place
Collagenase, dry packing
bandemia, clearing of eos and multiple potential sources including urine, sacral ulcer, pulmonary-concur with current broad spectrum abx pending further data

## 2020-05-09 NOTE — CONSULT NOTE ADULT - SUBJECTIVE AND OBJECTIVE BOX
Date/Time Patient Seen:  		  Referring MD:   Data Reviewed	       Patient is a 69y old  Female who presents with a chief complaint of hydropneumothorax (09 May 2020 11:19)      Subjective/HPI     in bed  seen and examined  vs and labs and imaging reviewed    pt with MS  does not ambulate  H and P reviewed  labs reviewed  ER provider note reviewed  CT scan noted  chest tube inserted - 28 [place] size -     69 year old F non-ambulatory with pmhx significant for multiple sclerosis, Breast Cancer s/p R mastectomy, Osteoporosis, Mitral stenosis, right ankle fracture, chronic sacral ulcer, chronic midline back pain that has been present for past few months since patient was dropped from a [facility] (7/17/2019) presenting with nausea and vomiting.  Patient states that today she developed persistent NBNB emesis for past two weeks. Has been taking an anti-nausea medication with some relief. Exacerbated by eating so patient states that she has not been eating for the past two weeks. Admits loss of appetite as well. Last BM was 3 days ago. Denies constipation. Admits to some abdominal soreness but denies abd pain. No fevers, chills. Currently not nauseous. Patient unable to elaborate on specific details of history and states that she is tired.    Of note, patient admitted 10/9/2019 for lower back and bilateral knee pain, found to have compression fracture of L2 with imaging subsequently found to have multiple lytic lesions on the spine and pelvis.  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 Matthew Fairbanks.    In the ED, T 97.8F, , BP 67/48 --> 116/67, RR 25, SpO2 93% on RA --> 100% on NRB.  Labs significant for WBC 4.15, H/H 9.6/30.9 (baseline 9-10), plt 869, band 16%, BUN/Cr 28/1.5 (baseline Cr 0.8-0.9), glucose 149, lactate 5.1, lipase 44, UA grossly positive, COVID negative.  Patient received zofran x1, vanco x1, fentanyl 50mg IVP x1, zosyn x1, NS bolus x2 in the ED.  Patient had chest tube placed in the ED, with 1200cc serous fluid drainage after placement.    EKG: sinus tachycardia   CXR: Moderately large right hydropneumothorax resulting in partial right lung collapse and slight cardiomediastinal shift to the left.  CT abd/pelvis: Partial visualization of a large right hydropneumothorax with mild cardiomediastinal shift to the left.  Interval development of sacral decubitus ulcer adjacent to the right ischial tuberosity. Scattered foci of soft tissue gas. Underlying osteomyelitis cannot be excluded. If strong clinical concern persists, nuclear HIDA scan or MRI are more sensitive. No discrete focal drainable collection. No small bowel obstruction.  Cecum is air filled. Gallstones. Diffuse osseous metastases.    Hospital Course:  Admission Date	08-May-2020 03:22  Reason for Admission	hydropneumothorax  	  Hospital Course	  HPI:  69 year old F non-ambulatory with PMHx significant for multiple sclerosis, Breast Cancer s/p R mastectomy, Osteoporosis, Mitral stenosis, right ankle fracture, chronic sacral ulcer, chronic midline back pain that has been present for past few months since patient was dropped from a [facility] (7/17/2019) presenting with nausea and vomiting.  Patient states that today she developed persistent NBNB emesis for past two weeks. Has been taking an anti-nausea medication with some relief. Exacerbated by eating so patient states that she has not been eating for the past two weeks. Admits loss of appetite as well. Last BM was 3 days ago. Denies constipation. Admits to some abdominal soreness but denies abd pain. No fevers, chills. Currently not nauseous. Patient unable to elaborate on specific details of history and states that she is tired.  Of note, patient admitted 10/9/2019 for lower back and bilateral knee pain, found to have compression fracture of L2 with imaging subsequently found to have multiple lytic lesions on the spine and pelvis.  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 Matthew Fairbanks.    In the ED, T 97.8F, , BP 67/48 --> 116/67, RR 25, SpO2 93% on RA --> 100% on NRB.  Labs significant for WBC 4.15, H/H 9.6/30.9 (baseline 9-10), plt 869, band 16%, BUN/Cr 28/1.5 (baseline Cr 0.8-0.9), glucose 149, lactate 5.1, lipase 44, UA grossly positive, COVID negative.  Patient received zofran x1, vanco x1, fentanyl 50mg IVP x1, zosyn x1, NS bolus x2 in the ED.  Patient had chest tube placed in the ED, with 1200cc serous fluid drainage after placement. EKG: sinus tachycardia   CXR: Moderately large right hydropneumothorax resulting in partial right lung collapse and slight cardiomediastinal shift to the left.  CT abd/pelvis: Partial visualization of a large right hydropneumothorax with mild cardiomediastinal shift to the left.  Interval development of sacral decubitus ulcer adjacent to the right ischial tuberosity. Scattered foci of soft tissue gas. Underlying osteomyelitis cannot be excluded. If strong clinical concern persists, nuclear HIDA scan or MRI are more sensitive. No discrete focal drainable collection. No small bowel obstruction.  Cecum is air filled. Gallstones. Diffuse osseous metastases. (08 May 2020 03:24)  PAST MEDICAL & SURGICAL HISTORY:  Hypertension  Multiple sclerosis  S/P mastectomy, right  Breast CA  Osteoporosis  MS (mitral stenosis)  History of bowel resection  H/O breast surgery        Medication list         MEDICATIONS  (STANDING):  ascorbic acid 500 milliGRAM(s) Oral daily  caspofungin IVPB      collagenase Ointment 1 Application(s) Topical daily  enoxaparin Injectable 30 milliGRAM(s) SubCutaneous daily  latanoprost 0.005% Ophthalmic Solution 1 Drop(s) Both EYES at bedtime  letrozole 2.5 milliGRAM(s) Oral daily  multivitamin/minerals 1 Tablet(s) Oral daily  piperacillin/tazobactam IVPB.. 3.375 Gram(s) IV Intermittent every 8 hours  sodium chloride 0.9%. 1000 milliLiter(s) (50 mL/Hr) IV Continuous <Continuous>  vancomycin  IVPB 750 milliGRAM(s) IV Intermittent every 24 hours    MEDICATIONS  (PRN):  acetaminophen   Tablet .. 650 milliGRAM(s) Oral every 6 hours PRN Temp greater or equal to 38C (100.4F), Mild Pain (1 - 3)  ondansetron   Disintegrating Tablet 4 milliGRAM(s) Oral every 6 hours PRN Nausea and/or Vomiting         Vitals log        ICU Vital Signs Last 24 Hrs  T(C): 37.1 (09 May 2020 13:47), Max: 37.1 (09 May 2020 13:47)  T(F): 98.8 (09 May 2020 13:47), Max: 98.8 (09 May 2020 13:47)  HR: 77 (09 May 2020 13:47) (77 - 93)  BP: 110/68 (09 May 2020 13:47) (96/64 - 110/68)  BP(mean): --  ABP: --  ABP(mean): --  RR: 16 (09 May 2020 13:47) (16 - 19)  SpO2: 97% (09 May 2020 13:47) (94% - 97%)           Input and Output:  I&O's Detail    08 May 2020 07:01  -  09 May 2020 07:00  --------------------------------------------------------  IN:    sodium chloride 0.9%: 650 mL    Solution: 200 mL  Total IN: 850 mL    OUT:    Chest Tube: 2580 mL    Indwelling Catheter - Stomal: 350 mL  Total OUT: 2930 mL    Total NET: -2080 mL          Lab Data                        7.5    x     )-----------( x        ( 09 May 2020 11:57 )             23.2     05-09    135  |  105  |  28<H>  ----------------------------<  122<H>  4.4   |  17<L>  |  0.97    Ca    7.1<L>      09 May 2020 10:48    TPro  6.8  /  Alb  1.6<L>  /  TBili  0.9  /  DBili  x   /  AST  28  /  ALT  24  /  AlkPhos  102  05-07            Review of Systems	  weak      Objective     Physical Examination    heart s1s2  lung dc BS  abd soft  head nc  on room air  verbal      Pertinent Lab findings & Imaging      Tereza:  NO   Adequate UO     I&O's Detail    08 May 2020 07:01  -  09 May 2020 07:00  --------------------------------------------------------  IN:    sodium chloride 0.9%: 650 mL    Solution: 200 mL  Total IN: 850 mL    OUT:    Chest Tube: 2580 mL    Indwelling Catheter - Stomal: 350 mL  Total OUT: 2930 mL    Total NET: -2080 mL               Discussed with:     Cultures:	        Radiology    EXAM:  CT ABDOMEN AND PELVIS IC                            PROCEDURE DATE:  05/08/2020          INTERPRETATION:  Abdominal/Pelvic CT    5/8/2020 1:13 AM    Indication: Nausea and vomiting, history of small bowel obstruction, breast cancer, bowel resection    Technique: Axial images were obtained following IV contrast from the lung bases through pubic symphysis.  95 cc of Omnipaque 350 was administered intravenously without complication and 5 cc was discarded.  Reformatted coronal and sagittal images are submitted.    Comparison: CT of May 15, 2017    FINDINGS:    LUNG BASES: Partial visualization of a large right hydropneumothorax. There is simple pleural fluid and right lung atelectasis.     PERITONEUM:  There is no free air or focal collection.  Trace amount of free fluid.  LIVER: Normal.  SPLEEN: Normal.  GALLBLADDER: Contracted with a small stone in the neck.   BILIARY TREE: Unremarkable.  PANCREAS: Normal.  ADRENAL GLANDS: Normal.  KIDNEYS: Normal.  BOWEL: Moderate hiatal hernia.  The stomach is incompletely distended.  There is no small bowel obstruction, focal bowel wall thickening or diverticulitis. The appendix is not visualized.  Cecum is air filled.    URINARY BLADDER: This is decompressed by Starks catheter.  PELVIC ORGANS: Small calcified fundal fibroid.    There is no significant adenopathy.  VASCULATURE: Unremarkable.  RETROPERITONEUM:  There is no mass.  BONES: Diffuse sclerotic foci throughout the spine, pelvis and ribs compatible with osseous metastases. Moderately severe L2 compression fracture, stable since 10/9/19.  Old fracture of the right superior pubic ramus. There appears to be a right sacral decubitus ulcer results seen in their around the right ischial tuberosity..  ABDOMINAL WALL: Redemonstration of subcutaneous fat stranding involving the right leg.    IMPRESSION:    Partial visualization of a large right hydropneumothorax with mild cardiomediastinal shift to the left.  Findings were discussed with Dr. Sahu at 1:30 am on 5/8/2020 with [facility].  Interval development of sacral decubitus ulcer adjacent to the right ischial tuberosity. Scattered foci of soft tissue gas. Underlying osteomyelitis cannot be excluded. If strong clinical concern persists, nuclear HIDA scan or MRI are more sensitive. No discrete focal drainable collection.  No small bowel obstruction.  Cecum is air filled.  Gallstones.  Diffuse osseous metastases.                RYAN REYNOLDS M.D, ATTENDING RADIOLOGIST  This document has been electronically signed. May  8 2020  1:32AM

## 2020-05-09 NOTE — PROGRESS NOTE ADULT - PROBLEM SELECTOR PLAN 3
Hb slowly dropping - ?unclear if dilutional, related to hemopneumothorax  - F/u repeat H/H, anemia workup

## 2020-05-09 NOTE — PROGRESS NOTE ADULT - ASSESSMENT
69 year old F non-ambulatory with PMH of multiple sclerosis, Breast Cancer s/p R mastectomy, Osteoporosis, Mitral stenosis, right ankle fracture, chronic sacral ulcer, chronic midline back pain that has been present for past few months since patient was dropped from a [facility] (7/17/2019) presented with nausea and vomiting, admitted for severe sepsis - unclear if 2/2 UTI vs. sacral decub, and pneumothorax s/p R chest tube placement on 5/8/2020.

## 2020-05-09 NOTE — PROGRESS NOTE ADULT - SUBJECTIVE AND OBJECTIVE BOX
This progress note was completed in part by resident acting as telephonic scribe during COVID-19 crisis. Physical exam and review of systems was completed by attending physician, and findings below are those of the attending physician, and have been reviewed in detail.    Patient is a 69y old  Female who presents with a chief complaint of hydropneumothorax (08 May 2020 03:24)    INTERVAL HPI/OVERNIGHT EVENTS: Pt seen and examined at bedside. No acute events overnight. Hb 7.3 today - repeat H/H ordered.    Vital Signs Last 24 Hrs  T(C): 36.8 (09 May 2020 05:05), Max: 36.8 (08 May 2020 21:55)  T(F): 98.2 (09 May 2020 05:05), Max: 98.2 (08 May 2020 21:55)  HR: 92 (09 May 2020 05:05) (92 - 93)  BP: 102/69 (09 May 2020 05:05) (96/64 - 102/69)  BP(mean): --  RR: 18 (09 May 2020 05:05) (18 - 19)  SpO2: 94% (09 May 2020 05:05) (94% - 94%)      I&O's Detail    08 May 2020 07:01  -  09 May 2020 07:00  --------------------------------------------------------  IN:    sodium chloride 0.9%.: 650 mL    Solution: 200 mL  Total IN: 850 mL    OUT:    Chest Tube: 2580 mL    Indwelling Catheter - Stomal: 350 mL  Total OUT: 2930 mL                        7.3    x     )-----------( 678      ( 09 May 2020 10:48 )             22.8     09 May 2020 10:48    135    |  105    |  28     ----------------------------<  122    4.4     |  17     |  0.97     Ca    7.1        09 May 2020 10:48    TPro  6.8    /  Alb  1.6    /  TBili  0.9    /  DBili  x      /  AST  28     /  ALT  24     /  AlkPhos  102    07 May 2020 23:09    LIVER FUNCTIONS - ( 07 May 2020 23:09 )  Alb: 1.6 g/dL / Pro: 6.8 g/dL / ALK PHOS: 102 U/L / ALT: 24 U/L / AST: 28 U/L / GGT: x             Urinalysis Basic - ( 08 May 2020 01:07 )  Color: Yellow / Appearance: Turbid / S.025 / pH: x  Gluc: x / Ketone: Trace  / Bili: Moderate / Urobili: 8   Blood: x / Protein: 30 mg/dL / Nitrite: Positive   Leuk Esterase: Moderate / RBC: 3-5 /HPF / WBC >50   Sq Epi: x / Non Sq Epi: Occasional / Bacteria: TNTC      Culture - Body Fluid with Gram Stain (collected 08 May 2020 17:52)  Source: Pleural Fl Pleural Fluid  Gram Stain (08 May 2020 19:18):    Few polymorphonuclear leukocytes per low power field    Rare Gram positive cocci in pairs per oil power field    Rare Gram Variable Rods per oil power field    Rare Yeast per oil power field    Culture - Urine (collected 08 May 2020 09:09)  Source: .Urine Catheterized  Final Report (09 May 2020 08:24):    >=3 organisms. Probable collection contamination.    Culture - Blood (collected 08 May 2020 09:02)  Source: .Blood Blood-Peripheral  Preliminary Report (09 May 2020 10:01):    No growth to date.      MEDICATIONS  (STANDING):  ascorbic acid 500 milliGRAM(s) Oral daily  caspofungin IVPB      caspofungin IVPB 70 milliGRAM(s) IV Intermittent once  collagenase Ointment 1 Application(s) Topical daily  enoxaparin Injectable 30 milliGRAM(s) SubCutaneous daily  latanoprost 0.005% Ophthalmic Solution 1 Drop(s) Both EYES at bedtime  letrozole 2.5 milliGRAM(s) Oral daily  lisinopril 20 milliGRAM(s) Oral daily  multivitamin/minerals 1 Tablet(s) Oral daily  piperacillin/tazobactam IVPB.. 3.375 Gram(s) IV Intermittent every 8 hours  sodium chloride 0.9%. 1000 milliLiter(s) (50 mL/Hr) IV Continuous <Continuous>  vancomycin  IVPB 750 milliGRAM(s) IV Intermittent every 24 hours    MEDICATIONS  (PRN):  acetaminophen   Tablet .. 650 milliGRAM(s) Oral every 6 hours PRN Temp greater or equal to 38C (100.4F), Mild Pain (1 - 3)  ondansetron   Disintegrating Tablet 4 milliGRAM(s) Oral every 6 hours PRN Nausea and/or Vomiting        REVIEW OF SYSTEMS:  CONSTITUTIONAL: No fever, weight loss, +fatigue  EYES: No eye pain, visual disturbances, or discharge  ENMT: No difficulty hearing, tinnitus, vertigo; No sinus or throat pain  NECK: No pain or stiffness  RESPIRATORY: No cough, wheezing, chills or hemoptysis; No shortness of breath  CARDIOVASCULAR: No chest pain, palpitations, dizziness, or leg swelling  GASTROINTESTINAL: No abdominal or epigastric pain. No nausea, vomiting, or hematemesis; No diarrhea or constipation; No melena or hematochezia  GENITOURINARY: No dysuria, frequency, hematuria, or incontinence  NEUROLOGICAL: No headaches, memory loss, loss of strength, numbness, or tremors  SKIN: No itching, burning, rashes, or lesions   MUSCULOSKELETAL: No joint pain or swelling; No muscle, back, or extremity pain  HEME/LYMPH: No easy bruising, or bleeding gums      PHYSICAL EXAM:  GENERAL: NAD, elderly, frail  HEAD: Atraumatic, Normocephalic  EYES: EOMI, PERRL, conjunctiva and sclera clear  ENMT: Moist mucous membranes  NECK: Supple, No JVD  NERVOUS SYSTEM: Alert & Oriented X3, Good concentration; Motor strength testing intact in B/L upper and lower extremities  CHEST/LUNG: Clear to percussion bilaterally; No rales, rhonchi, wheezing, or rubs; +R chest tube  HEART: Regular rate and rhythm; No murmurs, rubs, or gallops  ABDOMEN: Soft, Nontender, Nondistended; Bowel sounds present  EXTREMITIES: 2+ Peripheral Pulses, No clubbing, cyanosis, or edema  LYMPH: No lymphadenopathy noted  SKIN: Warm, dry, well-perfused, R sacral decub stage IV, R knee multiple small unstageable ulcers      RADIOLOGY & ADDITIONAL TESTS:    Imaging Personally Reviewed:  [ x ] YES  [ ] NO    Consultant(s) Notes Reviewed:  [ x ] YES  [ ] NO    Care Discussed with Consultants/Other Providers [ x ] YES  [ ] NO

## 2020-05-09 NOTE — PROGRESS NOTE ADULT - PROBLEM SELECTOR PLAN 6
Interval development of sacral decub adjacent to the R ischial tuberosity and scattered foci of soft tissue gas on CT - cannot r/o osteo  - Collagenase to R sacral decub with dry dressing daily  - Tissue Cx 5/1 with +Enterococcus faecalis, E. coli, Pseudomonas of R ischial wound  - Wound care, Dr. Medina consulted, recs appreciated  - Continue multivitamin, vit C and Ensure BID per dietary recs

## 2020-05-09 NOTE — PROGRESS NOTE ADULT - SUBJECTIVE AND OBJECTIVE BOX
infectious diseases progress note:    GEORGE STANLEY is a 69y y. o. Female patient    No concerning overnight events    Allergies    Sudafed (Other)    Intolerances        ANTIBIOTICS/RELEVANT:  antimicrobials  piperacillin/tazobactam IVPB.. 3.375 Gram(s) IV Intermittent every 8 hours  vancomycin  IVPB 750 milliGRAM(s) IV Intermittent every 24 hours    immunologic:    OTHER:  acetaminophen   Tablet .. 650 milliGRAM(s) Oral every 6 hours PRN  ascorbic acid 500 milliGRAM(s) Oral daily  collagenase Ointment 1 Application(s) Topical daily  enoxaparin Injectable 30 milliGRAM(s) SubCutaneous daily  latanoprost 0.005% Ophthalmic Solution 1 Drop(s) Both EYES at bedtime  letrozole 2.5 milliGRAM(s) Oral daily  lisinopril 20 milliGRAM(s) Oral daily  multivitamin/minerals 1 Tablet(s) Oral daily  ondansetron   Disintegrating Tablet 4 milliGRAM(s) Oral every 6 hours PRN  sodium chloride 0.9%. 1000 milliLiter(s) IV Continuous <Continuous>      Objective:  Vital Signs Last 24 Hrs  T(C): 36.8 (09 May 2020 05:05), Max: 36.8 (08 May 2020 21:55)  T(F): 98.2 (09 May 2020 05:05), Max: 98.2 (08 May 2020 21:55)  HR: 92 (09 May 2020 05:05) (92 - 93)  BP: 102/69 (09 May 2020 05:05) (96/64 - 102/69)  BP(mean): --  RR: 18 (09 May 2020 05:05) (18 - 19)  SpO2: 94% (09 May 2020 05:05) (94% - 94%)    T(C): 36.8 (20 @ 05:05), Max: 37.7 (20 @ 21:58)  T(C): 36.8 (20 @ 05:05), Max: 37.7 (20 @ 21:58)  T(C): 36.8 (20 @ 05:05), Max: 37.7 (20 @ 21:58)    PHYSICAL EXAM:  HEENT: NC atramautic  Neck: supple  Respiratory: no accessory muscle use, breathing comfortably  Cardiovascular: distant  Gastrointestinal: normal appearing, nondistended  Extremities: no clubbing, no cyanosis,      LABS:                          7.3    x     )-----------( 678      ( 09 May 2020 10:48 )             22.8       3.23  @ 06:11  4.15  @ 23:09      05    133<L>  |  104  |  28<H>  ----------------------------<  154<H>  5.0   |  20<L>  |  1.20    Ca    7.6<L>      08 May 2020 06:11    TPro  6.8  /  Alb  1.6<L>  /  TBili  0.9  /  DBili  x   /  AST  28  /  ALT  24  /  AlkPhos  102        Creatinine, Serum: 1.20 mg/dL (20 @ 06:11)  Creatinine, Serum: 1.50 mg/dL (20 @ 23:09)        Urinalysis Basic - ( 08 May 2020 01:07 )    Color: Yellow / Appearance: Turbid / S.025 / pH: x  Gluc: x / Ketone: Trace  / Bili: Moderate / Urobili: 8   Blood: x / Protein: 30 mg/dL / Nitrite: Positive   Leuk Esterase: Moderate / RBC: 3-5 /HPF / WBC >50   Sq Epi: x / Non Sq Epi: Occasional / Bacteria: TNTC            INFLAMMATORY MARKERS  Auto Neutrophil #: 2.14 K/uL (20 @ 06:11)  Auto Lymphocyte #: 0.91 K/uL (20 @ 06:11)  Auto Neutrophil #: 2.28 K/uL (20 @ 23:09)  Auto Lymphocyte #: 1.49 K/uL (20 @ 23:09)    Lactate, Blood: 2.7 mmol/L (20 @ 03:49)  Lactate, Blood: 5.1 mmol/L (20 @ 23:09)    Auto Eosinophil #: 0.00 K/uL (20 @ 06:11)  Auto Eosinophil #: 0.00 K/uL (20 @ 23:09)    Lactate Dehydrogenase, Serum: 181 U/L (20 @ 06:11)      MICROBIOLOGY:    Culture - Body Fluid with Gram Stain (20 @ 17:52)    Gram Stain:   Few polymorphonuclear leukocytes per low power field  Rare Gram positive cocci in pairs per oil power field  Rare Gram Variable Rods per oil power field  Rare Yeast per oil power field    Specimen Source: Pleural Fl Pleural Fluid        RADIOLOGY & ADDITIONAL STUDIES:

## 2020-05-09 NOTE — PROGRESS NOTE ADULT - PROBLEM SELECTOR PLAN 1
Unclear if 2/2 UTI vs. sacral ulcer with ?osteo vs. pulmonary source  - Continue Vanc/Zosyn, Caspofungin added by ID for fungal elements in pleural fluid  - BP remains soft, s/p 2L NS bolus in ED, continue gentle IVF  - UCx likely contaminated and BCx with NGTD  - Zofran PRN for nausea  - ID (Dr Tabares) consulted, recs appreciated

## 2020-05-09 NOTE — PROGRESS NOTE ADULT - PROBLEM SELECTOR PLAN 2
s/p R chest tube placement on 5/8/2020  - Pleural fluid studies indicate likely exudative, +RBCs, rare GPC and yeast  - F/u cytopath  - Monitor fluid drainage from chest tube

## 2020-05-09 NOTE — PROGRESS NOTE ADULT - PROBLEM SELECTOR PLAN 4
UA grossly positive however pt has chronic Starks, changed in ED  - Continue Vanc/Zosyn, Caspofungin  - UCx likely with contaminant

## 2020-05-10 DIAGNOSIS — J86.9 PYOTHORAX WITHOUT FISTULA: ICD-10-CM

## 2020-05-10 LAB
ANION GAP SERPL CALC-SCNC: 11 MMOL/L — SIGNIFICANT CHANGE UP (ref 5–17)
BASE EXCESS BLDV CALC-SCNC: -6.3 MMOL/L — LOW (ref -2–2)
BASOPHILS # BLD AUTO: 0.01 K/UL — SIGNIFICANT CHANGE UP (ref 0–0.2)
BASOPHILS NFR BLD AUTO: 0.1 % — SIGNIFICANT CHANGE UP (ref 0–2)
BLOOD GAS COMMENTS, VENOUS: SIGNIFICANT CHANGE UP
BUN SERPL-MCNC: 24 MG/DL — HIGH (ref 7–23)
CALCIUM SERPL-MCNC: 7.2 MG/DL — LOW (ref 8.5–10.1)
CHLORIDE SERPL-SCNC: 106 MMOL/L — SIGNIFICANT CHANGE UP (ref 96–108)
CO2 SERPL-SCNC: 17 MMOL/L — LOW (ref 22–31)
CREAT SERPL-MCNC: 0.83 MG/DL — SIGNIFICANT CHANGE UP (ref 0.5–1.3)
EOSINOPHIL # BLD AUTO: 0.01 K/UL — SIGNIFICANT CHANGE UP (ref 0–0.5)
EOSINOPHIL NFR BLD AUTO: 0.1 % — SIGNIFICANT CHANGE UP (ref 0–6)
GLUCOSE SERPL-MCNC: 116 MG/DL — HIGH (ref 70–99)
HCO3 BLDV-SCNC: 19 MMOL/L — LOW (ref 21–29)
HCT VFR BLD CALC: 22.8 % — LOW (ref 34.5–45)
HGB BLD-MCNC: 7.3 G/DL — LOW (ref 11.5–15.5)
HOROWITZ INDEX BLDV+IHG-RTO: 21 — SIGNIFICANT CHANGE UP
IMM GRANULOCYTES NFR BLD AUTO: 0.7 % — SIGNIFICANT CHANGE UP (ref 0–1.5)
LYMPHOCYTES # BLD AUTO: 1.52 K/UL — SIGNIFICANT CHANGE UP (ref 1–3.3)
LYMPHOCYTES # BLD AUTO: 22.6 % — SIGNIFICANT CHANGE UP (ref 13–44)
MCHC RBC-ENTMCNC: 26.4 PG — LOW (ref 27–34)
MCHC RBC-ENTMCNC: 32 GM/DL — SIGNIFICANT CHANGE UP (ref 32–36)
MCV RBC AUTO: 82.6 FL — SIGNIFICANT CHANGE UP (ref 80–100)
MONOCYTES # BLD AUTO: 0.44 K/UL — SIGNIFICANT CHANGE UP (ref 0–0.9)
MONOCYTES NFR BLD AUTO: 6.5 % — SIGNIFICANT CHANGE UP (ref 2–14)
NEUTROPHILS # BLD AUTO: 4.69 K/UL — SIGNIFICANT CHANGE UP (ref 1.8–7.4)
NEUTROPHILS NFR BLD AUTO: 70 % — SIGNIFICANT CHANGE UP (ref 43–77)
NRBC # BLD: 0 /100 WBCS — SIGNIFICANT CHANGE UP (ref 0–0)
PCO2 BLDV: 37 MMHG — SIGNIFICANT CHANGE UP (ref 35–50)
PH BLDV: 7.32 — LOW (ref 7.35–7.45)
PH FLD: <6.82 — SIGNIFICANT CHANGE UP
PLATELET # BLD AUTO: 674 K/UL — HIGH (ref 150–400)
PO2 BLDV: 51 MMHG — HIGH (ref 25–45)
POTASSIUM SERPL-MCNC: 4 MMOL/L — SIGNIFICANT CHANGE UP (ref 3.5–5.3)
POTASSIUM SERPL-SCNC: 4 MMOL/L — SIGNIFICANT CHANGE UP (ref 3.5–5.3)
RBC # BLD: 2.76 M/UL — LOW (ref 3.8–5.2)
RBC # FLD: 18.6 % — HIGH (ref 10.3–14.5)
SAO2 % BLDV: 79 % — SIGNIFICANT CHANGE UP (ref 67–88)
SODIUM SERPL-SCNC: 134 MMOL/L — LOW (ref 135–145)
SPECIMEN SOURCE FLD: SIGNIFICANT CHANGE UP
VANCOMYCIN TROUGH SERPL-MCNC: 10.2 UG/ML — SIGNIFICANT CHANGE UP (ref 10–20)
WBC # BLD: 6.72 K/UL — SIGNIFICANT CHANGE UP (ref 3.8–10.5)
WBC # FLD AUTO: 6.72 K/UL — SIGNIFICANT CHANGE UP (ref 3.8–10.5)

## 2020-05-10 PROCEDURE — 71045 X-RAY EXAM CHEST 1 VIEW: CPT | Mod: 26

## 2020-05-10 PROCEDURE — 99233 SBSQ HOSP IP/OBS HIGH 50: CPT

## 2020-05-10 RX ORDER — SACCHAROMYCES BOULARDII 250 MG
250 POWDER IN PACKET (EA) ORAL
Refills: 0 | Status: DISCONTINUED | OUTPATIENT
Start: 2020-05-10 | End: 2020-05-11

## 2020-05-10 RX ORDER — SODIUM CHLORIDE 9 MG/ML
1000 INJECTION INTRAMUSCULAR; INTRAVENOUS; SUBCUTANEOUS
Refills: 0 | Status: DISCONTINUED | OUTPATIENT
Start: 2020-05-10 | End: 2020-05-11

## 2020-05-10 RX ADMIN — PIPERACILLIN AND TAZOBACTAM 25 GRAM(S): 4; .5 INJECTION, POWDER, LYOPHILIZED, FOR SOLUTION INTRAVENOUS at 20:20

## 2020-05-10 RX ADMIN — CASPOFUNGIN ACETATE 260 MILLIGRAM(S): 7 INJECTION, POWDER, LYOPHILIZED, FOR SOLUTION INTRAVENOUS at 11:23

## 2020-05-10 RX ADMIN — Medication 250 MILLIGRAM(S): at 17:29

## 2020-05-10 RX ADMIN — PIPERACILLIN AND TAZOBACTAM 25 GRAM(S): 4; .5 INJECTION, POWDER, LYOPHILIZED, FOR SOLUTION INTRAVENOUS at 10:41

## 2020-05-10 RX ADMIN — ENOXAPARIN SODIUM 30 MILLIGRAM(S): 100 INJECTION SUBCUTANEOUS at 11:23

## 2020-05-10 RX ADMIN — SODIUM CHLORIDE 50 MILLILITER(S): 9 INJECTION INTRAMUSCULAR; INTRAVENOUS; SUBCUTANEOUS at 10:46

## 2020-05-10 RX ADMIN — SODIUM CHLORIDE 50 MILLILITER(S): 9 INJECTION INTRAMUSCULAR; INTRAVENOUS; SUBCUTANEOUS at 21:36

## 2020-05-10 RX ADMIN — Medication 1 APPLICATION(S): at 11:23

## 2020-05-10 RX ADMIN — Medication 1 TABLET(S): at 11:23

## 2020-05-10 RX ADMIN — LETROZOLE 2.5 MILLIGRAM(S): 2.5 TABLET, FILM COATED ORAL at 11:23

## 2020-05-10 RX ADMIN — Medication 250 MILLIGRAM(S): at 02:37

## 2020-05-10 RX ADMIN — PIPERACILLIN AND TAZOBACTAM 25 GRAM(S): 4; .5 INJECTION, POWDER, LYOPHILIZED, FOR SOLUTION INTRAVENOUS at 04:54

## 2020-05-10 RX ADMIN — Medication 500 MILLIGRAM(S): at 11:23

## 2020-05-10 RX ADMIN — LATANOPROST 1 DROP(S): 0.05 SOLUTION/ DROPS OPHTHALMIC; TOPICAL at 21:36

## 2020-05-10 NOTE — PROGRESS NOTE ADULT - SUBJECTIVE AND OBJECTIVE BOX
This progress note was completed in part by resident acting as telephonic scribe during COVID-19 crisis. Physical exam and review of systems was completed by attending physician, and findings below are those of the attending physician, and have been reviewed in detail.    Patient is a 69y old  Female who presents with a chief complaint of hydropneumothorax (08 May 2020 03:24)    INTERVAL HPI/OVERNIGHT EVENTS: Pt seen and examined at bedside. No acute events overnight.     Vital Signs Last 24 Hrs  T(C): 36.5 (10 May 2020 04:34), Max: 37.1 (09 May 2020 13:47)  T(F): 97.7 (10 May 2020 04:34), Max: 98.8 (09 May 2020 13:47)  HR: 96 (10 May 2020 04:34) (77 - 97)  BP: 92/66 (10 May 2020 04:34) (90/62 - 110/68)  BP(mean): --  RR: 18 (10 May 2020 04:34) (16 - 18)  SpO2: 91% (10 May 2020 04:34) (91% - 97%)      I&O's Detail    09 May 2020 07:01  -  10 May 2020 07:00  --------------------------------------------------------  IN:  Total IN: 0 mL    OUT:    Indwelling Catheter - Stomal: 250 mL  Total OUT: 250 mL    Total NET: -250 mL      10 May 2020 07:01  -  10 May 2020 09:56  --------------------------------------------------------  IN:  Total IN: 0 mL    OUT:  Total OUT: 0 mL    Total NET: 0 mL        LABS:                        7.3    6.72  )-----------( 674      ( 10 May 2020 08:43 )             22.8     10 May 2020 09:41    134    |  106    |  24     ----------------------------<  116    4.0     |  17     |  0.83     Ca    7.2        10 May 2020 09:41      Culture - Body Fluid with Gram Stain (collected 08 May 2020 17:52)  Source: Pleural Fl Pleural Fluid  Gram Stain (08 May 2020 19:18):    Few polymorphonuclear leukocytes per low power field    Rare Gram positive cocci in pairs per oil power field    Rare Gram Variable Rods per oil power field    Rare Yeast per oil power field  Preliminary Report (09 May 2020 20:34):    Few Streptococcus mitis/oralis group    Culture - Urine (collected 08 May 2020 09:09)  Source: .Urine Catheterized  Final Report (09 May 2020 08:24):    >=3 organisms. Probable collection contamination.    Culture - Blood (collected 08 May 2020 09:02)  Source: .Blood Blood-Peripheral  Preliminary Report (09 May 2020 10:01):    No growth to date.       MEDICATIONS  (STANDING):  MEDICATIONS  (STANDING):  ascorbic acid 500 milliGRAM(s) Oral daily  caspofungin IVPB      caspofungin IVPB 50 milliGRAM(s) IV Intermittent every 24 hours  collagenase Ointment 1 Application(s) Topical daily  enoxaparin Injectable 30 milliGRAM(s) SubCutaneous daily  latanoprost 0.005% Ophthalmic Solution 1 Drop(s) Both EYES at bedtime  letrozole 2.5 milliGRAM(s) Oral daily  multivitamin/minerals 1 Tablet(s) Oral daily  piperacillin/tazobactam IVPB.. 3.375 Gram(s) IV Intermittent every 8 hours  sodium chloride 0.9%. 1000 milliLiter(s) (50 mL/Hr) IV Continuous <Continuous>  vancomycin  IVPB 750 milliGRAM(s) IV Intermittent every 24 hours    MEDICATIONS  (PRN):  acetaminophen   Tablet .. 650 milliGRAM(s) Oral every 6 hours PRN Temp greater or equal to 38C (100.4F), Mild Pain (1 - 3)  ondansetron   Disintegrating Tablet 4 milliGRAM(s) Oral every 6 hours PRN Nausea and/or Vomiting        REVIEW OF SYSTEMS:  CONSTITUTIONAL: No fever, weight loss, +fatigue  EYES: No eye pain, visual disturbances, or discharge  ENMT: No difficulty hearing, tinnitus, vertigo; No sinus or throat pain  NECK: No pain or stiffness  RESPIRATORY: No cough, wheezing, chills or hemoptysis; No shortness of breath  CARDIOVASCULAR: No chest pain, palpitations, dizziness, or leg swelling  GASTROINTESTINAL: No abdominal or epigastric pain. No nausea, vomiting, or hematemesis; No diarrhea or constipation; No melena or hematochezia  GENITOURINARY: No dysuria, frequency, hematuria, or incontinence  NEUROLOGICAL: No headaches, memory loss, loss of strength, numbness, or tremors  SKIN: No itching, burning, rashes, or lesions   MUSCULOSKELETAL: No joint pain or swelling; No muscle, back, or extremity pain  HEME/LYMPH: No easy bruising, or bleeding gums      PHYSICAL EXAM:  GENERAL: NAD, elderly, frail  HEAD: Atraumatic, Normocephalic  EYES: EOMI, PERRL, conjunctiva and sclera clear  ENMT: Moist mucous membranes  NECK: Supple, No JVD  NERVOUS SYSTEM: Alert & Oriented X3, Good concentration; Motor strength testing intact in B/L upper and lower extremities  CHEST/LUNG: Clear to percussion bilaterally; No rales, rhonchi, wheezing, or rubs; +R chest tube  HEART: Regular rate and rhythm; No murmurs, rubs, or gallops  ABDOMEN: Soft, Nontender, Nondistended; Bowel sounds present  EXTREMITIES: 2+ Peripheral Pulses, No clubbing, cyanosis, or edema  LYMPH: No lymphadenopathy noted  SKIN: Warm, dry, well-perfused, R sacral decub stage IV, R knee multiple small unstageable ulcers      RADIOLOGY & ADDITIONAL TESTS:    Imaging Personally Reviewed:  [ x ] YES  [ ] NO    Consultant(s) Notes Reviewed:  [ x ] YES  [ ] NO    Care Discussed with Consultants/Other Providers [ x ] YES  [ ] NO

## 2020-05-10 NOTE — PROGRESS NOTE ADULT - SUBJECTIVE AND OBJECTIVE BOX
Date/Time Patient Seen:  		  Referring MD:   Data Reviewed	       Patient is a 69y old  Female who presents with a chief complaint of hydropneumothorax (09 May 2020 14:19)      Subjective/HPI     PAST MEDICAL & SURGICAL HISTORY:  Hypertension  Multiple sclerosis  S/P mastectomy, right  Breast CA  Osteoporosis  MS (mitral stenosis)  History of bowel resection  H/O breast surgery        Medication list         MEDICATIONS  (STANDING):  ascorbic acid 500 milliGRAM(s) Oral daily  caspofungin IVPB      caspofungin IVPB 50 milliGRAM(s) IV Intermittent every 24 hours  collagenase Ointment 1 Application(s) Topical daily  enoxaparin Injectable 30 milliGRAM(s) SubCutaneous daily  latanoprost 0.005% Ophthalmic Solution 1 Drop(s) Both EYES at bedtime  letrozole 2.5 milliGRAM(s) Oral daily  multivitamin/minerals 1 Tablet(s) Oral daily  piperacillin/tazobactam IVPB.. 3.375 Gram(s) IV Intermittent every 8 hours  sodium chloride 0.9%. 1000 milliLiter(s) (50 mL/Hr) IV Continuous <Continuous>  vancomycin  IVPB 750 milliGRAM(s) IV Intermittent every 24 hours    MEDICATIONS  (PRN):  acetaminophen   Tablet .. 650 milliGRAM(s) Oral every 6 hours PRN Temp greater or equal to 38C (100.4F), Mild Pain (1 - 3)  ondansetron   Disintegrating Tablet 4 milliGRAM(s) Oral every 6 hours PRN Nausea and/or Vomiting         Vitals log        ICU Vital Signs Last 24 Hrs  T(C): 36.5 (10 May 2020 04:34), Max: 37.1 (09 May 2020 13:47)  T(F): 97.7 (10 May 2020 04:34), Max: 98.8 (09 May 2020 13:47)  HR: 96 (10 May 2020 04:34) (77 - 97)  BP: 92/66 (10 May 2020 04:34) (90/62 - 110/68)  BP(mean): --  ABP: --  ABP(mean): --  RR: 18 (10 May 2020 04:34) (16 - 18)  SpO2: 91% (10 May 2020 04:34) (91% - 97%)           Input and Output:  I&O's Detail    08 May 2020 07:01  -  09 May 2020 07:00  --------------------------------------------------------  IN:    sodium chloride 0.9%: 650 mL    Solution: 200 mL  Total IN: 850 mL    OUT:    Chest Tube: 2580 mL    Indwelling Catheter - Stomal: 350 mL  Total OUT: 2930 mL    Total NET: -2080 mL      09 May 2020 07:01  -  10 May 2020 06:19  --------------------------------------------------------  IN:  Total IN: 0 mL    OUT:    Indwelling Catheter - Stomal: 250 mL  Total OUT: 250 mL    Total NET: -250 mL          Lab Data                        7.5    x     )-----------( x        ( 09 May 2020 11:57 )             23.2     05-09    135  |  105  |  28<H>  ----------------------------<  122<H>  4.4   |  17<L>  |  0.97    Ca    7.1<L>      09 May 2020 10:48              Review of Systems	      Objective     Physical Examination    heart s1s2  lung dec BS  abd soft      Pertinent Lab findings & Imaging      Tereza:  NO   Adequate UO     I&O's Detail    08 May 2020 07:01  -  09 May 2020 07:00  --------------------------------------------------------  IN:    sodium chloride 0.9%: 650 mL    Solution: 200 mL  Total IN: 850 mL    OUT:    Chest Tube: 2580 mL    Indwelling Catheter - Stomal: 350 mL  Total OUT: 2930 mL    Total NET: -2080 mL      09 May 2020 07:01  -  10 May 2020 06:19  --------------------------------------------------------  IN:  Total IN: 0 mL    OUT:    Indwelling Catheter - Stomal: 250 mL  Total OUT: 250 mL    Total NET: -250 mL               Discussed with:     Cultures:	        Radiology

## 2020-05-10 NOTE — CONSULT NOTE ADULT - ASSESSMENT
Metastatic breast cancer with evidence of bone mets.  Now admitted with pleural effusion and sacral decubitus.  cultures from fluid suggest yeast.  xrays of concern of osteomyelitis from sacral decubitus  anemia secondary to acute/chronic disease with fe studies consistent with this diagnosis.  with current comorbidities patient will not respond to SOPHY and does not require IV fe.  Will need to transfuse PRBC intermittently as needed.     Recommendations:  1.  follow CBC and transfuse as indicated  2.  check cytology on pleural fluid  3.  continue thoracic surgery and ID evaluation  4.  is not a candidate for SOPHY or IV fe and will require intermittent PRBC transfusion  5.  prognosis is guarded  6.  to continue current therapy and will followup with Dr. Fairbanks as an outpatient. Please call if any additional oncology evaluation required.   discussed with Dr. Perlman

## 2020-05-10 NOTE — PROGRESS NOTE ADULT - PROBLEM SELECTOR PLAN 1
s/p chest tube - put out 1200 cc on insertion - ER provider note reviewed  PTX persistent - see CT chest - dedicated study -   discussed with Thoracic Surgery - poss Readjust Large Bore presently in - vs - additional CT - pigtal - CTS team to see pt today  pt is on room air - no resp distress - no HD compromise -   ID eval noted - covered for fungal and bacterial pathogens - final Cx pending  hx of Breast Ca -   hx of trauma - fall from Obdulia lift -   etiol of HydroPTX - unclear at present - fluid studies pending - initial eval note - likely exudative - although fluid pH is not documented   Path pending from fluid studies -   pt needs to use Incentive Jeffy  will check VBG - pt with MS - to eval co2 retention and hypoventilation  pleurovac and CT in place.  pt with MS - needs assist with ADL Star Wedge Flap Text: The defect edges were debeveled with a #15 scalpel blade.  Given the location of the defect, shape of the defect and the proximity to free margins a star wedge flap was deemed most appropriate.  Using a sterile surgical marker, an appropriate rotation flap was drawn incorporating the defect and placing the expected incisions within the relaxed skin tension lines where possible. The area thus outlined was incised deep to adipose tissue with a #15 scalpel blade.  The skin margins were undermined to an appropriate distance in all directions utilizing iris scissors.

## 2020-05-10 NOTE — PROGRESS NOTE ADULT - PROBLEM SELECTOR PLAN 6
Interval development of sacral decub adjacent to the R ischial tuberosity and scattered foci of soft tissue gas on CT - cannot r/o osteo  - Collagenase to R sacral decub with dry dressing daily  - Tissue Cx 5/1 with +Enterococcus faecalis, E. coli, Pseudomonas of R ischial wound  - Wound care, Dr. Medina consulted, recs appreciated  - Continue multivitamin, vit C and Ensure BID per dietary recs Sacral decub, cannot r/o underlying osteo on CT  - Collagenase to R sacral decub with dry dressing daily  - Tissue Cx 5/1 with +Enterococcus faecalis, E. coli, Pseudomonas of R ischial wound  - Wound care, Dr. Medina consulted, recs appreciated  - Continue multivitamin, vit C and Ensure BID per dietary recs

## 2020-05-10 NOTE — CONSULT NOTE ADULT - SUBJECTIVE AND OBJECTIVE BOX
69 year old F non-ambulatory with pmhx significant for multiple sclerosis, Breast Cancer s/p R mastectomy, Osteoporosis, Mitral stenosis, right ankle fracture, chronic sacral ulcer, admitted to [facility] for hydropneumothorax. Pt had right sided chest tube placed in ER with about 1200 cc of serous return. Pt was 14Fr chest tube placed under local today with pulmonary under 20 cm h2o suction, with minimal return in chest tube.    Of note, patient admitted 10/9/2019 for lower back and bilateral knee pain, found to have compression fracture of L2 with imaging subsequently found to have multiple lytic lesions on the spine and pelvis.  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 Matthew Fairbanks.    PAST MEDICAL & SURGICAL HISTORY:  Hypertension  Multiple sclerosis  S/P mastectomy, right  Breast CA  Osteoporosis  MS (mitral stenosis)  History of bowel resection  H/O breast surgery      MEDICATIONS  (STANDING):  ascorbic acid 500 milliGRAM(s) Oral daily  caspofungin IVPB      caspofungin IVPB 50 milliGRAM(s) IV Intermittent every 24 hours  collagenase Ointment 1 Application(s) Topical daily  enoxaparin Injectable 30 milliGRAM(s) SubCutaneous daily  latanoprost 0.005% Ophthalmic Solution 1 Drop(s) Both EYES at bedtime  letrozole 2.5 milliGRAM(s) Oral daily  multivitamin/minerals 1 Tablet(s) Oral daily  piperacillin/tazobactam IVPB.. 3.375 Gram(s) IV Intermittent every 8 hours  saccharomyces boulardii 250 milliGRAM(s) Oral two times a day  sodium chloride 0.9%. 1000 milliLiter(s) (50 mL/Hr) IV Continuous <Continuous>  vancomycin  IVPB 750 milliGRAM(s) IV Intermittent every 24 hours    MEDICATIONS  (PRN):  acetaminophen   Tablet .. 650 milliGRAM(s) Oral every 6 hours PRN Temp greater or equal to 38C (100.4F), Mild Pain (1 - 3)  ondansetron   Disintegrating Tablet 4 milliGRAM(s) Oral every 6 hours PRN Nausea and/or Vomiting      acetaminophen   Tablet .. 650 milliGRAM(s) Oral every 6 hours PRN  ascorbic acid 500 milliGRAM(s) Oral daily  caspofungin IVPB      caspofungin IVPB 50 milliGRAM(s) IV Intermittent every 24 hours  collagenase Ointment 1 Application(s) Topical daily  enoxaparin Injectable 30 milliGRAM(s) SubCutaneous daily  latanoprost 0.005% Ophthalmic Solution 1 Drop(s) Both EYES at bedtime  letrozole 2.5 milliGRAM(s) Oral daily  multivitamin/minerals 1 Tablet(s) Oral daily  ondansetron   Disintegrating Tablet 4 milliGRAM(s) Oral every 6 hours PRN  piperacillin/tazobactam IVPB.. 3.375 Gram(s) IV Intermittent every 8 hours  saccharomyces boulardii 250 milliGRAM(s) Oral two times a day  sodium chloride 0.9%. 1000 milliLiter(s) IV Continuous <Continuous>  vancomycin  IVPB 750 milliGRAM(s) IV Intermittent every 24 hours      Sudafed (Other)      SOCIAL HISTORY:  Tobacco Use: Denies  Alcohol Use: Denies   Illicit substance use: Denies  Occupation: retired real estate     FAMILY HISTORY:  FHx: hyperlipidemia       Vital Signs Last 24 Hrs  T(C): 36.5 (10 May 2020 04:34), Max: 36.9 (09 May 2020 21:28)  T(F): 97.7 (10 May 2020 04:34), Max: 98.4 (09 May 2020 21:28)  HR: 96 (10 May 2020 04:34) (96 - 97)  BP: 92/66 (10 May 2020 04:34) (90/62 - 92/66)  BP(mean): --  RR: 18 (10 May 2020 04:34) (18 - 18)  SpO2: 91% (10 May 2020 04:34) (91% - 94%)    ROS  General: No acute distress, awake and alert  Head: Normal cephalic/atraumatic  Neck: Denies neck pain or palpable masses, hoarseness or stridor  Lymphatic: Denies cervical or axillary or femoral lymphadenopathy  Chest: No chest pain, cough or hemoptysis  Heart: No chest pain, palpitations  Abdomen: No abdominal distention, nausea or vomiting, no abdominal pain  Extremities: Denies cyanosis, open sores or swollen extremity  Neuro: Denies weakness, paralysis, syncope, loss of vision  Psych: Denies hallucinations, visual disturbances, or depression    PHYSICAL EXAM:  GENERAL: NAD, well-developed  HEAD:  Atraumatic, Normocephalic  EYES: EOMI, PERRLA, conjunctiva and sclera clear  CHEST/LUNG: Decreased breath sounds right, 2 chest tubes in places on right side first with about 2000 cc of serous output in pleuravac cannister, and second chest tube just placed with no outputs   HEART: RRR, no MRG  ABDOMEN: Soft, nontender, nondistended, +bs   EXTREMITIES:  2+ Peripheral Pulses, No clubbing, cyanosis, or edema  PSYCH: AAOx3  NEUROLOGY: non-focal  SKIN: No rashes or lesions    LABS: ALL [id number] STUDIES WERE REVIEWED IN THEIR ENTIRETY                        7.3    6.72  )-----------( 674      ( 10 May 2020 08:43 )             22.8     05-10    134<L>  |  106  |  24<H>  ----------------------------<  116<H>  4.0   |  17<L>  |  0.83    Ca    7.2<L>      10 May 2020 09:41      Culture - Body Fluid with Gram Stain (collected 08 May 2020 17:52)  Source: Pleural Fl Pleural Fluid  Gram Stain (08 May 2020 19:18):    Few polymorphonuclear leukocytes per low power field    Rare Gram positive cocci in pairs per oil power field    Rare Gram Variable Rods per oil power field    Rare Yeast per oil power field  Preliminary Report (09 May 2020 20:34):    Few Streptococcus mitis/oralis group    Culture - Urine (collected 08 May 2020 09:09)  Source: .Urine Catheterized  Final Report (09 May 2020 08:24):    >=3 organisms. Probable collection contamination.    Culture - Blood (collected 08 May 2020 09:02)  Source: .Blood Blood-Peripheral  Preliminary Report (09 May 2020 10:01):    No growth to date.    Culture - Blood (collected 08 May 2020 09:02)  Source: .Blood Blood  Preliminary Report (09 May 2020 10:01):    No growth to date.      Imaging:  < from: Xray Chest 1 View- PORTABLE-Urgent (05.10.20 @ 13:20) >    EXAM:  XR CHEST PORTABLE URGENT 1V                            PROCEDURE DATE:  05/10/2020          INTERPRETATION:    Examination: XR CHEST URGENT    History: ADMDIAG1: A41.9 SEPSIS, UNSPECIFIED ORGANISM/, eval chest tube placement -    Comparison:5/8/2020    Findings:  Similar heart size. Right chest tube remains present. Right pigtail catheter has been added.    Small right infiltrate with pleural effusion. Small right apical pneumothorax. Left lung is clear.    Impression:  1.  New right pigtail catheter. Small right pneumothorax.      DISCRETE X-RAY DATA:  Percent of LEFT lung opacification: Clear  Percent of RIGHT lung opacification: 34-66%  Change in lung opacification from most recent x-ray (<=3 days): Increase  Change from prior dated 3 or more days (same admission): No Prior  ROBBY TRIVEDI M.D., ATTENDING RADIOLOGIST  This document has been electronically signed. May 10 2020  1:29PM        < end of copied text >      Assessment:  70 y/o F with extensive pmhx significant of MS (non ambulatory), and breast ca s/p mastectomy, admitted for hydropneumothorax now with 2 right sided chest tubes in place,      Plan:  - no acute surgical intervention at this time  - trend chest tube outputs  - cont care per primary team  - pain control, supportive care   - will follow   - to be discussed with Dr. Murcia 69 year old F non-ambulatory with pmhx significant for multiple sclerosis, Breast Cancer s/p R mastectomy, Osteoporosis, Mitral stenosis, right ankle fracture, chronic sacral ulcer, admitted to [facility] for hydropneumothorax. Pt had right sided chest tube placed in ER with about 1200 cc of serous return. Pt was 14Fr chest tube placed under local today with pulmonary under 20 cm h2o suction, with minimal return in chest tube.    Of note, patient admitted 10/9/2019 for lower back and bilateral knee pain, found to have compression fracture of L2 with imaging subsequently found to have multiple lytic lesions on the spine and pelvis.  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 Matthew Fairbanks.    PAST MEDICAL & SURGICAL HISTORY:  Hypertension  Multiple sclerosis  S/P mastectomy, right  Breast CA  Osteoporosis  MS (mitral stenosis)  History of bowel resection  H/O breast surgery      MEDICATIONS  (STANDING):  ascorbic acid 500 milliGRAM(s) Oral daily  caspofungin IVPB      caspofungin IVPB 50 milliGRAM(s) IV Intermittent every 24 hours  collagenase Ointment 1 Application(s) Topical daily  enoxaparin Injectable 30 milliGRAM(s) SubCutaneous daily  latanoprost 0.005% Ophthalmic Solution 1 Drop(s) Both EYES at bedtime  letrozole 2.5 milliGRAM(s) Oral daily  multivitamin/minerals 1 Tablet(s) Oral daily  piperacillin/tazobactam IVPB.. 3.375 Gram(s) IV Intermittent every 8 hours  saccharomyces boulardii 250 milliGRAM(s) Oral two times a day  sodium chloride 0.9%. 1000 milliLiter(s) (50 mL/Hr) IV Continuous <Continuous>  vancomycin  IVPB 750 milliGRAM(s) IV Intermittent every 24 hours    MEDICATIONS  (PRN):  acetaminophen   Tablet .. 650 milliGRAM(s) Oral every 6 hours PRN Temp greater or equal to 38C (100.4F), Mild Pain (1 - 3)  ondansetron   Disintegrating Tablet 4 milliGRAM(s) Oral every 6 hours PRN Nausea and/or Vomiting      acetaminophen   Tablet .. 650 milliGRAM(s) Oral every 6 hours PRN  ascorbic acid 500 milliGRAM(s) Oral daily  caspofungin IVPB      caspofungin IVPB 50 milliGRAM(s) IV Intermittent every 24 hours  collagenase Ointment 1 Application(s) Topical daily  enoxaparin Injectable 30 milliGRAM(s) SubCutaneous daily  latanoprost 0.005% Ophthalmic Solution 1 Drop(s) Both EYES at bedtime  letrozole 2.5 milliGRAM(s) Oral daily  multivitamin/minerals 1 Tablet(s) Oral daily  ondansetron   Disintegrating Tablet 4 milliGRAM(s) Oral every 6 hours PRN  piperacillin/tazobactam IVPB.. 3.375 Gram(s) IV Intermittent every 8 hours  saccharomyces boulardii 250 milliGRAM(s) Oral two times a day  sodium chloride 0.9%. 1000 milliLiter(s) IV Continuous <Continuous>  vancomycin  IVPB 750 milliGRAM(s) IV Intermittent every 24 hours      Sudafed (Other)      SOCIAL HISTORY:  Tobacco Use: Denies  Alcohol Use: Denies   Illicit substance use: Denies  Occupation: retired real estate     FAMILY HISTORY:  FHx: hyperlipidemia       Vital Signs Last 24 Hrs  T(C): 36.5 (10 May 2020 04:34), Max: 36.9 (09 May 2020 21:28)  T(F): 97.7 (10 May 2020 04:34), Max: 98.4 (09 May 2020 21:28)  HR: 96 (10 May 2020 04:34) (96 - 97)  BP: 92/66 (10 May 2020 04:34) (90/62 - 92/66)  BP(mean): --  RR: 18 (10 May 2020 04:34) (18 - 18)  SpO2: 91% (10 May 2020 04:34) (91% - 94%)    ROS  General: No acute distress, awake and alert  Head: Normal cephalic/atraumatic  Neck: Denies neck pain or palpable masses, hoarseness or stridor  Lymphatic: Denies cervical or axillary or femoral lymphadenopathy  Chest: No chest pain, cough or hemoptysis  Heart: No chest pain, palpitations  Abdomen: No abdominal distention, nausea or vomiting, no abdominal pain  Extremities: Denies cyanosis, open sores or swollen extremity  Neuro: Denies weakness, paralysis, syncope, loss of vision  Psych: Denies hallucinations, visual disturbances, or depression    PHYSICAL EXAM:  GENERAL: NAD, well-developed  HEAD:  Atraumatic, Normocephalic  EYES: EOMI, PERRLA, conjunctiva and sclera clear  CHEST/LUNG: Decreased breath sounds right, 2 chest tubes in places on right side first with about 2000 cc of serous output in pleuravac cannister, and second chest tube just placed with no outputs   HEART: RRR, no MRG  ABDOMEN: Soft, nontender, nondistended, +bs   EXTREMITIES:  2+ Peripheral Pulses, No clubbing, cyanosis, or edema  PSYCH: AAOx3  NEUROLOGY: non-focal  SKIN: No rashes or lesions    LABS: ALL [id number] STUDIES WERE REVIEWED IN THEIR ENTIRETY                        7.3    6.72  )-----------( 674      ( 10 May 2020 08:43 )             22.8     05-10    134<L>  |  106  |  24<H>  ----------------------------<  116<H>  4.0   |  17<L>  |  0.83    Ca    7.2<L>      10 May 2020 09:41      Culture - Body Fluid with Gram Stain (collected 08 May 2020 17:52)  Source: Pleural Fl Pleural Fluid  Gram Stain (08 May 2020 19:18):    Few polymorphonuclear leukocytes per low power field    Rare Gram positive cocci in pairs per oil power field    Rare Gram Variable Rods per oil power field    Rare Yeast per oil power field  Preliminary Report (09 May 2020 20:34):    Few Streptococcus mitis/oralis group    Culture - Urine (collected 08 May 2020 09:09)  Source: .Urine Catheterized  Final Report (09 May 2020 08:24):    >=3 organisms. Probable collection contamination.    Culture - Blood (collected 08 May 2020 09:02)  Source: .Blood Blood-Peripheral  Preliminary Report (09 May 2020 10:01):    No growth to date.    Culture - Blood (collected 08 May 2020 09:02)  Source: .Blood Blood  Preliminary Report (09 May 2020 10:01):    No growth to date.      Imaging:  < from: Xray Chest 1 View- PORTABLE-Urgent (05.10.20 @ 13:20) >    EXAM:  XR CHEST PORTABLE URGENT 1V                            PROCEDURE DATE:  05/10/2020          INTERPRETATION:    Examination: XR CHEST URGENT    History: ADMDIAG1: A41.9 SEPSIS, UNSPECIFIED ORGANISM/, eval chest tube placement -    Comparison:5/8/2020    Findings:  Similar heart size. Right chest tube remains present. Right pigtail catheter has been added.    Small right infiltrate with pleural effusion. Small right apical pneumothorax. Left lung is clear.    Impression:  1.  New right pigtail catheter. Small right pneumothorax.      DISCRETE X-RAY DATA:  Percent of LEFT lung opacification: Clear  Percent of RIGHT lung opacification: 34-66%  Change in lung opacification from most recent x-ray (<=3 days): Increase  Change from prior dated 3 or more days (same admission): No Prior  ROBBY TRIVEDI M.D., ATTENDING RADIOLOGIST  This document has been electronically signed. May 10 2020  1:29PM        < end of copied text >      Assessment:  68 y/o F with extensive pmhx significant of MS (non ambulatory), and breast ca s/p mastectomy, admitted for hydropneumothorax now with 2 right sided chest tubes in place,      Plan:  - no acute surgical intervention at this time  - trend chest tube outputs, cont to suction   - f/u am cxr  - cont care per primary team  - pain control, supportive care   - will follow   - to be discussed with Dr. Murcia

## 2020-05-10 NOTE — PROGRESS NOTE ADULT - PROBLEM SELECTOR PLAN 1
noting multiple isolates in pleural fluid including fungal elements, will add caspofungin and follow serum ag tests

## 2020-05-10 NOTE — CONSULT NOTE ADULT - SUBJECTIVE AND OBJECTIVE BOX
Patient is a 69y old  Female who presents with a chief complaint of hydropneumothorax (10 May 2020 09:37)      HPI:  69 year old F non-ambulatory with pmhx significant for multiple sclerosis, Breast Cancer s/p R mastectomy, Osteoporosis, Mitral stenosis, right ankle fracture, chronic sacral ulcer, chronic midline back pain that has been present for past few months since patient was dropped from a [facility] (7/17/2019) presenting with nausea and vomiting.  Patient states that today she developed persistent NBNB emesis for past two weeks. Has been taking an anti-nausea medication with some relief. Exacerbated by eating so patient states that she has not been eating for the past two weeks. Admits loss of appetite as well. Last BM was 3 days ago. Denies constipation. Admits to some abdominal soreness but denies abd pain. No fevers, chills. Currently not nauseous. Patient unable to elaborate on specific details of history and states that she is tired.    Of note, patient admitted 10/9/2019 for lower back and bilateral knee pain, found to have compression fracture of L2 with imaging subsequently found to have multiple lytic lesions on the spine and pelvis.  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 Matthew Fairbanks.    In the ED, T 97.8F, , BP 67/48 --> 116/67, RR 25, SpO2 93% on RA --> 100% on NRB.  Labs significant for WBC 4.15, H/H 9.6/30.9 (baseline 9-10), plt 869, band 16%, BUN/Cr 28/1.5 (baseline Cr 0.8-0.9), glucose 149, lactate 5.1, lipase 44, UA grossly positive, COVID negative.  Patient received zofran x1, vanco x1, fentanyl 50mg IVP x1, zosyn x1, NS bolus x2 in the ED.  Patient had chest tube placed in the ED, with 1200cc serous fluid drainage after placement.    EKG: sinus tachycardia   CXR: Moderately large right hydropneumothorax resulting in partial right lung collapse and slight cardiomediastinal shift to the left.  CT abd/pelvis: Partial visualization of a large right hydropneumothorax with mild cardiomediastinal shift to the left.  Interval development of sacral decubitus ulcer adjacent to the right ischial tuberosity. Scattered foci of soft tissue gas. Underlying osteomyelitis cannot be excluded. If strong clinical concern persists, nuclear HIDA scan or MRI are more sensitive. No discrete focal drainable collection. No small bowel obstruction.  Cecum is air filled. Gallstones. Diffuse osseous metastases. (08 May 2020 03:24)       ROS:  followed by Dr. Fairbanks. patient states on kisqali and ?faslodex  Negative except for:    PAST MEDICAL & SURGICAL HISTORY:  Hypertension  Multiple sclerosis  S/P mastectomy, right  Breast CA  Osteoporosis  MS (mitral stenosis)  History of bowel resection  H/O breast surgery      SOCIAL HISTORY:    FAMILY HISTORY:  FHx: hyperlipidemia      MEDICATIONS  (STANDING):  ascorbic acid 500 milliGRAM(s) Oral daily  caspofungin IVPB      caspofungin IVPB 50 milliGRAM(s) IV Intermittent every 24 hours  collagenase Ointment 1 Application(s) Topical daily  enoxaparin Injectable 30 milliGRAM(s) SubCutaneous daily  latanoprost 0.005% Ophthalmic Solution 1 Drop(s) Both EYES at bedtime  letrozole 2.5 milliGRAM(s) Oral daily  multivitamin/minerals 1 Tablet(s) Oral daily  piperacillin/tazobactam IVPB.. 3.375 Gram(s) IV Intermittent every 8 hours  sodium chloride 0.9%. 1000 milliLiter(s) (50 mL/Hr) IV Continuous <Continuous>  vancomycin  IVPB 750 milliGRAM(s) IV Intermittent every 24 hours    MEDICATIONS  (PRN):  acetaminophen   Tablet .. 650 milliGRAM(s) Oral every 6 hours PRN Temp greater or equal to 38C (100.4F), Mild Pain (1 - 3)  ondansetron   Disintegrating Tablet 4 milliGRAM(s) Oral every 6 hours PRN Nausea and/or Vomiting      Allergies    Sudafed (Other)    Intolerances        Vital Signs Last 24 Hrs  T(C): 36.5 (10 May 2020 04:34), Max: 37.1 (09 May 2020 13:47)  T(F): 97.7 (10 May 2020 04:34), Max: 98.8 (09 May 2020 13:47)  HR: 96 (10 May 2020 04:34) (77 - 97)  BP: 92/66 (10 May 2020 04:34) (90/62 - 110/68)  BP(mean): --  RR: 18 (10 May 2020 04:34) (16 - 18)  SpO2: 91% (10 May 2020 04:34) (91% - 97%)    PHYSICAL EXAM  General: adult in NAD, chronically ill appearing  HEENT: clear oropharynx, anicteric sclera, pink conjunctivae  Neck: supple  CV: normal S1S2 with no murmur rubs or gallops  Lungs: clear to auscultation, no wheezes, no rhales  Abdomen: soft non-tender non-distended, no hepato/splenomegaly. sacral ulcer not seen  Ext: no clubbing cyanosis or edema  Skin: no rashes and no petichiae  Neuro: alert and oriented X3 not ambulatory      LABS:    CBC Full  -  ( 10 May 2020 08:43 )  WBC Count : 6.72 K/uL  RBC Count : 2.76 M/uL  Hemoglobin : 7.3 g/dL  Hematocrit : 22.8 %  Platelet Count - Automated : 674 K/uL  Mean Cell Volume : 82.6 fl  Mean Cell Hemoglobin : 26.4 pg  Mean Cell Hemoglobin Concentration : 32.0 gm/dL  Auto Neutrophil # : 4.69 K/uL  Auto Lymphocyte # : 1.52 K/uL  Auto Monocyte # : 0.44 K/uL  Auto Eosinophil # : 0.01 K/uL  Auto Basophil # : 0.01 K/uL  Auto Neutrophil % : 70.0 %  Auto Lymphocyte % : 22.6 %  Auto Monocyte % : 6.5 %  Auto Eosinophil % : 0.1 %  Auto Basophil % : 0.1 %    05-10    134<L>  |  106  |  24<H>  ----------------------------<  116<H>  4.0   |  17<L>  |  0.83    Ca    7.2<L>      10 May 2020 09:41        Iron - Total Binding Capacity.: 156 ug/dL (05-09 @ 15:27)  Ferritin, Serum: 808 ng/mL (05-09 @ 15:27)          BLOOD SMEAR INTERPRETATION:    RADIOLOGY & ADDITIONAL STUDIES:    < from: CT Chest No Cont (05.09.20 @ 15:53) >  EXAM:  CT CHEST                            PROCEDURE DATE:  05/09/2020          INTERPRETATION:  CLINICAL INFORMATION: Evaluate right-sided hydropneumothorax. History of metastatic breast cancer.    COMPARISON: CT scan abdomen pelvis 5/8/2020 and CT scan chest 11/24/2016.    PROCEDURE:   CT of the Chest was performed without intravenous contrast.  Sagittal and coronal reformats were performed.      FINDINGS:    LUNGS AND AIRWAYS:  PLEURA:     There is a large right-sided pneumothorax. A right-sided chest tube enters the lateral aspect of the mid right hemithorax, and may traverse the major fissure to terminate into the posterior pleural space. There is a small pleural collection of increased attenuation, which may reflect hemorrhagic pleural fluid.    There is airspace consolidation involving the basal medial aspect of the right lower lobe, which may represent pneumonia in the appropriate clinical setting.    The central airways remain patent.     There is mild peribronchial thickening involving the medial aspect of the left lower lobe.    MEDIASTINUM AND FINA: No enlarged lymphadenopathy.  There is thickening of the distal esophagus with probable small hiatal hernia. Radiopaque density at the level the gastroesophageal junction could reflect retained ingested material.    VESSELS: Within normal limits.    HEART: Heart size is normal. No pericardial effusion.    CHEST WALL AND LOWER NECK: Right-sided mastectomy.    VISUALIZED UPPER ABDOMEN:   Increased density within the partially visualized gallbladder may reflect vicarious excretion of previously administered intravenous contrast.    BONES: Diffuse osteoblastic an osteolytic metastases involving the visualized axial and appendicular skeleton.    IMPRESSION:     Large right-sided pneumothorax including small amount of high attenuation fluid within the pleural space likely reflecting hemothorax.  Right-sided chest tube appears to traverse the major fissure.    Right lower lobe airspace consolidation, may reflect pneumonia in the appropriate clinical setting.    Diffuse osseous metastatic disease.    Other findings as discussed above.                        ARPITA DANIELS M.D., ATTENDING RADIOLOGIST  This document has been electronically signed. May  9 2020  4:41PM                < end of copied text >        < from: CT Abdomen and Pelvis w/ IV Cont (05.08.20 @ 01:03) >  EXAM:  CT ABDOMEN AND PELVIS IC                            PROCEDURE DATE:  05/08/2020          INTERPRETATION:  Abdominal/Pelvic CT    5/8/2020 1:13 AM    Indication: Nausea and vomiting, history of small bowel obstruction, breast cancer, bowel resection    Technique: Axial images were obtained following IV contrast from the lung bases through pubic symphysis.  95 cc of Omnipaque 350 was administered intravenously without complication and 5 cc was discarded.  Reformatted coronal and sagittal images are submitted.    Comparison: CT of May 15, 2017    FINDINGS:    LUNG BASES: Partial visualization of a large right hydropneumothorax. There is simple pleural fluid and right lung atelectasis.     PERITONEUM:  There is no free air or focal collection.  Trace amount of free fluid.  LIVER: Normal.  SPLEEN: Normal.  GALLBLADDER: Contracted with a small stone in the neck.   BILIARY TREE: Unremarkable.  PANCREAS: Normal.  ADRENAL GLANDS: Normal.  KIDNEYS: Normal.  BOWEL: Moderate hiatal hernia.  The stomach is incompletely distended.  There is no small bowel obstruction, focal bowel wall thickening or diverticulitis. The appendix is not visualized.  Cecum is air filled.    URINARY BLADDER: This is decompressed by Starks catheter.  PELVIC ORGANS: Small calcified fundal fibroid.    There is no significant adenopathy.  VASCULATURE: Unremarkable.  RETROPERITONEUM:  There is no mass.  BONES: Diffuse sclerotic foci throughout the spine, pelvis and ribs compatible with osseous metastases. Moderately severe L2 compression fracture, stable since 10/9/19.  Old fracture of the right superior pubic ramus. There appears to be a right sacral decubitus ulcer results seen in their around the right ischial tuberosity..  ABDOMINAL WALL: Redemonstration of subcutaneous fat stranding involving the right leg.    IMPRESSION:    Partial visualization of a large right hydropneumothorax with mild cardiomediastinal shift to the left.  Findings were discussed with Dr. Sahu at 1:30 am on 5/8/2020 with [facility].  Interval development of sacral decubitus ulcer adjacent to the right ischial tuberosity. Scattered foci of soft tissue gas. Underlying osteomyelitis cannot be excluded. If strong clinical concern persists, nuclear HIDA scan or MRI are more sensitive.No discrete focal drainable collection.  No small bowel obstruction.  Cecum is air filled.  Gallstones.  Diffuse osseous metastases.                RYAN REYNOLDS M.D, ATTENDING RADIOLOGIST  This document has been electronically signed. May  8 2020  1:32AM        < end of copied text >

## 2020-05-10 NOTE — PROGRESS NOTE ADULT - PROBLEM SELECTOR PLAN 2
s/p R chest tube placement on 5/8/2020  - Pleural fluid studies indicate likely exudative, +RBCs, rare GPC and yeast  - F/u cytopath, pleural pH  - Monitor fluid drainage from chest tube s/p R chest tube placement on 5/8/2020  - Pleural fluid likely exudative, +RBCs, Strep mitis/oralis, yeast  - F/u cytopath, fungal Cx, serum Ag studies  - Monitor fluid drainage from chest tube  - Incentive spirometry  - Pulm (Dr Allen) and CTS following

## 2020-05-10 NOTE — CONSULT NOTE ADULT - ATTENDING COMMENTS
Patient seen and examined. She has MS and has not walked in about 4-5 years. She is able to move her upper ext well. She also has hx of metastatic breast cancer with evidence of bony involvement. She is currently on bedrest, I presume for this reason. She also has a decubitus ulcer s/p debridement.     She came in with sepsis with empyema and large air fluid level in the right chest. She is able to communicate with me and her vitals are now stable.  She clinically does not look septic and she said nothing much is bothering her pain. She denies upper respiratory sxs, cough, productive sputum, chills, shortness of breath. She says she's had low grade fevers at home. She states that "food got stuck in her throat" few weeks back. She cannot elaborate more than that.     She has a very poor prognosis overall. She is not a good candidate for surgery. Plan to get an esophagram tomorrow.   I did not feel any crepitus or fullness in the neck. I reviewed her CT Chest and CT abdomen. She may have a small hiatal hernia.   There is no air around the esophagus. The esophagus on CT scan without contrast looks essentially normal to me.     She is growing out polymicrobial organisms from the pleural fluid culture. This can be seen often in esophageal perforation. She does not describe a sentinel event with aspiration.     Her overall prognosis is guarded.

## 2020-05-10 NOTE — PROGRESS NOTE ADULT - PROBLEM SELECTOR PLAN 3
Hb dropping but stable - ?unclear if dilutional, related to hemopneumothorax  - Appears ACD per workup Likely 2/2   Hb dropping but stable - ?unclear if dilutional, related to hemopneumothorax  - Appears ACD per workup Likely 2/2 acute/chronic disease  - Not a candidate for SOPHY or IV Fe, intermittent pRBCs per heme  - Monitor CBC and for signs/symptoms of bleeding  - Heme (Dr Garcia) consulted, recs appreciated

## 2020-05-10 NOTE — PROGRESS NOTE ADULT - SUBJECTIVE AND OBJECTIVE BOX
infectious diseases progress note:    GEORGE STANLEY is a 69y y. o. Female patient    No concerning overnight events    Allergies    Sudafed (Other)    Intolerances        ANTIBIOTICS/RELEVANT:  antimicrobials  caspofungin IVPB      caspofungin IVPB 50 milliGRAM(s) IV Intermittent every 24 hours  piperacillin/tazobactam IVPB.. 3.375 Gram(s) IV Intermittent every 8 hours  vancomycin  IVPB 750 milliGRAM(s) IV Intermittent every 24 hours    immunologic:    OTHER:  acetaminophen   Tablet .. 650 milliGRAM(s) Oral every 6 hours PRN  ascorbic acid 500 milliGRAM(s) Oral daily  collagenase Ointment 1 Application(s) Topical daily  enoxaparin Injectable 30 milliGRAM(s) SubCutaneous daily  latanoprost 0.005% Ophthalmic Solution 1 Drop(s) Both EYES at bedtime  letrozole 2.5 milliGRAM(s) Oral daily  multivitamin/minerals 1 Tablet(s) Oral daily  ondansetron   Disintegrating Tablet 4 milliGRAM(s) Oral every 6 hours PRN  saccharomyces boulardii 250 milliGRAM(s) Oral two times a day  sodium chloride 0.9%. 1000 milliLiter(s) IV Continuous <Continuous>      Objective:  Vital Signs Last 24 Hrs  T(C): 36.5 (10 May 2020 04:34), Max: 37.1 (09 May 2020 13:47)  T(F): 97.7 (10 May 2020 04:34), Max: 98.8 (09 May 2020 13:47)  HR: 96 (10 May 2020 04:34) (77 - 97)  BP: 92/66 (10 May 2020 04:34) (90/62 - 110/68)  BP(mean): --  RR: 18 (10 May 2020 04:34) (16 - 18)  SpO2: 91% (10 May 2020 04:34) (91% - 97%)    T(C): 36.5 (05-10-20 @ 04:34), Max: 37.1 (05-09-20 @ 13:47)  T(C): 36.5 (05-10-20 @ 04:34), Max: 37.7 (05-07-20 @ 21:58)  T(C): 36.5 (05-10-20 @ 04:34), Max: 37.7 (05-07-20 @ 21:58)    PHYSICAL EXAM:  HEENT: NC atramautic  Neck: supple  Respiratory: no accessory muscle use, breathing comfortably, chest tube on right draining peach/white thick material  Cardiovascular: distant  Gastrointestinal: normal appearing, nondistended  Extremities: no clubbing, no cyanosis,      LABS:                          7.3    6.72  )-----------( 674      ( 10 May 2020 08:43 )             22.8       6.72 05-10 @ 08:43  4.95 05-09 @ 10:48  3.23 05-08 @ 06:11  4.15 05-07 @ 23:09      05-10    134<L>  |  106  |  24<H>  ----------------------------<  116<H>  4.0   |  17<L>  |  0.83    Ca    7.2<L>      10 May 2020 09:41        Creatinine, Serum: 0.83 mg/dL (05-10-20 @ 09:41)  Creatinine, Serum: 0.97 mg/dL (05-09-20 @ 10:48)  Creatinine, Serum: 1.20 mg/dL (05-08-20 @ 06:11)  Creatinine, Serum: 1.50 mg/dL (05-07-20 @ 23:09)                INFLAMMATORY MARKERS  Auto Neutrophil #: 4.69 K/uL (05-10-20 @ 08:43)  Auto Lymphocyte #: 1.52 K/uL (05-10-20 @ 08:43)  Auto Neutrophil #: 3.27 K/uL (05-09-20 @ 10:48)  Auto Lymphocyte #: 1.29 K/uL (05-09-20 @ 10:48)  Auto Neutrophil #: 2.14 K/uL (05-08-20 @ 06:11)  Auto Lymphocyte #: 0.91 K/uL (05-08-20 @ 06:11)  Auto Neutrophil #: 2.28 K/uL (05-07-20 @ 23:09)  Auto Lymphocyte #: 1.49 K/uL (05-07-20 @ 23:09)    Lactate, Blood: 2.7 mmol/L (05-08-20 @ 03:49)  Lactate, Blood: 5.1 mmol/L (05-07-20 @ 23:09)    Auto Eosinophil #: 0.01 K/uL (05-10-20 @ 08:43)  Auto Eosinophil #: 0.00 K/uL (05-09-20 @ 10:48)  Auto Eosinophil #: 0.00 K/uL (05-08-20 @ 06:11)  Auto Eosinophil #: 0.00 K/uL (05-07-20 @ 23:09)    Lactate Dehydrogenase, Serum: 181 U/L (05-08-20 @ 06:11)    Ferritin, Serum: 808 ng/mL (05-09-20 @ 15:27)    MICROBIOLOGY:    Culture - Body Fluid with Gram Stain (05.08.20 @ 17:52)    Gram Stain:   Few polymorphonuclear leukocytes per low power field  Rare Gram positive cocci in pairs per oil power field  Rare Gram Variable Rods per oil power field  Rare Yeast per oil power field    Specimen Source: Pleural Fl Pleural Fluid    Culture Results:   Few Streptococcus mitis/oralis group    RADIOLOGY & ADDITIONAL STUDIES:

## 2020-05-10 NOTE — PROGRESS NOTE ADULT - PROBLEM SELECTOR PLAN 4
UA grossly positive however pt has chronic Starks, changed in ED  - Continue Vanc/Zosyn, Caspofungin  - UCx likely with contaminant UTI less likely at this point  - UCx likely with contaminant  - Continue empiric Abx

## 2020-05-10 NOTE — PROGRESS NOTE ADULT - PROBLEM SELECTOR PLAN 1
Unclear if 2/2 UTI vs. sacral ulcer with ?osteo vs. pulmonary source  - Continue Vanc/Zosyn, Caspofungin  - BP remains soft, s/p 2L NS bolus in ED, continue gentle IVF  - UCx likely contaminated and BCx with NGTD  - Zofran PRN for nausea  - ID (Dr Tabares) consulted, recs appreciated Likely 2/2 pulmonary source with possible empyema vs. sacral ulcer  - Continue Vanc, Zosyn, Caspofungin  - BP remains soft, continue gentle IVF for another 24h  - UCx likely contaminated, BCx with NGTD  - F/u fungal Cx, serum Ag studies  - ID (Dr Tabares) consulted, recs appreciated

## 2020-05-10 NOTE — CHART NOTE - NSCHARTNOTEFT_GEN_A_CORE
exudate eff - pH c/w Empyema  s/p pigtail placement  may need operative intervention   will discuss with Thoracic  discussed with ID

## 2020-05-11 ENCOUNTER — INPATIENT (INPATIENT)
Facility: HOSPITAL | Age: 70
LOS: 28 days | Discharge: NURSING, REHAB OR LONG-TERM CARE | DRG: 326 | End: 2020-06-09
Attending: THORACIC SURGERY (CARDIOTHORACIC VASCULAR SURGERY) | Admitting: THORACIC SURGERY (CARDIOTHORACIC VASCULAR SURGERY)
Payer: MEDICARE

## 2020-05-11 VITALS
TEMPERATURE: 99 F | HEART RATE: 90 BPM | OXYGEN SATURATION: 94 % | RESPIRATION RATE: 18 BRPM | HEIGHT: 66 IN | DIASTOLIC BLOOD PRESSURE: 63 MMHG | SYSTOLIC BLOOD PRESSURE: 92 MMHG | WEIGHT: 125 LBS

## 2020-05-11 VITALS
TEMPERATURE: 98 F | HEART RATE: 85 BPM | OXYGEN SATURATION: 95 % | RESPIRATION RATE: 18 BRPM | DIASTOLIC BLOOD PRESSURE: 58 MMHG | SYSTOLIC BLOOD PRESSURE: 94 MMHG

## 2020-05-11 DIAGNOSIS — K22.3 PERFORATION OF ESOPHAGUS: ICD-10-CM

## 2020-05-11 DIAGNOSIS — Z92.89 PERSONAL HISTORY OF OTHER MEDICAL TREATMENT: Chronic | ICD-10-CM

## 2020-05-11 DIAGNOSIS — Z98.890 OTHER SPECIFIED POSTPROCEDURAL STATES: Chronic | ICD-10-CM

## 2020-05-11 LAB
-  AMIKACIN: SIGNIFICANT CHANGE UP
-  AMOXICILLIN/CLAVULANIC ACID: SIGNIFICANT CHANGE UP
-  AMPICILLIN/SULBACTAM: SIGNIFICANT CHANGE UP
-  AMPICILLIN/SULBACTAM: SIGNIFICANT CHANGE UP
-  AMPICILLIN: SIGNIFICANT CHANGE UP
-  AZTREONAM: SIGNIFICANT CHANGE UP
-  CEFAZOLIN: SIGNIFICANT CHANGE UP
-  CEFAZOLIN: SIGNIFICANT CHANGE UP
-  CEFEPIME: SIGNIFICANT CHANGE UP
-  CEFOXITIN: SIGNIFICANT CHANGE UP
-  CEFTAZIDIME/AVIBACTAM: SIGNIFICANT CHANGE UP
-  CEFTOLOZANE/TAZOBACTAM: SIGNIFICANT CHANGE UP
-  CEFTRIAXONE: SIGNIFICANT CHANGE UP
-  CEFTRIAXONE: SIGNIFICANT CHANGE UP
-  CIPROFLOXACIN: SIGNIFICANT CHANGE UP
-  CLINDAMYCIN: SIGNIFICANT CHANGE UP
-  CLINDAMYCIN: SIGNIFICANT CHANGE UP
-  ERTAPENEM: SIGNIFICANT CHANGE UP
-  ERYTHROMYCIN: SIGNIFICANT CHANGE UP
-  ERYTHROMYCIN: SIGNIFICANT CHANGE UP
-  GENTAMICIN: SIGNIFICANT CHANGE UP
-  GENTAMICIN: SIGNIFICANT CHANGE UP
-  IMIPENEM: SIGNIFICANT CHANGE UP
-  LEVOFLOXACIN: SIGNIFICANT CHANGE UP
-  LEVOFLOXACIN: SIGNIFICANT CHANGE UP
-  MEROPENEM: SIGNIFICANT CHANGE UP
-  OXACILLIN: SIGNIFICANT CHANGE UP
-  PENICILLIN: SIGNIFICANT CHANGE UP
-  PENICILLIN: SIGNIFICANT CHANGE UP
-  PIPERACILLIN/TAZOBACTAM: SIGNIFICANT CHANGE UP
-  RIFAMPIN: SIGNIFICANT CHANGE UP
-  TETRACYCLINE: SIGNIFICANT CHANGE UP
-  TOBRAMYCIN: SIGNIFICANT CHANGE UP
-  TRIMETHOPRIM/SULFAMETHOXAZOLE: SIGNIFICANT CHANGE UP
-  TRIMETHOPRIM/SULFAMETHOXAZOLE: SIGNIFICANT CHANGE UP
-  VANCOMYCIN: SIGNIFICANT CHANGE UP
-  VANCOMYCIN: SIGNIFICANT CHANGE UP
ANION GAP SERPL CALC-SCNC: 12 MMOL/L — SIGNIFICANT CHANGE UP (ref 5–17)
BASOPHILS # BLD AUTO: 0 K/UL — SIGNIFICANT CHANGE UP (ref 0–0.2)
BASOPHILS NFR BLD AUTO: 0 % — SIGNIFICANT CHANGE UP (ref 0–2)
BUN SERPL-MCNC: 26 MG/DL — HIGH (ref 7–23)
CALCIUM SERPL-MCNC: 7.3 MG/DL — LOW (ref 8.5–10.1)
CHLORIDE SERPL-SCNC: 104 MMOL/L — SIGNIFICANT CHANGE UP (ref 96–108)
CO2 SERPL-SCNC: 19 MMOL/L — LOW (ref 22–31)
CREAT SERPL-MCNC: 0.98 MG/DL — SIGNIFICANT CHANGE UP (ref 0.5–1.3)
EOSINOPHIL # BLD AUTO: 0.08 K/UL — SIGNIFICANT CHANGE UP (ref 0–0.5)
EOSINOPHIL NFR BLD AUTO: 1 % — SIGNIFICANT CHANGE UP (ref 0–6)
GLUCOSE SERPL-MCNC: 105 MG/DL — HIGH (ref 70–99)
HCT VFR BLD CALC: 21.2 % — LOW (ref 34.5–45)
HCT VFR BLD CALC: 25.6 % — LOW (ref 34.5–45)
HGB BLD-MCNC: 7 G/DL — CRITICAL LOW (ref 11.5–15.5)
HGB BLD-MCNC: 8.7 G/DL — LOW (ref 11.5–15.5)
LYMPHOCYTES # BLD AUTO: 1.89 K/UL — SIGNIFICANT CHANGE UP (ref 1–3.3)
LYMPHOCYTES # BLD AUTO: 23 % — SIGNIFICANT CHANGE UP (ref 13–44)
MCHC RBC-ENTMCNC: 26.9 PG — LOW (ref 27–34)
MCHC RBC-ENTMCNC: 33 GM/DL — SIGNIFICANT CHANGE UP (ref 32–36)
MCV RBC AUTO: 81.5 FL — SIGNIFICANT CHANGE UP (ref 80–100)
METHOD TYPE: SIGNIFICANT CHANGE UP
MONOCYTES # BLD AUTO: 0.41 K/UL — SIGNIFICANT CHANGE UP (ref 0–0.9)
MONOCYTES NFR BLD AUTO: 5 % — SIGNIFICANT CHANGE UP (ref 2–14)
NEUTROPHILS # BLD AUTO: 5.84 K/UL — SIGNIFICANT CHANGE UP (ref 1.8–7.4)
NEUTROPHILS NFR BLD AUTO: 65 % — SIGNIFICANT CHANGE UP (ref 43–77)
NON-GYNECOLOGICAL CYTOLOGY STUDY: SIGNIFICANT CHANGE UP
NRBC # BLD: SIGNIFICANT CHANGE UP /100 WBCS (ref 0–0)
PLATELET # BLD AUTO: 661 K/UL — HIGH (ref 150–400)
POTASSIUM SERPL-MCNC: 4.4 MMOL/L — SIGNIFICANT CHANGE UP (ref 3.5–5.3)
POTASSIUM SERPL-SCNC: 4.4 MMOL/L — SIGNIFICANT CHANGE UP (ref 3.5–5.3)
RBC # BLD: 2.6 M/UL — LOW (ref 3.8–5.2)
RBC # FLD: 18.9 % — HIGH (ref 10.3–14.5)
SODIUM SERPL-SCNC: 135 MMOL/L — SIGNIFICANT CHANGE UP (ref 135–145)
WBC # BLD: 8.22 K/UL — SIGNIFICANT CHANGE UP (ref 3.8–10.5)
WBC # FLD AUTO: 8.22 K/UL — SIGNIFICANT CHANGE UP (ref 3.8–10.5)

## 2020-05-11 PROCEDURE — 71045 X-RAY EXAM CHEST 1 VIEW: CPT | Mod: 26

## 2020-05-11 PROCEDURE — 71250 CT THORAX DX C-: CPT | Mod: 26

## 2020-05-11 PROCEDURE — 99284 EMERGENCY DEPT VISIT MOD MDM: CPT

## 2020-05-11 PROCEDURE — 74220 X-RAY XM ESOPHAGUS 1CNTRST: CPT | Mod: 26

## 2020-05-11 PROCEDURE — 99233 SBSQ HOSP IP/OBS HIGH 50: CPT

## 2020-05-11 RX ORDER — SACCHAROMYCES BOULARDII 250 MG
1 POWDER IN PACKET (EA) ORAL
Qty: 0 | Refills: 0 | DISCHARGE
Start: 2020-05-11

## 2020-05-11 RX ORDER — ASCORBIC ACID 60 MG
1 TABLET,CHEWABLE ORAL
Qty: 0 | Refills: 0 | DISCHARGE
Start: 2020-05-11

## 2020-05-11 RX ORDER — LETROZOLE 2.5 MG/1
2.5 TABLET, FILM COATED ORAL
Qty: 0 | Refills: 0 | DISCHARGE

## 2020-05-11 RX ORDER — LISINOPRIL 2.5 MG/1
20 TABLET ORAL
Qty: 0 | Refills: 0 | DISCHARGE

## 2020-05-11 RX ORDER — ONDANSETRON 8 MG/1
0 TABLET, FILM COATED ORAL
Qty: 0 | Refills: 0 | DISCHARGE

## 2020-05-11 RX ORDER — COLLAGENASE CLOSTRIDIUM HIST. 250 UNIT/G
1 OINTMENT (GRAM) TOPICAL
Qty: 0 | Refills: 0 | DISCHARGE
Start: 2020-05-11

## 2020-05-11 RX ORDER — LETROZOLE 2.5 MG/1
1 TABLET, FILM COATED ORAL
Qty: 0 | Refills: 0 | DISCHARGE
Start: 2020-05-11

## 2020-05-11 RX ORDER — LATANOPROST 0.05 MG/ML
1 SOLUTION/ DROPS OPHTHALMIC; TOPICAL
Qty: 0 | Refills: 0 | DISCHARGE

## 2020-05-11 RX ORDER — SODIUM CHLORIDE 9 MG/ML
1000 INJECTION INTRAMUSCULAR; INTRAVENOUS; SUBCUTANEOUS
Refills: 0 | Status: DISCONTINUED | OUTPATIENT
Start: 2020-05-11 | End: 2020-05-11

## 2020-05-11 RX ORDER — LATANOPROST 0.05 MG/ML
1 SOLUTION/ DROPS OPHTHALMIC; TOPICAL
Qty: 0 | Refills: 0 | DISCHARGE
Start: 2020-05-11

## 2020-05-11 RX ORDER — CASPOFUNGIN ACETATE 7 MG/ML
50 INJECTION, POWDER, LYOPHILIZED, FOR SOLUTION INTRAVENOUS
Qty: 0 | Refills: 0 | DISCHARGE
Start: 2020-05-11

## 2020-05-11 RX ORDER — ACETAMINOPHEN 500 MG
2 TABLET ORAL
Qty: 0 | Refills: 0 | DISCHARGE
Start: 2020-05-11

## 2020-05-11 RX ORDER — VANCOMYCIN HCL 1 G
750 VIAL (EA) INTRAVENOUS
Qty: 0 | Refills: 0 | DISCHARGE
Start: 2020-05-11

## 2020-05-11 RX ORDER — MULTIVIT-MIN/FERROUS GLUCONATE 9 MG/15 ML
1 LIQUID (ML) ORAL
Qty: 0 | Refills: 0 | DISCHARGE
Start: 2020-05-11

## 2020-05-11 RX ORDER — INTERFERON BETA-1A 22 UG/.5ML
0 INJECTION, SOLUTION SUBCUTANEOUS
Qty: 0 | Refills: 0 | DISCHARGE

## 2020-05-11 RX ORDER — RIBOCICLIB 200 MG/1
1 TABLET, FILM COATED ORAL
Qty: 0 | Refills: 0 | DISCHARGE

## 2020-05-11 RX ORDER — ONDANSETRON 8 MG/1
1 TABLET, FILM COATED ORAL
Qty: 0 | Refills: 0 | DISCHARGE
Start: 2020-05-11

## 2020-05-11 RX ORDER — PIPERACILLIN AND TAZOBACTAM 4; .5 G/20ML; G/20ML
3.38 INJECTION, POWDER, LYOPHILIZED, FOR SOLUTION INTRAVENOUS
Qty: 0 | Refills: 0 | DISCHARGE
Start: 2020-05-11

## 2020-05-11 RX ADMIN — Medication 250 MILLIGRAM(S): at 17:36

## 2020-05-11 RX ADMIN — Medication 1 TABLET(S): at 14:01

## 2020-05-11 RX ADMIN — ENOXAPARIN SODIUM 30 MILLIGRAM(S): 100 INJECTION SUBCUTANEOUS at 14:01

## 2020-05-11 RX ADMIN — Medication 1 APPLICATION(S): at 14:01

## 2020-05-11 RX ADMIN — Medication 250 MILLIGRAM(S): at 01:56

## 2020-05-11 RX ADMIN — SODIUM CHLORIDE 50 MILLILITER(S): 9 INJECTION INTRAMUSCULAR; INTRAVENOUS; SUBCUTANEOUS at 14:04

## 2020-05-11 RX ADMIN — LETROZOLE 2.5 MILLIGRAM(S): 2.5 TABLET, FILM COATED ORAL at 14:02

## 2020-05-11 RX ADMIN — Medication 500 MILLIGRAM(S): at 14:01

## 2020-05-11 RX ADMIN — SODIUM CHLORIDE 70 MILLILITER(S): 9 INJECTION INTRAMUSCULAR; INTRAVENOUS; SUBCUTANEOUS at 17:39

## 2020-05-11 RX ADMIN — CASPOFUNGIN ACETATE 260 MILLIGRAM(S): 7 INJECTION, POWDER, LYOPHILIZED, FOR SOLUTION INTRAVENOUS at 14:00

## 2020-05-11 RX ADMIN — PIPERACILLIN AND TAZOBACTAM 25 GRAM(S): 4; .5 INJECTION, POWDER, LYOPHILIZED, FOR SOLUTION INTRAVENOUS at 04:39

## 2020-05-11 RX ADMIN — PIPERACILLIN AND TAZOBACTAM 25 GRAM(S): 4; .5 INJECTION, POWDER, LYOPHILIZED, FOR SOLUTION INTRAVENOUS at 15:38

## 2020-05-11 RX ADMIN — Medication 250 MILLIGRAM(S): at 05:34

## 2020-05-11 NOTE — ED PROVIDER NOTE - CLINICAL SUMMARY MEDICAL DECISION MAKING FREE TEXT BOX
Pt. transferred to [facility] for repair of esophageal perforation and empyema. Pt. accepted by Dr. Murcia.

## 2020-05-11 NOTE — PROGRESS NOTE ADULT - PROBLEM SELECTOR PROBLEM 1
Empyema of right pleural space
Hydropneumothorax
Severe sepsis

## 2020-05-11 NOTE — PROGRESS NOTE ADULT - PROBLEM SELECTOR PLAN 1
Likely 2/2 pulmonary source with possible empyema vs. sacral ulcer  - Continue Vanc, Zosyn, Caspofungin  - BP remains soft, continue gentle IVF for another 24h  - UCx likely contaminated, BCx with NGTD  - F/u fungal Cx, serum Ag studies  - ID (Dr Tabares) consulted, recs appreciated 2/2 empyema due to esophageal perf found on CT today  - Continue Vanc, Zosyn, Caspofungin  - BP remains soft, continue gentle IVF  - BCx with NGTD  - CTS (Dr Murcia) and ID (Dr Tabares) following, recs appreciated

## 2020-05-11 NOTE — ED ADULT TRIAGE NOTE - CHIEF COMPLAINT QUOTE
Pt transfer from [place] for admission to sx team here, MD Santo at bedside to evaluate pt. On arrival pt A+Ox4, denies any pain or discomforts. Pt moved into critical care for evaluation.

## 2020-05-11 NOTE — ED ADULT NURSE NOTE - INTERVENTIONS DEFINITIONS
Stretcher in lowest position, wheels locked, appropriate side rails in place/Call bell, personal items and telephone within reach/[place] to call system

## 2020-05-11 NOTE — PROGRESS NOTE ADULT - PROBLEM SELECTOR PLAN 2
s/p R chest tube placement on 5/8/20 and 5/10/20  - Pleural fluid likely exudative, +RBCs, Strep mitis/oralis, yeast  - F/u cytopath, fungal Cx, serum Ag studies  - Monitor fluid drainage from chest tube  - Incentive spirometry  - Pulm (Dr Allen) and CTS following s/p R chest tube placement x2 on 5/8/20 and 5/10/20  - Pleural fluid likely exudative, +RBCs, Strep mitis/oralis, yeast  - F/u cytopath, fungal Cx, serum Ag studies  - Monitor fluid drainage from chest tubes  - Incentive spirometry  - Pulm (Dr Allen) and CTS following 2/2 esophageal perf  - s/p R chest tube placement x2 on 5/8/20 and 5/10/20  - Pleural fluid exudative, +RBCs, Strep mitis/oralis, Klebsiella oxytoca, yeast  - Cytopath showed neutrophils and bacteria, negative for malignant cells  - F/u serum Ag studies  - Monitor fluid drainage from chest tubes  - Incentive spirometry  - Pulm (Dr Allen) and CTS following

## 2020-05-11 NOTE — PROGRESS NOTE ADULT - SUBJECTIVE AND OBJECTIVE BOX
HOD # 3    SUBJECTIVE:  68 y/o F seen and examined at bedside offering no complaints at this time in NAD. No acute overnight events noted. Am cbc noted with H/H of 7/21. Both right sided chest tubes in place with about 1100 and 45 cc in each cannister respectively, with some pus like drainage noted.  Patient denies any fevers, chills, chest pain, shortness of breath, abdominal pain, nausea, vomiting or diarrhea.    Vital Signs Last 24 Hrs  T(C): 36.7 (11 May 2020 06:42), Max: 37 (10 May 2020 22:20)  T(F): 98 (11 May 2020 06:42), Max: 98.6 (10 May 2020 22:20)  HR: 92 (11 May 2020 06:42) (92 - 101)  BP: 98/69 (11 May 2020 06:42) (96/67 - 110/70)  BP(mean): --  RR: 18 (11 May 2020 06:42) (18 - 18)  SpO2: 95% (11 May 2020 06:42) (92% - 95%)    PHYSICAL EXAM:  GENERAL: No acute distress, well-developed  CHEST/LUNG: decreased right sided breath sounds  HEART: RRR, no MRG  ABDOMEN: Soft, non-tender, non-distended; bowel sounds+      I&O's Summary    10 May 2020 07:01  -  11 May 2020 07:00  --------------------------------------------------------  IN: 0 mL / OUT: 3090 mL / NET: -3090 mL      I&O's Detail    10 May 2020 07:01  -  11 May 2020 07:00  --------------------------------------------------------  IN:  Total IN: 0 mL    OUT:    Chest Tube: 10 mL    Chest Tube: 2180 mL    Indwelling Catheter - Stomal: 900 mL  Total OUT: 3090 mL    Total NET: -3090 mL        MEDICATIONS  (STANDING):  ascorbic acid 500 milliGRAM(s) Oral daily  caspofungin IVPB      caspofungin IVPB 50 milliGRAM(s) IV Intermittent every 24 hours  collagenase Ointment 1 Application(s) Topical daily  enoxaparin Injectable 30 milliGRAM(s) SubCutaneous daily  latanoprost 0.005% Ophthalmic Solution 1 Drop(s) Both EYES at bedtime  letrozole 2.5 milliGRAM(s) Oral daily  multivitamin/minerals 1 Tablet(s) Oral daily  piperacillin/tazobactam IVPB.. 3.375 Gram(s) IV Intermittent every 8 hours  saccharomyces boulardii 250 milliGRAM(s) Oral two times a day  sodium chloride 0.9%. 1000 milliLiter(s) (50 mL/Hr) IV Continuous <Continuous>  vancomycin  IVPB 750 milliGRAM(s) IV Intermittent every 24 hours    MEDICATIONS  (PRN):  acetaminophen   Tablet .. 650 milliGRAM(s) Oral every 6 hours PRN Temp greater or equal to 38C (100.4F), Mild Pain (1 - 3)  ondansetron   Disintegrating Tablet 4 milliGRAM(s) Oral every 6 hours PRN Nausea and/or Vomiting    LABS:                        7.0    8.22  )-----------( 661      ( 11 May 2020 07:37 )             21.2     05-11    135  |  104  |  26<H>  ----------------------------<  105<H>  4.4   |  19<L>  |  0.98    Ca    7.3<L>      11 May 2020 07:37    ASSESSMENT  68 y/o F HOD # 3 with right sided pneumothorax with 2 chest tubes in place, with pus like drainage noted, am cbc of drop in H/H,    PLAN  - NPO, f/u esophagram r/o esophageal perf  - transfuse prn, per primary team, trend h/h, serial cbc's  - cont care per primary team  - pain control, supportive care  - labs in am  - discussed with Dr. Murcia     Surgical Team Contact Information  Spectralink: Ext: [phone] or [phone]  Pager: [phone]

## 2020-05-11 NOTE — CHART NOTE - NSCHARTNOTEFT_GEN_A_CORE
spoke with Daughter this am - updated her on current status events, PRBC, Empyema eval, Anemia, 2 chest tubes, goals of care discussion and tx plan  pt is full code  getting PRBC for drop in Hgb and eval for bleeding in progress  CTS follow up noted  awake - alert - pt - VS noted and reviewed -   will follow

## 2020-05-11 NOTE — PROGRESS NOTE ADULT - ASSESSMENT
69 year old F non-ambulatory with PMH of multiple sclerosis, Breast Cancer s/p R mastectomy, Osteoporosis, Mitral stenosis, right ankle fracture, chronic sacral ulcer, chronic midline back pain that has been present for past few months since patient was dropped from a [employer] lift (7/17/2019) presented with nausea and vomiting, admitted for severe sepsis - unclear if 2/2 UTI vs. sacral decub, and pneumothorax s/p R chest tube placement on 5/8/2020. 69 year old F non-ambulatory with PMH of multiple sclerosis, Breast Cancer s/p R mastectomy, Osteoporosis, Mitral stenosis, right ankle fracture, chronic sacral ulcer, chronic midline back pain that has been present for past few months since patient was dropped from a [employer] lift (7/17/2019) presented with nausea and vomiting, admitted for severe sepsis and hydropneumothorax s/p R chest tube placement x2. Likely with empyema 2/2 esophageal perf found on CT.

## 2020-05-11 NOTE — ED PROVIDER NOTE - OBJECTIVE STATEMENT
69 year old F non-ambulatory with PMHx significant for multiple sclerosis, Breast Cancer s/p R mastectomy, Osteoporosis, Mitral stenosis, right ankle fracture, chronic sacral ulcer, chronic midline back pain that has been present for past few months since patient was dropped from a [employer] life (7/17/2019)  Sent in from [facility] after she was diagnosed with an Esophageal perforation with Empyema, seen on CT scan today. Dr. Murcia from CT surgery accepted the patient.  Pt.  she was admitted on 5/7/20 for sepsis and Right hydropneumothorax.   Pt. has Right chest tube. 69 year old F non-ambulatory with PMHx significant for multiple sclerosis, Breast Cancer s/p R mastectomy, Osteoporosis, Mitral stenosis, right ankle fracture, chronic sacral ulcer, chronic midline back pain that has been present for past few months since patient was dropped from a [employer] life (7/17/2019)  Sent in from [facility] after she was diagnosed with an Esophageal perforation with Empyema, seen on CT scan today. Dr. Murcia from CT surgery accepted the patient.  Pt.  she was admitted on 5/7/20 for sepsis and Right hydropneumothorax.   Pt. has Right chest tube. Pt. denies any chest pain. NO SOB. Pt. has no complaint at this time.

## 2020-05-11 NOTE — PROGRESS NOTE ADULT - REASON FOR ADMISSION
hydropneumothorax

## 2020-05-11 NOTE — ED ADULT NURSE NOTE - NS ED NURSE  PRESS [facility] AREA 13
[facility] Electrophysiology Services    [address]    [place] [address]    Phone:  [phone]    Fax:  [phone]       Thank You for choosing us for your health care visit. We are glad to serve you and happy to provide you with this summary of your visit. Please help us to ensure we have accurate records. If you find anything that needs to be changed, please let our staff know as soon as possible.          Your Demographic Information     Patient Name Sex Jarrell Duffy Male 1948       Ethnic Group Patient Race    Not of  or  Origin White      Your Visit Details     Date & Time Provider Department    3/27/2017 11:15 AM Paolo Stevens MD; CVF Device Check [facility]iology [facility]      Your Upcoming Appointment*(Max 10)     2017  9:50 AM CDT   Office Visit with King Sullivan MD   [facility] ([facility] [place])    [address] [place] [address] Dr  [facility] [id number] [phone]            2017  1:30 PM CDT   ICD Check Office with Paolo Stevens MD, CVF DEVICE CHECK   [facility]iology [facility] ([facility] [place])    [address]  [place] [address]   [phone]             10:30 AM CST   Follow-up Visit with John Kinney MD   [facility] [place] [facility] ([facility] [place])    [address]  [place] [address] [phone]              Your To Do List     Follow-Up    Return in about 4 months (around 2017) for Medtronic ICD.      We Ordered or Performed the Following     IN PERSON DEVICE CHECK       Conditions Discussed Today or Order-Related Diagnoses        Comments    VT (ventricular tachycardia)    -  Primary       Your Vitals Were     BP Pulse Height Weight BMI Smoking Status    120/60 80 5' 10.5\" (1.791 m) 220 lb  (99.8 kg) 31.12 kg/m2 Former Smoker      Medications Prescribed or Re-Ordered Today     None      Your Current Medications Are        Disp Refills Start End    lisinopril (PRINIVIL,ZESTRIL) 40 MG tablet 90 tablet 3 3/20/2017     Sig - Route: Take 1 tablet by mouth daily. - Oral    Class: Eprescribe    amiodarone (PACERONE,CORDARONE) 200 MG tablet 52 tablet 3 3/20/2017     Si tablet 4 days per week    Class: Eprescribe    carvedilol (COREG) 25 MG tablet 180 tablet 3 3/20/2017     Sig - Route: Take 1 tablet by mouth 2 times daily (with meals). - Oral    Class: Eprescribe    aspirin (ASPIRIN CHILDRENS) 81 MG chewable tablet   3/2/2017     Sig - Route: Chew 1 tablet by mouth daily. - Oral    Class: Historical Med    atorvastatin (LIPITOR) 40 MG tablet 90 tablet 3 2016     Sig - Route: Take 1 tablet by mouth daily. - Oral    Class: Eprescribe    ezetimibe (ZETIA) 10 MG tablet 90 tablet 3 2016     Sig - Route: Take 1 tablet by mouth daily. - Oral    Class: Eprescribe    quiNIDine gluconate 324 MG CR tablet 180 tablet 3 2016     Sig - Route: Take 1 tablet by mouth 2 times daily. - Oral    Class: Eprescribe    folic acid (FOLVITE) 800 MCG tablet   2011     Sig - Route: Take 800 mcg by mouth daily. - Oral    Class: Historical Med      Allergies     Penicillins     Unknown Reaction (received as a child)      Problem List as of 3/27/2017     Excess fluid volume    Dual Chamber ICD-Medtronic    VT (ventricular tachycardia)    Amiodarone and Quinidine therapy    CAD (coronary artery disease)    Status post total replacement of right hip    MILES (obstructive sleep apnea)    Postsurgical aortocoronary bypass status    Hyperlipidemia    Ischemic cardiomyopathy            Patient Instructions     None       medial/left

## 2020-05-11 NOTE — ED PROVIDER NOTE - PROGRESS NOTE DETAILS
Pt. will be admitted to CT sx. Covid testing pending for placement Pt. will be admitted to CT sx. Covid testing pending for placement. Pt. seen by [facility] PA.

## 2020-05-11 NOTE — PROGRESS NOTE ADULT - SUBJECTIVE AND OBJECTIVE BOX
This progress note was completed in part by resident acting as telephonic scribe during COVID-19 crisis. Physical exam and review of systems was completed by attending physician, and findings below are those of the attending physician, and have been reviewed in detail.    Patient is a 69y old  Female who presents with a chief complaint of hydropneumothorax (08 May 2020 03:24)    INTERVAL HPI/OVERNIGHT EVENTS: Pt seen and examined at bedside. No acute events overnight.     Vital Signs Last 24 Hrs  T(C): 36.7 (11 May 2020 06:42), Max: 37 (10 May 2020 22:20)  T(F): 98 (11 May 2020 06:42), Max: 98.6 (10 May 2020 22:20)  HR: 92 (11 May 2020 06:42) (92 - 101)  BP: 98/69 (11 May 2020 06:42) (96/67 - 110/70)  BP(mean): --  RR: 18 (11 May 2020 06:42) (18 - 18)  SpO2: 95% (11 May 2020 06:42) (92% - 95%)      I&O's Summary    10 May 2020 07:01  -  11 May 2020 07:00  --------------------------------------------------------  IN: 0 mL / OUT: 3090 mL / NET: -3090 mL        LABS:                        7.3    6.72  )-----------( 674      ( 10 May 2020 08:43 )             22.8     10 May 2020 09:41    134    |  106    |  24     ----------------------------<  116    4.0     |  17     |  0.83     Ca    7.2        10 May 2020 09:41      Culture - Body Fluid with Gram Stain (collected 08 May 2020 17:52)  Source: Pleural Fl Pleural Fluid  Gram Stain (08 May 2020 19:18):    Few polymorphonuclear leukocytes per low power field    Rare Gram positive cocci in pairs per oil power field    Rare Gram Variable Rods per oil power field    Rare Yeast per oil power field  Preliminary Report (09 May 2020 20:34):    Few Streptococcus mitis/oralis group    Culture - Urine (collected 08 May 2020 09:09)  Source: .Urine Catheterized  Final Report (09 May 2020 08:24):    >=3 organisms. Probable collection contamination.    Culture - Blood (collected 08 May 2020 09:02)  Source: .Blood Blood-Peripheral  Preliminary Report (09 May 2020 10:01):    No growth to date.       MEDICATIONS  (STANDING):  ascorbic acid 500 milliGRAM(s) Oral daily  caspofungin IVPB      caspofungin IVPB 50 milliGRAM(s) IV Intermittent every 24 hours  collagenase Ointment 1 Application(s) Topical daily  enoxaparin Injectable 30 milliGRAM(s) SubCutaneous daily  latanoprost 0.005% Ophthalmic Solution 1 Drop(s) Both EYES at bedtime  letrozole 2.5 milliGRAM(s) Oral daily  multivitamin/minerals 1 Tablet(s) Oral daily  piperacillin/tazobactam IVPB.. 3.375 Gram(s) IV Intermittent every 8 hours  saccharomyces boulardii 250 milliGRAM(s) Oral two times a day  sodium chloride 0.9%. 1000 milliLiter(s) (50 mL/Hr) IV Continuous <Continuous>  vancomycin  IVPB 750 milliGRAM(s) IV Intermittent every 24 hours    MEDICATIONS  (PRN):  acetaminophen   Tablet .. 650 milliGRAM(s) Oral every 6 hours PRN Temp greater or equal to 38C (100.4F), Mild Pain (1 - 3)  ondansetron   Disintegrating Tablet 4 milliGRAM(s) Oral every 6 hours PRN Nausea and/or Vomiting        REVIEW OF SYSTEMS:  CONSTITUTIONAL: No fever, weight loss, +fatigue  EYES: No eye pain, visual disturbances, or discharge  ENMT: No difficulty hearing, tinnitus, vertigo; No sinus or throat pain  NECK: No pain or stiffness  RESPIRATORY: No cough, wheezing, chills or hemoptysis; No shortness of breath  CARDIOVASCULAR: No chest pain, palpitations, dizziness, or leg swelling  GASTROINTESTINAL: No abdominal or epigastric pain. No nausea, vomiting, or hematemesis; No diarrhea or constipation; No melena or hematochezia  GENITOURINARY: No dysuria, frequency, hematuria, or incontinence  NEUROLOGICAL: No headaches, memory loss, loss of strength, numbness, or tremors  SKIN: No itching, burning, rashes, or lesions   MUSCULOSKELETAL: No joint pain or swelling; No muscle, back, or extremity pain  HEME/LYMPH: No easy bruising, or bleeding gums      PHYSICAL EXAM:  GENERAL: NAD, elderly, frail  HEAD: Atraumatic, Normocephalic  EYES: EOMI, PERRL, conjunctiva and sclera clear  ENMT: Moist mucous membranes  NECK: Supple, No JVD  NERVOUS SYSTEM: Alert & Oriented X3, Good concentration; Motor strength testing intact in B/L upper and lower extremities  CHEST/LUNG: Clear to percussion bilaterally; No rales, rhonchi, wheezing, or rubs; +R chest tube  HEART: Regular rate and rhythm; No murmurs, rubs, or gallops  ABDOMEN: Soft, Nontender, Nondistended; Bowel sounds present  EXTREMITIES: 2+ Peripheral Pulses, No clubbing, cyanosis, or edema  LYMPH: No lymphadenopathy noted  SKIN: Warm, dry, well-perfused, R sacral decub stage IV, R knee multiple small unstageable ulcers      RADIOLOGY & ADDITIONAL TESTS:    Imaging Personally Reviewed:  [ x ] YES  [ ] NO    Consultant(s) Notes Reviewed:  [ x ] YES  [ ] NO    Care Discussed with Consultants/Other Providers [ x ] YES  [ ] NO

## 2020-05-11 NOTE — GOALS OF CARE CONVERSATION - ADVANCED CARE PLANNING - CONVERSATION DETAILS
Spoke to pt about her wishes regarding resuscitation. She states that she does want CPR and intubation initiated but she would not want to be kept alive on machines. Pt was encouraged to have discussion with her HCP idicatind what her wishes would be.

## 2020-05-11 NOTE — ED ADULT NURSE NOTE - CHIEF COMPLAINT QUOTE
Pt transfer from [place] for admission to sx team here, MD Santo at bedside to evaluate pt. On arrival pt A+Ox4, denies any pain or discomforts. Pt moved into critical care for evaluation.

## 2020-05-11 NOTE — PROGRESS NOTE ADULT - PROBLEM SELECTOR PLAN 1
mets ca - breast ca - OM eval - decubitus - long standing MS -   PTX - pleural eff - Empyema -   on Antimicrobial regimen - fluid studies point to empyema - pH too -   thoracic surgery following -   heme onc eval noted -   ID following  overall prognosis guarded to poor - osseous mets - debilitated state - however resp status remains stable   now with 2 chest tubes - large bore and pigtail - CXR shows improvement  asp prec - oral hygiene - skin care - assist with ADL - monitor VS and HD -   I rosa as able to tolerate  labs and imaging reviewed with the patient  wound care -   pain assessment -

## 2020-05-11 NOTE — ED ADULT NURSE NOTE - OBJECTIVE STATEMENT
Received care of pt in CC room as ED CC RN. received pt a&ox3, no acute distress, breaths even and unlabored, #22IV to R FA, chest tube to medial chest wall, pigtail to R lateral chest. Also presents with unstageble pressure injury to medial R knee, 3 small unstageable pressure injuries to L medial shin and one large unstageable pressure injury to sacrum. pt also presents with indwelling campos catheter in place. pt offers no complaints at this time. pt cleaned and changed upon arrival. pt tolerated well. pillow placed between knees and pt repositioned for comfort. pt denies chest pain, sob or difficulty breathing at this time. Received care of pt in CC room as ED CC RN. received pt a&ox3, no acute distress, breaths even and unlabored, #22IV to R FA, chest tube to medial chest wall, pigtail to R lateral chest. Also presents with unstageble pressure injury to medial R knee, 3 small unstageable pressure injuries to L medial shin and one large unstageable pressure injury to sacrum. pt also presents with indwelling campos catheter in place. pt offers no complaints at this time. pt cleaned and changed upon arrival. pt tolerated well. pillow placed between knees and pt repositioned for comfort. pt denies chest pain, sob or difficulty breathing at this time. pt also presents with pink band to R arm.

## 2020-05-11 NOTE — PROGRESS NOTE ADULT - PROBLEM SELECTOR PLAN 6
Sacral decub, cannot r/o underlying osteo on CT  - Collagenase to R sacral decub with dry dressing daily  - Tissue Cx 5/1 with +Enterococcus faecalis, E. coli, Pseudomonas of R ischial wound  - Wound care, Dr. Medina consulted, recs appreciated  - Continue multivitamin, vit C and Ensure BID per dietary recs

## 2020-05-11 NOTE — PROGRESS NOTE ADULT - PROBLEM SELECTOR PLAN 3
Likely 2/2 acute/chronic disease  - Not a candidate for SOPHY or IV Fe, intermittent pRBCs per heme  - Monitor CBC and for signs/symptoms of bleeding  - Heme (Dr Garcia) consulted, recs appreciated Hb dropped to 7 today, ?related to acute/chronic disease  - 1 unit pRBCs ordered  - Not a candidate for SOPHY or IV Fe, intermittent pRBCs per heme  - Monitor CBC and for signs/symptoms of bleeding  - Heme (Dr Garcia) consulted, recs appreciated Hb dropped to 7 today, related to acute/chronic disease, esophageal perf  - 1 unit pRBCs ordered  - Not a candidate for SOPHY or IV Fe, intermittent pRBCs per heme  - Monitor CBC and for signs/symptoms of bleeding  - Heme (Dr Garcia) consulted, recs appreciated

## 2020-05-11 NOTE — PROGRESS NOTE ADULT - SUBJECTIVE AND OBJECTIVE BOX
infectious diseases progress note:    GEORGE STANLEY is a 69y y. o. Female patient    No concerning overnight events    Allergies    Sudafed (Other)    Intolerances    ANTIBIOTICS/RELEVANT:  antimicrobials  caspofungin IVPB      caspofungin IVPB 50 milliGRAM(s) IV Intermittent every 24 hours  piperacillin/tazobactam IVPB.. 3.375 Gram(s) IV Intermittent every 8 hours  vancomycin  IVPB 750 milliGRAM(s) IV Intermittent every 24 hours    immunologic:    OTHER:  acetaminophen   Tablet .. 650 milliGRAM(s) Oral every 6 hours PRN  ascorbic acid 500 milliGRAM(s) Oral daily  collagenase Ointment 1 Application(s) Topical daily  enoxaparin Injectable 30 milliGRAM(s) SubCutaneous daily  latanoprost 0.005% Ophthalmic Solution 1 Drop(s) Both EYES at bedtime  letrozole 2.5 milliGRAM(s) Oral daily  multivitamin/minerals 1 Tablet(s) Oral daily  ondansetron   Disintegrating Tablet 4 milliGRAM(s) Oral every 6 hours PRN  saccharomyces boulardii 250 milliGRAM(s) Oral two times a day  sodium chloride 0.9%. 1000 milliLiter(s) IV Continuous <Continuous>      Objective:  Vital Signs Last 24 Hrs  T(C): 36.7 (11 May 2020 06:42), Max: 37 (10 May 2020 22:20)  T(F): 98 (11 May 2020 06:42), Max: 98.6 (10 May 2020 22:20)  HR: 92 (11 May 2020 06:42) (92 - 101)  BP: 98/69 (11 May 2020 06:42) (96/67 - 110/70)  BP(mean): --  RR: 18 (11 May 2020 06:42) (18 - 18)  SpO2: 95% (11 May 2020 06:42) (92% - 95%)    T(C): 36.7 (05-11-20 @ 06:42), Max: 37.1 (05-09-20 @ 13:47)  T(C): 36.7 (05-11-20 @ 06:42), Max: 37.1 (05-09-20 @ 13:47)  T(C): 36.7 (05-11-20 @ 06:42), Max: 37.7 (05-07-20 @ 21:58)    PHYSICAL EXAM:  HEENT: NC atraumatic  Neck: supple  Respiratory: no accessory muscle use, breathing comfortably, 2 chest tubes on right with thick drainage  Cardiovascular: distant  Gastrointestinal: normal appearing, nondistended  Extremities: no clubbing, no cyanosis,      LABS:                          7.0    8.22  )-----------( 661      ( 11 May 2020 07:37 )             21.2       8.22 05-11 @ 07:37  6.72 05-10 @ 08:43  4.95 05-09 @ 10:48  3.23 05-08 @ 06:11  4.15 05-07 @ 23:09      05-11    135  |  104  |  26<H>  ----------------------------<  105<H>  4.4   |  19<L>  |  0.98    Ca    7.3<L>      11 May 2020 07:37        Creatinine, Serum: 0.98 mg/dL (05-11-20 @ 07:37)  Creatinine, Serum: 0.83 mg/dL (05-10-20 @ 09:41)  Creatinine, Serum: 0.97 mg/dL (05-09-20 @ 10:48)  Creatinine, Serum: 1.20 mg/dL (05-08-20 @ 06:11)  Creatinine, Serum: 1.50 mg/dL (05-07-20 @ 23:09)      INFLAMMATORY MARKERS  Auto Neutrophil #: 5.84 K/uL (05-11-20 @ 07:37)  Auto Lymphocyte #: 1.89 K/uL (05-11-20 @ 07:37)  Auto Neutrophil #: 4.69 K/uL (05-10-20 @ 08:43)  Auto Lymphocyte #: 1.52 K/uL (05-10-20 @ 08:43)  Auto Neutrophil #: 3.27 K/uL (05-09-20 @ 10:48)  Auto Lymphocyte #: 1.29 K/uL (05-09-20 @ 10:48)  Auto Neutrophil #: 2.14 K/uL (05-08-20 @ 06:11)  Auto Lymphocyte #: 0.91 K/uL (05-08-20 @ 06:11)  Auto Neutrophil #: 2.28 K/uL (05-07-20 @ 23:09)  Auto Lymphocyte #: 1.49 K/uL (05-07-20 @ 23:09)    Lactate, Blood: 2.7 mmol/L (05-08-20 @ 03:49)  Lactate, Blood: 5.1 mmol/L (05-07-20 @ 23:09)    Auto Eosinophil #: 0.08 K/uL (05-11-20 @ 07:37)  Auto Eosinophil #: 0.01 K/uL (05-10-20 @ 08:43)  Auto Eosinophil #: 0.00 K/uL (05-09-20 @ 10:48)  Auto Eosinophil #: 0.00 K/uL (05-08-20 @ 06:11)  Auto Eosinophil #: 0.00 K/uL (05-07-20 @ 23:09)    Lactate Dehydrogenase, Serum: 181 U/L (05-08-20 @ 06:11)    Ferritin, Serum: 808 ng/mL (05-09-20 @ 15:27)      MICROBIOLOGY:    Culture - Body Fluid with Gram Stain (05.08.20 @ 17:52)    Gram Stain:   Few polymorphonuclear leukocytes per low power field  Rare Gram positive cocci in pairs per oil power field  Rare Gram Variable Rods per oil power field  Rare Yeast per oil power field    Specimen Source: Pleural Fl Pleural Fluid    Culture Results:   Few Streptococcus mitis/oralis group  Growth in fluid media only Klebsiella oxytoca  Rare Coag Negative Staphylococcus        RADIOLOGY & ADDITIONAL STUDIES:

## 2020-05-11 NOTE — PROGRESS NOTE ADULT - ATTENDING COMMENTS
Patient to get esophagram today. Please leave her NPO until the films are reviewed.   Will talk to the Patient and family and decide on further surgical intervention

## 2020-05-11 NOTE — PROGRESS NOTE ADULT - PROBLEM SELECTOR PLAN 1
noting multiple isolates in pleural fluid including fungal elements, cont abx caspofungin and follow serum ag tests

## 2020-05-11 NOTE — ED PROVIDER NOTE - PHYSICAL EXAMINATION
GENERAL: elderly frail female in NAD  	EYES: sclera clear, no exudates  	ENMT: oropharynx clear without erythema,   	NECK: supple, soft, no thyromegaly noted  	LUNGS: good air entry bilaterally, clear to auscultation, symmetric breath sounds, no wheezing or rhonchi appreciated  Two Right sided chest tube noted. skin is clean and no signs of infection  	HEART: soft S1/S2, regular rate and rhythm, no murmurs noted, no lower extremity edema  	GASTROINTESTINAL: abdomen is soft, nontender, nondistended  	INTEGUMENT: slightly pale  Skin: Stage 4 sacral decub    	MUSCULOSKELETAL: no clubbing or cyanosis, no obvious deformity  	NEUROLOGIC: awake, alert, oriented x3, GENERAL: elderly frail female in NAD  	EYES: sclera clear, no exudates  	ENMT: oropharynx clear without erythema,   	NECK: supple, soft, no thyromegaly noted  	LUNGS: good air entry bilaterally, clear to auscultation, symmetric breath sounds, no wheezing or rhonchi appreciated  Two Right sided chest tube noted. skin is clean and no signs of infection  	HEART: soft S1/S2, regular rate and rhythm, no murmurs noted, no lower extremity edema  	GASTROINTESTINAL: abdomen is soft, nontender, nondistended  	INTEGUMENT: slightly pale  Skin: Stage 4 sacral decub    	MUSCULOSKELETAL: no clubbing or cyanosis, no obvious deformity. Rigidity and weakness to b/l lower extremities.   	NEUROLOGIC: awake, alert, oriented x3,

## 2020-05-11 NOTE — ED PROVIDER NOTE - CARE PLAN
Principal Discharge DX:	Esophageal perforation Principal Discharge DX:	Esophageal perforation  Secondary Diagnosis:	Hydropneumothorax

## 2020-05-11 NOTE — PROGRESS NOTE ADULT - SUBJECTIVE AND OBJECTIVE BOX
Date/Time Patient Seen:  		  Referring MD:   Data Reviewed	       Patient is a 69y old  Female who presents with a chief complaint of hydropneumothorax (10 May 2020 13:39)      Subjective/HPI     PAST MEDICAL & SURGICAL HISTORY:  Hypertension  Multiple sclerosis  S/P mastectomy, right  Breast CA  Osteoporosis  MS (mitral stenosis)  History of bowel resection  H/O breast surgery        Medication list         MEDICATIONS  (STANDING):  ascorbic acid 500 milliGRAM(s) Oral daily  caspofungin IVPB      caspofungin IVPB 50 milliGRAM(s) IV Intermittent every 24 hours  collagenase Ointment 1 Application(s) Topical daily  enoxaparin Injectable 30 milliGRAM(s) SubCutaneous daily  latanoprost 0.005% Ophthalmic Solution 1 Drop(s) Both EYES at bedtime  letrozole 2.5 milliGRAM(s) Oral daily  multivitamin/minerals 1 Tablet(s) Oral daily  piperacillin/tazobactam IVPB.. 3.375 Gram(s) IV Intermittent every 8 hours  saccharomyces boulardii 250 milliGRAM(s) Oral two times a day  sodium chloride 0.9%. 1000 milliLiter(s) (50 mL/Hr) IV Continuous <Continuous>  vancomycin  IVPB 750 milliGRAM(s) IV Intermittent every 24 hours    MEDICATIONS  (PRN):  acetaminophen   Tablet .. 650 milliGRAM(s) Oral every 6 hours PRN Temp greater or equal to 38C (100.4F), Mild Pain (1 - 3)  ondansetron   Disintegrating Tablet 4 milliGRAM(s) Oral every 6 hours PRN Nausea and/or Vomiting         Vitals log        ICU Vital Signs Last 24 Hrs  T(C): 37 (10 May 2020 22:20), Max: 37 (10 May 2020 22:20)  T(F): 98.6 (10 May 2020 22:20), Max: 98.6 (10 May 2020 22:20)  HR: 101 (10 May 2020 22:20) (101 - 101)  BP: 96/67 (10 May 2020 22:20) (96/67 - 110/70)  BP(mean): --  ABP: --  ABP(mean): --  RR: 18 (10 May 2020 22:20) (18 - 18)  SpO2: 92% (10 May 2020 22:20) (92% - 95%)           Input and Output:  I&O's Detail    09 May 2020 07:01  -  10 May 2020 07:00  --------------------------------------------------------  IN:  Total IN: 0 mL    OUT:    Indwelling Catheter - Stomal: 250 mL  Total OUT: 250 mL    Total NET: -250 mL      10 May 2020 07:01  -  11 May 2020 06:10  --------------------------------------------------------  IN:  Total IN: 0 mL    OUT:    Chest Tube: 10 mL    Chest Tube: 2180 mL  Total OUT: 2190 mL    Total NET: -2190 mL          Lab Data                        7.3    6.72  )-----------( 674      ( 10 May 2020 08:43 )             22.8     05-10    134<L>  |  106  |  24<H>  ----------------------------<  116<H>  4.0   |  17<L>  |  0.83    Ca    7.2<L>      10 May 2020 09:41              Review of Systems	      Objective     Physical Examination    heart s1s2  lung dec BS    Pertinent Lab findings & Imaging      Tereza:  NO   Adequate UO     I&O's Detail    09 May 2020 07:01  -  10 May 2020 07:00  --------------------------------------------------------  IN:  Total IN: 0 mL    OUT:    Indwelling Catheter - Stomal: 250 mL  Total OUT: 250 mL    Total NET: -250 mL      10 May 2020 07:01  -  11 May 2020 06:10  --------------------------------------------------------  IN:  Total IN: 0 mL    OUT:    Chest Tube: 10 mL    Chest Tube: 2180 mL  Total OUT: 2190 mL    Total NET: -2190 mL               Discussed with:     Cultures:	        Radiology

## 2020-05-12 ENCOUNTER — TRANSCRIPTION ENCOUNTER (OUTPATIENT)
Age: 70
End: 2020-05-12

## 2020-05-12 DIAGNOSIS — J86.9 PYOTHORAX WITHOUT FISTULA: ICD-10-CM

## 2020-05-12 DIAGNOSIS — J94.8 OTHER SPECIFIED PLEURAL CONDITIONS: ICD-10-CM

## 2020-05-12 DIAGNOSIS — K22.3 PERFORATION OF ESOPHAGUS: ICD-10-CM

## 2020-05-12 DIAGNOSIS — C50.911 MALIGNANT NEOPLASM OF UNSPECIFIED SITE OF RIGHT FEMALE BREAST: ICD-10-CM

## 2020-05-12 DIAGNOSIS — I10 ESSENTIAL (PRIMARY) HYPERTENSION: ICD-10-CM

## 2020-05-12 DIAGNOSIS — Z29.9 ENCOUNTER FOR PROPHYLACTIC MEASURES, UNSPECIFIED: ICD-10-CM

## 2020-05-12 DIAGNOSIS — G35 MULTIPLE SCLEROSIS: ICD-10-CM

## 2020-05-12 DIAGNOSIS — L89.214 PRESSURE ULCER OF RIGHT HIP, STAGE 4: ICD-10-CM

## 2020-05-12 LAB
A1C WITH ESTIMATED AVERAGE GLUCOSE RESULT: 5.5 % — SIGNIFICANT CHANGE UP (ref 4–5.6)
ALBUMIN SERPL ELPH-MCNC: 1.7 G/DL — LOW (ref 3.3–5.2)
ALP SERPL-CCNC: 78 U/L — SIGNIFICANT CHANGE UP (ref 40–120)
ALT FLD-CCNC: 7 U/L — SIGNIFICANT CHANGE UP
ANION GAP SERPL CALC-SCNC: 14 MMOL/L — SIGNIFICANT CHANGE UP (ref 5–17)
ANION GAP SERPL CALC-SCNC: 15 MMOL/L — SIGNIFICANT CHANGE UP (ref 5–17)
APPEARANCE UR: CLEAR — SIGNIFICANT CHANGE UP
APTT BLD: 25 SEC — LOW (ref 27.5–36.3)
AST SERPL-CCNC: 13 U/L — SIGNIFICANT CHANGE UP
BILIRUB SERPL-MCNC: 0.3 MG/DL — LOW (ref 0.4–2)
BILIRUB UR-MCNC: NEGATIVE — SIGNIFICANT CHANGE UP
BLD GP AB SCN SERPL QL: SIGNIFICANT CHANGE UP
BUN SERPL-MCNC: 18 MG/DL — SIGNIFICANT CHANGE UP (ref 8–20)
BUN SERPL-MCNC: 21 MG/DL — HIGH (ref 8–20)
CALCIUM SERPL-MCNC: 6.9 MG/DL — LOW (ref 8.6–10.2)
CALCIUM SERPL-MCNC: 6.9 MG/DL — LOW (ref 8.6–10.2)
CHLORIDE SERPL-SCNC: 100 MMOL/L — SIGNIFICANT CHANGE UP (ref 98–107)
CHLORIDE SERPL-SCNC: 92 MMOL/L — LOW (ref 98–107)
CO2 SERPL-SCNC: 16 MMOL/L — LOW (ref 22–29)
CO2 SERPL-SCNC: 17 MMOL/L — LOW (ref 22–29)
COLOR SPEC: YELLOW — SIGNIFICANT CHANGE UP
CREAT SERPL-MCNC: 0.63 MG/DL — SIGNIFICANT CHANGE UP (ref 0.5–1.3)
CREAT SERPL-MCNC: 0.87 MG/DL — SIGNIFICANT CHANGE UP (ref 0.5–1.3)
DIFF PNL FLD: NEGATIVE — SIGNIFICANT CHANGE UP
EPI CELLS # UR: SIGNIFICANT CHANGE UP
ESTIMATED AVERAGE GLUCOSE: 111 MG/DL — SIGNIFICANT CHANGE UP (ref 68–114)
FUNGITELL: 89 PG/ML — HIGH
GLUCOSE SERPL-MCNC: 255 MG/DL — HIGH (ref 70–99)
GLUCOSE SERPL-MCNC: 80 MG/DL — SIGNIFICANT CHANGE UP (ref 70–99)
GLUCOSE UR QL: NEGATIVE MG/DL — SIGNIFICANT CHANGE UP
HCT VFR BLD CALC: 24.6 % — LOW (ref 34.5–45)
HGB BLD-MCNC: 8.2 G/DL — LOW (ref 11.5–15.5)
INR BLD: 1.21 RATIO — HIGH (ref 0.88–1.16)
KETONES UR-MCNC: NEGATIVE — SIGNIFICANT CHANGE UP
LEUKOCYTE ESTERASE UR-ACNC: NEGATIVE — SIGNIFICANT CHANGE UP
MAGNESIUM SERPL-MCNC: 2 MG/DL — SIGNIFICANT CHANGE UP (ref 1.6–2.6)
MCHC RBC-ENTMCNC: 28 PG — SIGNIFICANT CHANGE UP (ref 27–34)
MCHC RBC-ENTMCNC: 33.3 GM/DL — SIGNIFICANT CHANGE UP (ref 32–36)
MCV RBC AUTO: 84 FL — SIGNIFICANT CHANGE UP (ref 80–100)
MRSA PCR RESULT.: SIGNIFICANT CHANGE UP
NITRITE UR-MCNC: NEGATIVE — SIGNIFICANT CHANGE UP
NT-PROBNP SERPL-SCNC: 267 PG/ML — SIGNIFICANT CHANGE UP (ref 0–300)
OSMOLALITY SERPL: 283 MOSMOL/KG — SIGNIFICANT CHANGE UP (ref 280–301)
PH UR: 5 — SIGNIFICANT CHANGE UP (ref 5–8)
PLATELET # BLD AUTO: 545 K/UL — HIGH (ref 150–400)
POTASSIUM SERPL-MCNC: 3.7 MMOL/L — SIGNIFICANT CHANGE UP (ref 3.5–5.3)
POTASSIUM SERPL-MCNC: 3.8 MMOL/L — SIGNIFICANT CHANGE UP (ref 3.5–5.3)
POTASSIUM SERPL-SCNC: 3.7 MMOL/L — SIGNIFICANT CHANGE UP (ref 3.5–5.3)
POTASSIUM SERPL-SCNC: 3.8 MMOL/L — SIGNIFICANT CHANGE UP (ref 3.5–5.3)
PREALB SERPL-MCNC: <4 MG/DL — LOW (ref 18–38)
PROT SERPL-MCNC: 5.1 G/DL — LOW (ref 6.6–8.7)
PROT UR-MCNC: 30 MG/DL
PROTHROM AB SERPL-ACNC: 13.7 SEC — HIGH (ref 10–12.9)
RBC # BLD: 2.93 M/UL — LOW (ref 3.8–5.2)
RBC # FLD: 17.1 % — HIGH (ref 10.3–14.5)
S AUREUS DNA NOSE QL NAA+PROBE: SIGNIFICANT CHANGE UP
SARS-COV-2 RNA SPEC QL NAA+PROBE: SIGNIFICANT CHANGE UP
SODIUM SERPL-SCNC: 124 MMOL/L — LOW (ref 135–145)
SODIUM SERPL-SCNC: 130 MMOL/L — LOW (ref 135–145)
SP GR SPEC: 1.02 — SIGNIFICANT CHANGE UP (ref 1.01–1.02)
TSH SERPL-MCNC: 2.22 UIU/ML — SIGNIFICANT CHANGE UP (ref 0.27–4.2)
UROBILINOGEN FLD QL: NEGATIVE MG/DL — SIGNIFICANT CHANGE UP
WBC # BLD: 8.36 K/UL — SIGNIFICANT CHANGE UP (ref 3.8–10.5)
WBC # FLD AUTO: 8.36 K/UL — SIGNIFICANT CHANGE UP (ref 3.8–10.5)

## 2020-05-12 PROCEDURE — 71045 X-RAY EXAM CHEST 1 VIEW: CPT | Mod: 26

## 2020-05-12 PROCEDURE — 99233 SBSQ HOSP IP/OBS HIGH 50: CPT

## 2020-05-12 PROCEDURE — 99222 1ST HOSP IP/OBS MODERATE 55: CPT

## 2020-05-12 PROCEDURE — 36569 INSJ PICC 5 YR+ W/O IMAGING: CPT

## 2020-05-12 PROCEDURE — 93010 ELECTROCARDIOGRAM REPORT: CPT

## 2020-05-12 RX ORDER — ACETAMINOPHEN 500 MG
650 TABLET ORAL EVERY 6 HOURS
Refills: 0 | Status: DISCONTINUED | OUTPATIENT
Start: 2020-05-12 | End: 2020-05-12

## 2020-05-12 RX ORDER — MULTIVIT-MIN/FERROUS GLUCONATE 9 MG/15 ML
1 LIQUID (ML) ORAL DAILY
Refills: 0 | Status: DISCONTINUED | OUTPATIENT
Start: 2020-05-12 | End: 2020-05-12

## 2020-05-12 RX ORDER — ASCORBIC ACID 60 MG
500 TABLET,CHEWABLE ORAL DAILY
Refills: 0 | Status: DISCONTINUED | OUTPATIENT
Start: 2020-05-12 | End: 2020-05-12

## 2020-05-12 RX ORDER — ONDANSETRON 8 MG/1
4 TABLET, FILM COATED ORAL EVERY 6 HOURS
Refills: 0 | Status: DISCONTINUED | OUTPATIENT
Start: 2020-05-12 | End: 2020-05-12

## 2020-05-12 RX ORDER — CASPOFUNGIN ACETATE 7 MG/ML
50 INJECTION, POWDER, LYOPHILIZED, FOR SOLUTION INTRAVENOUS EVERY 24 HOURS
Refills: 0 | Status: DISCONTINUED | OUTPATIENT
Start: 2020-05-12 | End: 2020-05-15

## 2020-05-12 RX ORDER — SODIUM CHLORIDE 9 MG/ML
1000 INJECTION INTRAMUSCULAR; INTRAVENOUS; SUBCUTANEOUS
Refills: 0 | Status: DISCONTINUED | OUTPATIENT
Start: 2020-05-12 | End: 2020-05-13

## 2020-05-12 RX ORDER — SACCHAROMYCES BOULARDII 250 MG
250 POWDER IN PACKET (EA) ORAL
Refills: 0 | Status: DISCONTINUED | OUTPATIENT
Start: 2020-05-12 | End: 2020-05-12

## 2020-05-12 RX ORDER — SODIUM CHLORIDE 9 MG/ML
10 INJECTION INTRAMUSCULAR; INTRAVENOUS; SUBCUTANEOUS
Refills: 0 | Status: DISCONTINUED | OUTPATIENT
Start: 2020-05-12 | End: 2020-05-15

## 2020-05-12 RX ORDER — MUPIROCIN 20 MG/G
1 OINTMENT TOPICAL EVERY 12 HOURS
Refills: 0 | Status: DISCONTINUED | OUTPATIENT
Start: 2020-05-12 | End: 2020-05-15

## 2020-05-12 RX ORDER — CHLORHEXIDINE GLUCONATE 213 G/1000ML
1 SOLUTION TOPICAL
Refills: 0 | Status: DISCONTINUED | OUTPATIENT
Start: 2020-05-12 | End: 2020-05-15

## 2020-05-12 RX ORDER — VANCOMYCIN HCL 1 G
1000 VIAL (EA) INTRAVENOUS EVERY 24 HOURS
Refills: 0 | Status: DISCONTINUED | OUTPATIENT
Start: 2020-05-13 | End: 2020-05-15

## 2020-05-12 RX ORDER — PANTOPRAZOLE SODIUM 20 MG/1
40 TABLET, DELAYED RELEASE ORAL
Refills: 0 | Status: DISCONTINUED | OUTPATIENT
Start: 2020-05-12 | End: 2020-05-12

## 2020-05-12 RX ORDER — PIPERACILLIN AND TAZOBACTAM 4; .5 G/20ML; G/20ML
3.38 INJECTION, POWDER, LYOPHILIZED, FOR SOLUTION INTRAVENOUS EVERY 8 HOURS
Refills: 0 | Status: DISCONTINUED | OUTPATIENT
Start: 2020-05-12 | End: 2020-05-15

## 2020-05-12 RX ORDER — PANTOPRAZOLE SODIUM 20 MG/1
40 TABLET, DELAYED RELEASE ORAL DAILY
Refills: 0 | Status: DISCONTINUED | OUTPATIENT
Start: 2020-05-12 | End: 2020-05-13

## 2020-05-12 RX ORDER — PIPERACILLIN AND TAZOBACTAM 4; .5 G/20ML; G/20ML
3.38 INJECTION, POWDER, LYOPHILIZED, FOR SOLUTION INTRAVENOUS ONCE
Refills: 0 | Status: COMPLETED | OUTPATIENT
Start: 2020-05-12 | End: 2020-05-12

## 2020-05-12 RX ORDER — SODIUM CHLORIDE 9 MG/ML
3 INJECTION INTRAMUSCULAR; INTRAVENOUS; SUBCUTANEOUS EVERY 8 HOURS
Refills: 0 | Status: DISCONTINUED | OUTPATIENT
Start: 2020-05-12 | End: 2020-05-15

## 2020-05-12 RX ORDER — MUPIROCIN 20 MG/G
1 OINTMENT TOPICAL EVERY 12 HOURS
Refills: 0 | Status: DISCONTINUED | OUTPATIENT
Start: 2020-05-12 | End: 2020-05-12

## 2020-05-12 RX ORDER — LATANOPROST 0.05 MG/ML
1 SOLUTION/ DROPS OPHTHALMIC; TOPICAL AT BEDTIME
Refills: 0 | Status: DISCONTINUED | OUTPATIENT
Start: 2020-05-12 | End: 2020-05-15

## 2020-05-12 RX ORDER — COLLAGENASE CLOSTRIDIUM HIST. 250 UNIT/G
1 OINTMENT (GRAM) TOPICAL DAILY
Refills: 0 | Status: DISCONTINUED | OUTPATIENT
Start: 2020-05-12 | End: 2020-05-14

## 2020-05-12 RX ORDER — LETROZOLE 2.5 MG/1
2.5 TABLET, FILM COATED ORAL DAILY
Refills: 0 | Status: DISCONTINUED | OUTPATIENT
Start: 2020-05-12 | End: 2020-05-12

## 2020-05-12 RX ORDER — VANCOMYCIN HCL 1 G
750 VIAL (EA) INTRAVENOUS EVERY 24 HOURS
Refills: 0 | Status: DISCONTINUED | OUTPATIENT
Start: 2020-05-12 | End: 2020-05-12

## 2020-05-12 RX ADMIN — PIPERACILLIN AND TAZOBACTAM 200 GRAM(S): 4; .5 INJECTION, POWDER, LYOPHILIZED, FOR SOLUTION INTRAVENOUS at 06:27

## 2020-05-12 RX ADMIN — CHLORHEXIDINE GLUCONATE 1 APPLICATION(S): 213 SOLUTION TOPICAL at 17:33

## 2020-05-12 RX ADMIN — MUPIROCIN 1 APPLICATION(S): 20 OINTMENT TOPICAL at 05:19

## 2020-05-12 RX ADMIN — MUPIROCIN 1 APPLICATION(S): 20 OINTMENT TOPICAL at 17:52

## 2020-05-12 RX ADMIN — Medication 250 MILLIGRAM(S): at 05:18

## 2020-05-12 RX ADMIN — SODIUM CHLORIDE 3 MILLILITER(S): 9 INJECTION INTRAMUSCULAR; INTRAVENOUS; SUBCUTANEOUS at 05:19

## 2020-05-12 RX ADMIN — LATANOPROST 1 DROP(S): 0.05 SOLUTION/ DROPS OPHTHALMIC; TOPICAL at 21:35

## 2020-05-12 RX ADMIN — PIPERACILLIN AND TAZOBACTAM 25 GRAM(S): 4; .5 INJECTION, POWDER, LYOPHILIZED, FOR SOLUTION INTRAVENOUS at 17:33

## 2020-05-12 RX ADMIN — SODIUM CHLORIDE 3 MILLILITER(S): 9 INJECTION INTRAMUSCULAR; INTRAVENOUS; SUBCUTANEOUS at 21:17

## 2020-05-12 RX ADMIN — Medication 1 APPLICATION(S): at 17:52

## 2020-05-12 RX ADMIN — CASPOFUNGIN ACETATE 260 MILLIGRAM(S): 7 INJECTION, POWDER, LYOPHILIZED, FOR SOLUTION INTRAVENOUS at 04:08

## 2020-05-12 RX ADMIN — SODIUM CHLORIDE 3 MILLILITER(S): 9 INJECTION INTRAMUSCULAR; INTRAVENOUS; SUBCUTANEOUS at 11:52

## 2020-05-12 RX ADMIN — SODIUM CHLORIDE 60 MILLILITER(S): 9 INJECTION INTRAMUSCULAR; INTRAVENOUS; SUBCUTANEOUS at 10:27

## 2020-05-12 NOTE — CHART NOTE - NSCHARTNOTEFT_GEN_A_CORE
Thoracic Pre-op Note:    CC: Patient is a 69y old  Female who presents with a chief complaint of Transfer from [facility] for Distal Esophageal perforation (12 May 2020 02:24)                                                                                                             Surgeon: Twin    Procedure:  esophageal stent and right chest washout/decortication, PEG    Allergies    Sudafed (Other)    Intolerances        HPI:  69 year old non-ambulatory Female with a PMHx significant for multiple sclerosis, Breast Cancer s/p R mastectomy, Osteoporosis, Mitral stenosis, right ankle fracture, chronic right sided sacral ulcer, chronic midline back pain since being dropped from a jame lift (29), admitted to [place] after presenting with sepsis in the setting of a UTI with chronic campos use and known large right ischial decubitus ulcer. Patient found to have a Moderately large Right hydropneumothorax resulting in partial right lung collapse and slight cardiomediastinal shift to the left on CXR with chest tube placed 20. Large bore chest tube placed 5/10/20 for persistent Right pneumothorax. Patient was followed by ID and was given Vanco and Zosyn originally for her presumed urosepsis, Caspofungin was added after pleural fluid was + for fungal elements. CT chest confirmed +Empyema in the Right pleural space. Patient ultimately transferred to [facility] late 20 after she was found to have a distal esophageal perforation on CT chest and Esophogram.     Of note, patient admitted 10/9/2019 for lower back and bilateral knee pain, found to have compression fracture of L2 with imaging subsequently found to have multiple lytic lesions on the spine and pelvis.  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 Matthew Fairbanks. (12 May 2020 02:24)      Subjective Assessment:    PAST MEDICAL & SURGICAL HISTORY:  Breast cancer metastasized to bone  Hypertension  Multiple sclerosis  S/P mastectomy, right  Breast CA  Osteoporosis  MS (mitral stenosis)  History of bowel resection  H/O breast surgery      MEDICATIONS  (STANDING):  ascorbic acid 500 milliGRAM(s) Oral daily  caspofungin IVPB 50 milliGRAM(s) IV Intermittent every 24 hours  collagenase Ointment 1 Application(s) Topical daily  latanoprost 0.005% Ophthalmic Solution 1 Drop(s) Both EYES at bedtime  letrozole 2.5 milliGRAM(s) Oral daily  multivitamin/minerals 1 Tablet(s) Oral daily  mupirocin 2% Nasal 1 Application(s) Both Nostrils every 12 hours  pantoprazole    Tablet 40 milliGRAM(s) Oral before breakfast  piperacillin/tazobactam IVPB.. 3.375 Gram(s) IV Intermittent every 8 hours  saccharomyces boulardii 250 milliGRAM(s) Oral two times a day  sodium chloride 0.9% lock flush 3 milliLiter(s) IV Push every 8 hours  sodium chloride 0.9%. 1000 milliLiter(s) (60 mL/Hr) IV Continuous <Continuous>  vancomycin  IVPB 750 milliGRAM(s) IV Intermittent every 24 hours    MEDICATIONS  (PRN):  acetaminophen   Tablet .. 650 milliGRAM(s) Oral every 6 hours PRN Temp greater or equal to 38.5C (101.3F), Mild Pain (1 - 3)  ondansetron   Disintegrating Tablet 4 milliGRAM(s) Oral every 6 hours PRN Nausea and/or Vomiting        Labs:                        8.2    8.36  )-----------( 545      ( 12 May 2020 06:37 )             24.6     05-12    124<L>  |  92<L>  |  21.0<H>  ----------------------------<  255<H>  3.8   |  17.0<L>  |  0.87    Ca    6.9<L>      12 May 2020 06:37  Mg     2.0     12    TPro  5.1<L>  /  Alb  1.7<L>  /  TBili  0.3<L>  /  DBili  x   /  AST  13  /  ALT  7   /  AlkPhos  78  05-12    PT/INR - ( 12 May 2020 06:37 )   PT: 13.7 sec;   INR: 1.21 ratio         PTT - ( 12 May 2020 06:37 )  PTT:25.0 sec    Blood Type:     Urinalysis Basic - ( 12 May 2020 02:49 )    Color: Yellow / Appearance: Clear / S.020 / pH: x  Gluc: x / Ketone: Negative  / Bili: Negative / Urobili: Negative mg/dL   Blood: x / Protein: 30 mg/dL / Nitrite: Negative   Leuk Esterase: Negative / RBC: x / WBC x   Sq Epi: x / Non Sq Epi: Occasional / Bacteria: x        CXR: < from: Xray Chest 1 View- PORTABLE-Urgent (20 @ 02:29) >      IMPRESSION:  1. Mild, partial interval clearing of contrast seen within the right pleural space, which was better demonstrated on prior CT chest of 2020.  2. Small right hydropneumothorax again evident, however grossly stable.  3. Patchy airspace disease of the right lower lung field again seen.    < end of copied text >    Vital Signs Last 24 Hrs  T(C): 36.6 (12 May 2020 08:24), Max: 37 (11 May 2020 22:12)  T(F): 97.8 (12 May 2020 08:24), Max: 98.6 (11 May 2020 22:12)  HR: 70 (12 May 2020 08:24) (70 - 95)  BP: 96/63 (12 May 2020 08:24) (92/63 - 112/51)  BP(mean): --  RR: 18 (12 May 2020 08:24) (18 - 18)  SpO2: 92% (12 May 2020 08:24) (92% - 99%)    I&O's Detail    11 May 2020 07:01  -  12 May 2020 07:00  --------------------------------------------------------  IN:  Total IN: 0 mL    OUT:    Chest Tube: 115 mL    Chest Tube: 205 mL    Indwelling Catheter - Urethral: 700 mL  Total OUT: 1020 mL    Total NET: -1020 mL        Gen: thin,WD NAD  Neuro: AAOx3, nonfocal  Pulm: diminished bs throughout  CV: RRR, S1S2  Abd: Soft, NT, ND +BS  Ext: No edema, + peripheral pulses  R chest tube with dark reddish brown drainage, + small air leak, R pigtail with dark drainage      Pt has AICD/PPM [ ] Yes  [x ] No             Brand Name:  [employer]  [ x] Yes  [ ] No  NPO after Midnight [x ] Yes  [ ] No  Pre-op ABX ordered, to be taped on chart:  [ ] Yes  [x ] No   on standing abx  Intraop on Hold: PRBCs [x ]   Consent obtained  [ ] Yes  [ x] No    Plan: strict NPO  Esophageal stent tomorrow  R chest washout tomorrow

## 2020-05-12 NOTE — H&P ADULT - NSICDXPASTMEDICALHX_GEN_ALL_CORE_FT
PAST MEDICAL HISTORY:  Breast CA     Breast cancer metastasized to bone     Hypertension     MS (mitral stenosis)     Multiple sclerosis     Osteoporosis     S/P mastectomy, right

## 2020-05-12 NOTE — H&P ADULT - PROBLEM SELECTOR PLAN 4
- Chronic debility. +Chronic decubitus ulcer on right ischium and chronic campos use.   - Non-ambulatory for 4-5 years as per patient.   - Interferon has been held since admission to [place].   - Fall risk protocol.

## 2020-05-12 NOTE — CONSULT NOTE ADULT - ASSESSMENT
HPI: This 69 year old non-ambulatory Female with a PMHx significant for multiple sclerosis, Breast Cancer s/p R mastectomy, Osteoporosis, Mitral stenosis, right ankle fracture, chronic right sided sacral ulcer, chronic midline back pain since being dropped from a jame lift (7/17/29), admitted to [place] after presenting with sepsis in the setting of a UTI with chronic campos use and known large right ischial decubitus ulcer. Patient found to have a Moderately large Right hydropneumothorax resulting in partial right lung collapse and slight cardiomediastinal shift to the left on CXR with chest tube placed 5/8/20. Large bore chest tube placed 5/10/20 for persistent Right pneumothorax. Patient was followed by ID and was given Vanco and Zosyn originally for her presumed urosepsis, Caspofungin was added after pleural fluid was + for fungal elements. CT chest confirmed +Empyema in the Right pleural space. Patient ultimately transferred to [facility] late 5/11/20 after she was found to have a distal esophageal perforation on CT chest and Esophogram.     Of note, patient admitted 10/9/2019 for lower back and bilateral knee pain, found to have compression fracture of L2 with imaging subsequently found to have multiple lytic lesions on the spine and pelvis.  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 Matthew Fairbanks. (12 May 2020 02:24)    patient is admitted to surgical service, pre-operatively planned for an esophageal stent 5/13.  Patient's labs/ cultures from [facility] reviewed.     Empyema with polymicrobial infection:  Strep mitis infection  Klebsiella oxytoca infection  CoNS infection  Perforated esophagus    Plan:  - continue ZOSYN  - continue VANCOMYCIN  - continue CASPOFUNGIN    - please repeat fluid cultures,     - continue Chest tubes        - follow up all outstanding cultures  - trend temperature and WBC curve  - repeat cultures from blood and all sources if febrile.

## 2020-05-12 NOTE — H&P ADULT - PROBLEM SELECTOR PLAN 6
- Multiple lytic lesions on the spine and pelvis.   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  - Follows as an outpatient with Dr. Matthew Fairbanks.  - Continue Letrozole.  - Heme/onc consult pending.

## 2020-05-12 NOTE — H&P ADULT - NSHPLABSRESULTS_GEN_ALL_CORE
8.7    x     )-----------( x        ( 11 May 2020 19:55 )             25.6     05-11    135  |  104  |  26<H>  ----------------------------<  105<H>  4.4   |  19<L>  |  0.98    Ca    7.3<L>      11 May 2020 07:37    COVID-19 PCR: NotDetec (12 May 2020 00:00)    < from: CT Chest No Cont (05.11.20 @ 13:23) >  FINDINGS:  LUNGS AND AIRWAYS: PLEURA:   MEDIASTINUM AND FINA:   There is a small amount of intraluminal contrast noted within the distal esophagus with diffuse esophageal wall thickening.  There is leakage of contrast at the level of the right posterior aspect of the distal esophagus, with contrast extending into the right pleural space.  Air and fluid are present within the right pleural space. Contrast is noted within the right posteriorly located chest tube. There has been an additional pigtail  right-sided chest catheter placed, the tube entering anteriorly and tip terminating in the anterior aspect of the pleural space in the right upper hemithorax.  No significant fluid or air collection is noted within the posterior mediastinum.  There is a probable small hiatal hernia.  There is persistent patchy right lower lobe airspace consolidation.  VESSELS: Within normal limits.  HEART: Heart size is normal. No pericardial effusion.  CHEST WALL AND LOWER NECK: Right-sided mastectomy.  VISUALIZED UPPER ABDOMEN:   Enteric contrast is noted within the stomach and proximal small bowel.  No pneumoperitoneum or intra-abdominal leakage of contrast material is noted.  There is a small amount of contrast noted within the bilateral renal collecting system/pelvis; finding which may be associated with contrast nephropathy, related to prior intravenous contrast administration for CT scan performed on 5/8/2020.  Recommend further clinical correlation.  BONES: Diffuse mixed osteolytic and blastic an osteolytic metastatic disease.  IMPRESSION:   Leakage of contrast from the esophagus which extends into the right pleural space.  Origin of the leakage is likely at the right posterior lateral aspect of the distal esophagus; compatible with esophageal perforation.  Air/fluid within the right pleural space compatible with empyema.  Findings were reviewed with Dr. Murcia at the time of interpretation on 5/11/2020.  Other findings as discussed above.  < end of copied text >    < from: Xray Esophagram Single Contrast (05.11.20 @ 12:40) >  Impression:  Leakage of oral contrast likely from the distal esophagus which extends into the right pleural space; compatible with esophageal rupture.  Dr. Murcia is aware of this finding at the time of interpretation on 5/11/2020.  < end of copied text >    Culture - Fungal, Body Fluid (05.08.20 @ 17:52)    Specimen Source: Pleural Fl Pleural Fluid    Culture Results: Few Yeast    Second pleural fluid culture Positive for coagulase Negative Staph, Klebsiella, and strep mitis/oralis    Blood cultures from 5/8 resulted Negative

## 2020-05-12 NOTE — H&P ADULT - PROBLEM SELECTOR PLAN 8
- SCDs for DVT prophylaxis, chemical anticoagulation held as patient is scheduled for the OR tomorrow.   - Protonix for GI prophylaxis

## 2020-05-12 NOTE — CONSULT NOTE ADULT - ASSESSMENT
69 year old female with multiple chronic medical co-morbidities including chronic right sided sacral ulcer, chronic midline back pain, chronic anemia,  stage IV breast cancer diagnosed in 10/2019 based on innumerable lytic lesions, with elevated tumor markers who was transferred from [place]  for distal esophageal perforation on CT chest and Esophogram. She was at [facility] for Moderately large Right hydropneumothorax resulting in partial right lung collapse s/p drainage and chest tube placement.    Oncology consult requested for stage IV breast cancer and anemia.      1)Anemia - multifactorial, and likely is a combination of anemia of chronic disease and anemia in neoplastic disease  -check iron, TIBC, ferritin, B12, folate  -will benefit from iron if low iron stores  -transfuse for Hgb < 7 g/dL    2)stage IV breast cancer with mainly osseous metastatic disease - follows with Dr. Matthew Chambers  -CT chest and CT abd/pelvis from 5/2020 reviewed - no evidence of visceral mets, has only bone disease  -5/12 pleural fluid was negative for malignancy  -continue letrozole  -follow up with primary oncologist on discharge 69 year old female with multiple chronic medical co-morbidities including chronic right sided sacral ulcer, chronic midline back pain, chronic anemia,  stage IV breast cancer diagnosed in 10/2019 based on innumerable lytic lesions, with elevated tumor markers who was transferred from [facility]  for distal esophageal perforation on CT chest and Esophogram. She was at [facility] for Moderately large Right hydropneumothorax resulting in partial right lung collapse s/p drainage and chest tube placement.    Oncology consult requested for stage IV breast cancer and anemia.      1)Anemia - multifactorial, and likely is a combination of anemia of chronic disease and anemia in neoplastic disease  -check iron, TIBC, ferritin, B12, folate  -will benefit from iron if low iron stores  -transfuse for Hgb < 7 g/dL    2)stage IV breast cancer with mainly osseous metastatic disease - follows with Dr. Matthew Chambers  -CT chest and CT abd/pelvis from 5/2020 reviewed - no evidence of visceral mets, has only bone disease  -5/12 pleural fluid was negative for malignancy  -continue letrozole  -follow up with primary oncologist on discharge    3) Esophageal perforation - OR tomorrow    4)R hydrothorax / empyema  -s/p chest tube  -on abx  -ID following    Please call with questions.

## 2020-05-12 NOTE — H&P ADULT - ASSESSMENT
69 year old non-ambulatory Female with a PMHx significant for multiple sclerosis, Breast Cancer s/p R mastectomy, Osteoporosis, Mitral stenosis, right ankle fracture, chronic right sided sacral ulcer, chronic midline back pain since being dropped from a jame lift (7/17/29), admitted to [place] after presenting with sepsis in the setting of a UTI with chronic campos use and known large right ischial decubitus ulcer. Patient found to have a Moderately large Right hydropneumothorax resulting in partial right lung collapse and slight cardiomediastinal shift to the left on CXR with chest tube placed 5/8/20. Large bore chest tube placed 5/10/20 for persistent Right pneumothorax. Patient was followed by ID and was given Vanco and Zosyn originally for her presumed urosepsis, Caspofungin was added after pleural fluid was + for fungal elements. CT chest confirmed +Empyema in the Right pleural space. Patient ultimately transferred to [facility] late 5/11/20 after she was found to have a distal esophageal perforation on CT chest and Esophogram.

## 2020-05-12 NOTE — H&P ADULT - PROBLEM SELECTOR PLAN 3
- Pleural fluid + for yeast, coag Negative Staph, Klebsiella, and strep mitis/oralis  - Continue IV antibiotics as were recommended by ID at [place].   - ID consult pending.  - Tentative plan for eventual washout.

## 2020-05-12 NOTE — CONSULT NOTE ADULT - SUBJECTIVE AND OBJECTIVE BOX
[facility]  INFECTIOUS DISEASES AND INTERNAL MEDICINE at [place]  =======================================================  Phong Wang MD  Diplomates American Board of Internal Medicine and Infectious Diseases  Telephone [phone]  Fax            [phone]  =======================================================    [id number]  GEORGE STANLEY   HPI: This 69 year old non-ambulatory Female with a PMHx significant for multiple sclerosis, Breast Cancer s/p R mastectomy, Osteoporosis, Mitral stenosis, right ankle fracture, chronic right sided sacral ulcer, chronic midline back pain since being dropped from a jame lift (7/17/29), admitted to [facility] after presenting with sepsis in the setting of a UTI with chronic elizondo use and known large right ischial decubitus ulcer. Patient found to have a Moderately large Right hydropneumothorax resulting in partial right lung collapse and slight cardiomediastinal shift to the left on CXR with chest tube placed 5/8/20. Large bore chest tube placed 5/10/20 for persistent Right pneumothorax. Patient was followed by ID and was given Vanco and Zosyn originally for her presumed urosepsis, Caspofungin was added after pleural fluid was + for fungal elements. CT chest confirmed +Empyema in the Right pleural space. Patient ultimately transferred to [facility] late 5/11/20 after she was found to have a distal esophageal perforation on CT chest and Esophogram.     Of note, patient admitted 10/9/2019 for lower back and bilateral knee pain, found to have compression fracture of L2 with imaging subsequently found to have multiple lytic lesions on the spine and pelvis.  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 Matthew Fairbanks. (12 May 2020 02:24)    patient is admitted to surgical service, pre-operatively planned for an esophageal stent 5/13.  Patient's labs/ cultures from [facility] reviewed.       I have personally reviewed the labs and data; pertinent labs and data are listed in this note; please see below.   =======================================================  Past Medical & Surgical Hx:  =====================  PAST MEDICAL & SURGICAL HISTORY:  Breast cancer metastasized to bone  Hypertension  Multiple sclerosis  S/P mastectomy, right  Breast CA  Osteoporosis  MS (mitral stenosis)  History of bowel resection  H/O breast surgery    Problem List:  ==========  HEALTH ISSUES - PROBLEM Dx:  Prophylactic measure: Prophylactic measure  Essential hypertension: Essential hypertension  Carcinoma of right breast metastatic to bone: Carcinoma of right breast metastatic to bone  Pressure injury of right hip, stage 4: Pressure injury of right hip, stage 4  Multiple sclerosis: Multiple sclerosis  Empyema of right pleural space: Empyema of right pleural space  Hydropneumothorax: Hydropneumothorax  Esophageal perforation: Esophageal perforation      Social Hx:  =======  no toxic habits currently    FAMILY HISTORY:  FHx: hyperlipidemia  no significant family history of immunosuppressive disorders in mother or father   =======================================================  REVIEW OF SYSTEMS:  CONSTITUTIONAL:  No Fever or chills  HEENT:  No diplopia or blurred vision.  No earache, sore throat or runny nose.  CARDIOVASCULAR:  No pressure, squeezing, strangling, tightness, heaviness or aching about the chest, neck, axilla or epigastrium.  RESPIRATORY:  MILD shortness of breath  GASTROINTESTINAL:  No nausea, vomiting or diarrhea.  GENITOURINARY:  No dysuria, frequency or urgency. No Blood in urine  MUSCULOSKELETAL:  no joint aches, no muscle pain  SKIN:  No change in skin, hair or nails.  NEUROLOGIC:  No Headaches, seizures or weakness.  PSYCHIATRIC:  No disorder of thought or mood.  ENDOCRINE:  No heat or cold intolerance  HEMATOLOGICAL:  No easy bruising or bleeding.   =======================================================  Allergies  Sudafed (Other)    Antibiotics:  caspofungin IVPB 50 milliGRAM(s) IV Intermittent every 24 hours  piperacillin/tazobactam IVPB.. 3.375 Gram(s) IV Intermittent every 8 hours  vancomycin  IVPB 750 milliGRAM(s) IV Intermittent every 24 hours    Other medications:  ascorbic acid 500 milliGRAM(s) Oral daily  collagenase Ointment 1 Application(s) Topical daily  latanoprost 0.005% Ophthalmic Solution 1 Drop(s) Both EYES at bedtime  letrozole 2.5 milliGRAM(s) Oral daily  multivitamin/minerals 1 Tablet(s) Oral daily  mupirocin 2% Nasal 1 Application(s) Both Nostrils every 12 hours  pantoprazole    Tablet 40 milliGRAM(s) Oral before breakfast  saccharomyces boulardii 250 milliGRAM(s) Oral two times a day  sodium chloride 0.9% lock flush 3 milliLiter(s) IV Push every 8 hours  sodium chloride 0.9%. 1000 milliLiter(s) IV Continuous <Continuous>     caspofungin IVPB   260 mL/Hr IV Intermittent (05-09-20 @ 13:24)    caspofungin IVPB   260 mL/Hr IV Intermittent (05-10-20 @ 11:23)   260 mL/Hr IV Intermittent (05-11-20 @ 14:00)    caspofungin IVPB   260 mL/Hr IV Intermittent (05-12-20 @ 04:08)    piperacillin/tazobactam IVPB.   200 mL/Hr IV Intermittent (05-12-20 @ 06:27)    piperacillin/tazobactam IVPB..   25 mL/Hr IV Intermittent (05-08-20 @ 10:57)   25 mL/Hr IV Intermittent (05-08-20 @ 20:33)   25 mL/Hr IV Intermittent (05-09-20 @ 03:41)   25 mL/Hr IV Intermittent (05-09-20 @ 11:34)   25 mL/Hr IV Intermittent (05-09-20 @ 18:16)   25 mL/Hr IV Intermittent (05-10-20 @ 04:54)   25 mL/Hr IV Intermittent (05-10-20 @ 10:41)   25 mL/Hr IV Intermittent (05-10-20 @ 20:20)   25 mL/Hr IV Intermittent (05-11-20 @ 04:39)   25 mL/Hr IV Intermittent (05-11-20 @ 15:38)    piperacillin/tazobactam IVPB...   200 mL/Hr IV Intermittent (05-08-20 @ 02:33)    vancomycin  IVPB   250 mL/Hr IV Intermittent (05-09-20 @ 00:43)   250 mL/Hr IV Intermittent (05-10-20 @ 02:37)   250 mL/Hr IV Intermittent (05-11-20 @ 01:56)    vancomycin  IVPB   250 mL/Hr IV Intermittent (05-08-20 @ 01:00)    vancomycin  IVPB   250 mL/Hr IV Intermittent (05-12-20 @ 05:18)      ======================================================  Physical Exam:  ============  T(F): 97.8 (12 May 2020 08:24), Max: 98.6 (11 May 2020 22:12)  HR: 70 (12 May 2020 08:24)  BP: 96/63 (12 May 2020 08:24)  RR: 18 (12 May 2020 11:08)  SpO2: 95% (12 May 2020 11:08) (92% - 99%)  temp max in last 48H T(F): , Max: 98.6 (05-10-20 @ 22:20)Height (cm): 167.64 (05-11-20 @ 22:12)  Weight (kg): 56.7 (05-11-20 @ 22:12)  BMI (kg/m2): 20.2 (05-11-20 @ 22:12)  BSA (m2): 1.64 (05-11-20 @ 22:12)    General:  No acute distress. FRAIL  Eye: Pupils are equal, round and reactive to light, Extraocular movements are intact, Normal conjunctiva.  HENT: Normocephalic, Oral mucosa is moist, No pharyngeal erythema, No sinus tenderness.  Neck: Supple, No lymphadenopathy.  Respiratory: Lungs  with diminished air entry at bases  RIGHT side with 2 chest tubes  copious bloody fluid  Cardiovascular: Normal rate, Regular rhythm,   Gastrointestinal: Soft, Non-tender, Non-distended, Normal bowel sounds.  Genitourinary: + ELIZONDO with clear urine  Lymphatics: No lymphadenopathy neck,   Musculoskeletal: Normal range of motion, Normal strength.  Integumentary: No rash.  Neurologic: Alert, Oriented, No focal deficits, Cranial Nerves II-XII are grossly intact.  Psychiatric: Appropriate mood & affect.    =======================================================  Labs:                        8.2    8.36  )-----------( 545      ( 12 May 2020 06:37 )             24.6      05-12    124<L>  |  92<L>  |  21.0<H>  ----------------------------<  255<H>  3.8   |  17.0<L>  |  0.87    Ca    6.9<L>      12 May 2020 06:37  Mg     2.0     05-12    TPro  5.1<L>  /  Alb  1.7<L>  /  TBili  0.3<L>  /  DBili  x   /  AST  13  /  ALT  7   /  AlkPhos  78  05-12      Culture - Fungal, Body Fluid (collected 05-08-20 @ 17:52)  Source: Pleural Fl Pleural Fluid    Culture - Body Fluid with Gram Stain (collected 05-08-20 @ 17:52)  Source: Pleural Fl Pleural Fluid  Gram Stain (05-08-20 @ 19:18):    Few polymorphonuclear leukocytes per low power field    Rare Gram positive cocci in pairs per oil power field    Rare Gram Variable Rods per oil power field    Rare Yeast per oil power field  Organism: Streptococcus mitis/oralis group (05-11-20 @ 17:39)    Sensitivities:      -  Clindamycin: R      -  Erythromycin: R      -  Levofloxacin: S      -  Vancomycin: S      Method Type: KB  Organism: Streptococcus mitis/oralis group  Coag Negative Staphylococcus  Klebsiella oxytoca (05-11-20 @ 17:37)  Organism: Streptococcus mitis/oralis group (05-11-20 @ 17:37)    Sensitivities:      -  Ceftriaxone: S 1      -  Penicillin: I 0.75      Method Type: ETEST  Organism: Klebsiella oxytoca (05-11-20 @ 17:35)    Sensitivities:      -  Amikacin: S <=16      -  Amoxicillin/Clavulanic Acid: S <=8/4      -  Ampicillin: R >16 These ampicillin results predict results for amoxicillin      -  Ampicillin/Sulbactam: S <=4/2 Enterobacter, Citrobacter, and Serratia may develop resistance during prolonged therapy (3-4 days)      -  Aztreonam: R >16      -  Cefazolin: R 16 Enterobacter, Citrobacter, and Serratia may develop resistance during prolonged therapy (3-4 days)      -  Cefepime: S <=2      -  Cefoxitin: S <=8      -  Ceftazidime/Avibactam: S 8      -  Ceftolozane/tazobactam: S <=2      -  Ceftriaxone: S <=1 Enterobacter, Citrobacter, and Serratia may develop resistance during prolonged therapy      -  Ciprofloxacin: R 2      -  Ertapenem: I 1      -  Gentamicin: S <=2      -  Imipenem: S <=1      -  Levofloxacin: R 2      -  Meropenem: S <=1      -  Piperacillin/Tazobactam: S <=8      -  Tobramycin: S <=2      -  Trimethoprim/Sulfamethoxazole: S <=0.5/9.5      Method Type: PAWAN  Organism: Coag Negative Staphylococcus (05-11-20 @ 17:35)    Sensitivities:      -  Ampicillin/Sulbactam: R <=8/4      -  Cefazolin: R <=4      -  Clindamycin: S <=0.25      -  Erythromycin: R >4      -  Gentamicin: S <=1 Should not be used as monotherapy      -  Oxacillin: R >2      -  Penicillin: R >8      -  RIF- Rifampin: S <=1 Should not be used as monotherapy      -  Tetra/Doxy: R >8      -  Trimethoprim/Sulfamethoxazole: S <=0.5/9.5      -  Vancomycin: S 2      Method Type: PAWAN    Culture - Urine (collected 05-08-20 @ 09:09)  Source: .Urine Catheterized  Final Report (05-09-20 @ 08:24):    >=3 organisms. Probable collection contamination.    Culture - Blood (collected 05-08-20 @ 09:02)  Source: .Blood Blood-Peripheral    Culture - Blood (collected 05-08-20 @ 09:02)  Source: .Blood Blood    Culture - Tissue with Gram Stain (collected 05-01-20 @ 22:18)  Source: .Tissue Other, Right ischium wound  Gram Stain (05-02-20 @ 04:48):    No polymorphonuclear cells seen per low power field    Numerous Gram Variable Rods seen per oil power field    Numerous Gram positive cocci in pairs seen per oil power field  Final Report (05-03-20 @ 23:05):    After additional incubation time, Culture yields >4 types of aerobic    and/or anaerobic bacteria    Call client services within 7 days if further workup is clinically    indicated. Culture includes    Rare Enterococcus faecalis    Numerous Pseudomonas aeruginosa    Numerous Escherichia coli    Numerous Bacteroides fragilis "Susceptibilities not performed"  Organism: Enterococcus faecalis  Escherichia coli  Pseudomonas aeruginosa (05-03-20 @ 23:05)  Organism: Pseudomonas aeruginosa (05-03-20 @ 23:05)    Sensitivities:      -  Amikacin: S <=16      -  Aztreonam: S <=4      -  Cefepime: S <=2      -  Ceftazidime: S <=1      -  Ciprofloxacin: S <=0.25      -  Gentamicin: S 4      -  Imipenem: S <=1      -  Levofloxacin: S <=0.5      -  Meropenem: S <=1      -  Piperacillin/Tazobactam: S <=8      -  Tobramycin: S <=2      Method Type: PAWAN  Organism: Escherichia coli (05-03-20 @ 23:05)    Sensitivities:      -  Amikacin: S <=16      -  Amoxicillin/Clavulanic Acid: S <=8/4      -  Ampicillin: S <=8 These ampicillin results predict results for amoxicillin      -  Ampicillin/Sulbactam: S <=4/2 Enterobacter, Citrobacter, and Serratia may develop resistance during prolonged therapy (3-4 days)      -  Aztreonam: S <=4      -  Cefazolin: S <=2 Enterobacter, Citrobacter, and Serratia may develop resistance during prolonged therapy (3-4 days)      -  Cefepime: S <=2      -  Cefoxitin: S <=8      -  Ceftriaxone: S <=1 Enterobacter, Citrobacter, and Serratia may develop resistance during prolonged therapy      -  Ciprofloxacin: S <=0.25      -  Ertapenem: S <=0.5      -  Gentamicin: S <=2      -  Imipenem: S <=1      -  Levofloxacin: S <=0.5      -  Meropenem: S <=1      -  Piperacillin/Tazobactam: S <=8      -  Tobramycin: S <=2      -  Trimethoprim/Sulfamethoxazole: S <=0.5/9.5      Method Type: PAWAN  Organism: Enterococcus faecalis (05-03-20 @ 23:05)    Sensitivities:      -  Ampicillin: S <=2 Predicts results to ampicillin/sulbactam, amoxacillin-clavulanate and  piperacillin-tazobactam.      -  Tetra/Doxy: S <=4      -  Vancomycin: S 2      Method Type: PAWAN      Creatinine, Serum: 0.87 mg/dL (05-12-20 @ 06:37)  Creatinine, Serum: 0.98 mg/dL (05-11-20 @ 07:37)  Creatinine, Serum: 0.83 mg/dL (05-10-20 @ 09:41)  Creatinine, Serum: 0.97 mg/dL (05-09-20 @ 10:48)  Creatinine, Serum: 1.20 mg/dL (05-08-20 @ 06:11)  Creatinine, Serum: 1.50 mg/dL (05-07-20 @ 23:09)            COVID-19 PCR: NotDetec (05-12-20 @ 00:00)  COVID-19 PCR: NotDetec (05-07-20 @ 23:17)

## 2020-05-12 NOTE — H&P ADULT - NSHPSOCIALHISTORY_GEN_ALL_CORE
Patient lives with her Son in a private home.   Unable to ambulate X 4-5 years due to debility from MS, mobilizes with a motorized scooter.   Denies any smoking history.   Denies any alcohol or drug use/abuse.   Follows with an outpatient wound clinic for chronic right sided ulcer as per patient.

## 2020-05-12 NOTE — H&P ADULT - PROBLEM SELECTOR PLAN 2
- Right anterior pigtail placed 5/8/20.   - Large bore chest tube placed 5/10/20 for persistent Right pneumothorax.   - Maintain 2 CTs to suction for now.   - Follow up CXR.

## 2020-05-12 NOTE — DISCHARGE NOTE NURSING/CASE MANAGEMENT/SOCIAL WORK - PATIENT PORTAL LINK FT
You can access the FollowMyHealth Patient Portal offered by [facility] by registering at the following website: [email or web address]. By joining "[employer]’s FollowMyHealth portal, you will also be able to view your health information using other applications (apps) compatible with our system.

## 2020-05-12 NOTE — CONSULT NOTE ADULT - SUBJECTIVE AND OBJECTIVE BOX
REASON FOR CONSULTATION:     HPI:  69 year old non-ambulatory Female with a PMHx significant for multiple sclerosis, Breast Cancer s/p R mastectomy, Osteoporosis, Mitral stenosis, right ankle fracture, chronic right sided sacral ulcer, chronic midline back pain since being dropped from a jame lift (29), admitted to [place] after presenting with sepsis in the setting of a UTI with chronic campos use and known large right ischial decubitus ulcer. Patient found to have a Moderately large Right hydropneumothorax resulting in partial right lung collapse and slight cardiomediastinal shift to the left on CXR with chest tube placed 20. Large bore chest tube placed 5/10/20 for persistent Right pneumothorax. Patient was followed by ID and was given Vanco and Zosyn originally for her presumed urosepsis, Caspofungin was added after pleural fluid was + for fungal elements. CT chest confirmed +Empyema in the Right pleural space. Patient ultimately transferred to [facility] late 20 after she was found to have a distal esophageal perforation on CT chest and Esophogram.     Of note, patient admitted 10/9/2019 for lower back and bilateral knee pain, found to have compression fracture of L2 with imaging subsequently found to have multiple lytic lesions on the spine and pelvis.  Patient had L post iliac bone biopsy performed on 10/14/2019 found to have bone marrow fibrosis however patient with elevated tumor markers (CA 27.29 and  and CEA elevated) and advised to follow up outpatient with her own oncologist Dr. Matthew Fairbanks. (12 May 2020 02:24)      REVIEW OF SYSTEMS:  Constitutional, Eyes, ENT, Cardiovascular, Respiratory, Gastrointestinal, Genitourinary, Musculoskeletal, Integumentary, Neurological, Psychiatric, Endocrine, Heme/Lymph, and Allergic/Immunologic review of systems are otherwise negative except as noted in the HPI.    PAST MEDICAL & SURGICAL HISTORY:  Breast cancer metastasized to bone  Hypertension  Multiple sclerosis  S/P mastectomy, right  Breast CA  Osteoporosis  MS (mitral stenosis)  History of bowel resection  H/O breast surgery      FAMILY HISTORY:  FHx: hyperlipidemia      SOCIAL HISTORY:    Allergies    Sudafed (Other)    Intolerances        MEDICATIONS  (STANDING):  ascorbic acid 500 milliGRAM(s) Oral daily  caspofungin IVPB 50 milliGRAM(s) IV Intermittent every 24 hours  collagenase Ointment 1 Application(s) Topical daily  latanoprost 0.005% Ophthalmic Solution 1 Drop(s) Both EYES at bedtime  letrozole 2.5 milliGRAM(s) Oral daily  multivitamin/minerals 1 Tablet(s) Oral daily  mupirocin 2% Nasal 1 Application(s) Both Nostrils every 12 hours  pantoprazole    Tablet 40 milliGRAM(s) Oral before breakfast  piperacillin/tazobactam IVPB.. 3.375 Gram(s) IV Intermittent every 8 hours  saccharomyces boulardii 250 milliGRAM(s) Oral two times a day  sodium chloride 0.9% lock flush 3 milliLiter(s) IV Push every 8 hours  sodium chloride 0.9%. 1000 milliLiter(s) (60 mL/Hr) IV Continuous <Continuous>  vancomycin  IVPB 750 milliGRAM(s) IV Intermittent every 24 hours    MEDICATIONS  (PRN):  acetaminophen   Tablet .. 650 milliGRAM(s) Oral every 6 hours PRN Temp greater or equal to 38.5C (101.3F), Mild Pain (1 - 3)  ondansetron   Disintegrating Tablet 4 milliGRAM(s) Oral every 6 hours PRN Nausea and/or Vomiting      Vital Signs Last 24 Hrs  T(C): 36.6 (12 May 2020 08:24), Max: 37 (11 May 2020 22:12)  T(F): 97.8 (12 May 2020 08:24), Max: 98.6 (11 May 2020 22:12)  HR: 70 (12 May 2020 08:24) (70 - 95)  BP: 96/63 (12 May 2020 08:24) (92/63 - 112/51)  BP(mean): --  RR: 18 (12 May 2020 08:24) (18 - 18)  SpO2: 92% (12 May 2020 08:24) (92% - 99%)    PHYSICAL EXAM:    GENERAL: NAD, well-groomed, well-developed  HEAD:  Atraumatic, Normocephalic  EYES: EOMI, PERRLA, conjunctiva and sclera clear  ENMT: No tonsillar erythema, exudates, or enlargement; Moist mucous membranes, Good dentition, No lesions  NECK: Supple, No JVD, Normal thyroid  NERVOUS SYSTEM:  Alert & Oriented X3, Good concentration; Motor Strength 5/5 B/L upper and lower extremities; DTRs 2+ intact and symmetric  CHEST/LUNG: Clear to auscultation bilaterally; No rales, rhonchi, wheezing, or rubs  HEART: Regular rate and rhythm; No murmurs, rubs, or gallops  ABDOMEN: Soft, Nontender, Nondistended; Bowel sounds present  EXTREMITIES:  2+ Peripheral Pulses, No clubbing, cyanosis, or edema  LYMPH: No lymphadenopathy noted  SKIN: No rashes or lesions      LABS:                        8.2    8.36  )-----------( 545      ( 12 May 2020 06:37 )             24.6     05    124<L>  |  92<L>  |  21.0<H>  ----------------------------<  255<H>  3.8   |  17.0<L>  |  0.87    Ca    6.9<L>      12 May 2020 06:37  Mg     2.0         TPro  5.1<L>  /  Alb  1.7<L>  /  TBili  0.3<L>  /  DBili  x   /  AST  13  /  ALT  7   /  AlkPhos  78  05-12    PT/INR - ( 12 May 2020 06:37 )   PT: 13.7 sec;   INR: 1.21 ratio         PTT - ( 12 May 2020 06:37 )  PTT:25.0 sec  Urinalysis Basic - ( 12 May 2020 02:49 )    Color: Yellow / Appearance: Clear / S.020 / pH: x  Gluc: x / Ketone: Negative  / Bili: Negative / Urobili: Negative mg/dL   Blood: x / Protein: 30 mg/dL / Nitrite: Negative   Leuk Esterase: Negative / RBC: x / WBC x   Sq Epi: x / Non Sq Epi: Occasional / Bacteria: x REASON FOR CONSULTATION:     HPI:  69 year old non-ambulatory Female with a PMHx significant for multiple sclerosis, Breast Cancer s/p R mastectomy, Osteoporosis, Mitral stenosis, right ankle fracture, chronic right sided sacral ulcer, chronic midline back pain since being dropped from a jame lift (29), admitted to [place] after presenting with sepsis in the setting of a UTI with chronic campos use and known large right ischial decubitus ulcer. Patient found to have a Moderately large Right hydropneumothorax resulting in partial right lung collapse and slight cardiomediastinal shift to the left on CXR with chest tube placed 20. Large bore chest tube placed 5/10/20 for persistent Right pneumothorax. Patient was followed by ID and was given Vanco and Zosyn originally for her presumed urosepsis, Caspofungin was added after pleural fluid was + for fungal elements. CT chest confirmed +Empyema in the Right pleural space. Patient ultimately transferred to [facility] late 20 after she was found to have a distal esophageal perforation on CT chest and Esophogram.     Of note, patient admitted 10/9/2019 for lower back and bilateral knee pain, found to have compression fracture of L2 with imaging subsequently found to have multiple lytic lesions on the spine and pelvis.  Patient had L post iliac bone biopsy performed on 10/14/2019 found to have bone marrow fibrosis however patient with elevated tumor markers (CA 27.29 and  and CEA elevated) and advised to follow up outpatient with her own oncologist Dr. Matthew Fairbanks. (12 May 2020 02:24)      REVIEW OF SYSTEMS:  Constitutional, Eyes, ENT, Cardiovascular, Respiratory, Gastrointestinal, Genitourinary, Musculoskeletal, Integumentary, Neurological, Psychiatric, Endocrine, Heme/Lymph, and Allergic/Immunologic review of systems are otherwise negative except as noted in the HPI.    PAST MEDICAL & SURGICAL HISTORY:  Breast cancer metastasized to bone  Hypertension  Multiple sclerosis  S/P mastectomy, right  Breast CA  Osteoporosis  MS (mitral stenosis)  History of bowel resection  H/O breast surgery      FAMILY HISTORY:  FHx: hyperlipidemia      SOCIAL HISTORY:  non smoker    Allergies    Sudafed (Other)    Intolerances        MEDICATIONS  (STANDING):  ascorbic acid 500 milliGRAM(s) Oral daily  caspofungin IVPB 50 milliGRAM(s) IV Intermittent every 24 hours  collagenase Ointment 1 Application(s) Topical daily  latanoprost 0.005% Ophthalmic Solution 1 Drop(s) Both EYES at bedtime  letrozole 2.5 milliGRAM(s) Oral daily  multivitamin/minerals 1 Tablet(s) Oral daily  mupirocin 2% Nasal 1 Application(s) Both Nostrils every 12 hours  pantoprazole    Tablet 40 milliGRAM(s) Oral before breakfast  piperacillin/tazobactam IVPB.. 3.375 Gram(s) IV Intermittent every 8 hours  saccharomyces boulardii 250 milliGRAM(s) Oral two times a day  sodium chloride 0.9% lock flush 3 milliLiter(s) IV Push every 8 hours  sodium chloride 0.9%. 1000 milliLiter(s) (60 mL/Hr) IV Continuous <Continuous>  vancomycin  IVPB 750 milliGRAM(s) IV Intermittent every 24 hours    MEDICATIONS  (PRN):  acetaminophen   Tablet .. 650 milliGRAM(s) Oral every 6 hours PRN Temp greater or equal to 38.5C (101.3F), Mild Pain (1 - 3)  ondansetron   Disintegrating Tablet 4 milliGRAM(s) Oral every 6 hours PRN Nausea and/or Vomiting      Vital Signs Last 24 Hrs  T(C): 36.6 (12 May 2020 08:24), Max: 37 (11 May 2020 22:12)  T(F): 97.8 (12 May 2020 08:24), Max: 98.6 (11 May 2020 22:12)  HR: 70 (12 May 2020 08:24) (70 - 95)  BP: 96/63 (12 May 2020 08:24) (92/63 - 112/51)  BP(mean): --  RR: 18 (12 May 2020 08:24) (18 - 18)  SpO2: 92% (12 May 2020 08:24) (92% - 99%)    PHYSICAL EXAM:        LABS:                        8.2    8.36  )-----------( 545      ( 12 May 2020 06:37 )             24.6     05-12    124<L>  |  92<L>  |  21.0<H>  ----------------------------<  255<H>  3.8   |  17.0<L>  |  0.87    Ca    6.9<L>      12 May 2020 06:37  Mg     2.0     05-12    TPro  5.1<L>  /  Alb  1.7<L>  /  TBili  0.3<L>  /  DBili  x   /  AST  13  /  ALT  7   /  AlkPhos  78  12    PT/INR - ( 12 May 2020 06:37 )   PT: 13.7 sec;   INR: 1.21 ratio         PTT - ( 12 May 2020 06:37 )  PTT:25.0 sec  Urinalysis Basic - ( 12 May 2020 02:49 )    Color: Yellow / Appearance: Clear / S.020 / pH: x  Gluc: x / Ketone: Negative  / Bili: Negative / Urobili: Negative mg/dL   Blood: x / Protein: 30 mg/dL / Nitrite: Negative   Leuk Esterase: Negative / RBC: x / WBC x   Sq Epi: x / Non Sq Epi: Occasional / Bacteria: x

## 2020-05-12 NOTE — H&P ADULT - HISTORY OF PRESENT ILLNESS
69 year old non-ambulatory Female with a PMHx significant for multiple sclerosis, Breast Cancer s/p R mastectomy, Osteoporosis, Mitral stenosis, right ankle fracture, chronic right sided sacral ulcer, chronic midline back pain since being dropped from a jame lift (7/17/29), admitted to [place] after presenting with sepsis in the setting of a UTI with chronic campos use and known large right ischial decubitus ulcer. Patient found to have a Moderately large Right hydropneumothorax resulting in partial right lung collapse and slight cardiomediastinal shift to the left on CXR with chest tube placed 5/8/20. Large bore chest tube placed 5/10/20 for persistent Right pneumothorax. Patient was followed by ID and was given Vanco and Zosyn originally for her presumed urosepsis, Caspofungin was added after pleural fluid was + for fungal elements. CT chest confirmed +Empyema in the Right pleural space. Patient ultimately transferred to [facility] late 5/11/20 after she was found to have a distal esophageal perforation on CT chest and Esophogram.     Of note, patient admitted 10/9/2019 for lower back and bilateral knee pain, found to have compression fracture of L2 with imaging subsequently found to have multiple lytic lesions on the spine and pelvis.  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 Matthew Fairbanks.

## 2020-05-12 NOTE — H&P ADULT - NSHPPHYSICALEXAM_GEN_ALL_CORE
Constitutional: NAD, lying on stretcher with VSS in ED, +debility, lying on Right side  Neuro: A+O x 3, non-focal, speech clear and intact  HEENT: NC/AT, PERRL, EOMI, anicteric sclerae, oral mucosa pink and moist  Neck: supple  CV: RRR +S1S2  Pulm/chest: Decreased BSs on the Right, left CTA, no accessory muscle use noted  Abd: soft, NT, ND, +BS  Ext: DAVIDSON x 4, Limited LE strength, +LE sensation intact, no C/C/E, +DP/PT pulses b/l  Skin: warm, well perfused  Psych: calm, appropriate affect  Skin: +Chronic large Right stage 4 ischial pressure ulcer  Tubes: +Starks with yellow urine in bedside bag, Right anterior chest pigtail with dressing c/d/i attached to suction, +Right lateral large bore chest tube with dressing c/d/i attached to suction. No surrounding crepitus noted.

## 2020-05-12 NOTE — H&P ADULT - PROBLEM SELECTOR PLAN 5
- Collaganase ordered for now.   - Wound care consult / plastic surgery consult pending.   - Make sure patient is on appropriate bed to prevent skin breakdown.  - Turn and position q2 hours.

## 2020-05-13 ENCOUNTER — APPOINTMENT (OUTPATIENT)
Dept: THORACIC SURGERY | Facility: HOSPITAL | Age: 70
End: 2020-05-13

## 2020-05-13 LAB
ANION GAP SERPL CALC-SCNC: 13 MMOL/L — SIGNIFICANT CHANGE UP (ref 5–17)
ANION GAP SERPL CALC-SCNC: 14 MMOL/L — SIGNIFICANT CHANGE UP (ref 5–17)
APTT BLD: 25.2 SEC — LOW (ref 27.5–36.3)
BASOPHILS # BLD AUTO: 0.09 K/UL — SIGNIFICANT CHANGE UP (ref 0–0.2)
BASOPHILS NFR BLD AUTO: 0.6 % — SIGNIFICANT CHANGE UP (ref 0–2)
BUN SERPL-MCNC: 12 MG/DL — SIGNIFICANT CHANGE UP (ref 8–20)
BUN SERPL-MCNC: 15 MG/DL — SIGNIFICANT CHANGE UP (ref 8–20)
CALCIUM SERPL-MCNC: 6.8 MG/DL — LOW (ref 8.6–10.2)
CALCIUM SERPL-MCNC: 7 MG/DL — LOW (ref 8.6–10.2)
CHLORIDE SERPL-SCNC: 101 MMOL/L — SIGNIFICANT CHANGE UP (ref 98–107)
CHLORIDE SERPL-SCNC: 102 MMOL/L — SIGNIFICANT CHANGE UP (ref 98–107)
CO2 SERPL-SCNC: 14 MMOL/L — LOW (ref 22–29)
CO2 SERPL-SCNC: 17 MMOL/L — LOW (ref 22–29)
CREAT ?TM UR-MCNC: 73 MG/DL — SIGNIFICANT CHANGE UP
CREAT SERPL-MCNC: 0.56 MG/DL — SIGNIFICANT CHANGE UP (ref 0.5–1.3)
CREAT SERPL-MCNC: 0.65 MG/DL — SIGNIFICANT CHANGE UP (ref 0.5–1.3)
CULTURE RESULTS: SIGNIFICANT CHANGE UP
CULTURE RESULTS: SIGNIFICANT CHANGE UP
EOSINOPHIL # BLD AUTO: 0.04 K/UL — SIGNIFICANT CHANGE UP (ref 0–0.5)
EOSINOPHIL NFR BLD AUTO: 0.3 % — SIGNIFICANT CHANGE UP (ref 0–6)
GALACTOMANNAN AG SERPL-ACNC: <0.5 INDEX — SIGNIFICANT CHANGE UP
GLUCOSE SERPL-MCNC: 109 MG/DL — HIGH (ref 70–99)
GLUCOSE SERPL-MCNC: 92 MG/DL — SIGNIFICANT CHANGE UP (ref 70–99)
HCT VFR BLD CALC: 21.7 % — LOW (ref 34.5–45)
HCT VFR BLD CALC: 33 % — LOW (ref 34.5–45)
HCT VFR BLD CALC: 33.9 % — LOW (ref 34.5–45)
HGB BLD-MCNC: 10.7 G/DL — LOW (ref 11.5–15.5)
HGB BLD-MCNC: 11 G/DL — LOW (ref 11.5–15.5)
HGB BLD-MCNC: 7.2 G/DL — LOW (ref 11.5–15.5)
IMM GRANULOCYTES NFR BLD AUTO: 6.7 % — HIGH (ref 0–1.5)
INR BLD: 1.26 RATIO — HIGH (ref 0.88–1.16)
LYMPHOCYTES # BLD AUTO: 1.94 K/UL — SIGNIFICANT CHANGE UP (ref 1–3.3)
LYMPHOCYTES # BLD AUTO: 13 % — SIGNIFICANT CHANGE UP (ref 13–44)
MAGNESIUM SERPL-MCNC: 2 MG/DL — SIGNIFICANT CHANGE UP (ref 1.6–2.6)
MAGNESIUM SERPL-MCNC: 2 MG/DL — SIGNIFICANT CHANGE UP (ref 1.8–2.6)
MCHC RBC-ENTMCNC: 27.7 PG — SIGNIFICANT CHANGE UP (ref 27–34)
MCHC RBC-ENTMCNC: 27.8 PG — SIGNIFICANT CHANGE UP (ref 27–34)
MCHC RBC-ENTMCNC: 28.2 PG — SIGNIFICANT CHANGE UP (ref 27–34)
MCHC RBC-ENTMCNC: 32.4 GM/DL — SIGNIFICANT CHANGE UP (ref 32–36)
MCHC RBC-ENTMCNC: 32.4 GM/DL — SIGNIFICANT CHANGE UP (ref 32–36)
MCHC RBC-ENTMCNC: 33.2 GM/DL — SIGNIFICANT CHANGE UP (ref 32–36)
MCV RBC AUTO: 83.5 FL — SIGNIFICANT CHANGE UP (ref 80–100)
MCV RBC AUTO: 85.8 FL — SIGNIFICANT CHANGE UP (ref 80–100)
MCV RBC AUTO: 87.1 FL — SIGNIFICANT CHANGE UP (ref 80–100)
MONOCYTES # BLD AUTO: 0.58 K/UL — SIGNIFICANT CHANGE UP (ref 0–0.9)
MONOCYTES NFR BLD AUTO: 3.9 % — SIGNIFICANT CHANGE UP (ref 2–14)
NEUTROPHILS # BLD AUTO: 11.22 K/UL — HIGH (ref 1.8–7.4)
NEUTROPHILS NFR BLD AUTO: 75.5 % — SIGNIFICANT CHANGE UP (ref 43–77)
OSMOLALITY UR: 440 MOSM/KG — SIGNIFICANT CHANGE UP (ref 300–1000)
PHOSPHATE SERPL-MCNC: 2.3 MG/DL — LOW (ref 2.4–4.7)
PLATELET # BLD AUTO: 445 K/UL — HIGH (ref 150–400)
PLATELET # BLD AUTO: 450 K/UL — HIGH (ref 150–400)
PLATELET # BLD AUTO: 532 K/UL — HIGH (ref 150–400)
POTASSIUM SERPL-MCNC: 3.6 MMOL/L — SIGNIFICANT CHANGE UP (ref 3.5–5.3)
POTASSIUM SERPL-MCNC: 5.3 MMOL/L — SIGNIFICANT CHANGE UP (ref 3.5–5.3)
POTASSIUM SERPL-SCNC: 3.6 MMOL/L — SIGNIFICANT CHANGE UP (ref 3.5–5.3)
POTASSIUM SERPL-SCNC: 5.3 MMOL/L — SIGNIFICANT CHANGE UP (ref 3.5–5.3)
POTASSIUM UR-SCNC: 44 MMOL/L — SIGNIFICANT CHANGE UP
PROTHROM AB SERPL-ACNC: 14.3 SEC — HIGH (ref 10–12.9)
RBC # BLD: 2.6 M/UL — LOW (ref 3.8–5.2)
RBC # BLD: 3.79 M/UL — LOW (ref 3.8–5.2)
RBC # BLD: 3.95 M/UL — SIGNIFICANT CHANGE UP (ref 3.8–5.2)
RBC # FLD: 16.9 % — HIGH (ref 10.3–14.5)
RBC # FLD: 16.9 % — HIGH (ref 10.3–14.5)
RBC # FLD: 17.4 % — HIGH (ref 10.3–14.5)
SODIUM SERPL-SCNC: 128 MMOL/L — LOW (ref 135–145)
SODIUM SERPL-SCNC: 133 MMOL/L — LOW (ref 135–145)
SODIUM UR-SCNC: <30 MMOL/L — SIGNIFICANT CHANGE UP
SPECIMEN SOURCE: SIGNIFICANT CHANGE UP
SPECIMEN SOURCE: SIGNIFICANT CHANGE UP
WBC # BLD: 14.48 K/UL — HIGH (ref 3.8–10.5)
WBC # BLD: 14.87 K/UL — HIGH (ref 3.8–10.5)
WBC # BLD: 9.28 K/UL — SIGNIFICANT CHANGE UP (ref 3.8–10.5)
WBC # FLD AUTO: 14.48 K/UL — HIGH (ref 3.8–10.5)
WBC # FLD AUTO: 14.87 K/UL — HIGH (ref 3.8–10.5)
WBC # FLD AUTO: 9.28 K/UL — SIGNIFICANT CHANGE UP (ref 3.8–10.5)

## 2020-05-13 PROCEDURE — 99232 SBSQ HOSP IP/OBS MODERATE 35: CPT

## 2020-05-13 PROCEDURE — 44310 ILEOSTOMY/JEJUNOSTOMY: CPT

## 2020-05-13 PROCEDURE — 43830 GSTRST OPEN WO CONSTJ TUBE: CPT

## 2020-05-13 PROCEDURE — 71045 X-RAY EXAM CHEST 1 VIEW: CPT | Mod: 26

## 2020-05-13 PROCEDURE — 71045 X-RAY EXAM CHEST 1 VIEW: CPT | Mod: 26,77

## 2020-05-13 RX ORDER — PANTOPRAZOLE SODIUM 20 MG/1
40 TABLET, DELAYED RELEASE ORAL EVERY 12 HOURS
Refills: 0 | Status: DISCONTINUED | OUTPATIENT
Start: 2020-05-13 | End: 2020-05-15

## 2020-05-13 RX ORDER — SODIUM CHLORIDE 9 MG/ML
1000 INJECTION, SOLUTION INTRAVENOUS
Refills: 0 | Status: DISCONTINUED | OUTPATIENT
Start: 2020-05-13 | End: 2020-05-15

## 2020-05-13 RX ADMIN — MUPIROCIN 1 APPLICATION(S): 20 OINTMENT TOPICAL at 19:25

## 2020-05-13 RX ADMIN — SODIUM CHLORIDE 3 MILLILITER(S): 9 INJECTION INTRAMUSCULAR; INTRAVENOUS; SUBCUTANEOUS at 05:13

## 2020-05-13 RX ADMIN — Medication 250 MILLIGRAM(S): at 05:13

## 2020-05-13 RX ADMIN — SODIUM CHLORIDE 3 MILLILITER(S): 9 INJECTION INTRAMUSCULAR; INTRAVENOUS; SUBCUTANEOUS at 17:39

## 2020-05-13 RX ADMIN — LATANOPROST 1 DROP(S): 0.05 SOLUTION/ DROPS OPHTHALMIC; TOPICAL at 21:05

## 2020-05-13 RX ADMIN — Medication 1 APPLICATION(S): at 18:33

## 2020-05-13 RX ADMIN — CHLORHEXIDINE GLUCONATE 1 APPLICATION(S): 213 SOLUTION TOPICAL at 05:13

## 2020-05-13 RX ADMIN — CASPOFUNGIN ACETATE 260 MILLIGRAM(S): 7 INJECTION, POWDER, LYOPHILIZED, FOR SOLUTION INTRAVENOUS at 01:23

## 2020-05-13 RX ADMIN — PIPERACILLIN AND TAZOBACTAM 25 GRAM(S): 4; .5 INJECTION, POWDER, LYOPHILIZED, FOR SOLUTION INTRAVENOUS at 01:23

## 2020-05-13 RX ADMIN — PIPERACILLIN AND TAZOBACTAM 25 GRAM(S): 4; .5 INJECTION, POWDER, LYOPHILIZED, FOR SOLUTION INTRAVENOUS at 09:47

## 2020-05-13 RX ADMIN — PANTOPRAZOLE SODIUM 40 MILLIGRAM(S): 20 TABLET, DELAYED RELEASE ORAL at 18:34

## 2020-05-13 RX ADMIN — SODIUM CHLORIDE 60 MILLILITER(S): 9 INJECTION INTRAMUSCULAR; INTRAVENOUS; SUBCUTANEOUS at 01:29

## 2020-05-13 RX ADMIN — SODIUM CHLORIDE 3 MILLILITER(S): 9 INJECTION INTRAMUSCULAR; INTRAVENOUS; SUBCUTANEOUS at 20:46

## 2020-05-13 RX ADMIN — MUPIROCIN 1 APPLICATION(S): 20 OINTMENT TOPICAL at 05:13

## 2020-05-13 RX ADMIN — PIPERACILLIN AND TAZOBACTAM 25 GRAM(S): 4; .5 INJECTION, POWDER, LYOPHILIZED, FOR SOLUTION INTRAVENOUS at 18:38

## 2020-05-13 NOTE — CHART NOTE - NSCHARTNOTEFT_GEN_A_CORE
Pt Am labs HCT 21.  Dr. Murcia aware and 2 units PRBC ordered.  Pt for OR today for right chest washout.  VSS.  NAD noted.

## 2020-05-13 NOTE — PROGRESS NOTE ADULT - ASSESSMENT
HPI: This 69 year old non-ambulatory Female with a PMHx significant for multiple sclerosis, Breast Cancer s/p R mastectomy, Osteoporosis, Mitral stenosis, right ankle fracture, chronic right sided sacral ulcer, chronic midline back pain since being dropped from a jame lift (7/17/29), admitted to [place] after presenting with sepsis in the setting of a UTI with chronic campos use and known large right ischial decubitus ulcer. Patient found to have a Moderately large Right hydropneumothorax resulting in partial right lung collapse and slight cardiomediastinal shift to the left on CXR with chest tube placed 5/8/20. Large bore chest tube placed 5/10/20 for persistent Right pneumothorax. Patient was followed by ID and was given Vanco and Zosyn originally for her presumed urosepsis, Caspofungin was added after pleural fluid was + for fungal elements. CT chest confirmed +Empyema in the Right pleural space. Patient ultimately transferred to [facility] late 5/11/20 after she was found to have a distal esophageal perforation on CT chest and Esophogram.     Of note, patient admitted 10/9/2019 for lower back and bilateral knee pain, found to have compression fracture of L2 with imaging subsequently found to have multiple lytic lesions on the spine and pelvis.  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 Matthew Fairbanks. (12 May 2020 02:24)    patient is admitted to surgical service, pre-operatively planned for an esophageal stent 5/13.  Patient's labs/ cultures from [facility] reviewed.     Empyema with polymicrobial infection:  Strep mitis infection  Klebsiella oxytoca infection  CoNS infection  Perforated esophagus    Plan:    continue Antibiotics:  caspofungin IVPB 50 milliGRAM(s) IV Intermittent every 24 hours  piperacillin/tazobactam IVPB.. 3.375 Gram(s) IV Intermittent every 8 hours  vancomycin  IVPB 1000 milliGRAM(s) IV Intermittent every 24 hours    for PEG and esophageal stent     - please repeat fluid cultures,   - continue Chest tubes    - follow up all outstanding cultures  - trend temperature and WBC curve  - repeat cultures from blood and all sources if febrile.

## 2020-05-13 NOTE — PROGRESS NOTE ADULT - SUBJECTIVE AND OBJECTIVE BOX
[facility]  INFECTIOUS DISEASES AND INTERNAL MEDICINE at [place]  =======================================================  Phong Wang MD  Diplomates American Board of Internal Medicine and Infectious Diseases  Telephone [phone]  Fax            [phone]  =======================================================    [facility]-[id number]  GEORGE STANLEY   follow up: empyema     no fevers  for PEG and esophageal stent.      =======================================================  REVIEW OF SYSTEMS:  CONSTITUTIONAL:  No Fever or chills  HEENT:  No diplopia or blurred vision.  No earache, sore throat or runny nose.  CARDIOVASCULAR:  No pressure, squeezing, strangling, tightness, heaviness or aching about the chest, neck, axilla or epigastrium.  RESPIRATORY:  MILD shortness of breath  GASTROINTESTINAL:  No nausea, vomiting or diarrhea.  GENITOURINARY:  No dysuria, frequency or urgency. No Blood in urine  MUSCULOSKELETAL:  no joint aches, no muscle pain  SKIN:  No change in skin, hair or nails.  NEUROLOGIC:  No Headaches, seizures or weakness.  PSYCHIATRIC:  No disorder of thought or mood.  ENDOCRINE:  No heat or cold intolerance  HEMATOLOGICAL:  No easy bruising or bleeding.   =======================================================  Allergies  Sudafed (Other)    ======================================================  Physical Exam:  ============  (see vitals section below)    General:  No acute distress. FRAIL  Eye: Pupils are equal, round and reactive to light, Extraocular movements are intact, Normal conjunctiva.  HENT: Normocephalic, Oral mucosa is moist, No pharyngeal erythema, No sinus tenderness.  Neck: Supple, No lymphadenopathy.  Respiratory: Lungs  with diminished air entry at bases  RIGHT side with 2 chest tubes  copious bloody fluid  Cardiovascular: Normal rate, Regular rhythm,   Gastrointestinal: Soft, Non-tender, Non-distended, Normal bowel sounds.  Genitourinary: + ELIZONDO with clear urine  Lymphatics: No lymphadenopathy neck,   Musculoskeletal: Normal range of motion, Normal strength.  Integumentary: No rash.  Neurologic: Alert, Oriented, No focal deficits, Cranial Nerves II-XII are grossly intact.  Psychiatric: Appropriate mood & affect.    =======================================================  Vitals:  ============  T(F): 98.4 (13 May 2020 11:24), Max: 98.6 (13 May 2020 08:15)  HR: 83 (13 May 2020 11:24)  BP: 103/59 (13 May 2020 11:24)  RR: 16 (13 May 2020 11:24)  SpO2: 98% (13 May 2020 11:24) (80% - 99%)  temp max in last 48H T(F): , Max: 98.6 (05-11-20 @ 22:12)    =======================================================  Current Antibiotics:  caspofungin IVPB 50 milliGRAM(s) IV Intermittent every 24 hours  piperacillin/tazobactam IVPB.. 3.375 Gram(s) IV Intermittent every 8 hours  vancomycin  IVPB 1000 milliGRAM(s) IV Intermittent every 24 hours    Other medications:  chlorhexidine 4% Liquid 1 Application(s) Topical <User Schedule>  collagenase Ointment 1 Application(s) Topical daily  latanoprost 0.005% Ophthalmic Solution 1 Drop(s) Both EYES at bedtime  mupirocin 2% Nasal 1 Application(s) Both Nostrils every 12 hours  pantoprazole  Injectable 40 milliGRAM(s) IV Push daily  sodium chloride 0.9% lock flush 3 milliLiter(s) IV Push every 8 hours  sodium chloride 0.9%. 1000 milliLiter(s) IV Continuous <Continuous>      =======================================================  Labs:                        7.2    9.28  )-----------( 450      ( 13 May 2020 07:54 )             21.7      05-13    128<L>  |  101  |  15.0  ----------------------------<  92  5.3   |  14.0<L>  |  0.56    Ca    6.8<L>      13 May 2020 07:52  Mg     2.0     05-13    TPro  5.1<L>  /  Alb  1.7<L>  /  TBili  0.3<L>  /  DBili  x   /  AST  13  /  ALT  7   /  AlkPhos  78  05-12      Culture - Fungal, Body Fluid (collected 05-08-20 @ 17:52)  Source: Pleural Fl Pleural Fluid    Culture - Body Fluid with Gram Stain (collected 05-08-20 @ 17:52)  Source: Pleural Fl Pleural Fluid  Gram Stain (05-08-20 @ 19:18):    Few polymorphonuclear leukocytes per low power field    Rare Gram positive cocci in pairs per oil power field    Rare Gram Variable Rods per oil power field    Rare Yeast per oil power field  Organism: Streptococcus mitis/oralis group (05-11-20 @ 17:39)    Sensitivities:      -  Clindamycin: R      -  Erythromycin: R      -  Levofloxacin: S      -  Vancomycin: S      Method Type: KB  Organism: Streptococcus mitis/oralis group  Coag Negative Staphylococcus  Klebsiella oxytoca (05-11-20 @ 17:37)  Organism: Streptococcus mitis/oralis group (05-11-20 @ 17:37)    Sensitivities:      -  Ceftriaxone: S 1      -  Penicillin: I 0.75      Method Type: ETEST  Organism: Klebsiella oxytoca (05-11-20 @ 17:35)    Sensitivities:      -  Amikacin: S <=16      -  Amoxicillin/Clavulanic Acid: S <=8/4      -  Ampicillin: R >16 These ampicillin results predict results for amoxicillin      -  Ampicillin/Sulbactam: S <=4/2 Enterobacter, Citrobacter, and Serratia may develop resistance during prolonged therapy (3-4 days)      -  Aztreonam: R >16      -  Cefazolin: R 16 Enterobacter, Citrobacter, and Serratia may develop resistance during prolonged therapy (3-4 days)      -  Cefepime: S <=2      -  Cefoxitin: S <=8      -  Ceftazidime/Avibactam: S 8      -  Ceftolozane/tazobactam: S <=2      -  Ceftriaxone: S <=1 Enterobacter, Citrobacter, and Serratia may develop resistance during prolonged therapy      -  Ciprofloxacin: R 2      -  Ertapenem: I 1      -  Gentamicin: S <=2      -  Imipenem: S <=1      -  Levofloxacin: R 2      -  Meropenem: S <=1      -  Piperacillin/Tazobactam: S <=8      -  Tobramycin: S <=2      -  Trimethoprim/Sulfamethoxazole: S <=0.5/9.5      Method Type: PAWAN  Organism: Coag Negative Staphylococcus (05-11-20 @ 17:35)    Sensitivities:      -  Ampicillin/Sulbactam: R <=8/4      -  Cefazolin: R <=4      -  Clindamycin: S <=0.25      -  Erythromycin: R >4      -  Gentamicin: S <=1 Should not be used as monotherapy      -  Oxacillin: R >2      -  Penicillin: R >8      -  RIF- Rifampin: S <=1 Should not be used as monotherapy      -  Tetra/Doxy: R >8      -  Trimethoprim/Sulfamethoxazole: S <=0.5/9.5      -  Vancomycin: S 2      Method Type: PAWAN    Culture - Urine (collected 05-08-20 @ 09:09)  Source: .Urine Catheterized  Final Report (05-09-20 @ 08:24):    >=3 organisms. Probable collection contamination.    Culture - Blood (collected 05-08-20 @ 09:02)  Source: .Blood Blood-Peripheral  Final Report (05-13-20 @ 10:00):    No Growth Final    Culture - Blood (collected 05-08-20 @ 09:02)  Source: .Blood Blood  Final Report (05-13-20 @ 10:00):    No Growth Final    Culture - Tissue with Gram Stain (collected 05-01-20 @ 22:18)  Source: .Tissue Other, Right ischium wound  Gram Stain (05-02-20 @ 04:48):    No polymorphonuclear cells seen per low power field    Numerous Gram Variable Rods seen per oil power field    Numerous Gram positive cocci in pairs seen per oil power field  Final Report (05-03-20 @ 23:05):    After additional incubation time, Culture yields >4 types of aerobic    and/or anaerobic bacteria    Call client services within 7 days if further workup is clinically    indicated. Culture includes    Rare Enterococcus faecalis    Numerous Pseudomonas aeruginosa    Numerous Escherichia coli    Numerous Bacteroides fragilis "Susceptibilities not performed"  Organism: Enterococcus faecalis  Escherichia coli  Pseudomonas aeruginosa (05-03-20 @ 23:05)  Organism: Pseudomonas aeruginosa (05-03-20 @ 23:05)    Sensitivities:      -  Amikacin: S <=16      -  Aztreonam: S <=4      -  Cefepime: S <=2      -  Ceftazidime: S <=1      -  Ciprofloxacin: S <=0.25      -  Gentamicin: S 4      -  Imipenem: S <=1      -  Levofloxacin: S <=0.5      -  Meropenem: S <=1      -  Piperacillin/Tazobactam: S <=8      -  Tobramycin: S <=2      Method Type: PAWAN  Organism: Escherichia coli (05-03-20 @ 23:05)    Sensitivities:      -  Amikacin: S <=16      -  Amoxicillin/Clavulanic Acid: S <=8/4      -  Ampicillin: S <=8 These ampicillin results predict results for amoxicillin      -  Ampicillin/Sulbactam: S <=4/2 Enterobacter, Citrobacter, and Serratia may develop resistance during prolonged therapy (3-4 days)      -  Aztreonam: S <=4      -  Cefazolin: S <=2 Enterobacter, Citrobacter, and Serratia may develop resistance during prolonged therapy (3-4 days)      -  Cefepime: S <=2      -  Cefoxitin: S <=8      -  Ceftriaxone: S <=1 Enterobacter, Citrobacter, and Serratia may develop resistance during prolonged therapy      -  Ciprofloxacin: S <=0.25      -  Ertapenem: S <=0.5      -  Gentamicin: S <=2      -  Imipenem: S <=1      -  Levofloxacin: S <=0.5      -  Meropenem: S <=1      -  Piperacillin/Tazobactam: S <=8      -  Tobramycin: S <=2      -  Trimethoprim/Sulfamethoxazole: S <=0.5/9.5      Method Type: PAWAN  Organism: Enterococcus faecalis (05-03-20 @ 23:05)    Sensitivities:      -  Ampicillin: S <=2 Predicts results to ampicillin/sulbactam, amoxacillin-clavulanate and  piperacillin-tazobactam.      -  Tetra/Doxy: S <=4      -  Vancomycin: S 2      Method Type: PAWAN      Creatinine, Serum: 0.56 mg/dL (05-13-20 @ 07:52)  Creatinine, Serum: 0.63 mg/dL (05-12-20 @ 19:38)  Creatinine, Serum: 0.87 mg/dL (05-12-20 @ 06:37)  Creatinine, Serum: 0.98 mg/dL (05-11-20 @ 07:37)  Creatinine, Serum: 0.83 mg/dL (05-10-20 @ 09:41)  Creatinine, Serum: 0.97 mg/dL (05-09-20 @ 10:48)    Ferritin, Serum: 808 ng/mL (05-09-20 @ 15:27)    WBC Count: 9.28 K/uL (05-13-20 @ 07:54)  WBC Count: 8.36 K/uL (05-12-20 @ 06:37)  WBC Count: 8.22 K/uL (05-11-20 @ 07:37)  WBC Count: 6.72 K/uL (05-10-20 @ 08:43)  WBC Count: 4.95 K/uL (05-09-20 @ 10:48)    COVID-19 PCR: NotDetec (05-12-20 @ 00:00)  COVID-19 PCR: NotDetec (05-07-20 @ 23:17)    Lactate Dehydrogenase, Serum: 181 U/L (05-08-20 @ 06:11)    Alkaline Phosphatase, Serum: 78 U/L (05-12-20 @ 06:37)  Alanine Aminotransferase (ALT/SGPT): 7 U/L (05-12-20 @ 06:37)  Aspartate Aminotransferase (AST/SGOT): 13 U/L (05-12-20 @ 06:37)  Bilirubin Total, Serum: 0.3 mg/dL (05-12-20 @ 06:37)

## 2020-05-14 ENCOUNTER — TRANSCRIPTION ENCOUNTER (OUTPATIENT)
Age: 70
End: 2020-05-14

## 2020-05-14 DIAGNOSIS — D63.8 ANEMIA IN OTHER CHRONIC DISEASES CLASSIFIED ELSEWHERE: ICD-10-CM

## 2020-05-14 LAB
ALBUMIN SERPL ELPH-MCNC: 1.5 G/DL — LOW (ref 3.3–5.2)
ALP SERPL-CCNC: 91 U/L — SIGNIFICANT CHANGE UP (ref 40–120)
ALT FLD-CCNC: 7 U/L — SIGNIFICANT CHANGE UP
ANION GAP SERPL CALC-SCNC: 17 MMOL/L — SIGNIFICANT CHANGE UP (ref 5–17)
AST SERPL-CCNC: 20 U/L — SIGNIFICANT CHANGE UP
BILIRUB SERPL-MCNC: 0.3 MG/DL — LOW (ref 0.4–2)
BUN SERPL-MCNC: 12 MG/DL — SIGNIFICANT CHANGE UP (ref 8–20)
CALCIUM SERPL-MCNC: 6.6 MG/DL — LOW (ref 8.6–10.2)
CHLORIDE SERPL-SCNC: 102 MMOL/L — SIGNIFICANT CHANGE UP (ref 98–107)
CO2 SERPL-SCNC: 16 MMOL/L — LOW (ref 22–29)
CREAT SERPL-MCNC: 0.62 MG/DL — SIGNIFICANT CHANGE UP (ref 0.5–1.3)
GLUCOSE SERPL-MCNC: 72 MG/DL — SIGNIFICANT CHANGE UP (ref 70–99)
HCT VFR BLD CALC: 33.2 % — LOW (ref 34.5–45)
HGB BLD-MCNC: 10.9 G/DL — LOW (ref 11.5–15.5)
MAGNESIUM SERPL-MCNC: 2 MG/DL — SIGNIFICANT CHANGE UP (ref 1.6–2.6)
MCHC RBC-ENTMCNC: 28.5 PG — SIGNIFICANT CHANGE UP (ref 27–34)
MCHC RBC-ENTMCNC: 32.8 GM/DL — SIGNIFICANT CHANGE UP (ref 32–36)
MCV RBC AUTO: 86.7 FL — SIGNIFICANT CHANGE UP (ref 80–100)
PHOSPHATE SERPL-MCNC: 2.1 MG/DL — LOW (ref 2.4–4.7)
PLATELET # BLD AUTO: 423 K/UL — HIGH (ref 150–400)
POTASSIUM SERPL-MCNC: 4 MMOL/L — SIGNIFICANT CHANGE UP (ref 3.5–5.3)
POTASSIUM SERPL-SCNC: 4 MMOL/L — SIGNIFICANT CHANGE UP (ref 3.5–5.3)
PROT SERPL-MCNC: 4.9 G/DL — LOW (ref 6.6–8.7)
RBC # BLD: 3.83 M/UL — SIGNIFICANT CHANGE UP (ref 3.8–5.2)
RBC # FLD: 17.7 % — HIGH (ref 10.3–14.5)
SODIUM SERPL-SCNC: 134 MMOL/L — LOW (ref 135–145)
WBC # BLD: 10.09 K/UL — SIGNIFICANT CHANGE UP (ref 3.8–10.5)
WBC # FLD AUTO: 10.09 K/UL — SIGNIFICANT CHANGE UP (ref 3.8–10.5)

## 2020-05-14 PROCEDURE — 71045 X-RAY EXAM CHEST 1 VIEW: CPT | Mod: 26

## 2020-05-14 PROCEDURE — 99232 SBSQ HOSP IP/OBS MODERATE 35: CPT

## 2020-05-14 PROCEDURE — 99223 1ST HOSP IP/OBS HIGH 75: CPT

## 2020-05-14 PROCEDURE — 49440 PLACE GASTROSTOMY TUBE PERC: CPT | Mod: 52

## 2020-05-14 RX ORDER — ENOXAPARIN SODIUM 100 MG/ML
30 INJECTION SUBCUTANEOUS DAILY
Refills: 0 | Status: DISCONTINUED | OUTPATIENT
Start: 2020-05-14 | End: 2020-05-15

## 2020-05-14 RX ORDER — ACETAMINOPHEN 500 MG
1000 TABLET ORAL EVERY 8 HOURS
Refills: 0 | Status: DISCONTINUED | OUTPATIENT
Start: 2020-05-14 | End: 2020-05-15

## 2020-05-14 RX ORDER — FOLIC ACID 0.8 MG
1 TABLET ORAL DAILY
Refills: 0 | Status: DISCONTINUED | OUTPATIENT
Start: 2020-05-14 | End: 2020-05-15

## 2020-05-14 RX ADMIN — MUPIROCIN 1 APPLICATION(S): 20 OINTMENT TOPICAL at 17:40

## 2020-05-14 RX ADMIN — Medication 250 MILLIGRAM(S): at 06:19

## 2020-05-14 RX ADMIN — Medication 1 APPLICATION(S): at 13:12

## 2020-05-14 RX ADMIN — Medication 62.5 MILLIMOLE(S): at 13:11

## 2020-05-14 RX ADMIN — CASPOFUNGIN ACETATE 260 MILLIGRAM(S): 7 INJECTION, POWDER, LYOPHILIZED, FOR SOLUTION INTRAVENOUS at 01:50

## 2020-05-14 RX ADMIN — SODIUM CHLORIDE 75 MILLILITER(S): 9 INJECTION, SOLUTION INTRAVENOUS at 06:03

## 2020-05-14 RX ADMIN — SODIUM CHLORIDE 3 MILLILITER(S): 9 INJECTION INTRAMUSCULAR; INTRAVENOUS; SUBCUTANEOUS at 21:34

## 2020-05-14 RX ADMIN — SODIUM CHLORIDE 3 MILLILITER(S): 9 INJECTION INTRAMUSCULAR; INTRAVENOUS; SUBCUTANEOUS at 05:43

## 2020-05-14 RX ADMIN — LATANOPROST 1 DROP(S): 0.05 SOLUTION/ DROPS OPHTHALMIC; TOPICAL at 21:35

## 2020-05-14 RX ADMIN — PIPERACILLIN AND TAZOBACTAM 25 GRAM(S): 4; .5 INJECTION, POWDER, LYOPHILIZED, FOR SOLUTION INTRAVENOUS at 02:51

## 2020-05-14 RX ADMIN — SODIUM CHLORIDE 3 MILLILITER(S): 9 INJECTION INTRAMUSCULAR; INTRAVENOUS; SUBCUTANEOUS at 14:37

## 2020-05-14 RX ADMIN — MUPIROCIN 1 APPLICATION(S): 20 OINTMENT TOPICAL at 05:09

## 2020-05-14 RX ADMIN — PANTOPRAZOLE SODIUM 40 MILLIGRAM(S): 20 TABLET, DELAYED RELEASE ORAL at 05:09

## 2020-05-14 RX ADMIN — ENOXAPARIN SODIUM 30 MILLIGRAM(S): 100 INJECTION SUBCUTANEOUS at 21:35

## 2020-05-14 RX ADMIN — PIPERACILLIN AND TAZOBACTAM 25 GRAM(S): 4; .5 INJECTION, POWDER, LYOPHILIZED, FOR SOLUTION INTRAVENOUS at 13:11

## 2020-05-14 RX ADMIN — CHLORHEXIDINE GLUCONATE 1 APPLICATION(S): 213 SOLUTION TOPICAL at 05:05

## 2020-05-14 RX ADMIN — PANTOPRAZOLE SODIUM 40 MILLIGRAM(S): 20 TABLET, DELAYED RELEASE ORAL at 17:41

## 2020-05-14 NOTE — PROGRESS NOTE ADULT - PROBLEM SELECTOR PLAN 2
- Pleural fluid + for yeast, coag Negative Staph, Klebsiella, and strep mitis/oralis.  - Continue IV antibiotics per ID.  - S/p Right thoracotomy / washout / VATS decort 5/13/20.

## 2020-05-14 NOTE — PROGRESS NOTE ADULT - SUBJECTIVE AND OBJECTIVE BOX
consulted by:  dr pearl  Reason for consult: percutaneous gastrojejunostomy    relevant chart and imaging reviewed.    patient is a 69yFemale presented with distal esophageal perf s/p stent, for GJ tube placement.     Perforation of esophagus  FHx: hyperlipidemia  No pertinent family history in first degree relatives  Handoff  MEWS Score  Breast cancer metastasized to bone  Hypertension  Multiple sclerosis  S/P mastectomy, right  Breast CA  Osteoporosis  MS (mitral stenosis)  Esophageal perforation  Esophageal perforation  Esophageal perforation  Anemia, chronic disease  Prophylactic measure  Essential hypertension  Carcinoma of right breast metastatic to bone  Pressure injury of right hip, stage 4  Multiple sclerosis  Empyema of right pleural space  Hydropneumothorax  Esophageal perforation  Chest tube placement  VATS, with partial lung decortication  Placement of esophageal stent  History of bowel resection  H/O breast surgery  TRANSFER SEPSIS  0  Hydropneumothorax      Allergies    Sudafed (Other)    Intolerances        HPI:  69 year old non-ambulatory Female with a PMHx significant for multiple sclerosis, Breast Cancer s/p R mastectomy, Osteoporosis, Mitral stenosis, right ankle fracture, chronic right sided sacral ulcer, chronic midline back pain since being dropped from a jame lift (7/17/29), admitted to [facility] after presenting with sepsis in the setting of a UTI with chronic campos use and known large right ischial decubitus ulcer. Patient found to have a Moderately large Right hydropneumothorax resulting in partial right lung collapse and slight cardiomediastinal shift to the left on CXR with chest tube placed 5/8/20. Large bore chest tube placed 5/10/20 for persistent Right pneumothorax. Patient was followed by ID and was given Vanco and Zosyn originally for her presumed urosepsis, Caspofungin was added after pleural fluid was + for fungal elements. CT chest confirmed +Empyema in the Right pleural space. Patient ultimately transferred to [facility] late 5/11/20 after she was found to have a distal esophageal perforation on CT chest and Esophogram.     Of note, patient admitted 10/9/2019 for lower back and bilateral knee pain, found to have compression fracture of L2 with imaging subsequently found to have multiple lytic lesions on the spine and pelvis.  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 Matthew Fairbanks. (12 May 2020 02:24)      T(C): 36.4 (05-14-20 @ 08:59), Max: 36.7 (05-13-20 @ 16:00)  HR: 90 (05-14-20 @ 08:59) (74 - 90)  BP: 98/67 (05-14-20 @ 08:59) (96/62 - 132/98)  RR: 16 (05-14-20 @ 08:59) (15 - 19)  SpO2: 92% (05-14-20 @ 08:59) (92% - 100%)    PT/INR - ( 13 May 2020 08:45 )   PT: 14.3 sec;   INR: 1.26 ratio         PTT - ( 13 May 2020 08:45 )  PTT:25.2 sec    05-14    134<L>  |  102  |  12.0  ----------------------------<  72  4.0   |  16.0<L>  |  0.62    Ca    6.6<L>      14 May 2020 08:01  Phos  2.1     05-14  Mg     2.0     05-14    TPro  4.9<L>  /  Alb  1.5<L>  /  TBili  0.3<L>  /  DBili  x   /  AST  20  /  ALT  7   /  AlkPhos  91  05-14                            10.9   10.09 )-----------( 423      ( 14 May 2020 08:01 )             33.2       Imaging shows:  distal esophageal perf.  recent stent, traverses the eg junction    Plan:  percutaneous gastrojejonostomy

## 2020-05-14 NOTE — PROGRESS NOTE ADULT - PROBLEM SELECTOR PLAN 1
- S/p esophageal stent placed 5/13/20 with NGT placed.   - Continuous telemetry, .   - Follow up AM CXR.   - Maintain b/l CTs to suction today for +airleak  - Continue IV antibiotics as per ID.   - Case and plan to be discussed with Thoracic Surgery team on AM rounds.

## 2020-05-14 NOTE — PROGRESS NOTE ADULT - PROBLEM SELECTOR PLAN 7
- Heme consult appreciated. Follow up recommendations.   - Iron studies ordered.   - Folic acid ordered.   - Transfuse for Hgb < 7 g/dL.

## 2020-05-14 NOTE — CONSULT NOTE ADULT - SUBJECTIVE AND OBJECTIVE BOX
[facility]                                     Neurology at [place]                                 [address]                                  [address]                                        [place], [place], [address]                                             [phone]    CC: multiple sclerosis  HPI: The patient is a 69y Female who presented as a transfer from [place] for esophogeal tear and repair.  She has history of multiple sclerosis, diagnosed 44 years ago with paresthesia in her legs.  She is taking avonex.  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  She estimates that it has been 5 years since she ambulated independently.  Neurology is asked to evaluate for medicine management.    PAST MEDICAL & SURGICAL HISTORY:  Breast cancer metastasized to bone  Hypertension  Multiple sclerosis  S/P mastectomy, right  Breast CA  Osteoporosis  MS (mitral stenosis)  History of bowel resection  H/O breast surgery      MEDICATIONS  (STANDING):  caspofungin IVPB 50 milliGRAM(s) IV Intermittent every 24 hours  chlorhexidine 4% Liquid 1 Application(s) Topical <User Schedule>  collagenase Ointment 1 Application(s) Topical daily  enoxaparin Injectable 30 milliGRAM(s) SubCutaneous daily  folic acid 1 milliGRAM(s) Oral daily  lactated ringers. 1000 milliLiter(s) (75 mL/Hr) IV Continuous <Continuous>  latanoprost 0.005% Ophthalmic Solution 1 Drop(s) Both EYES at bedtime  mupirocin 2% Nasal 1 Application(s) Both Nostrils every 12 hours  pantoprazole  Injectable 40 milliGRAM(s) IV Push every 12 hours  piperacillin/tazobactam IVPB.. 3.375 Gram(s) IV Intermittent every 8 hours  sodium chloride 0.9% lock flush 3 milliLiter(s) IV Push every 8 hours  vancomycin  IVPB 1000 milliGRAM(s) IV Intermittent every 24 hours    MEDICATIONS  (PRN):  acetaminophen  IVPB .. 1000 milliGRAM(s) IV Intermittent every 8 hours PRN Mild Pain (1 - 3)  sodium chloride 0.9% lock flush 10 milliLiter(s) IV Push every 1 hour PRN Pre/post blood products, medications, blood draw, and to maintain line patency      Allergies    Sudafed (Other)    Intolerances        SOCIAL HISTORY:  no tob,   no alcohol   no drugs    FAMILY HISTORY:  FHx: hyperlipidemia  no multiple sclerosis in either parent      ROS: 14 point ROS negative other than what is present in HPI or below  as above    Vital Signs Last 24 Hrs  T(C): 36.4 (14 May 2020 08:59), Max: 36.7 (13 May 2020 16:00)  T(F): 97.5 (14 May 2020 08:59), Max: 98 (13 May 2020 16:00)  HR: 90 (14 May 2020 08:59) (74 - 90)  BP: 98/67 (14 May 2020 08:59) (96/65 - 132/98)  BP(mean): --  RR: 16 (14 May 2020 08:59) (16 - 18)  SpO2: 92% (14 May 2020 08:59) (92% - 100%)      General: NAD    Detailed Neurologic Exam:    Mental status: The patient is awake and alert and has normal attention span.  The patient is fully oriented in 3 spheres. The patient is oriented to current events. The patient is able to name objects, follow commands, repeat sentences.    Cranial nerves: Pupils equal and react symmetrically to light. There is no visual field deficit to confrontation. Extraocular motion is full with no nystagmus. There is no ptosis. Facial sensation is intact. Facial musculature is symmetric. Palate elevates symmetrically. Shoulder shrug is normal. Tongue is midline.    Motor: There is normal bulk and tone.  There is left greater than right  tremor with movement  Strength is 4+/5 in the right arm and 0-1/5leg.   Strength is 4/5 in the left arm and 0-1/5leg.    Sensation: Intact to light touch and pin in 4 extremities    Reflexes: 1+ in UE, absent BLE and plantar responses are silent.    Cerebellar: There is mild left>right dysmetria on finger to nose testing.    Gait : deferred    LABS:                         10.9   10.09 )-----------( 423      ( 14 May 2020 08:01 )             33.2       05-14    134<L>  |  102  |  12.0  ----------------------------<  72  4.0   |  16.0<L>  |  0.62    Ca    6.6<L>      14 May 2020 08:01  Phos  2.1     05-14  Mg     2.0     05-14    TPro  4.9<L>  /  Alb  1.5<L>  /  TBili  0.3<L>  /  DBili  x   /  AST  20  /  ALT  7   /  AlkPhos  91  05-14      PT/INR - ( 13 May 2020 08:45 )   PT: 14.3 sec;   INR: 1.26 ratio         PTT - ( 13 May 2020 08:45 )  PTT:25.2 sec    RADIOLOGY & ADDITIONAL STUDIES (independently reviewed unless otherwise noted):  no neurological studies

## 2020-05-14 NOTE — CHART NOTE - NSCHARTNOTEFT_GEN_A_CORE
All labs and radiology reviewed. Patient hemodynamically stable, denies pain. NPO due to esophogeal perforation- s/p stent placement.    IR today for g-J tube placement. Spoke with neurology Dr. Waters about need for interferon- with recommendations to hold as patient is still on antibiotics for infection.

## 2020-05-14 NOTE — PROGRESS NOTE ADULT - ASSESSMENT
69 year old non-ambulatory Female with a PMHx significant for multiple sclerosis, Breast Cancer s/p R mastectomy, Osteoporosis, Mitral stenosis, right ankle fracture, chronic right sided sacral ulcer, chronic midline back pain since being dropped from a jame lift (7/17/29), admitted to [place] after presenting with sepsis in the setting of a UTI with chronic campos use and known large right ischial decubitus ulcer. Patient found to have a Moderately large Right hydropneumothorax resulting in partial right lung collapse and slight cardiomediastinal shift to the left on CXR with chest tube placed 5/8/20. Large bore chest tube placed 5/10/20 for persistent Right pneumothorax. Patient was followed by ID and was given Vanco and Zosyn originally for her presumed urosepsis, Caspofungin was added after pleural fluid was + for fungal elements. CT chest confirmed +Empyema in the Right pleural space. Patient ultimately transferred to [facility] late 5/11/20 after she was found to have a distal esophageal perforation on CT chest and Esophogram.     Patient now s/p EGD with esophageal stent placed, NGT placed, VATS with right thoracic cavity washout and pulmonary decortication with vaibhav drain, right posterior, and right medial chest tubes placed on 5/13/20.

## 2020-05-14 NOTE — PROGRESS NOTE ADULT - SUBJECTIVE AND OBJECTIVE BOX
[facility]  INFECTIOUS DISEASES AND INTERNAL MEDICINE at [place]  =======================================================  Phong Wang MD  Diplomates American Board of Internal Medicine and Infectious Diseases  Telephone [phone]  Fax            [phone]  =======================================================    [facility]-[id number]  GEORGE STANLEY   follow up: empyema     no fevers  s/p EGD and esophageal stent; s/p VATS 5/14       =======================================================  REVIEW OF SYSTEMS:  CONSTITUTIONAL:  No Fever or chills  HEENT:  No diplopia or blurred vision.  No earache, sore throat or runny nose.  CARDIOVASCULAR:  No pressure, squeezing, strangling, tightness, heaviness or aching about the chest, neck, axilla or epigastrium.  RESPIRATORY:  MILD shortness of breath  GASTROINTESTINAL:  No nausea, vomiting or diarrhea.  GENITOURINARY:  No dysuria, frequency or urgency. No Blood in urine  MUSCULOSKELETAL:  no joint aches, no muscle pain  SKIN:  No change in skin, hair or nails.  NEUROLOGIC:  No Headaches, seizures or weakness.  PSYCHIATRIC:  No disorder of thought or mood.  ENDOCRINE:  No heat or cold intolerance  HEMATOLOGICAL:  No easy bruising or bleeding.   =======================================================  Allergies  Sudafed (Other)    ======================================================  Physical Exam:  ============  (see vitals section below)    General:  No acute distress. FRAIL  Eye: Pupils are equal, round and reactive to light, Extraocular movements are intact, Normal conjunctiva.  HENT: Normocephalic, Oral mucosa is moist, No pharyngeal erythema, No sinus tenderness.  Neck: Supple, No lymphadenopathy.  Respiratory: Lungs  with diminished air entry at bases  RIGHT side with 2 chest tubes  copious bloody fluid  Cardiovascular: Normal rate, Regular rhythm,   Gastrointestinal: Soft, Non-tender, Non-distended, Normal bowel sounds.  Genitourinary: + ELIZONDO with clear urine  Lymphatics: No lymphadenopathy neck,   Musculoskeletal: Normal range of motion, Normal strength.  Integumentary: No rash.  Neurologic: Alert, Oriented, No focal deficits, Cranial Nerves II-XII are grossly intact.  Psychiatric: Appropriate mood & affect.    =======================================================  Vitals:  ============  T(F): 97.5 (14 May 2020 08:59), Max: 98.4 (13 May 2020 11:24)  HR: 90 (14 May 2020 08:59)  BP: 98/67 (14 May 2020 08:59)  RR: 16 (14 May 2020 08:59)  SpO2: 92% (14 May 2020 08:59) (92% - 100%)  temp max in last 48H T(F): , Max: 98.6 (05-13-20 @ 08:15)    =======================================================  Current Antibiotics:  caspofungin IVPB 50 milliGRAM(s) IV Intermittent every 24 hours  piperacillin/tazobactam IVPB.. 3.375 Gram(s) IV Intermittent every 8 hours  vancomycin  IVPB 1000 milliGRAM(s) IV Intermittent every 24 hours    Other medications:  chlorhexidine 4% Liquid 1 Application(s) Topical <User Schedule>  collagenase Ointment 1 Application(s) Topical daily  enoxaparin Injectable 30 milliGRAM(s) SubCutaneous daily  folic acid 1 milliGRAM(s) Oral daily  lactated ringers. 1000 milliLiter(s) IV Continuous <Continuous>  latanoprost 0.005% Ophthalmic Solution 1 Drop(s) Both EYES at bedtime  mupirocin 2% Nasal 1 Application(s) Both Nostrils every 12 hours  pantoprazole  Injectable 40 milliGRAM(s) IV Push every 12 hours  sodium chloride 0.9% lock flush 3 milliLiter(s) IV Push every 8 hours  sodium phosphate IVPB 15 milliMole(s) IV Intermittent once      =======================================================  Labs:                        10.9   10.09 )-----------( 423      ( 14 May 2020 08:01 )             33.2      05-14    134<L>  |  102  |  12.0  ----------------------------<  72  4.0   |  16.0<L>  |  0.62    Ca    6.6<L>      14 May 2020 08:01  Phos  2.1     05-14  Mg     2.0     05-14    TPro  4.9<L>  /  Alb  1.5<L>  /  TBili  0.3<L>  /  DBili  x   /  AST  20  /  ALT  7   /  AlkPhos  91  05-14      Culture - Fungal, Body Fluid (collected 05-08-20 @ 17:52)  Source: Pleural Fl Pleural Fluid    Culture - Body Fluid with Gram Stain (collected 05-08-20 @ 17:52)  Source: Pleural Fl Pleural Fluid  Gram Stain (05-08-20 @ 19:18):    Few polymorphonuclear leukocytes per low power field    Rare Gram positive cocci in pairs per oil power field    Rare Gram Variable Rods per oil power field    Rare Yeast per oil power field  Organism: Streptococcus mitis/oralis group (05-11-20 @ 17:39)    Sensitivities:      -  Clindamycin: R      -  Erythromycin: R      -  Levofloxacin: S      -  Vancomycin: S      Method Type: KB  Organism: Streptococcus mitis/oralis group  Coag Negative Staphylococcus  Klebsiella oxytoca (05-11-20 @ 17:37)  Organism: Streptococcus mitis/oralis group (05-11-20 @ 17:37)    Sensitivities:      -  Ceftriaxone: S 1      -  Penicillin: I 0.75      Method Type: ETEST  Organism: Klebsiella oxytoca (05-11-20 @ 17:35)    Sensitivities:      -  Amikacin: S <=16      -  Amoxicillin/Clavulanic Acid: S <=8/4      -  Ampicillin: R >16 These ampicillin results predict results for amoxicillin      -  Ampicillin/Sulbactam: S <=4/2 Enterobacter, Citrobacter, and Serratia may develop resistance during prolonged therapy (3-4 days)      -  Aztreonam: R >16      -  Cefazolin: R 16 Enterobacter, Citrobacter, and Serratia may develop resistance during prolonged therapy (3-4 days)      -  Cefepime: S <=2      -  Cefoxitin: S <=8      -  Ceftazidime/Avibactam: S 8      -  Ceftolozane/tazobactam: S <=2      -  Ceftriaxone: S <=1 Enterobacter, Citrobacter, and Serratia may develop resistance during prolonged therapy      -  Ciprofloxacin: R 2      -  Ertapenem: I 1      -  Gentamicin: S <=2      -  Imipenem: S <=1      -  Levofloxacin: R 2      -  Meropenem: S <=1      -  Piperacillin/Tazobactam: S <=8      -  Tobramycin: S <=2      -  Trimethoprim/Sulfamethoxazole: S <=0.5/9.5      Method Type: PAWAN  Organism: Coag Negative Staphylococcus (05-11-20 @ 17:35)    Sensitivities:      -  Ampicillin/Sulbactam: R <=8/4      -  Cefazolin: R <=4      -  Clindamycin: S <=0.25      -  Erythromycin: R >4      -  Gentamicin: S <=1 Should not be used as monotherapy      -  Oxacillin: R >2      -  Penicillin: R >8      -  RIF- Rifampin: S <=1 Should not be used as monotherapy      -  Tetra/Doxy: R >8      -  Trimethoprim/Sulfamethoxazole: S <=0.5/9.5      -  Vancomycin: S 2      Method Type: PAWAN    Culture - Urine (collected 05-08-20 @ 09:09)  Source: .Urine Catheterized  Final Report (05-09-20 @ 08:24):    >=3 organisms. Probable collection contamination.    Culture - Blood (collected 05-08-20 @ 09:02)  Source: .Blood Blood-Peripheral  Final Report (05-13-20 @ 10:00):    No Growth Final    Culture - Blood (collected 05-08-20 @ 09:02)  Source: .Blood Blood  Final Report (05-13-20 @ 10:00):    No Growth Final    Culture - Tissue with Gram Stain (collected 05-01-20 @ 22:18)  Source: .Tissue Other, Right ischium wound  Gram Stain (05-02-20 @ 04:48):    No polymorphonuclear cells seen per low power field    Numerous Gram Variable Rods seen per oil power field    Numerous Gram positive cocci in pairs seen per oil power field  Final Report (05-03-20 @ 23:05):    After additional incubation time, Culture yields >4 types of aerobic    and/or anaerobic bacteria    Call client services within 7 days if further workup is clinically    indicated. Culture includes    Rare Enterococcus faecalis    Numerous Pseudomonas aeruginosa    Numerous Escherichia coli    Numerous Bacteroides fragilis "Susceptibilities not performed"  Organism: Enterococcus faecalis  Escherichia coli  Pseudomonas aeruginosa (05-03-20 @ 23:05)  Organism: Pseudomonas aeruginosa (05-03-20 @ 23:05)    Sensitivities:      -  Amikacin: S <=16      -  Aztreonam: S <=4      -  Cefepime: S <=2      -  Ceftazidime: S <=1      -  Ciprofloxacin: S <=0.25      -  Gentamicin: S 4      -  Imipenem: S <=1      -  Levofloxacin: S <=0.5      -  Meropenem: S <=1      -  Piperacillin/Tazobactam: S <=8      -  Tobramycin: S <=2      Method Type: PAWAN  Organism: Escherichia coli (05-03-20 @ 23:05)    Sensitivities:      -  Amikacin: S <=16      -  Amoxicillin/Clavulanic Acid: S <=8/4      -  Ampicillin: S <=8 These ampicillin results predict results for amoxicillin      -  Ampicillin/Sulbactam: S <=4/2 Enterobacter, Citrobacter, and Serratia may develop resistance during prolonged therapy (3-4 days)      -  Aztreonam: S <=4      -  Cefazolin: S <=2 Enterobacter, Citrobacter, and Serratia may develop resistance during prolonged therapy (3-4 days)      -  Cefepime: S <=2      -  Cefoxitin: S <=8      -  Ceftriaxone: S <=1 Enterobacter, Citrobacter, and Serratia may develop resistance during prolonged therapy      -  Ciprofloxacin: S <=0.25      -  Ertapenem: S <=0.5      -  Gentamicin: S <=2      -  Imipenem: S <=1      -  Levofloxacin: S <=0.5      -  Meropenem: S <=1      -  Piperacillin/Tazobactam: S <=8      -  Tobramycin: S <=2      -  Trimethoprim/Sulfamethoxazole: S <=0.5/9.5      Method Type: PAWAN  Organism: Enterococcus faecalis (05-03-20 @ 23:05)    Sensitivities:      -  Ampicillin: S <=2 Predicts results to ampicillin/sulbactam, amoxacillin-clavulanate and  piperacillin-tazobactam.      -  Tetra/Doxy: S <=4      -  Vancomycin: S 2      Method Type: PAWAN      Creatinine, Serum: 0.62 mg/dL (05-14-20 @ 08:01)  Creatinine, Serum: 0.65 mg/dL (05-13-20 @ 14:45)  Creatinine, Serum: 0.56 mg/dL (05-13-20 @ 07:52)  Creatinine, Serum: 0.63 mg/dL (05-12-20 @ 19:38)  Creatinine, Serum: 0.87 mg/dL (05-12-20 @ 06:37)  Creatinine, Serum: 0.98 mg/dL (05-11-20 @ 07:37)  Creatinine, Serum: 0.83 mg/dL (05-10-20 @ 09:41)        Ferritin, Serum: 808 ng/mL (05-09-20 @ 15:27)      WBC Count: 10.09 K/uL (05-14-20 @ 08:01)  WBC Count: 14.48 K/uL (05-13-20 @ 16:32)  WBC Count: 14.87 K/uL (05-13-20 @ 14:45)  WBC Count: 9.28 K/uL (05-13-20 @ 07:54)  WBC Count: 8.36 K/uL (05-12-20 @ 06:37)  WBC Count: 8.22 K/uL (05-11-20 @ 07:37)  WBC Count: 6.72 K/uL (05-10-20 @ 08:43)      COVID-19 PCR: NotDetec (05-12-20 @ 00:00)  COVID-19 PCR: NotDetec (05-07-20 @ 23:17)    Lactate Dehydrogenase, Serum: 181 U/L (05-08-20 @ 06:11)    Alkaline Phosphatase, Serum: 91 U/L (05-14-20 @ 08:01)  Alkaline Phosphatase, Serum: 78 U/L (05-12-20 @ 06:37)  Alanine Aminotransferase (ALT/SGPT): 7 U/L (05-14-20 @ 08:01)  Alanine Aminotransferase (ALT/SGPT): 7 U/L (05-12-20 @ 06:37)  Aspartate Aminotransferase (AST/SGOT): 20 U/L (05-14-20 @ 08:01)  Aspartate Aminotransferase (AST/SGOT): 13 U/L (05-12-20 @ 06:37)  Bilirubin Total, Serum: 0.3 mg/dL (05-14-20 @ 08:01)  Bilirubin Total, Serum: 0.3 mg/dL (05-12-20 @ 06:37)

## 2020-05-14 NOTE — PROGRESS NOTE ADULT - SUBJECTIVE AND OBJECTIVE BOX
HPI:  69 year old non-ambulatory Female with a PMHx significant for multiple sclerosis, Breast Cancer s/p R mastectomy, Osteoporosis, Mitral stenosis, right ankle fracture, chronic right sided sacral ulcer, chronic midline back pain since being dropped from a jame lift (7/17/29), admitted to [place] after presenting with sepsis in the setting of a UTI with chronic campos use and known large right ischial decubitus ulcer. Patient found to have a Moderately large Right hydropneumothorax resulting in partial right lung collapse and slight cardiomediastinal shift to the left on CXR with chest tube placed 5/8/20. Large bore chest tube placed 5/10/20 for persistent Right pneumothorax. Patient was followed by ID and was given Vanco and Zosyn originally for her presumed urosepsis, Caspofungin was added after pleural fluid was + for fungal elements. CT chest confirmed +Empyema in the Right pleural space. Patient ultimately transferred to [facility] late 5/11/20 after she was found to have a distal esophageal perforation on CT chest and Esophogram.     Of note, patient admitted 10/9/2019 for lower back and bilateral knee pain, found to have compression fracture of L2 with imaging subsequently found to have multiple lytic lesions on the spine and pelvis.  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 Matthew Fairbanks. (12 May 2020 02:24)    PAST MEDICAL & SURGICAL HISTORY:  Breast cancer metastasized to bone  Hypertension  Multiple sclerosis  S/P mastectomy, right  Breast CA  Osteoporosis  MS (mitral stenosis)  History of bowel resection  H/O breast surgery    Brief Hospital Course: Patient admitted to [facility] 5/12/20 for +Right sided Empyema, with distal esophageal perforation in the setting of recent sepsis on IV abx.  S/p EGD with esophageal perforation identified and stent placed with fluoroscopy, NGT placed, VATS with right thoracic cavity washout and pulmonary decortication with vaibhav drain, right posterior, and right medial chest tubes placed on 5/13/20.     Subjective: Patient lying in bed in no acute distress. States she is tired. Denies any further complaints.     Vital Signs Last 24 Hrs  T(C): 36.6 (05-13-20 @ 21:07), Max: 37 (05-13-20 @ 08:15)  T(F): 97.8 (05-13-20 @ 21:07), Max: 98.6 (05-13-20 @ 08:15)  HR: 82 (05-13-20 @ 21:07) (67 - 83)  BP: 96/65 (05-13-20 @ 21:07) (96/62 - 132/98)  RR: 17 (05-13-20 @ 21:07) (15 - 19)  SpO2: 100% (05-13-20 @ 21:07) (95% - 100%)  on (O2)              MEDICATIONS  caspofungin IVPB 50 milliGRAM(s) IV Intermittent every 24 hours  chlorhexidine 4% Liquid 1 Application(s) Topical <User Schedule>  collagenase Ointment 1 Application(s) Topical daily  lactated ringers. 1000 milliLiter(s) IV Continuous <Continuous>  latanoprost 0.005% Ophthalmic Solution 1 Drop(s) Both EYES at bedtime  mupirocin 2% Nasal 1 Application(s) Both Nostrils every 12 hours  pantoprazole  Injectable 40 milliGRAM(s) IV Push every 12 hours  piperacillin/tazobactam IVPB.. 3.375 Gram(s) IV Intermittent every 8 hours  sodium chloride 0.9% lock flush 3 milliLiter(s) IV Push every 8 hours  sodium chloride 0.9% lock flush 10 milliLiter(s) IV Push every 1 hour PRN  vancomycin  IVPB 1000 milliGRAM(s) IV Intermittent every 24 hours    PHYSICAL EXAM  Constitutional: NAD, lying on stretcher with VSS in ED, +debility, lying on Right side  Neuro: A+O x 3, non-focal, speech clear and intact  HEENT: NC/AT, PERRL, EOMI, anicteric sclerae, oral mucosa pink and moist, +[place]  Neck: supple  CV: RRR +S1S2  Pulm/chest: Decreased BSs on the Right, left CTA, no accessory muscle use noted  Abd: soft, NT, ND, +BS  Ext: DAVIDSON x 4, Limited LE strength, +LE sensation intact, no C/C/E, +DP/PT pulses b/l  Skin: warm, well perfused  Psych: calm, appropriate affect  Skin: +Chronic large Right stage 4 ischial pressure ulcer  Tubes: +NG tube, +Campos with yellow urine in bedside bag, 2 Right sided chest tubes to suction, ++airleak, dressing c/d/i, +Right midaxillary SOURAV drain with serosanguinous drainage to bulb suction. No surrounding crepitus noted.    I&O's Detail    12 May 2020 07:01  -  13 May 2020 07:00  --------------------------------------------------------  IN:    sodium chloride 0.9%: 660 mL    Solution: 250 mL    Solution: 250 mL    Solution: 100 mL  Total IN: 1260 mL    OUT:    Chest Tube: 300 mL    Indwelling Catheter - Urethral: 475 mL    Voided: 50 mL  Total OUT: 825 mL    Total NET: 435 mL      13 May 2020 07:01  -  14 May 2020 02:35  --------------------------------------------------------  IN:    lactated ringers.: 150 mL  Total IN: 150 mL    OUT:    Chest Tube: 15 mL    Chest Tube: 25 mL    Drain: 180 mL    Indwelling Catheter - Urethral: 335 mL  Total OUT: 555 mL    Total NET: -405 mL      Weights:  Admit Wt: Drug Dosing Weight  Height (cm): 167.64 (11 May 2020 22:12)  Weight (kg): 56.7 (11 May 2020 22:12)  BMI (kg/m2): 20.2 (11 May 2020 22:12)  BSA (m2): 1.64 (11 May 2020 22:12)    All laboratory results, radiology and medications reviewed.    LABS    05-13    133<L>  |  102  |  12.0  ----------------------------<  109<H>  3.6   |  17.0<L>  |  0.65    Ca    7.0<L>      13 May 2020 14:45  Phos  2.3     05-13  Mg     2.0     05-13    TPro  5.1<L>  /  Alb  1.7<L>  /  TBili  0.3<L>  /  DBili  x   /  AST  13  /  ALT  7   /  AlkPhos  78  05-12                                 10.7   14.48 )-----------( 445      ( 13 May 2020 16:32 )             33.0          PT/INR - ( 13 May 2020 08:45 )   PT: 14.3 sec;   INR: 1.26 ratio       PTT - ( 13 May 2020 08:45 )  PTT:25.2 sec      Today's CXR: Pending    Last CXR:  < from: Xray Chest 1 View-PORTABLE IMMEDIATE (05.13.20 @ 15:14) >  FINDINGS:  Single frontal view of the chest demonstrates improved diffuse patchy right lung infiltrates. The cardiomediastinal silhouette is enlarged. No acute osseous abnormalities. Overlying EKG leads and wires are noted. 3 right-sided chest tubes. Esophageal stent. NG tube tip courses below the hemidiaphragm. No large pneumothorax. The left lung is clear.  IMPRESSION: Improved diffuse patchy right lung infiltrates.  < end of copied text >

## 2020-05-14 NOTE — PROGRESS NOTE ADULT - ASSESSMENT
HPI: This 69 year old non-ambulatory Female with a PMHx significant for multiple sclerosis, Breast Cancer s/p R mastectomy, Osteoporosis, Mitral stenosis, right ankle fracture, chronic right sided sacral ulcer, chronic midline back pain since being dropped from a jame lift (7/17/29), admitted to [place] after presenting with sepsis in the setting of a UTI with chronic campso use and known large right ischial decubitus ulcer. Patient found to have a Moderately large Right hydropneumothorax resulting in partial right lung collapse and slight cardiomediastinal shift to the left on CXR with chest tube placed 5/8/20. Large bore chest tube placed 5/10/20 for persistent Right pneumothorax. Patient was followed by ID and was given Vanco and Zosyn originally for her presumed urosepsis, Caspofungin was added after pleural fluid was + for fungal elements. CT chest confirmed +Empyema in the Right pleural space. Patient ultimately transferred to [facility] late 5/11/20 after she was found to have a distal esophageal perforation on CT chest and Esophogram.     Of note, patient admitted 10/9/2019 for lower back and bilateral knee pain, found to have compression fracture of L2 with imaging subsequently found to have multiple lytic lesions on the spine and pelvis.  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 Matthew Fairbanks. (12 May 2020 02:24)    patient is admitted to surgical service, pre-operatively planned for an esophageal stent 5/13.  Patient's labs/ cultures from [facility] reviewed.     Empyema with polymicrobial infection:  Strep mitis infection  Klebsiella oxytoca infection  CoNS infection  Perforated esophagus    Plan:    continue Antibiotics:  caspofungin IVPB 50 milliGRAM(s) IV Intermittent every 24 hours  piperacillin/tazobactam IVPB.. 3.375 Gram(s) IV Intermittent every 8 hours  vancomycin  IVPB 1000 milliGRAM(s) IV Intermittent every 24 hours    s/p esophageal stent  and VATS 5/13  pending PEG    likely will DROP coverage for CONS and Fungal in 14 days time, then COVERING with ZOSYN alone for ENTERIC and RESPI organisms, empirically.     - continue Chest tubes    - follow up all outstanding cultures  - trend temperature and WBC curve  - repeat cultures from blood and all sources if febrile.

## 2020-05-14 NOTE — CONSULT NOTE ADULT - ASSESSMENT
The patient is a 69y Female who is followed by neurology because of multiple sclerosis    Secondary progressive multiple sclerosis  At this point I would suggest to continue to hold her avonex.    Currently she says she has secondary progressive multiple sclerosis and avonex is not indicated for progressive forms of MS  Also it may interfere with her ability to fight infection.    She will need follow up with her outside neurologist post discharge to determine the best course of treatment for her MS, if any.    Thank you for allowing me to participate in the care of your patient    Al Waters MD, PhD   [id number]

## 2020-05-14 NOTE — PROGRESS NOTE ADULT - SUBJECTIVE AND OBJECTIVE BOX
Vascular & Interventional Radiology Post-Procedure Note    Pre-Procedure Diagnosis:  distal esophageal perforation  Post-Procedure Diagnosis: Same as pre.  Indications for Procedure: feeding and venting    : garcía  Assistant(s): ____    Procedure Details/Findings:     unable to insufflate the stomach due to the presence of the stent.  the EG junction is incompetent due to the stent.      Access (if applicable): ____    Complications: 0  Estimated Blood Loss: Minimal  Anethesia: 1% Lidocane SQ  Specimen: 0  Contrast: 0  Sedation: 0  Patient Condition/Disposition: Stable    Plan:     unable to perform the procedure due to the stent (as described).  Discussed with the patient and Dr Murcia, plan for surgical G/J

## 2020-05-14 NOTE — PROGRESS NOTE ADULT - PROBLEM SELECTOR PLAN 5
- Continue Collaganase.  - Wound care consult / plastic surgery consult pending.   - Make sure patient is on appropriate bed to prevent skin breakdown.  - Turn and position q2 hours.

## 2020-05-15 ENCOUNTER — APPOINTMENT (OUTPATIENT)
Dept: THORACIC SURGERY | Facility: CLINIC | Age: 70
End: 2020-05-15

## 2020-05-15 LAB
ALBUMIN SERPL ELPH-MCNC: 1.5 G/DL — LOW (ref 3.3–5.2)
ALP SERPL-CCNC: 93 U/L — SIGNIFICANT CHANGE UP (ref 40–120)
ALT FLD-CCNC: 8 U/L — SIGNIFICANT CHANGE UP
ANION GAP SERPL CALC-SCNC: 16 MMOL/L — SIGNIFICANT CHANGE UP (ref 5–17)
AST SERPL-CCNC: 25 U/L — SIGNIFICANT CHANGE UP
BILIRUB SERPL-MCNC: 0.3 MG/DL — LOW (ref 0.4–2)
BUN SERPL-MCNC: 11 MG/DL — SIGNIFICANT CHANGE UP (ref 8–20)
CALCIUM SERPL-MCNC: 6.2 MG/DL — CRITICAL LOW (ref 8.6–10.2)
CHLORIDE SERPL-SCNC: 102 MMOL/L — SIGNIFICANT CHANGE UP (ref 98–107)
CO2 SERPL-SCNC: 14 MMOL/L — LOW (ref 22–29)
CREAT SERPL-MCNC: 0.9 MG/DL — SIGNIFICANT CHANGE UP (ref 0.5–1.3)
GLUCOSE BLDC GLUCOMTR-MCNC: 83 MG/DL — SIGNIFICANT CHANGE UP (ref 70–99)
GLUCOSE BLDC GLUCOMTR-MCNC: 88 MG/DL — SIGNIFICANT CHANGE UP (ref 70–99)
GLUCOSE SERPL-MCNC: 91 MG/DL — SIGNIFICANT CHANGE UP (ref 70–99)
HCT VFR BLD CALC: 32.6 % — LOW (ref 34.5–45)
HGB BLD-MCNC: 10.6 G/DL — LOW (ref 11.5–15.5)
MAGNESIUM SERPL-MCNC: 1.8 MG/DL — SIGNIFICANT CHANGE UP (ref 1.6–2.6)
MCHC RBC-ENTMCNC: 28.1 PG — SIGNIFICANT CHANGE UP (ref 27–34)
MCHC RBC-ENTMCNC: 32.5 GM/DL — SIGNIFICANT CHANGE UP (ref 32–36)
MCV RBC AUTO: 86.5 FL — SIGNIFICANT CHANGE UP (ref 80–100)
PHOSPHATE SERPL-MCNC: 2.7 MG/DL — SIGNIFICANT CHANGE UP (ref 2.4–4.7)
PLATELET # BLD AUTO: 431 K/UL — HIGH (ref 150–400)
POTASSIUM SERPL-MCNC: 4.3 MMOL/L — SIGNIFICANT CHANGE UP (ref 3.5–5.3)
POTASSIUM SERPL-SCNC: 4.3 MMOL/L — SIGNIFICANT CHANGE UP (ref 3.5–5.3)
PROT SERPL-MCNC: 4.9 G/DL — LOW (ref 6.6–8.7)
RBC # BLD: 3.77 M/UL — LOW (ref 3.8–5.2)
RBC # FLD: 18 % — HIGH (ref 10.3–14.5)
SODIUM SERPL-SCNC: 132 MMOL/L — LOW (ref 135–145)
VANCOMYCIN TROUGH SERPL-MCNC: 53.2 UG/ML — CRITICAL HIGH (ref 10–20)
WBC # BLD: 12.33 K/UL — HIGH (ref 3.8–10.5)
WBC # FLD AUTO: 12.33 K/UL — HIGH (ref 3.8–10.5)

## 2020-05-15 PROCEDURE — 99232 SBSQ HOSP IP/OBS MODERATE 35: CPT

## 2020-05-15 PROCEDURE — 44310 ILEOSTOMY/JEJUNOSTOMY: CPT | Mod: 78

## 2020-05-15 PROCEDURE — 43830 GSTRST OPEN WO CONSTJ TUBE: CPT | Mod: AS,78

## 2020-05-15 PROCEDURE — 43830 GSTRST OPEN WO CONSTJ TUBE: CPT | Mod: 82,78

## 2020-05-15 PROCEDURE — 71045 X-RAY EXAM CHEST 1 VIEW: CPT | Mod: 26

## 2020-05-15 PROCEDURE — 43830 GSTRST OPEN WO CONSTJ TUBE: CPT | Mod: 78

## 2020-05-15 PROCEDURE — 74018 RADEX ABDOMEN 1 VIEW: CPT | Mod: 26

## 2020-05-15 PROCEDURE — 44310 ILEOSTOMY/JEJUNOSTOMY: CPT | Mod: 82,78

## 2020-05-15 PROCEDURE — 44310 ILEOSTOMY/JEJUNOSTOMY: CPT | Mod: AS,78

## 2020-05-15 RX ORDER — ONDANSETRON 8 MG/1
8 TABLET, FILM COATED ORAL ONCE
Refills: 0 | Status: DISCONTINUED | OUTPATIENT
Start: 2020-05-15 | End: 2020-05-15

## 2020-05-15 RX ORDER — SODIUM CHLORIDE 9 MG/ML
1000 INJECTION, SOLUTION INTRAVENOUS
Refills: 0 | Status: DISCONTINUED | OUTPATIENT
Start: 2020-05-15 | End: 2020-05-15

## 2020-05-15 RX ORDER — FOLIC ACID 0.8 MG
1 TABLET ORAL DAILY
Refills: 0 | Status: DISCONTINUED | OUTPATIENT
Start: 2020-05-15 | End: 2020-05-15

## 2020-05-15 RX ORDER — CASPOFUNGIN ACETATE 7 MG/ML
50 INJECTION, POWDER, LYOPHILIZED, FOR SOLUTION INTRAVENOUS EVERY 24 HOURS
Refills: 0 | Status: DISCONTINUED | OUTPATIENT
Start: 2020-05-16 | End: 2020-05-27

## 2020-05-15 RX ORDER — FENTANYL CITRATE 50 UG/ML
25 INJECTION INTRAVENOUS
Refills: 0 | Status: DISCONTINUED | OUTPATIENT
Start: 2020-05-15 | End: 2020-05-15

## 2020-05-15 RX ORDER — PANTOPRAZOLE SODIUM 20 MG/1
40 TABLET, DELAYED RELEASE ORAL EVERY 12 HOURS
Refills: 0 | Status: DISCONTINUED | OUTPATIENT
Start: 2020-05-15 | End: 2020-06-08

## 2020-05-15 RX ORDER — ENOXAPARIN SODIUM 100 MG/ML
30 INJECTION SUBCUTANEOUS DAILY
Refills: 0 | Status: DISCONTINUED | OUTPATIENT
Start: 2020-05-16 | End: 2020-05-16

## 2020-05-15 RX ORDER — CASPOFUNGIN ACETATE 7 MG/ML
50 INJECTION, POWDER, LYOPHILIZED, FOR SOLUTION INTRAVENOUS EVERY 24 HOURS
Refills: 0 | Status: DISCONTINUED | OUTPATIENT
Start: 2020-05-15 | End: 2020-05-15

## 2020-05-15 RX ORDER — SODIUM CHLORIDE 9 MG/ML
10 INJECTION INTRAMUSCULAR; INTRAVENOUS; SUBCUTANEOUS
Refills: 0 | Status: DISCONTINUED | OUTPATIENT
Start: 2020-05-15 | End: 2020-06-08

## 2020-05-15 RX ORDER — ACETAMINOPHEN 500 MG
1000 TABLET ORAL EVERY 8 HOURS
Refills: 0 | Status: COMPLETED | OUTPATIENT
Start: 2020-05-15 | End: 2020-05-20

## 2020-05-15 RX ORDER — LATANOPROST 0.05 MG/ML
1 SOLUTION/ DROPS OPHTHALMIC; TOPICAL AT BEDTIME
Refills: 0 | Status: DISCONTINUED | OUTPATIENT
Start: 2020-05-15 | End: 2020-06-09

## 2020-05-15 RX ORDER — PIPERACILLIN AND TAZOBACTAM 4; .5 G/20ML; G/20ML
3.38 INJECTION, POWDER, LYOPHILIZED, FOR SOLUTION INTRAVENOUS EVERY 8 HOURS
Refills: 0 | Status: DISCONTINUED | OUTPATIENT
Start: 2020-05-15 | End: 2020-05-29

## 2020-05-15 RX ORDER — CHLORHEXIDINE GLUCONATE 213 G/1000ML
1 SOLUTION TOPICAL
Refills: 0 | Status: DISCONTINUED | OUTPATIENT
Start: 2020-05-15 | End: 2020-06-09

## 2020-05-15 RX ORDER — SODIUM CHLORIDE 9 MG/ML
1000 INJECTION, SOLUTION INTRAVENOUS
Refills: 0 | Status: DISCONTINUED | OUTPATIENT
Start: 2020-05-15 | End: 2020-05-16

## 2020-05-15 RX ORDER — SODIUM CHLORIDE 9 MG/ML
3 INJECTION INTRAMUSCULAR; INTRAVENOUS; SUBCUTANEOUS EVERY 8 HOURS
Refills: 0 | Status: DISCONTINUED | OUTPATIENT
Start: 2020-05-15 | End: 2020-06-09

## 2020-05-15 RX ORDER — VANCOMYCIN HCL 1 G
1000 VIAL (EA) INTRAVENOUS DAILY
Refills: 0 | Status: DISCONTINUED | OUTPATIENT
Start: 2020-05-16 | End: 2020-05-18

## 2020-05-15 RX ADMIN — PIPERACILLIN AND TAZOBACTAM 25 GRAM(S): 4; .5 INJECTION, POWDER, LYOPHILIZED, FOR SOLUTION INTRAVENOUS at 17:23

## 2020-05-15 RX ADMIN — CHLORHEXIDINE GLUCONATE 1 APPLICATION(S): 213 SOLUTION TOPICAL at 04:44

## 2020-05-15 RX ADMIN — PIPERACILLIN AND TAZOBACTAM 25 GRAM(S): 4; .5 INJECTION, POWDER, LYOPHILIZED, FOR SOLUTION INTRAVENOUS at 09:07

## 2020-05-15 RX ADMIN — Medication 250 MILLIGRAM(S): at 04:44

## 2020-05-15 RX ADMIN — PIPERACILLIN AND TAZOBACTAM 25 GRAM(S): 4; .5 INJECTION, POWDER, LYOPHILIZED, FOR SOLUTION INTRAVENOUS at 01:10

## 2020-05-15 RX ADMIN — PANTOPRAZOLE SODIUM 40 MILLIGRAM(S): 20 TABLET, DELAYED RELEASE ORAL at 17:23

## 2020-05-15 RX ADMIN — SODIUM CHLORIDE 75 MILLILITER(S): 9 INJECTION, SOLUTION INTRAVENOUS at 13:38

## 2020-05-15 RX ADMIN — SODIUM CHLORIDE 3 MILLILITER(S): 9 INJECTION INTRAMUSCULAR; INTRAVENOUS; SUBCUTANEOUS at 20:41

## 2020-05-15 RX ADMIN — LATANOPROST 1 DROP(S): 0.05 SOLUTION/ DROPS OPHTHALMIC; TOPICAL at 20:41

## 2020-05-15 RX ADMIN — SODIUM CHLORIDE 75 MILLILITER(S): 9 INJECTION, SOLUTION INTRAVENOUS at 07:49

## 2020-05-15 RX ADMIN — CASPOFUNGIN ACETATE 260 MILLIGRAM(S): 7 INJECTION, POWDER, LYOPHILIZED, FOR SOLUTION INTRAVENOUS at 01:11

## 2020-05-15 RX ADMIN — PANTOPRAZOLE SODIUM 40 MILLIGRAM(S): 20 TABLET, DELAYED RELEASE ORAL at 04:44

## 2020-05-15 RX ADMIN — MUPIROCIN 1 APPLICATION(S): 20 OINTMENT TOPICAL at 04:44

## 2020-05-15 RX ADMIN — SODIUM CHLORIDE 3 MILLILITER(S): 9 INJECTION INTRAMUSCULAR; INTRAVENOUS; SUBCUTANEOUS at 04:44

## 2020-05-15 NOTE — PROGRESS NOTE ADULT - PROBLEM SELECTOR PLAN 1
- S/p esophageal stent placed 5/13/20 with NGT placed.   - Continuous telemetry, .   - Follow up AM CXR.   - Maintain b/l CTs to suction today for +airleak  -OR today for formal feeding tube placement  -Strict NPO for now, continue IV hydration   -CBC, BMP daily  - Case and plan to be discussed with Thoracic Surgery team on AM rounds.

## 2020-05-15 NOTE — PROGRESS NOTE ADULT - ASSESSMENT
HPI: This 69 year old non-ambulatory Female with a PMHx significant for multiple sclerosis, Breast Cancer s/p R mastectomy, Osteoporosis, Mitral stenosis, right ankle fracture, chronic right sided sacral ulcer, chronic midline back pain since being dropped from a jame lift (7/17/29), admitted to [place] after presenting with sepsis in the setting of a UTI with chronic campos use and known large right ischial decubitus ulcer. Patient found to have a Moderately large Right hydropneumothorax resulting in partial right lung collapse and slight cardiomediastinal shift to the left on CXR with chest tube placed 5/8/20. Large bore chest tube placed 5/10/20 for persistent Right pneumothorax. Patient was followed by ID and was given Vanco and Zosyn originally for her presumed urosepsis, Caspofungin was added after pleural fluid was + for fungal elements. CT chest confirmed +Empyema in the Right pleural space. Patient ultimately transferred to [facility] late 5/11/20 after she was found to have a distal esophageal perforation on CT chest and Esophogram.     Of note, patient admitted 10/9/2019 for lower back and bilateral knee pain, found to have compression fracture of L2 with imaging subsequently found to have multiple lytic lesions on the spine and pelvis.  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 Matthew Fairbanks. (12 May 2020 02:24)    patient is admitted to surgical service, pre-operatively planned for an esophageal stent 5/13.  Patient's labs/ cultures from [facility] reviewed.     Empyema with polymicrobial infection:  Strep mitis infection  Klebsiella oxytoca infection  CoNS infection  Perforated esophagus    Plan:    continue Antibiotics:  caspofungin IVPB 50 milliGRAM(s) IV Intermittent every 24 hours  piperacillin/tazobactam IVPB.. 3.375 Gram(s) IV Intermittent every 8 hours  vancomycin  IVPB 1000 milliGRAM(s) IV Intermittent every 24 hours    s/p esophageal stent  and VATS 5/13  pending PEG    likely will DROP coverage for CONS and Fungal in 14 days time, then COVERING with ZOSYN alone for ENTERIC and RESPI organisms, empirically.     - continue Chest tubes    - pending PEG tube    - follow up all outstanding cultures  - trend temperature and WBC curve  - repeat cultures from blood and all sources if febrile.

## 2020-05-15 NOTE — BRIEF OPERATIVE NOTE - NSICDXBRIEFPOSTOP_GEN_ALL_CORE_FT
POST-OP DIAGNOSIS:  Esophageal perforation 13-May-2020 14:35:13  Boyd Bishop
POST-OP DIAGNOSIS:  Esophageal perforation 13-May-2020 14:35:13  Boyd Bishop

## 2020-05-15 NOTE — PROGRESS NOTE ADULT - PROBLEM SELECTOR PLAN 3
- Maintain 2 CTs to suction for now.   - Follow up AM CXR.  -Pleurx catheter placement today - Maintain 2 CTs to suction for now.   - Follow up AM CXR.

## 2020-05-15 NOTE — BRIEF OPERATIVE NOTE - NSICDXBRIEFPREOP_GEN_ALL_CORE_FT
PRE-OP DIAGNOSIS:  Esophageal perforation 13-May-2020 14:35:02  Boyd Bishop
PRE-OP DIAGNOSIS:  Esophageal perforation 13-May-2020 14:35:02  Boyd Bishop

## 2020-05-15 NOTE — BRIEF OPERATIVE NOTE - NSICDXBRIEFPROCEDURE_GEN_ALL_CORE_FT
PROCEDURES:  Bronchoscopy in adult 15-May-2020 13:20:46  Boyd Bishop  Insertion, gastric tube, with diagnostic aspiration 15-May-2020 13:20:19  Boyd Bishop
PROCEDURES:  Chest tube placement 13-May-2020 14:34:37  Boyd Bishop  VATS, with partial lung decortication 13-May-2020 14:33:59  Boyd Bishop  Placement of esophageal stent 13-May-2020 14:32:50  Boyd Bishop

## 2020-05-15 NOTE — PROGRESS NOTE ADULT - ASSESSMENT
69 year old non-ambulatory Female with a PMHx significant for multiple sclerosis, Breast Cancer s/p R mastectomy, Osteoporosis, Mitral stenosis, right ankle fracture, chronic right sided sacral ulcer, chronic midline back pain since being dropped from a jame lift (7/17/29), admitted to [place] after presenting with sepsis in the setting of a UTI with chronic campos use and known large right ischial decubitus ulcer. Patient found to have a Moderately large Right hydropneumothorax resulting in partial right lung collapse and slight cardiomediastinal shift to the left on CXR with chest tube placed 5/8/20. Large bore chest tube placed 5/10/20 for persistent Right pneumothorax. Patient was followed by ID and was given Vanco and Zosyn originally for her presumed urosepsis, Caspofungin was added after pleural fluid was + for fungal elements. CT chest confirmed +Empyema in the Right pleural space. Patient ultimately transferred to [facility] late 5/11/20 after she was found to have a distal esophageal perforation on CT chest and Esophogram.     Patient now s/p EGD with esophageal stent placed, NGT placed, VATS with right thoracic cavity washout and pulmonary decortication with vaibhav drain, right posterior, and right medial chest tubes placed on 5/13/20.  She is RTOR today 5/15/2020 for feeding tube placement and Pleurx catheter placement. 69 year old non-ambulatory Female with a PMHx significant for multiple sclerosis, Breast Cancer s/p R mastectomy, Osteoporosis, Mitral stenosis, right ankle fracture, chronic right sided sacral ulcer, chronic midline back pain since being dropped from a jame lift (7/17/29), admitted to [place] after presenting with sepsis in the setting of a UTI with chronic campos use and known large right ischial decubitus ulcer. Patient found to have a Moderately large Right hydropneumothorax resulting in partial right lung collapse and slight cardiomediastinal shift to the left on CXR with chest tube placed 5/8/20. Large bore chest tube placed 5/10/20 for persistent Right pneumothorax. Patient was followed by ID and was given Vanco and Zosyn originally for her presumed urosepsis, Caspofungin was added after pleural fluid was + for fungal elements. CT chest confirmed +Empyema in the Right pleural space. Patient ultimately transferred to [facility] late 5/11/20 after she was found to have a distal esophageal perforation on CT chest and Esophogram.     Patient now s/p EGD with esophageal stent placed, NGT placed, VATS with right thoracic cavity washout and pulmonary decortication with vaibhav drain, right posterior, and right medial chest tubes placed on 5/13/20.  She is RTOR today 5/15/2020 for feeding tube placement.

## 2020-05-15 NOTE — PROGRESS NOTE ADULT - SUBJECTIVE AND OBJECTIVE BOX
HPI:  69 year old non-ambulatory Female with a PMHx significant for multiple sclerosis, Breast Cancer s/p R mastectomy, Osteoporosis, Mitral stenosis, right ankle fracture, chronic right sided sacral ulcer, chronic midline back pain since being dropped from a jame lift (7/17/29), admitted to [place] after presenting with sepsis in the setting of a UTI with chronic campos use and known large right ischial decubitus ulcer. Patient found to have a Moderately large Right hydropneumothorax resulting in partial right lung collapse and slight cardiomediastinal shift to the left on CXR with chest tube placed 5/8/20. Large bore chest tube placed 5/10/20 for persistent Right pneumothorax. Patient was followed by ID and was given Vanco and Zosyn originally for her presumed urosepsis, Caspofungin was added after pleural fluid was + for fungal elements. CT chest confirmed +Empyema in the Right pleural space. Patient ultimately transferred to [facility] late 5/11/20 after she was found to have a distal esophageal perforation on CT chest and Esophogram.     Of note, patient admitted 10/9/2019 for lower back and bilateral knee pain, found to have compression fracture of L2 with imaging subsequently found to have multiple lytic lesions on the spine and pelvis.  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 Matthew Fairbanks.       PAST MEDICAL & SURGICAL HISTORY:  Breast cancer metastasized to bone  Hypertension  Multiple sclerosis  S/P mastectomy, right  Breast CA  Osteoporosis  MS (mitral stenosis)  History of bowel resection  H/O breast surgery    Brief Hospital Course: Patient admitted to [facility] 5/12/20 for +Right sided Empyema, with distal esophageal perforation in the setting of recent sepsis on IV abx.  S/p EGD with esophageal perforation identified and stent placed with fluoroscopy, NGT placed, VATS with right thoracic cavity washout and pulmonary decortication with vaibhav drain, right posterior, and right medial chest tubes placed on 5/13/20.  On 5/14- patient was taken to IR for unsuccessful G/J tube placement. 5/15 she was taken to the OR for feeding tube and pleurex catheter placement with Dr. Murcia.     Subjective: Patient lying in bed in no acute distress. States she is tired. Denies any further complaints.     Vital Signs Last 24 Hrs  Vital Signs Last 24 Hrs  T(C): 36 (15 May 2020 09:49), Max: 36.4 (14 May 2020 15:34)  T(F): 96.8 (15 May 2020 09:49), Max: 97.6 (14 May 2020 23:06)  HR: 90 (15 May 2020 09:49) (50 - 111)  BP: 112/70 (15 May 2020 09:49) (99/62 - 112/70)  RR: 22 (15 May 2020 09:49) (18 - 22)  SpO2: 99% (15 May 2020 09:49) (90% - 99%)         MEDICATIONS  MEDICATIONS  (STANDING):  caspofungin IVPB 50 milliGRAM(s) IV Intermittent every 24 hours  chlorhexidine 4% Liquid 1 Application(s) Topical <User Schedule>  enoxaparin Injectable 30 milliGRAM(s) SubCutaneous daily  folic acid 1 milliGRAM(s) Oral daily  lactated ringers. 1000 milliLiter(s) (75 mL/Hr) IV Continuous <Continuous>  latanoprost 0.005% Ophthalmic Solution 1 Drop(s) Both EYES at bedtime  mupirocin 2% Nasal 1 Application(s) Both Nostrils every 12 hours  pantoprazole  Injectable 40 milliGRAM(s) IV Push every 12 hours  piperacillin/tazobactam IVPB.. 3.375 Gram(s) IV Intermittent every 8 hours  sodium chloride 0.9% lock flush 3 milliLiter(s) IV Push every 8 hours  vancomycin  IVPB 1000 milliGRAM(s) IV Intermittent every 24 hours      PHYSICAL EXAM  Constitutional: NAD   Neuro: A+O x 3, non-focal, speech clear and intact  HEENT: NC/AT, PERRL, EOMI, anicteric sclerae, oral mucosa pink and moist  Neck: supple  CV: RRR +S1S2  Pulm/chest: Decreased BSs on the Right, left CTA, no accessory muscle use noted  Abd: soft, NT, ND, +BS  Ext: DAVIDSON x 4, Limited LE strength, +LE sensation intact, no C/C/E, +DP/PT pulses b/l  Skin: warm, well perfused  Psych: calm, appropriate affect  Skin: +Chronic large Right stage 4 ischial pressure ulcer- no necrosis noted- pink with minimal granulation tissues  Tubes: +NG tube, +Campos with yellow urine in bedside bag, 2 Right sided chest tubes to suction, dressing c/d/i, +Right midaxillary SOURAV drain with serosanguinous drainage to bulb suction. No surrounding crepitus noted, no airleak noted     I&O's Detail    I&O's Detail    14 May 2020 07:01  -  15 May 2020 07:00  --------------------------------------------------------  IN:    lactated ringers.: 825 mL    Solution: 100 mL  Total IN: 925 mL    OUT:    Chest Tube: 144 mL    Chest Tube: 97 mL    Drain: 107.5 mL    Indwelling Catheter - Urethral: 500 mL  Total OUT: 848.5 mL    Total NET: 76.5 mL      15 May 2020 07:01  -  15 May 2020 11:19  --------------------------------------------------------  IN:    lactated ringers.: 225 mL  Total IN: 225 mL    OUT:  Total OUT: 0 mL    Total NET: 225 mL              Weights:  Admit Wt: Drug Dosing Weight  Height (cm): 167.64 (11 May 2020 22:12)  Weight (kg): 56.7 (11 May 2020 22:12)  BMI (kg/m2): 20.2 (11 May 2020 22:12)  BSA (m2): 1.64 (11 May 2020 22:12)    All laboratory results, radiology and medications reviewed.  < from: Xray Chest 1 View- PORTABLE-Routine (05.15.20 @ 03:47) >  Impression:  1.  Patchy opacities throughout the right lung and at the left lung base, increased since 5/13/2020.    2.  Loculated right pleural effusion.    3.  Multiple right-sided chest tubes and esophageal stent.      DISCRETE X-RAY DATA:  Percent of LEFT lung opacification: 1-33%  Percent of RIGHT lung opacification: 34-66%  Change in lung opacification from most recent x-ray (<=3 days): Increase  Change from prior dated 3 or more days (same admission): Increase    < end of copied text >        LABS                          10.6   12.33 )-----------( 431      ( 15 May 2020 07:46 )             32.6       05-15    132<L>  |  102  |  11.0  ----------------------------<  91  4.3   |  14.0<L>  |  0.90    Ca    6.2<LL>      15 May 2020 08:07  Phos  2.7     05-15  Mg     1.8     05-15    TPro  4.9<L>  /  Alb  1.5<L>  /  TBili  0.3<L>  /  DBili  x   /  AST  25  /  ALT  8   /  AlkPhos  93  05-15 HPI:  69 year old non-ambulatory Female with a PMHx significant for multiple sclerosis, Breast Cancer s/p R mastectomy, Osteoporosis, Mitral stenosis, right ankle fracture, chronic right sided sacral ulcer, chronic midline back pain since being dropped from a jame lift (7/17/29), admitted to [place] after presenting with sepsis in the setting of a UTI with chronic campos use and known large right ischial decubitus ulcer. Patient found to have a Moderately large Right hydropneumothorax resulting in partial right lung collapse and slight cardiomediastinal shift to the left on CXR with chest tube placed 5/8/20. Large bore chest tube placed 5/10/20 for persistent Right pneumothorax. Patient was followed by ID and was given Vanco and Zosyn originally for her presumed urosepsis, Caspofungin was added after pleural fluid was + for fungal elements. CT chest confirmed +Empyema in the Right pleural space. Patient ultimately transferred to [facility] late 5/11/20 after she was found to have a distal esophageal perforation on CT chest and Esophogram.     Of note, patient admitted 10/9/2019 for lower back and bilateral knee pain, found to have compression fracture of L2 with imaging subsequently found to have multiple lytic lesions on the spine and pelvis.  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 Matthew Fairbanks.       PAST MEDICAL & SURGICAL HISTORY:  Breast cancer metastasized to bone  Hypertension  Multiple sclerosis  S/P mastectomy, right  Breast CA  Osteoporosis  MS (mitral stenosis)  History of bowel resection  H/O breast surgery    Brief Hospital Course: Patient admitted to [facility] 5/12/20 for +Right sided Empyema, with distal esophageal perforation in the setting of recent sepsis on IV abx.  S/p EGD with esophageal perforation identified and stent placed with fluoroscopy, NGT placed, VATS with right thoracic cavity washout and pulmonary decortication with vaibhav drain, right posterior, and right medial chest tubes placed on 5/13/20.  On 5/14- patient was taken to IR for unsuccessful G/J tube placement. 5/15 she was taken to the OR for feeding tube placement with Dr. Murcia.     Subjective: Patient lying in bed in no acute distress. States she is tired. Denies any further complaints.     Vital Signs Last 24 Hrs  Vital Signs Last 24 Hrs  T(C): 36 (15 May 2020 09:49), Max: 36.4 (14 May 2020 15:34)  T(F): 96.8 (15 May 2020 09:49), Max: 97.6 (14 May 2020 23:06)  HR: 90 (15 May 2020 09:49) (50 - 111)  BP: 112/70 (15 May 2020 09:49) (99/62 - 112/70)  RR: 22 (15 May 2020 09:49) (18 - 22)  SpO2: 99% (15 May 2020 09:49) (90% - 99%)         MEDICATIONS  MEDICATIONS  (STANDING):  caspofungin IVPB 50 milliGRAM(s) IV Intermittent every 24 hours  chlorhexidine 4% Liquid 1 Application(s) Topical <User Schedule>  enoxaparin Injectable 30 milliGRAM(s) SubCutaneous daily  folic acid 1 milliGRAM(s) Oral daily  lactated ringers. 1000 milliLiter(s) (75 mL/Hr) IV Continuous <Continuous>  latanoprost 0.005% Ophthalmic Solution 1 Drop(s) Both EYES at bedtime  mupirocin 2% Nasal 1 Application(s) Both Nostrils every 12 hours  pantoprazole  Injectable 40 milliGRAM(s) IV Push every 12 hours  piperacillin/tazobactam IVPB.. 3.375 Gram(s) IV Intermittent every 8 hours  sodium chloride 0.9% lock flush 3 milliLiter(s) IV Push every 8 hours  vancomycin  IVPB 1000 milliGRAM(s) IV Intermittent every 24 hours      PHYSICAL EXAM  Constitutional: NAD   Neuro: A+O x 3, non-focal, speech clear and intact  HEENT: NC/AT, PERRL, EOMI, anicteric sclerae, oral mucosa pink and moist  Neck: supple  CV: RRR +S1S2  Pulm/chest: Decreased BSs on the Right, left CTA, no accessory muscle use noted  Abd: soft, NT, ND, +BS  Ext: DAVIDSON x 4, Limited LE strength, +LE sensation intact, no C/C/E, +DP/PT pulses b/l  Skin: warm, well perfused  Psych: calm, appropriate affect  Skin: +Chronic large Right stage 4 ischial pressure ulcer- no necrosis noted- pink with minimal granulation tissues  Tubes: +NG tube, +Campos with yellow urine in bedside bag, 2 Right sided chest tubes to suction, dressing c/d/i, +Right midaxillary SOURAV drain with serosanguinous drainage to bulb suction. No surrounding crepitus noted, no airleak noted     I&O's Detail    I&O's Detail    14 May 2020 07:01  -  15 May 2020 07:00  --------------------------------------------------------  IN:    lactated ringers.: 825 mL    Solution: 100 mL  Total IN: 925 mL    OUT:    Chest Tube: 144 mL    Chest Tube: 97 mL    Drain: 107.5 mL    Indwelling Catheter - Urethral: 500 mL  Total OUT: 848.5 mL    Total NET: 76.5 mL      15 May 2020 07:01  -  15 May 2020 11:19  --------------------------------------------------------  IN:    lactated ringers.: 225 mL  Total IN: 225 mL    OUT:  Total OUT: 0 mL    Total NET: 225 mL              Weights:  Admit Wt: Drug Dosing Weight  Height (cm): 167.64 (11 May 2020 22:12)  Weight (kg): 56.7 (11 May 2020 22:12)  BMI (kg/m2): 20.2 (11 May 2020 22:12)  BSA (m2): 1.64 (11 May 2020 22:12)    All laboratory results, radiology and medications reviewed.  < from: Xray Chest 1 View- PORTABLE-Routine (05.15.20 @ 03:47) >  Impression:  1.  Patchy opacities throughout the right lung and at the left lung base, increased since 5/13/2020.    2.  Loculated right pleural effusion.    3.  Multiple right-sided chest tubes and esophageal stent.      DISCRETE X-RAY DATA:  Percent of LEFT lung opacification: 1-33%  Percent of RIGHT lung opacification: 34-66%  Change in lung opacification from most recent x-ray (<=3 days): Increase  Change from prior dated 3 or more days (same admission): Increase    < end of copied text >        LABS                          10.6   12.33 )-----------( 431      ( 15 May 2020 07:46 )             32.6       05-15    132<L>  |  102  |  11.0  ----------------------------<  91  4.3   |  14.0<L>  |  0.90    Ca    6.2<LL>      15 May 2020 08:07  Phos  2.7     05-15  Mg     1.8     05-15    TPro  4.9<L>  /  Alb  1.5<L>  /  TBili  0.3<L>  /  DBili  x   /  AST  25  /  ALT  8   /  AlkPhos  93  05-15

## 2020-05-15 NOTE — PROGRESS NOTE ADULT - SUBJECTIVE AND OBJECTIVE BOX
[facility]  INFECTIOUS DISEASES AND INTERNAL MEDICINE at [place]  =======================================================  Phong Wang MD  Diplomates American Board of Internal Medicine and Infectious Diseases  Telephone [phone]  Fax            [phone]  =======================================================    [facility]-[id number]  GEORGE STANLEY   follow up: empyema     no fevers  s/p EGD and esophageal stent; s/p VATS 5/14    pending PEG today     =======================================================  REVIEW OF SYSTEMS:  CONSTITUTIONAL:  No Fever or chills  HEENT:  No diplopia or blurred vision.  No earache, sore throat or runny nose.  CARDIOVASCULAR:  No pressure, squeezing, strangling, tightness, heaviness or aching about the chest, neck, axilla or epigastrium.  RESPIRATORY:  MILD shortness of breath  GASTROINTESTINAL:  No nausea, vomiting or diarrhea.  GENITOURINARY:  No dysuria, frequency or urgency. No Blood in urine  MUSCULOSKELETAL:  no joint aches, no muscle pain  SKIN:  No change in skin, hair or nails.  NEUROLOGIC:  No Headaches, seizures or weakness.  PSYCHIATRIC:  No disorder of thought or mood.  ENDOCRINE:  No heat or cold intolerance  HEMATOLOGICAL:  No easy bruising or bleeding.   =======================================================  Allergies  Sudafed (Other)    ======================================================  Physical Exam:  ============  (see vitals section below)    General:  No acute distress. FRAIL  Eye: Pupils are equal, round and reactive to light, Extraocular movements are intact, Normal conjunctiva.  HENT: Normocephalic, Oral mucosa is moist, No pharyngeal erythema, No sinus tenderness.  Neck: Supple, No lymphadenopathy.  Respiratory: Lungs  with diminished air entry at bases  RIGHT side with 2 chest tubes  copious bloody fluid  Cardiovascular: Normal rate, Regular rhythm,   Gastrointestinal: Soft, Non-tender, Non-distended, Normal bowel sounds.  Genitourinary: + ELIZONDO with clear urine  Lymphatics: No lymphadenopathy neck,   Musculoskeletal: Normal range of motion, Normal strength.  Integumentary: No rash.  Neurologic: Alert, Oriented, No focal deficits, Cranial Nerves II-XII are grossly intact.  Psychiatric: Appropriate mood & affect.    =======================================================  Vitals:  ============  T(F): 97.6 (14 May 2020 23:06), Max: 97.6 (14 May 2020 23:06)  HR: 76 (14 May 2020 23:06)  BP: 99/62 (14 May 2020 23:06)  RR: 18 (14 May 2020 23:06)  SpO2: 99% (14 May 2020 23:06) (90% - 99%)  temp max in last 48H T(F): , Max: 98.4 (05-13-20 @ 11:24)    =======================================================  Current Antibiotics:  caspofungin IVPB 50 milliGRAM(s) IV Intermittent every 24 hours  piperacillin/tazobactam IVPB.. 3.375 Gram(s) IV Intermittent every 8 hours  vancomycin  IVPB 1000 milliGRAM(s) IV Intermittent every 24 hours    Other medications:  chlorhexidine 4% Liquid 1 Application(s) Topical <User Schedule>  enoxaparin Injectable 30 milliGRAM(s) SubCutaneous daily  folic acid 1 milliGRAM(s) Oral daily  lactated ringers. 1000 milliLiter(s) IV Continuous <Continuous>  latanoprost 0.005% Ophthalmic Solution 1 Drop(s) Both EYES at bedtime  mupirocin 2% Nasal 1 Application(s) Both Nostrils every 12 hours  pantoprazole  Injectable 40 milliGRAM(s) IV Push every 12 hours  sodium chloride 0.9% lock flush 3 milliLiter(s) IV Push every 8 hours      =======================================================  Labs:                        10.6   12.33 )-----------( 431      ( 15 May 2020 07:46 )             32.6      05-14    134<L>  |  102  |  12.0  ----------------------------<  72  4.0   |  16.0<L>  |  0.62    Ca    6.6<L>      14 May 2020 08:01  Phos  2.1     05-14  Mg     2.0     05-14    TPro  4.9<L>  /  Alb  1.5<L>  /  TBili  0.3<L>  /  DBili  x   /  AST  20  /  ALT  7   /  AlkPhos  91  05-14      Culture - Fungal, Body Fluid (collected 05-08-20 @ 17:52)  Source: Pleural Fl Pleural Fluid    Culture - Body Fluid with Gram Stain (collected 05-08-20 @ 17:52)  Source: Pleural Fl Pleural Fluid  Gram Stain (05-08-20 @ 19:18):    Few polymorphonuclear leukocytes per low power field    Rare Gram positive cocci in pairs per oil power field    Rare Gram Variable Rods per oil power field    Rare Yeast per oil power field  Organism: Streptococcus mitis/oralis group (05-11-20 @ 17:39)    Sensitivities:      -  Clindamycin: R      -  Erythromycin: R      -  Levofloxacin: S      -  Vancomycin: S      Method Type: KB  Organism: Streptococcus mitis/oralis group  Coag Negative Staphylococcus  Klebsiella oxytoca (05-11-20 @ 17:37)  Organism: Streptococcus mitis/oralis group (05-11-20 @ 17:37)    Sensitivities:      -  Ceftriaxone: S 1      -  Penicillin: I 0.75      Method Type: ETEST  Organism: Klebsiella oxytóscar (05-11-20 @ 17:35)    Sensitivities:      -  Amikacin: S <=16      -  Amoxicillin/Clavulanic Acid: S <=8/4      -  Ampicillin: R >16 These ampicillin results predict results for amoxicillin      -  Ampicillin/Sulbactam: S <=4/2 Enterobacter, Citrobacter, and Serratia may develop resistance during prolonged therapy (3-4 days)      -  Aztreonam: R >16      -  Cefazolin: R 16 Enterobacter, Citrobacter, and Serratia may develop resistance during prolonged therapy (3-4 days)      -  Cefepime: S <=2      -  Cefoxitin: S <=8      -  Ceftazidime/Avibactam: S 8      -  Ceftolozane/tazobactam: S <=2      -  Ceftriaxone: S <=1 Enterobacter, Citrobacter, and Serratia may develop resistance during prolonged therapy      -  Ciprofloxacin: R 2      -  Ertapenem: I 1      -  Gentamicin: S <=2      -  Imipenem: S <=1      -  Levofloxacin: R 2      -  Meropenem: S <=1      -  Piperacillin/Tazobactam: S <=8      -  Tobramycin: S <=2      -  Trimethoprim/Sulfamethoxazole: S <=0.5/9.5      Method Type: PAWAN  Organism: Coag Negative Staphylococcus (05-11-20 @ 17:35)    Sensitivities:      -  Ampicillin/Sulbactam: R <=8/4      -  Cefazolin: R <=4      -  Clindamycin: S <=0.25      -  Erythromycin: R >4      -  Gentamicin: S <=1 Should not be used as monotherapy      -  Oxacillin: R >2      -  Penicillin: R >8      -  RIF- Rifampin: S <=1 Should not be used as monotherapy      -  Tetra/Doxy: R >8      -  Trimethoprim/Sulfamethoxazole: S <=0.5/9.5      -  Vancomycin: S 2      Method Type: PAWAN    Culture - Urine (collected 05-08-20 @ 09:09)  Source: .Urine Catheterized  Final Report (05-09-20 @ 08:24):    >=3 organisms. Probable collection contamination.    Culture - Blood (collected 05-08-20 @ 09:02)  Source: .Blood Blood-Peripheral  Final Report (05-13-20 @ 10:00):    No Growth Final    Culture - Blood (collected 05-08-20 @ 09:02)  Source: .Blood Blood  Final Report (05-13-20 @ 10:00):    No Growth Final    Culture - Tissue with Gram Stain (collected 05-01-20 @ 22:18)  Source: .Tissue Other, Right ischium wound  Gram Stain (05-02-20 @ 04:48):    No polymorphonuclear cells seen per low power field    Numerous Gram Variable Rods seen per oil power field    Numerous Gram positive cocci in pairs seen per oil power field  Final Report (05-03-20 @ 23:05):    After additional incubation time, Culture yields >4 types of aerobic    and/or anaerobic bacteria    Call client services within 7 days if further workup is clinically    indicated. Culture includes    Rare Enterococcus faecalis    Numerous Pseudomonas aeruginosa    Numerous Escherichia coli    Numerous Bacteroides fragilis "Susceptibilities not performed"  Organism: Enterococcus faecalis  Escherichia coli  Pseudomonas aeruginosa (05-03-20 @ 23:05)  Organism: Pseudomonas aeruginosa (05-03-20 @ 23:05)    Sensitivities:      -  Amikacin: S <=16      -  Aztreonam: S <=4      -  Cefepime: S <=2      -  Ceftazidime: S <=1      -  Ciprofloxacin: S <=0.25      -  Gentamicin: S 4      -  Imipenem: S <=1      -  Levofloxacin: S <=0.5      -  Meropenem: S <=1      -  Piperacillin/Tazobactam: S <=8      -  Tobramycin: S <=2      Method Type: PAWAN  Organism: Escherichia coli (05-03-20 @ 23:05)    Sensitivities:      -  Amikacin: S <=16      -  Amoxicillin/Clavulanic Acid: S <=8/4      -  Ampicillin: S <=8 These ampicillin results predict results for amoxicillin      -  Ampicillin/Sulbactam: S <=4/2 Enterobacter, Citrobacter, and Serratia may develop resistance during prolonged therapy (3-4 days)      -  Aztreonam: S <=4      -  Cefazolin: S <=2 Enterobacter, Citrobacter, and Serratia may develop resistance during prolonged therapy (3-4 days)      -  Cefepime: S <=2      -  Cefoxitin: S <=8      -  Ceftriaxone: S <=1 Enterobacter, Citrobacter, and Serratia may develop resistance during prolonged therapy      -  Ciprofloxacin: S <=0.25      -  Ertapenem: S <=0.5      -  Gentamicin: S <=2      -  Imipenem: S <=1      -  Levofloxacin: S <=0.5      -  Meropenem: S <=1      -  Piperacillin/Tazobactam: S <=8      -  Tobramycin: S <=2      -  Trimethoprim/Sulfamethoxazole: S <=0.5/9.5      Method Type: PAWAN  Organism: Enterococcus faecalis (05-03-20 @ 23:05)    Sensitivities:      -  Ampicillin: S <=2 Predicts results to ampicillin/sulbactam, amoxacillin-clavulanate and  piperacillin-tazobactam.      -  Tetra/Doxy: S <=4      -  Vancomycin: S 2      Method Type: PAWAN      Creatinine, Serum: 0.62 mg/dL (05-14-20 @ 08:01)  Creatinine, Serum: 0.65 mg/dL (05-13-20 @ 14:45)  Creatinine, Serum: 0.56 mg/dL (05-13-20 @ 07:52)  Creatinine, Serum: 0.63 mg/dL (05-12-20 @ 19:38)  Creatinine, Serum: 0.87 mg/dL (05-12-20 @ 06:37)  Creatinine, Serum: 0.98 mg/dL (05-11-20 @ 07:37)  Creatinine, Serum: 0.83 mg/dL (05-10-20 @ 09:41)        Ferritin, Serum: 808 ng/mL (05-09-20 @ 15:27)      WBC Count: 12.33 K/uL (05-15-20 @ 07:46)  WBC Count: 10.09 K/uL (05-14-20 @ 08:01)  WBC Count: 14.48 K/uL (05-13-20 @ 16:32)  WBC Count: 14.87 K/uL (05-13-20 @ 14:45)  WBC Count: 9.28 K/uL (05-13-20 @ 07:54)  WBC Count: 8.36 K/uL (05-12-20 @ 06:37)  WBC Count: 8.22 K/uL (05-11-20 @ 07:37)  WBC Count: 6.72 K/uL (05-10-20 @ 08:43)      COVID-19 PCR: NotDetec (05-12-20 @ 00:00)  COVID-19 PCR: NotDetec (05-07-20 @ 23:17)    Lactate Dehydrogenase, Serum: 181 U/L (05-08-20 @ 06:11)    Alkaline Phosphatase, Serum: 91 U/L (05-14-20 @ 08:01)  Alkaline Phosphatase, Serum: 78 U/L (05-12-20 @ 06:37)  Alanine Aminotransferase (ALT/SGPT): 7 U/L (05-14-20 @ 08:01)  Alanine Aminotransferase (ALT/SGPT): 7 U/L (05-12-20 @ 06:37)  Aspartate Aminotransferase (AST/SGOT): 20 U/L (05-14-20 @ 08:01)  Aspartate Aminotransferase (AST/SGOT): 13 U/L (05-12-20 @ 06:37)  Bilirubin Total, Serum: 0.3 mg/dL (05-14-20 @ 08:01)  Bilirubin Total, Serum: 0.3 mg/dL (05-12-20 @ 06:37)

## 2020-05-16 PROBLEM — C50.919 MALIGNANT NEOPLASM OF UNSPECIFIED SITE OF UNSPECIFIED FEMALE BREAST: Chronic | Status: ACTIVE | Noted: 2020-05-12

## 2020-05-16 LAB
ANION GAP SERPL CALC-SCNC: 15 MMOL/L — SIGNIFICANT CHANGE UP (ref 5–17)
BUN SERPL-MCNC: 13 MG/DL — SIGNIFICANT CHANGE UP (ref 8–20)
CALCIUM SERPL-MCNC: 5.8 MG/DL — CRITICAL LOW (ref 8.6–10.2)
CHLORIDE SERPL-SCNC: 100 MMOL/L — SIGNIFICANT CHANGE UP (ref 98–107)
CO2 SERPL-SCNC: 18 MMOL/L — LOW (ref 22–29)
CREAT SERPL-MCNC: 1.16 MG/DL — SIGNIFICANT CHANGE UP (ref 0.5–1.3)
GLUCOSE BLDC GLUCOMTR-MCNC: 61 MG/DL — LOW (ref 70–99)
GLUCOSE BLDC GLUCOMTR-MCNC: 74 MG/DL — SIGNIFICANT CHANGE UP (ref 70–99)
GLUCOSE BLDC GLUCOMTR-MCNC: 95 MG/DL — SIGNIFICANT CHANGE UP (ref 70–99)
GLUCOSE SERPL-MCNC: 82 MG/DL — SIGNIFICANT CHANGE UP (ref 70–99)
HCT VFR BLD CALC: 31.9 % — LOW (ref 34.5–45)
HGB BLD-MCNC: 10.7 G/DL — LOW (ref 11.5–15.5)
MAGNESIUM SERPL-MCNC: 1.7 MG/DL — SIGNIFICANT CHANGE UP (ref 1.6–2.6)
MCHC RBC-ENTMCNC: 28.4 PG — SIGNIFICANT CHANGE UP (ref 27–34)
MCHC RBC-ENTMCNC: 33.5 GM/DL — SIGNIFICANT CHANGE UP (ref 32–36)
MCV RBC AUTO: 84.6 FL — SIGNIFICANT CHANGE UP (ref 80–100)
PLATELET # BLD AUTO: 470 K/UL — HIGH (ref 150–400)
POTASSIUM SERPL-MCNC: 3.8 MMOL/L — SIGNIFICANT CHANGE UP (ref 3.5–5.3)
POTASSIUM SERPL-SCNC: 3.8 MMOL/L — SIGNIFICANT CHANGE UP (ref 3.5–5.3)
RBC # BLD: 3.77 M/UL — LOW (ref 3.8–5.2)
RBC # FLD: 18 % — HIGH (ref 10.3–14.5)
SODIUM SERPL-SCNC: 133 MMOL/L — LOW (ref 135–145)
VANCOMYCIN TROUGH SERPL-MCNC: 19.1 UG/ML — SIGNIFICANT CHANGE UP (ref 10–20)
WBC # BLD: 16.44 K/UL — HIGH (ref 3.8–10.5)
WBC # FLD AUTO: 16.44 K/UL — HIGH (ref 3.8–10.5)

## 2020-05-16 PROCEDURE — 99024 POSTOP FOLLOW-UP VISIT: CPT

## 2020-05-16 PROCEDURE — 71045 X-RAY EXAM CHEST 1 VIEW: CPT | Mod: 26

## 2020-05-16 RX ORDER — SODIUM CHLORIDE 9 MG/ML
1000 INJECTION, SOLUTION INTRAVENOUS
Refills: 0 | Status: DISCONTINUED | OUTPATIENT
Start: 2020-05-16 | End: 2020-05-17

## 2020-05-16 RX ORDER — ENOXAPARIN SODIUM 100 MG/ML
30 INJECTION SUBCUTANEOUS EVERY 12 HOURS
Refills: 0 | Status: DISCONTINUED | OUTPATIENT
Start: 2020-05-16 | End: 2020-06-09

## 2020-05-16 RX ADMIN — SODIUM CHLORIDE 3 MILLILITER(S): 9 INJECTION INTRAMUSCULAR; INTRAVENOUS; SUBCUTANEOUS at 05:02

## 2020-05-16 RX ADMIN — ENOXAPARIN SODIUM 30 MILLIGRAM(S): 100 INJECTION SUBCUTANEOUS at 17:36

## 2020-05-16 RX ADMIN — PIPERACILLIN AND TAZOBACTAM 25 GRAM(S): 4; .5 INJECTION, POWDER, LYOPHILIZED, FOR SOLUTION INTRAVENOUS at 10:17

## 2020-05-16 RX ADMIN — PIPERACILLIN AND TAZOBACTAM 25 GRAM(S): 4; .5 INJECTION, POWDER, LYOPHILIZED, FOR SOLUTION INTRAVENOUS at 17:36

## 2020-05-16 RX ADMIN — CHLORHEXIDINE GLUCONATE 1 APPLICATION(S): 213 SOLUTION TOPICAL at 05:02

## 2020-05-16 RX ADMIN — PIPERACILLIN AND TAZOBACTAM 25 GRAM(S): 4; .5 INJECTION, POWDER, LYOPHILIZED, FOR SOLUTION INTRAVENOUS at 02:25

## 2020-05-16 RX ADMIN — PANTOPRAZOLE SODIUM 40 MILLIGRAM(S): 20 TABLET, DELAYED RELEASE ORAL at 17:35

## 2020-05-16 RX ADMIN — CASPOFUNGIN ACETATE 260 MILLIGRAM(S): 7 INJECTION, POWDER, LYOPHILIZED, FOR SOLUTION INTRAVENOUS at 01:12

## 2020-05-16 RX ADMIN — LATANOPROST 1 DROP(S): 0.05 SOLUTION/ DROPS OPHTHALMIC; TOPICAL at 22:48

## 2020-05-16 RX ADMIN — SODIUM CHLORIDE 3 MILLILITER(S): 9 INJECTION INTRAMUSCULAR; INTRAVENOUS; SUBCUTANEOUS at 22:45

## 2020-05-16 RX ADMIN — SODIUM CHLORIDE 3 MILLILITER(S): 9 INJECTION INTRAMUSCULAR; INTRAVENOUS; SUBCUTANEOUS at 13:19

## 2020-05-16 RX ADMIN — Medication 250 MILLIGRAM(S): at 14:58

## 2020-05-16 RX ADMIN — PANTOPRAZOLE SODIUM 40 MILLIGRAM(S): 20 TABLET, DELAYED RELEASE ORAL at 05:02

## 2020-05-16 NOTE — DIETITIAN INITIAL EVALUATION ADULT. - MALNUTRITION
Limited NFPE performed- moderate muscle wasting at temples, shoulders.  Moderate fat loss in orbital region. severe (chronic) severe (acute)-- likely chronic

## 2020-05-16 NOTE — DIETITIAN INITIAL EVALUATION ADULT. - PROBLEM SELECTOR PLAN 4
- Chronic debility. +Chronic decubitus ulcer on right ischium and chronic campos use.   - Non-ambulatory for 4-5 years as per patient.   - Interferon has been held since admission to [place].   - Fall risk protocol.

## 2020-05-16 NOTE — DIETITIAN INITIAL EVALUATION ADULT. - ENTERAL
As/when medically feasible, initiate Pivot 1.5 @ 30ml/hr, increase by 10ml q6 hrs to goal rate of 60ml/hr (x20hrs) daily to provide 1200ml, 1800kcal, 113g protein, 910ml free water

## 2020-05-16 NOTE — PROGRESS NOTE ADULT - PROBLEM SELECTOR PLAN 5
- Wound care consult / plastic surgery consult pending.   - air flow mattress  - Turn and position q2 hours.  -optimize nutrition once able to access feeding tube

## 2020-05-16 NOTE — PROGRESS NOTE ADULT - SUBJECTIVE AND OBJECTIVE BOX
Subjective: Patient resting comfortably in bed, no acute distress noted, denies fever, chills, chest pian, palpitation, shortness of beath, dizziness, headache, abdominal pain, N/V/D.     HPI:  69 year old non-ambulatory Female with a PMHx significant for multiple sclerosis, Breast Cancer s/p R mastectomy, Osteoporosis, Mitral stenosis, right ankle fracture, chronic right sided sacral ulcer, chronic midline back pain since being dropped from a jame lift (7/17/29), admitted to [place] after presenting with sepsis in the setting of a UTI with chronic campos use and known large right ischial decubitus ulcer. Patient found to have a Moderately large Right hydropneumothorax resulting in partial right lung collapse and slight cardiomediastinal shift to the left on CXR with chest tube placed 5/8/20. Large bore chest tube placed 5/10/20 for persistent Right pneumothorax. Patient was followed by ID and was given Vanco and Zosyn originally for her presumed urosepsis, Caspofungin was added after pleural fluid was + for fungal elements. CT chest confirmed +Empyema in the Right pleural space. Patient ultimately transferred to [facility] late 5/11/20 after she was found to have a distal esophageal perforation on CT chest and Esophogram.     Of note, patient admitted 10/9/2019 for lower back and bilateral knee pain, found to have compression fracture of L2 with imaging subsequently found to have multiple lytic lesions on the spine and pelvis.  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 Matthew Fairbanks.          PAST MEDICAL & SURGICAL HISTORY:  Breast cancer metastasized to bone  Hypertension  Multiple sclerosis  S/P mastectomy, right  Breast CA  Osteoporosis  MS (mitral stenosis)  History of bowel resection  H/O breast surgery    V/S  T(C): 36.3 (05-15-20 @ 15:20), Max: 36.7 (05-15-20 @ 14:15)  HR: 80 (05-15-20 @ 20:42) (70 - 111)  BP: 108/70 (05-15-20 @ 20:42) (103/70 - 130/71)  RR: 17 (05-15-20 @ 20:42) (11 - 22)  SpO2: 100% (05-15-20 @ 20:42) (91% - 100%) on room air                                               Tele: Sinus rhythm    CHEST TUBE: Right chest tube x2 to suction and Right midaxillary SOURAV drain to bulb suction. OUTPUT:             per 24 hours     Drainage:    AIR LEAKS:  Chest tube # 1[ x] YES. Chest tube #2 [x ] NO      MEDICATIONS  (STANDING):  caspofungin IVPB 50 milliGRAM(s) IV Intermittent every 24 hours  chlorhexidine 4% Liquid 1 Application(s) Topical <User Schedule>  enoxaparin Injectable 30 milliGRAM(s) SubCutaneous daily  lactated ringers. 1000 milliLiter(s) (75 mL/Hr) IV Continuous <Continuous>  latanoprost 0.005% Ophthalmic Solution 1 Drop(s) Both EYES at bedtime  pantoprazole  Injectable 40 milliGRAM(s) IV Push every 12 hours  piperacillin/tazobactam IVPB.. 3.375 Gram(s) IV Intermittent every 8 hours  sodium chloride 0.9% lock flush 3 milliLiter(s) IV Push every 8 hours  vancomycin  IVPB 1000 milliGRAM(s) IV Intermittent daily      05-15    132<L>  |  102  |  11.0  ----------------------------<  91  4.3   |  14.0<L>  |  0.90    Ca    6.2<LL>      15 May 2020 08:07  Phos  2.7     05-15  Mg     1.8     05-15    TPro  4.9<L>  /  Alb  1.5<L>  /  TBili  0.3<L>  /  DBili  x   /  AST  25  /  ALT  8   /  AlkPhos  93  05-15                               10.6   12.33 )-----------( 431      ( 15 May 2020 07:46 )             32.6                 CAPILLARY BLOOD GLUCOSE      POCT Blood Glucose.: 83 mg/dL (15 May 2020 16:44)  POCT Blood Glucose.: 88 mg/dL (15 May 2020 07:46)           CXR:  < from: Xray Chest 1 View- PORTABLE-Routine (05.15.20 @ 03:47) >  Findings:  3 right-sided chest tubes.    Increased density along the lateral aspect of the right hemithorax, likely a loculated pleural effusion. Patchy opacities throughout the right lung, worsened since 5/13/2020.    Patchy opacity at the left lung base.    Esophageal stent again seen. NGtube with the tip in the stomach.    Cardiomediastinal silhouette is stable.    Diffuse osseous metastasis. Surgical clips in the right axillary region.    Impression:  1.  Patchy opacities throughout the right lung and at the left lung base, increased since 5/13/2020.    2.  Loculated right pleural effusion.    3.  Multiple right-sided chest tubes and esophageal stent.        < end of copied text >  < from: Xray Kidney Ureter Bladder (05.15.20 @ 13:53) >    NG tube with the tip in the region of the gastroesophageal junction. Esophageal stent.    No evidence of a bowel obstruction.    Skin staples overlying the midline of the abdomen.    Diffuse osseous metastasis. Compression fracture of L2. Calcified fibroid in the pelvis.    Partially imaged chest tubes on the right.    IMPRESSION:      NG tube with the tip in the region of the gastroesophageal junction and should be advanced.    The findings were discussed with MOLLY Shah at 2:27 PM on 5/15/2020.      < end of copied text >    Physical Examination:  Constitutional: Well developed, no acute distress noted  Neuro: A+O x 3, non-focal   HEENT: NC/AT, PERRL, EOMI, oral mucosa pink and moist  Neck: supple, no JVD  CV: RRR +S1S2, no murmur, gallops or rubs  Pulm/chest: Decreased breath sounds on the Right, left CTA, no accessory muscle use noted  Abd: soft, NT, ND, +BS  : +Campos with yellow urine in bedside bag  Ext: DAVIDSON x 4, Limited LE strength, +LE sensation intact, +3 pedal edema bilaterally, +DP/PT pulses b/l  Skin: warm, well perfused  Psych: calm, appropriate affect  Tubes: +NG tube to low intermittent suction, J tube: G port connected to Campos bag to gravity, J port capped. 2 Right sided chest tubes to suction, dressing c/d/i, +Right midaxillary SOURAV drain to bulb suction. + air leak in CT #1.  No surrounding crepitus noted.

## 2020-05-16 NOTE — PROGRESS NOTE ADULT - ASSESSMENT
69 year old non-ambulatory Female with a PMHx significant for multiple sclerosis, Breast Cancer s/p R mastectomy, Osteoporosis, Mitral stenosis, right ankle fracture, chronic right sided sacral ulcer, chronic midline back pain since being dropped from a jame lift (7/17/29), admitted to [place] after presenting with sepsis in the setting of a UTI with chronic campos use and known large right ischial decubitus ulcer. Patient found to have a Moderately large Right hydropneumothorax resulting in partial right lung collapse and slight cardiomediastinal shift to the left on CXR with chest tube placed 5/8/20. Large bore chest tube placed 5/10/20 for persistent Right pneumothorax. Patient was followed by ID and was given Vanco and Zosyn originally for her presumed urosepsis, Caspofungin was added after pleural fluid was + for fungal elements. CT chest confirmed +Empyema in the Right pleural space. Patient ultimately transferred to [facility] late 5/11/20 after she was found to have a distal esophageal perforation on CT chest and Esophogram.     Patient now s/p EGD with esophageal stent placed, NGT placed, VATS with right thoracic cavity washout and pulmonary decortication with vaibhav drain, right posterior, and right medial chest tubes placed on 5/13/20.    5/15/2020: Back to OR  for GJ tube placement.

## 2020-05-16 NOTE — DIETITIAN INITIAL EVALUATION ADULT. - PERTINENT LABORATORY DATA
05-15 Na132 mmol/L<L> Glu 91 mg/dL K+ 4.3 mmol/L Cr  0.90 mg/dL BUN 11.0 mg/dL Phos 2.7 mg/dL Alb 1.5 g/dL<L> PAB n/a

## 2020-05-16 NOTE — PROGRESS NOTE ADULT - PROBLEM SELECTOR PLAN 1
- S/p esophageal stent placed 5/13/20 with NGT placed.   - s/p GJ tube placement (5/15) G port connected to [place] bag to gravity, J port to flush every 8 hours with 60 cc sterile water x24 hours  -Continue NGT to low intermittent suction  -Keep NPO now  - DR. Murcia will re assess in the morning for GJ/NGT for med/feeding  - Continuous telemetry, .   - Follow up AM CXR.   - Maintain b/l CTs to suction for +airleak  -Strict NPO for now, continue IV hydration   -CBC, BMP daily  - Case and plan to be discussed with Thoracic Surgery team on AM rounds.

## 2020-05-16 NOTE — DIETITIAN INITIAL EVALUATION ADULT. - ETIOLOGY
related to inability to meet increased protein energy needs in setting of metastatic breast ca, esophageal perforation, +NGT, s/p GJ tube placement and skin breakdown

## 2020-05-16 NOTE — PROGRESS NOTE ADULT - PROBLEM SELECTOR PLAN 2
- Pleural fluid + for yeast, coag Negative Staph, Klebsiella, and strep mitis/oralis.  - Continue IV antibiotics per ID.  - S/p Right thoracotomy / washout / VATS decort 5/13/20.  -Chest to suction  Daily CXR   Will discuss the plan with Thoracic attending in AM rounds

## 2020-05-16 NOTE — DIETITIAN INITIAL EVALUATION ADULT. - PROBLEM SELECTOR PLAN 3
- Pleural fluid + for yeast, coag Negative Staph, Klebsiella, and strep mitis/oralis  - Continue IV antibiotics as were recommended by ID at [place].   - ID consult pending.  - Tentative plan for eventual washout.

## 2020-05-16 NOTE — DIETITIAN INITIAL EVALUATION ADULT. - PROBLEM SELECTOR PLAN 1
- Admit to Dr. Murcia.   - Continuous telemetry, .   - Follow up CXR.   - Follow up labs.  - Plan for OR tomorrow for Esophageal stent and feeding tube placement.  - Case and plan discussed with Thoracic Surgeon Dr. Murcia.

## 2020-05-16 NOTE — CHART NOTE - NSCHARTNOTEFT_GEN_A_CORE
Upon Nutritional Assessment by the Registered Dietitian your patient was determined to meet criteria / has evidence of the following diagnosis/diagnoses:          [ ]  Mild Protein Calorie Malnutrition        [ ]  Moderate Protein Calorie Malnutrition        [x ] Severe Protein Calorie Malnutrition        [ ] Unspecified Protein Calorie Malnutrition        [ ] Underweight / BMI <19        [ ] Morbid Obesity / BMI > 40      Findings as based on:  •  Comprehensive nutrition assessment and consultation  •  Calorie counts (nutrient intake analysis)  •  Food acceptance and intake status from observations by staff  •  Follow up  •  Patient education  •  Intervention secondary to interdisciplinary rounds  •   concerns      Treatment:    The following diet has been recommended:  As/when medically feasible, initiate Pivot 1.5 @ 30ml/hr, increase by 10ml q6 hrs to goal rate of 60ml/hr (x20hrs) daily to provide 1200ml, 1800kcal, 113g protein, 910ml free water  RX: MVI and Vit C 500mg daily.  RX: znso4 220mg x 10 days daily    PROVIDER Section:     By signing this assessment you are acknowledging and agree with the diagnosis/diagnoses assigned by the Registered Dietitian    Comments:

## 2020-05-16 NOTE — DIETITIAN INITIAL EVALUATION ADULT. - OTHER INFO
Pt with PMHx significant for MS, Breast Cancer s/p R mastectomy, Osteoporosis, Mitral stenosis, right ankle fracture, chronic right sided sacral ulcer, chronic midline back pain since being dropped from a jame lift (7/17/29), admitted to [place] after presenting with sepsis in the setting of a UTI with chronic campos use and known large right ischial decubitus ulcer. Pt ultimately transferred to [facility] late 5/11/20 after she was found to have a distal esophageal perforation on CT chest and Esophogram.  Pt now s/p EGD with esophageal stent placed, NGT placed, VATS with right thoracic cavity washout and pulmonary decortication.  On 5/15/2020: Back to OR  for GJ tube placement.  Pt remains NPO since (5/12).  Pt sleeping upon interview- limited NFPE performed.  Also noted to have had wt loss since a prior admission in Oct 2019 with wt of 140#, now 125#.

## 2020-05-17 LAB
ALBUMIN SERPL ELPH-MCNC: 1.5 G/DL — LOW (ref 3.3–5.2)
ALP SERPL-CCNC: 68 U/L — SIGNIFICANT CHANGE UP (ref 40–120)
ALT FLD-CCNC: 12 U/L — SIGNIFICANT CHANGE UP
ANION GAP SERPL CALC-SCNC: 11 MMOL/L — SIGNIFICANT CHANGE UP (ref 5–17)
ANION GAP SERPL CALC-SCNC: 14 MMOL/L — SIGNIFICANT CHANGE UP (ref 5–17)
AST SERPL-CCNC: 23 U/L — SIGNIFICANT CHANGE UP
BILIRUB SERPL-MCNC: 0.2 MG/DL — LOW (ref 0.4–2)
BUN SERPL-MCNC: 14 MG/DL — SIGNIFICANT CHANGE UP (ref 8–20)
BUN SERPL-MCNC: 14 MG/DL — SIGNIFICANT CHANGE UP (ref 8–20)
CALCIUM SERPL-MCNC: 5.7 MG/DL — CRITICAL LOW (ref 8.6–10.2)
CALCIUM SERPL-MCNC: 5.9 MG/DL — CRITICAL LOW (ref 8.6–10.2)
CHLORIDE SERPL-SCNC: 101 MMOL/L — SIGNIFICANT CHANGE UP (ref 98–107)
CHLORIDE SERPL-SCNC: 103 MMOL/L — SIGNIFICANT CHANGE UP (ref 98–107)
CO2 SERPL-SCNC: 18 MMOL/L — LOW (ref 22–29)
CO2 SERPL-SCNC: 19 MMOL/L — LOW (ref 22–29)
CREAT SERPL-MCNC: 1.07 MG/DL — SIGNIFICANT CHANGE UP (ref 0.5–1.3)
CREAT SERPL-MCNC: 1.15 MG/DL — SIGNIFICANT CHANGE UP (ref 0.5–1.3)
GLUCOSE SERPL-MCNC: 81 MG/DL — SIGNIFICANT CHANGE UP (ref 70–99)
GLUCOSE SERPL-MCNC: 85 MG/DL — SIGNIFICANT CHANGE UP (ref 70–99)
HCT VFR BLD CALC: 29.1 % — LOW (ref 34.5–45)
HGB BLD-MCNC: 9.9 G/DL — LOW (ref 11.5–15.5)
MAGNESIUM SERPL-MCNC: 1.7 MG/DL — SIGNIFICANT CHANGE UP (ref 1.6–2.6)
MCHC RBC-ENTMCNC: 28.7 PG — SIGNIFICANT CHANGE UP (ref 27–34)
MCHC RBC-ENTMCNC: 34 GM/DL — SIGNIFICANT CHANGE UP (ref 32–36)
MCV RBC AUTO: 84.3 FL — SIGNIFICANT CHANGE UP (ref 80–100)
PHOSPHATE SERPL-MCNC: 2.3 MG/DL — LOW (ref 2.4–4.7)
PLATELET # BLD AUTO: 472 K/UL — HIGH (ref 150–400)
POTASSIUM SERPL-MCNC: 3.2 MMOL/L — LOW (ref 3.5–5.3)
POTASSIUM SERPL-MCNC: 3.6 MMOL/L — SIGNIFICANT CHANGE UP (ref 3.5–5.3)
POTASSIUM SERPL-SCNC: 3.2 MMOL/L — LOW (ref 3.5–5.3)
POTASSIUM SERPL-SCNC: 3.6 MMOL/L — SIGNIFICANT CHANGE UP (ref 3.5–5.3)
PROT SERPL-MCNC: 3.9 G/DL — LOW (ref 6.6–8.7)
RBC # BLD: 3.45 M/UL — LOW (ref 3.8–5.2)
RBC # FLD: 18.2 % — HIGH (ref 10.3–14.5)
SODIUM SERPL-SCNC: 133 MMOL/L — LOW (ref 135–145)
SODIUM SERPL-SCNC: 133 MMOL/L — LOW (ref 135–145)
VANCOMYCIN TROUGH SERPL-MCNC: 25.7 UG/ML — CRITICAL HIGH (ref 10–20)
WBC # BLD: 14.9 K/UL — HIGH (ref 3.8–10.5)
WBC # FLD AUTO: 14.9 K/UL — HIGH (ref 3.8–10.5)

## 2020-05-17 PROCEDURE — 99232 SBSQ HOSP IP/OBS MODERATE 35: CPT

## 2020-05-17 PROCEDURE — 71045 X-RAY EXAM CHEST 1 VIEW: CPT | Mod: 26

## 2020-05-17 PROCEDURE — 99024 POSTOP FOLLOW-UP VISIT: CPT

## 2020-05-17 RX ORDER — POTASSIUM CHLORIDE 20 MEQ
40 PACKET (EA) ORAL ONCE
Refills: 0 | Status: DISCONTINUED | OUTPATIENT
Start: 2020-05-17 | End: 2020-05-17

## 2020-05-17 RX ORDER — CALCIUM GLUCONATE 100 MG/ML
1 VIAL (ML) INTRAVENOUS ONCE
Refills: 0 | Status: COMPLETED | OUTPATIENT
Start: 2020-05-17 | End: 2020-05-17

## 2020-05-17 RX ORDER — SODIUM CHLORIDE 9 MG/ML
1000 INJECTION INTRAMUSCULAR; INTRAVENOUS; SUBCUTANEOUS
Refills: 0 | Status: DISCONTINUED | OUTPATIENT
Start: 2020-05-17 | End: 2020-05-20

## 2020-05-17 RX ORDER — POTASSIUM CHLORIDE 20 MEQ
40 PACKET (EA) ORAL ONCE
Refills: 0 | Status: COMPLETED | OUTPATIENT
Start: 2020-05-17 | End: 2020-05-17

## 2020-05-17 RX ORDER — MAGNESIUM SULFATE 500 MG/ML
2 VIAL (ML) INJECTION ONCE
Refills: 0 | Status: COMPLETED | OUTPATIENT
Start: 2020-05-17 | End: 2020-05-17

## 2020-05-17 RX ADMIN — PIPERACILLIN AND TAZOBACTAM 25 GRAM(S): 4; .5 INJECTION, POWDER, LYOPHILIZED, FOR SOLUTION INTRAVENOUS at 04:36

## 2020-05-17 RX ADMIN — Medication 50 GRAM(S): at 09:30

## 2020-05-17 RX ADMIN — ENOXAPARIN SODIUM 30 MILLIGRAM(S): 100 INJECTION SUBCUTANEOUS at 06:38

## 2020-05-17 RX ADMIN — LATANOPROST 1 DROP(S): 0.05 SOLUTION/ DROPS OPHTHALMIC; TOPICAL at 21:01

## 2020-05-17 RX ADMIN — PIPERACILLIN AND TAZOBACTAM 25 GRAM(S): 4; .5 INJECTION, POWDER, LYOPHILIZED, FOR SOLUTION INTRAVENOUS at 11:06

## 2020-05-17 RX ADMIN — PANTOPRAZOLE SODIUM 40 MILLIGRAM(S): 20 TABLET, DELAYED RELEASE ORAL at 17:46

## 2020-05-17 RX ADMIN — Medication 40 MILLIEQUIVALENT(S): at 09:29

## 2020-05-17 RX ADMIN — Medication 100 GRAM(S): at 17:45

## 2020-05-17 RX ADMIN — SODIUM CHLORIDE 3 MILLILITER(S): 9 INJECTION INTRAMUSCULAR; INTRAVENOUS; SUBCUTANEOUS at 06:29

## 2020-05-17 RX ADMIN — SODIUM CHLORIDE 3 MILLILITER(S): 9 INJECTION INTRAMUSCULAR; INTRAVENOUS; SUBCUTANEOUS at 20:43

## 2020-05-17 RX ADMIN — Medication 100 GRAM(S): at 11:06

## 2020-05-17 RX ADMIN — Medication 40 MILLIEQUIVALENT(S): at 17:47

## 2020-05-17 RX ADMIN — CASPOFUNGIN ACETATE 260 MILLIGRAM(S): 7 INJECTION, POWDER, LYOPHILIZED, FOR SOLUTION INTRAVENOUS at 02:37

## 2020-05-17 RX ADMIN — PIPERACILLIN AND TAZOBACTAM 25 GRAM(S): 4; .5 INJECTION, POWDER, LYOPHILIZED, FOR SOLUTION INTRAVENOUS at 17:45

## 2020-05-17 RX ADMIN — CHLORHEXIDINE GLUCONATE 1 APPLICATION(S): 213 SOLUTION TOPICAL at 06:29

## 2020-05-17 RX ADMIN — Medication 100 GRAM(S): at 23:39

## 2020-05-17 RX ADMIN — ENOXAPARIN SODIUM 30 MILLIGRAM(S): 100 INJECTION SUBCUTANEOUS at 17:45

## 2020-05-17 RX ADMIN — SODIUM CHLORIDE 3 MILLILITER(S): 9 INJECTION INTRAMUSCULAR; INTRAVENOUS; SUBCUTANEOUS at 15:39

## 2020-05-17 RX ADMIN — PANTOPRAZOLE SODIUM 40 MILLIGRAM(S): 20 TABLET, DELAYED RELEASE ORAL at 06:38

## 2020-05-17 NOTE — PROGRESS NOTE ADULT - SUBJECTIVE AND OBJECTIVE BOX
[facility]  INFECTIOUS DISEASES AND INTERNAL MEDICINE at [place]  =======================================================  Phong Wang MD  Diplomates American Board of Internal Medicine and Infectious Diseases  Telephone [phone]  Fax            [phone]  =======================================================    [facility]-[id number]  GEOREG STANLEY   follow up: empyema     s/p EGD and esophageal stent; s/p VATS 5/14  s/p gastrojejunal tube on 5/15/2020    feels well.    =======================================================  REVIEW OF SYSTEMS:  CONSTITUTIONAL:  No Fever or chills  HEENT:  No diplopia or blurred vision.  No earache, sore throat or runny nose.  CARDIOVASCULAR:  No pressure, squeezing, strangling, tightness, heaviness or aching about the chest, neck, axilla or epigastrium.  RESPIRATORY:  MILD shortness of breath  GASTROINTESTINAL:  No nausea, vomiting or diarrhea.  GENITOURINARY:  No dysuria, frequency or urgency. No Blood in urine  MUSCULOSKELETAL:  no joint aches, no muscle pain  SKIN:  No change in skin, hair or nails.  NEUROLOGIC:  No Headaches, seizures or weakness.  PSYCHIATRIC:  No disorder of thought or mood.  ENDOCRINE:  No heat or cold intolerance  HEMATOLOGICAL:  No easy bruising or bleeding.   =======================================================  Allergies  Sudafed (Other)    ======================================================  Physical Exam:  ============  (see vitals section below)    General:  No acute distress. FRAIL  Eye: Pupils are equal, round and reactive to light, Extraocular movements are intact, Normal conjunctiva.  HENT: Normocephalic, Oral mucosa is moist, No pharyngeal erythema, No sinus tenderness.  Neck: Supple, No lymphadenopathy.  Respiratory: Lungs  with diminished air entry at bases  RIGHT side with 2 chest tubes - CLEAR FLUID  Cardiovascular: Normal rate, Regular rhythm,   Gastrointestinal: Soft, Non-tender, Non-distended, Normal bowel sounds.  PEJ tube present in abd  Genitourinary: + ELIZONDO with clear urine  Lymphatics: No lymphadenopathy neck,   Musculoskeletal: Normal range of motion, Normal strength.  Integumentary: No rash.  Neurologic: Alert, Oriented, No focal deficits, Cranial Nerves II-XII are grossly intact.  Psychiatric: Appropriate mood & affect.    =======================================================  Vitals:  ============  T(F): 97.4 (17 May 2020 09:01), Max: 97.7 (16 May 2020 16:08)  HR: 87 (17 May 2020 09:01)  BP: 114/75 (17 May 2020 09:01)  RR: 18 (17 May 2020 09:01)  SpO2: 98% (17 May 2020 09:01) (96% - 98%)  temp max in last 48H T(F): , Max: 98.1 (05-15-20 @ 14:15)    =======================================================  Current Antibiotics:  caspofungin IVPB 50 milliGRAM(s) IV Intermittent every 24 hours  piperacillin/tazobactam IVPB.. 3.375 Gram(s) IV Intermittent every 8 hours  vancomycin  IVPB 1000 milliGRAM(s) IV Intermittent daily    Other medications:  calcium gluconate IVPB 1 Gram(s) IV Intermittent once  chlorhexidine 4% Liquid 1 Application(s) Topical <User Schedule>  dextrose 5% + lactated ringers. 1000 milliLiter(s) IV Continuous <Continuous>  enoxaparin Injectable 30 milliGRAM(s) SubCutaneous every 12 hours  latanoprost 0.005% Ophthalmic Solution 1 Drop(s) Both EYES at bedtime  pantoprazole  Injectable 40 milliGRAM(s) IV Push every 12 hours  sodium chloride 0.9% lock flush 3 milliLiter(s) IV Push every 8 hours      =======================================================  Labs:                        9.9    14.90 )-----------( 472      ( 17 May 2020 08:17 )             29.1      05-17    133<L>  |  101  |  14.0  ----------------------------<  81  3.2<L>   |  18.0<L>  |  1.07    Ca    5.7<LL>      17 May 2020 08:17  Phos  2.3     05-17  Mg     1.7     05-17    TPro  3.9<L>  /  Alb  1.5<L>  /  TBili  0.2<L>  /  DBili  x   /  AST  23  /  ALT  12  /  AlkPhos  68  05-17      Culture - Fungal, Body Fluid (collected 05-08-20 @ 17:52)  Source: Pleural Fl Pleural Fluid    Culture - Body Fluid with Gram Stain (collected 05-08-20 @ 17:52)  Source: Pleural Fl Pleural Fluid  Gram Stain (05-08-20 @ 19:18):    Few polymorphonuclear leukocytes per low power field    Rare Gram positive cocci in pairs per oil power field    Rare Gram Variable Rods per oil power field    Rare Yeast per oil power field  Organism: Streptococcus mitis/oralis group (05-11-20 @ 17:39)    Sensitivities:      -  Clindamycin: R      -  Erythromycin: R      -  Levofloxacin: S      -  Vancomycin: S      Method Type: KB  Organism: Streptococcus mitis/oralis group  Coag Negative Staphylococcus  Klebsiella oxytoca (05-11-20 @ 17:37)  Organism: Streptococcus mitis/oralis group (05-11-20 @ 17:37)    Sensitivities:      -  Ceftriaxone: S 1      -  Penicillin: I 0.75      Method Type: ETEST  Organism: Klebsiella oxytoca (05-11-20 @ 17:35)    Sensitivities:      -  Amikacin: S <=16      -  Amoxicillin/Clavulanic Acid: S <=8/4      -  Ampicillin: R >16 These ampicillin results predict results for amoxicillin      -  Ampicillin/Sulbactam: S <=4/2 Enterobacter, Citrobacter, and Serratia may develop resistance during prolonged therapy (3-4 days)      -  Aztreonam: R >16      -  Cefazolin: R 16 Enterobacter, Citrobacter, and Serratia may develop resistance during prolonged therapy (3-4 days)      -  Cefepime: S <=2      -  Cefoxitin: S <=8      -  Ceftazidime/Avibactam: S 8      -  Ceftolozane/tazobactam: S <=2      -  Ceftriaxone: S <=1 Enterobacter, Citrobacter, and Serratia may develop resistance during prolonged therapy      -  Ciprofloxacin: R 2      -  Ertapenem: I 1      -  Gentamicin: S <=2      -  Imipenem: S <=1      -  Levofloxacin: R 2      -  Meropenem: S <=1      -  Piperacillin/Tazobactam: S <=8      -  Tobramycin: S <=2      -  Trimethoprim/Sulfamethoxazole: S <=0.5/9.5      Method Type: PAWAN  Organism: Coag Negative Staphylococcus (05-11-20 @ 17:35)    Sensitivities:      -  Ampicillin/Sulbactam: R <=8/4      -  Cefazolin: R <=4      -  Clindamycin: S <=0.25      -  Erythromycin: R >4      -  Gentamicin: S <=1 Should not be used as monotherapy      -  Oxacillin: R >2      -  Penicillin: R >8      -  RIF- Rifampin: S <=1 Should not be used as monotherapy      -  Tetra/Doxy: R >8      -  Trimethoprim/Sulfamethoxazole: S <=0.5/9.5      -  Vancomycin: S 2      Method Type: PAWAN    Culture - Urine (collected 05-08-20 @ 09:09)  Source: .Urine Catheterized  Final Report (05-09-20 @ 08:24):    >=3 organisms. Probable collection contamination.    Culture - Blood (collected 05-08-20 @ 09:02)  Source: .Blood Blood-Peripheral  Final Report (05-13-20 @ 10:00):    No Growth Final    Culture - Blood (collected 05-08-20 @ 09:02)  Source: .Blood Blood  Final Report (05-13-20 @ 10:00):    No Growth Final      Creatinine, Serum: 1.07 mg/dL (05-17-20 @ 08:17)  Creatinine, Serum: 1.16 mg/dL (05-16-20 @ 09:42)  Creatinine, Serum: 0.90 mg/dL (05-15-20 @ 08:07)  Creatinine, Serum: 0.62 mg/dL (05-14-20 @ 08:01)  Creatinine, Serum: 0.65 mg/dL (05-13-20 @ 14:45)  Creatinine, Serum: 0.56 mg/dL (05-13-20 @ 07:52)  Creatinine, Serum: 0.63 mg/dL (05-12-20 @ 19:38)        Ferritin, Serum: 808 ng/mL (05-09-20 @ 15:27)      WBC Count: 14.90 K/uL (05-17-20 @ 08:17)  WBC Count: 16.44 K/uL (05-16-20 @ 09:42)  WBC Count: 12.33 K/uL (05-15-20 @ 07:46)  WBC Count: 10.09 K/uL (05-14-20 @ 08:01)  WBC Count: 14.48 K/uL (05-13-20 @ 16:32)  WBC Count: 14.87 K/uL (05-13-20 @ 14:45)  WBC Count: 9.28 K/uL (05-13-20 @ 07:54)      COVID-19 PCR: NotDetec (05-12-20 @ 00:00)  COVID-19 PCR: NotDetec (05-07-20 @ 23:17)    Lactate Dehydrogenase, Serum: 181 U/L (05-08-20 @ 06:11)    Alkaline Phosphatase, Serum: 68 U/L (05-17-20 @ 08:17)  Alkaline Phosphatase, Serum: 93 U/L (05-15-20 @ 08:07)  Alkaline Phosphatase, Serum: 91 U/L (05-14-20 @ 08:01)  Alanine Aminotransferase (ALT/SGPT): 12 U/L (05-17-20 @ 08:17)  Alanine Aminotransferase (ALT/SGPT): 8 U/L (05-15-20 @ 08:07)  Alanine Aminotransferase (ALT/SGPT): 7 U/L (05-14-20 @ 08:01)  Aspartate Aminotransferase (AST/SGOT): 23 U/L (05-17-20 @ 08:17)  Aspartate Aminotransferase (AST/SGOT): 25 U/L (05-15-20 @ 08:07)  Aspartate Aminotransferase (AST/SGOT): 20 U/L (05-14-20 @ 08:01)  Bilirubin Total, Serum: 0.2 mg/dL (05-17-20 @ 08:17)  Bilirubin Total, Serum: 0.3 mg/dL (05-15-20 @ 08:07)  Bilirubin Total, Serum: 0.3 mg/dL (05-14-20 @ 08:01)

## 2020-05-17 NOTE — PROGRESS NOTE ADULT - SUBJECTIVE AND OBJECTIVE BOX
Subjective: Patient resting comfortably in bed, no acute distress noted, denies chest pain, palpitation, shortness of breath, JACKSON, dizziness, headache, abdominal pain. N/V/D.     HPI:  69 year old non-ambulatory Female with a PMHx significant for multiple sclerosis, Breast Cancer s/p R mastectomy, Osteoporosis, Mitral stenosis, right ankle fracture, chronic right sided sacral ulcer, chronic midline back pain since being dropped from a jame lift (7/17/29), admitted to [place] after presenting with sepsis in the setting of a UTI with chronic campos use and known large right ischial decubitus ulcer. Patient found to have a Moderately large Right hydropneumothorax resulting in partial right lung collapse and slight cardiomediastinal shift to the left on CXR with chest tube placed 5/8/20. Large bore chest tube placed 5/10/20 for persistent Right pneumothorax. Patient was followed by ID and was given Vanco and Zosyn originally for her presumed urosepsis, Caspofungin was added after pleural fluid was + for fungal elements. CT chest confirmed +Empyema in the Right pleural space. Patient ultimately transferred to [facility] late 5/11/20 after she was found to have a distal esophageal perforation on CT chest and Esophogram.     Of note, patient admitted 10/9/2019 for lower back and bilateral knee pain, found to have compression fracture of L2 with imaging subsequently found to have multiple lytic lesions on the spine and pelvis.  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 Matthew Fairbanks.      PAST MEDICAL & SURGICAL HISTORY:  Breast cancer metastasized to bone  Hypertension  Multiple sclerosis  S/P mastectomy, right  Breast CA  Osteoporosis  MS (mitral stenosis)  History of bowel resection  H/O breast surgery    V/S  T(C): 36.4 (05-16-20 @ 22:30), Max: 36.5 (05-16-20 @ 16:08)  HR: 87 (05-16-20 @ 22:30) (79 - 87)  BP: 90/59 (05-16-20 @ 22:30) (90/59 - 99/69)  RR: 18 (05-16-20 @ 22:30) (17 - 18)  SpO2: 96% (05-16-20 @ 22:30) (96% - 99%) on room air                                              Tele: Sinus Rhythm    CHEST TUBE: Right pigtail, Right large bore CT, Right midaxillary SOURAV OUTPUT last 12 hours: #1 50 ml, #2 none, SOURAV 30 ml    AIR LEAKS:  Tube #1 +airleak.  Tube #2 No airleak     MEDICATIONS  (STANDING):  caspofungin IVPB 50 milliGRAM(s) IV Intermittent every 24 hours  chlorhexidine 4% Liquid 1 Application(s) Topical <User Schedule>  dextrose 5% + lactated ringers. 1000 milliLiter(s) (75 mL/Hr) IV Continuous <Continuous>  enoxaparin Injectable 30 milliGRAM(s) SubCutaneous every 12 hours  latanoprost 0.005% Ophthalmic Solution 1 Drop(s) Both EYES at bedtime  pantoprazole  Injectable 40 milliGRAM(s) IV Push every 12 hours  piperacillin/tazobactam IVPB.. 3.375 Gram(s) IV Intermittent every 8 hours  sodium chloride 0.9% lock flush 3 milliLiter(s) IV Push every 8 hours  vancomycin  IVPB 1000 milliGRAM(s) IV Intermittent daily      05-16    133<L>  |  100  |  13.0  ----------------------------<  82  3.8   |  18.0<L>  |  1.16    Ca    5.8<LL>      16 May 2020 09:42  Phos  2.7     05-15  Mg     1.7     05-16    TPro  4.9<L>  /  Alb  1.5<L>  /  TBili  0.3<L>  /  DBili  x   /  AST  25  /  ALT  8   /  AlkPhos  93  05-15                               10.7   16.44 )-----------( 470      ( 16 May 2020 09:42 )             31.9                 CAPILLARY BLOOD GLUCOSE      POCT Blood Glucose.: 95 mg/dL (16 May 2020 16:54)  POCT Blood Glucose.: 61 mg/dL (16 May 2020 11:28)  POCT Blood Glucose.: 74 mg/dL (16 May 2020 08:36)           CXR:    Physical Exam< from: Xray Chest 1 View- PORTABLE-Routine (05.16.20 @ 06:56) >    INTERPRETATION:  XR CHEST    Single AP view    HISTORY:  follow up    Comparison: Chest x-ray one day prior      The NG tube is in the stomach.   Esophageal stent.   Right chest tube without pneumothorax.    The cardiac silhouette is within normal limits. Patchy bilateral airspace disease. No pleural abnormality.    IMPRESSION: No change from one day prior      < end of copied text >    Physical Examination:    Constitutional: Well developed, no acute distress noted  Neuro: A+O x 3, non-focal   HEENT: NC/AT, PERRL, EOMI, oral mucosa pink and moist  Neck: supple, no JVD  CV: RRR +S1S2, no murmur, gallops or rubs  Pulm/chest: Decreased breath sounds on the Right, left CTA, no accessory muscle use noted  Abd: soft, NT, ND, +BS  : +Campos with yellow urine in bedside bag  Ext: DAVIDSON x 4, Limited LE strength, +LE sensation intact, +3 pedal edema bilaterally, +DP/PT pulses bilaterally.  Skin: warm, well perfused  Psych: calm, appropriate affect  Tubes: +NG tube to low intermittent suction, J tube: G port connected to Campos bag to gravity, J port capped. 2 Right sided chest tubes to suction, dressing c/d/i, +Right midaxillary SOURAV drain to bulb suction. + air leak in CT #1.  No surrounding crepitus noted.

## 2020-05-17 NOTE — CHART NOTE - NSCHARTNOTEFT_GEN_A_CORE
Patient seen and examined, chart, labs and radiology reviewed. Denies complaints. Patient was hypokalemic and hypocalcemic this morning and was repleted. Chest tubes remain to -20 mmHg suction and vaibhav to self suction with continued drainage. The [place] terminates in the esophagus per Dr Murcia and is to remain on intermittent low wall suction. The G tube is to gravity drainage, and slow feeds are advancing through the J tube. Free water was added today for flushing of J-tube and IVF switched to NS for hyponatremia, and lowered. Patient to remain NPO after midnight for esophagram and evaluation of swallowing s/p esophageal stent. Will continue to monitor closely.

## 2020-05-17 NOTE — PROGRESS NOTE ADULT - PROBLEM SELECTOR PLAN 5
Wound care consult / plastic surgery consult pending.   Air flow mattress  Turn and position q2 hours.  Optimize nutrition once able to access feeding tube

## 2020-05-17 NOTE — PROGRESS NOTE ADULT - ASSESSMENT
69 year old non-ambulatory Female with a PMHx significant for multiple sclerosis, Breast Cancer s/p R mastectomy, Osteoporosis, Mitral stenosis, right ankle fracture, chronic right sided sacral ulcer, chronic midline back pain since being dropped from a jame lift (7/17/29), admitted to [place] after presenting with sepsis in the setting of a UTI with chronic campos use and known large right ischial decubitus ulcer. Patient found to have a Moderately large Right hydropneumothorax resulting in partial right lung collapse and slight cardiomediastinal shift to the left on CXR with chest tube placed 5/8/20. Large bore chest tube placed 5/10/20 for persistent Right pneumothorax. Patient was followed by ID and was given Vanco and Zosyn originally for her presumed urosepsis, Caspofungin was added after pleural fluid was + for fungal elements. CT chest confirmed +Empyema in the Right pleural space. Patient ultimately transferred to [facility] late 5/11/20 after she was found to have a distal esophageal perforation on CT chest and Esophogram.     Patient now s/p EGD with esophageal stent placed, NGT placed, VATS with right thoracic cavity washout and pulmonary decortication with vaibhav drain, right posterior, and right medial chest tubes placed on 5/13/20.    5/15/2020: Back to OR  for GJ tube placement.

## 2020-05-17 NOTE — PROGRESS NOTE ADULT - ASSESSMENT
HPI: This 69 year old non-ambulatory Female with a PMHx significant for multiple sclerosis, Breast Cancer s/p R mastectomy, Osteoporosis, Mitral stenosis, right ankle fracture, chronic right sided sacral ulcer, chronic midline back pain since being dropped from a jame lift (7/17/29), admitted to [place] after presenting with sepsis in the setting of a UTI with chronic campos use and known large right ischial decubitus ulcer. Patient found to have a Moderately large Right hydropneumothorax resulting in partial right lung collapse and slight cardiomediastinal shift to the left on CXR with chest tube placed 5/8/20. Large bore chest tube placed 5/10/20 for persistent Right pneumothorax. Patient was followed by ID and was given Vanco and Zosyn originally for her presumed urosepsis, Caspofungin was added after pleural fluid was + for fungal elements. CT chest confirmed +Empyema in the Right pleural space. Patient ultimately transferred to [facility] late 5/11/20 after she was found to have a distal esophageal perforation on CT chest and Esophogram.     Of note, patient admitted 10/9/2019 for lower back and bilateral knee pain, found to have compression fracture of L2 with imaging subsequently found to have multiple lytic lesions on the spine and pelvis.  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 Matthew Fairbanks. (12 May 2020 02:24)    patient is admitted to surgical service, pre-operatively planned for an esophageal stent 5/13.  Patient's labs/ cultures from [facility] reviewed.     Empyema with polymicrobial infection:  Strep mitis infection  Klebsiella oxytoca infection  CoNS infection  Perforated esophagus    Plan:    continue Antibiotics:  caspofungin IVPB 50 milliGRAM(s) IV Intermittent every 24 hours  piperacillin/tazobactam IVPB.. 3.375 Gram(s) IV Intermittent every 8 hours  vancomycin  IVPB 1000 milliGRAM(s) IV Intermittent every 24 hours    s/p esophageal stent  and VATS 5/13  s/p PEJ on 5/15    plant to stop VANCO and CASPOFUNGIN on 5/26/2020  coverage for CONS and Fungal    WILL continue ZOSYN THRU 6/9/2020  - continue Chest tubes      WILL need PICC LINE at time of discharge    - follow up all outstanding cultures  - trend temperature and WBC curve  - repeat cultures from blood and all sources if febrile.

## 2020-05-17 NOTE — PROGRESS NOTE ADULT - PROBLEM SELECTOR PLAN 1
S/p esophageal stent placed 5/13/20 with NGT placed.   s/p GJ tube placement (5/15) G port connected to [place] bag to gravity, J port to flush every 8 hours with 60 cc sterile water x24 hours, continue NGT to low intermittent suction for now  Will keep NPO for now, will start tube feeds in AM today  Will start P[place]iot 30 ml /hour at 06:00 AM, advance slowly as tolerated to goal 60 ml/hour  Will restart PO meds once tolerating tube feeds   Repeat Esophagogram on Monday 5/19  Encourage deep breathing, chest PT, incentive spirometry  Maintain b/l CTs to suction for +airleak  Continue IV hydration while NPO, will d/c once tolerating Tube feeds  CBC, BMP daily  Follow up AM CXR.   Case and plan to be discussed with Dr. Murcia in AM. S/p esophageal stent placed 5/13/20 with NGT placed.   s/p GJ tube placement (5/15) G port connected to [place]arks bag to gravity, J port to flush every 8 hours with 60 cc sterile water x24 hours, continue NGT to low intermittent suction for now  Will keep NPO for now, will start tube feeds in AM today  Will start Piviot 30 ml /hour at 06:00 AM, advance slowly as tolerated to goal 60 ml/hour  RD consult appreciated, PO vitamin supplements to be added when tolerating tube feeds  Will restart PO meds once tolerating tube feeds   Repeat Esophagogram on Monday 5/19  Encourage deep breathing, chest PT, incentive spirometry  Maintain b/l CTs to suction for +airleak  Continue IV hydration while NPO, will d/c once tolerating Tube feeds  CBC, BMP daily  Follow up AM CXR.   Case and plan to be discussed with Dr. Murcia in AM.

## 2020-05-17 NOTE — PROGRESS NOTE ADULT - PROBLEM SELECTOR PLAN 2
Pleural fluid + for yeast, coag Negative Staph, Klebsiella, and strep mitis/oralis. Continue IV antibiotics per ID.  S/p Right thoracotomy / washout / VATS decort 5/13/20.  Chest to suction  Daily CXR

## 2020-05-18 LAB
ANION GAP SERPL CALC-SCNC: 16 MMOL/L — SIGNIFICANT CHANGE UP (ref 5–17)
BUN SERPL-MCNC: 14 MG/DL — SIGNIFICANT CHANGE UP (ref 8–20)
CALCIUM SERPL-MCNC: 6.3 MG/DL — CRITICAL LOW (ref 8.6–10.2)
CHLORIDE SERPL-SCNC: 102 MMOL/L — SIGNIFICANT CHANGE UP (ref 98–107)
CO2 SERPL-SCNC: 16 MMOL/L — LOW (ref 22–29)
CREAT SERPL-MCNC: 1.19 MG/DL — SIGNIFICANT CHANGE UP (ref 0.5–1.3)
GLUCOSE BLDC GLUCOMTR-MCNC: 64 MG/DL — LOW (ref 70–99)
GLUCOSE SERPL-MCNC: 88 MG/DL — SIGNIFICANT CHANGE UP (ref 70–99)
HCT VFR BLD CALC: 29.1 % — LOW (ref 34.5–45)
HGB BLD-MCNC: 9.6 G/DL — LOW (ref 11.5–15.5)
MAGNESIUM SERPL-MCNC: 2.1 MG/DL — SIGNIFICANT CHANGE UP (ref 1.6–2.6)
MCHC RBC-ENTMCNC: 28 PG — SIGNIFICANT CHANGE UP (ref 27–34)
MCHC RBC-ENTMCNC: 33 GM/DL — SIGNIFICANT CHANGE UP (ref 32–36)
MCV RBC AUTO: 84.8 FL — SIGNIFICANT CHANGE UP (ref 80–100)
PHOSPHATE SERPL-MCNC: 2 MG/DL — LOW (ref 2.4–4.7)
PLATELET # BLD AUTO: 454 K/UL — HIGH (ref 150–400)
POTASSIUM SERPL-MCNC: 3.8 MMOL/L — SIGNIFICANT CHANGE UP (ref 3.5–5.3)
POTASSIUM SERPL-SCNC: 3.8 MMOL/L — SIGNIFICANT CHANGE UP (ref 3.5–5.3)
RBC # BLD: 3.43 M/UL — LOW (ref 3.8–5.2)
RBC # FLD: 18.4 % — HIGH (ref 10.3–14.5)
SODIUM SERPL-SCNC: 134 MMOL/L — LOW (ref 135–145)
VANCOMYCIN TROUGH SERPL-MCNC: 21.1 UG/ML — HIGH (ref 10–20)
WBC # BLD: 14.91 K/UL — HIGH (ref 3.8–10.5)
WBC # FLD AUTO: 14.91 K/UL — HIGH (ref 3.8–10.5)

## 2020-05-18 PROCEDURE — 99232 SBSQ HOSP IP/OBS MODERATE 35: CPT

## 2020-05-18 PROCEDURE — 71045 X-RAY EXAM CHEST 1 VIEW: CPT | Mod: 26

## 2020-05-18 PROCEDURE — 74220 X-RAY XM ESOPHAGUS 1CNTRST: CPT | Mod: 26

## 2020-05-18 RX ORDER — CALCIUM GLUCONATE 100 MG/ML
1 VIAL (ML) INTRAVENOUS EVERY 4 HOURS
Refills: 0 | Status: COMPLETED | OUTPATIENT
Start: 2020-05-18 | End: 2020-05-18

## 2020-05-18 RX ADMIN — Medication 100 GRAM(S): at 10:26

## 2020-05-18 RX ADMIN — PANTOPRAZOLE SODIUM 40 MILLIGRAM(S): 20 TABLET, DELAYED RELEASE ORAL at 18:23

## 2020-05-18 RX ADMIN — SODIUM CHLORIDE 40 MILLILITER(S): 9 INJECTION INTRAMUSCULAR; INTRAVENOUS; SUBCUTANEOUS at 18:24

## 2020-05-18 RX ADMIN — ENOXAPARIN SODIUM 30 MILLIGRAM(S): 100 INJECTION SUBCUTANEOUS at 18:23

## 2020-05-18 RX ADMIN — SODIUM CHLORIDE 3 MILLILITER(S): 9 INJECTION INTRAMUSCULAR; INTRAVENOUS; SUBCUTANEOUS at 05:15

## 2020-05-18 RX ADMIN — PIPERACILLIN AND TAZOBACTAM 25 GRAM(S): 4; .5 INJECTION, POWDER, LYOPHILIZED, FOR SOLUTION INTRAVENOUS at 18:54

## 2020-05-18 RX ADMIN — Medication 100 GRAM(S): at 18:19

## 2020-05-18 RX ADMIN — PANTOPRAZOLE SODIUM 40 MILLIGRAM(S): 20 TABLET, DELAYED RELEASE ORAL at 05:56

## 2020-05-18 RX ADMIN — SODIUM CHLORIDE 3 MILLILITER(S): 9 INJECTION INTRAMUSCULAR; INTRAVENOUS; SUBCUTANEOUS at 21:22

## 2020-05-18 RX ADMIN — CASPOFUNGIN ACETATE 260 MILLIGRAM(S): 7 INJECTION, POWDER, LYOPHILIZED, FOR SOLUTION INTRAVENOUS at 01:41

## 2020-05-18 RX ADMIN — SODIUM CHLORIDE 3 MILLILITER(S): 9 INJECTION INTRAMUSCULAR; INTRAVENOUS; SUBCUTANEOUS at 13:00

## 2020-05-18 RX ADMIN — CHLORHEXIDINE GLUCONATE 1 APPLICATION(S): 213 SOLUTION TOPICAL at 05:15

## 2020-05-18 RX ADMIN — ENOXAPARIN SODIUM 30 MILLIGRAM(S): 100 INJECTION SUBCUTANEOUS at 05:57

## 2020-05-18 RX ADMIN — PIPERACILLIN AND TAZOBACTAM 25 GRAM(S): 4; .5 INJECTION, POWDER, LYOPHILIZED, FOR SOLUTION INTRAVENOUS at 02:52

## 2020-05-18 RX ADMIN — PIPERACILLIN AND TAZOBACTAM 25 GRAM(S): 4; .5 INJECTION, POWDER, LYOPHILIZED, FOR SOLUTION INTRAVENOUS at 10:26

## 2020-05-18 NOTE — PROGRESS NOTE ADULT - PROBLEM SELECTOR PLAN 2
S/p Right thoracotomy / washout / VATS decort 5/13/20.  Chest tubes to suction.  Follow up daily CXR.  Pleural fluid + for yeast, coag Negative Staph, Klebsiella, and strep mitis/oralis. Continue IV antibiotics per ID.   Vanco and caspofungin to be completed 5/26/20 and Zosyn to be completed 6/9/20.

## 2020-05-18 NOTE — PROGRESS NOTE ADULT - ASSESSMENT
69 year old non-ambulatory Female with a PMHx significant for multiple sclerosis, Breast Cancer s/p R mastectomy, Osteoporosis, Mitral stenosis, right ankle fracture, chronic right sided sacral ulcer, chronic midline back pain since being dropped from a jame lift (7/17/29), admitted to [place] after presenting with sepsis in the setting of a UTI with chronic campos use and known large right ischial decubitus ulcer. Patient found to have a Moderately large Right hydropneumothorax resulting in partial right lung collapse and slight cardiomediastinal shift to the left on CXR with chest tube placed 5/8/20. Large bore chest tube placed 5/10/20 for persistent Right pneumothorax. Patient was followed by ID and was given Vanco and Zosyn originally for her presumed urosepsis, Caspofungin was added after pleural fluid was + for fungal elements. CT chest confirmed +Empyema in the Right pleural space. Patient ultimately transferred to [facility] late 5/11/20 after she was found to have a distal esophageal perforation on CT chest and Esophogram.     5/13/20 patient underwent EGD with esophageal stent placed, NGT placed, VATS with right thoracic cavity washout and pulmonary decortication with vaibhav drain, right posterior, and right medial chest tubes placed with Dr. Murcia.  On 5/14 patient was taken to IR for unsuccessful G/J tube placement. 5/15 patient was taken to the OR for feeding tube placement with Dr. Murcia.  The G tube is to gravity drainage, and slow feeds are advancing through the J tube. Plan for esophagram and evaluation of swallowing s/p esophageal stent later today.

## 2020-05-18 NOTE — PROGRESS NOTE ADULT - ASSESSMENT
HPI: This 69 year old non-ambulatory Female with a PMHx significant for multiple sclerosis, Breast Cancer s/p R mastectomy, Osteoporosis, Mitral stenosis, right ankle fracture, chronic right sided sacral ulcer, chronic midline back pain since being dropped from a jame lift (7/17/29), admitted to [place] after presenting with sepsis in the setting of a UTI with chronic campos use and known large right ischial decubitus ulcer. Patient found to have a Moderately large Right hydropneumothorax resulting in partial right lung collapse and slight cardiomediastinal shift to the left on CXR with chest tube placed 5/8/20. Large bore chest tube placed 5/10/20 for persistent Right pneumothorax. Patient was followed by ID and was given Vanco and Zosyn originally for her presumed urosepsis, Caspofungin was added after pleural fluid was + for fungal elements. CT chest confirmed +Empyema in the Right pleural space. Patient ultimately transferred to [facility] late 5/11/20 after she was found to have a distal esophageal perforation on CT chest and Esophogram.     Of note, patient admitted 10/9/2019 for lower back and bilateral knee pain, found to have compression fracture of L2 with imaging subsequently found to have multiple lytic lesions on the spine and pelvis.  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 Matthew Fairbanks. (12 May 2020 02:24)    patient is admitted to surgical service, pre-operatively planned for an esophageal stent 5/13.  Patient's labs/ cultures from [facility] reviewed.     Empyema with polymicrobial infection:  Strep mitis infection  Klebsiella oxytoca infection  CoNS infection  Perforated esophagus        s/p esophageal stent  and VATS 5/13  s/p PEJ on 5/15    Plan:    continue Antibiotics:  caspofungin IVPB 50 milliGRAM(s) IV Intermittent every 24 hours  piperacillin/tazobactam IVPB.. 3.375 Gram(s) IV Intermittent every 8 hours    Vanco level elevated at 21 on 5/18, dose help.   should repeat level in 24 H 5/19     Plan to stop VANCO and CASPOFUNGIN on 5/26/2020  coverage for CONS and Fungal  WILL continue ZOSYN THRU 6/9/2020  - continue Chest tubes    WILL need PICC LINE at time of discharge to treat empyema    - follow up all outstanding cultures  - trend temperature and WBC curve  - repeat cultures from blood and all sources if febrile.

## 2020-05-18 NOTE — PROGRESS NOTE ADULT - SUBJECTIVE AND OBJECTIVE BOX
HPI:  69 year old non-ambulatory Female with a PMHx significant for multiple sclerosis, Breast Cancer s/p R mastectomy, Osteoporosis, Mitral stenosis, right ankle fracture, chronic right sided sacral ulcer, chronic midline back pain since being dropped from a jame lift (7/17/29), admitted to [place] after presenting with sepsis in the setting of a UTI with chronic campos use and known large right ischial decubitus ulcer. Patient found to have a Moderately large Right hydropneumothorax resulting in partial right lung collapse and slight cardiomediastinal shift to the left on CXR with chest tube placed 5/8/20. Large bore chest tube placed 5/10/20 for persistent Right pneumothorax. Patient was followed by ID and was given Vanco and Zosyn originally for her presumed urosepsis, Caspofungin was added after pleural fluid was + for fungal elements. CT chest confirmed +Empyema in the Right pleural space. Patient ultimately transferred to [facility] late 5/11/20 after she was found to have a distal esophageal perforation on CT chest and Esophogram.     Of note, patient admitted 10/9/2019 for lower back and bilateral knee pain, found to have compression fracture of L2 with imaging subsequently found to have multiple lytic lesions on the spine and pelvis.  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 Matthew Fairbanks.     PAST MEDICAL & SURGICAL HISTORY:  Breast cancer metastasized to bone  Hypertension  Multiple sclerosis  S/P mastectomy, right  Breast CA  Osteoporosis  MS (mitral stenosis)  History of bowel resection  H/O breast surgery    Brief Hospital Course: Patient admitted to [facility] 5/12/20 for +Right sided Empyema, with distal esophageal perforation in the setting of recent sepsis on IV abx.  S/p EGD with esophageal perforation identified and stent placed with fluoroscopy, NGT placed, VATS with right thoracic cavity washout and pulmonary decortication with vaibhav drain, right posterior, and right medial chest tubes placed on 5/13/20.  On 5/14 patient was taken to IR for unsuccessful G/J tube placement. 5/15 patient was taken to the OR for feeding tube placement with Dr. Murcia.  The G tube is to gravity drainage, and slow feeds are advancing through the J tube. Plan for esophagram and evaluation of swallowing s/p esophageal stent later today.     Subjective: Patient is pleasant, lying in bed in no acute distress. Denies any pain. Denies any current complaints.     Vital Signs Last 24 Hrs  T(C): 36.6 (05-18-20 @ 00:25), Max: 36.6 (05-18-20 @ 00:25)  T(F): 97.9 (05-18-20 @ 00:25), Max: 97.9 (05-18-20 @ 00:25)  HR: 76 (05-18-20 @ 00:25) (74 - 87)  BP: 112/74 (05-18-20 @ 00:25) (101/64 - 118/84)  RR: 18 (05-18-20 @ 00:25) (17 - 18)  SpO2: 93% (05-18-20 @ 00:25) (93% - 100%)  on (O2)              MEDICATIONS  acetaminophen  IVPB .. 1000 milliGRAM(s) IV Intermittent every 8 hours PRN  caspofungin IVPB 50 milliGRAM(s) IV Intermittent every 24 hours  chlorhexidine 4% Liquid 1 Application(s) Topical <User Schedule>  enoxaparin Injectable 30 milliGRAM(s) SubCutaneous every 12 hours  latanoprost 0.005% Ophthalmic Solution 1 Drop(s) Both EYES at bedtime  pantoprazole  Injectable 40 milliGRAM(s) IV Push every 12 hours  piperacillin/tazobactam IVPB.. 3.375 Gram(s) IV Intermittent every 8 hours  sodium chloride 0.9% lock flush 10 milliLiter(s) IV Push every 1 hour PRN  sodium chloride 0.9% lock flush 3 milliLiter(s) IV Push every 8 hours  sodium chloride 0.9%. 1000 milliLiter(s) IV Continuous   vancomycin  IVPB 1000 milliGRAM(s) IV Intermittent daily    PHYSICAL EXAM  Constitutional: Well developed, no acute distress noted  Neuro: A+O x 3, non-focal   HEENT: NC/AT, PERRL, EOMI, oral mucosa pink and moist  Neck: supple, no JVD  CV: RRR +S1S2, no murmur, gallops or rubs  Pulm/chest: Decreased breath sounds on the Right, left CTA, no accessory muscle use noted  Abd: soft, NT, ND, +BS  : +Campos with yellow urine in bedside bag  Ext: DAVIDSON x 4, Limited LE strength, legs contracted, +LE sensation intact, +2 pedal edema bilaterally, +DP/PT pulses bilaterally.  Skin: warm, well perfused  Psych: calm, appropriate affect  Tubes: +NG tube to low intermittent suction, J tube: G port connected to Campos bag to gravity, J port capped. 2 Right sided chest tubes to suction, dressing c/d/i, +Right midaxillary SOURAV drain to bulb suction. + air leak in CT #1.  No surrounding crepitus noted.    I&O's Detail    16 May 2020 07:01  -  17 May 2020 07:00  --------------------------------------------------------  IN:    dextrose 5% + lactated ringers.: 1050 mL    Enteral Tube Flush: 120 mL    Free Water: 60 mL    lactated ringers.: 300 mL    Solution: 100 mL    Solution: 1000 mL    Solution: 250 mL  Total IN: 2880 mL    OUT:    Chest Tube: 20 mL    Chest Tube: 120 mL    Drain: 100 mL    Gastrostomy Tube: 200 mL    Nasoenteral Tube: 250 mL    Voided: 200 mL  Total OUT: 890 mL    Total NET: 1990 mL      17 May 2020 07:01  -  18 May 2020 03:34  --------------------------------------------------------  IN:    dextrose 5% + lactated ringers.: 360 mL    Free Water: 120 mL    Pivot 1.5: 60 mL    sodium chloride 0.9%.: 200 mL    Solution: 100 mL  Total IN: 840 mL    OUT:    Chest Tube: 20 mL    Drain: 65 mL    Gastrostomy Tube: 90 mL    Nasoenteral Tube: 10 mL  Total OUT: 185 mL    Total NET: 655 mL      Weights:  Admit Wt: Drug Dosing Weight  Height (cm): 167.64 (11 May 2020 22:12)  Weight (kg): 56.7 (15 May 2020 09:49)  BMI (kg/m2): 20.2 (15 May 2020 09:49)  BSA (m2): 1.64 (15 May 2020 09:49)    All laboratory results, radiology and medications reviewed.    LABS    05-17    133<L>  |  103  |  14.0  ----------------------------<  85  3.6   |  19.0<L>  |  1.15    Ca    5.9<LL>      17 May 2020 13:26  Phos  2.3     05-17  Mg     1.7     05-17    TPro  3.9<L>  /  Alb  1.5<L>  /  TBili  0.2<L>  /  DBili  x   /  AST  23  /  ALT  12  /  AlkPhos  68  05-17                                 9.9    14.90 )-----------( 472      ( 17 May 2020 08:17 )             29.1            Bilirubin Total, Serum: 0.2 mg/dL (05-17 @ 08:17)      Last CXR:  < from: Xray Chest 1 View- PORTABLE-Routine (05.17.20 @ 07:01) >  FINDINGS:  LINES/TUBES: Enteric tube with tip terminating in the the distal esophagus, which contains an esophageal stent. Unchanged right chest tubes.  LUNGS/PLEURA: Trace bilateral pleural effusions and patchy airspace disease in the right lung. No pneumothorax.  MEDIASTINUM: Heart size cannot adequately be assessed on this projection.  OTHER: None.  IMPRESSION:   Trace bilateral pleural effusions and patchy airspace disease in the right lung.   < end of copied text > HPI:  69 year old non-ambulatory Female with a PMHx significant for multiple sclerosis, Breast Cancer s/p R mastectomy, Osteoporosis, Mitral stenosis, right ankle fracture, chronic right sided sacral ulcer, chronic midline back pain since being dropped from a jame lift (7/17/29), admitted to [place] after presenting with sepsis in the setting of a UTI with chronic campos use and known large right ischial decubitus ulcer. Patient found to have a Moderately large Right hydropneumothorax resulting in partial right lung collapse and slight cardiomediastinal shift to the left on CXR with chest tube placed 5/8/20. Large bore chest tube placed 5/10/20 for persistent Right pneumothorax. Patient was followed by ID and was given Vanco and Zosyn originally for her presumed urosepsis, Caspofungin was added after pleural fluid was + for fungal elements. CT chest confirmed +Empyema in the Right pleural space. Patient ultimately transferred to [facility] late 5/11/20 after she was found to have a distal esophageal perforation on CT chest and Esophogram.     Of note, patient admitted 10/9/2019 for lower back and bilateral knee pain, found to have compression fracture of L2 with imaging subsequently found to have multiple lytic lesions on the spine and pelvis.  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 Matthew Fairbanks.     PAST MEDICAL & SURGICAL HISTORY:  Breast cancer metastasized to bone  Hypertension  Multiple sclerosis  S/P mastectomy, right  Breast CA  Osteoporosis  MS (mitral stenosis)  History of bowel resection  H/O breast surgery    Brief Hospital Course: Patient admitted to [facility] 5/12/20 for +Right sided Empyema, with distal esophageal perforation in the setting of recent sepsis on IV abx.  S/p EGD with esophageal perforation identified and stent placed with fluoroscopy, NGT placed, VATS with right thoracic cavity washout and pulmonary decortication with vaibhav drain, right posterior, and right medial chest tubes placed on 5/13/20.  On 5/14 patient was taken to IR for unsuccessful G/J tube placement. 5/15 patient was taken to the OR for feeding tube placement with Dr. Murcia.  The G tube is to gravity drainage, and slow feeds are advancing through the J tube. Plan for esophagram and evaluation of swallowing s/p esophageal stent later today.     Subjective: Patient is pleasant, lying in bed in no acute distress. Denies any pain. Denies any current complaints.     Vital Signs Last 24 Hrs  T(C): 36.6 (05-18-20 @ 00:25), Max: 36.6 (05-18-20 @ 00:25)  T(F): 97.9 (05-18-20 @ 00:25), Max: 97.9 (05-18-20 @ 00:25)  HR: 76 (05-18-20 @ 00:25) (74 - 87)  BP: 112/74 (05-18-20 @ 00:25) (101/64 - 118/84)  RR: 18 (05-18-20 @ 00:25) (17 - 18)  SpO2: 93% (05-18-20 @ 00:25) (93% - 100%)  on (O2)              MEDICATIONS  acetaminophen  IVPB .. 1000 milliGRAM(s) IV Intermittent every 8 hours PRN  caspofungin IVPB 50 milliGRAM(s) IV Intermittent every 24 hours  chlorhexidine 4% Liquid 1 Application(s) Topical <User Schedule>  enoxaparin Injectable 30 milliGRAM(s) SubCutaneous every 12 hours  latanoprost 0.005% Ophthalmic Solution 1 Drop(s) Both EYES at bedtime  pantoprazole  Injectable 40 milliGRAM(s) IV Push every 12 hours  piperacillin/tazobactam IVPB.. 3.375 Gram(s) IV Intermittent every 8 hours  sodium chloride 0.9% lock flush 10 milliLiter(s) IV Push every 1 hour PRN  sodium chloride 0.9% lock flush 3 milliLiter(s) IV Push every 8 hours  sodium chloride 0.9%. 1000 milliLiter(s) IV Continuous   vancomycin  IVPB 1000 milliGRAM(s) IV Intermittent daily    PHYSICAL EXAM  Constitutional: Well developed, no acute distress noted  Neuro: A+O x 3, non-focal   HEENT: NC/AT, PERRL, EOMI, oral mucosa pink and moist  Neck: supple, no JVD  CV: RRR +S1S2, no murmur, gallops or rubs  Pulm/chest: Decreased breath sounds on the Right, left CTA, no accessory muscle use noted  Abd: soft, NT, ND, +BS  : +Campos with yellow urine in bedside bag  Ext: DAVIDSON x 4, Limited LE strength, legs contracted, +LE sensation intact, +2 pedal edema bilaterally, +DP/PT pulses bilaterally.  Skin: warm, well perfused, +Large sacral decub  Psych: calm, appropriate affect  Tubes: +NG tube to low intermittent suction, J tube: G port connected to Campos bag to gravity, J port capped. 2 Right sided chest tubes to suction, dressing c/d/i, +Right midaxillary SOURAV drain to bulb suction. + air leak in CT #1.  No surrounding crepitus noted.    I&O's Detail    16 May 2020 07:01  -  17 May 2020 07:00  --------------------------------------------------------  IN:    dextrose 5% + lactated ringers.: 1050 mL    Enteral Tube Flush: 120 mL    Free Water: 60 mL    lactated ringers.: 300 mL    Solution: 100 mL    Solution: 1000 mL    Solution: 250 mL  Total IN: 2880 mL    OUT:    Chest Tube: 20 mL    Chest Tube: 120 mL    Drain: 100 mL    Gastrostomy Tube: 200 mL    Nasoenteral Tube: 250 mL    Voided: 200 mL  Total OUT: 890 mL    Total NET: 1990 mL      17 May 2020 07:01  -  18 May 2020 03:34  --------------------------------------------------------  IN:    dextrose 5% + lactated ringers.: 360 mL    Free Water: 120 mL    Pivot 1.5: 60 mL    sodium chloride 0.9%.: 200 mL    Solution: 100 mL  Total IN: 840 mL    OUT:    Chest Tube: 20 mL    Drain: 65 mL    Gastrostomy Tube: 90 mL    Nasoenteral Tube: 10 mL  Total OUT: 185 mL    Total NET: 655 mL      Weights:  Admit Wt: Drug Dosing Weight  Height (cm): 167.64 (11 May 2020 22:12)  Weight (kg): 56.7 (15 May 2020 09:49)  BMI (kg/m2): 20.2 (15 May 2020 09:49)  BSA (m2): 1.64 (15 May 2020 09:49)    All laboratory results, radiology and medications reviewed.    LABS    05-17    133<L>  |  103  |  14.0  ----------------------------<  85  3.6   |  19.0<L>  |  1.15    Ca    5.9<LL>      17 May 2020 13:26  Phos  2.3     05-17  Mg     1.7     05-17    TPro  3.9<L>  /  Alb  1.5<L>  /  TBili  0.2<L>  /  DBili  x   /  AST  23  /  ALT  12  /  AlkPhos  68  05-17                                 9.9    14.90 )-----------( 472      ( 17 May 2020 08:17 )             29.1            Bilirubin Total, Serum: 0.2 mg/dL (05-17 @ 08:17)      Last CXR:  < from: Xray Chest 1 View- PORTABLE-Routine (05.17.20 @ 07:01) >  FINDINGS:  LINES/TUBES: Enteric tube with tip terminating in the the distal esophagus, which contains an esophageal stent. Unchanged right chest tubes.  LUNGS/PLEURA: Trace bilateral pleural effusions and patchy airspace disease in the right lung. No pneumothorax.  MEDIASTINUM: Heart size cannot adequately be assessed on this projection.  OTHER: None.  IMPRESSION:   Trace bilateral pleural effusions and patchy airspace disease in the right lung.   < end of copied text >

## 2020-05-18 NOTE — PROGRESS NOTE ADULT - PROBLEM SELECTOR PLAN 1
S/p esophageal stent placed 5/13/20 with NGT placed.   S/p GJ tube placement 5/15 G port connected to gravity drainage, slow feeds are advancing through the J tube.  Continue NGT to low intermittent suction for now.  Will keep NPO for now for Esophagram later today.   Dietary consult appreciated, PO vitamin supplements to be added when tolerating tube feeds.  Will restart PO meds once tolerating tube feeds.  Continue IV hydration while NPO, will d/c once tolerating Tube feeds.  Encourage deep breathing, chest PT, incentive spirometry.   Maintain b/l CTs to suction for +airleak.   Follow up AM CXR.   Case and plan to be discussed with Dr. Murcia / Thoracic Surgery team in AM rounds.

## 2020-05-18 NOTE — CHART NOTE - NSCHARTNOTEFT_GEN_A_CORE
Pt with unstageable sacral decub,+ incontinence feces>plastic surgery consulted>Dr Parrish office notified,Spoke w/ Kimberly

## 2020-05-18 NOTE — PROGRESS NOTE ADULT - SUBJECTIVE AND OBJECTIVE BOX
[facility]  INFECTIOUS DISEASES AND INTERNAL MEDICINE at [place]  =======================================================  Phong Wang MD  Diplomates American Board of Internal Medicine and Infectious Diseases  Telephone [phone]  Fax            [phone]  =======================================================    [id number]  GEORGE STANLEY   follow up: empyema     s/p EGD and esophageal stent; s/p VATS 5/14  s/p gastrojejunal tube on 5/15/2020    no significant events this AM     =======================================================  REVIEW OF SYSTEMS:  CONSTITUTIONAL:  No Fever or chills  HEENT:  No diplopia or blurred vision.  No earache, sore throat or runny nose.  CARDIOVASCULAR:  No pressure, squeezing, strangling, tightness, heaviness or aching about the chest, neck, axilla or epigastrium.  RESPIRATORY:  no shortness of breath  GASTROINTESTINAL:  No nausea, vomiting or diarrhea.  GENITOURINARY:  No dysuria, frequency or urgency. No Blood in urine  MUSCULOSKELETAL:  no joint aches, no muscle pain  SKIN:  No change in skin, hair or nails.  NEUROLOGIC:  No Headaches, seizures or weakness.  PSYCHIATRIC:  No disorder of thought or mood.  ENDOCRINE:  No heat or cold intolerance  HEMATOLOGICAL:  No easy bruising or bleeding.   =======================================================  Allergies  Sudafed (Other)    ======================================================  Physical Exam:  ============  (see vitals section below)    General:  No acute distress. FRAIL  Eye: Pupils are equal, round and reactive to light, Extraocular movements are intact, Normal conjunctiva.  HENT: Normocephalic, Oral mucosa is moist, No pharyngeal erythema, No sinus tenderness.  Neck: Supple, No lymphadenopathy.  Respiratory: Lungs  with diminished air entry at bases  RIGHT side with 2 chest tubes - CLEAR FLUID  Cardiovascular: Normal rate, Regular rhythm,   Gastrointestinal: Soft, Non-tender, Non-distended, Normal bowel sounds.  PEJ tube present in abd  Genitourinary: + ELIZONDO with clear urine  Lymphatics: No lymphadenopathy neck,   Musculoskeletal: Normal range of motion, Normal strength.  Integumentary: No rash.  Neurologic: Alert, Oriented, No focal deficits, Cranial Nerves II-XII are grossly intact.  Psychiatric: Appropriate mood & affect.    =======================================================  Vitals:  ============  T(F): 97.5 (18 May 2020 07:23), Max: 97.9 (18 May 2020 00:25)  HR: 90 (18 May 2020 07:23)  BP: 102/64 (18 May 2020 07:23)  RR: 18 (18 May 2020 07:23)  SpO2: 96% (18 May 2020 07:23) (93% - 100%)  temp max in last 48H T(F): , Max: 97.9 (05-18-20 @ 00:25)    =======================================================  Current Antibiotics:  caspofungin IVPB 50 milliGRAM(s) IV Intermittent every 24 hours  piperacillin/tazobactam IVPB.. 3.375 Gram(s) IV Intermittent every 8 hours    Other medications:  calcium gluconate IVPB 1 Gram(s) IV Intermittent every 4 hours  chlorhexidine 4% Liquid 1 Application(s) Topical <User Schedule>  enoxaparin Injectable 30 milliGRAM(s) SubCutaneous every 12 hours  latanoprost 0.005% Ophthalmic Solution 1 Drop(s) Both EYES at bedtime  pantoprazole  Injectable 40 milliGRAM(s) IV Push every 12 hours  sodium chloride 0.9% lock flush 3 milliLiter(s) IV Push every 8 hours  sodium chloride 0.9%. 1000 milliLiter(s) IV Continuous <Continuous>      =======================================================  Labs:                        9.6    14.91 )-----------( 454      ( 18 May 2020 07:39 )             29.1      05-18    134<L>  |  102  |  14.0  ----------------------------<  88  3.8   |  16.0<L>  |  1.19    Ca    6.3<LL>      18 May 2020 07:39  Phos  2.0     05-18  Mg     2.1     05-18    TPro  3.9<L>  /  Alb  1.5<L>  /  TBili  0.2<L>  /  DBili  x   /  AST  23  /  ALT  12  /  AlkPhos  68  05-17      Culture - Fungal, Body Fluid (collected 05-08-20 @ 17:52)  Source: Pleural Fl Pleural Fluid    Culture - Body Fluid with Gram Stain (collected 05-08-20 @ 17:52)  Source: Pleural Fl Pleural Fluid  Gram Stain (05-08-20 @ 19:18):    Few polymorphonuclear leukocytes per low power field    Rare Gram positive cocci in pairs per oil power field    Rare Gram Variable Rods per oil power field    Rare Yeast per oil power field  Organism: Streptococcus mitis/oralis group (05-11-20 @ 17:39)    Sensitivities:      -  Clindamycin: R      -  Erythromycin: R      -  Levofloxacin: S      -  Vancomycin: S      Method Type: KB  Organism: Streptococcus mitis/oralis group  Coag Negative Staphylococcus  Klebsiella oxytoca (05-11-20 @ 17:37)  Organism: Streptococcus mitis/oralis group (05-11-20 @ 17:37)    Sensitivities:      -  Ceftriaxone: S 1      -  Penicillin: I 0.75      Method Type: ETEST  Organism: Klebsiella oxytoca (05-11-20 @ 17:35)    Sensitivities:      -  Amikacin: S <=16      -  Amoxicillin/Clavulanic Acid: S <=8/4      -  Ampicillin: R >16 These ampicillin results predict results for amoxicillin      -  Ampicillin/Sulbactam: S <=4/2 Enterobacter, Citrobacter, and Serratia may develop resistance during prolonged therapy (3-4 days)      -  Aztreonam: R >16      -  Cefazolin: R 16 Enterobacter, Citrobacter, and Serratia may develop resistance during prolonged therapy (3-4 days)      -  Cefepime: S <=2      -  Cefoxitin: S <=8      -  Ceftazidime/Avibactam: S 8      -  Ceftolozane/tazobactam: S <=2      -  Ceftriaxone: S <=1 Enterobacter, Citrobacter, and Serratia may develop resistance during prolonged therapy      -  Ciprofloxacin: R 2      -  Ertapenem: I 1      -  Gentamicin: S <=2      -  Imipenem: S <=1      -  Levofloxacin: R 2      -  Meropenem: S <=1      -  Piperacillin/Tazobactam: S <=8      -  Tobramycin: S <=2      -  Trimethoprim/Sulfamethoxazole: S <=0.5/9.5      Method Type: PAWAN  Organism: Coag Negative Staphylococcus (05-11-20 @ 17:35)    Sensitivities:      -  Ampicillin/Sulbactam: R <=8/4      -  Cefazolin: R <=4      -  Clindamycin: S <=0.25      -  Erythromycin: R >4      -  Gentamicin: S <=1 Should not be used as monotherapy      -  Oxacillin: R >2      -  Penicillin: R >8      -  RIF- Rifampin: S <=1 Should not be used as monotherapy      -  Tetra/Doxy: R >8      -  Trimethoprim/Sulfamethoxazole: S <=0.5/9.5      -  Vancomycin: S 2      Method Type: PAWAN    Culture - Urine (collected 05-08-20 @ 09:09)  Source: .Urine Catheterized  Final Report (05-09-20 @ 08:24):    >=3 organisms. Probable collection contamination.    Culture - Blood (collected 05-08-20 @ 09:02)  Source: .Blood Blood-Peripheral  Final Report (05-13-20 @ 10:00):    No Growth Final    Culture - Blood (collected 05-08-20 @ 09:02)  Source: .Blood Blood  Final Report (05-13-20 @ 10:00):    No Growth Final      Creatinine, Serum: 1.19 mg/dL (05-18-20 @ 07:39)  Creatinine, Serum: 1.15 mg/dL (05-17-20 @ 13:26)  Creatinine, Serum: 1.07 mg/dL (05-17-20 @ 08:17)  Creatinine, Serum: 1.16 mg/dL (05-16-20 @ 09:42)  Creatinine, Serum: 0.90 mg/dL (05-15-20 @ 08:07)  Creatinine, Serum: 0.62 mg/dL (05-14-20 @ 08:01)  Creatinine, Serum: 0.65 mg/dL (05-13-20 @ 14:45)    Ferritin, Serum: 808 ng/mL (05-09-20 @ 15:27)      WBC Count: 14.91 K/uL (05-18-20 @ 07:39)  WBC Count: 14.90 K/uL (05-17-20 @ 08:17)  WBC Count: 16.44 K/uL (05-16-20 @ 09:42)  WBC Count: 12.33 K/uL (05-15-20 @ 07:46)  WBC Count: 10.09 K/uL (05-14-20 @ 08:01)  WBC Count: 14.48 K/uL (05-13-20 @ 16:32)  WBC Count: 14.87 K/uL (05-13-20 @ 14:45)      COVID-19 PCR: NotDetec (05-12-20 @ 00:00)  COVID-19 PCR: NotDetec (05-07-20 @ 23:17)    Lactate Dehydrogenase, Serum: 181 U/L (05-08-20 @ 06:11)    Alkaline Phosphatase, Serum: 68 U/L (05-17-20 @ 08:17)  Alkaline Phosphatase, Serum: 93 U/L (05-15-20 @ 08:07)  Alanine Aminotransferase (ALT/SGPT): 12 U/L (05-17-20 @ 08:17)  Alanine Aminotransferase (ALT/SGPT): 8 U/L (05-15-20 @ 08:07)  Aspartate Aminotransferase (AST/SGOT): 23 U/L (05-17-20 @ 08:17)  Aspartate Aminotransferase (AST/SGOT): 25 U/L (05-15-20 @ 08:07)  Bilirubin Total, Serum: 0.2 mg/dL (05-17-20 @ 08:17)  Bilirubin Total, Serum: 0.3 mg/dL (05-15-20 @ 08:07)

## 2020-05-19 LAB
ALBUMIN SERPL ELPH-MCNC: 1.4 G/DL — LOW (ref 3.3–5.2)
ALP SERPL-CCNC: 94 U/L — SIGNIFICANT CHANGE UP (ref 40–120)
ALT FLD-CCNC: 13 U/L — SIGNIFICANT CHANGE UP
ANION GAP SERPL CALC-SCNC: 14 MMOL/L — SIGNIFICANT CHANGE UP (ref 5–17)
AST SERPL-CCNC: 22 U/L — SIGNIFICANT CHANGE UP
BILIRUB SERPL-MCNC: <0.2 MG/DL — LOW (ref 0.4–2)
BUN SERPL-MCNC: 17 MG/DL — SIGNIFICANT CHANGE UP (ref 8–20)
CALCIUM SERPL-MCNC: 6.6 MG/DL — LOW (ref 8.6–10.2)
CHLORIDE SERPL-SCNC: 107 MMOL/L — SIGNIFICANT CHANGE UP (ref 98–107)
CO2 SERPL-SCNC: 15 MMOL/L — LOW (ref 22–29)
CREAT SERPL-MCNC: 1.14 MG/DL — SIGNIFICANT CHANGE UP (ref 0.5–1.3)
GLUCOSE BLDC GLUCOMTR-MCNC: 139 MG/DL — HIGH (ref 70–99)
GLUCOSE BLDC GLUCOMTR-MCNC: 96 MG/DL — SIGNIFICANT CHANGE UP (ref 70–99)
GLUCOSE SERPL-MCNC: 166 MG/DL — HIGH (ref 70–99)
HCT VFR BLD CALC: 30.9 % — LOW (ref 34.5–45)
HGB BLD-MCNC: 9.8 G/DL — LOW (ref 11.5–15.5)
MAGNESIUM SERPL-MCNC: 1.8 MG/DL — SIGNIFICANT CHANGE UP (ref 1.6–2.6)
MCHC RBC-ENTMCNC: 28 PG — SIGNIFICANT CHANGE UP (ref 27–34)
MCHC RBC-ENTMCNC: 31.7 GM/DL — LOW (ref 32–36)
MCV RBC AUTO: 88.3 FL — SIGNIFICANT CHANGE UP (ref 80–100)
PLATELET # BLD AUTO: 452 K/UL — HIGH (ref 150–400)
POTASSIUM SERPL-MCNC: 3.7 MMOL/L — SIGNIFICANT CHANGE UP (ref 3.5–5.3)
POTASSIUM SERPL-SCNC: 3.7 MMOL/L — SIGNIFICANT CHANGE UP (ref 3.5–5.3)
PROT SERPL-MCNC: 4.6 G/DL — LOW (ref 6.6–8.7)
RBC # BLD: 3.5 M/UL — LOW (ref 3.8–5.2)
RBC # FLD: 19.1 % — HIGH (ref 10.3–14.5)
SODIUM SERPL-SCNC: 136 MMOL/L — SIGNIFICANT CHANGE UP (ref 135–145)
VANCOMYCIN FLD-MCNC: 17.1 UG/ML — SIGNIFICANT CHANGE UP
WBC # BLD: 17.27 K/UL — HIGH (ref 3.8–10.5)
WBC # FLD AUTO: 17.27 K/UL — HIGH (ref 3.8–10.5)

## 2020-05-19 PROCEDURE — 99232 SBSQ HOSP IP/OBS MODERATE 35: CPT

## 2020-05-19 RX ORDER — SODIUM HYPOCHLORITE 0.125 %
1 SOLUTION, NON-ORAL MISCELLANEOUS
Refills: 0 | Status: DISCONTINUED | OUTPATIENT
Start: 2020-05-19 | End: 2020-06-09

## 2020-05-19 RX ORDER — VANCOMYCIN HCL 1 G
750 VIAL (EA) INTRAVENOUS ONCE
Refills: 0 | Status: COMPLETED | OUTPATIENT
Start: 2020-05-19 | End: 2020-05-19

## 2020-05-19 RX ADMIN — LATANOPROST 1 DROP(S): 0.05 SOLUTION/ DROPS OPHTHALMIC; TOPICAL at 23:39

## 2020-05-19 RX ADMIN — CHLORHEXIDINE GLUCONATE 1 APPLICATION(S): 213 SOLUTION TOPICAL at 05:57

## 2020-05-19 RX ADMIN — PIPERACILLIN AND TAZOBACTAM 25 GRAM(S): 4; .5 INJECTION, POWDER, LYOPHILIZED, FOR SOLUTION INTRAVENOUS at 10:09

## 2020-05-19 RX ADMIN — PIPERACILLIN AND TAZOBACTAM 25 GRAM(S): 4; .5 INJECTION, POWDER, LYOPHILIZED, FOR SOLUTION INTRAVENOUS at 18:39

## 2020-05-19 RX ADMIN — SODIUM CHLORIDE 3 MILLILITER(S): 9 INJECTION INTRAMUSCULAR; INTRAVENOUS; SUBCUTANEOUS at 06:26

## 2020-05-19 RX ADMIN — LATANOPROST 1 DROP(S): 0.05 SOLUTION/ DROPS OPHTHALMIC; TOPICAL at 00:41

## 2020-05-19 RX ADMIN — ENOXAPARIN SODIUM 30 MILLIGRAM(S): 100 INJECTION SUBCUTANEOUS at 06:25

## 2020-05-19 RX ADMIN — PANTOPRAZOLE SODIUM 40 MILLIGRAM(S): 20 TABLET, DELAYED RELEASE ORAL at 06:26

## 2020-05-19 RX ADMIN — SODIUM CHLORIDE 3 MILLILITER(S): 9 INJECTION INTRAMUSCULAR; INTRAVENOUS; SUBCUTANEOUS at 12:25

## 2020-05-19 RX ADMIN — ENOXAPARIN SODIUM 30 MILLIGRAM(S): 100 INJECTION SUBCUTANEOUS at 18:39

## 2020-05-19 RX ADMIN — PIPERACILLIN AND TAZOBACTAM 25 GRAM(S): 4; .5 INJECTION, POWDER, LYOPHILIZED, FOR SOLUTION INTRAVENOUS at 02:28

## 2020-05-19 RX ADMIN — Medication 250 MILLIGRAM(S): at 23:38

## 2020-05-19 RX ADMIN — Medication 1 APPLICATION(S): at 23:40

## 2020-05-19 RX ADMIN — CASPOFUNGIN ACETATE 260 MILLIGRAM(S): 7 INJECTION, POWDER, LYOPHILIZED, FOR SOLUTION INTRAVENOUS at 01:13

## 2020-05-19 RX ADMIN — SODIUM CHLORIDE 3 MILLILITER(S): 9 INJECTION INTRAMUSCULAR; INTRAVENOUS; SUBCUTANEOUS at 23:22

## 2020-05-19 RX ADMIN — PANTOPRAZOLE SODIUM 40 MILLIGRAM(S): 20 TABLET, DELAYED RELEASE ORAL at 18:39

## 2020-05-19 NOTE — PROGRESS NOTE ADULT - SUBJECTIVE AND OBJECTIVE BOX
See dictated consult    Pt with multiple comorbidities, admitted with esophageal perforation, empyema/right PTX s/p VATS, UTI and sepsis.  Pt with MS, nonambulatory.   Pt with chronic right ischial decubitus ulcer, unknown duration.    wound is 12-14 cm stage IV with exposed necrotic ischium.  Urine soaked dressings.  No collections.      A/P: Pt is a poor surgical candidate and not a candidate for flap closure  May benefit from debridement but need to balance risks of general anesthesia, not likely source of systemic infection  Would do BID dressing changes with Dakins  Need to control urine and stool contamination of the wound.  Unlikely to improve with constant contamination.

## 2020-05-19 NOTE — PROGRESS NOTE ADULT - SUBJECTIVE AND OBJECTIVE BOX
Subjective: "I am ok, just tired today" NAD denies cp, sob or pain    Vital Signs:  Vital Signs Last 24 Hrs  T(C): 36.5 (05-19-20 @ 15:47), Max: 36.5 (05-19-20 @ 15:47)  T(F): 97.7 (05-19-20 @ 15:47), Max: 97.7 (05-19-20 @ 15:47)  HR: 94 (05-19-20 @ 15:47) (67 - 94)  BP: 128/81 (05-19-20 @ 15:47) (122/77 - 135/84)  RR: 19 (05-19-20 @ 15:47) (19 - 20)  SpO2: 98% (05-19-20 @ 15:47) (90% - 100%) on RA    Telemetry/Alarms: SR    Relevant labs, radiology and Medications reviewed    Pertinent Physical Exam  General: WN/WD NAD  Neurology: A&Ox3, nonfocal, DAVIDSON x 4  Respiratory: diminished r, clear l  CV: RRR, S1S2, no murmurs, rubs or gallops  Abdominal: Soft, NT, +hypoactive BS, Last BM today  Extremities: +1-2 edema UE and LE, + peripheral pulses  Incisions: Midline abd incision + staples, open to air, clean dry intact  Tubes: R chest tube #1 + air leak, small amount serous drainage, R chest tube #2 no air leak scant serous drainage, R SOURAV small amt serous drainage    05-18 @ 07:01  -  05-19 @ 07:00  --------------------------------------------------------  IN:    Pivot 1.5: 180 mL    sodium chloride 0.9%.: 960 mL    Solution: 175 mL    Solution: 250 mL  Total IN: 1565 mL    OUT:    Chest Tube: 65 mL    Chest Tube: 100 mL    Drain: 90 mL    Gastrostomy Tube: 325 mL    Indwelling Catheter - Urethral: 450 mL  Total OUT: 1030 mL    Total NET: 535 mL

## 2020-05-19 NOTE — ADVANCED PRACTICE NURSE CONSULT - REASON FOR CONSULT
Patient seen on skin care rounds after wound care referral received for assessment of skin impairment and recommendations of topical management.  Chart reviewed:   BMI (20.2), Noah (12), Serum albumin (1.5 g/dL) and Total Protein (3.7 g/dL).  Patient is unable to provide etiology or history of the wound. Patient H/O multiple sclerosis, diagnosed 44 years ago with paresthesia in her legs.  Presented to [facility] as a transfer from [place] for esophageal tear and repair.

## 2020-05-19 NOTE — ADVANCED PRACTICE NURSE CONSULT - ASSESSMENT
Patient is noted to have unstageable Pressure Injuries to Right Ischium Tuberosity (RIT) measuring 13.0 x 10.5 x 5.5 cm, the wound bed covered by 70% slough tissue and 30% agranular pink tissue, moderate amount of purulent drainage, undermining from 7 o’clock to 2 o’clock with the deepest point at 12 o’clock measuring 6.0 cm, no tunneling, no odor noted.  No erythema, warmth of induration noted to periwound.

## 2020-05-19 NOTE — PROGRESS NOTE ADULT - PROBLEM SELECTOR PLAN 5
Wound care consult / plastic surgery consult appreciated.   Wound care recommends Santyl and plastics eval  Dr Parrish from plastics recommends dakins soaked packing to clean wound bid.  Air flow mattress  Turn and position q2 hours.  Optimize nutrition once able to access feeding tube

## 2020-05-19 NOTE — CHART NOTE - NSCHARTNOTEFT_GEN_A_CORE
Source: Patient [ ]  Family [ ]   other [x ] pt currently sleeping    Current Diet: Diet, Full Liquid:   Tube Feeding Modality: Jejunostomy  Pivot 1.5 Derek  Total Volume for 24 Hours (mL): 720  Continuous  Until Goal Tube Feed Rate (mL per Hour): 30  Tube Feed Duration (in Hours): 24  Tube Feed Start Time: 09:30 (05-19-20 @ 09:22)    Enteral Nutrition: Pt currently receiving Pivot @ 20ml/hr as per previous diet order.  Diet order changed this morning to Pivot @ 30ml/hr (x20hrs) will provide 600ml, 900kcal, 85g protein, 683ml free water daily via J tube and Full liquids with po    Current Weight:   (5/15) 125 lbs  (5/11) 125 lbs    % Weight Change no new weight to assess, will continue to monitor. No edema noted.    Pertinent Medications: MEDICATIONS  (STANDING):  caspofungin IVPB 50 milliGRAM(s) IV Intermittent every 24 hours  chlorhexidine 4% Liquid 1 Application(s) Topical <User Schedule>  enoxaparin Injectable 30 milliGRAM(s) SubCutaneous every 12 hours  latanoprost 0.005% Ophthalmic Solution 1 Drop(s) Both EYES at bedtime  pantoprazole  Injectable 40 milliGRAM(s) IV Push every 12 hours  piperacillin/tazobactam IVPB.. 3.375 Gram(s) IV Intermittent every 8 hours  sodium chloride 0.9% lock flush 3 milliLiter(s) IV Push every 8 hours  sodium chloride 0.9%. 1000 milliLiter(s) (40 mL/Hr) IV Continuous <Continuous>    MEDICATIONS  (PRN):  acetaminophen  IVPB .. 1000 milliGRAM(s) IV Intermittent every 8 hours PRN Mild Pain (1 - 3)  sodium chloride 0.9% lock flush 10 milliLiter(s) IV Push every 1 hour PRN Pre/post blood products, medications, blood draw, and to maintain line patency    Pertinent Labs: CBC Full  -  ( 18 May 2020 07:39 )  WBC Count : 14.91 K/uL  RBC Count : 3.43 M/uL  Hemoglobin : 9.6 g/dL  Hematocrit : 29.1 %  Platelet Count - Automated : 454 K/uL  Mean Cell Volume : 84.8 fl  Mean Cell Hemoglobin : 28.0 pg  Mean Cell Hemoglobin Concentration : 33.0 gm/dL  05-18 Na134 mmol/L<L> Glu 88 mg/dL K+ 3.8 mmol/L Cr  1.19 mg/dL BUN 14.0 mg/dL Phos 2.0 mg/dL<L> Alb n/a   PAB n/a       Skin: Unstageable right ischium    Limited nutrition focused physical exam previously conducted - found signs of malnutrition [ ]absent [x ]present    Subcutaneous fat loss: [x ] Orbital fat pads region, [ ]Buccal fat region, [ ]Triceps region,  [ ]Ribs region    Muscle wasting: [x ]Temples region, [ ]Clavicle region, [x ]Shoulder region, [ ]Scapula region, [ ]Interosseous region,  [ ]thigh region, [ ]Calf region    Current Nutrition Diagnosis: Pt remains at nutrition risk secondary to severe protein calorie malnutrition (acute-- likely on chronic) related to inability to meet increased protein energy needs in setting of metastatic breast ca, esophageal perforation, +NGT, s/p GJ tube placement and skin breakdown as evidenced by pt previously meeting <50% estimated energy intake >5 days, moderate muscle wasting, and 11% wt loss.  Pt currently tolerating enteral feeds of Pivot @ 20ml/hr- order for increased goal rate placed today.    Recommendations:   1. Continue with order for Pivot 1.5 @ 30ml/hr.  As/when medically feasible- increase by 10ml q 8 hrs to goal rate of 50ml/hr (x20hrs) daily, as tolerated (while on full liquids) to provide 1000ml, 1500kcal, 94g protein, 760ml free water.  2. Consider RX: Ensure Clear TID with full liquid trays- as tolerated  3. RX: MVI and Vit C 500mg daily   4. RX: znso4 220mg x 10 days daily  5. Monitor daily wts     Monitoring and Evaluation:   [x ] PO intake [x ] Tolerance to diet prescription [X] Weights  [X] Follow up per protocol [X] Labs:

## 2020-05-19 NOTE — PROGRESS NOTE ADULT - PROBLEM SELECTOR PLAN 1
S/p esophageal stent placed 5/13/20 with NGT placed.   S/p GJ tube placement 5/15 (open procedure)  G port connected to gravity drainage, slow feeds are advancing through the J tube.  NGT removed yesterday  Start on liquid diet today (keep G tube to gravity, which will drain liquids)  Dietary consult appreciated, PO vitamin supplements to be added when tolerating tube feeds.  Will restart PO meds once able to  Continue IV hydration while NPO, will d/c once tolerating Tube feeds.  Encourage deep breathing, chest PT, incentive spirometry.   Maintain R CTs x 2 to suction for +airleak.   Follow up AM CXR.   Case and plan discussed with Dr. Murcia / Thoracic Surgery team in AM rounds.

## 2020-05-19 NOTE — PROGRESS NOTE ADULT - PROBLEM SELECTOR PLAN 2
S/p Right thoracotomy / washout / VATS decort 5/13/20.  Chest tubes to suction.  Follow up daily CXR.  Pleural fluid + for yeast, coag Negative Staph, Klebsiella, and strep mitis/oralis. Continue IV antibiotics per ID.   Vanco stopped, caspofungin to be completed 5/26/20 and Zosyn to be completed 6/9/20.

## 2020-05-19 NOTE — PROGRESS NOTE ADULT - ASSESSMENT
69 year old non-ambulatory Female with a PMHx significant for multiple sclerosis, Breast Cancer s/p R mastectomy, Osteoporosis, Mitral stenosis, right ankle fracture, chronic right sided sacral ulcer, chronic midline back pain since being dropped from a jame lift (7/17/29), admitted to [place] after presenting with sepsis in the setting of a UTI with chronic campos use and known large right ischial decubitus ulcer. Patient found to have a Moderately large Right hydropneumothorax resulting in partial right lung collapse and slight cardiomediastinal shift to the left on CXR with chest tube placed 5/8/20. Large bore chest tube placed 5/10/20 for persistent Right pneumothorax. Patient was followed by ID and was given Vanco and Zosyn originally for her presumed urosepsis, Caspofungin was added after pleural fluid was + for fungal elements. CT chest confirmed +Empyema in the Right pleural space. Patient ultimately transferred to [facility] late 5/11/20 after she was found to have a distal esophageal perforation on CT chest and Esophogram.     5/13/20 patient underwent EGD with esophageal stent placed, NGT placed, VATS with right thoracic cavity washout and pulmonary decortication with vaibhav drain, right posterior, and right medial chest tubes placed with Dr. Murcia.  On 5/14 patient was taken to IR for unsuccessful G/J tube placement. 5/15 patient was taken to the OR for feeding tube placement with Dr. Murcia.  The G tube is to gravity drainage, and slow feeds are advancing through the J tube. S/P esophagram yesterday which showed narrowing distal stented area. Tolerating tube feeds.

## 2020-05-19 NOTE — PROGRESS NOTE ADULT - ASSESSMENT
HPI: This 69 year old non-ambulatory Female with a PMHx significant for multiple sclerosis, Breast Cancer s/p R mastectomy, Osteoporosis, Mitral stenosis, right ankle fracture, chronic right sided sacral ulcer, chronic midline back pain since being dropped from a jame lift (7/17/29), admitted to [place] after presenting with sepsis in the setting of a UTI with chronic campos use and known large right ischial decubitus ulcer. Patient found to have a Moderately large Right hydropneumothorax resulting in partial right lung collapse and slight cardiomediastinal shift to the left on CXR with chest tube placed 5/8/20. Large bore chest tube placed 5/10/20 for persistent Right pneumothorax. Patient was followed by ID and was given Vanco and Zosyn originally for her presumed urosepsis, Caspofungin was added after pleural fluid was + for fungal elements. CT chest confirmed +Empyema in the Right pleural space. Patient ultimately transferred to [facility] late 5/11/20 after she was found to have a distal esophageal perforation on CT chest and Esophogram.     Of note, patient admitted 10/9/2019 for lower back and bilateral knee pain, found to have compression fracture of L2 with imaging subsequently found to have multiple lytic lesions on the spine and pelvis.  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 Matthew Fairbanks. (12 May 2020 02:24)    patient is admitted to surgical service, pre-operatively planned for an esophageal stent 5/13.  Patient's labs/ cultures from [facility] reviewed.     Empyema with polymicrobial infection:  Strep mitis infection  Klebsiella oxytoca infection  CoNS infection  Perforated esophagus    Plan:    continue Antibiotics:  caspofungin IVPB 50 milliGRAM(s) IV Intermittent every 24 hours  piperacillin/tazobactam IVPB.. 3.375 Gram(s) IV Intermittent every 8 hours  vancomycin  by level    s/p esophageal stent  and VATS 5/13  s/p PEJ on 5/15      continue VANCO and CASPOFUNGIN on 5/26/2020  coverage for CONS and Fungal  YEAST from fluid culture being worked up    WILL continue ZOSYN THRU 6/9/2020  - continue Chest tubes      WILL need PICC LINE at time of discharge    - follow up all outstanding cultures  - trend temperature and WBC curve  - repeat cultures from blood and all sources if febrile.

## 2020-05-19 NOTE — PROGRESS NOTE ADULT - SUBJECTIVE AND OBJECTIVE BOX
[facility]  INFECTIOUS DISEASES AND INTERNAL MEDICINE at [place]  =======================================================  Phong Wang MD  Diplomates American Board of Internal Medicine and Infectious Diseases  Telephone [phone]  Fax            [phone]  =======================================================    [id number]  GEORGE STANLEY   follow up: empyema     s/p EGD and esophageal stent; s/p VATS 5/14  s/p gastrojejunal tube on 5/15/2020    plastics consulted for sacral decubitus     =======================================================  REVIEW OF SYSTEMS:  CONSTITUTIONAL:  No Fever or chills  HEENT:  No diplopia or blurred vision.  No earache, sore throat or runny nose.  CARDIOVASCULAR:  No pressure, squeezing, strangling, tightness, heaviness or aching about the chest, neck, axilla or epigastrium.  RESPIRATORY:  MILD shortness of breath  GASTROINTESTINAL:  No nausea, vomiting or diarrhea.  GENITOURINARY:  No dysuria, frequency or urgency. No Blood in urine  MUSCULOSKELETAL:  no joint aches, no muscle pain  SKIN:  No change in skin, hair or nails.  NEUROLOGIC:  No Headaches, seizures or weakness.  PSYCHIATRIC:  No disorder of thought or mood.  ENDOCRINE:  No heat or cold intolerance  HEMATOLOGICAL:  No easy bruising or bleeding.   =======================================================  Allergies  Sudafed (Other)    ======================================================  Physical Exam:  ============  (see vitals section below)    General:  No acute distress. FRAIL  Eye: Pupils are equal, round and reactive to light, Extraocular movements are intact, Normal conjunctiva.  HENT: Normocephalic, Oral mucosa is moist, No pharyngeal erythema, No sinus tenderness.  Neck: Supple, No lymphadenopathy.  Respiratory: Lungs  with diminished air entry at bases  RIGHT side with 2 chest tubes - CLEAR FLUID  Cardiovascular: Normal rate, Regular rhythm  Gastrointestinal: Soft, Non-tender, Non-distended, Normal bowel sounds.  PEJ tube present in abd  Genitourinary: + ELIZONDO with clear urine  Lymphatics: No lymphadenopathy neck,   Musculoskeletal: Normal range of motion, Normal strength.  Integumentary: No rash.  Neurologic: Alert, Oriented, No focal deficits, Cranial Nerves II-XII are grossly intact.  Psychiatric: Appropriate mood & affect.    =======================================================  Vitals:  ============  T(F): 97.5 (19 May 2020 09:40), Max: 97.6 (18 May 2020 22:22)  HR: 93 (19 May 2020 09:40)  BP: 135/84 (19 May 2020 09:40)  RR: 20 (19 May 2020 09:40)  SpO2: 90% (19 May 2020 09:40) (90% - 100%)  temp max in last 48H T(F): , Max: 97.9 (05-18-20 @ 00:25)    =======================================================  Current Antibiotics:  caspofungin IVPB 50 milliGRAM(s) IV Intermittent every 24 hours  piperacillin/tazobactam IVPB.. 3.375 Gram(s) IV Intermittent every 8 hours    Other medications:  chlorhexidine 4% Liquid 1 Application(s) Topical <User Schedule>  enoxaparin Injectable 30 milliGRAM(s) SubCutaneous every 12 hours  latanoprost 0.005% Ophthalmic Solution 1 Drop(s) Both EYES at bedtime  pantoprazole  Injectable 40 milliGRAM(s) IV Push every 12 hours  sodium chloride 0.9% lock flush 3 milliLiter(s) IV Push every 8 hours  sodium chloride 0.9%. 1000 milliLiter(s) IV Continuous <Continuous>      =======================================================  Labs:                        9.6    14.91 )-----------( 454      ( 18 May 2020 07:39 )             29.1      05-18    134<L>  |  102  |  14.0  ----------------------------<  88  3.8   |  16.0<L>  |  1.19    Ca    6.3<LL>      18 May 2020 07:39  Phos  2.0     05-18  Mg     2.1     05-18        Culture - Fungal, Body Fluid (collected 05-08-20 @ 17:52)  Source: Pleural Fl Pleural Fluid    Culture - Body Fluid with Gram Stain (collected 05-08-20 @ 17:52)  Source: Pleural Fl Pleural Fluid  Gram Stain (05-08-20 @ 19:18):    Few polymorphonuclear leukocytes per low power field    Rare Gram positive cocci in pairs per oil power field    Rare Gram Variable Rods per oil power field    Rare Yeast per oil power field  Organism: Streptococcus mitis/oralis group (05-11-20 @ 17:39)    Sensitivities:      -  Clindamycin: R      -  Erythromycin: R      -  Levofloxacin: S      -  Vancomycin: S      Method Type: KB  Organism: Streptococcus mitis/oralis group  Coag Negative Staphylococcus  Klebsiella oxytoca (05-11-20 @ 17:37)  Organism: Streptococcus mitis/oralis group (05-11-20 @ 17:37)    Sensitivities:      -  Ceftriaxone: S 1      -  Penicillin: I 0.75      Method Type: ETEST  Organism: Klebsiella oxytoca (05-11-20 @ 17:35)    Sensitivities:      -  Amikacin: S <=16      -  Amoxicillin/Clavulanic Acid: S <=8/4      -  Ampicillin: R >16 These ampicillin results predict results for amoxicillin      -  Ampicillin/Sulbactam: S <=4/2 Enterobacter, Citrobacter, and Serratia may develop resistance during prolonged therapy (3-4 days)      -  Aztreonam: R >16      -  Cefazolin: R 16 Enterobacter, Citrobacter, and Serratia may develop resistance during prolonged therapy (3-4 days)      -  Cefepime: S <=2      -  Cefoxitin: S <=8      -  Ceftazidime/Avibactam: S 8      -  Ceftolozane/tazobactam: S <=2      -  Ceftriaxone: S <=1 Enterobacter, Citrobacter, and Serratia may develop resistance during prolonged therapy      -  Ciprofloxacin: R 2      -  Ertapenem: I 1      -  Gentamicin: S <=2      -  Imipenem: S <=1      -  Levofloxacin: R 2      -  Meropenem: S <=1      -  Piperacillin/Tazobactam: S <=8      -  Tobramycin: S <=2      -  Trimethoprim/Sulfamethoxazole: S <=0.5/9.5      Method Type: PAWAN  Organism: Coag Negative Staphylococcus (05-11-20 @ 17:35)    Sensitivities:      -  Ampicillin/Sulbactam: R <=8/4      -  Cefazolin: R <=4      -  Clindamycin: S <=0.25      -  Erythromycin: R >4      -  Gentamicin: S <=1 Should not be used as monotherapy      -  Oxacillin: R >2      -  Penicillin: R >8      -  RIF- Rifampin: S <=1 Should not be used as monotherapy      -  Tetra/Doxy: R >8      -  Trimethoprim/Sulfamethoxazole: S <=0.5/9.5      -  Vancomycin: S 2      Method Type: PAWAN    Culture - Urine (collected 05-08-20 @ 09:09)  Source: .Urine Catheterized  Final Report (05-09-20 @ 08:24):    >=3 organisms. Probable collection contamination.    Culture - Blood (collected 05-08-20 @ 09:02)  Source: .Blood Blood-Peripheral  Final Report (05-13-20 @ 10:00):    No Growth Final    Culture - Blood (collected 05-08-20 @ 09:02)  Source: .Blood Blood  Final Report (05-13-20 @ 10:00):    No Growth Final    Creatinine, Serum: 1.19 mg/dL (05-18-20 @ 07:39)  Creatinine, Serum: 1.15 mg/dL (05-17-20 @ 13:26)  Creatinine, Serum: 1.07 mg/dL (05-17-20 @ 08:17)  Creatinine, Serum: 1.16 mg/dL (05-16-20 @ 09:42)  Creatinine, Serum: 0.90 mg/dL (05-15-20 @ 08:07)    Ferritin, Serum: 808 ng/mL (05-09-20 @ 15:27)    WBC Count: 14.91 K/uL (05-18-20 @ 07:39)  WBC Count: 14.90 K/uL (05-17-20 @ 08:17)  WBC Count: 16.44 K/uL (05-16-20 @ 09:42)  WBC Count: 12.33 K/uL (05-15-20 @ 07:46)      COVID-19 PCR: NotDetec (05-12-20 @ 00:00)  COVID-19 PCR: NotDetec (05-07-20 @ 23:17)    Lactate Dehydrogenase, Serum: 181 U/L (05-08-20 @ 06:11)    Alkaline Phosphatase, Serum: 68 U/L (05-17-20 @ 08:17)  Alanine Aminotransferase (ALT/SGPT): 12 U/L (05-17-20 @ 08:17)  Aspartate Aminotransferase (AST/SGOT): 23 U/L (05-17-20 @ 08:17)  Bilirubin Total, Serum: 0.2 mg/dL (05-17-20 @ 08:17)

## 2020-05-19 NOTE — ADVANCED PRACTICE NURSE CONSULT - RECOMMEDATIONS
Follow Nutritional suggestions as per Dietary consult.  Patient would benefit from I Veraflow Wound VAC to clean out slough tissue and possible consideration of a surgical flap.  Interim recommendation for Unstageable Pressure Injuries:  Daily cleanse with Normal Saline, apply nickel thickness of Santyl (Collagenase) to affected area cover with dry dressing and tape.     Goal of Care:  Enzymatic debridement    Continue low air loss bed therapy, continue to turn & reposition q2h with Z-tarsha fluidized positioning device, Z-Tarsha Heel boots to bilateral feet and single breathable pad, please continue measures to decrease friction/shear/pressure.     Plan discussed with nurse, Prema Kennedy and RENEE Wright.  Please call wound care service line at [phone], if further assistance is needed.

## 2020-05-20 PROCEDURE — 99232 SBSQ HOSP IP/OBS MODERATE 35: CPT

## 2020-05-20 PROCEDURE — 71045 X-RAY EXAM CHEST 1 VIEW: CPT | Mod: 26

## 2020-05-20 PROCEDURE — 99232 SBSQ HOSP IP/OBS MODERATE 35: CPT | Mod: 24

## 2020-05-20 PROCEDURE — 86078 PHYS BLOOD BANK SERV REACTJ: CPT

## 2020-05-20 RX ORDER — SODIUM CHLORIDE 9 MG/ML
1000 INJECTION INTRAMUSCULAR; INTRAVENOUS; SUBCUTANEOUS
Refills: 0 | Status: DISCONTINUED | OUTPATIENT
Start: 2020-05-20 | End: 2020-05-23

## 2020-05-20 RX ORDER — MAGNESIUM SULFATE 500 MG/ML
2 VIAL (ML) INJECTION ONCE
Refills: 0 | Status: COMPLETED | OUTPATIENT
Start: 2020-05-20 | End: 2020-05-20

## 2020-05-20 RX ORDER — POTASSIUM CHLORIDE 20 MEQ
10 PACKET (EA) ORAL ONCE
Refills: 0 | Status: COMPLETED | OUTPATIENT
Start: 2020-05-20 | End: 2020-05-20

## 2020-05-20 RX ORDER — SODIUM CHLORIDE 9 MG/ML
1000 INJECTION, SOLUTION INTRAVENOUS
Refills: 0 | Status: DISCONTINUED | OUTPATIENT
Start: 2020-05-20 | End: 2020-05-23

## 2020-05-20 RX ADMIN — CHLORHEXIDINE GLUCONATE 1 APPLICATION(S): 213 SOLUTION TOPICAL at 05:24

## 2020-05-20 RX ADMIN — ENOXAPARIN SODIUM 30 MILLIGRAM(S): 100 INJECTION SUBCUTANEOUS at 09:17

## 2020-05-20 RX ADMIN — PIPERACILLIN AND TAZOBACTAM 25 GRAM(S): 4; .5 INJECTION, POWDER, LYOPHILIZED, FOR SOLUTION INTRAVENOUS at 17:30

## 2020-05-20 RX ADMIN — SODIUM CHLORIDE 3 MILLILITER(S): 9 INJECTION INTRAMUSCULAR; INTRAVENOUS; SUBCUTANEOUS at 11:34

## 2020-05-20 RX ADMIN — SODIUM CHLORIDE 250 MILLILITER(S): 9 INJECTION INTRAMUSCULAR; INTRAVENOUS; SUBCUTANEOUS at 11:18

## 2020-05-20 RX ADMIN — PIPERACILLIN AND TAZOBACTAM 25 GRAM(S): 4; .5 INJECTION, POWDER, LYOPHILIZED, FOR SOLUTION INTRAVENOUS at 10:24

## 2020-05-20 RX ADMIN — SODIUM CHLORIDE 3 MILLILITER(S): 9 INJECTION INTRAMUSCULAR; INTRAVENOUS; SUBCUTANEOUS at 22:48

## 2020-05-20 RX ADMIN — SODIUM CHLORIDE 40 MILLILITER(S): 9 INJECTION, SOLUTION INTRAVENOUS at 05:33

## 2020-05-20 RX ADMIN — Medication 100 MILLIEQUIVALENT(S): at 09:17

## 2020-05-20 RX ADMIN — ENOXAPARIN SODIUM 30 MILLIGRAM(S): 100 INJECTION SUBCUTANEOUS at 22:59

## 2020-05-20 RX ADMIN — LATANOPROST 1 DROP(S): 0.05 SOLUTION/ DROPS OPHTHALMIC; TOPICAL at 22:59

## 2020-05-20 RX ADMIN — Medication 400 MILLIGRAM(S): at 14:52

## 2020-05-20 RX ADMIN — Medication 1 APPLICATION(S): at 05:25

## 2020-05-20 RX ADMIN — CASPOFUNGIN ACETATE 260 MILLIGRAM(S): 7 INJECTION, POWDER, LYOPHILIZED, FOR SOLUTION INTRAVENOUS at 01:38

## 2020-05-20 RX ADMIN — SODIUM CHLORIDE 3 MILLILITER(S): 9 INJECTION INTRAMUSCULAR; INTRAVENOUS; SUBCUTANEOUS at 05:24

## 2020-05-20 RX ADMIN — PANTOPRAZOLE SODIUM 40 MILLIGRAM(S): 20 TABLET, DELAYED RELEASE ORAL at 05:25

## 2020-05-20 RX ADMIN — PIPERACILLIN AND TAZOBACTAM 25 GRAM(S): 4; .5 INJECTION, POWDER, LYOPHILIZED, FOR SOLUTION INTRAVENOUS at 03:26

## 2020-05-20 RX ADMIN — Medication 1 APPLICATION(S): at 16:21

## 2020-05-20 RX ADMIN — Medication 50 GRAM(S): at 09:17

## 2020-05-20 RX ADMIN — PANTOPRAZOLE SODIUM 40 MILLIGRAM(S): 20 TABLET, DELAYED RELEASE ORAL at 17:30

## 2020-05-20 NOTE — PROGRESS NOTE ADULT - ASSESSMENT
HPI: This 69 year old non-ambulatory Female with a PMHx significant for multiple sclerosis, Breast Cancer s/p R mastectomy, Osteoporosis, Mitral stenosis, right ankle fracture, chronic right sided sacral ulcer, chronic midline back pain since being dropped from a jame lift (7/17/29), admitted to [facility] after presenting with sepsis in the setting of a UTI with chronic campos use and known large right ischial decubitus ulcer. Patient found to have a Moderately large Right hydropneumothorax resulting in partial right lung collapse and slight cardiomediastinal shift to the left on CXR with chest tube placed 5/8/20. Large bore chest tube placed 5/10/20 for persistent Right pneumothorax. Patient was followed by ID and was given Vanco and Zosyn originally for her presumed urosepsis, Caspofungin was added after pleural fluid was + for fungal elements. CT chest confirmed +Empyema in the Right pleural space. Patient ultimately transferred to [facility] late 5/11/20 after she was found to have a distal esophageal perforation on CT chest and Esophogram.     Of note, patient admitted 10/9/2019 for lower back and bilateral knee pain, found to have compression fracture of L2 with imaging subsequently found to have multiple lytic lesions on the spine and pelvis.  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 Matthew Fairbanks. (12 May 2020 02:24)    patient is admitted to surgical service, pre-operatively planned for an esophageal stent 5/13.  Patient's labs/ cultures from [facility] reviewed.     Empyema with polymicrobial infection:  Strep mitis infection  Klebsiella oxytoca infection  CoNS infection  Perforated esophagus    Plan:  s/p esophageal stent  and VATS 5/13  s/p PEJ on 5/15    continue Antibiotics:  caspofungin IVPB 50 milliGRAM(s) IV Intermittent every 24 hours  piperacillin/tazobactam IVPB.. 3.375 Gram(s) IV Intermittent every 8 hours  Vanco by level    YEAST from fluid culture being worked up  NOT Candida auris; specimen sent to [facility] labs    WILL continue ZOSYN THRU 6/9/2020  - continue Chest tubes      WILL need PICC LINE at time of discharge    - follow up all outstanding cultures  - trend temperature and WBC curve  - repeat cultures from blood and all sources if febrile.

## 2020-05-20 NOTE — PROGRESS NOTE ADULT - PROBLEM SELECTOR PLAN 7
SCDs and Lovenox for DVT prophylaxis.  Protonix for GI prophylaxis  Case and plan discussed with Dr. Murcia / Thoracic Surgery team in AM rounds.

## 2020-05-20 NOTE — PROGRESS NOTE ADULT - PROBLEM SELECTOR PLAN 1
S/p esophageal stent placed 5/13/20 with NGT placed.   S/p GJ tube placement 5/15 (open procedure)  G port connected to gravity drainage, slow feeds are advancing through the J tube.  NGT removed 5/18  Start on liquid diet 5/19 (keep G tube to gravity, which will drain liquids)  Dietary consult appreciated, PO vitamin supplements to be added when tolerating tube feeds.  Will restart PO meds once able to  Continue IV hydration while NPO, will d/c once tolerating Tube feeds.  Encourage deep breathing, chest PT, incentive spirometry.   Maintain R CTs x 2 to suction for +airleak.   Follow up AM CXR.  Case and plan discussed with Dr. Murcia / Thoracic Surgery team in AM rounds.

## 2020-05-20 NOTE — PROGRESS NOTE ADULT - ASSESSMENT
69 year old non-ambulatory Female with a PMHx significant for multiple sclerosis, Breast Cancer s/p R mastectomy, Osteoporosis, Mitral stenosis, right ankle fracture, chronic right sided sacral ulcer, chronic midline back pain since being dropped from a jame lift (7/17/29), admitted to [facility] after presenting with sepsis in the setting of a UTI with chronic campos use and known large right ischial decubitus ulcer. Patient found to have a Moderately large Right hydropneumothorax resulting in partial right lung collapse and slight cardiomediastinal shift to the left on CXR with chest tube placed 5/8/20. Large bore chest tube placed 5/10/20 for persistent Right pneumothorax. Patient was followed by ID and was given Vanco and Zosyn originally for her presumed urosepsis, Caspofungin was added after pleural fluid was + for fungal elements. CT chest confirmed +Empyema in the Right pleural space. Patient ultimately transferred to [facility] late 5/11/20 after she was found to have a distal esophageal perforation on CT chest and Esophogram.     5/13/20 patient underwent EGD with esophageal stent placed, NGT placed, VATS with right thoracic cavity washout and pulmonary decortication with vaibhav drain, right posterior, and right medial chest tubes placed with Dr. Murcia.  On 5/14 patient was taken to IR for unsuccessful G/J tube placement. 5/15 patient was taken to the OR for feeding tube placement with Dr. Murcia.  The G tube is to gravity drainage, and slow feeds are advancing through the J tube. S/P esophagram 5/18 which showed narrowing distal stented area. Tolerating tube feeds.

## 2020-05-20 NOTE — PROGRESS NOTE ADULT - SUBJECTIVE AND OBJECTIVE BOX
[facility]  INFECTIOUS DISEASES AND INTERNAL MEDICINE at [place]  =======================================================  Phong Wang MD  Diplomates American Board of Internal Medicine and Infectious Diseases  Telephone [phone]  Fax            [phone]  =======================================================    [id number]  GEORGE STANLEY   follow up: empyema     s/p EGD and esophageal stent; s/p VATS 5/14  s/p gastrojejunal tube on 5/15/2020  s/p debridement of sacral decubitus       =======================================================  REVIEW OF SYSTEMS:  CONSTITUTIONAL:  No Fever or chills  HEENT:  No diplopia or blurred vision.  No earache, sore throat or runny nose.  CARDIOVASCULAR:  No pressure, squeezing, strangling, tightness, heaviness or aching about the chest, neck, axilla or epigastrium.  RESPIRATORY:  MILD shortness of breath  GASTROINTESTINAL:  No nausea, vomiting or diarrhea.  GENITOURINARY:  No dysuria, frequency or urgency. No Blood in urine  MUSCULOSKELETAL:  no joint aches, no muscle pain  SKIN:  No change in skin, hair or nails.  NEUROLOGIC:  No Headaches, seizures or weakness.  PSYCHIATRIC:  No disorder of thought or mood.  ENDOCRINE:  No heat or cold intolerance  HEMATOLOGICAL:  No easy bruising or bleeding.   =======================================================  Allergies  Sudafed (Other)    ======================================================  Physical Exam:  ============  (see vitals section below)    General:  No acute distress. FRAIL  Eye: Pupils are equal, round and reactive to light, Extraocular movements are intact, Normal conjunctiva.  HENT: Normocephalic, Oral mucosa is moist, No pharyngeal erythema, No sinus tenderness.  Neck: Supple, No lymphadenopathy.  Respiratory: Lungs  with diminished air entry at bases  RIGHT side with 2 chest tubes - CLEAR FLUID  Cardiovascular: Normal rate, Regular rhythm  Gastrointestinal: Soft, Non-tender, Non-distended, Normal bowel sounds.  PEJ tube present in abd  Genitourinary: + ELIZONDO with clear urine  Lymphatics: No lymphadenopathy neck,   Musculoskeletal: Normal range of motion, Normal strength.  Integumentary: No rash.  Neurologic: Alert, Oriented, No focal deficits, Cranial Nerves II-XII are grossly intact.  Psychiatric: Appropriate mood & affect.    =======================================================  Vitals:  ============  T(F): 98.1 (20 May 2020 10:00), Max: 98.1 (20 May 2020 10:00)  HR: 87 (20 May 2020 10:00)  BP: 100/66 (20 May 2020 10:00)  RR: 18 (20 May 2020 10:00)  SpO2: 99% (20 May 2020 10:00) (94% - 100%)  temp max in last 48H T(F): , Max: 98.1 (05-20-20 @ 10:00)    =======================================================  Current Antibiotics:  caspofungin IVPB 50 milliGRAM(s) IV Intermittent every 24 hours  piperacillin/tazobactam IVPB.. 3.375 Gram(s) IV Intermittent every 8 hours    Other medications:  chlorhexidine 4% Liquid 1 Application(s) Topical <User Schedule>  Dakins Solution - 1/2 Strength 1 Application(s) Topical two times a day  enoxaparin Injectable 30 milliGRAM(s) SubCutaneous every 12 hours  lactated ringers. 1000 milliLiter(s) IV Continuous <Continuous>  latanoprost 0.005% Ophthalmic Solution 1 Drop(s) Both EYES at bedtime  pantoprazole  Injectable 40 milliGRAM(s) IV Push every 12 hours  sodium chloride 0.9% lock flush 3 milliLiter(s) IV Push every 8 hours  sodium chloride 0.9%. 1000 milliLiter(s) IV Continuous <Continuous>      =======================================================  Labs:                        9.8    17.27 )-----------( 452      ( 19 May 2020 21:02 )             30.9      05-19    136  |  107  |  17.0  ----------------------------<  166<H>  3.7   |  15.0<L>  |  1.14    Ca    6.6<L>      19 May 2020 21:02  Mg     1.8     05-19    TPro  4.6<L>  /  Alb  1.4<L>  /  TBili  <0.2<L>  /  DBili  x   /  AST  22  /  ALT  13  /  AlkPhos  94  05-19      Culture - Fungal, Body Fluid (collected 05-08-20 @ 17:52)  Source: Pleural Fl Pleural Fluid    Culture - Body Fluid with Gram Stain (collected 05-08-20 @ 17:52)  Source: Pleural Fl Pleural Fluid  Gram Stain (05-08-20 @ 19:18):    Few polymorphonuclear leukocytes per low power field    Rare Gram positive cocci in pairs per oil power field    Rare Gram Variable Rods per oil power field    Rare Yeast per oil power field  Organism: Streptococcus mitis/oralis group (05-11-20 @ 17:39)    Sensitivities:      -  Clindamycin: R      -  Erythromycin: R      -  Levofloxacin: S      -  Vancomycin: S      Method Type: KB  Organism: Streptococcus mitis/oralis group  Coag Negative Staphylococcus  Klebsiella oxytoca (05-11-20 @ 17:37)  Organism: Streptococcus mitis/oralis group (05-11-20 @ 17:37)    Sensitivities:      -  Ceftriaxone: S 1      -  Penicillin: I 0.75      Method Type: ETEST  Organism: Klebsiella oxytoca (05-11-20 @ 17:35)    Sensitivities:      -  Amikacin: S <=16      -  Amoxicillin/Clavulanic Acid: S <=8/4      -  Ampicillin: R >16 These ampicillin results predict results for amoxicillin      -  Ampicillin/Sulbactam: S <=4/2 Enterobacter, Citrobacter, and Serratia may develop resistance during prolonged therapy (3-4 days)      -  Aztreonam: R >16      -  Cefazolin: R 16 Enterobacter, Citrobacter, and Serratia may develop resistance during prolonged therapy (3-4 days)      -  Cefepime: S <=2      -  Cefoxitin: S <=8      -  Ceftazidime/Avibactam: S 8      -  Ceftolozane/tazobactam: S <=2      -  Ceftriaxone: S <=1 Enterobacter, Citrobacter, and Serratia may develop resistance during prolonged therapy      -  Ciprofloxacin: R 2      -  Ertapenem: I 1      -  Gentamicin: S <=2      -  Imipenem: S <=1      -  Levofloxacin: R 2      -  Meropenem: S <=1      -  Piperacillin/Tazobactam: S <=8      -  Tobramycin: S <=2      -  Trimethoprim/Sulfamethoxazole: S <=0.5/9.5      Method Type: PAWAN  Organism: Coag Negative Staphylococcus (05-11-20 @ 17:35)    Sensitivities:      -  Ampicillin/Sulbactam: R <=8/4      -  Cefazolin: R <=4      -  Clindamycin: S <=0.25      -  Erythromycin: R >4      -  Gentamicin: S <=1 Should not be used as monotherapy      -  Oxacillin: R >2      -  Penicillin: R >8      -  RIF- Rifampin: S <=1 Should not be used as monotherapy      -  Tetra/Doxy: R >8      -  Trimethoprim/Sulfamethoxazole: S <=0.5/9.5      -  Vancomycin: S 2      Method Type: PAWAN    Culture - Urine (collected 05-08-20 @ 09:09)  Source: .Urine Catheterized  Final Report (05-09-20 @ 08:24):    >=3 organisms. Probable collection contamination.    Culture - Blood (collected 05-08-20 @ 09:02)  Source: .Blood Blood-Peripheral  Final Report (05-13-20 @ 10:00):    No Growth Final    Culture - Blood (collected 05-08-20 @ 09:02)  Source: .Blood Blood  Final Report (05-13-20 @ 10:00):    No Growth Final      Creatinine, Serum: 1.14 mg/dL (05-19-20 @ 21:02)  Creatinine, Serum: 1.19 mg/dL (05-18-20 @ 07:39)  Creatinine, Serum: 1.15 mg/dL (05-17-20 @ 13:26)  Creatinine, Serum: 1.07 mg/dL (05-17-20 @ 08:17)  Creatinine, Serum: 1.16 mg/dL (05-16-20 @ 09:42)    Ferritin, Serum: 808 ng/mL (05-09-20 @ 15:27)      WBC Count: 17.27 K/uL (05-19-20 @ 21:02)  WBC Count: 14.91 K/uL (05-18-20 @ 07:39)  WBC Count: 14.90 K/uL (05-17-20 @ 08:17)  WBC Count: 16.44 K/uL (05-16-20 @ 09:42)      COVID-19 PCR: NotDetec (05-12-20 @ 00:00)  COVID-19 PCR: NotDetec (05-07-20 @ 23:17)    Lactate Dehydrogenase, Serum: 181 U/L (05-08-20 @ 06:11)    Alkaline Phosphatase, Serum: 94 U/L (05-19-20 @ 21:02)  Alkaline Phosphatase, Serum: 68 U/L (05-17-20 @ 08:17)  Alanine Aminotransferase (ALT/SGPT): 13 U/L (05-19-20 @ 21:02)  Alanine Aminotransferase (ALT/SGPT): 12 U/L (05-17-20 @ 08:17)  Aspartate Aminotransferase (AST/SGOT): 22 U/L (05-19-20 @ 21:02)  Aspartate Aminotransferase (AST/SGOT): 23 U/L (05-17-20 @ 08:17)  Bilirubin Total, Serum: <0.2 mg/dL (05-19-20 @ 21:02)  Bilirubin Total, Serum: 0.2 mg/dL (05-17-20 @ 08:17)

## 2020-05-20 NOTE — PROGRESS NOTE ADULT - SUBJECTIVE AND OBJECTIVE BOX
Subjective: Patient lying in bed, no acute distress noted, stated "feeling better". Patient denies chest pain, palpitation, dizziness, shortness of breath, abdominal pain, N/V/D.        PAST MEDICAL & SURGICAL HISTORY:  Breast cancer metastasized to bone  Hypertension  Multiple sclerosis  S/P mastectomy, right  Breast CA  Osteoporosis  MS (mitral stenosis)  History of bowel resection  H/O breast surgery    V/S  T(C): 36.6 (05-19-20 @ 21:42), Max: 36.6 (05-19-20 @ 21:42)  HR: 67 (05-19-20 @ 21:42) (67 - 94)  BP: 134/87 (05-19-20 @ 21:42) (128/81 - 135/84)  RR: 18 (05-19-20 @ 21:42) (18 - 20)  SpO2: 94% (05-19-20 @ 21:42) (90% - 100%) on room air                                        Tele: Sinus rhythm    MEDICATIONS  (STANDING):  caspofungin IVPB 50 milliGRAM(s) IV Intermittent every 24 hours  chlorhexidine 4% Liquid 1 Application(s) Topical <User Schedule>  Dakins Solution - 1/2 Strength 1 Application(s) Topical two times a day  enoxaparin Injectable 30 milliGRAM(s) SubCutaneous every 12 hours  latanoprost 0.005% Ophthalmic Solution 1 Drop(s) Both EYES at bedtime  pantoprazole  Injectable 40 milliGRAM(s) IV Push every 12 hours  piperacillin/tazobactam IVPB.. 3.375 Gram(s) IV Intermittent every 8 hours  sodium chloride 0.9% lock flush 3 milliLiter(s) IV Push every 8 hours  sodium chloride 0.9%. 1000 milliLiter(s) (40 mL/Hr) IV Continuous <Continuous>      05-19    136  |  107  |  17.0  ----------------------------<  166<H>  3.7   |  15.0<L>  |  1.14    Ca    6.6<L>      19 May 2020 21:02  Phos  2.0     05-18  Mg     1.8     05-19    TPro  4.6<L>  /  Alb  1.4<L>  /  TBili  <0.2<L>  /  DBili  x   /  AST  22  /  ALT  13  /  AlkPhos  94  05-19                               9.8    17.27 )-----------( 452      ( 19 May 2020 21:02 )             30.9                 CAPILLARY BLOOD GLUCOSE      POCT Blood Glucose.: 139 mg/dL (19 May 2020 16:57)  POCT Blood Glucose.: 96 mg/dL (19 May 2020 08:17)           CXR:  < from: Xray Chest 1 View- PORTABLE-Urgent (05.18.20 @ 18:15) >    INTERPRETATION:    AP chest on May 18, 2020 at 6:05 PM. COVID a virus testing is -28.    Patient has history of esophageal perforation treated with esophageal stent. There is history of breast cancer with metastasis to bone patient has pelvic decubitus ulcers.    Heart is magnified by technique.    Retrocardiac process likely representing atelectasis with possible fluid component is again noted.    Lower esophageal stent extending into the abdomen again noted.    There are 2 right chest tubes.    There is an associated pleural reaction scattered in the right chest without pneumothorax. Clips are seen in the right axilla.    Numerous blastic metastases are seen and bones.    Chest is similar to May 17.    IMPRESSION: Unchanged chest as above.    < end of copied text >        PHYSICAL EXAMINATION:  Neuro: A+O x 3, non-focal, speech clear and intact  HEENT: PERRL, EOMI  Neck: supple, no JVD  CV: regular rate, regular rhythm, +S1S2, no murmurs or rub  Pulm/chest: lung sounds clear bilaterally,  no accessory muscle use noted. Right chest tube to suction, co crepitus noted  Abd: soft, NT, ND, +BS  Ext: DAVIDSON x 4, +2 edema upper and lower extremities bilaterally, + peripheral pulses bilaterally  Incisions: Midline abd incision + staples, open to air, clean dry intact  Tubes: R chest tube #1 + minimal air leak, small amount serous drainage, R chest tube #2 no air leak scant serous drainage, R SOURAV small amt serous drainage

## 2020-05-20 NOTE — PROGRESS NOTE ADULT - PROBLEM SELECTOR PLAN 2
S/p Right thoracotomy / washout / VATS decort 5/13/20.  Chest tubes to suction.  Follow up daily CXR.  Pleural fluid + for yeast, coag Negative Staph, Klebsiella, and strep mitis/oralis. Continue IV antibiotics per ID.   Vancomycin and Caspofungin to be completed 5/26/20 and Zosyn to be completed 6/9/20.  PICC line to be placed at the time fo discharge

## 2020-05-21 LAB
ANION GAP SERPL CALC-SCNC: 10 MMOL/L — SIGNIFICANT CHANGE UP (ref 5–17)
ANION GAP SERPL CALC-SCNC: 11 MMOL/L — SIGNIFICANT CHANGE UP (ref 5–17)
BUN SERPL-MCNC: 19 MG/DL — SIGNIFICANT CHANGE UP (ref 8–20)
BUN SERPL-MCNC: 20 MG/DL — SIGNIFICANT CHANGE UP (ref 8–20)
CALCIUM SERPL-MCNC: 6.5 MG/DL — CRITICAL LOW (ref 8.6–10.2)
CALCIUM SERPL-MCNC: 6.7 MG/DL — LOW (ref 8.6–10.2)
CHLORIDE SERPL-SCNC: 105 MMOL/L — SIGNIFICANT CHANGE UP (ref 98–107)
CHLORIDE SERPL-SCNC: 107 MMOL/L — SIGNIFICANT CHANGE UP (ref 98–107)
CO2 SERPL-SCNC: 18 MMOL/L — LOW (ref 22–29)
CO2 SERPL-SCNC: 20 MMOL/L — LOW (ref 22–29)
CREAT SERPL-MCNC: 0.77 MG/DL — SIGNIFICANT CHANGE UP (ref 0.5–1.3)
CREAT SERPL-MCNC: 0.81 MG/DL — SIGNIFICANT CHANGE UP (ref 0.5–1.3)
GLUCOSE SERPL-MCNC: 88 MG/DL — SIGNIFICANT CHANGE UP (ref 70–99)
GLUCOSE SERPL-MCNC: 94 MG/DL — SIGNIFICANT CHANGE UP (ref 70–99)
HCT VFR BLD CALC: 28.9 % — LOW (ref 34.5–45)
HGB BLD-MCNC: 9.6 G/DL — LOW (ref 11.5–15.5)
MAGNESIUM SERPL-MCNC: 2 MG/DL — SIGNIFICANT CHANGE UP (ref 1.6–2.6)
MAGNESIUM SERPL-MCNC: 2 MG/DL — SIGNIFICANT CHANGE UP (ref 1.6–2.6)
MCHC RBC-ENTMCNC: 27.8 PG — SIGNIFICANT CHANGE UP (ref 27–34)
MCHC RBC-ENTMCNC: 33.2 GM/DL — SIGNIFICANT CHANGE UP (ref 32–36)
MCV RBC AUTO: 83.8 FL — SIGNIFICANT CHANGE UP (ref 80–100)
PHOSPHATE SERPL-MCNC: 1.1 MG/DL — LOW (ref 2.4–4.7)
PLATELET # BLD AUTO: 429 K/UL — HIGH (ref 150–400)
POTASSIUM SERPL-MCNC: 3.4 MMOL/L — LOW (ref 3.5–5.3)
POTASSIUM SERPL-MCNC: 3.6 MMOL/L — SIGNIFICANT CHANGE UP (ref 3.5–5.3)
POTASSIUM SERPL-SCNC: 3.4 MMOL/L — LOW (ref 3.5–5.3)
POTASSIUM SERPL-SCNC: 3.6 MMOL/L — SIGNIFICANT CHANGE UP (ref 3.5–5.3)
RBC # BLD: 3.45 M/UL — LOW (ref 3.8–5.2)
RBC # FLD: 19.1 % — HIGH (ref 10.3–14.5)
SODIUM SERPL-SCNC: 135 MMOL/L — SIGNIFICANT CHANGE UP (ref 135–145)
SODIUM SERPL-SCNC: 136 MMOL/L — SIGNIFICANT CHANGE UP (ref 135–145)
VANCOMYCIN TROUGH SERPL-MCNC: 15.3 UG/ML — SIGNIFICANT CHANGE UP (ref 10–20)
VANCOMYCIN TROUGH SERPL-MCNC: 17.5 UG/ML — SIGNIFICANT CHANGE UP (ref 10–20)
WBC # BLD: 10.18 K/UL — SIGNIFICANT CHANGE UP (ref 3.8–10.5)
WBC # FLD AUTO: 10.18 K/UL — SIGNIFICANT CHANGE UP (ref 3.8–10.5)

## 2020-05-21 PROCEDURE — 99232 SBSQ HOSP IP/OBS MODERATE 35: CPT

## 2020-05-21 PROCEDURE — 71045 X-RAY EXAM CHEST 1 VIEW: CPT | Mod: 26

## 2020-05-21 RX ORDER — POTASSIUM CHLORIDE 20 MEQ
40 PACKET (EA) ORAL EVERY 4 HOURS
Refills: 0 | Status: COMPLETED | OUTPATIENT
Start: 2020-05-21 | End: 2020-05-21

## 2020-05-21 RX ORDER — ACETAMINOPHEN 500 MG
750 TABLET ORAL ONCE
Refills: 0 | Status: COMPLETED | OUTPATIENT
Start: 2020-05-21 | End: 2020-05-21

## 2020-05-21 RX ORDER — VANCOMYCIN HCL 1 G
750 VIAL (EA) INTRAVENOUS EVERY 24 HOURS
Refills: 0 | Status: DISCONTINUED | OUTPATIENT
Start: 2020-05-21 | End: 2020-05-22

## 2020-05-21 RX ORDER — POTASSIUM CHLORIDE 20 MEQ
40 PACKET (EA) ORAL EVERY 4 HOURS
Refills: 0 | Status: DISCONTINUED | OUTPATIENT
Start: 2020-05-21 | End: 2020-05-21

## 2020-05-21 RX ORDER — ACETAMINOPHEN 500 MG
1000 TABLET ORAL ONCE
Refills: 0 | Status: COMPLETED | OUTPATIENT
Start: 2020-05-21 | End: 2020-05-21

## 2020-05-21 RX ORDER — CALCIUM GLUCONATE 100 MG/ML
1 VIAL (ML) INTRAVENOUS ONCE
Refills: 0 | Status: COMPLETED | OUTPATIENT
Start: 2020-05-21 | End: 2020-05-21

## 2020-05-21 RX ADMIN — SODIUM CHLORIDE 40 MILLILITER(S): 9 INJECTION, SOLUTION INTRAVENOUS at 09:55

## 2020-05-21 RX ADMIN — Medication 400 MILLIGRAM(S): at 01:37

## 2020-05-21 RX ADMIN — CHLORHEXIDINE GLUCONATE 1 APPLICATION(S): 213 SOLUTION TOPICAL at 06:35

## 2020-05-21 RX ADMIN — Medication 1 APPLICATION(S): at 06:44

## 2020-05-21 RX ADMIN — Medication 300 MILLIGRAM(S): at 17:26

## 2020-05-21 RX ADMIN — Medication 40 MILLIEQUIVALENT(S): at 15:47

## 2020-05-21 RX ADMIN — PANTOPRAZOLE SODIUM 40 MILLIGRAM(S): 20 TABLET, DELAYED RELEASE ORAL at 17:27

## 2020-05-21 RX ADMIN — ENOXAPARIN SODIUM 30 MILLIGRAM(S): 100 INJECTION SUBCUTANEOUS at 10:01

## 2020-05-21 RX ADMIN — PANTOPRAZOLE SODIUM 40 MILLIGRAM(S): 20 TABLET, DELAYED RELEASE ORAL at 06:44

## 2020-05-21 RX ADMIN — PIPERACILLIN AND TAZOBACTAM 25 GRAM(S): 4; .5 INJECTION, POWDER, LYOPHILIZED, FOR SOLUTION INTRAVENOUS at 10:00

## 2020-05-21 RX ADMIN — PIPERACILLIN AND TAZOBACTAM 25 GRAM(S): 4; .5 INJECTION, POWDER, LYOPHILIZED, FOR SOLUTION INTRAVENOUS at 03:15

## 2020-05-21 RX ADMIN — SODIUM CHLORIDE 3 MILLILITER(S): 9 INJECTION INTRAMUSCULAR; INTRAVENOUS; SUBCUTANEOUS at 06:35

## 2020-05-21 RX ADMIN — Medication 1 APPLICATION(S): at 17:27

## 2020-05-21 RX ADMIN — Medication 250 MILLIGRAM(S): at 15:48

## 2020-05-21 RX ADMIN — PIPERACILLIN AND TAZOBACTAM 25 GRAM(S): 4; .5 INJECTION, POWDER, LYOPHILIZED, FOR SOLUTION INTRAVENOUS at 18:28

## 2020-05-21 RX ADMIN — ENOXAPARIN SODIUM 30 MILLIGRAM(S): 100 INJECTION SUBCUTANEOUS at 17:27

## 2020-05-21 RX ADMIN — SODIUM CHLORIDE 3 MILLILITER(S): 9 INJECTION INTRAMUSCULAR; INTRAVENOUS; SUBCUTANEOUS at 20:35

## 2020-05-21 RX ADMIN — LATANOPROST 1 DROP(S): 0.05 SOLUTION/ DROPS OPHTHALMIC; TOPICAL at 22:16

## 2020-05-21 RX ADMIN — SODIUM CHLORIDE 3 MILLILITER(S): 9 INJECTION INTRAMUSCULAR; INTRAVENOUS; SUBCUTANEOUS at 15:46

## 2020-05-21 RX ADMIN — CASPOFUNGIN ACETATE 260 MILLIGRAM(S): 7 INJECTION, POWDER, LYOPHILIZED, FOR SOLUTION INTRAVENOUS at 02:01

## 2020-05-21 RX ADMIN — Medication 100 GRAM(S): at 17:26

## 2020-05-21 RX ADMIN — Medication 40 MILLIEQUIVALENT(S): at 10:00

## 2020-05-21 NOTE — PROGRESS NOTE ADULT - PROBLEM SELECTOR PLAN 5
Wound care consult / plastic surgery consult appreciated.   Wound care recommends Santyl and plastics eval  Dr Parrish from plastics recommends Dakins soaked packing to clean wound bid.  Air flow mattress  Turn and position q2 hours.  Optimize nutrition once able to access feeding tube Wound care consult / plastic surgery consult appreciated.   Wound care recommends Santyl and plastics eval.  Dr. Parrish from plastics recommends Dakins soaked packing to clean wound bid.  Air flow mattress.  Turn and position q2 hours.  Optimize nutrition once able to access feeding tube.

## 2020-05-21 NOTE — PROGRESS NOTE ADULT - PROBLEM SELECTOR PLAN 7
SCDs and Lovenox for DVT prophylaxis.  Protonix for GI prophylaxis  Case and plan discussed with Dr. Murcia / Thoracic Surgery team in AM rounds. SCDs and Lovenox for DVT prophylaxis.  Protonix for GI prophylaxis.    Case and plan to be discussed with attendings and Thoracic Surgery team in AM rounds.

## 2020-05-21 NOTE — PROGRESS NOTE ADULT - ASSESSMENT
69 year old non-ambulatory Female with a PMHx significant for multiple sclerosis, Breast Cancer s/p R mastectomy, Osteoporosis, Mitral stenosis, right ankle fracture, chronic right sided sacral ulcer, chronic midline back pain since being dropped from a jame lift (7/17/29), admitted to [facility] after presenting with sepsis in the setting of a UTI with chronic campos use and known large right ischial decubitus ulcer. Patient found to have a Moderately large Right hydropneumothorax resulting in partial right lung collapse and slight cardiomediastinal shift to the left on CXR with chest tube placed 5/8/20. Large bore chest tube placed 5/10/20 for persistent Right pneumothorax. Patient was followed by ID and was given Vanco and Zosyn originally for her presumed urosepsis, Caspofungin was added after pleural fluid was + for fungal elements. CT chest confirmed +Empyema in the Right pleural space. Patient ultimately transferred to [facility] late 5/11/20 after she was found to have a distal esophageal perforation on CT chest and Esophogram.     5/13/20 patient underwent EGD with esophageal stent placed, NGT placed, VATS with right thoracic cavity washout and pulmonary decortication with vaibhav drain, right posterior, and right medial chest tubes placed with Dr. Murcia.  On 5/14 patient was taken to IR for unsuccessful G/J tube placement. 5/15 patient was taken to the OR for feeding tube placement with Dr. Murcia.  The G tube is to gravity drainage, and slow feeds are advancing through the J tube. S/P esophagram 5/18 which showed narrowing distal stented area. Tolerating tube feeds.

## 2020-05-21 NOTE — PROGRESS NOTE ADULT - PROBLEM SELECTOR PLAN 2
S/p Right thoracotomy / washout / VATS decort 5/13/20.  Chest tubes to suction.  Follow up daily CXR.  Pleural fluid + for yeast, coag Negative Staph, Klebsiella, and strep mitis/oralis. Continue IV antibiotics per ID.   Vancomycin and Caspofungin to be completed 5/26/20 and Zosyn to be completed 6/9/20.  PICC line to be placed at the time fo discharge S/p Right thoracotomy / washout / VATS decort 5/13/20.  Chest tubes to suction.  Follow up daily CXR.  Pleural fluid + for yeast, coag Negative Staph, Klebsiella, and strep mitis/oralis. Continue IV antibiotics per ID.   Vancomycin and Caspofungin to be completed 5/26/20 and Zosyn to be completed 6/9/20.  PICC line to be placed at the time of discharge.

## 2020-05-21 NOTE — CHART NOTE - NSCHARTNOTEFT_GEN_A_CORE
CTS ACP Addendum    Patient seen and examined. Notes, flowsheets, medications, radiologic images and labs reviewed. Case discussed with Dr. Murcia. Patient noted to have slightly increased thick turbid white/tan fluid from chest tube and SOURAV drain. No air leak noted. OK to continue full liquid feeding by mouth at this time per Dr Murcia. Hemodynamically stable. Electrolyte abnormalities noted and repleted, likely c/w refeeding syndrome. Patient has edema/third spacing likely due to severe protein malnutrition. Plan to continue feeding for now, monitor chest tube outputs, check daily electrolytes, dakins packing to ischial wound BID. Tube feeds to continue at 30 cc/hr. Will continue to monitor closely.

## 2020-05-21 NOTE — PROGRESS NOTE ADULT - SUBJECTIVE AND OBJECTIVE BOX
[facility]  INFECTIOUS DISEASES AND INTERNAL MEDICINE at [place]  =======================================================  Phong Wang MD  Diplomates American Board of Internal Medicine and Infectious Diseases  Telephone [phone]  Fax            [phone]  =======================================================    [id number]  GEORGE STANLEY   follow up: empyema     s/p EGD and esophageal stent; s/p VATS 5/14  s/p gastrojejunal tube on 5/15/2020  s/p debridement of sacral decubitus       =======================================================  REVIEW OF SYSTEMS:  CONSTITUTIONAL:  No Fever or chills  HEENT:  No diplopia or blurred vision.  No earache, sore throat or runny nose.  CARDIOVASCULAR:  No pressure, squeezing, strangling, tightness, heaviness or aching about the chest, neck, axilla or epigastrium.  RESPIRATORY:  MILD shortness of breath  GASTROINTESTINAL:  No nausea, vomiting or diarrhea.  GENITOURINARY:  No dysuria, frequency or urgency. No Blood in urine  MUSCULOSKELETAL:  no joint aches, no muscle pain  SKIN:  No change in skin, hair or nails.  NEUROLOGIC:  No Headaches, seizures or weakness.  PSYCHIATRIC:  No disorder of thought or mood.  ENDOCRINE:  No heat or cold intolerance  HEMATOLOGICAL:  No easy bruising or bleeding.   =======================================================  Allergies  Sudafed (Other)    ======================================================  Physical Exam:  ============   Vital Signs Last 24 Hrs  T(C): 36.4 (21 May 2020 10:00), Max: 36.4 (21 May 2020 10:00)  T(F): 97.5 (21 May 2020 10:00), Max: 97.5 (21 May 2020 10:00)  HR: 81 (21 May 2020 10:00) (68 - 81)  BP: 112/78 (21 May 2020 10:00) (102/62 - 127/82)  BP(mean): --  RR: 18 (21 May 2020 10:00) (18 - 18)  SpO2: 98% (21 May 2020 10:00) (98% - 99%)    General:  No acute distress. FRAIL  Eye: Pupils are equal, round and reactive to light, Extraocular movements are intact, Normal conjunctiva.  HENT: Normocephalic, Oral mucosa is moist, No pharyngeal erythema, No sinus tenderness.  Neck: Supple, No lymphadenopathy.  Respiratory: Lungs  with diminished air entry at bases  RIGHT side with 2 chest tubes - CLEAR FLUID  Cardiovascular: Normal rate, Regular rhythm  Gastrointestinal: Soft, Non-tender, Non-distended, Normal bowel sounds.  PEJ tube present in abd  Genitourinary: + ELIZONDO with clear urine  Lymphatics: No lymphadenopathy neck,   Musculoskeletal: Normal range of motion, Normal strength.  Integumentary: No rash.  Neurologic: Alert, Oriented, No focal deficits, Cranial Nerves II-XII are grossly intact.  Psychiatric: Appropriate mood & affect.    =======================================================           =======================================================  Current Antibiotics:  caspofungin IVPB 50 milliGRAM(s) IV Intermittent every 24 hours  piperacillin/tazobactam IVPB.. 3.375 Gram(s) IV Intermittent every 8 hours    Other medications:  chlorhexidine 4% Liquid 1 Application(s) Topical <User Schedule>  Dakins Solution - 1/2 Strength 1 Application(s) Topical two times a day  enoxaparin Injectable 30 milliGRAM(s) SubCutaneous every 12 hours  lactated ringers. 1000 milliLiter(s) IV Continuous <Continuous>  latanoprost 0.005% Ophthalmic Solution 1 Drop(s) Both EYES at bedtime  pantoprazole  Injectable 40 milliGRAM(s) IV Push every 12 hours  sodium chloride 0.9% lock flush 3 milliLiter(s) IV Push every 8 hours  sodium chloride 0.9%. 1000 milliLiter(s) IV Continuous <Continuous>      =======================================================  Labs:                                       9.6    10.18 )-----------( 429      ( 21 May 2020 06:31 )             28.9   05-21    135  |  105  |  20.0  ----------------------------<  94  3.6   |  20.0<L>  |  0.77    Ca    6.5<LL>      21 May 2020 11:25  Phos  1.1     05-21  Mg     2.0     05-21    TPro  4.6<L>  /  Alb  1.4<L>  /  TBili  <0.2<L>  /  DBili  x   /  AST  22  /  ALT  13  /  AlkPhos  94  05-19      Culture - Fungal, Body Fluid (collected 05-08-20 @ 17:52)  Source: Pleural Fl Pleural Fluid    Culture - Body Fluid with Gram Stain (collected 05-08-20 @ 17:52)  Source: Pleural Fl Pleural Fluid  Gram Stain (05-08-20 @ 19:18):    Few polymorphonuclear leukocytes per low power field    Rare Gram positive cocci in pairs per oil power field    Rare Gram Variable Rods per oil power field    Rare Yeast per oil power field  Organism: Streptococcus mitis/oralis group (05-11-20 @ 17:39)    Sensitivities:      -  Clindamycin: R      -  Erythromycin: R      -  Levofloxacin: S      -  Vancomycin: S      Method Type: KB  Organism: Streptococcus mitis/oralis group  Coag Negative Staphylococcus  Klebsiella oxytoca (05-11-20 @ 17:37)  Organism: Streptococcus mitis/oralis group (05-11-20 @ 17:37)    Sensitivities:      -  Ceftriaxone: S 1      -  Penicillin: I 0.75      Method Type: ETEST  Organism: Klebsiella oxytoca (05-11-20 @ 17:35)    Sensitivities:      -  Amikacin: S <=16      -  Amoxicillin/Clavulanic Acid: S <=8/4      -  Ampicillin: R >16 These ampicillin results predict results for amoxicillin      -  Ampicillin/Sulbactam: S <=4/2 Enterobacter, Citrobacter, and Serratia may develop resistance during prolonged therapy (3-4 days)      -  Aztreonam: R >16      -  Cefazolin: R 16 Enterobacter, Citrobacter, and Serratia may develop resistance during prolonged therapy (3-4 days)      -  Cefepime: S <=2      -  Cefoxitin: S <=8      -  Ceftazidime/Avibactam: S 8      -  Ceftolozane/tazobactam: S <=2      -  Ceftriaxone: S <=1 Enterobacter, Citrobacter, and Serratia may develop resistance during prolonged therapy      -  Ciprofloxacin: R 2      -  Ertapenem: I 1      -  Gentamicin: S <=2      -  Imipenem: S <=1      -  Levofloxacin: R 2      -  Meropenem: S <=1      -  Piperacillin/Tazobactam: S <=8      -  Tobramycin: S <=2      -  Trimethoprim/Sulfamethoxazole: S <=0.5/9.5      Method Type: PAWAN  Organism: Coag Negative Staphylococcus (05-11-20 @ 17:35)    Sensitivities:      -  Ampicillin/Sulbactam: R <=8/4      -  Cefazolin: R <=4      -  Clindamycin: S <=0.25      -  Erythromycin: R >4      -  Gentamicin: S <=1 Should not be used as monotherapy      -  Oxacillin: R >2      -  Penicillin: R >8      -  RIF- Rifampin: S <=1 Should not be used as monotherapy      -  Tetra/Doxy: R >8      -  Trimethoprim/Sulfamethoxazole: S <=0.5/9.5      -  Vancomycin: S 2      Method Type: PAWAN    Culture - Urine (collected 05-08-20 @ 09:09)  Source: .Urine Catheterized  Final Report (05-09-20 @ 08:24):    >=3 organisms. Probable collection contamination.    Culture - Blood (collected 05-08-20 @ 09:02)  Source: .Blood Blood-Peripheral  Final Report (05-13-20 @ 10:00):    No Growth Final    Culture - Blood (collected 05-08-20 @ 09:02)  Source: .Blood Blood  Final Report (05-13-20 @ 10:00):    No Growth Final      Creatinine, Serum: 1.14 mg/dL (05-19-20 @ 21:02)  Creatinine, Serum: 1.19 mg/dL (05-18-20 @ 07:39)  Creatinine, Serum: 1.15 mg/dL (05-17-20 @ 13:26)  Creatinine, Serum: 1.07 mg/dL (05-17-20 @ 08:17)  Creatinine, Serum: 1.16 mg/dL (05-16-20 @ 09:42)    Ferritin, Serum: 808 ng/mL (05-09-20 @ 15:27)      WBC Count: 17.27 K/uL (05-19-20 @ 21:02)  WBC Count: 14.91 K/uL (05-18-20 @ 07:39)  WBC Count: 14.90 K/uL (05-17-20 @ 08:17)  WBC Count: 16.44 K/uL (05-16-20 @ 09:42)      COVID-19 PCR: NotDetec (05-12-20 @ 00:00)  COVID-19 PCR: NotDetec (05-07-20 @ 23:17)    Lactate Dehydrogenase, Serum: 181 U/L (05-08-20 @ 06:11)    Alkaline Phosphatase, Serum: 94 U/L (05-19-20 @ 21:02)  Alkaline Phosphatase, Serum: 68 U/L (05-17-20 @ 08:17)  Alanine Aminotransferase (ALT/SGPT): 13 U/L (05-19-20 @ 21:02)  Alanine Aminotransferase (ALT/SGPT): 12 U/L (05-17-20 @ 08:17)  Aspartate Aminotransferase (AST/SGOT): 22 U/L (05-19-20 @ 21:02)  Aspartate Aminotransferase (AST/SGOT): 23 U/L (05-17-20 @ 08:17)  Bilirubin Total, Serum: <0.2 mg/dL (05-19-20 @ 21:02)  Bilirubin Total, Serum: 0.2 mg/dL (05-17-20 @ 08:17)

## 2020-05-21 NOTE — PROGRESS NOTE ADULT - PROBLEM SELECTOR PLAN 1
S/p esophageal stent placed 5/13/20 with NGT placed.   S/p GJ tube placement 5/15 (open procedure).  G port connected to gravity drainage, slow feeds are advancing through the J tube.  Tolerating feeds (liquid diet, G tube to gravity which will drain liquids).  Dietary consult appreciated, PO vitamin supplements to be added when tolerating tube feeds.  Will restart PO meds once able to.  Continue IV hydration while NPO, will d/c once tolerating Tube feeds.  Encourage deep breathing, chest PT, incentive spirometry.   Maintain R CTs x 2 to suction for +airleak.   Follow up AM CXR.  Case and plan discussed with Dr. Murcia / Thoracic Surgery team in AM rounds. S/p esophageal stent placed 5/13/20 with NGT placed.   S/p GJ tube placement 5/15 (open procedure).  G port connected to gravity drainage, slow feeds are advancing through the J tube.  Tolerating feeds.  Dietary consult appreciated, PO vitamin supplements to be added when tolerating tube feeds.  Will restart PO meds once able to.  Continue IV hydration while NPO, will d/c once tolerating enough Tube feeds.  Encourage deep breathing, chest PT, incentive spirometry.   Maintain R CTs x 2 to suction for +airleak.   Follow up AM CXR.

## 2020-05-21 NOTE — PROGRESS NOTE ADULT - ASSESSMENT
HPI: This 69 year old non-ambulatory Female with a PMHx significant for multiple sclerosis, Breast Cancer s/p R mastectomy, Osteoporosis, Mitral stenosis, right ankle fracture, chronic right sided sacral ulcer, chronic midline back pain since being dropped from a jame lift (7/17/29), admitted to [facility] after presenting with sepsis in the setting of a UTI with chronic campos use and known large right ischial decubitus ulcer. Patient found to have a Moderately large Right hydropneumothorax resulting in partial right lung collapse and slight cardiomediastinal shift to the left on CXR with chest tube placed 5/8/20. Large bore chest tube placed 5/10/20 for persistent Right pneumothorax. Patient was followed by ID and was given Vanco and Zosyn originally for her presumed urosepsis, Caspofungin was added after pleural fluid was + for fungal elements. CT chest confirmed +Empyema in the Right pleural space. Patient ultimately transferred to [facility] late 5/11/20 after she was found to have a distal esophageal perforation on CT chest and Esophogram.     Of note, patient admitted 10/9/2019 for lower back and bilateral knee pain, found to have compression fracture of L2 with imaging subsequently found to have multiple lytic lesions on the spine and pelvis.  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 Matthew Fairbanks. (12 May 2020 02:24)    patient is admitted to surgical service, pre-operatively planned for an esophageal stent 5/13.  Patient's labs/ cultures from [facility] reviewed.     Empyema with polymicrobial infection:  Strep mitis infection  Klebsiella oxytoca infection  CoNS infection  Perforated esophagus    Plan:  s/p esophageal stent  and VATS 5/13  s/p PEJ on 5/15    continue Antibiotics:  caspofungin IVPB 50 milliGRAM(s) IV Intermittent every 24 hours  piperacillin/tazobactam IVPB.. 3.375 Gram(s) IV Intermittent every 8 hours  Vanco by level    YEAST from fluid culture being worked up  NOT Candida auris; specimen sent to [facility] labs  CASPO/VANCO THROUGH 5/26  WILL continue ZOSYN THRU 6/9/2020  - continue Chest tubes      WILL need PICC LINE at time of discharge    -

## 2020-05-21 NOTE — PROGRESS NOTE ADULT - SUBJECTIVE AND OBJECTIVE BOX
HPI:  69 year old non-ambulatory Female with a PMHx significant for multiple sclerosis, Breast Cancer s/p R mastectomy, Osteoporosis, Mitral stenosis, right ankle fracture, chronic right sided sacral ulcer, chronic midline back pain since being dropped from a jame lift (7/17/29), admitted to [facility] after presenting with sepsis in the setting of a UTI with chronic campos use and known large right ischial decubitus ulcer. Patient found to have a Moderately large Right hydropneumothorax resulting in partial right lung collapse and slight cardiomediastinal shift to the left on CXR with chest tube placed 5/8/20. Large bore chest tube placed 5/10/20 for persistent Right pneumothorax. Patient was followed by ID and was given Vanco and Zosyn originally for her presumed urosepsis, Caspofungin was added after pleural fluid was + for fungal elements. CT chest confirmed +Empyema in the Right pleural space. Patient ultimately transferred to [facility] late 5/11/20 after she was found to have a distal esophageal perforation on CT chest and Esophogram.     Of note, patient admitted 10/9/2019 for lower back and bilateral knee pain, found to have compression fracture of L2 with imaging subsequently found to have multiple lytic lesions on the spine and pelvis.  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 Matthew Fairbanks.     PAST MEDICAL & SURGICAL HISTORY:  Breast cancer metastasized to bone  Hypertension  Multiple sclerosis  S/P mastectomy, right  Breast CA  Osteoporosis  MS (mitral stenosis)  History of bowel resection  H/O breast surgery    Brief Hospital Course: Patient admitted to [facility] 5/12/20 for +Right sided Empyema, with distal esophageal perforation in the setting of recent sepsis on IV abx.  S/p EGD with esophageal perforation identified and stent placed with fluoroscopy, NGT placed, VATS with right thoracic cavity washout and pulmonary decortication with vaibhav drain, right posterior, and right medial chest tubes placed on 5/13/20.  On 5/14 patient was taken to IR for unsuccessful G/J tube placement. 5/15 patient was taken to the OR for feeding tube placement with Dr. Murcia.  The G tube is to gravity drainage, and slow feeds are advancing through the J tube. Esophagram done 5/18 with narrowing of the distal esophagus/gastroesophageal junction just distal to the stent with delayed emptying of the esophagus noted.    Subjective: Patient lying in bed in no acute distress. Denies fevers, chills, lightheadedness, dizziness, HA, CP, palpitations, SOB, cough, abdominal pain, N/V, diarrhea, numbness/tingling in extremities, or any other acute complaints.    Vital Signs Last 24 Hrs  T(C): 36.3 (05-20-20 @ 22:49), Max: 36.7 (05-20-20 @ 10:00)  T(F): 97.4 (05-20-20 @ 22:49), Max: 98.1 (05-20-20 @ 10:00)  HR: 69 (05-20-20 @ 22:49) (69 - 87)  BP: 127/82 (05-20-20 @ 22:49) (100/66 - 127/82)  RR: 18 (05-20-20 @ 22:49) (18 - 18)  SpO2: 99% (05-20-20 @ 22:49) (99% - 100%)  on (O2)              MEDICATIONS  caspofungin IVPB 50 milliGRAM(s) IV Intermittent every 24 hours  chlorhexidine 4% Liquid 1 Application(s) Topical <User Schedule>  Dakins Solution - 1/2 Strength 1 Application(s) Topical two times a day  enoxaparin Injectable 30 milliGRAM(s) SubCutaneous every 12 hours  lactated ringers. 1000 milliLiter(s) IV Continuous <Continuous>  latanoprost 0.005% Ophthalmic Solution 1 Drop(s) Both EYES at bedtime  pantoprazole  Injectable 40 milliGRAM(s) IV Push every 12 hours  piperacillin/tazobactam IVPB.. 3.375 Gram(s) IV Intermittent every 8 hours  sodium chloride 0.9% lock flush 10 milliLiter(s) IV Push every 1 hour PRN  sodium chloride 0.9% lock flush 3 milliLiter(s) IV Push every 8 hours  sodium chloride 0.9%. 1000 milliLiter(s) IV Continuous    PHYSICAL EXAM  Constitutional: Well developed, no acute distress noted  Neuro: A+O x 3, non-focal   HEENT: NC/AT, PERRL, EOMI, oral mucosa pink and moist  Neck: supple, no JVD  CV: RRR +S1S2, no murmur, gallops or rubs  Pulm/chest: Decreased breath sounds on the Right, left CTA, no accessory muscle use noted  Abd: soft, NT, ND, +BS  : +Campos with yellow urine in bedside bag  Ext: DAVIDSON x 4, Limited LE strength, legs contracted, +LE sensation intact, +2 pedal edema bilaterally, +DP/PT pulses bilaterally.  Skin: warm, well perfused, +Large sacral decub  Psych: calm, appropriate affect  Tubes: G/J tubes in place with tube feeds @ 30/hr. 2 Right sided chest tubes to suction, dressing c/d/i, no surrounding crepitus noted. +Right midaxillary SOURAV drain to bulb suction.     I&O's Detail    19 May 2020 07:01  -  20 May 2020 07:00  --------------------------------------------------------  IN:    Enteral Tube Flush: 60 mL    Free Water: 600 mL    lactated ringers.: 80 mL    Oral Fluid: 1500 mL    Pivot 1.5: 210 mL    sodium chloride 0.9%: 200 mL  Total IN: 2650 mL    OUT:    Chest Tube: 50 mL    Chest Tube: 25 mL    Drain: 115 mL    Gastrostomy Tube: 200 mL    Indwelling Catheter - Urethral: 75 mL    Voided: 50 mL  Total OUT: 515 mL    Total NET: 2135 mL      20 May 2020 07:01  -  21 May 2020 03:08  --------------------------------------------------------  IN:    Free Water: 450 mL    lactated ringers.: 720 mL    Oral Fluid: 420 mL    Pivot 1.5: 540 mL    sodium chloride 0.9%.: 250 mL    Solution: 200 mL    Solution: 100 mL    Solution: 50 mL    Solution: 100 mL  Total IN: 2830 mL    OUT:    Chest Tube: 340 mL    Chest Tube: 40 mL    Drain: 50 mL    Indwelling Catheter - Urethral: 350 mL  Total OUT: 780 mL    Total NET: 2050 mL    Weights:  Admit Wt: Drug Dosing Weight  Height (cm): 167.64 (11 May 2020 22:12)  Weight (kg): 56.7 (15 May 2020 09:49)  BMI (kg/m2): 20.2 (15 May 2020 09:49)  BSA (m2): 1.64 (15 May 2020 09:49)    All laboratory results, radiology and medications reviewed.    LABS    05-19    136  |  107  |  17.0  ----------------------------<  166<H>  3.7   |  15.0<L>  |  1.14    Ca    6.6<L>      19 May 2020 21:02  Mg     1.8     05-19    TPro  4.6<L>  /  Alb  1.4<L>  /  TBili  <0.2<L>  /  DBili  x   /  AST  22  /  ALT  13  /  AlkPhos  94  05-19                                 9.8    17.27 )-----------( 452      ( 19 May 2020 21:02 )             30.9            Last CXR:  < from: Xray Chest 1 View- PORTABLE-Routine (05.20.20 @ 05:05) >  Patient had hydropneumothorax treated with chest tube present.  Heart suggest normal size.  Lower esophageal stent again noted.  3 Right chest tubes remain.  There is a mild lateral right pleural reaction and some irregular density in the right lower lung field similar to May 18. There is no pneumothorax.  Present film shows an increasing effusion likely with adjacent left lower lobe atelectasis compared to prior.  Bony metastatic disease again noted.  IMPRESSION: Increasing left base pleural pulmonary findings. Otherwise stable findings as above.  < end of copied text >

## 2020-05-22 LAB
ALBUMIN SERPL ELPH-MCNC: 1.5 G/DL — LOW (ref 3.3–5.2)
ALP SERPL-CCNC: 131 U/L — HIGH (ref 40–120)
ALT FLD-CCNC: 27 U/L — SIGNIFICANT CHANGE UP
ANION GAP SERPL CALC-SCNC: 14 MMOL/L — SIGNIFICANT CHANGE UP (ref 5–17)
AST SERPL-CCNC: 51 U/L — HIGH
BILIRUB SERPL-MCNC: 0.2 MG/DL — LOW (ref 0.4–2)
BUN SERPL-MCNC: 21 MG/DL — HIGH (ref 8–20)
CALCIUM SERPL-MCNC: 6.8 MG/DL — LOW (ref 8.6–10.2)
CHLORIDE SERPL-SCNC: 106 MMOL/L — SIGNIFICANT CHANGE UP (ref 98–107)
CO2 SERPL-SCNC: 18 MMOL/L — LOW (ref 22–29)
CREAT SERPL-MCNC: 0.84 MG/DL — SIGNIFICANT CHANGE UP (ref 0.5–1.3)
GLUCOSE SERPL-MCNC: 85 MG/DL — SIGNIFICANT CHANGE UP (ref 70–99)
HCT VFR BLD CALC: 28.4 % — LOW (ref 34.5–45)
HGB BLD-MCNC: 9.5 G/DL — LOW (ref 11.5–15.5)
MAGNESIUM SERPL-MCNC: 1.9 MG/DL — SIGNIFICANT CHANGE UP (ref 1.6–2.6)
MCHC RBC-ENTMCNC: 28 PG — SIGNIFICANT CHANGE UP (ref 27–34)
MCHC RBC-ENTMCNC: 33.5 GM/DL — SIGNIFICANT CHANGE UP (ref 32–36)
MCV RBC AUTO: 83.8 FL — SIGNIFICANT CHANGE UP (ref 80–100)
PHOSPHATE SERPL-MCNC: 1.3 MG/DL — LOW (ref 2.4–4.7)
PLATELET # BLD AUTO: 520 K/UL — HIGH (ref 150–400)
POTASSIUM SERPL-MCNC: 3.7 MMOL/L — SIGNIFICANT CHANGE UP (ref 3.5–5.3)
POTASSIUM SERPL-SCNC: 3.7 MMOL/L — SIGNIFICANT CHANGE UP (ref 3.5–5.3)
PROT SERPL-MCNC: 4.8 G/DL — LOW (ref 6.6–8.7)
RBC # BLD: 3.39 M/UL — LOW (ref 3.8–5.2)
RBC # FLD: 19 % — HIGH (ref 10.3–14.5)
SODIUM SERPL-SCNC: 138 MMOL/L — SIGNIFICANT CHANGE UP (ref 135–145)
VANCOMYCIN TROUGH SERPL-MCNC: 21.2 UG/ML — HIGH (ref 10–20)
WBC # BLD: 11.43 K/UL — HIGH (ref 3.8–10.5)
WBC # FLD AUTO: 11.43 K/UL — HIGH (ref 3.8–10.5)

## 2020-05-22 PROCEDURE — 71045 X-RAY EXAM CHEST 1 VIEW: CPT | Mod: 26

## 2020-05-22 PROCEDURE — 99024 POSTOP FOLLOW-UP VISIT: CPT

## 2020-05-22 RX ORDER — VANCOMYCIN HCL 1 G
750 VIAL (EA) INTRAVENOUS EVERY 24 HOURS
Refills: 0 | Status: DISCONTINUED | OUTPATIENT
Start: 2020-05-22 | End: 2020-05-24

## 2020-05-22 RX ORDER — POTASSIUM CHLORIDE 20 MEQ
40 PACKET (EA) ORAL ONCE
Refills: 0 | Status: COMPLETED | OUTPATIENT
Start: 2020-05-22 | End: 2020-05-22

## 2020-05-22 RX ORDER — MAGNESIUM SULFATE 500 MG/ML
2 VIAL (ML) INJECTION ONCE
Refills: 0 | Status: COMPLETED | OUTPATIENT
Start: 2020-05-22 | End: 2020-05-22

## 2020-05-22 RX ADMIN — Medication 50 GRAM(S): at 17:26

## 2020-05-22 RX ADMIN — SODIUM CHLORIDE 3 MILLILITER(S): 9 INJECTION INTRAMUSCULAR; INTRAVENOUS; SUBCUTANEOUS at 21:54

## 2020-05-22 RX ADMIN — Medication 1 APPLICATION(S): at 06:25

## 2020-05-22 RX ADMIN — PIPERACILLIN AND TAZOBACTAM 25 GRAM(S): 4; .5 INJECTION, POWDER, LYOPHILIZED, FOR SOLUTION INTRAVENOUS at 01:34

## 2020-05-22 RX ADMIN — SODIUM CHLORIDE 3 MILLILITER(S): 9 INJECTION INTRAMUSCULAR; INTRAVENOUS; SUBCUTANEOUS at 14:00

## 2020-05-22 RX ADMIN — Medication 62.5 MILLIMOLE(S): at 22:01

## 2020-05-22 RX ADMIN — PANTOPRAZOLE SODIUM 40 MILLIGRAM(S): 20 TABLET, DELAYED RELEASE ORAL at 17:30

## 2020-05-22 RX ADMIN — CHLORHEXIDINE GLUCONATE 1 APPLICATION(S): 213 SOLUTION TOPICAL at 05:42

## 2020-05-22 RX ADMIN — CASPOFUNGIN ACETATE 260 MILLIGRAM(S): 7 INJECTION, POWDER, LYOPHILIZED, FOR SOLUTION INTRAVENOUS at 00:04

## 2020-05-22 RX ADMIN — PIPERACILLIN AND TAZOBACTAM 25 GRAM(S): 4; .5 INJECTION, POWDER, LYOPHILIZED, FOR SOLUTION INTRAVENOUS at 12:38

## 2020-05-22 RX ADMIN — Medication 40 MILLIEQUIVALENT(S): at 11:25

## 2020-05-22 RX ADMIN — PIPERACILLIN AND TAZOBACTAM 25 GRAM(S): 4; .5 INJECTION, POWDER, LYOPHILIZED, FOR SOLUTION INTRAVENOUS at 19:25

## 2020-05-22 RX ADMIN — PANTOPRAZOLE SODIUM 40 MILLIGRAM(S): 20 TABLET, DELAYED RELEASE ORAL at 06:25

## 2020-05-22 RX ADMIN — LATANOPROST 1 DROP(S): 0.05 SOLUTION/ DROPS OPHTHALMIC; TOPICAL at 21:53

## 2020-05-22 RX ADMIN — SODIUM CHLORIDE 40 MILLILITER(S): 9 INJECTION, SOLUTION INTRAVENOUS at 18:55

## 2020-05-22 RX ADMIN — Medication 85 MILLIMOLE(S): at 06:25

## 2020-05-22 RX ADMIN — Medication 1 APPLICATION(S): at 17:26

## 2020-05-22 RX ADMIN — SODIUM CHLORIDE 3 MILLILITER(S): 9 INJECTION INTRAMUSCULAR; INTRAVENOUS; SUBCUTANEOUS at 05:42

## 2020-05-22 RX ADMIN — ENOXAPARIN SODIUM 30 MILLIGRAM(S): 100 INJECTION SUBCUTANEOUS at 11:26

## 2020-05-22 RX ADMIN — Medication 250 MILLIGRAM(S): at 17:29

## 2020-05-22 RX ADMIN — ENOXAPARIN SODIUM 30 MILLIGRAM(S): 100 INJECTION SUBCUTANEOUS at 21:53

## 2020-05-22 NOTE — CHART NOTE - NSCHARTNOTEFT_GEN_A_CORE
Source: Patient [ ]  Family [ ]   other [ x]    Current Diet: Diet, Full Liquid:   Tube Feeding Modality: Jejunostomy  Pivot 1.5 Derek  Total Volume for 24 Hours (mL): 720  Continuous  Until Goal Tube Feed Rate (mL per Hour): 30  Tube Feed Duration (in Hours): 24  Tube Feed Start Time: 09:30 (05-19-20 @ 09:22)    Enteral /Parenteral Nutrition:  Pivot @ 30ml/hr (x20hrs) will provide 600ml, 900kcal, 85g protein, 683ml free water daily via J tube and Full liquids with po    Current Weight:   (5/21) 165.7 lbs  (5/15) 125 lbs  (5/11) 125 lbs  -Question accuracy of wts, continue to monitor. L leg 2+, R arm 2+ edema noted.     Pertinent Medications: MEDICATIONS  (STANDING):  caspofungin IVPB 50 milliGRAM(s) IV Intermittent every 24 hours  chlorhexidine 4% Liquid 1 Application(s) Topical <User Schedule>  Dakins Solution - 1/2 Strength 1 Application(s) Topical two times a day  enoxaparin Injectable 30 milliGRAM(s) SubCutaneous every 12 hours  lactated ringers. 1000 milliLiter(s) (40 mL/Hr) IV Continuous <Continuous>  latanoprost 0.005% Ophthalmic Solution 1 Drop(s) Both EYES at bedtime  magnesium sulfate  IVPB 2 Gram(s) IV Intermittent once  pantoprazole  Injectable 40 milliGRAM(s) IV Push every 12 hours  piperacillin/tazobactam IVPB.. 3.375 Gram(s) IV Intermittent every 8 hours  potassium chloride    Tablet ER 40 milliEquivalent(s) Oral once  sodium chloride 0.9% lock flush 3 milliLiter(s) IV Push every 8 hours  sodium chloride 0.9%. 1000 milliLiter(s) (250 mL/Hr) IV Continuous <Continuous>  sodium phosphate IVPB 15 milliMole(s) IV Intermittent once  vancomycin  IVPB 750 milliGRAM(s) IV Intermittent every 24 hours    MEDICATIONS  (PRN):  sodium chloride 0.9% lock flush 10 milliLiter(s) IV Push every 1 hour PRN Pre/post blood products, medications, blood draw, and to maintain line patency    Pertinent Labs: CBC Full  -  ( 22 May 2020 06:38 )  WBC Count : 11.43 K/uL  RBC Count : 3.39 M/uL  Hemoglobin : 9.5 g/dL  Hematocrit : 28.4 %  Platelet Count - Automated : 520 K/uL  Mean Cell Volume : 83.8 fl  Mean Cell Hemoglobin : 28.0 pg  Mean Cell Hemoglobin Concentration : 33.5 gm/dL  Auto Neutrophil # : x  Auto Lymphocyte # : x  Auto Monocyte # : x  Auto Eosinophil # : x  Auto Basophil # : x  Auto Neutrophil % : x  Auto Lymphocyte % : x  Auto Monocyte % : x  Auto Eosinophil % : x  Auto Basophil % : x  05-22 Na138 mmol/L Glu 85 mg/dL K+ 3.7 mmol/L Cr  0.84 mg/dL BUN 21.0 mg/dL<H> Phos 1.3 mg/dL<L> Alb 1.5 g/dL<L> PAB n/a       Skin: Unstageable right ischium, R heel unstageable, L shin unstageable, R knee unstageable     Limited nutrition focused physical exam previously conducted - found signs of malnutrition [ ]absent [x ]present    Subcutaneous fat loss: [x ] Orbital fat pads region, [ ]Buccal fat region, [ ]Triceps region,  [ ]Ribs region    Muscle wasting: [x ]Temples region, [ ]Clavicle region, [x ]Shoulder region, [ ]Scapula region, [ ]Interosseous region,  [ ]thigh region, [ ]Calf region    Current Nutrition Diagnosis: Pt remains at nutrition risk secondary to severe protein calorie malnutrition (acute-- likely on chronic) related to inability to meet increased protein energy needs in setting of metastatic breast ca, esophageal perforation, +NGT, s/p GJ tube placement and skin breakdown as evidenced by pt previously meeting <50% estimated energy intake >5 days, moderate muscle wasting, and 11% wt loss. TF currently running at goal rate of 30ml/hr- pt tolerating well per documentation. Pt assisted with full liquid diet, consuming %. Last documented BM 5/21. RD to remain available.     Recommendations:   1. Continue with order for Pivot 1.5 @ 30ml/hr.  As/when medically feasible- increase by 10ml q 8 hrs to goal rate of 50ml/hr (x20hrs) daily, as tolerated (while on full liquids) to provide 1000ml, 1500kcal, 94g protein, 760ml free water.  2. Consider RX: Ensure Clear TID with full liquid trays- as tolerated  3. RX: liquid MVI and Vit C 500mg daily   4. RX: znso4 220mg x 10 days daily  5. Monitor daily wts and labs    Monitoring and Evaluation:   [x ] PO intake [x ] Tolerance to diet prescription [X] Weights  [X] Follow up per protocol [X] Labs:

## 2020-05-22 NOTE — PROGRESS NOTE ADULT - SUBJECTIVE AND OBJECTIVE BOX
Subjective: Patient resting comfortably in bed, no acute distress noted, denies fever, chills, chest pain, shortness of breath, JACKSON, dizziness, headache, abdominal pain/N/V/D.       V/S  T(C): 36.2 (05-21-20 @ 22:12), Max: 36.4 (05-21-20 @ 10:00)  HR: 80 (05-21-20 @ 22:12) (68 - 81)  BP: 141/76 (05-21-20 @ 22:12) (112/78 - 141/76)  RR: 16 (05-21-20 @ 22:12) (16 - 18)  SpO2: 98% (05-21-20 @ 22:12) (98% - 98%) on room air                                         Tele: Sinus rhythm    CHEST TUBE:  Right side with Chest Tube x2 and SOURAV x1   AIR LEAKS: Chest tube #1 +air leak,  Chest tube 2 with no air leak.     MEDICATIONS  (STANDING):  caspofungin IVPB 50 milliGRAM(s) IV Intermittent every 24 hours  chlorhexidine 4% Liquid 1 Application(s) Topical <User Schedule>  Dakins Solution - 1/2 Strength 1 Application(s) Topical two times a day  enoxaparin Injectable 30 milliGRAM(s) SubCutaneous every 12 hours  lactated ringers. 1000 milliLiter(s) (40 mL/Hr) IV Continuous <Continuous>  latanoprost 0.005% Ophthalmic Solution 1 Drop(s) Both EYES at bedtime  pantoprazole  Injectable 40 milliGRAM(s) IV Push every 12 hours  piperacillin/tazobactam IVPB.. 3.375 Gram(s) IV Intermittent every 8 hours  sodium chloride 0.9% lock flush 3 milliLiter(s) IV Push every 8 hours  sodium chloride 0.9%. 1000 milliLiter(s) (250 mL/Hr) IV Continuous <Continuous>  sodium phosphate IVPB 30 milliMole(s) IV Intermittent once  vancomycin  IVPB 750 milliGRAM(s) IV Intermittent every 24 hours      05-21    135  |  105  |  20.0  ----------------------------<  94  3.6   |  20.0<L>  |  0.77    Ca    6.5<LL>      21 May 2020 11:25  Phos  1.1     05-21  Mg     2.0     05-21                                 9.6    10.18 )-----------( 429      ( 21 May 2020 06:31 )             28.9                 CAPILLARY BLOOD GLUCOSE               CXR:  < from: Xray Chest 1 View- PORTABLE-Routine (05.21.20 @ 04:55) >    INTERPRETATION:    Examination: XR CHEST    History: ADMDIAG1: K22.3 TRANSFER SEPSIS/, chf COVID negative patient.    Findings:  Cardio megaly. Status post vascular graft in the chest, unchanged. Right chest tube unchanged in position. Second right chest tube also unchanged in position. Persistent cardiomegaly. Opacification of the left lung base consistent with consolidation. Cannot exclude pleural effusion. Right pleural thickening and ill-defined infiltrates in the right lung.    Impression:  Stable exam without significant change since the previous study.      < end of copied text >        Physical Exam:  PHYSICAL EXAM  Constitutional: Well developed, no acute distress noted  Neuro: A+O x 3, non-focal   HEENT: NC/AT, PERRL, EOMI, oral mucosa pink and moist  Neck: supple, no JVD  CV: RRR +S1S2, no murmur, gallops or rubs  Pulm/chest: Decreased breath sounds on the Right, left CTA, no accessory muscle use noted  Abd: soft, NT, ND, +BS  : +Starks with yellow urine in bedside bag  Ext: DAVIDSON x 4, Limited LE strength, legs contracted, +LE sensation intact, +2 pedal edema bilaterally, +DP/PT pulses bilaterally.  Skin: warm, well perfused, +Large sacral decub  Psych: calm, appropriate affect  Tubes: G/J tubes in place with tube feeds @ 30/hr. G tube with drainage bag to gravity. 2 Right sided chest tubes to suction, dressing c/d/i, no surrounding crepitus noted. +Right midaxillary SOURAV drain to bulb suction.           PAST MEDICAL & SURGICAL HISTORY:  Breast cancer metastasized to bone  Hypertension  Multiple sclerosis  S/P mastectomy, right  Breast CA  Osteoporosis  MS (mitral stenosis)  History of bowel resection  H/O breast surgery

## 2020-05-22 NOTE — PROGRESS NOTE ADULT - PROBLEM SELECTOR PLAN 1
S/p esophageal stent placed 5/13/20 with NGT placed.   S/p GJ tube placement 5/15 (open procedure).  G port connected to gravity drainage, slow feeds are advancing through the J tube.  Tolerating feeds. NGT removed  Dietary consult appreciated, PO vitamin supplements to be added when tolerating tube feeds.  Will restart PO meds once able to.  Continue IV hydration while NPO, will d/c once tolerating enough Tube feeds.  Encourage deep breathing, chest PT, incentive spirometry.   Maintain R CTs x 2 to suction for +airleak.   Follow up AM CXR.

## 2020-05-22 NOTE — PROGRESS NOTE ADULT - PROBLEM SELECTOR PLAN 2
S/p Right thoracotomy / washout / VATS decort 5/13/20.  Chest tubes to suction.  Follow up daily CXR.  Pleural fluid + for yeast, coag Negative Staph, Klebsiella, and strep mitis/oralis. Continue IV antibiotics per ID.   Vancomycin and Caspofungin to be completed 5/26/20 and Zosyn to be completed 6/9/20.  PICC line to be placed at the time of discharge.

## 2020-05-22 NOTE — PROGRESS NOTE ADULT - PROBLEM SELECTOR PLAN 5
Wound care consult / plastic surgery consult appreciated.   Wound care recommends Santyl and plastics eval.  Dr. Parrish from plastics recommends Dakins soaked packing to clean wound bid.  Air flow mattress.  Turn and position q2 hours.  Optimize nutrition via feeding tube, vitamins supplements to add when tolerating PO

## 2020-05-22 NOTE — PROGRESS NOTE ADULT - PROBLEM SELECTOR PLAN 7
SCDs and Lovenox for DVT prophylaxis.  Protonix for GI prophylaxis.    Case and plan to be discussed with attendings and Thoracic Surgery team in AM rounds.

## 2020-05-22 NOTE — PROGRESS NOTE ADULT - ASSESSMENT
69 year old non-ambulatory Female with a PMHx significant for multiple sclerosis, Breast Cancer s/p R mastectomy, Osteoporosis, Mitral stenosis, right ankle fracture, chronic right sided sacral ulcer, chronic midline back pain since being dropped from a jame lift (7/17/29), admitted to [facility] after presenting with sepsis in the setting of a UTI with chronic campos use and known large right ischial decubitus ulcer. Patient found to have a Moderately large Right hydropneumothorax resulting in partial right lung collapse and slight cardiomediastinal shift to the left on CXR with chest tube placed 5/8/20. Large bore chest tube placed 5/10/20 for persistent Right pneumothorax. Patient was followed by ID and was given Vanco and Zosyn originally for her presumed urosepsis, Caspofungin was added after pleural fluid was + for fungal elements. CT chest confirmed +Empyema in the Right pleural space. Patient ultimately transferred to [facility] late 5/11/20 after she was found to have a distal esophageal perforation on CT chest and Esophogram.     5/13/20 patient underwent EGD with esophageal stent placed, NGT placed, VATS with right thoracic cavity washout and pulmonary decortication with vaibhav drain, right posterior, and right medial chest tubes placed with Dr. Murcia.  On 5/14 patient was taken to IR for unsuccessful G/J tube placement. 5/15 patient was taken to the OR for feeding tube placement with Dr. Murcia.  The G tube is to gravity drainage, and slow feeds are advancing through the J tube. S/P esophagram 5/18 which showed narrowing distal stented area. Tolerating tube feeds.

## 2020-05-23 LAB
ALBUMIN SERPL ELPH-MCNC: 1.7 G/DL — LOW (ref 3.3–5.2)
ALP SERPL-CCNC: 222 U/L — HIGH (ref 40–120)
ALT FLD-CCNC: 29 U/L — SIGNIFICANT CHANGE UP
ANION GAP SERPL CALC-SCNC: 12 MMOL/L — SIGNIFICANT CHANGE UP (ref 5–17)
AST SERPL-CCNC: 48 U/L — HIGH
BILIRUB SERPL-MCNC: <0.2 MG/DL — LOW (ref 0.4–2)
BUN SERPL-MCNC: 25 MG/DL — HIGH (ref 8–20)
CALCIUM SERPL-MCNC: 6.5 MG/DL — CRITICAL LOW (ref 8.6–10.2)
CHLORIDE SERPL-SCNC: 102 MMOL/L — SIGNIFICANT CHANGE UP (ref 98–107)
CO2 SERPL-SCNC: 21 MMOL/L — LOW (ref 22–29)
CREAT SERPL-MCNC: 0.9 MG/DL — SIGNIFICANT CHANGE UP (ref 0.5–1.3)
GLUCOSE SERPL-MCNC: 106 MG/DL — HIGH (ref 70–99)
HCT VFR BLD CALC: 26.7 % — LOW (ref 34.5–45)
HGB BLD-MCNC: 8.8 G/DL — LOW (ref 11.5–15.5)
MAGNESIUM SERPL-MCNC: 1.9 MG/DL — SIGNIFICANT CHANGE UP (ref 1.6–2.6)
MCHC RBC-ENTMCNC: 27.8 PG — SIGNIFICANT CHANGE UP (ref 27–34)
MCHC RBC-ENTMCNC: 33 GM/DL — SIGNIFICANT CHANGE UP (ref 32–36)
MCV RBC AUTO: 84.2 FL — SIGNIFICANT CHANGE UP (ref 80–100)
PHOSPHATE SERPL-MCNC: 2.8 MG/DL — SIGNIFICANT CHANGE UP (ref 2.4–4.7)
PLATELET # BLD AUTO: 507 K/UL — HIGH (ref 150–400)
POTASSIUM SERPL-MCNC: 3.7 MMOL/L — SIGNIFICANT CHANGE UP (ref 3.5–5.3)
POTASSIUM SERPL-SCNC: 3.7 MMOL/L — SIGNIFICANT CHANGE UP (ref 3.5–5.3)
PROT SERPL-MCNC: 5 G/DL — LOW (ref 6.6–8.7)
RBC # BLD: 3.17 M/UL — LOW (ref 3.8–5.2)
RBC # FLD: 19.2 % — HIGH (ref 10.3–14.5)
SODIUM SERPL-SCNC: 135 MMOL/L — SIGNIFICANT CHANGE UP (ref 135–145)
VANCOMYCIN TROUGH SERPL-MCNC: 20.3 UG/ML — HIGH (ref 10–20)
WBC # BLD: 7.39 K/UL — SIGNIFICANT CHANGE UP (ref 3.8–10.5)
WBC # FLD AUTO: 7.39 K/UL — SIGNIFICANT CHANGE UP (ref 3.8–10.5)

## 2020-05-23 PROCEDURE — 71045 X-RAY EXAM CHEST 1 VIEW: CPT | Mod: 26

## 2020-05-23 PROCEDURE — 99232 SBSQ HOSP IP/OBS MODERATE 35: CPT | Mod: 24

## 2020-05-23 RX ORDER — MAGNESIUM SULFATE 500 MG/ML
2 VIAL (ML) INJECTION ONCE
Refills: 0 | Status: COMPLETED | OUTPATIENT
Start: 2020-05-23 | End: 2020-05-23

## 2020-05-23 RX ORDER — POTASSIUM CHLORIDE 20 MEQ
10 PACKET (EA) ORAL ONCE
Refills: 0 | Status: COMPLETED | OUTPATIENT
Start: 2020-05-23 | End: 2020-05-23

## 2020-05-23 RX ORDER — CALCIUM GLUCONATE 100 MG/ML
1 VIAL (ML) INTRAVENOUS ONCE
Refills: 0 | Status: COMPLETED | OUTPATIENT
Start: 2020-05-23 | End: 2020-05-23

## 2020-05-23 RX ADMIN — CASPOFUNGIN ACETATE 260 MILLIGRAM(S): 7 INJECTION, POWDER, LYOPHILIZED, FOR SOLUTION INTRAVENOUS at 02:34

## 2020-05-23 RX ADMIN — PANTOPRAZOLE SODIUM 40 MILLIGRAM(S): 20 TABLET, DELAYED RELEASE ORAL at 06:04

## 2020-05-23 RX ADMIN — Medication 1 APPLICATION(S): at 06:03

## 2020-05-23 RX ADMIN — CHLORHEXIDINE GLUCONATE 1 APPLICATION(S): 213 SOLUTION TOPICAL at 05:57

## 2020-05-23 RX ADMIN — ENOXAPARIN SODIUM 30 MILLIGRAM(S): 100 INJECTION SUBCUTANEOUS at 21:30

## 2020-05-23 RX ADMIN — SODIUM CHLORIDE 3 MILLILITER(S): 9 INJECTION INTRAMUSCULAR; INTRAVENOUS; SUBCUTANEOUS at 05:57

## 2020-05-23 RX ADMIN — PIPERACILLIN AND TAZOBACTAM 25 GRAM(S): 4; .5 INJECTION, POWDER, LYOPHILIZED, FOR SOLUTION INTRAVENOUS at 03:37

## 2020-05-23 RX ADMIN — Medication 250 MILLIGRAM(S): at 17:58

## 2020-05-23 RX ADMIN — Medication 100 MILLIEQUIVALENT(S): at 21:33

## 2020-05-23 RX ADMIN — PIPERACILLIN AND TAZOBACTAM 25 GRAM(S): 4; .5 INJECTION, POWDER, LYOPHILIZED, FOR SOLUTION INTRAVENOUS at 20:00

## 2020-05-23 RX ADMIN — Medication 100 GRAM(S): at 21:03

## 2020-05-23 RX ADMIN — PIPERACILLIN AND TAZOBACTAM 25 GRAM(S): 4; .5 INJECTION, POWDER, LYOPHILIZED, FOR SOLUTION INTRAVENOUS at 10:26

## 2020-05-23 RX ADMIN — Medication 50 GRAM(S): at 20:00

## 2020-05-23 RX ADMIN — Medication 1 APPLICATION(S): at 17:57

## 2020-05-23 RX ADMIN — ENOXAPARIN SODIUM 30 MILLIGRAM(S): 100 INJECTION SUBCUTANEOUS at 10:26

## 2020-05-23 RX ADMIN — PANTOPRAZOLE SODIUM 40 MILLIGRAM(S): 20 TABLET, DELAYED RELEASE ORAL at 18:54

## 2020-05-23 RX ADMIN — SODIUM CHLORIDE 3 MILLILITER(S): 9 INJECTION INTRAMUSCULAR; INTRAVENOUS; SUBCUTANEOUS at 17:46

## 2020-05-23 RX ADMIN — SODIUM CHLORIDE 3 MILLILITER(S): 9 INJECTION INTRAMUSCULAR; INTRAVENOUS; SUBCUTANEOUS at 21:29

## 2020-05-23 RX ADMIN — LATANOPROST 1 DROP(S): 0.05 SOLUTION/ DROPS OPHTHALMIC; TOPICAL at 21:32

## 2020-05-23 NOTE — PROGRESS NOTE ADULT - PROBLEM SELECTOR PLAN 1
S/p esophageal stent placed 5/13/20 with NGT placed.   S/p GJ tube placement 5/15 (open procedure).  G port connected to gravity drainage, slow feeds are advancing through the J tube.  Tolerating feeds. NGT removed  Dietary consult appreciated, PO vitamin supplements to be added when tolerating tube feeds.  Will restart PO meds once able to.  Encourage deep breathing, chest PT, incentive spirometry.   Maintain R CTs x 2 to suction for +airleak.   Follow up AM CXR.

## 2020-05-23 NOTE — PROGRESS NOTE ADULT - SUBJECTIVE AND OBJECTIVE BOX
Subjective: Pt in bed NAD no issues overnight .  Tube feeds increased to 40cc/hr as per Dr Murcia     T(C): 36.4 (05-22-20 @ 19:41), Max: 36.4 (05-22-20 @ 19:41)  HR: 87 (05-23-20 @ 05:56) (87 - 95)  BP: 106/61 (05-23-20 @ 05:56) (106/61 - 121/82)    RR: 17 (05-23-20 @ 05:56) (17 - 17)  SpO2: 98% (05-23-20 @ 05:56) (98% - 100%) 2 L NC   Tele: SR     CHEST TUBE: #1 150cc    no airleak #2 0  + air leak                                      05-22    138  |  106  |  21.0<H>  ----------------------------<  85  3.7   |  18.0<L>  |  0.84    Ca    6.8<L>      22 May 2020 06:38  Phos  1.3     05-22  Mg     1.9     05-22    TPro  4.8<L>  /  Alb  1.5<L>  /  TBili  0.2<L>  /  DBili  x   /  AST  51<H>  /  ALT  27  /  AlkPhos  131<H>  05-22                               9.5    11.43 )-----------( 520      ( 22 May 2020 06:38 )             28.4                 CAPILLARY BLOOD GLUCOSE               CXR: < from: Xray Chest 1 View- PORTABLE-Routine (05.23.20 @ 05:43) >  FINDINGS: RIGHT chest tube tip overlies apex of lung. Second chest tube coiled in mid hemithorax..  Mid esophageal stent in place.  The lungs  show LEFT lower lobe airspace consolidation and/or effusion. A chronic a RIGHT lung base and    The heart and mediastinum are within normal limits.    There are diffuse sclerotic osseous metastasis.    IMPRESSION:   LEFT basilar airspace consolidation and/or effusion. Ill-defined opacities in the RIGHT lung base. RIGHT chest tubes in place.  Esophageal stent in place.  Diffuse sclerotic of osseous metastasis..    < end of copied text >    PHYSICAL EXAM  Constitutional: Well developed, no acute distress noted  Neuro: A+O x 3, non-focal   HEENT: NC/AT, PERRL, EOMI, oral mucosa pink and moist  Neck: supple, no JVD  CV: RRR +S1S2, no murmur, gallops or rubs  Pulm/chest: Decreased breath sounds on the Right, left CTA, no accessory muscle use noted  Abd: soft, NT, ND, +BS  : +Starks with yellow urine in bedside bag  Ext: DAVIDSON x 4, Limited LE strength, legs contracted, +LE sensation intact, +2 pedal edema bilaterally, +DP/PT pulses bilaterally.              Assessment:  70yFemale    with PAST MEDICAL & SURGICAL HISTORY:  Breast cancer metastasized to bone  Hypertension  Multiple sclerosis  S/P mastectomy, right  Breast CA  Osteoporosis  MS (mitral stenosis)  History of bowel resection  H/O breast surgery        Plan:

## 2020-05-23 NOTE — PROGRESS NOTE ADULT - ASSESSMENT
69 year old non-ambulatory Female with a PMHx significant for multiple sclerosis, Breast Cancer s/p R mastectomy, Osteoporosis, Mitral stenosis, right ankle fracture, chronic right sided sacral ulcer, chronic midline back pain since being dropped from a jame lift (7/17/29), admitted to [facility] after presenting with sepsis in the setting of a UTI with chronic campos use and known large right ischial decubitus ulcer. Patient found to have a Moderately large Right hydropneumothorax resulting in partial right lung collapse and slight cardiomediastinal shift to the left on CXR with chest tube placed 5/8/20. Large bore chest tube placed 5/10/20 for persistent Right pneumothorax. Patient was followed by ID and was given Vanco and Zosyn originally for her presumed urosepsis, Caspofungin was added after pleural fluid was + for fungal elements. CT chest confirmed +Empyema in the Right pleural space. Patient ultimately transferred to [facility] late 5/11/20 after she was found to have a distal esophageal perforation on CT chest and Esophogram.     5/13/20 patient underwent EGD with esophageal stent placed, NGT placed, VATS with right thoracic cavity washout and pulmonary decortication with vaibhav drain, right posterior, and right medial chest tubes placed with Dr. Murcia.  On 5/14 patient was taken to IR for unsuccessful G/J tube placement. 5/15 patient was taken to the OR for feeding tube placement with Dr. Murcia.  The G tube is to gravity drainage, and slow feeds are advancing through the J tube. S/P esophagram 5/18 which showed narrowing distal stented area. Tolerating tube feeds.

## 2020-05-24 LAB
ALBUMIN SERPL ELPH-MCNC: 1.6 G/DL — LOW (ref 3.3–5.2)
ALP SERPL-CCNC: 210 U/L — HIGH (ref 40–120)
ALT FLD-CCNC: 26 U/L — SIGNIFICANT CHANGE UP
ANION GAP SERPL CALC-SCNC: 12 MMOL/L — SIGNIFICANT CHANGE UP (ref 5–17)
AST SERPL-CCNC: 43 U/L — HIGH
BILIRUB SERPL-MCNC: <0.2 MG/DL — LOW (ref 0.4–2)
BUN SERPL-MCNC: 27 MG/DL — HIGH (ref 8–20)
CALCIUM SERPL-MCNC: 6.5 MG/DL — CRITICAL LOW (ref 8.6–10.2)
CHLORIDE SERPL-SCNC: 101 MMOL/L — SIGNIFICANT CHANGE UP (ref 98–107)
CO2 SERPL-SCNC: 20 MMOL/L — LOW (ref 22–29)
CREAT SERPL-MCNC: 0.82 MG/DL — SIGNIFICANT CHANGE UP (ref 0.5–1.3)
GLUCOSE SERPL-MCNC: 110 MG/DL — HIGH (ref 70–99)
HCT VFR BLD CALC: 23.3 % — LOW (ref 34.5–45)
HGB BLD-MCNC: 7.6 G/DL — LOW (ref 11.5–15.5)
MAGNESIUM SERPL-MCNC: 2.2 MG/DL — SIGNIFICANT CHANGE UP (ref 1.6–2.6)
MCHC RBC-ENTMCNC: 27.5 PG — SIGNIFICANT CHANGE UP (ref 27–34)
MCHC RBC-ENTMCNC: 32.6 GM/DL — SIGNIFICANT CHANGE UP (ref 32–36)
MCV RBC AUTO: 84.4 FL — SIGNIFICANT CHANGE UP (ref 80–100)
PHOSPHATE SERPL-MCNC: 2.2 MG/DL — LOW (ref 2.4–4.7)
PLATELET # BLD AUTO: 405 K/UL — HIGH (ref 150–400)
POTASSIUM SERPL-MCNC: 3.6 MMOL/L — SIGNIFICANT CHANGE UP (ref 3.5–5.3)
POTASSIUM SERPL-SCNC: 3.6 MMOL/L — SIGNIFICANT CHANGE UP (ref 3.5–5.3)
PROT SERPL-MCNC: 4.8 G/DL — LOW (ref 6.6–8.7)
RBC # BLD: 2.76 M/UL — LOW (ref 3.8–5.2)
RBC # FLD: 19.1 % — HIGH (ref 10.3–14.5)
SODIUM SERPL-SCNC: 133 MMOL/L — LOW (ref 135–145)
VANCOMYCIN TROUGH SERPL-MCNC: 22.3 UG/ML — HIGH (ref 10–20)
VANCOMYCIN TROUGH SERPL-MCNC: 22.4 UG/ML — HIGH (ref 10–20)
WBC # BLD: 5.22 K/UL — SIGNIFICANT CHANGE UP (ref 3.8–10.5)
WBC # FLD AUTO: 5.22 K/UL — SIGNIFICANT CHANGE UP (ref 3.8–10.5)

## 2020-05-24 PROCEDURE — 99232 SBSQ HOSP IP/OBS MODERATE 35: CPT

## 2020-05-24 PROCEDURE — 71045 X-RAY EXAM CHEST 1 VIEW: CPT | Mod: 26

## 2020-05-24 RX ORDER — VANCOMYCIN HCL 1 G
500 VIAL (EA) INTRAVENOUS EVERY 24 HOURS
Refills: 0 | Status: DISCONTINUED | OUTPATIENT
Start: 2020-05-24 | End: 2020-05-27

## 2020-05-24 RX ORDER — POTASSIUM CHLORIDE 20 MEQ
10 PACKET (EA) ORAL
Refills: 0 | Status: COMPLETED | OUTPATIENT
Start: 2020-05-24 | End: 2020-05-24

## 2020-05-24 RX ORDER — POTASSIUM CHLORIDE 20 MEQ
40 PACKET (EA) ORAL ONCE
Refills: 0 | Status: DISCONTINUED | OUTPATIENT
Start: 2020-05-24 | End: 2020-05-24

## 2020-05-24 RX ORDER — OXYCODONE HYDROCHLORIDE 5 MG/1
5 TABLET ORAL ONCE
Refills: 0 | Status: DISCONTINUED | OUTPATIENT
Start: 2020-05-24 | End: 2020-05-24

## 2020-05-24 RX ORDER — CALCIUM GLUCONATE 100 MG/ML
1 VIAL (ML) INTRAVENOUS ONCE
Refills: 0 | Status: COMPLETED | OUTPATIENT
Start: 2020-05-24 | End: 2020-05-24

## 2020-05-24 RX ORDER — ACETAMINOPHEN 500 MG
1000 TABLET ORAL ONCE
Refills: 0 | Status: COMPLETED | OUTPATIENT
Start: 2020-05-24 | End: 2020-05-24

## 2020-05-24 RX ADMIN — PIPERACILLIN AND TAZOBACTAM 25 GRAM(S): 4; .5 INJECTION, POWDER, LYOPHILIZED, FOR SOLUTION INTRAVENOUS at 05:51

## 2020-05-24 RX ADMIN — PIPERACILLIN AND TAZOBACTAM 25 GRAM(S): 4; .5 INJECTION, POWDER, LYOPHILIZED, FOR SOLUTION INTRAVENOUS at 11:06

## 2020-05-24 RX ADMIN — Medication 62.5 MILLIMOLE(S): at 13:41

## 2020-05-24 RX ADMIN — Medication 100 MILLIEQUIVALENT(S): at 18:03

## 2020-05-24 RX ADMIN — SODIUM CHLORIDE 3 MILLILITER(S): 9 INJECTION INTRAMUSCULAR; INTRAVENOUS; SUBCUTANEOUS at 15:16

## 2020-05-24 RX ADMIN — LATANOPROST 1 DROP(S): 0.05 SOLUTION/ DROPS OPHTHALMIC; TOPICAL at 21:25

## 2020-05-24 RX ADMIN — ENOXAPARIN SODIUM 30 MILLIGRAM(S): 100 INJECTION SUBCUTANEOUS at 21:25

## 2020-05-24 RX ADMIN — CASPOFUNGIN ACETATE 260 MILLIGRAM(S): 7 INJECTION, POWDER, LYOPHILIZED, FOR SOLUTION INTRAVENOUS at 01:37

## 2020-05-24 RX ADMIN — Medication 400 MILLIGRAM(S): at 21:53

## 2020-05-24 RX ADMIN — Medication 1 APPLICATION(S): at 18:03

## 2020-05-24 RX ADMIN — Medication 100 MILLIEQUIVALENT(S): at 12:22

## 2020-05-24 RX ADMIN — PANTOPRAZOLE SODIUM 40 MILLIGRAM(S): 20 TABLET, DELAYED RELEASE ORAL at 05:51

## 2020-05-24 RX ADMIN — Medication 100 GRAM(S): at 12:21

## 2020-05-24 RX ADMIN — PIPERACILLIN AND TAZOBACTAM 25 GRAM(S): 4; .5 INJECTION, POWDER, LYOPHILIZED, FOR SOLUTION INTRAVENOUS at 21:25

## 2020-05-24 RX ADMIN — Medication 1 APPLICATION(S): at 05:52

## 2020-05-24 RX ADMIN — CHLORHEXIDINE GLUCONATE 1 APPLICATION(S): 213 SOLUTION TOPICAL at 05:53

## 2020-05-24 RX ADMIN — ENOXAPARIN SODIUM 30 MILLIGRAM(S): 100 INJECTION SUBCUTANEOUS at 11:07

## 2020-05-24 RX ADMIN — PANTOPRAZOLE SODIUM 40 MILLIGRAM(S): 20 TABLET, DELAYED RELEASE ORAL at 18:03

## 2020-05-24 RX ADMIN — SODIUM CHLORIDE 3 MILLILITER(S): 9 INJECTION INTRAMUSCULAR; INTRAVENOUS; SUBCUTANEOUS at 05:52

## 2020-05-24 RX ADMIN — SODIUM CHLORIDE 3 MILLILITER(S): 9 INJECTION INTRAMUSCULAR; INTRAVENOUS; SUBCUTANEOUS at 21:16

## 2020-05-24 RX ADMIN — Medication 100 MILLIGRAM(S): at 20:52

## 2020-05-24 RX ADMIN — OXYCODONE HYDROCHLORIDE 5 MILLIGRAM(S): 5 TABLET ORAL at 23:38

## 2020-05-24 NOTE — PROGRESS NOTE ADULT - SUBJECTIVE AND OBJECTIVE BOX
[facility]  INFECTIOUS DISEASES AND INTERNAL MEDICINE at [place]  =======================================================  Phong Wang MD  Diplomates American Board of Internal Medicine and Infectious Diseases  Telephone [phone]  Fax            [phone]  =======================================================    [id number]  GEORGE STANLEY   follow up: empyema     s/p EGD and esophageal stent; s/p VATS 5/14  s/p gastrojejunal tube on 5/15/2020  s/p debridement of sacral decubitus       =======================================================  REVIEW OF SYSTEMS:  CONSTITUTIONAL:  No Fever or chills  HEENT:  No diplopia or blurred vision.  No earache, sore throat or runny nose.  CARDIOVASCULAR:  No pressure, squeezing, strangling, tightness, heaviness or aching about the chest, neck, axilla or epigastrium.  RESPIRATORY:  MILD shortness of breath  GASTROINTESTINAL:  No nausea, vomiting or diarrhea.  GENITOURINARY:  No dysuria, frequency or urgency. No Blood in urine  MUSCULOSKELETAL:  no joint aches, no muscle pain  SKIN:  No change in skin, hair or nails.  NEUROLOGIC:  No Headaches, seizures or weakness.  PSYCHIATRIC:  No disorder of thought or mood.  ENDOCRINE:  No heat or cold intolerance  HEMATOLOGICAL:  No easy bruising or bleeding.   =======================================================  Allergies  Sudafed (Other)    ======================================================  Physical Exam:  ============   Vital Signs Last 24 Hrs  T(C): 36.7 (24 May 2020 15:46), Max: 36.7 (23 May 2020 21:34)  T(F): 98 (24 May 2020 15:46), Max: 98.1 (23 May 2020 21:34)  HR: 99 (24 May 2020 15:46) (72 - 99)  BP: 136/54 (24 May 2020 15:46) (113/76 - 136/54)  BP(mean): --  RR: 18 (24 May 2020 15:46) (18 - 18)  SpO2: 99% (24 May 2020 15:46) (99% - 100%)    General:  No acute distress. FRAIL  Eye: Pupils are equal, round and reactive to light, Extraocular movements are intact, Normal conjunctiva.  HENT: Normocephalic, Oral mucosa is moist, No pharyngeal erythema, No sinus tenderness.  Neck: Supple, No lymphadenopathy.  Respiratory: Lungs  with diminished air entry at bases  RIGHT side with 2 chest tubes - CLEAR FLUID  Cardiovascular: Normal rate, Regular rhythm  Gastrointestinal: Soft, Non-tender, Non-distended, Normal bowel sounds.  PEJ tube present in abd  Genitourinary: + ELIZONDO with clear urine  Lymphatics: No lymphadenopathy neck,   Musculoskeletal: Normal range of motion, Normal strength.  Integumentary: No rash.  Neurologic: Alert, Oriented, No focal deficits, Cranial Nerves II-XII are grossly intact.  Psychiatric: Appropriate mood & affect.    =======================================================           =======================================================  Current Antibiotics:  caspofungin IVPB 50 milliGRAM(s) IV Intermittent every 24 hours  piperacillin/tazobactam IVPB.. 3.375 Gram(s) IV Intermittent every 8 hours    Other medications:  chlorhexidine 4% Liquid 1 Application(s) Topical <User Schedule>  Dakins Solution - 1/2 Strength 1 Application(s) Topical two times a day  enoxaparin Injectable 30 milliGRAM(s) SubCutaneous every 12 hours  lactated ringers. 1000 milliLiter(s) IV Continuous <Continuous>  latanoprost 0.005% Ophthalmic Solution 1 Drop(s) Both EYES at bedtime  pantoprazole  Injectable 40 milliGRAM(s) IV Push every 12 hours  sodium chloride 0.9% lock flush 3 milliLiter(s) IV Push every 8 hours  sodium chloride 0.9%. 1000 milliLiter(s) IV Continuous <Continuous>      =======================================================  Labs:                Vital Signs Last 24 Hrs  T(C): 36.7 (24 May 2020 15:46), Max: 36.7 (23 May 2020 21:34)  T(F): 98 (24 May 2020 15:46), Max: 98.1 (23 May 2020 21:34)  HR: 99 (24 May 2020 15:46) (72 - 99)  BP: 136/54 (24 May 2020 15:46) (113/76 - 136/54)  BP(mean): --  RR: 18 (24 May 2020 15:46) (18 - 18)  SpO2: 99% (24 May 2020 15:46) (99% - 100%)      Culture - Fungal, Body Fluid (collected 05-08-20 @ 17:52)  Source: Pleural Fl Pleural Fluid    Culture - Body Fluid with Gram Stain (collected 05-08-20 @ 17:52)  Source: Pleural Fl Pleural Fluid  Gram Stain (05-08-20 @ 19:18):    Few polymorphonuclear leukocytes per low power field    Rare Gram positive cocci in pairs per oil power field    Rare Gram Variable Rods per oil power field    Rare Yeast per oil power field  Organism: Streptococcus mitis/oralis group (05-11-20 @ 17:39)    Sensitivities:      -  Clindamycin: R      -  Erythromycin: R      -  Levofloxacin: S      -  Vancomycin: S      Method Type: KB  Organism: Streptococcus mitis/oralis group  Coag Negative Staphylococcus  Klebsiella oxytoca (05-11-20 @ 17:37)  Organism: Streptococcus mitis/oralis group (05-11-20 @ 17:37)    Sensitivities:      -  Ceftriaxone: S 1      -  Penicillin: I 0.75      Method Type: ETEST  Organism: Klebsiella oxytoca (05-11-20 @ 17:35)    Sensitivities:      -  Amikacin: S <=16      -  Amoxicillin/Clavulanic Acid: S <=8/4      -  Ampicillin: R >16 These ampicillin results predict results for amoxicillin      -  Ampicillin/Sulbactam: S <=4/2 Enterobacter, Citrobacter, and Serratia may develop resistance during prolonged therapy (3-4 days)      -  Aztreonam: R >16      -  Cefazolin: R 16 Enterobacter, Citrobacter, and Serratia may develop resistance during prolonged therapy (3-4 days)      -  Cefepime: S <=2      -  Cefoxitin: S <=8      -  Ceftazidime/Avibactam: S 8      -  Ceftolozane/tazobactam: S <=2      -  Ceftriaxone: S <=1 Enterobacter, Citrobacter, and Serratia may develop resistance during prolonged therapy      -  Ciprofloxacin: R 2      -  Ertapenem: I 1      -  Gentamicin: S <=2      -  Imipenem: S <=1      -  Levofloxacin: R 2      -  Meropenem: S <=1      -  Piperacillin/Tazobactam: S <=8      -  Tobramycin: S <=2      -  Trimethoprim/Sulfamethoxazole: S <=0.5/9.5      Method Type: PAWAN  Organism: Coag Negative Staphylococcus (05-11-20 @ 17:35)    Sensitivities:      -  Ampicillin/Sulbactam: R <=8/4      -  Cefazolin: R <=4      -  Clindamycin: S <=0.25      -  Erythromycin: R >4      -  Gentamicin: S <=1 Should not be used as monotherapy      -  Oxacillin: R >2      -  Penicillin: R >8      -  RIF- Rifampin: S <=1 Should not be used as monotherapy      -  Tetra/Doxy: R >8      -  Trimethoprim/Sulfamethoxazole: S <=0.5/9.5      -  Vancomycin: S 2      Method Type: PAWAN    Culture - Urine (collected 05-08-20 @ 09:09)  Source: .Urine Catheterized  Final Report (05-09-20 @ 08:24):    >=3 organisms. Probable collection contamination.    Culture - Blood (collected 05-08-20 @ 09:02)  Source: .Blood Blood-Peripheral  Final Report (05-13-20 @ 10:00):    No Growth Final    Culture - Blood (collected 05-08-20 @ 09:02)  Source: .Blood Blood  Final Report (05-13-20 @ 10:00):    No Growth Final      Creatinine, Serum: 1.14 mg/dL (05-19-20 @ 21:02)  Creatinine, Serum: 1.19 mg/dL (05-18-20 @ 07:39)  Creatinine, Serum: 1.15 mg/dL (05-17-20 @ 13:26)  Creatinine, Serum: 1.07 mg/dL (05-17-20 @ 08:17)  Creatinine, Serum: 1.16 mg/dL (05-16-20 @ 09:42)    Ferritin, Serum: 808 ng/mL (05-09-20 @ 15:27)      WBC Count: 17.27 K/uL (05-19-20 @ 21:02)  WBC Count: 14.91 K/uL (05-18-20 @ 07:39)  WBC Count: 14.90 K/uL (05-17-20 @ 08:17)  WBC Count: 16.44 K/uL (05-16-20 @ 09:42)      COVID-19 PCR: NotDetec (05-12-20 @ 00:00)  COVID-19 PCR: NotDetec (05-07-20 @ 23:17)    Lactate Dehydrogenase, Serum: 181 U/L (05-08-20 @ 06:11)    Alkaline Phosphatase, Serum: 94 U/L (05-19-20 @ 21:02)  Alkaline Phosphatase, Serum: 68 U/L (05-17-20 @ 08:17)  Alanine Aminotransferase (ALT/SGPT): 13 U/L (05-19-20 @ 21:02)  Alanine Aminotransferase (ALT/SGPT): 12 U/L (05-17-20 @ 08:17)  Aspartate Aminotransferase (AST/SGOT): 22 U/L (05-19-20 @ 21:02)  Aspartate Aminotransferase (AST/SGOT): 23 U/L (05-17-20 @ 08:17)  Bilirubin Total, Serum: <0.2 mg/dL (05-19-20 @ 21:02)  Bilirubin Total, Serum: 0.2 mg/dL (05-17-20 @ 08:17)

## 2020-05-24 NOTE — PROGRESS NOTE ADULT - PROBLEM SELECTOR PLAN 2
S/p Right thoracotomy / washout / VATS decort 5/13/20.  Chest tubes to waterseal.  Follow up daily CXR.  Pleural fluid + for yeast, coag Negative Staph, Klebsiella, and strep mitis/oralis. Continue IV antibiotics per ID.   Vancomycin and Caspofungin to be completed 5/26/20 and Zosyn to be completed 6/9/20.  PICC line to be placed closer to time of discharge.  Blood cultures negative 5/8.  Will add repeat for tomorrow.

## 2020-05-24 NOTE — PROGRESS NOTE ADULT - ASSESSMENT
HPI: This 69 year old non-ambulatory Female with a PMHx significant for multiple sclerosis, Breast Cancer s/p R mastectomy, Osteoporosis, Mitral stenosis, right ankle fracture, chronic right sided sacral ulcer, chronic midline back pain since being dropped from a jame lift (7/17/29), admitted to [place] after presenting with sepsis in the setting of a UTI with chronic campos use and known large right ischial decubitus ulcer. Patient found to have a Moderately large Right hydropneumothorax resulting in partial right lung collapse and slight cardiomediastinal shift to the left on CXR with chest tube placed 5/8/20. Large bore chest tube placed 5/10/20 for persistent Right pneumothorax. Patient was followed by ID and was given Vanco and Zosyn originally for her presumed urosepsis, Caspofungin was added after pleural fluid was + for fungal elements. CT chest confirmed +Empyema in the Right pleural space. Patient ultimately transferred to [facility] late 5/11/20 after she was found to have a distal esophageal perforation on CT chest and Esophogram.     Of note, patient admitted 10/9/2019 for lower back and bilateral knee pain, found to have compression fracture of L2 with imaging subsequently found to have multiple lytic lesions on the spine and pelvis.  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 Matthew Fairbanks. (12 May 2020 02:24)    patient is admitted to surgical service, pre-operatively planned for an esophageal stent 5/13.  Patient's labs/ cultures from [facility] reviewed.     Empyema with polymicrobial infection:  Strep mitis infection  Klebsiella oxytoca infection  CoNS infection  Perforated esophagus    Plan:  s/p esophageal stent  and VATS 5/13  s/p PEJ on 5/15    continue Antibiotics:  caspofungin IVPB 50 milliGRAM(s) IV Intermittent every 24 hours  piperacillin/tazobactam IVPB.. 3.375 Gram(s) IV Intermittent every 8 hours  Vanco by level    YEAST from fluid culture being worked up  NOT Candida auris; specimen sent to [facility] labs  CASPO/VANCO THROUGH 5/26  WILL continue ZOSYN THRU 6/9/2020  - continue Chest tubes      WILL need PICC LINE at time of discharge  will follow up WITH FURTHER Recommendations  -

## 2020-05-24 NOTE — PROGRESS NOTE ADULT - PROBLEM SELECTOR PLAN 1
S/p esophageal stent placed 5/13/20 with NGT placed.   S/p GJ tube placement 5/15 (open procedure).  G port connected to gravity drainage, slow feeds  through the J tube @ 40ml/hr and to maintain rate for now per Dr. Murcia.  Encourage deep breathing, chest PT, incentive spirometry.   Maintain R CTs x 2.  Place to waterseal today.  No airleak.   Follow up AM CXR.

## 2020-05-24 NOTE — PROGRESS NOTE ADULT - SUBJECTIVE AND OBJECTIVE BOX
Subjective: Pt sitting up in bed.  Denies SOB or CP.  NAD noted.    Vital Signs:  Vital Signs Last 24 Hrs  T(C): 36.5 (05-24-20 @ 11:29), Max: 36.9 (05-23-20 @ 16:19)  T(F): 97.7 (05-24-20 @ 11:29), Max: 98.4 (05-23-20 @ 16:19)  HR: 91 (05-24-20 @ 11:29) (72 - 98)  BP: 128/78 (05-24-20 @ 11:29) (94/69 - 128/78)  RR: 18 (05-24-20 @ 11:29) (18 - 18)  SpO2: 100% (05-24-20 @ 11:29) (96% - 100%)     Telemetry/Alarms: SR    Relevant labs, radiology and Medications reviewed    Neuro: A+O x 3.  CV: RRR +S1S2.  Pulm/chest: Decreased breath sounds on the Right, left CTA, no accessory muscle use noted.  + CT x 2 on right side.  No airleak and no crepitus.  Abd: soft, NT, ND, +BS  : +Starks with yellow urine in bedside bag.  Ext: DAVIDSON x 4, Limited LE strength.  +LE sensation intact, +2 BLE edema, +pp.    05-23 @ 07:01  -  05-24 @ 07:00  --------------------------------------------------------  IN:    Enteral Tube Flush: 150 mL    Free Water: 150 mL    lactated ringers.: 120 mL    Oral Fluid: 650 mL    Pivot 1.5: 910 mL    Solution: 250 mL    Solution: 200 mL    Solution: 50 mL    Solution: 50 mL    Solution: 260 mL    Solution: 100 mL  Total IN: 2890 mL    OUT:    Chest Tube: 380 mL    Drain: 185 mL    Gastrostomy Tube: 350 mL    Voided: 350 mL  Total OUT: 1265 mL    Total NET: 1625 mL      05-24 @ 07:01  -  05-24 @ 12:30  --------------------------------------------------------  IN:    Enteral Tube Flush: 150 mL    Oral Fluid: 420 mL    Pivot 1.5: 200 mL    Solution: 100 mL    Solution: 50 mL    Solution: 100 mL  Total IN: 1020 mL    OUT:    Drain: 80 mL  Total OUT: 80 mL    Total NET: 940 mL

## 2020-05-24 NOTE — PROGRESS NOTE ADULT - ASSESSMENT
69 year old non-ambulatory Female with a PMHx significant for multiple sclerosis, Breast Cancer s/p R mastectomy, Osteoporosis, Mitral stenosis, right ankle fracture, chronic right sided sacral ulcer, chronic midline back pain since being dropped from a jame lift (7/17/29), admitted to [place] after presenting with sepsis in the setting of a UTI with chronic campos use and known large right ischial decubitus ulcer. Patient found to have a Moderately large Right hydropneumothorax resulting in partial right lung collapse and slight cardiomediastinal shift to the left on CXR with chest tube placed 5/8/20. Large bore chest tube placed 5/10/20 for persistent Right pneumothorax. Patient was followed by ID and was given Vanco and Zosyn originally for her presumed urosepsis, Caspofungin was added after pleural fluid was + for fungal elements. CT chest confirmed +Empyema in the Right pleural space. Patient ultimately transferred to [facility] late 5/11/20 after she was found to have a distal esophageal perforation on CT chest and Esophogram.     5/13/20 patient underwent EGD with esophageal stent placed, NGT placed, VATS with right thoracic cavity washout and pulmonary decortication with vaibhav drain, right posterior, and right medial chest tubes placed with Dr. Murcia.  On 5/14 patient was taken to IR for unsuccessful G/J tube placement. 5/15 patient was taken to the OR for feeding tube placement with Dr. Murcia.  The G tube is to gravity drainage, and slow feeds are advancing through the J tube. S/P esophagram 5/18 which showed narrowing distal stented area. Tolerating tube feeds.

## 2020-05-25 LAB
ALBUMIN SERPL ELPH-MCNC: 1.3 G/DL — LOW (ref 3.3–5.2)
ALLERGY+IMMUNOLOGY DIAG STUDY NOTE: SIGNIFICANT CHANGE UP
ALLERGY+IMMUNOLOGY DIAG STUDY NOTE: SIGNIFICANT CHANGE UP
ALP SERPL-CCNC: 174 U/L — HIGH (ref 40–120)
ALT FLD-CCNC: 21 U/L — SIGNIFICANT CHANGE UP
ANION GAP SERPL CALC-SCNC: 12 MMOL/L — SIGNIFICANT CHANGE UP (ref 5–17)
AST SERPL-CCNC: 27 U/L — SIGNIFICANT CHANGE UP
BILIRUB SERPL-MCNC: <0.2 MG/DL — LOW (ref 0.4–2)
BLD GP AB SCN SERPL QL: SIGNIFICANT CHANGE UP
BUN SERPL-MCNC: 27 MG/DL — HIGH (ref 8–20)
CALCIUM SERPL-MCNC: 6.4 MG/DL — CRITICAL LOW (ref 8.6–10.2)
CHLORIDE SERPL-SCNC: 101 MMOL/L — SIGNIFICANT CHANGE UP (ref 98–107)
CO2 SERPL-SCNC: 20 MMOL/L — LOW (ref 22–29)
CREAT SERPL-MCNC: 0.84 MG/DL — SIGNIFICANT CHANGE UP (ref 0.5–1.3)
DAT IGG-SP REAG RBC-IMP: ABNORMAL
DIR ANTIGLOB POLYSPECIFIC INTERPRETATION: ABNORMAL
GLUCOSE SERPL-MCNC: 98 MG/DL — SIGNIFICANT CHANGE UP (ref 70–99)
HCT VFR BLD CALC: 22.7 % — LOW (ref 34.5–45)
HGB BLD-MCNC: 7.4 G/DL — LOW (ref 11.5–15.5)
IAT COMP-SP REAG SERPL QL: SIGNIFICANT CHANGE UP
MAGNESIUM SERPL-MCNC: 1.9 MG/DL — SIGNIFICANT CHANGE UP (ref 1.6–2.6)
MCHC RBC-ENTMCNC: 27.8 PG — SIGNIFICANT CHANGE UP (ref 27–34)
MCHC RBC-ENTMCNC: 32.6 GM/DL — SIGNIFICANT CHANGE UP (ref 32–36)
MCV RBC AUTO: 85.3 FL — SIGNIFICANT CHANGE UP (ref 80–100)
OB PNL STL: NEGATIVE — SIGNIFICANT CHANGE UP
PHOSPHATE SERPL-MCNC: 2.7 MG/DL — SIGNIFICANT CHANGE UP (ref 2.4–4.7)
PLATELET # BLD AUTO: 394 K/UL — SIGNIFICANT CHANGE UP (ref 150–400)
POTASSIUM SERPL-MCNC: 3.7 MMOL/L — SIGNIFICANT CHANGE UP (ref 3.5–5.3)
POTASSIUM SERPL-SCNC: 3.7 MMOL/L — SIGNIFICANT CHANGE UP (ref 3.5–5.3)
PROT SERPL-MCNC: 4.5 G/DL — LOW (ref 6.6–8.7)
RBC # BLD: 2.66 M/UL — LOW (ref 3.8–5.2)
RBC # FLD: 19.1 % — HIGH (ref 10.3–14.5)
SODIUM SERPL-SCNC: 133 MMOL/L — LOW (ref 135–145)
VANCOMYCIN TROUGH SERPL-MCNC: 15.6 UG/ML — SIGNIFICANT CHANGE UP (ref 10–20)
WBC # BLD: 5.64 K/UL — SIGNIFICANT CHANGE UP (ref 3.8–10.5)
WBC # FLD AUTO: 5.64 K/UL — SIGNIFICANT CHANGE UP (ref 3.8–10.5)

## 2020-05-25 PROCEDURE — 86077 PHYS BLOOD BANK SERV XMATCH: CPT

## 2020-05-25 PROCEDURE — 71045 X-RAY EXAM CHEST 1 VIEW: CPT | Mod: 26

## 2020-05-25 PROCEDURE — 99232 SBSQ HOSP IP/OBS MODERATE 35: CPT | Mod: 24

## 2020-05-25 RX ORDER — POTASSIUM CHLORIDE 20 MEQ
40 PACKET (EA) ORAL ONCE
Refills: 0 | Status: COMPLETED | OUTPATIENT
Start: 2020-05-25 | End: 2020-05-25

## 2020-05-25 RX ORDER — MORPHINE SULFATE 50 MG/1
2 CAPSULE, EXTENDED RELEASE ORAL ONCE
Refills: 0 | Status: DISCONTINUED | OUTPATIENT
Start: 2020-05-25 | End: 2020-05-25

## 2020-05-25 RX ORDER — CALCIUM GLUCONATE 100 MG/ML
2 VIAL (ML) INTRAVENOUS ONCE
Refills: 0 | Status: COMPLETED | OUTPATIENT
Start: 2020-05-25 | End: 2020-05-25

## 2020-05-25 RX ORDER — ACETAMINOPHEN 500 MG
1000 TABLET ORAL ONCE
Refills: 0 | Status: COMPLETED | OUTPATIENT
Start: 2020-05-25 | End: 2020-05-25

## 2020-05-25 RX ORDER — MAGNESIUM SULFATE 500 MG/ML
2 VIAL (ML) INJECTION ONCE
Refills: 0 | Status: COMPLETED | OUTPATIENT
Start: 2020-05-25 | End: 2020-05-25

## 2020-05-25 RX ADMIN — PIPERACILLIN AND TAZOBACTAM 25 GRAM(S): 4; .5 INJECTION, POWDER, LYOPHILIZED, FOR SOLUTION INTRAVENOUS at 11:39

## 2020-05-25 RX ADMIN — CASPOFUNGIN ACETATE 260 MILLIGRAM(S): 7 INJECTION, POWDER, LYOPHILIZED, FOR SOLUTION INTRAVENOUS at 00:27

## 2020-05-25 RX ADMIN — CHLORHEXIDINE GLUCONATE 1 APPLICATION(S): 213 SOLUTION TOPICAL at 07:57

## 2020-05-25 RX ADMIN — Medication 400 MILLIGRAM(S): at 17:20

## 2020-05-25 RX ADMIN — Medication 200 GRAM(S): at 10:13

## 2020-05-25 RX ADMIN — SODIUM CHLORIDE 3 MILLILITER(S): 9 INJECTION INTRAMUSCULAR; INTRAVENOUS; SUBCUTANEOUS at 11:20

## 2020-05-25 RX ADMIN — Medication 50 GRAM(S): at 09:03

## 2020-05-25 RX ADMIN — PIPERACILLIN AND TAZOBACTAM 25 GRAM(S): 4; .5 INJECTION, POWDER, LYOPHILIZED, FOR SOLUTION INTRAVENOUS at 23:36

## 2020-05-25 RX ADMIN — ENOXAPARIN SODIUM 30 MILLIGRAM(S): 100 INJECTION SUBCUTANEOUS at 20:40

## 2020-05-25 RX ADMIN — SODIUM CHLORIDE 3 MILLILITER(S): 9 INJECTION INTRAMUSCULAR; INTRAVENOUS; SUBCUTANEOUS at 20:35

## 2020-05-25 RX ADMIN — ENOXAPARIN SODIUM 30 MILLIGRAM(S): 100 INJECTION SUBCUTANEOUS at 09:03

## 2020-05-25 RX ADMIN — PANTOPRAZOLE SODIUM 40 MILLIGRAM(S): 20 TABLET, DELAYED RELEASE ORAL at 05:00

## 2020-05-25 RX ADMIN — PANTOPRAZOLE SODIUM 40 MILLIGRAM(S): 20 TABLET, DELAYED RELEASE ORAL at 17:00

## 2020-05-25 RX ADMIN — Medication 1 APPLICATION(S): at 16:59

## 2020-05-25 RX ADMIN — SODIUM CHLORIDE 3 MILLILITER(S): 9 INJECTION INTRAMUSCULAR; INTRAVENOUS; SUBCUTANEOUS at 04:58

## 2020-05-25 RX ADMIN — PIPERACILLIN AND TAZOBACTAM 25 GRAM(S): 4; .5 INJECTION, POWDER, LYOPHILIZED, FOR SOLUTION INTRAVENOUS at 05:00

## 2020-05-25 RX ADMIN — Medication 1 APPLICATION(S): at 03:00

## 2020-05-25 RX ADMIN — Medication 40 MILLIEQUIVALENT(S): at 09:03

## 2020-05-25 RX ADMIN — LATANOPROST 1 DROP(S): 0.05 SOLUTION/ DROPS OPHTHALMIC; TOPICAL at 20:40

## 2020-05-25 RX ADMIN — Medication 100 MILLIGRAM(S): at 18:56

## 2020-05-25 RX ADMIN — MORPHINE SULFATE 2 MILLIGRAM(S): 50 CAPSULE, EXTENDED RELEASE ORAL at 18:54

## 2020-05-25 NOTE — PROGRESS NOTE ADULT - PROBLEM SELECTOR PLAN 5
Wound care consult / plastic surgery consult appreciated.   Wound care recommends Santyl and plastics eval.  Dr. Parrish from plastics recommends Dakins soaked packing to clean wound bid.  Air flow mattress.  Turn and position q2 hours.  Optimize nutrition via feeding tube, vitamins supplements to add when tolerating PO.

## 2020-05-25 NOTE — CHART NOTE - NSCHARTNOTEFT_GEN_A_CORE
Thoracic    Patient admitted to [facility] 5/12/20 for +Right sided Empyema, with distal esophageal perforation in the setting of recent sepsis on IV abx.  S/p EGD with esophageal perforation identified and stent placed with fluoroscopy, NGT placed, VATS with right thoracic cavity washout and pulmonary decortication with vaibhav drain, right posterior, and right medial chest tubes placed on 5/13/20.  On 5/14 patient was taken to IR for unsuccessful G/J tube placement. 5/15 patient was taken to the OR for feeding tube placement with Dr. Murcia.  The G tube is to gravity drainage, and slow feeds are advancing through the J tube. Esophagram done 5/18 with narrowing of the distal esophagus/gastroesophageal junction just distal to the stent with delayed emptying of the esophagus noted. Tolerating tube feeds.     Pt seen at bedside.  NAD Spoke with Dr Murcia.  Increased TF to 50cc/hr.  Patient tolerating.  Noted low H&H.  Pt hemodynamically stable. T & S Pending.  May need transfusion at some point.  K+, Mg and Calcium replaced.    Spoke with thoracic team .

## 2020-05-25 NOTE — PROGRESS NOTE ADULT - PROBLEM SELECTOR PLAN 1
S/p esophageal stent placed 5/13/20 with NGT placed.   S/p GJ tube placement 5/15 (open procedure).  G port connected to gravity drainage, slow feeds  through the J tube @ 40ml/hr and to maintain rate for now per Dr. Murcia.  Encourage deep breathing, chest PT, incentive spirometry.   Maintain R CTs x 2 to waterseal, 1 chest tube + airleak, 1 chest tube no airleak noted.   Follow up AM CXR.

## 2020-05-25 NOTE — PROGRESS NOTE ADULT - SUBJECTIVE AND OBJECTIVE BOX
HPI:  69 year old non-ambulatory Female with a PMHx significant for multiple sclerosis, Breast Cancer s/p R mastectomy, Osteoporosis, Mitral stenosis, right ankle fracture, chronic right sided sacral ulcer, chronic midline back pain since being dropped from a jame lift (7/17/29), admitted to [facility] after presenting with sepsis in the setting of a UTI with chronic campos use and known large right ischial decubitus ulcer. Patient found to have a Moderately large Right hydropneumothorax resulting in partial right lung collapse and slight cardiomediastinal shift to the left on CXR with chest tube placed 5/8/20. Large bore chest tube placed 5/10/20 for persistent Right pneumothorax. Patient was followed by ID and was given Vanco and Zosyn originally for her presumed urosepsis, Caspofungin was added after pleural fluid was + for fungal elements. CT chest confirmed +Empyema in the Right pleural space. Patient ultimately transferred to [facility] late 5/11/20 after she was found to have a distal esophageal perforation on CT chest and Esophogram.     Of note, patient admitted 10/9/2019 for lower back and bilateral knee pain, found to have compression fracture of L2 with imaging subsequently found to have multiple lytic lesions on the spine and pelvis.  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 Matthew Fairbanks. (12 May 2020 02:24)    Brief Hospital Course: Patient admitted to [facility] 5/12/20 for +Right sided Empyema, with distal esophageal perforation in the setting of recent sepsis on IV abx.  S/p EGD with esophageal perforation identified and stent placed with fluoroscopy, NGT placed, VATS with right thoracic cavity washout and pulmonary decortication with vaibhav drain, right posterior, and right medial chest tubes placed on 5/13/20.  On 5/14 patient was taken to IR for unsuccessful G/J tube placement. 5/15 patient was taken to the OR for feeding tube placement with Dr. Murcia.  The G tube is to gravity drainage, and slow feeds are advancing through the J tube. Esophagram done 5/18 with narrowing of the distal esophagus/gastroesophageal junction just distal to the stent with delayed emptying of the esophagus noted. Tolerating tube feeds.     PAST MEDICAL & SURGICAL HISTORY:  Breast cancer metastasized to bone  Hypertension  Multiple sclerosis  S/P mastectomy, right  Breast CA  Osteoporosis  MS (mitral stenosis)  History of bowel resection  H/O breast surgery    Subjective: Patient lying in bed in no acute distress watching TV. States she is not comfortable, repositioned with her RN at the bedside. Denies chills, lightheadedness, dizziness, HA, CP, palpitations, SOB, abdominal pain, N/V, diarrhea, numbness/tingling in extremities, or any other acute complaints.    Vital Signs Last 24 Hrs  T(C): 37.3 (05-24-20 @ 21:22), Max: 37.3 (05-24-20 @ 21:22)  T(F): 99.2 (05-24-20 @ 21:22), Max: 99.2 (05-24-20 @ 21:22)  HR: 98 (05-24-20 @ 21:22) (72 - 99)  BP: 108/67 (05-24-20 @ 21:22) (108/67 - 136/54)  RR: 20 (05-24-20 @ 21:22) (18 - 20)  SpO2: 99% (05-24-20 @ 21:22) (99% - 100%)  on (O2)              MEDICATIONS  caspofungin IVPB 50 milliGRAM(s) IV Intermittent every 24 hours  chlorhexidine 4% Liquid 1 Application(s) Topical <User Schedule>  Dakins Solution - 1/2 Strength 1 Application(s) Topical two times a day  enoxaparin Injectable 30 milliGRAM(s) SubCutaneous every 12 hours  latanoprost 0.005% Ophthalmic Solution 1 Drop(s) Both EYES at bedtime  pantoprazole  Injectable 40 milliGRAM(s) IV Push every 12 hours  piperacillin/tazobactam IVPB.. 3.375 Gram(s) IV Intermittent every 8 hours  sodium chloride 0.9% lock flush 10 milliLiter(s) IV Push every 1 hour PRN  sodium chloride 0.9% lock flush 3 milliLiter(s) IV Push every 8 hours  vancomycin  IVPB 500 milliGRAM(s) IV Intermittent every 24 hours    PHYSICAL EXAM  Constitutional: No acute distress noted, lying in bed, appears comfortable  Neuro: A+O x 3, non-focal   HEENT: NC/AT, PERRL, EOMI, oral mucosa pink and moist  Neck: supple, no JVD  CV: RRR +S1S2  Pulm/chest: Decreased breath sounds on the Right, left CTA, no accessory muscle use noted  Abd: soft, NT, ND, +BS  : +Campos with yellow urine in bedside bag  Ext: DAVIDSON x 4, Limited LE strength, legs contracted, +LE sensation intact, +2 pedal edema bilaterally, +DP/PT pulses bilaterally.  Skin: warm, well perfused, +Large sacral decub  Psych: calm, appropriate affect  Tubes: G/J tubes in place with tube feeds @ 40/hr. 2 Right sided chest tubes to waterseal, Chest tube #1 +airleak with purulent/pus drainage, Chest tube #2, no noted airleak with serosanguinous drainage, dressing c/d/i, no surrounding crepitus noted. +Right midaxillary SOURAV drain to bulb suction.     I&O's Detail    23 May 2020 07:01  -  24 May 2020 07:00  --------------------------------------------------------  IN:    Enteral Tube Flush: 150 mL    Free Water: 150 mL    lactated ringers.: 120 mL    Oral Fluid: 650 mL    Pivot 1.5: 910 mL    Solution: 250 mL    Solution: 200 mL    Solution: 50 mL    Solution: 50 mL    Solution: 260 mL    Solution: 100 mL  Total IN: 2890 mL    OUT:    Chest Tube: 380 mL    Drain: 185 mL    Gastrostomy Tube: 350 mL    Voided: 350 mL  Total OUT: 1265 mL    Total NET: 1625 mL      24 May 2020 07:01  -  25 May 2020 01:56  --------------------------------------------------------  IN:    Enteral Tube Flush: 150 mL    Oral Fluid: 1260 mL    Pivot 1.5: 480 mL    Solution: 100 mL    Solution: 50 mL    Solution: 100 mL  Total IN: 2140 mL    OUT:    Chest Tube: 70 mL    Drain: 120 mL    Gastrostomy Tube: 275 mL  Total OUT: 465 mL    Total NET: 1675 mL    Weights:  Admit Wt: Drug Dosing Weight  Height (cm): 167.64 (11 May 2020 22:12)  Weight (kg): 56.7 (15 May 2020 09:49)  BMI (kg/m2): 20.2 (15 May 2020 09:49)  BSA (m2): 1.64 (15 May 2020 09:49)    All laboratory results, radiology and medications reviewed.    LABS    05-24    133<L>  |  101  |  27.0<H>  ----------------------------<  110<H>  3.6   |  20.0<L>  |  0.82    Ca    6.5<LL>      24 May 2020 09:31  Phos  2.2     05-24  Mg     2.2     05-24    TPro  4.8<L>  /  Alb  1.6<L>  /  TBili  <0.2<L>  /  DBili  x   /  AST  43<H>  /  ALT  26  /  AlkPhos  210<H>  05-24                                 7.6    5.22  )-----------( 405      ( 24 May 2020 09:31 )             23.3            Bilirubin Total, Serum: <0.2 mg/dL (05-24 @ 09:31)      Last CXR:  < from: Xray Chest 1 View- PORTABLE-Routine (05.24.20 @ 06:13) >  FINDINGS:   There is no interval change.  RIGHT chest tube tip overlies apex of lung. Second chest tube   coiled in mid hemithorax..   Mid esophageal stent in place.   The lungs show LEFT lower lobe airspace consolidation and/or effusion. Chronic RIGHT lung base and interstitial opacities on the costophrenic angle thickening.  The heart and mediastinum are within normal limits.   There are diffuse sclerotic osseous metastasis.   IMPRESSION: No interval change. LEFT basilar airspace consolidation and/or effusion.   Ill-defined opacities in the RIGHT lung base. RIGHT chest tubes in place.   Esophageal stent in place.   Diffuse sclerotic of osseous metastasis..   < end of copied text >

## 2020-05-25 NOTE — PROGRESS NOTE ADULT - PROBLEM SELECTOR PLAN 2
S/p Right thoracotomy / washout / VATS decort 5/13/20.  2 Chest tubes to waterseal.  Follow up daily CXR.  Pleural fluid + for yeast, coag Negative Staph, Klebsiella, and strep mitis/oralis. Continue IV antibiotics per ID.   Vancomycin and Caspofungin to be completed 5/26/20 and Zosyn to be completed 6/9/20.  PICC line to be placed closer to time of discharge.  Blood cultures negative 5/8.  Repeat cultures to be send today 5/25.

## 2020-05-25 NOTE — PROGRESS NOTE ADULT - PROBLEM SELECTOR PLAN 7
SCDs and Lovenox for DVT prophylaxis.  Protonix for GI prophylaxis.    Dispo: Patient remains in the hospital due to 2 chest tubes with continued output, 1 chest tube with purulent output and airleak. Will need eventual PICC if plan for d/c prior to finishing IV antibiotic course.   Case and plan to be discussed with attendings and Thoracic Surgery team in AM rounds.

## 2020-05-25 NOTE — PROGRESS NOTE ADULT - ASSESSMENT
69 year old non-ambulatory Female with a PMHx significant for multiple sclerosis, Breast Cancer s/p R mastectomy, Osteoporosis, Mitral stenosis, right ankle fracture, chronic right sided sacral ulcer, chronic midline back pain since being dropped from a jame lift (7/17/29), admitted to [place] after presenting with sepsis in the setting of a UTI with chronic campos use and known large right ischial decubitus ulcer. Patient found to have a Moderately large Right hydropneumothorax resulting in partial right lung collapse and slight cardiomediastinal shift to the left on CXR with chest tube placed 5/8/20. Large bore chest tube placed 5/10/20 for persistent Right pneumothorax. Patient was followed by ID and was given Vanco and Zosyn originally for her presumed urosepsis, Caspofungin was added after pleural fluid was + for fungal elements. CT chest confirmed +Empyema in the Right pleural space. Patient ultimately transferred to [facility] late 5/11/20 after she was found to have a distal esophageal perforation on CT chest and Esophogram.     5/13/20 patient underwent EGD with esophageal stent placed, NGT placed, VATS with right thoracic cavity washout and pulmonary decortication with vaibhav drain, right posterior, and right medial chest tubes placed with Dr. Murcia.  On 5/14 patient was taken to IR for unsuccessful G/J tube placement. 5/15 patient was taken to the OR for feeding tube placement with Dr. Murcia.  The G tube is to gravity drainage, and slow feeds are advancing through the J tube. S/P esophagram 5/18 which showed narrowing distal stented area. Tolerating tube feeds.

## 2020-05-26 DIAGNOSIS — D64.9 ANEMIA, UNSPECIFIED: ICD-10-CM

## 2020-05-26 LAB
ANION GAP SERPL CALC-SCNC: 12 MMOL/L — SIGNIFICANT CHANGE UP (ref 5–17)
BUN SERPL-MCNC: 30 MG/DL — HIGH (ref 8–20)
CALCIUM SERPL-MCNC: 6.7 MG/DL — LOW (ref 8.6–10.2)
CHLORIDE SERPL-SCNC: 101 MMOL/L — SIGNIFICANT CHANGE UP (ref 98–107)
CO2 SERPL-SCNC: 20 MMOL/L — LOW (ref 22–29)
CREAT SERPL-MCNC: 0.83 MG/DL — SIGNIFICANT CHANGE UP (ref 0.5–1.3)
GLUCOSE SERPL-MCNC: 96 MG/DL — SIGNIFICANT CHANGE UP (ref 70–99)
HCT VFR BLD CALC: 28.2 % — LOW (ref 34.5–45)
HGB BLD-MCNC: 9.2 G/DL — LOW (ref 11.5–15.5)
MAGNESIUM SERPL-MCNC: 2.2 MG/DL — SIGNIFICANT CHANGE UP (ref 1.8–2.6)
MCHC RBC-ENTMCNC: 27.9 PG — SIGNIFICANT CHANGE UP (ref 27–34)
MCHC RBC-ENTMCNC: 32.6 GM/DL — SIGNIFICANT CHANGE UP (ref 32–36)
MCV RBC AUTO: 85.5 FL — SIGNIFICANT CHANGE UP (ref 80–100)
PHOSPHATE SERPL-MCNC: 2.3 MG/DL — LOW (ref 2.4–4.7)
PLATELET # BLD AUTO: 393 K/UL — SIGNIFICANT CHANGE UP (ref 150–400)
POTASSIUM SERPL-MCNC: 3.9 MMOL/L — SIGNIFICANT CHANGE UP (ref 3.5–5.3)
POTASSIUM SERPL-SCNC: 3.9 MMOL/L — SIGNIFICANT CHANGE UP (ref 3.5–5.3)
RBC # BLD: 3.3 M/UL — LOW (ref 3.8–5.2)
RBC # FLD: 18.5 % — HIGH (ref 10.3–14.5)
SODIUM SERPL-SCNC: 133 MMOL/L — LOW (ref 135–145)
WBC # BLD: 6.41 K/UL — SIGNIFICANT CHANGE UP (ref 3.8–10.5)
WBC # FLD AUTO: 6.41 K/UL — SIGNIFICANT CHANGE UP (ref 3.8–10.5)

## 2020-05-26 PROCEDURE — 83605 ASSAY OF LACTIC ACID: CPT

## 2020-05-26 PROCEDURE — 83615 LACTATE (LD) (LDH) ENZYME: CPT

## 2020-05-26 PROCEDURE — 87186 SC STD MICRODIL/AGAR DIL: CPT

## 2020-05-26 PROCEDURE — 88108 CYTOPATH CONCENTRATE TECH: CPT

## 2020-05-26 PROCEDURE — 87040 BLOOD CULTURE FOR BACTERIA: CPT

## 2020-05-26 PROCEDURE — P9016: CPT

## 2020-05-26 PROCEDURE — 84466 ASSAY OF TRANSFERRIN: CPT

## 2020-05-26 PROCEDURE — 74220 X-RAY XM ESOPHAGUS 1CNTRST: CPT

## 2020-05-26 PROCEDURE — 71045 X-RAY EXAM CHEST 1 VIEW: CPT

## 2020-05-26 PROCEDURE — 87086 URINE CULTURE/COLONY COUNT: CPT

## 2020-05-26 PROCEDURE — 36430 TRANSFUSION BLD/BLD COMPNT: CPT

## 2020-05-26 PROCEDURE — 99291 CRITICAL CARE FIRST HOUR: CPT

## 2020-05-26 PROCEDURE — 87635 SARS-COV-2 COVID-19 AMP PRB: CPT

## 2020-05-26 PROCEDURE — 82728 ASSAY OF FERRITIN: CPT

## 2020-05-26 PROCEDURE — 86902 BLOOD TYPE ANTIGEN DONOR EA: CPT

## 2020-05-26 PROCEDURE — 83690 ASSAY OF LIPASE: CPT

## 2020-05-26 PROCEDURE — 96361 HYDRATE IV INFUSION ADD-ON: CPT

## 2020-05-26 PROCEDURE — 80202 ASSAY OF VANCOMYCIN: CPT

## 2020-05-26 PROCEDURE — 84157 ASSAY OF PROTEIN OTHER: CPT

## 2020-05-26 PROCEDURE — 80053 COMPREHEN METABOLIC PANEL: CPT

## 2020-05-26 PROCEDURE — 82945 GLUCOSE OTHER FLUID: CPT

## 2020-05-26 PROCEDURE — 85027 COMPLETE CBC AUTOMATED: CPT

## 2020-05-26 PROCEDURE — 82746 ASSAY OF FOLIC ACID SERUM: CPT

## 2020-05-26 PROCEDURE — 80048 BASIC METABOLIC PNL TOTAL CA: CPT

## 2020-05-26 PROCEDURE — 86900 BLOOD TYPING SEROLOGIC ABO: CPT

## 2020-05-26 PROCEDURE — 87070 CULTURE OTHR SPECIMN AEROBIC: CPT

## 2020-05-26 PROCEDURE — 87205 SMEAR GRAM STAIN: CPT

## 2020-05-26 PROCEDURE — 74177 CT ABD & PELVIS W/CONTRAST: CPT

## 2020-05-26 PROCEDURE — 83986 ASSAY PH BODY FLUID NOS: CPT

## 2020-05-26 PROCEDURE — 83550 IRON BINDING TEST: CPT

## 2020-05-26 PROCEDURE — 99292 CRITICAL CARE ADDL 30 MIN: CPT

## 2020-05-26 PROCEDURE — 87102 FUNGUS ISOLATION CULTURE: CPT

## 2020-05-26 PROCEDURE — 99232 SBSQ HOSP IP/OBS MODERATE 35: CPT

## 2020-05-26 PROCEDURE — 85014 HEMATOCRIT: CPT

## 2020-05-26 PROCEDURE — 71250 CT THORAX DX C-: CPT

## 2020-05-26 PROCEDURE — 87184 SC STD DISK METHOD PER PLATE: CPT

## 2020-05-26 PROCEDURE — 96375 TX/PRO/DX INJ NEW DRUG ADDON: CPT

## 2020-05-26 PROCEDURE — 86905 BLOOD TYPING RBC ANTIGENS: CPT

## 2020-05-26 PROCEDURE — 86901 BLOOD TYPING SEROLOGIC RH(D): CPT

## 2020-05-26 PROCEDURE — 83540 ASSAY OF IRON: CPT

## 2020-05-26 PROCEDURE — 89051 BODY FLUID CELL COUNT: CPT

## 2020-05-26 PROCEDURE — 85018 HEMOGLOBIN: CPT

## 2020-05-26 PROCEDURE — 36415 COLL VENOUS BLD VENIPUNCTURE: CPT

## 2020-05-26 PROCEDURE — 71045 X-RAY EXAM CHEST 1 VIEW: CPT | Mod: 26

## 2020-05-26 PROCEDURE — 96365 THER/PROPH/DIAG IV INF INIT: CPT | Mod: XU

## 2020-05-26 PROCEDURE — 86923 COMPATIBILITY TEST ELECTRIC: CPT

## 2020-05-26 PROCEDURE — 82803 BLOOD GASES ANY COMBINATION: CPT

## 2020-05-26 PROCEDURE — 87075 CULTR BACTERIA EXCEPT BLOOD: CPT

## 2020-05-26 PROCEDURE — 88305 TISSUE EXAM BY PATHOLOGIST: CPT

## 2020-05-26 PROCEDURE — 82607 VITAMIN B-12: CPT

## 2020-05-26 PROCEDURE — 86850 RBC ANTIBODY SCREEN: CPT

## 2020-05-26 PROCEDURE — 81001 URINALYSIS AUTO W/SCOPE: CPT

## 2020-05-26 PROCEDURE — 87449 NOS EACH ORGANISM AG IA: CPT

## 2020-05-26 RX ORDER — CALCIUM GLUCONATE 100 MG/ML
1 VIAL (ML) INTRAVENOUS ONCE
Refills: 0 | Status: COMPLETED | OUTPATIENT
Start: 2020-05-26 | End: 2020-05-26

## 2020-05-26 RX ORDER — POTASSIUM PHOSPHATE, MONOBASIC POTASSIUM PHOSPHATE, DIBASIC 236; 224 MG/ML; MG/ML
15 INJECTION, SOLUTION INTRAVENOUS ONCE
Refills: 0 | Status: COMPLETED | OUTPATIENT
Start: 2020-05-26 | End: 2020-05-26

## 2020-05-26 RX ORDER — OXYCODONE AND ACETAMINOPHEN 5; 325 MG/1; MG/1
1 TABLET ORAL EVERY 6 HOURS
Refills: 0 | Status: DISCONTINUED | OUTPATIENT
Start: 2020-05-26 | End: 2020-05-26

## 2020-05-26 RX ADMIN — CASPOFUNGIN ACETATE 260 MILLIGRAM(S): 7 INJECTION, POWDER, LYOPHILIZED, FOR SOLUTION INTRAVENOUS at 05:15

## 2020-05-26 RX ADMIN — CHLORHEXIDINE GLUCONATE 1 APPLICATION(S): 213 SOLUTION TOPICAL at 05:03

## 2020-05-26 RX ADMIN — Medication 1 APPLICATION(S): at 17:36

## 2020-05-26 RX ADMIN — ENOXAPARIN SODIUM 30 MILLIGRAM(S): 100 INJECTION SUBCUTANEOUS at 09:03

## 2020-05-26 RX ADMIN — PIPERACILLIN AND TAZOBACTAM 25 GRAM(S): 4; .5 INJECTION, POWDER, LYOPHILIZED, FOR SOLUTION INTRAVENOUS at 07:00

## 2020-05-26 RX ADMIN — PIPERACILLIN AND TAZOBACTAM 25 GRAM(S): 4; .5 INJECTION, POWDER, LYOPHILIZED, FOR SOLUTION INTRAVENOUS at 17:36

## 2020-05-26 RX ADMIN — Medication 1 APPLICATION(S): at 05:02

## 2020-05-26 RX ADMIN — LATANOPROST 1 DROP(S): 0.05 SOLUTION/ DROPS OPHTHALMIC; TOPICAL at 21:03

## 2020-05-26 RX ADMIN — SODIUM CHLORIDE 3 MILLILITER(S): 9 INJECTION INTRAMUSCULAR; INTRAVENOUS; SUBCUTANEOUS at 12:10

## 2020-05-26 RX ADMIN — PANTOPRAZOLE SODIUM 40 MILLIGRAM(S): 20 TABLET, DELAYED RELEASE ORAL at 05:02

## 2020-05-26 RX ADMIN — PIPERACILLIN AND TAZOBACTAM 25 GRAM(S): 4; .5 INJECTION, POWDER, LYOPHILIZED, FOR SOLUTION INTRAVENOUS at 23:52

## 2020-05-26 RX ADMIN — ENOXAPARIN SODIUM 30 MILLIGRAM(S): 100 INJECTION SUBCUTANEOUS at 21:03

## 2020-05-26 RX ADMIN — SODIUM CHLORIDE 3 MILLILITER(S): 9 INJECTION INTRAMUSCULAR; INTRAVENOUS; SUBCUTANEOUS at 05:03

## 2020-05-26 RX ADMIN — Medication 100 MILLIGRAM(S): at 17:37

## 2020-05-26 RX ADMIN — PANTOPRAZOLE SODIUM 40 MILLIGRAM(S): 20 TABLET, DELAYED RELEASE ORAL at 17:36

## 2020-05-26 RX ADMIN — POTASSIUM PHOSPHATE, MONOBASIC POTASSIUM PHOSPHATE, DIBASIC 62.5 MILLIMOLE(S): 236; 224 INJECTION, SOLUTION INTRAVENOUS at 11:14

## 2020-05-26 RX ADMIN — SODIUM CHLORIDE 3 MILLILITER(S): 9 INJECTION INTRAMUSCULAR; INTRAVENOUS; SUBCUTANEOUS at 21:05

## 2020-05-26 RX ADMIN — Medication 100 GRAM(S): at 11:14

## 2020-05-26 NOTE — PROGRESS NOTE ADULT - ASSESSMENT
HPI: This 69 year old non-ambulatory Female with a PMHx significant for multiple sclerosis, Breast Cancer s/p R mastectomy, Osteoporosis, Mitral stenosis, right ankle fracture, chronic right sided sacral ulcer, chronic midline back pain since being dropped from a jame lift (7/17/29), admitted to [facility] after presenting with sepsis in the setting of a UTI with chronic campos use and known large right ischial decubitus ulcer. Patient found to have a Moderately large Right hydropneumothorax resulting in partial right lung collapse and slight cardiomediastinal shift to the left on CXR with chest tube placed 5/8/20. Large bore chest tube placed 5/10/20 for persistent Right pneumothorax. Patient was followed by ID and was given Vanco and Zosyn originally for her presumed urosepsis, Caspofungin was added after pleural fluid was + for fungal elements. CT chest confirmed +Empyema in the Right pleural space. Patient ultimately transferred to [facility] late 5/11/20 after she was found to have a distal esophageal perforation on CT chest and Esophogram.     Of note, patient admitted 10/9/2019 for lower back and bilateral knee pain, found to have compression fracture of L2 with imaging subsequently found to have multiple lytic lesions on the spine and pelvis.  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 Matthew Fairbanks. (12 May 2020 02:24)    patient is admitted to surgical service, pre-operatively planned for an esophageal stent 5/13.  Patient's labs/ cultures from [facility] reviewed.     Empyema with polymicrobial infection:  Strep mitis infection  Klebsiella oxytoca infection  CoNS infection  Perforated esophagus    Plan:  s/p esophageal stent  and VATS 5/13  s/p PEJ on 5/15    continue Antibiotics:  caspofungin IVPB 50 milliGRAM(s) IV Intermittent every 24 hours  piperacillin/tazobactam IVPB.. 3.375 Gram(s) IV Intermittent every 8 hours  Vanco by level    YEAST from fluid culture being worked up  NOT Candida auris; specimen sent to [facility] labs  CASPO/VANCO THROUGH 5/26  WILL continue ZOSYN THRU 6/9/2020  - continue Chest tubes      WILL need PICC LINE at time of discharge  will follow up WITH FURTHER Recommendations  -

## 2020-05-26 NOTE — PROGRESS NOTE ADULT - PROBLEM SELECTOR PLAN 7
SCDs and Lovenox for DVT prophylaxis.  Protonix for GI prophylaxis.    Dispo: Patient remains in the hospital due to 2 chest tubes with continued output, 1 chest tube with purulent output and airleak. Continuing IV antibiotics for +Empyema. Will need eventual PICC if plan for d/c prior to finishing IV antibiotic course. Continue to monitoring output from G-tube to assess how much feeds patient is tolerating.  Case and plan to be discussed with attendings and Thoracic Surgery team in AM rounds. H/H trending downward. Continue to trend.   Type and cross with +antibodies.   1unit PRBC transfused 5/25.  Continue to monitor for S/S of acute blood loss.   Consider FOBT.

## 2020-05-26 NOTE — PROGRESS NOTE ADULT - PROBLEM SELECTOR PLAN 1
S/p esophageal stent placed 5/13/20 with NGT placed.   S/p GJ tube placement 5/15 (open procedure).  G port connected to gravity drainage, slow feeds  through the J tube @ 50ml/hr and to maintain rate for now per Dr. Murcia.  Continue to monitoring output from G-tube to assess how much feeds patient is tolerating.  Encourage deep breathing, chest PT, incentive spirometry.   Maintain R CTs x 2 to waterseal, 1 chest tube + airleak, 1 chest tube no airleak noted.   Follow up AM CXR.

## 2020-05-26 NOTE — PROGRESS NOTE ADULT - SUBJECTIVE AND OBJECTIVE BOX
[facility]  INFECTIOUS DISEASES AND INTERNAL MEDICINE at [place]  =======================================================  Phong Wang MD  Diplomates American Board of Internal Medicine and Infectious Diseases  Telephone [phone]  Fax            [phone]  =======================================================    [id number]  GEORGE STANLEY   follow up: empyema     s/p EGD and esophageal stent; s/p VATS 5/14  s/p gastrojejunal tube on 5/15/2020  s/p debridement of sacral decubitus       =======================================================  REVIEW OF SYSTEMS:  CONSTITUTIONAL:  No Fever or chills  HEENT:  No diplopia or blurred vision.  No earache, sore throat or runny nose.  CARDIOVASCULAR:  No pressure, squeezing, strangling, tightness, heaviness or aching about the chest, neck, axilla or epigastrium.  RESPIRATORY:  MILD shortness of breath  GASTROINTESTINAL:  No nausea, vomiting or diarrhea.  GENITOURINARY:  No dysuria, frequency or urgency. No Blood in urine  MUSCULOSKELETAL:  no joint aches, no muscle pain  SKIN:  No change in skin, hair or nails.  NEUROLOGIC:  No Headaches, seizures or weakness.  PSYCHIATRIC:  No disorder of thought or mood.  ENDOCRINE:  No heat or cold intolerance  HEMATOLOGICAL:  No easy bruising or bleeding.   =======================================================  Allergies  Sudafed (Other)    ======================================================  Physical Exam:  ============   Vital Signs Last 24 Hrs  T(C): 36.6 (26 May 2020 04:57), Max: 36.8 (25 May 2020 16:05)  T(F): 97.9 (26 May 2020 04:57), Max: 98.3 (25 May 2020 20:17)  HR: 92 (26 May 2020 04:57) (86 - 99)  BP: 116/80 (26 May 2020 04:57) (110/75 - 116/80)  BP(mean): --  RR: 18 (26 May 2020 04:57) (18 - 24)  SpO2: 98% (26 May 2020 04:57) (94% - 99%)    General:  No acute distress. FRAIL  Eye: Pupils are equal, round and reactive to light, Extraocular movements are intact, Normal conjunctiva.  HENT: Normocephalic, Oral mucosa is moist, No pharyngeal erythema, No sinus tenderness.  Neck: Supple, No lymphadenopathy.  Respiratory: Lungs  with diminished air entry at bases  RIGHT side with 2 chest tubes - CLEAR FLUID  Cardiovascular: Normal rate, Regular rhythm  Gastrointestinal: Soft, Non-tender, Non-distended, Normal bowel sounds.  PEJ tube present in abd  Genitourinary: + ELIZONDO with clear urine  Lymphatics: No lymphadenopathy neck,   Musculoskeletal: Normal range of motion, Normal strength.  Integumentary: No rash.  Neurologic: Alert, Oriented, No focal deficits, Cranial Nerves II-XII are grossly intact.  Psychiatric: Appropriate mood & affect.    =======================================================           =======================================================  Current Antibiotics:  caspofungin IVPB 50 milliGRAM(s) IV Intermittent every 24 hours  piperacillin/tazobactam IVPB.. 3.375 Gram(s) IV Intermittent every 8 hours    Other medications:  chlorhexidine 4% Liquid 1 Application(s) Topical <User Schedule>  Dakins Solution - 1/2 Strength 1 Application(s) Topical two times a day  enoxaparin Injectable 30 milliGRAM(s) SubCutaneous every 12 hours  lactated ringers. 1000 milliLiter(s) IV Continuous <Continuous>  latanoprost 0.005% Ophthalmic Solution 1 Drop(s) Both EYES at bedtime  pantoprazole  Injectable 40 milliGRAM(s) IV Push every 12 hours  sodium chloride 0.9% lock flush 3 milliLiter(s) IV Push every 8 hours  sodium chloride 0.9%. 1000 milliLiter(s) IV Continuous <Continuous>      =======================================================  Labs:                                 9.2    6.41  )-----------( 393      ( 26 May 2020 06:12 )             28.2   05-26    133<L>  |  101  |  30.0<H>  ----------------------------<  96  3.9   |  20.0<L>  |  0.83    Ca    6.7<L>      26 May 2020 06:12  Phos  2.3     05-26  Mg     2.2     05-26    TPro  4.5<L>  /  Alb  1.3<L>  /  TBili  <0.2<L>  /  DBili  x   /  AST  27  /  ALT  21  /  AlkPhos  174<H>  05-25      od Type: ETEST  Organism:

## 2020-05-26 NOTE — PROGRESS NOTE ADULT - SUBJECTIVE AND OBJECTIVE BOX
HPI:  69 year old non-ambulatory Female with a PMHx significant for multiple sclerosis, Breast Cancer s/p R mastectomy, Osteoporosis, Mitral stenosis, right ankle fracture, chronic right sided sacral ulcer, chronic midline back pain since being dropped from a jame lift (7/17/29), admitted to [place] after presenting with sepsis in the setting of a UTI with chronic campos use and known large right ischial decubitus ulcer. Patient found to have a Moderately large Right hydropneumothorax resulting in partial right lung collapse and slight cardiomediastinal shift to the left on CXR with chest tube placed 5/8/20. Large bore chest tube placed 5/10/20 for persistent Right pneumothorax. Patient was followed by ID and was given Vanco and Zosyn originally for her presumed urosepsis, Caspofungin was added after pleural fluid was + for fungal elements. CT chest confirmed +Empyema in the Right pleural space. Patient ultimately transferred to [facility] late 5/11/20 after she was found to have a distal esophageal perforation on CT chest and Esophogram.     Of note, patient admitted 10/9/2019 for lower back and bilateral knee pain, found to have compression fracture of L2 with imaging subsequently found to have multiple lytic lesions on the spine and pelvis.  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 Matthew Fairbanks. (12 May 2020 02:24)    Brief Hospital Course: Patient admitted to [facility] 5/12/20 for +Right sided Empyema, with distal esophageal perforation in the setting of recent sepsis on IV abx.  S/p EGD with esophageal perforation identified and stent placed with fluoroscopy, NGT placed, VATS with right thoracic cavity washout and pulmonary decortication with vaibhav drain, right posterior, and right medial chest tubes placed on 5/13/20.  On 5/14 patient was taken to IR for unsuccessful G/J tube placement. 5/15 patient was taken to the OR for feeding tube placement with Dr. Murcia.  The G tube is to gravity drainage, and slow feeds are advancing through the J tube. Esophagram done 5/18 with narrowing of the distal esophagus/gastroesophageal junction just distal to the stent with delayed emptying of the esophagus noted. Tolerating tube feeds.     PAST MEDICAL & SURGICAL HISTORY:  Breast cancer metastasized to bone  Hypertension  Multiple sclerosis  S/P mastectomy, right  Breast CA  Osteoporosis  MS (mitral stenosis)  History of bowel resection  H/O breast surgery    Subjective: Patient lying in bed in no acute distress on arrival. Denies lightheadedness, dizziness, HA, CP, palpitations, SOB, abdominal pain, N/V, diarrhea, numbness/tingling in extremities, or any other acute complaints.    Vital Signs Last 24 Hrs  T(C): 36.8 (05-25-20 @ 23:33), Max: 36.8 (05-25-20 @ 16:05)  T(F): 98.3 (05-25-20 @ 23:33), Max: 98.3 (05-25-20 @ 20:17)  HR: 86 (05-25-20 @ 23:33) (86 - 99)  BP: 116/79 (05-25-20 @ 23:33) (106/60 - 116/79)  RR: 24 (05-25-20 @ 23:33) (17 - 24)  SpO2: 97% (05-25-20 @ 23:33) (94% - 99%)  on (O2)              MEDICATIONS  caspofungin IVPB 50 milliGRAM(s) IV Intermittent every 24 hours  chlorhexidine 4% Liquid 1 Application(s) Topical <User Schedule>  Dakins Solution - 1/2 Strength 1 Application(s) Topical two times a day  enoxaparin Injectable 30 milliGRAM(s) SubCutaneous every 12 hours  latanoprost 0.005% Ophthalmic Solution 1 Drop(s) Both EYES at bedtime  pantoprazole  Injectable 40 milliGRAM(s) IV Push every 12 hours  piperacillin/tazobactam IVPB.. 3.375 Gram(s) IV Intermittent every 8 hours  sodium chloride 0.9% lock flush 10 milliLiter(s) IV Push every 1 hour PRN  sodium chloride 0.9% lock flush 3 milliLiter(s) IV Push every 8 hours  vancomycin  IVPB 500 milliGRAM(s) IV Intermittent every 24 hours    PHYSICAL EXAM  Constitutional: No acute distress noted, lying in bed, appears comfortable  Neuro: A+O x 3, non-focal   HEENT: NC/AT, PERRL, EOMI, oral mucosa pink and moist  Neck: supple, no JVD  CV: RRR +S1S2  Pulm/chest: Decreased breath sounds on the Right, left CTA, no accessory muscle use noted  Abd: soft, NT, ND, +BS  : +Campos with yellow urine in bedside bag  Ext: DAVIDSON x 4, Limited LE strength, legs contracted, +LE sensation intact, +2 pedal edema bilaterally, +DP/PT pulses bilaterally.  Skin: warm, well perfused, +Large sacral decub  Psych: calm, appropriate affect  Tubes: G/J tubes in place with tube feeds @ 50/hr. 2 Right sided chest tubes to waterseal, Chest tube #1 +airleak with purulent/pus drainage, Chest tube #2, no noted airleak with serosanguinous drainage, dressing c/d/i, no surrounding crepitus noted. +Right midaxillary SOURAV drain to bulb suction.     I&O's Detail    24 May 2020 07:01  -  25 May 2020 07:00  --------------------------------------------------------  IN:    Enteral Tube Flush: 150 mL    Free Water: 300 mL    Oral Fluid: 1260 mL    Pivot 1.5: 960 mL    Solution: 50 mL    Solution: 100 mL    Solution: 275 mL    Solution: 100 mL  Total IN: 3195 mL    OUT:    Chest Tube: 270 mL    Drain: 150 mL    Gastrostomy Tube: 525 mL    Voided: 450 mL  Total OUT: 1395 mL    Total NET: 1800 mL      25 May 2020 07:01  -  26 May 2020 02:33  --------------------------------------------------------  IN:    Enteral Tube Flush: 300 mL    Free Water: 150 mL    Oral Fluid: 960 mL    Pivot 1.5: 750 mL    Solution: 50 mL    Solution: 100 mL    Solution: 100 mL    Solution: 100 mL  Total IN: 2510 mL    OUT:    Chest Tube: 100 mL    Drain: 20 mL    Gastrostomy Tube: 400 mL    Indwelling Catheter - Urethral: 500 mL  Total OUT: 1020 mL    Total NET: 1490 mL    Weights:  Admit Wt: Drug Dosing Weight  Height (cm): 167.64 (11 May 2020 22:12)  Weight (kg): 56.7 (15 May 2020 09:49)  BMI (kg/m2): 20.2 (15 May 2020 09:49)  BSA (m2): 1.64 (15 May 2020 09:49)    All laboratory results, radiology and medications reviewed.    LABS    05-25    133<L>  |  101  |  27.0<H>  ----------------------------<  98  3.7   |  20.0<L>  |  0.84    Ca    6.4<LL>      25 May 2020 06:44  Phos  2.7     05-25  Mg     1.9     05-25    TPro  4.5<L>  /  Alb  1.3<L>  /  TBili  <0.2<L>  /  DBili  x   /  AST  27  /  ALT  21  /  AlkPhos  174<H>  05-25                                 7.4    5.64  )-----------( 394      ( 25 May 2020 06:44 )             22.7            Bilirubin Total, Serum: <0.2 mg/dL (05-25 @ 06:44)      Last CXR:  < from: Xray Chest 1 View- PORTABLE-Routine (05.25.20 @ 05:44) >  FINDINGS:  Esophageal stent. 2 right chest tubes.  Patchy opacities in the right lung. Opacities in the left mid and lower lung.  No pleural effusions or pneumothorax.  The cardiomediastinal silhouette is magnified due to AP technique.  Diffuse osseous metastases.  IMPRESSION: Bilateral pulmonary opacities, unchanged. 2 right chest tubes.  < end of copied text >

## 2020-05-26 NOTE — PROGRESS NOTE ADULT - ASSESSMENT
69 year old non-ambulatory Female with a PMHx significant for multiple sclerosis, Breast Cancer s/p R mastectomy, Osteoporosis, Mitral stenosis, right ankle fracture, chronic right sided sacral ulcer, chronic midline back pain since being dropped from a jame lift (7/17/29), admitted to [facility] after presenting with sepsis in the setting of a UTI with chronic cmapos use and known large right ischial decubitus ulcer. Patient found to have a Moderately large Right hydropneumothorax resulting in partial right lung collapse and slight cardiomediastinal shift to the left on CXR with chest tube placed 5/8/20. Large bore chest tube placed 5/10/20 for persistent Right pneumothorax. Patient was followed by ID and was given Vanco and Zosyn originally for her presumed urosepsis, Caspofungin was added after pleural fluid was + for fungal elements. CT chest confirmed +Empyema in the Right pleural space. Patient ultimately transferred to [facility] late 5/11/20 after she was found to have a distal esophageal perforation on CT chest and Esophogram.     5/13/20 patient underwent EGD with esophageal stent placed, NGT placed, VATS with right thoracic cavity washout and pulmonary decortication with vaibhav drain, right posterior, and right medial chest tubes placed with Dr. Murcia.  On 5/14 patient was taken to IR for unsuccessful G/J tube placement. 5/15 patient was taken to the OR for feeding tube placement with Dr. Murcia.  The G tube is to gravity drainage, and slow feeds are advancing through the J tube. S/P esophagram 5/18 which showed narrowing distal stented area. Tolerating tube feeds.

## 2020-05-26 NOTE — CHART NOTE - NSCHARTNOTEFT_GEN_A_CORE
CTS ACP Addendum    Briefly, patient is a 69F h/o MS and breast CA who was found to have a chronic esophageal perforation s/p esophageal stent and right chest washout, PEG/J placement. Patient seen and examined today. Notes, flowsheets, medications, radiologic images and labs reviewed. Case discussed with Dr. Murcia. Patient required one prbc yesterday for anemia. She continues to have g tube drainage (green/serous) and is tolerating tube feeds via J tube @ 50 cc/hr. She has two chest tubes that remain in situ and 1 SOURAV drain. There is no significant air leak in chest tube but there is continued drainage in tube #1 (225 cc/24 hours). Plan to maintain tubes today, consider PICC line after blood cultures repeat resulted. Will continue to monitor closely. CTS ACP Addendum    Briefly, patient is a 69F h/o MS and breast CA who was found to have a chronic esophageal perforation s/p esophageal stent and right chest washout, PEG/J placement. Patient seen and examined today. Notes, flowsheets, medications, radiologic images and labs reviewed. Case discussed with Dr. Murcia. Patient required one prbc yesterday for anemia. She continues to have g tube drainage (green/serous) and is tolerating tube feeds via J tube @ 50 cc/hr. She has two chest tubes that remain in situ and 1 SOURAV drain. There is no significant air leak in chest tube but there is continued drainage in tube #1 (225 cc/24 hours). Plan to maintain tubes today, consider PICC line after blood cultures repeat resulted. Continue to trend and replete electrolytes as needed. Will continue to monitor closely.

## 2020-05-26 NOTE — PROGRESS NOTE ADULT - PROBLEM SELECTOR PLAN 5
Wound care consult / plastic surgery consult appreciated.   Wound care recommends Santyl and plastics eval.  Dr. Parrish from plastics recommends Dakins soaked packing to clean wound bid.  Air flow mattress.  Turn and position q2 hours.  Optimize nutrition via feeding tube, vitamins supplements to add when tolerating PO.  Pain control PRN.

## 2020-05-27 LAB
ALBUMIN SERPL ELPH-MCNC: 1.5 G/DL — LOW (ref 3.3–5.2)
ALP SERPL-CCNC: 198 U/L — HIGH (ref 40–120)
ALT FLD-CCNC: 18 U/L — SIGNIFICANT CHANGE UP
ANION GAP SERPL CALC-SCNC: 12 MMOL/L — SIGNIFICANT CHANGE UP (ref 5–17)
AST SERPL-CCNC: 22 U/L — SIGNIFICANT CHANGE UP
BILIRUB SERPL-MCNC: <0.2 MG/DL — LOW (ref 0.4–2)
BUN SERPL-MCNC: 33 MG/DL — HIGH (ref 8–20)
CA-I BLD-SCNC: 1.06 MMOL/L — LOW (ref 1.12–1.3)
CALCIUM SERPL-MCNC: 7 MG/DL — LOW (ref 8.6–10.2)
CHLORIDE SERPL-SCNC: 102 MMOL/L — SIGNIFICANT CHANGE UP (ref 98–107)
CO2 SERPL-SCNC: 21 MMOL/L — LOW (ref 22–29)
CREAT SERPL-MCNC: 0.77 MG/DL — SIGNIFICANT CHANGE UP (ref 0.5–1.3)
GLUCOSE SERPL-MCNC: 88 MG/DL — SIGNIFICANT CHANGE UP (ref 70–99)
HCT VFR BLD CALC: 27.6 % — LOW (ref 34.5–45)
HGB BLD-MCNC: 8.9 G/DL — LOW (ref 11.5–15.5)
MAGNESIUM SERPL-MCNC: 1.9 MG/DL — SIGNIFICANT CHANGE UP (ref 1.6–2.6)
MCHC RBC-ENTMCNC: 27.6 PG — SIGNIFICANT CHANGE UP (ref 27–34)
MCHC RBC-ENTMCNC: 32.2 GM/DL — SIGNIFICANT CHANGE UP (ref 32–36)
MCV RBC AUTO: 85.4 FL — SIGNIFICANT CHANGE UP (ref 80–100)
PHOSPHATE SERPL-MCNC: 2.5 MG/DL — SIGNIFICANT CHANGE UP (ref 2.4–4.7)
PLATELET # BLD AUTO: 410 K/UL — HIGH (ref 150–400)
POTASSIUM SERPL-MCNC: 4.4 MMOL/L — SIGNIFICANT CHANGE UP (ref 3.5–5.3)
POTASSIUM SERPL-SCNC: 4.4 MMOL/L — SIGNIFICANT CHANGE UP (ref 3.5–5.3)
PROT SERPL-MCNC: 4.9 G/DL — LOW (ref 6.6–8.7)
RBC # BLD: 3.23 M/UL — LOW (ref 3.8–5.2)
RBC # FLD: 18.7 % — HIGH (ref 10.3–14.5)
SODIUM SERPL-SCNC: 135 MMOL/L — SIGNIFICANT CHANGE UP (ref 135–145)
WBC # BLD: 9.14 K/UL — SIGNIFICANT CHANGE UP (ref 3.8–10.5)
WBC # FLD AUTO: 9.14 K/UL — SIGNIFICANT CHANGE UP (ref 3.8–10.5)

## 2020-05-27 PROCEDURE — 99024 POSTOP FOLLOW-UP VISIT: CPT

## 2020-05-27 PROCEDURE — 71045 X-RAY EXAM CHEST 1 VIEW: CPT | Mod: 26

## 2020-05-27 PROCEDURE — 99232 SBSQ HOSP IP/OBS MODERATE 35: CPT

## 2020-05-27 RX ORDER — NYSTATIN CREAM 100000 [USP'U]/G
1 CREAM TOPICAL
Refills: 0 | Status: DISCONTINUED | OUTPATIENT
Start: 2020-05-27 | End: 2020-05-28

## 2020-05-27 RX ORDER — ACETAMINOPHEN 500 MG
1000 TABLET ORAL ONCE
Refills: 0 | Status: COMPLETED | OUTPATIENT
Start: 2020-05-27 | End: 2020-05-27

## 2020-05-27 RX ORDER — LIDOCAINE HCL 20 MG/ML
20 VIAL (ML) INJECTION ONCE
Refills: 0 | Status: COMPLETED | OUTPATIENT
Start: 2020-05-27 | End: 2020-05-27

## 2020-05-27 RX ADMIN — ENOXAPARIN SODIUM 30 MILLIGRAM(S): 100 INJECTION SUBCUTANEOUS at 23:21

## 2020-05-27 RX ADMIN — PIPERACILLIN AND TAZOBACTAM 25 GRAM(S): 4; .5 INJECTION, POWDER, LYOPHILIZED, FOR SOLUTION INTRAVENOUS at 18:16

## 2020-05-27 RX ADMIN — ENOXAPARIN SODIUM 30 MILLIGRAM(S): 100 INJECTION SUBCUTANEOUS at 09:29

## 2020-05-27 RX ADMIN — PANTOPRAZOLE SODIUM 40 MILLIGRAM(S): 20 TABLET, DELAYED RELEASE ORAL at 18:16

## 2020-05-27 RX ADMIN — SODIUM CHLORIDE 3 MILLILITER(S): 9 INJECTION INTRAMUSCULAR; INTRAVENOUS; SUBCUTANEOUS at 13:18

## 2020-05-27 RX ADMIN — SODIUM CHLORIDE 3 MILLILITER(S): 9 INJECTION INTRAMUSCULAR; INTRAVENOUS; SUBCUTANEOUS at 23:13

## 2020-05-27 RX ADMIN — Medication 1 APPLICATION(S): at 16:47

## 2020-05-27 RX ADMIN — CASPOFUNGIN ACETATE 260 MILLIGRAM(S): 7 INJECTION, POWDER, LYOPHILIZED, FOR SOLUTION INTRAVENOUS at 05:00

## 2020-05-27 RX ADMIN — CHLORHEXIDINE GLUCONATE 1 APPLICATION(S): 213 SOLUTION TOPICAL at 05:09

## 2020-05-27 RX ADMIN — Medication 400 MILLIGRAM(S): at 02:23

## 2020-05-27 RX ADMIN — Medication 20 MILLILITER(S): at 13:18

## 2020-05-27 RX ADMIN — SODIUM CHLORIDE 3 MILLILITER(S): 9 INJECTION INTRAMUSCULAR; INTRAVENOUS; SUBCUTANEOUS at 05:09

## 2020-05-27 RX ADMIN — PANTOPRAZOLE SODIUM 40 MILLIGRAM(S): 20 TABLET, DELAYED RELEASE ORAL at 05:00

## 2020-05-27 RX ADMIN — LATANOPROST 1 DROP(S): 0.05 SOLUTION/ DROPS OPHTHALMIC; TOPICAL at 23:22

## 2020-05-27 RX ADMIN — PIPERACILLIN AND TAZOBACTAM 25 GRAM(S): 4; .5 INJECTION, POWDER, LYOPHILIZED, FOR SOLUTION INTRAVENOUS at 09:29

## 2020-05-27 RX ADMIN — Medication 1 APPLICATION(S): at 05:01

## 2020-05-27 NOTE — PROGRESS NOTE ADULT - ASSESSMENT
HPI: This 69 year old non-ambulatory Female with a PMHx significant for multiple sclerosis, Breast Cancer s/p R mastectomy, Osteoporosis, Mitral stenosis, right ankle fracture, chronic right sided sacral ulcer, chronic midline back pain since being dropped from a jame lift (7/17/29), admitted to [facility] after presenting with sepsis in the setting of a UTI with chronic campos use and known large right ischial decubitus ulcer. Patient found to have a Moderately large Right hydropneumothorax resulting in partial right lung collapse and slight cardiomediastinal shift to the left on CXR with chest tube placed 5/8/20. Large bore chest tube placed 5/10/20 for persistent Right pneumothorax. Patient was followed by ID and was given Vanco and Zosyn originally for her presumed urosepsis, Caspofungin was added after pleural fluid was + for fungal elements. CT chest confirmed +Empyema in the Right pleural space. Patient ultimately transferred to [facility] late 5/11/20 after she was found to have a distal esophageal perforation on CT chest and Esophogram.     Of note, patient admitted 10/9/2019 for lower back and bilateral knee pain, found to have compression fracture of L2 with imaging subsequently found to have multiple lytic lesions on the spine and pelvis.  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 Matthew Fairbanks. (12 May 2020 02:24)    patient is admitted to surgical service, pre-operatively planned for an esophageal stent 5/13.  Patient's labs/ cultures from [facility] reviewed.     Empyema with polymicrobial infection:  Strep mitis infection  Klebsiella oxytoca infection  CoNS infection  Perforated esophagus    Plan:  s/p esophageal stent  and VATS 5/13  s/p PEJ on 5/15    continue Antibiotics:  caspofungin IVPB 50 milliGRAM(s) IV Intermittent every 24 hours  piperacillin/tazobactam IVPB.. 3.375 Gram(s) IV Intermittent every 8 hours  Vanco by level    YEAST from fluid culture being worked up  NOT Candida auris; specimen sent to [facility] labs   COMPLETED  CASPO/VANCO    WILL continue ZOSYN THRU 6/9/2020  - continue Chest tubes      WILL need PICC LINE at time of discharge  will follow up WITH FURTHER Recommendations  -

## 2020-05-27 NOTE — PROGRESS NOTE ADULT - ASSESSMENT
69 year old non-ambulatory Female with a PMHx significant for multiple sclerosis, Breast Cancer s/p R mastectomy, Osteoporosis, Mitral stenosis, right ankle fracture, chronic right sided sacral ulcer, chronic midline back pain since being dropped from a jame lift (7/17/29), admitted to [place] after presenting with sepsis in the setting of a UTI with chronic campos use and known large right ischial decubitus ulcer. Patient found to have a Moderately large Right hydropneumothorax resulting in partial right lung collapse and slight cardiomediastinal shift to the left on CXR with chest tube placed 5/8/20. Large bore chest tube placed 5/10/20 for persistent Right pneumothorax. Patient was followed by ID and was given Vanco and Zosyn originally for her presumed urosepsis, Caspofungin was added after pleural fluid was + for fungal elements. CT chest confirmed +Empyema in the Right pleural space. Patient ultimately transferred to [facility] late 5/11/20 after she was found to have a distal esophageal perforation on CT chest and Esophogram.     5/13/20 patient underwent EGD with esophageal stent placed, NGT placed, VATS with right thoracic cavity washout and pulmonary decortication with vaibhav drain, right posterior, and right medial chest tubes placed with Dr. Murcia.  On 5/14 patient was taken to IR for unsuccessful G/J tube placement. 5/15 patient was taken to the OR for feeding tube placement with Dr. Murcia.  The G tube is to gravity drainage, and slow feeds are advancing through the J tube. S/P esophagram 5/18 which showed narrowing distal stented area. Tolerating tube feeds.

## 2020-05-27 NOTE — PROGRESS NOTE ADULT - PROBLEM SELECTOR PLAN 1
S/p esophageal stent placed 5/13/20 with NGT placed.   S/p GJ tube placement 5/15 (open procedure).  G port connected to gravity drainage, slow feeds  through the J tube @ 50ml/hr and to maintain rate for now per Dr. Murcia.  Continue to monitoring output from G-tube to assess how much feeds patient is tolerating.  Encourage deep breathing, chest PT, incentive spirometry.   Maintain R CTs x 2 to waterseal, 1 chest tube + airleak, 1 chest tube no airleak   noted. SOURAV to bulb suction  Follow up AM CXR.

## 2020-05-27 NOTE — PROGRESS NOTE ADULT - SUBJECTIVE AND OBJECTIVE BOX
Subjective: Patient lying in bed, no acute distress noted, denies fever, chills, chest pain, palpitation, dizziness, headache, shortness of breath, abdominal pain, N/V/D.      V/S  T(C): 36.6 (05-26-20 @ 20:59), Max: 36.6 (05-26-20 @ 04:57)  HR: 92 (05-26-20 @ 20:59) (92 - 92)  BP: 129/72 (05-26-20 @ 20:59) (113/61 - 129/72)  RR: 16 (05-26-20 @ 20:59) (16 - 18)  SpO2: 100% (05-26-20 @ 20:59) (97% - 100%) on room air                                                Tele:    CHEST TUBE: Left chest tube x 2     OUTPUT:             per 24 hours     Drainage:    AIR LEAKS:  Chest tube  31 +air leak, Chest tube x2 no air leak     MEDICATIONS  (STANDING):  caspofungin IVPB 50 milliGRAM(s) IV Intermittent every 24 hours  chlorhexidine 4% Liquid 1 Application(s) Topical <User Schedule>  Dakins Solution - 1/2 Strength 1 Application(s) Topical two times a day  enoxaparin Injectable 30 milliGRAM(s) SubCutaneous every 12 hours  latanoprost 0.005% Ophthalmic Solution 1 Drop(s) Both EYES at bedtime  pantoprazole  Injectable 40 milliGRAM(s) IV Push every 12 hours  piperacillin/tazobactam IVPB.. 3.375 Gram(s) IV Intermittent every 8 hours  sodium chloride 0.9% lock flush 3 milliLiter(s) IV Push every 8 hours  vancomycin  IVPB 500 milliGRAM(s) IV Intermittent every 24 hours      05-26    133<L>  |  101  |  30.0<H>  ----------------------------<  96  3.9   |  20.0<L>  |  0.83    Ca    6.7<L>      26 May 2020 06:12  Phos  2.3     05-26  Mg     2.2     05-26    TPro  4.5<L>  /  Alb  1.3<L>  /  TBili  <0.2<L>  /  DBili  x   /  AST  27  /  ALT  21  /  AlkPhos  174<H>  05-25                               9.2    6.41  )-----------( 393      ( 26 May 2020 06:12 )             28.2                 CAPILLARY BLOOD GLUCOSE               CXR:      < from: Xray Chest 1 View- PORTABLE-Routine (05.26.20 @ 07:46) >  FINDINGS:    Single frontal view of the chest demonstrates diffuse bilateral infiltrates, unchanged. Differential diagnosis includes CHF. Correlate clinically. Two right-sided chest tubes are  unchanged. Small right upper lobe and right lower lobe pneumothoraces, unchanged. Esophageal stent. Left axillary midline catheter. Right axillary surgical clips. Small bilateral pleural effusions. The cardiomediastinal silhouette is enlarged. No acute osseous abnormalities. Overlying EKG leads and wires are noted    IMPRESSION: Small right lung pneumothoraces. Diffuse bilateral infiltrates. Small bilateral pleural effusions      < end of copied text >    Physical Exam:    Neuro: A+O x 3, non-focal, speech clear and intact  HEENT: PERRL, EOMI  Neck: supple, no JVD  CV: regular rate, regular rhythm, +S1S2, no murmurs or rub  Pulm/chest: lung sounds CTA and equal bilaterally, no accessory muscle use noted  Abd: soft, NT, ND, +BS  Ext: DAVIDSON x 4, no C/C/E  Skin: warm, well perfused, no rashes          PAST MEDICAL & SURGICAL HISTORY:  Breast cancer metastasized to bone  Hypertension  Multiple sclerosis  S/P mastectomy, right  Breast CA  Osteoporosis  MS (mitral stenosis)  History of bowel resection  H/O breast surgery Subjective: Patient lying in bed, no acute distress noted, denies fever, chills, chest pain, palpitation, dizziness, headache, shortness of breath, abdominal pain, N/V/D.      V/S  T(C): 36.6 (05-26-20 @ 20:59), Max: 36.6 (05-26-20 @ 04:57)  HR: 92 (05-26-20 @ 20:59) (92 - 92)  BP: 129/72 (05-26-20 @ 20:59) (113/61 - 129/72)  RR: 16 (05-26-20 @ 20:59) (16 - 18)  SpO2: 100% (05-26-20 @ 20:59) (97% - 100%) on room air                                                Tele:    CHEST TUBE: Left chest tube x 2     OUTPUT:             per 24 hours     Drainage:    AIR LEAKS:  Chest tube  31 +air leak, Chest tube x2 no air leak     MEDICATIONS  (STANDING):  caspofungin IVPB 50 milliGRAM(s) IV Intermittent every 24 hours  chlorhexidine 4% Liquid 1 Application(s) Topical <User Schedule>  Dakins Solution - 1/2 Strength 1 Application(s) Topical two times a day  enoxaparin Injectable 30 milliGRAM(s) SubCutaneous every 12 hours  latanoprost 0.005% Ophthalmic Solution 1 Drop(s) Both EYES at bedtime  pantoprazole  Injectable 40 milliGRAM(s) IV Push every 12 hours  piperacillin/tazobactam IVPB.. 3.375 Gram(s) IV Intermittent every 8 hours  sodium chloride 0.9% lock flush 3 milliLiter(s) IV Push every 8 hours  vancomycin  IVPB 500 milliGRAM(s) IV Intermittent every 24 hours      05-26    133<L>  |  101  |  30.0<H>  ----------------------------<  96  3.9   |  20.0<L>  |  0.83    Ca    6.7<L>      26 May 2020 06:12  Phos  2.3     05-26  Mg     2.2     05-26    TPro  4.5<L>  /  Alb  1.3<L>  /  TBili  <0.2<L>  /  DBili  x   /  AST  27  /  ALT  21  /  AlkPhos  174<H>  05-25                               9.2    6.41  )-----------( 393      ( 26 May 2020 06:12 )             28.2                 CAPILLARY BLOOD GLUCOSE               CXR:      < from: Xray Chest 1 View- PORTABLE-Routine (05.26.20 @ 07:46) >  FINDINGS:    Single frontal view of the chest demonstrates diffuse bilateral infiltrates, unchanged. Differential diagnosis includes CHF. Correlate clinically. Two right-sided chest tubes are  unchanged. Small right upper lobe and right lower lobe pneumothoraces, unchanged. Esophageal stent. Left axillary midline catheter. Right axillary surgical clips. Small bilateral pleural effusions. The cardiomediastinal silhouette is enlarged. No acute osseous abnormalities. Overlying EKG leads and wires are noted    IMPRESSION: Small right lung pneumothoraces. Diffuse bilateral infiltrates. Small bilateral pleural effusions      < end of copied text >    Physical Exam:    Neuro: A+O x 3, non-focal, speech clear and intact  HEENT: PERRL, EOMI  Neck: supple, no JVD  CV: regular rate, regular rhythm, +S1S2, no murmurs or rub  Pulm/chest: lung sounds clear, diminished at the bases bilaterally, no accessory muscle use noted, Right chest tube x2 to suction. Right SOURAV to bulb suction   Abd: soft, NT, ND, +BS. GJ tube site dry and intact, no signs of infection, sutures intact. J tube connected to feeding, G tube to gravity   Ext: Limited movement BLE,  +3 edema BLE, +PP bilaterally  Skin: warm, large sacral decubitus ulcer, with dressig          PAST MEDICAL & SURGICAL HISTORY:  Breast cancer metastasized to bone  Hypertension  Multiple sclerosis  S/P mastectomy, right  Breast CA  Osteoporosis  MS (mitral stenosis)  History of bowel resection  H/O breast surgery

## 2020-05-27 NOTE — CHART NOTE - NSCHARTNOTEFT_GEN_A_CORE
Source: Patient [ ]  Family [ ]   other [x ] pt sleeping at time of interview    Current Diet: Diet, Full Liquid:   Tube Feeding Modality: Jejunostomy  Pivot 1.5 Derek  Total Volume for 24 Hours (mL): 1200  Continuous  Starting Tube Feed Rate {mL per Hour}: 50  Until Goal Tube Feed Rate (mL per Hour): 50  Tube Feed Duration (in Hours): 24  Tube Feed Start Time: 13:00 (05-25-20 @ 12:55)    PO intake:  Pt tolerating full liquids per physician notes- consuming ~75% at meals    Enteral Nutrition: Pt currently tolerating Pivot 1.5 @ 50ml/hr x20 hrs daily- provides 1000ml, 1500kcal, 94g protein, 759ml free water    Current Weight:   (5/26) 155 lbs  (5/23) 161 lbs  (5/21) 165 lbs    % Weight Change wt appears to be trending down since admission, if accurate- will continue to monitor.  Aware pt with 2+ mild edema B/L legs noted.    Pertinent Medications: MEDICATIONS  (STANDING):  caspofungin IVPB 50 milliGRAM(s) IV Intermittent every 24 hours  chlorhexidine 4% Liquid 1 Application(s) Topical <User Schedule>  Dakins Solution - 1/2 Strength 1 Application(s) Topical two times a day  enoxaparin Injectable 30 milliGRAM(s) SubCutaneous every 12 hours  latanoprost 0.005% Ophthalmic Solution 1 Drop(s) Both EYES at bedtime  pantoprazole  Injectable 40 milliGRAM(s) IV Push every 12 hours  piperacillin/tazobactam IVPB.. 3.375 Gram(s) IV Intermittent every 8 hours  sodium chloride 0.9% lock flush 3 milliLiter(s) IV Push every 8 hours  vancomycin  IVPB 500 milliGRAM(s) IV Intermittent every 24 hours    MEDICATIONS  (PRN):  oxycodone    5 mG/acetaminophen 325 mG 1 Tablet(s) Oral every 6 hours PRN Moderate Pain (4 - 6)  sodium chloride 0.9% lock flush 10 milliLiter(s) IV Push every 1 hour PRN Pre/post blood products, medications, blood draw, and to maintain line patency    Pertinent Labs: CBC Full  -  ( 27 May 2020 06:03 )  WBC Count : 9.14 K/uL  RBC Count : 3.23 M/uL  Hemoglobin : 8.9 g/dL  Hematocrit : 27.6 %  Platelet Count - Automated : 410 K/uL  Mean Cell Volume : 85.4 fl  Mean Cell Hemoglobin : 27.6 pg  Mean Cell Hemoglobin Concentration : 32.2 gm/dL  05-27 Na135 mmol/L Glu 88 mg/dL K+ 4.4 mmol/L Cr  0.77 mg/dL BUN 33.0 mg/dL<H> Phos 2.5 mg/dL Alb 1.5 g/dL<L> PAB n/a       Skin: nstageable right ischium, R heel unstageable, L shin unstageable, R knee unstageable    Nutrition focused physical exam conducted - found signs of malnutrition [ ]absent [ ]present    Subcutaneous fat loss: [ ] Orbital fat pads region, [ ]Buccal fat region, [ ]Triceps region,  [ ]Ribs region    Muscle wasting: [ ]Temples region, [ ]Clavicle region, [ ]Shoulder region, [ ]Scapula region, [ ]Interosseous region,  [ ]thigh region, [ ]Calf region    Current Nutrition Diagnosis:Pt remains at nutrition risk secondary to severe protein calorie malnutrition (acute-- likely on chronic) related to inability to meet increased protein energy needs in setting of metastatic breast ca, esophageal perforation, +NGT, s/p GJ tube placement and skin breakdown as evidenced by pt previously meeting <50% estimated energy intake >5 days, moderate muscle wasting, and 11% wt loss. TF currently running at goal rate of 30ml/hr- pt tolerating well per documentation. Pt assisted with full liquid diet, consuming %. Last documented BM 5/21. RD to remain available.     Recommendations:   1. Continue with order for Pivot 1.5 @ 30ml/hr.  As/when medically feasible- increase by 10ml q 8 hrs to goal rate of 50ml/hr (x20hrs) daily, as tolerated (while on full liquids) to provide 1000ml, 1500kcal, 94g protein, 760ml free water.  2. Consider RX: Ensure Clear TID with full liquid trays- as tolerated  3. RX: liquid MVI and Vit C 500mg daily   4. RX: znso4 220mg x 10 days daily  5. Monitor daily wts and labs      Recommendations:     Monitoring and Evaluation:   [x ] PO intake [x ] Tolerance to diet prescription [X] Weights  [X] Follow up per protocol [X] Labs: Source: Patient [ ]  Family [ ]   other [x ] pt sleeping at time of interview    Current Diet: Diet, Full Liquid:   Tube Feeding Modality: Jejunostomy  Pivot 1.5 Derek  Total Volume for 24 Hours (mL): 1200  Continuous  Starting Tube Feed Rate {mL per Hour}: 50  Until Goal Tube Feed Rate (mL per Hour): 50  Tube Feed Duration (in Hours): 24  Tube Feed Start Time: 13:00 (05-25-20 @ 12:55)    PO intake:  Pt tolerating full liquids per physician notes- consuming ~75% at meals    Enteral Nutrition: Pt currently tolerating Pivot 1.5 @ 50ml/hr x20 hrs daily- provides 1000ml, 1500kcal, 94g protein, 759ml free water    Current Weight:   (5/26) 155 lbs  (5/23) 161 lbs  (5/21) 165 lbs    % Weight Change wt appears to be trending down since admission, if accurate- will continue to monitor.  Aware pt with 2+ mild edema B/L legs noted.    Pertinent Medications: MEDICATIONS  (STANDING):  caspofungin IVPB 50 milliGRAM(s) IV Intermittent every 24 hours  chlorhexidine 4% Liquid 1 Application(s) Topical <User Schedule>  Dakins Solution - 1/2 Strength 1 Application(s) Topical two times a day  enoxaparin Injectable 30 milliGRAM(s) SubCutaneous every 12 hours  latanoprost 0.005% Ophthalmic Solution 1 Drop(s) Both EYES at bedtime  pantoprazole  Injectable 40 milliGRAM(s) IV Push every 12 hours  piperacillin/tazobactam IVPB.. 3.375 Gram(s) IV Intermittent every 8 hours  sodium chloride 0.9% lock flush 3 milliLiter(s) IV Push every 8 hours  vancomycin  IVPB 500 milliGRAM(s) IV Intermittent every 24 hours    MEDICATIONS  (PRN):  oxycodone    5 mG/acetaminophen 325 mG 1 Tablet(s) Oral every 6 hours PRN Moderate Pain (4 - 6)  sodium chloride 0.9% lock flush 10 milliLiter(s) IV Push every 1 hour PRN Pre/post blood products, medications, blood draw, and to maintain line patency    Pertinent Labs: CBC Full  -  ( 27 May 2020 06:03 )  WBC Count : 9.14 K/uL  RBC Count : 3.23 M/uL  Hemoglobin : 8.9 g/dL  Hematocrit : 27.6 %  Platelet Count - Automated : 410 K/uL  Mean Cell Volume : 85.4 fl  Mean Cell Hemoglobin : 27.6 pg  Mean Cell Hemoglobin Concentration : 32.2 gm/dL  05-27 Na135 mmol/L Glu 88 mg/dL K+ 4.4 mmol/L Cr  0.77 mg/dL BUN 33.0 mg/dL<H> Phos 2.5 mg/dL Alb 1.5 g/dL<L> PAB n/a       Skin: nstageable right ischium, R heel unstageable, L shin unstageable, R knee unstageable    Nutrition focused physical exam previously conducted - found signs of malnutrition [ ]absent [x ]present    Subcutaneous fat loss: [x ] Orbital fat pads region, [ ]Buccal fat region, [ ]Triceps region,  [ ]Ribs region    Muscle wasting: [x ]Temples region, [ ]Clavicle region, [x ]Shoulder region, [ ]Scapula region, [ ]Interosseous region,  [ ]thigh region, [ ]Calf region    Current Nutrition Diagnosis: Pt remains at nutrition risk secondary to severe protein calorie malnutrition (acute-- likely on chronic) related to inability to meet increased protein energy needs in setting of metastatic breast ca, esophageal perforation, +NGT, s/p GJ tube placement and skin breakdown as evidenced by pt previously meeting <50% estimated energy intake >5 days, moderate muscle wasting, and 11% wt loss.  Pt tolerating tube feedings at this time.  Pt tolerating full liquid diet consuming ~75% at meals.       Recommendations:   1. Continue with current enteral nutrition regimen at this time/as tolerated  2. Continue with full liquid diet as tolerated  3. RX: liquid MVI and Vit C 500mg daily   4. RX: znso4 220mg x 10 days daily  5. Monitor daily wts     Monitoring and Evaluation:   [x ] PO intake [x ] Tolerance to diet prescription [X] Weights  [X] Follow up per protocol [X] Labs:

## 2020-05-27 NOTE — PROGRESS NOTE ADULT - PROBLEM SELECTOR PLAN 7
H/H trending downward. Continue to trend.   Type and cross with +antibodies.   1unit PRBC transfused 5/25.  Continue to monitor for S/S of acute blood loss.   Consider FOBT.

## 2020-05-27 NOTE — PROGRESS NOTE ADULT - SUBJECTIVE AND OBJECTIVE BOX
[facility]  INFECTIOUS DISEASES AND INTERNAL MEDICINE at [place]  =======================================================  Phong Wang MD  Diplomates American Board of Internal Medicine and Infectious Diseases  Telephone [phone]  Fax            [phone]  =======================================================    [id number]  GEORGE STANLEY   follow up: empyema     s/p EGD and esophageal stent; s/p VATS 5/14  s/p gastrojejunal tube on 5/15/2020  s/p debridement of sacral decubitus       =======================================================  REVIEW OF SYSTEMS:  CONSTITUTIONAL:  No Fever or chills  HEENT:  No diplopia or blurred vision.  No earache, sore throat or runny nose.  CARDIOVASCULAR:  No pressure, squeezing, strangling, tightness, heaviness or aching about the chest, neck, axilla or epigastrium.  RESPIRATORY:  MILD shortness of breath  GASTROINTESTINAL:  No nausea, vomiting or diarrhea.  GENITOURINARY:  No dysuria, frequency or urgency. No Blood in urine  MUSCULOSKELETAL:  no joint aches, no muscle pain  SKIN:  No change in skin, hair or nails.  NEUROLOGIC:  No Headaches, seizures or weakness.  PSYCHIATRIC:  No disorder of thought or mood.  ENDOCRINE:  No heat or cold intolerance  HEMATOLOGICAL:  No easy bruising or bleeding.   =======================================================  Allergies  Sudafed (Other)    ======================================================  Physical Exam:  ============   V Vital Signs Last 24 Hrs  T(C): 36.5 (27 May 2020 16:33), Max: 36.6 (26 May 2020 20:59)  T(F): 97.7 (27 May 2020 16:33), Max: 97.9 (26 May 2020 20:59)  HR: 81 (27 May 2020 16:33) (81 - 92)  BP: 124/73 (27 May 2020 16:33) (112/78 - 143/84)  BP(mean): --  RR: 18 (27 May 2020 16:33) (16 - 18)  SpO2: 96% (27 May 2020 16:33) (96% - 100%)    General:  No acute distress. FRAIL  Eye: Pupils are equal, round and reactive to light, Extraocular movements are intact, Normal conjunctiva.  HENT: Normocephalic, Oral mucosa is moist, No pharyngeal erythema, No sinus tenderness.  Neck: Supple, No lymphadenopathy.  Respiratory: Lungs  with diminished air entry at bases  RIGHT side with 2 chest tubes - CLEAR FLUID  Cardiovascular: Normal rate, Regular rhythm  Gastrointestinal: Soft, Non-tender, Non-distended, Normal bowel sounds.  PEJ tube present in abd  Genitourinary: + ELIZONDO with clear urine  Lymphatics: No lymphadenopathy neck,   Musculoskeletal: Normal range of motion, Normal strength.   decubitus ulcer on right hip as per nursing   Neurologic: Alert, Oriented, No focal deficits, Cranial Nerves II-XII are grossly intact.  Psychiatric: Appropriate mood & affect.    =======================================================           =======================================================  Current Antibiotics:  c     =======================================================  Labs:                                                     8.9    9.14  )-----------( 410      ( 27 May 2020 06:03 )             27.6   05-27    135  |  102  |  33.0<H>  ----------------------------<  88  4.4   |  21.0<L>  |  0.77    Ca    7.0<L>      27 May 2020 06:03  Phos  2.5     05-27  Mg     1.9     05-27    TPro  4.9<L>  /  Alb  1.5<L>  /  TBili  <0.2<L>  /  DBili  x   /  AST  22  /  ALT  18  /  AlkPhos  198<H>  05-27

## 2020-05-27 NOTE — PROGRESS NOTE ADULT - PROBLEM SELECTOR PLAN 2
S/p Right thoracotomy / washout / VATS decort 5/13/20.  2 Chest tubes to waterseal.  Follow up daily CXR.  Pleural fluid + for yeast, coag Negative Staph, Klebsiella, and strep mitis/oralis. Continue IV antibiotics per ID.   Vancomycin and Caspofungin completed 5/26/20 and Zosyn to be completed 6/9/20.  PICC line to be placed closer to time of discharge.  Blood cultures negative 5/8.  Repeat cultures reordered, pending collection, will follow up

## 2020-05-28 LAB
ALBUMIN SERPL ELPH-MCNC: 1.5 G/DL — LOW (ref 3.3–5.2)
ALP SERPL-CCNC: 266 U/L — HIGH (ref 40–120)
ALT FLD-CCNC: 25 U/L — SIGNIFICANT CHANGE UP
ANION GAP SERPL CALC-SCNC: 10 MMOL/L — SIGNIFICANT CHANGE UP (ref 5–17)
AST SERPL-CCNC: 36 U/L — HIGH
BILIRUB SERPL-MCNC: <0.2 MG/DL — LOW (ref 0.4–2)
BUN SERPL-MCNC: 32 MG/DL — HIGH (ref 8–20)
CALCIUM SERPL-MCNC: 7 MG/DL — LOW (ref 8.6–10.2)
CHLORIDE SERPL-SCNC: 104 MMOL/L — SIGNIFICANT CHANGE UP (ref 98–107)
CO2 SERPL-SCNC: 21 MMOL/L — LOW (ref 22–29)
CREAT SERPL-MCNC: 0.79 MG/DL — SIGNIFICANT CHANGE UP (ref 0.5–1.3)
GLUCOSE SERPL-MCNC: 94 MG/DL — SIGNIFICANT CHANGE UP (ref 70–99)
HCT VFR BLD CALC: 27.1 % — LOW (ref 34.5–45)
HGB BLD-MCNC: 8.8 G/DL — LOW (ref 11.5–15.5)
MAGNESIUM SERPL-MCNC: 1.7 MG/DL — SIGNIFICANT CHANGE UP (ref 1.6–2.6)
MCHC RBC-ENTMCNC: 27.8 PG — SIGNIFICANT CHANGE UP (ref 27–34)
MCHC RBC-ENTMCNC: 32.5 GM/DL — SIGNIFICANT CHANGE UP (ref 32–36)
MCV RBC AUTO: 85.5 FL — SIGNIFICANT CHANGE UP (ref 80–100)
PHOSPHATE SERPL-MCNC: 2.3 MG/DL — LOW (ref 2.4–4.7)
PLATELET # BLD AUTO: 402 K/UL — HIGH (ref 150–400)
POTASSIUM SERPL-MCNC: 4.5 MMOL/L — SIGNIFICANT CHANGE UP (ref 3.5–5.3)
POTASSIUM SERPL-SCNC: 4.5 MMOL/L — SIGNIFICANT CHANGE UP (ref 3.5–5.3)
PROT SERPL-MCNC: 4.9 G/DL — LOW (ref 6.6–8.7)
RBC # BLD: 3.17 M/UL — LOW (ref 3.8–5.2)
RBC # FLD: 19 % — HIGH (ref 10.3–14.5)
SODIUM SERPL-SCNC: 135 MMOL/L — SIGNIFICANT CHANGE UP (ref 135–145)
TRIGL FLD-MCNC: 99 MG/DL — SIGNIFICANT CHANGE UP
TRIGL SERPL-MCNC: 121 MG/DL — SIGNIFICANT CHANGE UP (ref 10–200)
WBC # BLD: 9.43 K/UL — SIGNIFICANT CHANGE UP (ref 3.8–10.5)
WBC # FLD AUTO: 9.43 K/UL — SIGNIFICANT CHANGE UP (ref 3.8–10.5)

## 2020-05-28 PROCEDURE — 71045 X-RAY EXAM CHEST 1 VIEW: CPT | Mod: 26

## 2020-05-28 PROCEDURE — 99232 SBSQ HOSP IP/OBS MODERATE 35: CPT

## 2020-05-28 PROCEDURE — 99024 POSTOP FOLLOW-UP VISIT: CPT

## 2020-05-28 RX ORDER — NYSTATIN CREAM 100000 [USP'U]/G
1 CREAM TOPICAL
Refills: 0 | Status: DISCONTINUED | OUTPATIENT
Start: 2020-05-28 | End: 2020-06-09

## 2020-05-28 RX ORDER — MAGNESIUM SULFATE 500 MG/ML
2 VIAL (ML) INJECTION ONCE
Refills: 0 | Status: COMPLETED | OUTPATIENT
Start: 2020-05-28 | End: 2020-05-28

## 2020-05-28 RX ADMIN — SODIUM CHLORIDE 3 MILLILITER(S): 9 INJECTION INTRAMUSCULAR; INTRAVENOUS; SUBCUTANEOUS at 05:49

## 2020-05-28 RX ADMIN — Medication 62.5 MILLIMOLE(S): at 18:52

## 2020-05-28 RX ADMIN — NYSTATIN CREAM 1 APPLICATION(S): 100000 CREAM TOPICAL at 17:47

## 2020-05-28 RX ADMIN — SODIUM CHLORIDE 3 MILLILITER(S): 9 INJECTION INTRAMUSCULAR; INTRAVENOUS; SUBCUTANEOUS at 13:17

## 2020-05-28 RX ADMIN — Medication 1 APPLICATION(S): at 17:47

## 2020-05-28 RX ADMIN — PANTOPRAZOLE SODIUM 40 MILLIGRAM(S): 20 TABLET, DELAYED RELEASE ORAL at 17:48

## 2020-05-28 RX ADMIN — ENOXAPARIN SODIUM 30 MILLIGRAM(S): 100 INJECTION SUBCUTANEOUS at 21:08

## 2020-05-28 RX ADMIN — CHLORHEXIDINE GLUCONATE 1 APPLICATION(S): 213 SOLUTION TOPICAL at 05:49

## 2020-05-28 RX ADMIN — LATANOPROST 1 DROP(S): 0.05 SOLUTION/ DROPS OPHTHALMIC; TOPICAL at 21:07

## 2020-05-28 RX ADMIN — PANTOPRAZOLE SODIUM 40 MILLIGRAM(S): 20 TABLET, DELAYED RELEASE ORAL at 05:50

## 2020-05-28 RX ADMIN — Medication 1 APPLICATION(S): at 05:50

## 2020-05-28 RX ADMIN — PIPERACILLIN AND TAZOBACTAM 25 GRAM(S): 4; .5 INJECTION, POWDER, LYOPHILIZED, FOR SOLUTION INTRAVENOUS at 23:09

## 2020-05-28 RX ADMIN — PIPERACILLIN AND TAZOBACTAM 25 GRAM(S): 4; .5 INJECTION, POWDER, LYOPHILIZED, FOR SOLUTION INTRAVENOUS at 13:17

## 2020-05-28 RX ADMIN — SODIUM CHLORIDE 3 MILLILITER(S): 9 INJECTION INTRAMUSCULAR; INTRAVENOUS; SUBCUTANEOUS at 21:07

## 2020-05-28 RX ADMIN — Medication 50 GRAM(S): at 17:46

## 2020-05-28 RX ADMIN — PIPERACILLIN AND TAZOBACTAM 25 GRAM(S): 4; .5 INJECTION, POWDER, LYOPHILIZED, FOR SOLUTION INTRAVENOUS at 01:54

## 2020-05-28 RX ADMIN — ENOXAPARIN SODIUM 30 MILLIGRAM(S): 100 INJECTION SUBCUTANEOUS at 09:25

## 2020-05-28 NOTE — CHART NOTE - NSCHARTNOTEFT_GEN_A_CORE
CTS ACP Addendum    Briefly, patient is a 69F h/o MS on interferon, breast CA on letrozole p/w esophageal perforation now POD #15 s/p right chest washout and esophageal stent placement, POD  #13 s/p JG tube placement and 1 prbc transfusion for anemia of chronic disease.    Patient seen and examined. Notes, flowsheets, medications, radiologic images and labs reviewed. Plan for electrolyte repletion, maintain chest tubes today - concern for chylothorax despite pleural fluid triglyceride level 99 (elevated level over 110). G port clamped, J tube feeding and meds, oral liquid diet. Dakins solution dressing changes to ischial wound. Will check every other day labs and cxr and plan for discharge to [facility] next week.    Case discussed with Dr. Murcia.

## 2020-05-28 NOTE — PROGRESS NOTE ADULT - ASSESSMENT
HPI: This 69 year old non-ambulatory Female with a PMHx significant for multiple sclerosis, Breast Cancer s/p R mastectomy, Osteoporosis, Mitral stenosis, right ankle fracture, chronic right sided sacral ulcer, chronic midline back pain since being dropped from a jame lift (7/17/29), admitted to [facility] after presenting with sepsis in the setting of a UTI with chronic campos use and known large right ischial decubitus ulcer. Patient found to have a Moderately large Right hydropneumothorax resulting in partial right lung collapse and slight cardiomediastinal shift to the left on CXR with chest tube placed 5/8/20. Large bore chest tube placed 5/10/20 for persistent Right pneumothorax. Patient was followed by ID and was given Vanco and Zosyn originally for her presumed urosepsis, Caspofungin was added after pleural fluid was + for fungal elements. CT chest confirmed +Empyema in the Right pleural space. Patient ultimately transferred to [facility] late 5/11/20 after she was found to have a distal esophageal perforation on CT chest and Esophogram.     Of note, patient admitted 10/9/2019 for lower back and bilateral knee pain, found to have compression fracture of L2 with imaging subsequently found to have multiple lytic lesions on the spine and pelvis.  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 Matthew Fairbanks. (12 May 2020 02:24)    patient is admitted to surgical service, pre-operatively planned for an esophageal stent 5/13.  Patient's labs/ cultures from [facility] reviewed.     Empyema with polymicrobial infection:  Strep mitis infection  Klebsiella oxytoca infection  CoNS infection  Perforated esophagus    Plan:  s/p esophageal stent  and VATS 5/13  s/p PEJ on 5/15         YEAST from fluid culture being worked up  NOT Candida auris; specimen sent to [facility] labs      COMPLETED CASPO/ VANCO    WILL continue ZOSYN THRU 6/9/2020  - continue Chest tubes    WILL need PICC LINE at time of discharge  will follow up WITH FURTHER Recommendations HPI: This 69 year old non-ambulatory Female with a PMHx significant for multiple sclerosis, Breast Cancer s/p R mastectomy, Osteoporosis, Mitral stenosis, right ankle fracture, chronic right sided sacral ulcer, chronic midline back pain since being dropped from a jame lift (7/17/29), admitted to [place] after presenting with sepsis in the setting of a UTI with chronic cmapos use and known large right ischial decubitus ulcer. Patient found to have a Moderately large Right hydropneumothorax resulting in partial right lung collapse and slight cardiomediastinal shift to the left on CXR with chest tube placed 5/8/20. Large bore chest tube placed 5/10/20 for persistent Right pneumothorax. Patient was followed by ID and was given Vanco and Zosyn originally for her presumed urosepsis, Caspofungin was added after pleural fluid was + for fungal elements. CT chest confirmed +Empyema in the Right pleural space. Patient ultimately transferred to [facility] late 5/11/20 after she was found to have a distal esophageal perforation on CT chest and Esophogram.     Of note, patient admitted 10/9/2019 for lower back and bilateral knee pain, found to have compression fracture of L2 with imaging subsequently found to have multiple lytic lesions on the spine and pelvis.  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 Matthew Fairbanks. (12 May 2020 02:24)    patient is admitted to surgical service, pre-operatively planned for an esophageal stent 5/13.  Patient's labs/ cultures from [facility] reviewed.     Empyema with polymicrobial infection:  Strep mitis infection  Klebsiella oxytoca infection  CoNS infection  Perforated esophagus    Plan:  s/p esophageal stent  and VATS 5/13  s/p PEJ on 5/15         YEAST from fluid culture being worked up  NOT Candida auris; specimen sent to [facility] labs      COMPLETED CASPO/ VANCO    WILL continue ZOSYN THRU 6/9/2020  - continue Chest tubes    - regarding DECUBITUS Ulcer, present prior to admission in this bed-ridden patient, CLINICAL osteomyelitis  - continue wound care even after course of Zosyn    WILL need PICC LINE at time of discharge is discharged prior to END date.   will follow up WITH FURTHER Recommendations normal sinus rhythm

## 2020-05-28 NOTE — PROGRESS NOTE ADULT - SUBJECTIVE AND OBJECTIVE BOX
Subjective: Patient lying in bed, no acute distress, oxygenating well on room air. Patient denies shortness of breath, fever, chills, chest pain, palpitation, dizziness, headache, abdominal pain, N/V/D.     V/S  T(C): 36.9 (05-27-20 @ 23:16), Max: 36.9 (05-27-20 @ 23:16)  HR: 93 (05-27-20 @ 23:16) (81 - 93)  BP: 133/79 (05-27-20 @ 23:16) (112/78 - 143/84)  RR: 20 (05-27-20 @ 23:16) (16 - 20)  SpO2: 99% (05-27-20 @ 23:16) (96% - 99%) on room air                                           Tele: Sinus rhythm    CHEST TUBE:  Right CT x1 to waterseal    Drainage:  Pale creamy color  AIR LEAKS:  [x ] YES [ ] NO      MEDICATIONS  (STANDING):  chlorhexidine 4% Liquid 1 Application(s) Topical <User Schedule>  Dakins Solution - 1/2 Strength 1 Application(s) Topical two times a day  enoxaparin Injectable 30 milliGRAM(s) SubCutaneous every 12 hours  latanoprost 0.005% Ophthalmic Solution 1 Drop(s) Both EYES at bedtime  nystatin Powder 1 Application(s) Topical two times a day  pantoprazole  Injectable 40 milliGRAM(s) IV Push every 12 hours  piperacillin/tazobactam IVPB.. 3.375 Gram(s) IV Intermittent every 8 hours  sodium chloride 0.9% lock flush 3 milliLiter(s) IV Push every 8 hours      05-27    135  |  102  |  33.0<H>  ----------------------------<  88  4.4   |  21.0<L>  |  0.77    Ca    7.0<L>      27 May 2020 06:03  Phos  2.5     05-27  Mg     1.9     05-27    TPro  4.9<L>  /  Alb  1.5<L>  /  TBili  <0.2<L>  /  DBili  x   /  AST  22  /  ALT  18  /  AlkPhos  198<H>  05-27                               8.9    9.14  )-----------( 410      ( 27 May 2020 06:03 )             27.6                     CXR:  < from: Xray Chest 1 View- PORTABLE-Routine (05.27.20 @ 05:49) >    Frontal expiratory view of the chest shows the heart to besimilar in size.  Three right chest tubes, right axillary clips and esophageal stent remain present.    The lungs show similar right lung infiltrates or atelectasis and there is no evidence of pneumothorax. Small pleural effusions are present, left larger than right. Bony metastases, notable in the scapulae, are again noted.    IMPRESSION:  No pneumothorax.    Thank you for the courtesy of this referral.    < end of copied text >      Physical Exam:    Neuro: A+O x 3, non-focal, speech clear and intact  HEENT: PERRL, EOMI  Neck: supple, no JVD  CV: regular rate, regular rhythm, +S1S2, no murmurs or rub  Pulm/chest: Breath sounds clear, diminished at the bases bilaterally, no accessory muscle use noted. Right chest tube to waterseal. SOURAV to bulb suction  Abd: soft, NT, obese, +edematous, +BS. GJ tube with staples, clean dry and intact. G tube clamped, J tube to feeding  : Starks to drainage bag  Ext: DAVIDSON x 4, no cyanosis or clubbing. +3 edema BLE, +PP pulses bilaterally  Skin: Cool to touch, +3 edema BLE. Large sacral decubitus ulcer with dressing          PAST MEDICAL & SURGICAL HISTORY:  Breast cancer metastasized to bone  Hypertension  Multiple sclerosis  S/P mastectomy, right  Breast CA  Osteoporosis  MS (mitral stenosis)  History of bowel resection  H/O breast surgery

## 2020-05-28 NOTE — PROGRESS NOTE ADULT - PROBLEM SELECTOR PLAN 1
S/p esophageal stent placed 5/13/20 with NGT placed.   S/p GJ tube placement 5/15 (open procedure).  G port clamped (5/27), slow feeds  through the J tube @ 50ml/hr and to maintain rate for now per Dr. Murcia.  Encourage deep breathing, chest PT, incentive spirometry.   Right chest to waterseal, SOURAV to bulb suction  Follow up AM CXR.

## 2020-05-28 NOTE — PROGRESS NOTE ADULT - SUBJECTIVE AND OBJECTIVE BOX
[facility]  INFECTIOUS DISEASES AND INTERNAL MEDICINE at [place]  =======================================================  Phong Wang MD  Diplomates American Board of Internal Medicine and Infectious Diseases  Telephone [phone]  Fax            [phone]  =======================================================    [id number]  GEORGE STANLEY   follow up: empyema     s/p EGD and esophageal stent; s/p VATS 5/14  s/p gastrojejunal tube on 5/15/2020  s/p debridement of sacral decubitus    remains on antibiotics.      =======================================================  REVIEW OF SYSTEMS:  CONSTITUTIONAL:  No Fever or chills  HEENT:  No diplopia or blurred vision.  No earache, sore throat or runny nose.  CARDIOVASCULAR:  No pressure, squeezing, strangling, tightness, heaviness or aching about the chest, neck, axilla or epigastrium.  RESPIRATORY:  MILD shortness of breath  GASTROINTESTINAL:  No nausea, vomiting or diarrhea.  GENITOURINARY:  No dysuria, frequency or urgency. No Blood in urine  MUSCULOSKELETAL:  no joint aches, no muscle pain  SKIN:  No change in skin, hair or nails.  NEUROLOGIC:  No Headaches, seizures or weakness.  PSYCHIATRIC:  No disorder of thought or mood.  ENDOCRINE:  No heat or cold intolerance  HEMATOLOGICAL:  No easy bruising or bleeding.   =======================================================  Allergies  Sudafed (Other)    ======================================================  Physical Exam:  ============   (see vitals section below)    General:  No acute distress. FRAIL  Eye: Pupils are equal, round and reactive to light, Extraocular movements are intact, Normal conjunctiva.  HENT: Normocephalic, Oral mucosa is moist, No pharyngeal erythema, No sinus tenderness.  Neck: Supple, No lymphadenopathy.  Respiratory: Lungs  with diminished air entry at bases  RIGHT side with 1 chest tubes - CLEAR FLUID  2 chest tube connected to SOURAV drain.   Cardiovascular: Normal rate, Regular rhythm  Gastrointestinal: Soft, Non-tender, Non-distended, Normal bowel sounds.  PEJ tube present in abd  Genitourinary:  no dysuria  Lymphatics: No lymphadenopathy neck,   Musculoskeletal: Normal range of motion, Normal strength.   decubitus ulcer on right hip   Neurologic: Alert, Oriented, No focal deficits, Cranial Nerves II-XII are grossly intact.  Psychiatric: Appropriate mood & affect.    =======================================================  Vitals:  ============  T(F): 98.1 (28 May 2020 05:40), Max: 98.4 (27 May 2020 23:16)  HR: 91 (28 May 2020 05:40)  BP: 141/81 (28 May 2020 05:40)  RR: 19 (28 May 2020 05:40)  SpO2: 99% (28 May 2020 05:40) (96% - 99%)  temp max in last 48H T(F): , Max: 98.4 (05-27-20 @ 23:16)    =======================================================  Current Antibiotics:  piperacillin/tazobactam IVPB.. 3.375 Gram(s) IV Intermittent every 8 hours    Other medications:  chlorhexidine 4% Liquid 1 Application(s) Topical <User Schedule>  Dakins Solution - 1/2 Strength 1 Application(s) Topical two times a day  enoxaparin Injectable 30 milliGRAM(s) SubCutaneous every 12 hours  latanoprost 0.005% Ophthalmic Solution 1 Drop(s) Both EYES at bedtime  magnesium sulfate  IVPB 2 Gram(s) IV Intermittent once  nystatin Powder 1 Application(s) Topical two times a day  pantoprazole  Injectable 40 milliGRAM(s) IV Push every 12 hours  sodium chloride 0.9% lock flush 3 milliLiter(s) IV Push every 8 hours  sodium phosphate IVPB 15 milliMole(s) IV Intermittent once      =======================================================  Labs:                        8.8    9.43  )-----------( 402      ( 28 May 2020 07:26 )             27.1      05-28    135  |  104  |  32.0<H>  ----------------------------<  94  4.5   |  21.0<L>  |  0.79    Ca    7.0<L>      28 May 2020 07:26  Phos  2.3     05-28  Mg     1.7     05-28    TPro  4.9<L>  /  Alb  1.5<L>  /  TBili  <0.2<L>  /  DBili  x   /  AST  36<H>  /  ALT  25  /  AlkPhos  266<H>  05-28      Creatinine, Serum: 0.79 mg/dL (05-28-20 @ 07:26)  Creatinine, Serum: 0.77 mg/dL (05-27-20 @ 06:03)  Creatinine, Serum: 0.83 mg/dL (05-26-20 @ 06:12)  Creatinine, Serum: 0.84 mg/dL (05-25-20 @ 06:44)  Creatinine, Serum: 0.82 mg/dL (05-24-20 @ 09:31)  Creatinine, Serum: 0.90 mg/dL (05-23-20 @ 17:38)        Ferritin, Serum: 808 ng/mL (05-09-20 @ 15:27)      WBC Count: 9.43 K/uL (05-28-20 @ 07:26)  WBC Count: 9.14 K/uL (05-27-20 @ 06:03)  WBC Count: 6.41 K/uL (05-26-20 @ 06:12)  WBC Count: 5.64 K/uL (05-25-20 @ 06:44)  WBC Count: 5.22 K/uL (05-24-20 @ 09:31)  WBC Count: 7.39 K/uL (05-23-20 @ 17:38)      COVID-19 PCR: NotDetec (05-12-20 @ 00:00)  COVID-19 PCR: NotDetec (05-07-20 @ 23:17)    Lactate Dehydrogenase, Serum: 181 U/L (05-08-20 @ 06:11)    Alkaline Phosphatase, Serum: 266 U/L (05-28-20 @ 07:26)  Alkaline Phosphatase, Serum: 198 U/L (05-27-20 @ 06:03)  Alkaline Phosphatase, Serum: 174 U/L (05-25-20 @ 06:44)  Alanine Aminotransferase (ALT/SGPT): 25 U/L (05-28-20 @ 07:26)  Alanine Aminotransferase (ALT/SGPT): 18 U/L (05-27-20 @ 06:03)  Alanine Aminotransferase (ALT/SGPT): 21 U/L (05-25-20 @ 06:44)  Aspartate Aminotransferase (AST/SGOT): 36 U/L (05-28-20 @ 07:26)  Aspartate Aminotransferase (AST/SGOT): 22 U/L (05-27-20 @ 06:03)  Aspartate Aminotransferase (AST/SGOT): 27 U/L (05-25-20 @ 06:44)  Bilirubin Total, Serum: <0.2 mg/dL (05-28-20 @ 07:26)  Bilirubin Total, Serum: <0.2 mg/dL (05-27-20 @ 06:03)  Bilirubin Total, Serum: <0.2 mg/dL (05-25-20 @ 06:44)

## 2020-05-28 NOTE — PROGRESS NOTE ADULT - ASSESSMENT
69 year old non-ambulatory Female with a PMHx significant for multiple sclerosis, Breast Cancer s/p R mastectomy, Osteoporosis, Mitral stenosis, right ankle fracture, chronic right sided sacral ulcer, chronic midline back pain since being dropped from a jame lift (7/17/29), admitted to [facility] after presenting with sepsis in the setting of a UTI with chronic campos use and known large right ischial decubitus ulcer. Patient found to have a Moderately large Right hydropneumothorax resulting in partial right lung collapse and slight cardiomediastinal shift to the left on CXR with chest tube placed 5/8/20. Large bore chest tube placed 5/10/20 for persistent Right pneumothorax. Patient was followed by ID and was given Vanco and Zosyn originally for her presumed urosepsis, Caspofungin was added after pleural fluid was + for fungal elements. CT chest confirmed +Empyema in the Right pleural space. Patient ultimately transferred to [facility] late 5/11/20 after she was found to have a distal esophageal perforation on CT chest and Esophogram.     5/13/20 patient underwent EGD with esophageal stent placed, NGT placed, VATS with right thoracic cavity washout and pulmonary decortication with Dl drain, right posterior, and right medial chest tubes placed with Dr. Murcia.  On 5/14 patient was taken to IR for unsuccessful G/J tube placement. 5/15 patient was taken to the OR for feeding tube placement with Dr. Murcia.  The G tube is to gravity drainage, and slow feeds are advancing through the J tube. S/P esophagram 5/18 which showed narrowing distal stented area. Tolerating tube feeds.   5/27: G port clamped. Right Chest tube 31 to waterseal, Right CT #2 d/c.

## 2020-05-28 NOTE — PROGRESS NOTE ADULT - PROBLEM SELECTOR PLAN 2
S/p Right thoracotomy / washout / VATS decort 5/13/20.  Right Chest tubes to waterseal.  Follow up daily CXR.  Pleural fluid + for yeast, coag Negative Staph, Klebsiella, and strep mitis/oralis. Continue IV antibiotics per ID.   Vancomycin and Caspofungin completed 5/26/20 and Zosyn to be completed 6/9/20.  PICC line to be placed closer to time of discharge.  Blood cultures negative 5/8.  Repeat cultures reordered, pending collection, will follow up

## 2020-05-29 PROCEDURE — 74177 CT ABD & PELVIS W/CONTRAST: CPT | Mod: 26

## 2020-05-29 PROCEDURE — 99232 SBSQ HOSP IP/OBS MODERATE 35: CPT

## 2020-05-29 PROCEDURE — 71260 CT THORAX DX C+: CPT | Mod: 26

## 2020-05-29 PROCEDURE — 99024 POSTOP FOLLOW-UP VISIT: CPT

## 2020-05-29 RX ORDER — ACETAMINOPHEN 500 MG
650 TABLET ORAL EVERY 6 HOURS
Refills: 0 | Status: DISCONTINUED | OUTPATIENT
Start: 2020-05-29 | End: 2020-06-09

## 2020-05-29 RX ORDER — ALTEPLASE 100 MG
2 KIT INTRAVENOUS ONCE
Refills: 0 | Status: COMPLETED | OUTPATIENT
Start: 2020-05-29 | End: 2020-05-29

## 2020-05-29 RX ORDER — MEROPENEM 1 G/30ML
1000 INJECTION INTRAVENOUS EVERY 8 HOURS
Refills: 0 | Status: DISCONTINUED | OUTPATIENT
Start: 2020-05-29 | End: 2020-06-09

## 2020-05-29 RX ORDER — LIDOCAINE HCL 20 MG/ML
10 VIAL (ML) INJECTION ONCE
Refills: 0 | Status: DISCONTINUED | OUTPATIENT
Start: 2020-05-29 | End: 2020-06-08

## 2020-05-29 RX ADMIN — PANTOPRAZOLE SODIUM 40 MILLIGRAM(S): 20 TABLET, DELAYED RELEASE ORAL at 05:32

## 2020-05-29 RX ADMIN — SODIUM CHLORIDE 3 MILLILITER(S): 9 INJECTION INTRAMUSCULAR; INTRAVENOUS; SUBCUTANEOUS at 21:51

## 2020-05-29 RX ADMIN — NYSTATIN CREAM 1 APPLICATION(S): 100000 CREAM TOPICAL at 19:27

## 2020-05-29 RX ADMIN — SODIUM CHLORIDE 3 MILLILITER(S): 9 INJECTION INTRAMUSCULAR; INTRAVENOUS; SUBCUTANEOUS at 05:31

## 2020-05-29 RX ADMIN — CHLORHEXIDINE GLUCONATE 1 APPLICATION(S): 213 SOLUTION TOPICAL at 05:30

## 2020-05-29 RX ADMIN — ALTEPLASE 2 MILLIGRAM(S): KIT at 11:23

## 2020-05-29 RX ADMIN — PANTOPRAZOLE SODIUM 40 MILLIGRAM(S): 20 TABLET, DELAYED RELEASE ORAL at 18:30

## 2020-05-29 RX ADMIN — Medication 650 MILLIGRAM(S): at 11:23

## 2020-05-29 RX ADMIN — SODIUM CHLORIDE 3 MILLILITER(S): 9 INJECTION INTRAMUSCULAR; INTRAVENOUS; SUBCUTANEOUS at 14:57

## 2020-05-29 RX ADMIN — Medication 1 APPLICATION(S): at 18:29

## 2020-05-29 RX ADMIN — ENOXAPARIN SODIUM 30 MILLIGRAM(S): 100 INJECTION SUBCUTANEOUS at 09:13

## 2020-05-29 RX ADMIN — Medication 1 APPLICATION(S): at 05:31

## 2020-05-29 RX ADMIN — NYSTATIN CREAM 1 APPLICATION(S): 100000 CREAM TOPICAL at 05:31

## 2020-05-29 RX ADMIN — ENOXAPARIN SODIUM 30 MILLIGRAM(S): 100 INJECTION SUBCUTANEOUS at 22:33

## 2020-05-29 RX ADMIN — MEROPENEM 100 MILLIGRAM(S): 1 INJECTION INTRAVENOUS at 14:52

## 2020-05-29 RX ADMIN — LATANOPROST 1 DROP(S): 0.05 SOLUTION/ DROPS OPHTHALMIC; TOPICAL at 22:33

## 2020-05-29 RX ADMIN — MEROPENEM 100 MILLIGRAM(S): 1 INJECTION INTRAVENOUS at 22:33

## 2020-05-29 NOTE — PROGRESS NOTE ADULT - PROBLEM SELECTOR PLAN 7
H/H trending downward. Continue to trend.   Type and cross with +antibodies.   1unit PRBC transfused 5/25.  Continue to monitor for S/S of acute blood loss.   Consider FOBT

## 2020-05-29 NOTE — PROGRESS NOTE ADULT - PROBLEM SELECTOR PLAN 1
S/p esophageal stent placed 5/13/20 with NGT placed.   S/p GJ tube placement 5/15 (open procedure).  G port clamped (5/27), slow feeds  through the J tube @ 50ml/hr and to maintain rate for now per Dr. Murcia.  Encourage deep breathing, chest PT, incentive spirometry.   Right chest to waterseal, SOURAV to bulb suction  Labs and CXR every other day as per Dr. Murcia S/p esophageal stent placed 5/13/20 with NGT placed.   S/p GJ tube placement 5/15 (open procedure).  G port capped(5/27), slow feeds  through the J tube @ 50ml/hr and to maintain rate for now per Dr. Murcia.  Encourage deep breathing, chest PT, incentive spirometry.   Right chest to waterseal, SOURAV to bulb suction  Labs and CXR every other day as per Dr. Murcia

## 2020-05-29 NOTE — PROGRESS NOTE ADULT - ASSESSMENT
HPI: This 69 year old non-ambulatory Female with a PMHx significant for multiple sclerosis, Breast Cancer s/p R mastectomy, Osteoporosis, Mitral stenosis, right ankle fracture, chronic right sided sacral ulcer, chronic midline back pain since being dropped from a jame lift (7/17/29), admitted to [place] after presenting with sepsis in the setting of a UTI with chronic campos use and known large right ischial decubitus ulcer. Patient found to have a Moderately large Right hydropneumothorax resulting in partial right lung collapse and slight cardiomediastinal shift to the left on CXR with chest tube placed 5/8/20. Large bore chest tube placed 5/10/20 for persistent Right pneumothorax. Patient was followed by ID and was given Vanco and Zosyn originally for her presumed urosepsis, Caspofungin was added after pleural fluid was + for fungal elements. CT chest confirmed +Empyema in the Right pleural space. Patient ultimately transferred to [facility] late 5/11/20 after she was found to have a distal esophageal perforation on CT chest and Esophogram.     Of note, patient admitted 10/9/2019 for lower back and bilateral knee pain, found to have compression fracture of L2 with imaging subsequently found to have multiple lytic lesions on the spine and pelvis.  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 Matthew Fairbanks. (12 May 2020 02:24)    patient is admitted to surgical service, pre-operatively planned for an esophageal stent 5/13.  Patient's labs/ cultures from [facility] reviewed.     Empyema with polymicrobial infection:  Strep mitis infection  Klebsiella oxytoca infection  CoNS infection  Perforated esophagus  Fevers    Plan:  s/p esophageal stent  and VATS 5/13  s/p PEJ on 5/15         YEAST from fluid culture being worked up  NOT Candida auris; specimen sent to [facility] labs      COMPLETED CASPO/ VANCO      had been on ZOSYN; DEVELOPED fevers overnight and AM of 5/29/2020    - WILL CHANGE antibiotics to MERREM  original end date was  6/9/2020; may need to re-consider  BLOOD CX sent yesterday,  REPEAT blood cx today    - continue Chest tubes    - regarding DECUBITUS Ulcer, present prior to admission in this bed-ridden patient, CLINICAL osteomyelitis  - continue wound care even after course of Zosyn    WILL need PICC LINE at time of discharge is discharged prior to END date.   will follow up WITH FURTHER Recommendations

## 2020-05-29 NOTE — PROGRESS NOTE ADULT - SUBJECTIVE AND OBJECTIVE BOX
[facility]  INFECTIOUS DISEASES AND INTERNAL MEDICINE at [place]  =======================================================  Phong Wang MD  Diplomates American Board of Internal Medicine and Infectious Diseases  Telephone [phone]  Fax            [phone]  =======================================================    [facility]-[id number]  GEORGE STANLEY   follow up: empyema     s/p EGD and esophageal stent; s/p VATS 5/14  s/p gastrojejunal tube on 5/15/2020  s/p debridement of sacral decubitus    remains on antibiotics.   still with air leak on chest tube  FEVERS overnight and this AM     =======================================================  REVIEW OF SYSTEMS:  CONSTITUTIONAL:  No Fever or chills  HEENT:  No diplopia or blurred vision.  No earache, sore throat or runny nose.  CARDIOVASCULAR:  No pressure, squeezing, strangling, tightness, heaviness or aching about the chest, neck, axilla or epigastrium.  RESPIRATORY:  MILD shortness of breath  GASTROINTESTINAL:  No nausea, vomiting or diarrhea.  GENITOURINARY:  No dysuria, frequency or urgency. No Blood in urine  MUSCULOSKELETAL:  no joint aches, no muscle pain  SKIN:  No change in skin, hair or nails.  NEUROLOGIC:  No Headaches, seizures or weakness.  PSYCHIATRIC:  No disorder of thought or mood.  ENDOCRINE:  No heat or cold intolerance  HEMATOLOGICAL:  No easy bruising or bleeding.   =======================================================  Allergies  Sudafed (Other)    ======================================================  Physical Exam:  ============   (see vitals section below)    General:  No acute distress. FRAIL  Eye: Pupils are equal, round and reactive to light, Extraocular movements are intact, Normal conjunctiva.  HENT: Normocephalic, Oral mucosa is moist, No pharyngeal erythema, No sinus tenderness.  Neck: Supple, No lymphadenopathy.  Respiratory: Lungs  with diminished air entry at bases  RIGHT side with 1 chest tubes - CLEAR FLUID  2 chest tube connected to SOURAV drain.   Cardiovascular: Normal rate, Regular rhythm  Gastrointestinal: Soft, Non-tender, Non-distended, Normal bowel sounds.  PEJ tube present in abd  Genitourinary:  no dysuria  Lymphatics: No lymphadenopathy neck,   Musculoskeletal: Normal range of motion, Normal strength.   decubitus ulcer on right hip   Neurologic: Alert, Oriented, No focal deficits, Cranial Nerves II-XII are grossly intact.  Psychiatric: Appropriate mood & affect.    =======================================================  Vitals:  ============  T(F): 101.4 (29 May 2020 10:06), Max: 101.4 (29 May 2020 10:06)  HR: 101 (29 May 2020 10:06)  BP: 117/75 (29 May 2020 10:06)  RR: 18 (29 May 2020 10:06)  SpO2: 97% (29 May 2020 10:06) (96% - 100%)  temp max in last 48H T(F): , Max: 101.4 (05-29-20 @ 10:06)    =======================================================  Current Antibiotics:  meropenem  IVPB 1000 milliGRAM(s) IV Intermittent every 8 hours    Other medications:  chlorhexidine 4% Liquid 1 Application(s) Topical <User Schedule>  Dakins Solution - 1/2 Strength 1 Application(s) Topical two times a day  enoxaparin Injectable 30 milliGRAM(s) SubCutaneous every 12 hours  latanoprost 0.005% Ophthalmic Solution 1 Drop(s) Both EYES at bedtime  nystatin Cream 1 Application(s) Topical two times a day  pantoprazole  Injectable 40 milliGRAM(s) IV Push every 12 hours  sodium chloride 0.9% lock flush 3 milliLiter(s) IV Push every 8 hours      =======================================================  Labs:                        8.8    9.43  )-----------( 402      ( 28 May 2020 07:26 )             27.1      05-28    135  |  104  |  32.0<H>  ----------------------------<  94  4.5   |  21.0<L>  |  0.79    Ca    7.0<L>      28 May 2020 07:26  Phos  2.3     05-28  Mg     1.7     05-28    TPro  4.9<L>  /  Alb  1.5<L>  /  TBili  <0.2<L>  /  DBili  x   /  AST  36<H>  /  ALT  25  /  AlkPhos  266<H>  05-28    Culture - Blood (collected 05-26-20 @ 14:22)  Source: .Blood Blood-Peripheral    Creatinine, Serum: 0.79 mg/dL (05-28-20 @ 07:26)  Creatinine, Serum: 0.77 mg/dL (05-27-20 @ 06:03)  Creatinine, Serum: 0.83 mg/dL (05-26-20 @ 06:12)  Creatinine, Serum: 0.84 mg/dL (05-25-20 @ 06:44)    Ferritin, Serum: 808 ng/mL (05-09-20 @ 15:27)    WBC Count: 9.43 K/uL (05-28-20 @ 07:26)  WBC Count: 9.14 K/uL (05-27-20 @ 06:03)  WBC Count: 6.41 K/uL (05-26-20 @ 06:12)  WBC Count: 5.64 K/uL (05-25-20 @ 06:44)    COVID-19 PCR: NotDetec (05-12-20 @ 00:00)  COVID-19 PCR: NotDetec (05-07-20 @ 23:17)    Lactate Dehydrogenase, Serum: 181 U/L (05-08-20 @ 06:11)    Alkaline Phosphatase, Serum: 266 U/L (05-28-20 @ 07:26)  Alkaline Phosphatase, Serum: 198 U/L (05-27-20 @ 06:03)  Alanine Aminotransferase (ALT/SGPT): 25 U/L (05-28-20 @ 07:26)  Alanine Aminotransferase (ALT/SGPT): 18 U/L (05-27-20 @ 06:03)  Aspartate Aminotransferase (AST/SGOT): 36 U/L (05-28-20 @ 07:26)  Aspartate Aminotransferase (AST/SGOT): 22 U/L (05-27-20 @ 06:03)  Bilirubin Total, Serum: <0.2 mg/dL (05-28-20 @ 07:26)  Bilirubin Total, Serum: <0.2 mg/dL (05-27-20 @ 06:03)

## 2020-05-29 NOTE — PROGRESS NOTE ADULT - PROBLEM SELECTOR PLAN 2
S/p Right thoracotomy / washout / VATS decort 5/13/20.  Right Chest tubes to waterseal.  Follow up daily CXR.  Pleural fluid + for yeast, coag Negative Staph, Klebsiella, and strep mitis/oralis. Continue IV antibiotics per ID.   Vancomycin and Caspofungin completed 5/26/20 and Zosyn to be completed 6/9/20.  PICC line to be placed closer to time of discharge.  Blood cultures negative 5/8.  Repeat cultures reordered

## 2020-05-29 NOTE — PROGRESS NOTE ADULT - ASSESSMENT
69 year old non-ambulatory Female with a PMHx significant for multiple sclerosis, Breast Cancer s/p R mastectomy, Osteoporosis, Mitral stenosis, right ankle fracture, chronic right sided sacral ulcer, chronic midline back pain since being dropped from a jame lift (7/17/29), admitted to [place] after presenting with sepsis in the setting of a UTI with chronic campos use and known large right ischial decubitus ulcer. Patient found to have a Moderately large Right hydropneumothorax resulting in partial right lung collapse and slight cardiomediastinal shift to the left on CXR with chest tube placed 5/8/20. Large bore chest tube placed 5/10/20 for persistent Right pneumothorax. Patient was followed by ID and was given Vanco and Zosyn originally for her presumed urosepsis, Caspofungin was added after pleural fluid was + for fungal elements. CT chest confirmed +Empyema in the Right pleural space. Patient ultimately transferred to [facility] late 5/11/20 after she was found to have a distal esophageal perforation on CT chest and Esophogram.     5/13/20 patient underwent EGD with esophageal stent placed, NGT placed, VATS with right thoracic cavity washout and pulmonary decortication with Dl drain, right posterior, and right medial chest tubes placed with Dr. Murcia.  On 5/14 patient was taken to IR for unsuccessful G/J tube placement. 5/15 patient was taken to the OR for feeding tube placement with Dr. Murcia.  The G tube is to gravity drainage, and slow feeds are advancing through the J tube. S/P esophagram 5/18 which showed narrowing distal stented area. Tolerating tube feeds.   5/27: G port clamped. Right Chest tube 31 to waterseal, Right CT #2 d/c.

## 2020-05-29 NOTE — PROGRESS NOTE ADULT - SUBJECTIVE AND OBJECTIVE BOX
Subjective: Patient lying in bed, no acute distress noted, denies fever, chills, chest pain, palpitation, shortness of breath, dizziness, abdominal pain, N/V.       V/S  T(C): 37.1 (05-28-20 @ 20:52), Max: 37.1 (05-28-20 @ 20:52)  HR: 97 (05-28-20 @ 20:52) (91 - 97)  BP: 137/82 (05-28-20 @ 20:52) (125/82 - 141/81)  RR: 18 (05-28-20 @ 20:52) (18 - 19)  SpO2: 96% (05-28-20 @ 20:52) (96% - 100%) on room air                                             Tele: Sinus rhythm    CHEST TUBE:  Right chest tube to waterseal     OUTPUT:             per 24 hours     Drainage:    AIR LEAKS:  [x ] YES [ ] NO      MEDICATIONS  (STANDING):  chlorhexidine 4% Liquid 1 Application(s) Topical <User Schedule>  Dakins Solution - 1/2 Strength 1 Application(s) Topical two times a day  enoxaparin Injectable 30 milliGRAM(s) SubCutaneous every 12 hours  latanoprost 0.005% Ophthalmic Solution 1 Drop(s) Both EYES at bedtime  nystatin Cream 1 Application(s) Topical two times a day  pantoprazole  Injectable 40 milliGRAM(s) IV Push every 12 hours  piperacillin/tazobactam IVPB.. 3.375 Gram(s) IV Intermittent every 8 hours  sodium chloride 0.9% lock flush 3 milliLiter(s) IV Push every 8 hours      05-28    135  |  104  |  32.0<H>  ----------------------------<  94  4.5   |  21.0<L>  |  0.79    Ca    7.0<L>      28 May 2020 07:26  Phos  2.3     05-28  Mg     1.7     05-28    TPro  4.9<L>  /  Alb  1.5<L>  /  TBili  <0.2<L>  /  DBili  x   /  AST  36<H>  /  ALT  25  /  AlkPhos  266<H>  05-28                               8.8    9.43  )-----------( 402      ( 28 May 2020 07:26 )             27.1                 CAPILLARY BLOOD GLUCOSE               CXR:  	  < from: Xray Chest 1 View- PORTABLE-Routine (05.28.20 @ 05:56) >    INTERPRETATION:  Portable chest radiograph        CLINICAL INFORMATION: RIGHT chest tubes. Follow-up.    TECHNIQUE:  Portable  AP view of the chest was obtained.    COMPARISON: 5/27/2020 available for review.    FINDINGS:    The lungs  show persistent bilateral perihilar and basilar airspace consolidations unchanged from prior exam. There is LEFT lower lobe retrocardiac airspace consolidation and/or effusion obscuring LEFT diaphragm contour.    RIGHT chest tubes overlie the mid RIGHT hemithorax and RIGHT diaphragm. No pneumothorax..  Esophageal stent catheter in place.  The  heart is enlarged in transverse diameter. No hilar mass. Trachea midline.   Visualized osseous structures are intact.        IMPRESSION:   No significant change..    < end of copied text >      Physical Exam:  Neuro: A+O x 3, non-focal, speech clear and intact  HEENT: PERRL, EOMI  Neck: supple, no JVD  CV: regular rate, regular rhythm, +S1S2, no murmurs or rub  Pulm/chest: Breath sounds clear, diminished at the bases bilaterally, no accessory muscle use noted. Right chest tube to waterseal. SOURAV to bulb suction  Abd: soft, NT, obese, +edematous, +BS. GJ tube with staples, clean dry and intact. G tube capped, J tube to feeding  : Starks to drainage bag  Ext: DAVIDSON x 4, no cyanosis or clubbing. +3 edema BLE, +PP pulses bilaterally  Skin: Cool to touch, +3 edema BLE. Large sacral decubitus ulcer with dressing    PAST MEDICAL & SURGICAL HISTORY:  Breast cancer metastasized to bone  Hypertension  Multiple sclerosis  S/P mastectomy, right  Breast CA  Osteoporosis  MS (mitral stenosis)  History of bowel resection  H/O breast surgery Subjective: Patient lying in bed, no acute distress noted, denies fever, chills, chest pain, palpitation, shortness of breath, dizziness, abdominal pain, N/V.       V/S  T(C): 37.1 (05-28-20 @ 20:52), Max: 37.1 (05-28-20 @ 20:52)  HR: 97 (05-28-20 @ 20:52) (91 - 97)  BP: 137/82 (05-28-20 @ 20:52) (125/82 - 141/81)  RR: 18 (05-28-20 @ 20:52) (18 - 19)  SpO2: 96% (05-28-20 @ 20:52) (96% - 100%) on room air                                             Tele: Sinus rhythm    CHEST TUBE:  Right chest tube to waterseal. SOURAV to bulb suction    OUTPUT: /150 ml. SOURAV 40/60 ml    per 24 hours (AM/PM)    Drainage:  Pale (triglyceride level high normal)  AIR LEAKS:  [x ] YES [ ] NO      MEDICATIONS  (STANDING):  chlorhexidine 4% Liquid 1 Application(s) Topical <User Schedule>  Dakins Solution - 1/2 Strength 1 Application(s) Topical two times a day  enoxaparin Injectable 30 milliGRAM(s) SubCutaneous every 12 hours  latanoprost 0.005% Ophthalmic Solution 1 Drop(s) Both EYES at bedtime  nystatin Cream 1 Application(s) Topical two times a day  pantoprazole  Injectable 40 milliGRAM(s) IV Push every 12 hours  piperacillin/tazobactam IVPB.. 3.375 Gram(s) IV Intermittent every 8 hours  sodium chloride 0.9% lock flush 3 milliLiter(s) IV Push every 8 hours      05-28    135  |  104  |  32.0<H>  ----------------------------<  94  4.5   |  21.0<L>  |  0.79    Ca    7.0<L>      28 May 2020 07:26  Phos  2.3     05-28  Mg     1.7     05-28    TPro  4.9<L>  /  Alb  1.5<L>  /  TBili  <0.2<L>  /  DBili  x   /  AST  36<H>  /  ALT  25  /  AlkPhos  266<H>  05-28                               8.8    9.43  )-----------( 402      ( 28 May 2020 07:26 )             27.1                          CXR:  	  < from: Xray Chest 1 View- PORTABLE-Routine (05.28.20 @ 05:56) >    INTERPRETATION:  Portable chest radiograph        CLINICAL INFORMATION: RIGHT chest tubes. Follow-up.    TECHNIQUE:  Portable  AP view of the chest was obtained.    COMPARISON: 5/27/2020 available for review.    FINDINGS:    The lungs  show persistent bilateral perihilar and basilar airspace consolidations unchanged from prior exam. There is LEFT lower lobe retrocardiac airspace consolidation and/or effusion obscuring LEFT diaphragm contour.    RIGHT chest tubes overlie the mid RIGHT hemithorax and RIGHT diaphragm. No pneumothorax..  Esophageal stent catheter in place.  The  heart is enlarged in transverse diameter. No hilar mass. Trachea midline.   Visualized osseous structures are intact.        IMPRESSION:   No significant change..    < end of copied text >      Physical Exam:  Neuro: A+O x 3, non-focal, speech clear and intact  HEENT: PERRL, EOMI  Neck: supple, no JVD  CV: regular rate, regular rhythm, +S1S2, no murmurs or rub  Pulm/chest: Breath sounds clear, diminished at the bases bilaterally, no accessory muscle use noted. Right chest tube to waterseal. SOURAV to bulb suction  Abd: soft, NT, obese, +edematous, +BS. GJ tube with staples, clean dry and intact. G tube capped, J tube to feeding  : Starks to drainage bag  Ext: DAVIDSON x 4, no cyanosis or clubbing. +3 edema BLE, +PP pulses bilaterally  Skin: Cool to touch, +3 edema BLE. Large sacral decubitus ulcer with dressing    PAST MEDICAL & SURGICAL HISTORY:  Breast cancer metastasized to bone  Hypertension  Multiple sclerosis  S/P mastectomy, right  Breast CA  Osteoporosis  MS (mitral stenosis)  History of bowel resection  H/O breast surgery

## 2020-05-29 NOTE — CHART NOTE - NSCHARTNOTEFT_GEN_A_CORE
Thoracic     69 year old non-ambulatory Female with a PMHx significant for multiple sclerosis, Breast Cancer s/p R mastectomy, Osteoporosis, Mitral stenosis, right ankle fracture, chronic right sided sacral ulcer, chronic midline back pain since being dropped from a jame lift (7/17/29), admitted to [place] after presenting with sepsis in the setting of a UTI with chronic campos use and known large right ischial decubitus ulcer. Patient found to have a Moderately large Right hydropneumothorax resulting in partial right lung collapse and slight cardiomediastinal shift to the left on CXR with chest tube placed 5/8/20. Large bore chest tube placed 5/10/20 for persistent Right pneumothorax. Patient was followed by ID and was given Vanco and Zosyn originally for her presumed urosepsis, Caspofungin was added after pleural fluid was + for fungal elements. CT chest confirmed +Empyema in the Right pleural space. Patient ultimately transferred to [facility] late 5/11/20 after she was found to have a distal esophageal perforation on CT chest and Esophogram.     5/13/20 patient underwent EGD with esophageal stent placed, NGT placed, VATS with right thoracic cavity washout and pulmonary decortication with Dl drain, right posterior, and right medial chest tubes placed with Dr. Murcia.  On 5/14 patient was taken to IR for unsuccessful G/J tube placement. 5/15 patient was taken to the OR for feeding tube placement with Dr. Murcia.  The G tube is to gravity drainage, and slow feeds are advancing through the J tube. S/P esophagram 5/18 which showed narrowing distal stented area. Tolerating tube feeds.   5/27: G port clamped. Right Chest tube 31 to waterseal, Right CT #2 d/c.     Today, patient had fever today.  Tylenol given.  Pt in bed NAD.  PEG reinforced by Dr Murcia today.  Plan for CT scan IV/PO contrast today to r/o Esophogeal leak/PEG leak. DW Dr Murcia

## 2020-05-30 LAB
ALBUMIN SERPL ELPH-MCNC: 1.6 G/DL — LOW (ref 3.3–5.2)
ALP SERPL-CCNC: 314 U/L — HIGH (ref 40–120)
ALT FLD-CCNC: 36 U/L — HIGH
ANION GAP SERPL CALC-SCNC: 14 MMOL/L — SIGNIFICANT CHANGE UP (ref 5–17)
AST SERPL-CCNC: 34 U/L — HIGH
BILIRUB SERPL-MCNC: <0.2 MG/DL — LOW (ref 0.4–2)
BUN SERPL-MCNC: 35 MG/DL — HIGH (ref 8–20)
CALCIUM SERPL-MCNC: 7.1 MG/DL — LOW (ref 8.6–10.2)
CHLORIDE SERPL-SCNC: 98 MMOL/L — SIGNIFICANT CHANGE UP (ref 98–107)
CO2 SERPL-SCNC: 20 MMOL/L — LOW (ref 22–29)
CREAT SERPL-MCNC: 0.77 MG/DL — SIGNIFICANT CHANGE UP (ref 0.5–1.3)
GLUCOSE SERPL-MCNC: 117 MG/DL — HIGH (ref 70–99)
HCT VFR BLD CALC: 27.2 % — LOW (ref 34.5–45)
HGB BLD-MCNC: 8.8 G/DL — LOW (ref 11.5–15.5)
MAGNESIUM SERPL-MCNC: 1.9 MG/DL — SIGNIFICANT CHANGE UP (ref 1.6–2.6)
MCHC RBC-ENTMCNC: 28.2 PG — SIGNIFICANT CHANGE UP (ref 27–34)
MCHC RBC-ENTMCNC: 32.4 GM/DL — SIGNIFICANT CHANGE UP (ref 32–36)
MCV RBC AUTO: 87.2 FL — SIGNIFICANT CHANGE UP (ref 80–100)
PHOSPHATE SERPL-MCNC: 2.4 MG/DL — SIGNIFICANT CHANGE UP (ref 2.4–4.7)
PLATELET # BLD AUTO: 498 K/UL — HIGH (ref 150–400)
POTASSIUM SERPL-MCNC: 4.8 MMOL/L — SIGNIFICANT CHANGE UP (ref 3.5–5.3)
POTASSIUM SERPL-SCNC: 4.8 MMOL/L — SIGNIFICANT CHANGE UP (ref 3.5–5.3)
PROT SERPL-MCNC: 5.6 G/DL — LOW (ref 6.6–8.7)
RBC # BLD: 3.12 M/UL — LOW (ref 3.8–5.2)
RBC # FLD: 18.8 % — HIGH (ref 10.3–14.5)
SODIUM SERPL-SCNC: 132 MMOL/L — LOW (ref 135–145)
WBC # BLD: 12.02 K/UL — HIGH (ref 3.8–10.5)
WBC # FLD AUTO: 12.02 K/UL — HIGH (ref 3.8–10.5)

## 2020-05-30 PROCEDURE — 99232 SBSQ HOSP IP/OBS MODERATE 35: CPT

## 2020-05-30 PROCEDURE — 71045 X-RAY EXAM CHEST 1 VIEW: CPT | Mod: 26

## 2020-05-30 RX ADMIN — SODIUM CHLORIDE 3 MILLILITER(S): 9 INJECTION INTRAMUSCULAR; INTRAVENOUS; SUBCUTANEOUS at 22:55

## 2020-05-30 RX ADMIN — SODIUM CHLORIDE 3 MILLILITER(S): 9 INJECTION INTRAMUSCULAR; INTRAVENOUS; SUBCUTANEOUS at 13:31

## 2020-05-30 RX ADMIN — MEROPENEM 100 MILLIGRAM(S): 1 INJECTION INTRAVENOUS at 23:11

## 2020-05-30 RX ADMIN — SODIUM CHLORIDE 3 MILLILITER(S): 9 INJECTION INTRAMUSCULAR; INTRAVENOUS; SUBCUTANEOUS at 05:01

## 2020-05-30 RX ADMIN — MEROPENEM 100 MILLIGRAM(S): 1 INJECTION INTRAVENOUS at 06:44

## 2020-05-30 RX ADMIN — ENOXAPARIN SODIUM 30 MILLIGRAM(S): 100 INJECTION SUBCUTANEOUS at 23:11

## 2020-05-30 RX ADMIN — NYSTATIN CREAM 1 APPLICATION(S): 100000 CREAM TOPICAL at 06:55

## 2020-05-30 RX ADMIN — NYSTATIN CREAM 1 APPLICATION(S): 100000 CREAM TOPICAL at 19:37

## 2020-05-30 RX ADMIN — Medication 1 APPLICATION(S): at 06:43

## 2020-05-30 RX ADMIN — ENOXAPARIN SODIUM 30 MILLIGRAM(S): 100 INJECTION SUBCUTANEOUS at 13:27

## 2020-05-30 RX ADMIN — MEROPENEM 100 MILLIGRAM(S): 1 INJECTION INTRAVENOUS at 13:27

## 2020-05-30 RX ADMIN — LATANOPROST 1 DROP(S): 0.05 SOLUTION/ DROPS OPHTHALMIC; TOPICAL at 23:11

## 2020-05-30 RX ADMIN — Medication 1 APPLICATION(S): at 19:38

## 2020-05-30 RX ADMIN — PANTOPRAZOLE SODIUM 40 MILLIGRAM(S): 20 TABLET, DELAYED RELEASE ORAL at 19:37

## 2020-05-30 RX ADMIN — CHLORHEXIDINE GLUCONATE 1 APPLICATION(S): 213 SOLUTION TOPICAL at 06:34

## 2020-05-30 RX ADMIN — PANTOPRAZOLE SODIUM 40 MILLIGRAM(S): 20 TABLET, DELAYED RELEASE ORAL at 06:44

## 2020-05-30 NOTE — PROGRESS NOTE ADULT - SUBJECTIVE AND OBJECTIVE BOX
[facility]  INFECTIOUS DISEASES AND INTERNAL MEDICINE at [place]  =======================================================  Phong Wang MD  Diplomates American Board of Internal Medicine and Infectious Diseases  Telephone [phone]  Fax            [phone]  =======================================================    [id number]  GEORGE STANLEY   follow up: empyema     s/p EGD and esophageal stent; s/p VATS 5/14  s/p gastrojejunal tube on 5/15/2020  s/p debridement of sacral decubitus    remains on antibiotics.   no fevers this AM  repeat cx in process     =======================================================  REVIEW OF SYSTEMS:  CONSTITUTIONAL:  No Fever or chills  HEENT:  No diplopia or blurred vision.  No earache, sore throat or runny nose.  CARDIOVASCULAR:  No pressure, squeezing, strangling, tightness, heaviness or aching about the chest, neck, axilla or epigastrium.  RESPIRATORY:  MILD shortness of breath  GASTROINTESTINAL:  No nausea, vomiting or diarrhea.  GENITOURINARY:  No dysuria, frequency or urgency. No Blood in urine  MUSCULOSKELETAL:  no joint aches, no muscle pain  SKIN:  No change in skin, hair or nails.  NEUROLOGIC:  No Headaches, seizures or weakness.  PSYCHIATRIC:  No disorder of thought or mood.  ENDOCRINE:  No heat or cold intolerance  HEMATOLOGICAL:  No easy bruising or bleeding.   =======================================================  Allergies  Sudafed (Other)    ======================================================  Physical Exam:  ============   (see vitals section below)    General:  No acute distress. FRAIL  Eye: Pupils are equal, round and reactive to light, Extraocular movements are intact, Normal conjunctiva.  HENT: Normocephalic, Oral mucosa is moist, No pharyngeal erythema, No sinus tenderness.  Neck: Supple, No lymphadenopathy.  Respiratory: Lungs  with diminished air entry at bases  1st chest tube to water seal  2nd chest tube connected to SOURAV drain.  LIGHT COLOR YELLOW FLUID  Cardiovascular: Normal rate, Regular rhythm  Gastrointestinal: Soft, Non-tender, Non-distended, Normal bowel sounds.  PEJ tube present in abd  Genitourinary:  no dysuria  Lymphatics: No lymphadenopathy neck,   Musculoskeletal: Normal range of motion, Normal strength.   decubitus ulcer on right hip   Neurologic: Alert, Oriented, No focal deficits, Cranial Nerves II-XII are grossly intact.  Psychiatric: Appropriate mood & affect.    =======================================================  Vitals:  ============  T(F): 98.2 (30 May 2020 06:38), Max: 101.4 (29 May 2020 10:06)  HR: 88 (30 May 2020 06:38)  BP: 132/73 (30 May 2020 06:38)  RR: 18 (30 May 2020 06:38)  SpO2: 99% (30 May 2020 06:38) (95% - 99%)  temp max in last 48H T(F): , Max: 101.4 (05-29-20 @ 10:06)    =======================================================  Current Antibiotics:  meropenem  IVPB 1000 milliGRAM(s) IV Intermittent every 8 hours    Other medications:  chlorhexidine 4% Liquid 1 Application(s) Topical <User Schedule>  Dakins Solution - 1/2 Strength 1 Application(s) Topical two times a day  enoxaparin Injectable 30 milliGRAM(s) SubCutaneous every 12 hours  latanoprost 0.005% Ophthalmic Solution 1 Drop(s) Both EYES at bedtime  lidocaine 1% Injectable 10 milliLiter(s) Local Injection once  nystatin Cream 1 Application(s) Topical two times a day  pantoprazole  Injectable 40 milliGRAM(s) IV Push every 12 hours  sodium chloride 0.9% lock flush 3 milliLiter(s) IV Push every 8 hours      =======================================================  Labs:             Culture - Blood (collected 05-26-20 @ 14:22)  Source: .Blood Blood-Peripheral      Creatinine, Serum: 0.79 mg/dL (05-28-20 @ 07:26)  Creatinine, Serum: 0.77 mg/dL (05-27-20 @ 06:03)  Creatinine, Serum: 0.83 mg/dL (05-26-20 @ 06:12)        Ferritin, Serum: 808 ng/mL (05-09-20 @ 15:27)      WBC Count: 9.43 K/uL (05-28-20 @ 07:26)  WBC Count: 9.14 K/uL (05-27-20 @ 06:03)  WBC Count: 6.41 K/uL (05-26-20 @ 06:12)      COVID-19 PCR: NotDetec (05-12-20 @ 00:00)  COVID-19 PCR: NotDetec (05-07-20 @ 23:17)    Lactate Dehydrogenase, Serum: 181 U/L (05-08-20 @ 06:11)    Alkaline Phosphatase, Serum: 266 U/L (05-28-20 @ 07:26)  Alkaline Phosphatase, Serum: 198 U/L (05-27-20 @ 06:03)  Alanine Aminotransferase (ALT/SGPT): 25 U/L (05-28-20 @ 07:26)  Alanine Aminotransferase (ALT/SGPT): 18 U/L (05-27-20 @ 06:03)  Aspartate Aminotransferase (AST/SGOT): 36 U/L (05-28-20 @ 07:26)  Aspartate Aminotransferase (AST/SGOT): 22 U/L (05-27-20 @ 06:03)  Bilirubin Total, Serum: <0.2 mg/dL (05-28-20 @ 07:26)  Bilirubin Total, Serum: <0.2 mg/dL (05-27-20 @ 06:03)

## 2020-05-30 NOTE — PROGRESS NOTE ADULT - PROBLEM SELECTOR PLAN 1
S/p esophageal stent placed 5/13/20 with NGT placed.   S/p GJ tube placement 5/15 (open procedure).  G port capped(5/27), slow feeds  through the J tube @ 50ml/hr and to maintain rate for now per Dr. Murcia.  Encourage deep breathing, chest PT, incentive spirometry.   Right chest to waterseal, SOURAV to bulb suction  Labs and CXR every other day as per Dr. Murcia  Plan for Palliative care consult

## 2020-05-30 NOTE — PROGRESS NOTE ADULT - ASSESSMENT
HPI: This 69 year old non-ambulatory Female with a PMHx significant for multiple sclerosis, Breast Cancer s/p R mastectomy, Osteoporosis, Mitral stenosis, right ankle fracture, chronic right sided sacral ulcer, chronic midline back pain since being dropped from a jame lift (7/17/29), admitted to [place] after presenting with sepsis in the setting of a UTI with chronic campos use and known large right ischial decubitus ulcer. Patient found to have a Moderately large Right hydropneumothorax resulting in partial right lung collapse and slight cardiomediastinal shift to the left on CXR with chest tube placed 5/8/20. Large bore chest tube placed 5/10/20 for persistent Right pneumothorax. Patient was followed by ID and was given Vanco and Zosyn originally for her presumed urosepsis, Caspofungin was added after pleural fluid was + for fungal elements. CT chest confirmed +Empyema in the Right pleural space. Patient ultimately transferred to [facility] late 5/11/20 after she was found to have a distal esophageal perforation on CT chest and Esophogram.     Of note, patient admitted 10/9/2019 for lower back and bilateral knee pain, found to have compression fracture of L2 with imaging subsequently found to have multiple lytic lesions on the spine and pelvis.  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 Matthew Fairbanks. (12 May 2020 02:24)    patient is admitted to surgical service, pre-operatively planned for an esophageal stent 5/13.  Patient's labs/ cultures from [facility] reviewed.     Empyema with polymicrobial infection:  Strep mitis infection  Klebsiella oxytoca infection  CoNS infection  Perforated esophagus  Fevers    Plan:  s/p esophageal stent  and VATS 5/13  s/p PEJ on 5/15    YEAST from fluid culture being worked up  NOT Candida auris; specimen sent to [facility] labs      COMPLETED CASPO/ VANCO      had been on ZOSYN; DEVELOPED fevers overnight and AM of 5/29/2020  - Continue MERREM until new cx finalized  original end date was  6/9/2020; may need to re-consider  BLOOD CX sent 5/28; REPEAT blood cx 5/29    - continue Chest tubes    - regarding DECUBITUS Ulcer, present prior to admission in this bed-ridden patient, CLINICAL osteomyelitis  - continue wound care even after course of Zosyn    WILL need PICC LINE at time of discharge is discharged prior to END date.   will follow up WITH FURTHER Recommendations

## 2020-05-30 NOTE — PROGRESS NOTE ADULT - ASSESSMENT
69 year old non-ambulatory Female with a PMHx significant for multiple sclerosis, Breast Cancer s/p R mastectomy, Osteoporosis, Mitral stenosis, right ankle fracture, chronic right sided sacral ulcer, chronic midline back pain since being dropped from a jame lift (7/17/29), admitted to [place] after presenting with sepsis in the setting of a UTI with chronic campos use and known large right ischial decubitus ulcer. Patient found to have a Moderately large Right hydropneumothorax resulting in partial right lung collapse and slight cardiomediastinal shift to the left on CXR with chest tube placed 5/8/20. Large bore chest tube placed 5/10/20 for persistent Right pneumothorax. Patient was followed by ID and was given Vanco and Zosyn originally for her presumed urosepsis, Caspofungin was added after pleural fluid was + for fungal elements. CT chest confirmed +Empyema in the Right pleural space. Patient ultimately transferred to [facility] late 5/11/20 after she was found to have a distal esophageal perforation on CT chest and Esophogram.     5/13/20 patient underwent EGD with esophageal stent placed, NGT placed, VATS with right thoracic cavity washout and pulmonary decortication with Dl drain, right posterior, and right medial chest tubes placed with Dr. Murcia.  On 5/14 patient was taken to IR for unsuccessful G/J tube placement. 5/15 patient was taken to the OR for feeding tube placement with Dr. Murcia.  The G tube is to gravity drainage, and slow feeds are advancing through the J tube. S/P esophagram 5/18 which showed narrowing distal stented area. Tolerating tube feeds.   5/27: G port clamped. Right Chest tube 31 to waterseal, Right CT #2 d/c.

## 2020-05-30 NOTE — PROGRESS NOTE ADULT - SUBJECTIVE AND OBJECTIVE BOX
Subjective:  Pt in bed NAD Pt is in pain when patient is turned.  Pt has a large stage 4 decubitus with sacral bone exposure.  With the patients state, plan to call palliative care     T(C): 36.5 (05-30-20 @ 09:44), Max: 37.7 (05-29-20 @ 13:30)  HR: 93 (05-30-20 @ 09:44) (88 - 104)  BP: 135/85 (05-30-20 @ 09:44) (94/63 - 135/85)    RR: 20 (05-30-20 @ 09:44) (16 - 20)  SpO2: 100% (05-30-20 @ 09:44) (95% - 100%)  Tele: SR     CHEST TUBE:     CT 90cc     AIR LEAKS:  [ ] YES [ x] NO    Dl 65cc     CXR: < from: Xray Chest 1 View- PORTABLE-Routine (05.30.20 @ 07:58) >  Findings/  Impression: Esophageal stent. Cardiomegaly. Multifocal lung opacities and small left pleural effusion unchanged.    < end of copied text >    < from: CT Chest w/ IV Cont (05.29.20 @ 21:26) >  FINDINGS:  CHEST:   LUNGS AND LARGE AIRWAYS AND PLEURA: Patent central airways. Moderate left pleural effusion and small loculated right pleural effusion with smaller locules of pleural fluid along the periphery of the right upper lobe and right lower lobe. Small right pneumothorax. Two right-sided chest tubes. Associated bibasilar compressive atelectasis. Few patchy nodular groundglass opacities in the left lung are nonspecific.  VESSELS: Normal caliber thoracic aorta. Main pulmonary artery is not dilated.  HEART: Heart is mildly enlarged. No pericardial effusion.  MEDIASTINUM AND FINA: No lymphadenopathy. Interval placement of an esophageal stent. Enteric contrast in the mid to distal esophageal stent. No evidence of stent leak; no extravasated enteric contrast or paraesophageal air. Mild edema in the posterior subcutaneous fat surrounding the stent.  CHEST WALL AND LOWER NECK: Mild generalized soft tissueedema.    ABDOMEN AND PELVIS:  LIVER: Indistinct low-attenuation in the left hepatic lobe periphery measuring up to 1.4 cm, unchanged.  BILE DUCTS: Normal caliber.  GALLBLADDER: Gallstone in the gallbladder neck. Gallbladder normally distended. No pericholecystic inflammatory changes.  SPLEEN: Within normal limits.  PANCREAS: Within normal limits.  ADRENALS: Mild nodular thickening of the left adrenal gland. Right adrenal gland is unremarkable.   KIDNEYS/URETERS: Kidneys enhance symmetrically without hydronephrosis.    BLADDER: Collapsed around a Starks catheter.  REPRODUCTIVE ORGANS: Calcified uterine fibroid. Adnexa are unremarkable.    BOWEL: Percutaneous tube extending from the stomach into the proximal jejunum. No bowel obstruction. Appendix is not visualized, however, no secondary CT findings to suggest appendicitis.  PERITONEUM: Trace pelvic free fluid. No pneumoperitoneum. No loculated collection. No mesenteric lymphadenopathy.  VESSELS: Within normal limits.  RETROPERITONEUM/LYMPHNODES: No lymphadenopathy.    ABDOMINAL WALL: Generalized soft tissue edema and postsurgical changes.  BONES: Extensive bony metastatic disease with numerous sclerotic and mixed osteolytic lesions. Chronic compression fracture of L2. Chronic fracturedeformities of the right superior and inferior pubic rami.    IMPRESSION:   Interval placement of an esophageal stent. No extravasation of enteric contrast or paraesophageal air to suggest leak.    Moderate left pleural effusion and small right loculated pleural effusion with smaller locules of pleural fluid in the periphery of the right upper lobe and right lower lobe. Small right pneumothorax. Two right-sided chest tubes. Associated compressive atelectasis at the lung bases.    < end of copied text >        EXAM  Neuro: A+O x 3, non-focal, speech clear and intact  HEENT: PERRL, EOMI  Neck: supple, no JVD  CV: regular rate, regular rhythm, +S1S2, no murmurs or rub  Pulm/chest: Breath sounds clear, diminished at the bases bilaterally, no accessory muscle use noted. Right chest tube to waterseal. SOURAV to bulb suction  Abd: soft, NT, obese, +edematous, +BS. GJ tube with staples, clean dry and intact. G tube capped, J tube to feeding  : Starks to drainage bag  Ext: DAVIDSON x 4, no cyanosis or clubbing. +3 edema BLE, +PP pulses bilaterally  Skin: Cool to touch, +3 edema BLE. Large sacral decubitus ulcer with dressing      Assessment:  70yFemale    with PAST MEDICAL & SURGICAL HISTORY:  Breast cancer metastasized to bone  Hypertension  Multiple sclerosis  S/P mastectomy, right  Breast CA  Osteoporosis  MS (mitral stenosis)  History of bowel resection  H/O breast surgery        Plan:

## 2020-05-31 LAB
CULTURE RESULTS: SIGNIFICANT CHANGE UP
SPECIMEN SOURCE: SIGNIFICANT CHANGE UP

## 2020-05-31 RX ADMIN — LATANOPROST 1 DROP(S): 0.05 SOLUTION/ DROPS OPHTHALMIC; TOPICAL at 22:22

## 2020-05-31 RX ADMIN — PANTOPRAZOLE SODIUM 40 MILLIGRAM(S): 20 TABLET, DELAYED RELEASE ORAL at 18:29

## 2020-05-31 RX ADMIN — ENOXAPARIN SODIUM 30 MILLIGRAM(S): 100 INJECTION SUBCUTANEOUS at 22:22

## 2020-05-31 RX ADMIN — SODIUM CHLORIDE 3 MILLILITER(S): 9 INJECTION INTRAMUSCULAR; INTRAVENOUS; SUBCUTANEOUS at 06:06

## 2020-05-31 RX ADMIN — NYSTATIN CREAM 1 APPLICATION(S): 100000 CREAM TOPICAL at 18:30

## 2020-05-31 RX ADMIN — SODIUM CHLORIDE 3 MILLILITER(S): 9 INJECTION INTRAMUSCULAR; INTRAVENOUS; SUBCUTANEOUS at 15:14

## 2020-05-31 RX ADMIN — MEROPENEM 100 MILLIGRAM(S): 1 INJECTION INTRAVENOUS at 22:22

## 2020-05-31 RX ADMIN — Medication 1 APPLICATION(S): at 06:11

## 2020-05-31 RX ADMIN — NYSTATIN CREAM 1 APPLICATION(S): 100000 CREAM TOPICAL at 06:11

## 2020-05-31 RX ADMIN — SODIUM CHLORIDE 3 MILLILITER(S): 9 INJECTION INTRAMUSCULAR; INTRAVENOUS; SUBCUTANEOUS at 22:07

## 2020-05-31 RX ADMIN — PANTOPRAZOLE SODIUM 40 MILLIGRAM(S): 20 TABLET, DELAYED RELEASE ORAL at 06:11

## 2020-05-31 RX ADMIN — Medication 1 APPLICATION(S): at 18:30

## 2020-05-31 RX ADMIN — ENOXAPARIN SODIUM 30 MILLIGRAM(S): 100 INJECTION SUBCUTANEOUS at 12:12

## 2020-05-31 RX ADMIN — CHLORHEXIDINE GLUCONATE 1 APPLICATION(S): 213 SOLUTION TOPICAL at 06:06

## 2020-05-31 RX ADMIN — MEROPENEM 100 MILLIGRAM(S): 1 INJECTION INTRAVENOUS at 06:11

## 2020-05-31 RX ADMIN — MEROPENEM 100 MILLIGRAM(S): 1 INJECTION INTRAVENOUS at 15:14

## 2020-05-31 NOTE — PROGRESS NOTE ADULT - PROBLEM SELECTOR PLAN 1
S/p esophageal stent placed 5/13/20 with NGT placed.   S/p GJ tube placement 5/15 (open procedure).  G port capped(5/27), slow feeds  through the J tube @ 50ml/hr and to maintain rate for now per Dr. Murcia.  Encourage deep breathing, chest PT, incentive spirometry.   Right chest to waterseal, SOURAV to bulb suction  Labs and CXR every other day as per Dr. Murcia (next tomorrow 6/1)  Plan for Palliative care consult

## 2020-05-31 NOTE — CHART NOTE - NSCHARTNOTEFT_GEN_A_CORE
69 year old F with PMHx significant for multiple sclerosis, Breast Cancer s/p R mastectomy, Osteoporosis, Mitral stenosis, right ankle fracture, chronic sacral ulcer, and  chronic midline back pain. Patient was transferred from [facility] to [facility] on 05/11/20 after she was diagnosed with an Esophageal perforation with Empyema. Blood transfusion history at [facility] significant for transfusion of one unit of red blood cells on 05/11/20. Transfusion was well tolerated. No transfusion reaction reported to the blood bank. Pre-transfusion hemoglobin 7.0 g/dL, post-transfusion hemoglobin 8.7 g/dL.    At [facility], patient blood sample received on 05/12/20 indicate the patient is blood group A RhD positive with a negative antibody screen. One unit of red blood cells was ordered, computer crossmatched, and transfused on 05/13/20. Pre-transfusion hemoglobin 7.2 g/dL, post-transfusion hemoglobin 11.0 g/dL. Transfusion was well tolerated and no transfusion reaction was reported to the blood bank. Same day operative note indicates EGD performed, esophageal perforation identified approximately 2cm above EG junction. Esophageal stent placed. VATS, right thoracic cavity washout with pulmonary decortication. Dl drain in place.  mL.    Blood sample received on 05/25/20 shows the patient antibody screen is now positive, with anti-K identified in patient's plasma. The direct antiglobulin test is positive with anti-IgG AHG, and the red blood cells eluate reacts with all cells tested. One unit of red blood cells was ordered, serologically crossmatched, and transfused. The unit was tested pre-transfusion as negative for the K antigen. The transfusion was well tolerated. Not transfusion reaction was reported to the blood bank. Pre-transfusion hemoglobin 7.4 g/dL, post-transfusion hemoglobin 9.2 g/dL.    Blood bank initiated a workup for a suspected delayed transfusion reaction. Retrospective testing of the patient's pre-transfusion sample at [facility] indicate the patient is negative for the K antigen, and the unit of red blood cells transfused on 05/11/20 was K-antigen positive. That unit was likely the immunizing event for the patient to develop anti-K, which became detectable in the patient's plasma on the sample submitted to the [facility] blood bank on 05/25/20. Retrospective testing of the unit of red blood cells transfused at [facility] on 05/13/20 indicates that the unit was K antigen negative.     Conclusions: findings are consistent with a delayed serologic transfusion reaction (DSTR), which occurs when a recipient develops new antibodies against red blood cells between 24 hours and 28 days after a transfusion without exhibiting any clinical symptoms or laboratory evidence of hemolysis. The National Healthcare Safety Network (NHSN) hemovigilance protocol defines DSTR as the absence of clinical signs of hemolysis AND demonstration of new, clinically significant antibodies against red blood cells BY EITHER positive direct antiglobulin test (OLESYA) OR positive antibody screen with newly identified RBC alloantibody. DSTR must be distinguished from a delayed hemolytic transfusion reaction, in which there is evidence of hemolysis such as an inadequate rise of post-transfusion hemoglobin level or a rapid fall in hemoglobin back to the pre-transfusion level. Although there was a rapid fall in hemoglobin after the unit transfused at [facility], there was adequate increase in post-transfusion hemoglobin and anti-K was undetected in patient's pre-transfusion sample on 05/13/20.    Recommendations: Anti-K is a clinically significant antibody that can cause acute and delayed hemolytic transfusion reactions. Crossmatch compatible red blood cells, negative for the K antigen, must be selected for transfusion. Please allow sufficient time for pre-transfusion blood bank workup.

## 2020-05-31 NOTE — PROGRESS NOTE ADULT - PROBLEM SELECTOR PLAN 2
S/p Right thoracotomy / washout / VATS decort 5/13/20.  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  Right Chest tube to waterseal.  Follow up daily CXR.  Pleural fluid + for yeast, coag Negative Staph, Klebsiella, and strep mitis/oralis. Continue IV antibiotics per ID.   Vancomycin and Caspofungin completed 5/26/20 and Zosyn to be completed 6/9/20.  PICC line to be placed closer to time of discharge.  Blood cultures negative 5/8 and negative to date from 5/28.

## 2020-05-31 NOTE — PROGRESS NOTE ADULT - ASSESSMENT
69 year old non-ambulatory Female with a PMHx significant for multiple sclerosis, Breast Cancer s/p R mastectomy, Osteoporosis, Mitral stenosis, right ankle fracture, chronic right sided sacral ulcer, chronic midline back pain since being dropped from a jame lift (7/17/29), admitted to [place] after presenting with sepsis in the setting of a UTI with chronic campos use and known large right ischial decubitus ulcer. Patient found to have a Moderately large Right hydropneumothorax resulting in partial right lung collapse and slight cardiomediastinal shift to the left on CXR with chest tube placed 5/8/20. Large bore chest tube placed 5/10/20 for persistent Right pneumothorax. Patient was followed by ID and was given Vanco and Zosyn originally for her presumed urosepsis, Caspofungin was added after pleural fluid was + for fungal elements. CT chest confirmed +Empyema in the Right pleural space. Patient ultimately transferred to [facility] late 5/11/20 after she was found to have a distal esophageal perforation on CT chest and Esophogram.     5/13/20 patient underwent EGD with esophageal stent placed, NGT placed, VATS with right thoracic cavity washout and pulmonary decortication with Dl drain, right posterior, and right medial chest tubes placed with Dr. Murcia.  On 5/14 patient was taken to IR for unsuccessful G/J tube placement. 5/15 patient was taken to the OR for feeding tube placement with Dr. Murcia.  The G tube is to gravity drainage, and slow feeds are advancing through the J tube. S/P esophagram 5/18 which showed narrowing distal stented area. Tolerating tube feeds.   5/27: G port clamped. Right Chest tube 31 to waterseal, Right CT #2 d/c.

## 2020-05-31 NOTE — CHART NOTE - NSCHARTNOTEFT_GEN_A_CORE
69 year old F with PMHx significant for multiple sclerosis, Breast Cancer s/p R mastectomy, Osteoporosis, Mitral stenosis, right ankle fracture, chronic sacral ulcer, and  chronic midline back pain. Patient was transferred from [facility] to [facility] on 05/11/20 after she was diagnosed with an Esophageal perforation with Empyema. Blood transfusion history at [facility] significant for transfusion of one unit of red blood cells on 05/11/20.     At [facility], patient blood sample received on 05/12/20 indicate the patient is blood group A RhD positive with a negative antibody screen. Blood sample received on 05/25/20 shows the patient is blood group A RhD positive. The antibody screen is positive and anti-K is identified in patient's plasma. Anti-K is a clinically significant antibody that can cause acute and delayed hemolytic transfusion reactions. Crossmatch compatible red blood cells, negative for the K antigen, must be selected for transfusion. Please allow sufficient time for pre-transfusion blood bank workup.

## 2020-05-31 NOTE — PROGRESS NOTE ADULT - SUBJECTIVE AND OBJECTIVE BOX
Subjective:  Pt lying in bed.  Denies CP or SOB.  NAD noted.      Tele: SR                          T(F): 98.4 (05-31-20 @ 06:10), Max: 98.4 (05-31-20 @ 06:10)  HR: 95 (05-31-20 @ 06:10) (95 - 99)  BP: 117/78 (05-31-20 @ 06:10) (99/70 - 126/85)  RR: 16 (05-31-20 @ 06:10) (16 - 20)  SpO2: 100% (05-31-20 @ 06:10) (96% - 100%) on room air.    Allergies:  Sudafed (Other)      05-30    132<L>  |  98  |  35.0<H>  ----------------------------<  117<H>  4.8   |  20.0<L>  |  0.77    Ca    7.1<L>      30 May 2020 19:57  Phos  2.4     05-30  Mg     1.9     05-30    TPro  5.6<L>  /  Alb  1.6<L>  /  TBili  <0.2<L>  /  DBili  x   /  AST  34<H>  /  ALT  36<H>  /  AlkPhos  314<H>  05-30                               8.8    12.02 )-----------( 498      ( 30 May 2020 19:57 )             27.2          CXR: < from: Xray Chest 1 View- PORTABLE-Routine (05.30.20 @ 07:58) >     EXAM:  XR CHEST PORTABLE ROUTINE 1V                          PROCEDURE DATE:  05/30/2020          INTERPRETATION:  Clinical Information: CHF    Technique: AP chest image.     Comparison: 05/29/2020    Findings/  Impression: Esophageal stent. Cardiomegaly. Multifocal lung opacities and small left pleural effusion unchanged.    AMADOU MORAN M.D., ATTENDING RADIOLOGIST  This document has been electronically signed. May 30 2020  8:35AM    < end of copied text >      I&O's Detail    30 May 2020 07:01  -  31 May 2020 07:00  --------------------------------------------------------  IN:    Oral Fluid: 240 mL  Total IN: 240 mL    OUT:    Chest Tube: 640 mL    Drain: 185 mL    Indwelling Catheter - Urethral: 500 mL  Total OUT: 1325 mL    Total NET: -1085 mL      CHEST TUBE:  [x ] YES [ ] NO  OUTPUT:  Right CT 290ml overnight and 640ml over the last 24 hours.  AIR LEAKS:  [ ] YES [x ] NO      Dl to sourav> 85ml overnight and 185ml over the last 24 hours.      Active Medications:  acetaminophen   Tablet .. 650 milliGRAM(s) Oral every 6 hours PRN  chlorhexidine 4% Liquid 1 Application(s) Topical <User Schedule>  Dakins Solution - 1/2 Strength 1 Application(s) Topical two times a day  enoxaparin Injectable 30 milliGRAM(s) SubCutaneous every 12 hours  latanoprost 0.005% Ophthalmic Solution 1 Drop(s) Both EYES at bedtime  lidocaine 1% Injectable 10 milliLiter(s) Local Injection once  meropenem  IVPB 1000 milliGRAM(s) IV Intermittent every 8 hours  nystatin Cream 1 Application(s) Topical two times a day  oxycodone    5 mG/acetaminophen 325 mG 1 Tablet(s) Oral every 6 hours PRN  pantoprazole  Injectable 40 milliGRAM(s) IV Push every 12 hours  sodium chloride 0.9% lock flush 10 milliLiter(s) IV Push every 1 hour PRN  sodium chloride 0.9% lock flush 3 milliLiter(s) IV Push every 8 hours      Physical Exam:    Neuro: A+O x 3, non-focal.  CV: RRR.  +S1S2,  Pulm/chest: Breath sounds clear, diminished at the bases bilaterally. Right chest tube to waterseal. SOURAV to bulb suction.  Abd: soft, NT, obese, +edematous, +BS. GJ tube with staples, clean dry and intact. G tube capped, J tube to feeding  : Starks to drainage bag  Ext: DAVIDSON x 4, no cyanosis or clubbing. +3 edema BLE, +PP pulses bilaterally  Skin: Cool to touch, +3 edema BLE. Large sacral decubitus ulcer with dressing                    PAST MEDICAL & SURGICAL HISTORY:  Breast cancer metastasized to bone  Hypertension  Multiple sclerosis  S/P mastectomy, right  Breast CA  Osteoporosis  MS (mitral stenosis)  History of bowel resection  H/O breast surgery

## 2020-06-01 LAB
ALBUMIN SERPL ELPH-MCNC: 1.8 G/DL — LOW (ref 3.3–5.2)
ALLERGY+IMMUNOLOGY DIAG STUDY NOTE: SIGNIFICANT CHANGE UP
ALP SERPL-CCNC: 250 U/L — HIGH (ref 40–120)
ALT FLD-CCNC: 36 U/L — HIGH
ANION GAP SERPL CALC-SCNC: 13 MMOL/L — SIGNIFICANT CHANGE UP (ref 5–17)
AST SERPL-CCNC: 36 U/L — HIGH
BILIRUB SERPL-MCNC: <0.2 MG/DL — LOW (ref 0.4–2)
BUN SERPL-MCNC: 34 MG/DL — HIGH (ref 8–20)
CALCIUM SERPL-MCNC: 7.1 MG/DL — LOW (ref 8.6–10.2)
CHLORIDE SERPL-SCNC: 97 MMOL/L — LOW (ref 98–107)
CO2 SERPL-SCNC: 21 MMOL/L — LOW (ref 22–29)
CREAT SERPL-MCNC: 0.7 MG/DL — SIGNIFICANT CHANGE UP (ref 0.5–1.3)
GLUCOSE SERPL-MCNC: 86 MG/DL — SIGNIFICANT CHANGE UP (ref 70–99)
HCT VFR BLD CALC: 24.7 % — LOW (ref 34.5–45)
HGB BLD-MCNC: 7.8 G/DL — LOW (ref 11.5–15.5)
MAGNESIUM SERPL-MCNC: 1.8 MG/DL — SIGNIFICANT CHANGE UP (ref 1.6–2.6)
MCHC RBC-ENTMCNC: 27.5 PG — SIGNIFICANT CHANGE UP (ref 27–34)
MCHC RBC-ENTMCNC: 31.6 GM/DL — LOW (ref 32–36)
MCV RBC AUTO: 87 FL — SIGNIFICANT CHANGE UP (ref 80–100)
OB PNL STL: POSITIVE
PHOSPHATE SERPL-MCNC: 2.2 MG/DL — LOW (ref 2.4–4.7)
PLATELET # BLD AUTO: 512 K/UL — HIGH (ref 150–400)
POTASSIUM SERPL-MCNC: 4.7 MMOL/L — SIGNIFICANT CHANGE UP (ref 3.5–5.3)
POTASSIUM SERPL-SCNC: 4.7 MMOL/L — SIGNIFICANT CHANGE UP (ref 3.5–5.3)
PROT SERPL-MCNC: 5.3 G/DL — LOW (ref 6.6–8.7)
RBC # BLD: 2.84 M/UL — LOW (ref 3.8–5.2)
RBC # FLD: 18.5 % — HIGH (ref 10.3–14.5)
SODIUM SERPL-SCNC: 131 MMOL/L — LOW (ref 135–145)
WBC # BLD: 12.49 K/UL — HIGH (ref 3.8–10.5)
WBC # FLD AUTO: 12.49 K/UL — HIGH (ref 3.8–10.5)

## 2020-06-01 PROCEDURE — 99233 SBSQ HOSP IP/OBS HIGH 50: CPT

## 2020-06-01 PROCEDURE — 99232 SBSQ HOSP IP/OBS MODERATE 35: CPT

## 2020-06-01 PROCEDURE — 71045 X-RAY EXAM CHEST 1 VIEW: CPT | Mod: 26

## 2020-06-01 RX ADMIN — Medication 1 APPLICATION(S): at 06:56

## 2020-06-01 RX ADMIN — Medication 62.5 MILLIMOLE(S): at 18:31

## 2020-06-01 RX ADMIN — LATANOPROST 1 DROP(S): 0.05 SOLUTION/ DROPS OPHTHALMIC; TOPICAL at 23:11

## 2020-06-01 RX ADMIN — CHLORHEXIDINE GLUCONATE 1 APPLICATION(S): 213 SOLUTION TOPICAL at 06:54

## 2020-06-01 RX ADMIN — ENOXAPARIN SODIUM 30 MILLIGRAM(S): 100 INJECTION SUBCUTANEOUS at 10:43

## 2020-06-01 RX ADMIN — PANTOPRAZOLE SODIUM 40 MILLIGRAM(S): 20 TABLET, DELAYED RELEASE ORAL at 06:55

## 2020-06-01 RX ADMIN — NYSTATIN CREAM 1 APPLICATION(S): 100000 CREAM TOPICAL at 18:25

## 2020-06-01 RX ADMIN — PANTOPRAZOLE SODIUM 40 MILLIGRAM(S): 20 TABLET, DELAYED RELEASE ORAL at 18:25

## 2020-06-01 RX ADMIN — Medication 1 APPLICATION(S): at 14:41

## 2020-06-01 RX ADMIN — MEROPENEM 100 MILLIGRAM(S): 1 INJECTION INTRAVENOUS at 06:55

## 2020-06-01 RX ADMIN — SODIUM CHLORIDE 3 MILLILITER(S): 9 INJECTION INTRAMUSCULAR; INTRAVENOUS; SUBCUTANEOUS at 23:10

## 2020-06-01 RX ADMIN — SODIUM CHLORIDE 3 MILLILITER(S): 9 INJECTION INTRAMUSCULAR; INTRAVENOUS; SUBCUTANEOUS at 06:56

## 2020-06-01 RX ADMIN — NYSTATIN CREAM 1 APPLICATION(S): 100000 CREAM TOPICAL at 06:56

## 2020-06-01 RX ADMIN — ENOXAPARIN SODIUM 30 MILLIGRAM(S): 100 INJECTION SUBCUTANEOUS at 23:11

## 2020-06-01 RX ADMIN — SODIUM CHLORIDE 3 MILLILITER(S): 9 INJECTION INTRAMUSCULAR; INTRAVENOUS; SUBCUTANEOUS at 13:13

## 2020-06-01 RX ADMIN — MEROPENEM 100 MILLIGRAM(S): 1 INJECTION INTRAVENOUS at 13:14

## 2020-06-01 RX ADMIN — MEROPENEM 100 MILLIGRAM(S): 1 INJECTION INTRAVENOUS at 23:11

## 2020-06-01 NOTE — PROGRESS NOTE ADULT - SUBJECTIVE AND OBJECTIVE BOX
[facility]  INFECTIOUS DISEASES AND INTERNAL MEDICINE at [place]  =======================================================  Phong Wang MD  Diplomates American Board of Internal Medicine and Infectious Diseases  Telephone [phone]  Fax            [phone]  =======================================================    [id number]  GEORGE STANLEY   follow up: empyema     s/p EGD and esophageal stent; s/p VATS 5/14  s/p gastrojejunal tube on 5/15/2020  s/p debridement of sacral decubitus    Chylous drainage from CT tube      =======================================================  REVIEW OF SYSTEMS:  CONSTITUTIONAL:  No Fever or chills  HEENT:  No diplopia or blurred vision.  No earache, sore throat or runny nose.  CARDIOVASCULAR:  No pressure, squeezing, strangling, tightness, heaviness or aching about the chest, neck, axilla or epigastrium.  RESPIRATORY:  MILD shortness of breath  GASTROINTESTINAL:  No nausea, vomiting or diarrhea.  GENITOURINARY:  No dysuria, frequency or urgency. No Blood in urine  MUSCULOSKELETAL:  no joint aches, no muscle pain  SKIN:  No change in skin, hair or nails.  NEUROLOGIC:  No Headaches, seizures or weakness.  PSYCHIATRIC:  No disorder of thought or mood.  ENDOCRINE:  No heat or cold intolerance  HEMATOLOGICAL:  No easy bruising or bleeding.   =======================================================  Allergies  Sudafed (Other)    ======================================================  Physical Exam:  ============   (see vitals section below)    General:  No acute distress. FRAIL  Eye: Pupils are equal, round and reactive to light, Extraocular movements are intact, Normal conjunctiva.  HENT: Normocephalic, Oral mucosa is moist, No pharyngeal erythema, No sinus tenderness.  Neck: Supple, No lymphadenopathy.  Respiratory: Lungs  with diminished air entry at bases  1st chest tube to water seal  2nd chest tube connected to SOURAV drain.  TURBID cream COLOR FLUID  Cardiovascular: Normal rate, Regular rhythm  Gastrointestinal: Soft, Non-tender, Non-distended, Normal bowel sounds.  PEJ tube present in abd  Genitourinary:  no dysuria  Lymphatics: No lymphadenopathy neck,   Musculoskeletal: Normal range of motion, Normal strength.   decubitus ulcer on right hip   Neurologic: Alert, Oriented, No focal deficits, Cranial Nerves II-XII are grossly intact.  Psychiatric: Appropriate mood & affect.    =======================================================  Vitals:  ============  T(F): 98.1 (01 Jun 2020 05:59), Max: 98.5 (31 May 2020 17:39)  HR: 90 (01 Jun 2020 05:59)  BP: 125/77 (01 Jun 2020 05:59)  RR: 18 (01 Jun 2020 05:59)  SpO2: 100% (01 Jun 2020 05:59) (91% - 100%)  temp max in last 48H T(F): , Max: 98.5 (05-31-20 @ 17:39)    =======================================================  Current Antibiotics:  meropenem  IVPB 1000 milliGRAM(s) IV Intermittent every 8 hours    Other medications:  chlorhexidine 4% Liquid 1 Application(s) Topical <User Schedule>  Dakins Solution - 1/2 Strength 1 Application(s) Topical two times a day  enoxaparin Injectable 30 milliGRAM(s) SubCutaneous every 12 hours  latanoprost 0.005% Ophthalmic Solution 1 Drop(s) Both EYES at bedtime  lidocaine 1% Injectable 10 milliLiter(s) Local Injection once  nystatin Cream 1 Application(s) Topical two times a day  pantoprazole  Injectable 40 milliGRAM(s) IV Push every 12 hours  sodium chloride 0.9% lock flush 3 milliLiter(s) IV Push every 8 hours  sodium phosphate IVPB 15 milliMole(s) IV Intermittent once      =======================================================  Labs:                        7.8    12.49 )-----------( 512      ( 01 Jun 2020 07:54 )             24.7      06-01    131<L>  |  97<L>  |  34.0<H>  ----------------------------<  86  4.7   |  21.0<L>  |  0.70    Ca    7.1<L>      01 Jun 2020 07:54  Phos  2.2     06-01  Mg     1.8     06-01    TPro  5.3<L>  /  Alb  1.8<L>  /  TBili  <0.2<L>  /  DBili  x   /  AST  36<H>  /  ALT  36<H>  /  AlkPhos  250<H>  06-01      Culture - Blood (collected 05-29-20 @ 11:31)  Source: .Blood Blood    Culture - Blood (collected 05-29-20 @ 11:31)  Source: .Blood Blood    Culture - Blood (collected 05-28-20 @ 19:56)  Source: .Blood Blood-Peripheral    Culture - Blood (collected 05-28-20 @ 19:56)  Source: .Blood Blood-Peripheral    Culture - Blood (collected 05-26-20 @ 14:22)  Source: .Blood Blood-Peripheral  Final Report (05-31-20 @ 15:00):    No growth at 5 days.      Creatinine, Serum: 0.70 mg/dL (06-01-20 @ 07:54)  Creatinine, Serum: 0.77 mg/dL (05-30-20 @ 19:57)  Creatinine, Serum: 0.79 mg/dL (05-28-20 @ 07:26)    Ferritin, Serum: 808 ng/mL (05-09-20 @ 15:27)    WBC Count: 12.49 K/uL (06-01-20 @ 07:54)  WBC Count: 12.02 K/uL (05-30-20 @ 19:57)  WBC Count: 9.43 K/uL (05-28-20 @ 07:26)    COVID-19 PCR: NotDetec (05-12-20 @ 00:00)    Alkaline Phosphatase, Serum: 250 U/L (06-01-20 @ 07:54)  Alkaline Phosphatase, Serum: 314 U/L (05-30-20 @ 19:57)  Alanine Aminotransferase (ALT/SGPT): 36 U/L (06-01-20 @ 07:54)  Alanine Aminotransferase (ALT/SGPT): 36 U/L (05-30-20 @ 19:57)  Aspartate Aminotransferase (AST/SGOT): 36 U/L (06-01-20 @ 07:54)  Aspartate Aminotransferase (AST/SGOT): 34 U/L (05-30-20 @ 19:57)  Bilirubin Total, Serum: <0.2 mg/dL (06-01-20 @ 07:54)  Bilirubin Total, Serum: <0.2 mg/dL (05-30-20 @ 19:57)

## 2020-06-01 NOTE — GOALS OF CARE CONVERSATION - ADVANCED CARE PLANNING - CONVERSATION DETAILS
Spoke to pt about her wishes regarding resuscitation. She states that she does want CPR and intubation initiated but she would not want to be kept alive on machines. Pt was encouraged to have discussion with her HCP idicatind what her wishes would be.      Palliative care Social Work note. 6/1  SW met with patient to provide supportive counseling in coping with dx of catastrophic illness. patient actively engaged in discussion regarding difficulties at home and dealing with cancer over 2 years. Patient reports increased issues with swallowing and was unable to take chemotherapy but remains hopeful that she can crush further medications for future treatment. patient lives with son and dgt lives near by. patient reports having a lot of difficulty getting self out of bed at home and managing has become increasingly difficult. SW explored ACS services with patient and palliative care following.

## 2020-06-01 NOTE — PROGRESS NOTE ADULT - ASSESSMENT
69 year old non-ambulatory Female with a PMHx significant for multiple sclerosis, Breast Cancer s/p R mastectomy, Osteoporosis, Mitral stenosis, right ankle fracture, chronic right sided sacral ulcer, chronic midline back pain since being dropped from a jame lift (7/17/29), admitted to [place] after presenting with sepsis in the setting of a UTI with chronic campos use and known large right ischial decubitus ulcer. Patient found to have a Moderately large Right hydropneumothorax resulting in partial right lung collapse and slight cardiomediastinal shift to the left on CXR with chest tube placed 5/8/20. Large bore chest tube placed 5/10/20 for persistent Right pneumothorax. Patient was followed by ID and was given Vanco and Zosyn originally for her presumed urosepsis, Caspofungin was added after pleural fluid was + for fungal elements. CT chest confirmed +Empyema in the Right pleural space. Patient ultimately transferred to [facility] late 5/11/20 after she was found to have a distal esophageal perforation on CT chest and Esophogram.     5/13/20 patient underwent EGD with esophageal stent placed, NGT placed, VATS with right thoracic cavity washout and pulmonary decortication with Dl drain, right posterior, and right medial chest tubes placed with Dr. Murcia.  On 5/14 patient was taken to IR for unsuccessful G/J tube placement. 5/15 patient was taken to the OR for feeding tube placement with Dr. Murcia.  The G tube is to gravity drainage, and slow feeds are advancing through the J tube. S/P esophagram 5/18 which showed narrowing distal stented area. Tolerating tube feeds.   5/27: G port clamped. Right Chest tube 31 to waterseal, Right CT #2 d/c.   5/29 Ct Chest with no extrav., mod L effusion, small loculated R.

## 2020-06-01 NOTE — CONSULT NOTE ADULT - ASSESSMENT
70F with MS (wheelchair bound), metastatic breast cancer to pelvis, thoracic, and lumbar spine, transferred from [facility] with esophageal perforation, empyema s/p esophageal stent, VATs (5/13), Empyema with polymicrobial infection, Strep mitis infection, Klebsiella oxytoca infection, CoNS infection, sacral decubitus ulcer, clinically osteomyelitis per ID.     #1 Metastatic breast cancer - to bone - thoracic/lumbar and pelvis. per daughter, had been on Kisquali (ribociclib) oral tablets per daughter with Dr. Eddie Fairbanks. I left a message for call back from Dr. Fairbanks - he is not in the office today but will be back in tomorrow - to discuss her chronic infections, poor functional status and if it is even feasible to restart oral chemotherapy. Poor prognosis given spread to bone.   #2 Esophageal perforation - s/p stent with CT sugery on 5/13. was not a candidate for reconstructive surgery. also s/p gastrojejunostomy tube for feeds.   #3 Empyema - s/p VATS with right thoracic cavity washout and pulmonary decortication with right posterior, right medial chest tubes. polymicrobial infection with Strep mitis infection, Klebsiella oxytoca infection, and CoNS, as well as yeast, s/p vancomycin and caspofungin (completed 5/26), on meropenem to be completed 6/9.   #4 sacral decubitus - clinically osteomyelitis per ID. will not heal, patient is bedbound. wound care.   #5 Encounter for palliative care - met patient for first time today to establish relationship, spoke with daughter Roma who helps with her medical decisions. Daughter had alot of questions on next steps and restarting chemotherapy agent. I explained to Roma that given mom's more debilitated state, poor nutritional status, esophageal perforation with only temporizing measure of stent and inability to do reconstructive surgery, recent chest infection, and chronic sacral infection, I was unsure if resumption of chemotherapy agent would be a good decision or an option. I left message for Dr. Fairbanks to call me back to discuss further. Overall this woman has a very poor prognosis for return to previous functional status (which was getting around in her electric wheelchair) and is extremely high risk for rehospitalizations due to repeated infections; additionally she has advanced metastatic cancer. Will discuss advanced directives with patient in the next 24 hours.

## 2020-06-01 NOTE — PROGRESS NOTE ADULT - SUBJECTIVE AND OBJECTIVE BOX
Subjective: Patient with no complaints. One episode of diarrhea yesterday as per RN. Denies fever, chills, SOB and pain. Tolerating diet.     Vital Signs:  Vital Signs Last 24 Hrs  T(C): 36.7 (06-01-20 @ 05:59), Max: 36.9 (05-31-20 @ 17:39)  T(F): 98.1 (06-01-20 @ 05:59), Max: 98.5 (05-31-20 @ 17:39)  HR: 90 (06-01-20 @ 05:59) (89 - 91)  BP: 125/77 (06-01-20 @ 05:59) (119/79 - 143/85)  RR: 18 (06-01-20 @ 05:59) (18 - 18)  SpO2: 100% (06-01-20 @ 05:59) (91% - 100%) on (O2)  I&O's Detail    31 May 2020 07:01  -  01 Jun 2020 07:00  --------------------------------------------------------  IN:    Enteral Tube Flush: 60 mL    Free Water: 250 mL    Oral Fluid: 720 mL    Pivot 1.5: 1250 mL  Total IN: 2280 mL    OUT:    Chest Tube: 230 mL    Drain: 140 mL    Indwelling Catheter - Urethral: 775 mL  Total OUT: 1145 mL    Total NET: 1135 mL      01 Jun 2020 07:01  -  01 Jun 2020 10:49  --------------------------------------------------------  IN:  Total IN: 0 mL    OUT:    Chest Tube: 40 mL  Total OUT: 40 mL    Total NET: -40 mL      General: NAD  Neurology: Awake, nonfocal  Eyes: Scleras clear, PERRLA/ EOMI, Gross vision intact  ENT: Gross hearing intact, grossly patent pharynx, no stridor  Neck: Neck supple, trachea midline, No JVD  Respiratory: Decereased BS at Right base  CV: S1S2, no murmurs, rubs or gallops  Abdominal: Soft, NT, ND  Incisions: Staples intact  Tubes: CT to WS< +air leak, 230 out in 24H  Dl to bulb, clear output, 140cc in last 24H    Relevant labs, radiology and Medications reviewed                        7.8    12.49 )-----------( 512      ( 01 Jun 2020 07:54 )             24.7     06-01    131<L>  |  97<L>  |  34.0<H>  ----------------------------<  86  4.7   |  21.0<L>  |  0.70    Ca    7.1<L>      01 Jun 2020 07:54  Phos  2.2     06-01  Mg     1.8     06-01    TPro  5.3<L>  /  Alb  1.8<L>  /  TBili  <0.2<L>  /  DBili  x   /  AST  36<H>  /  ALT  36<H>  /  AlkPhos  250<H>  06-01      MEDICATIONS  (STANDING):  chlorhexidine 4% Liquid 1 Application(s) Topical <User Schedule>  Dakins Solution - 1/2 Strength 1 Application(s) Topical two times a day  enoxaparin Injectable 30 milliGRAM(s) SubCutaneous every 12 hours  latanoprost 0.005% Ophthalmic Solution 1 Drop(s) Both EYES at bedtime  lidocaine 1% Injectable 10 milliLiter(s) Local Injection once  meropenem  IVPB 1000 milliGRAM(s) IV Intermittent every 8 hours  nystatin Cream 1 Application(s) Topical two times a day  pantoprazole  Injectable 40 milliGRAM(s) IV Push every 12 hours  sodium chloride 0.9% lock flush 3 milliLiter(s) IV Push every 8 hours  sodium phosphate IVPB 15 milliMole(s) IV Intermittent once    MEDICATIONS  (PRN):  acetaminophen   Tablet .. 650 milliGRAM(s) Oral every 6 hours PRN Temp greater or equal to 38C (100.4F), Moderate Pain (4 - 6)  oxycodone    5 mG/acetaminophen 325 mG 1 Tablet(s) Oral every 6 hours PRN Moderate Pain (4 - 6)  sodium chloride 0.9% lock flush 10 milliLiter(s) IV Push every 1 hour PRN Pre/post blood products, medications, blood draw, and to maintain line patency        Assessment  70y Female  w/ PAST MEDICAL & SURGICAL HISTORY:  Breast cancer metastasized to bone  Hypertension  Multiple sclerosis  S/P mastectomy, right  Breast CA  Osteoporosis  MS (mitral stenosis)  History of bowel resection  H/O breast surgery  admitted with complaints of Patient is a 70y old  Female who presents with a chief complaint of Transfer from [facility] for Distal Esophageal perforation (31 May 2020 10:18)   patient underwent Bronchoscopy in adult  Insertion, gastric tube, with diagnostic aspiration  Chest tube placement  VATS, with partial lung decortication  Placement of esophageal stent

## 2020-06-01 NOTE — CONSULT NOTE ADULT - SUBJECTIVE AND OBJECTIVE BOX
HPI: 70F with PMH as listed admitted 5/11 transferred from [facility] with esophageal perforation s/p esophageal stent. Patient also with right hydropneumothorax, partial right lung collapse, with mediastinal shift s/p chest tube, eventually with empyema s/p VATs with right thoracic cavity washout and pulmonary decortication with right posterior, right medial chest tubes on 5/13. Patient has had a prolonged hospitalization.     PERTINENT PMH REVIEWED: Yes     PAST MEDICAL & SURGICAL HISTORY:  Breast cancer metastasized to bone  Hypertension  Multiple sclerosis  S/P mastectomy, right  Breast CA  Osteoporosis  MS (mitral stenosis)  History of bowel resection  H/O breast surgery    SOCIAL HISTORY:                                     Admitted from:  home  SNF  [facility]     Surrogate/HCP/Guardian: Phone#:    FAMILY HISTORY:  FHx: hyperlipidemia      Baseline ADLs (prior to admission):  Independent/ Dependent      Allergies    Sudafed (Other)    Intolerances        Present Symptoms:     Dyspnea: 0 1 2 3   Nausea/Vomiting: Yes No  Anxiety:  Yes No  Depression: Yes No  Fatigue: Yes No  Loss of appetite: Yes No    Pain:             Character-            Duration-            Effect-            Factors-            Frequency-            Location-            Severity-    Review of Systems: Reviewed                     Negative:                     Positive:  Unable to obtain due to poor mentation   All others negative    MEDICATIONS  (STANDING):  chlorhexidine 4% Liquid 1 Application(s) Topical <User Schedule>  Dakins Solution - 1/2 Strength 1 Application(s) Topical two times a day  enoxaparin Injectable 30 milliGRAM(s) SubCutaneous every 12 hours  latanoprost 0.005% Ophthalmic Solution 1 Drop(s) Both EYES at bedtime  lidocaine 1% Injectable 10 milliLiter(s) Local Injection once  meropenem  IVPB 1000 milliGRAM(s) IV Intermittent every 8 hours  nystatin Cream 1 Application(s) Topical two times a day  pantoprazole  Injectable 40 milliGRAM(s) IV Push every 12 hours  sodium chloride 0.9% lock flush 3 milliLiter(s) IV Push every 8 hours  sodium phosphate IVPB 15 milliMole(s) IV Intermittent once    MEDICATIONS  (PRN):  acetaminophen   Tablet .. 650 milliGRAM(s) Oral every 6 hours PRN Temp greater or equal to 38C (100.4F), Moderate Pain (4 - 6)  oxycodone    5 mG/acetaminophen 325 mG 1 Tablet(s) Oral every 6 hours PRN Moderate Pain (4 - 6)  sodium chloride 0.9% lock flush 10 milliLiter(s) IV Push every 1 hour PRN Pre/post blood products, medications, blood draw, and to maintain line patency      PHYSICAL EXAM:    Vital Signs Last 24 Hrs  T(C): 36.7 (01 Jun 2020 05:59), Max: 36.9 (31 May 2020 17:39)  T(F): 98.1 (01 Jun 2020 05:59), Max: 98.5 (31 May 2020 17:39)  HR: 90 (01 Jun 2020 05:59) (89 - 91)  BP: 125/77 (01 Jun 2020 05:59) (119/79 - 143/85)  BP(mean): --  RR: 18 (01 Jun 2020 05:59) (18 - 18)  SpO2: 100% (01 Jun 2020 05:59) (91% - 100%)    General: alert  oriented x ____ lethargic agitated                  cachexia  nonverbal  coma    Karnofsky:  %    HEENT: normal  dry mouth  ET tube/trach    Lungs: comfortable tachypnea/labored breathing  excessive secretions    CV: normal  tachycardia    GI: normal  distended  tender  no BS               PEG/NG/OG tube  constipation  last BM:     : normal  incontinent  oliguria/anuria  campos    MSK: normal  weakness  edema             ambulatory  bedbound/wheelchair bound    Skin: normal  pressure ulcers- Stage_____  no rash    LABS:                        7.8    12.49 )-----------( 512      ( 01 Jun 2020 07:54 )             24.7     06-01    131<L>  |  97<L>  |  34.0<H>  ----------------------------<  86  4.7   |  21.0<L>  |  0.70    Ca    7.1<L>      01 Jun 2020 07:54  Phos  2.2     06-01  Mg     1.8     06-01    TPro  5.3<L>  /  Alb  1.8<L>  /  TBili  <0.2<L>  /  DBili  x   /  AST  36<H>  /  ALT  36<H>  /  AlkPhos  250<H>  06-01    I&O's Summary    31 May 2020 07:01  -  01 Jun 2020 07:00  --------------------------------------------------------  IN: 2280 mL / OUT: 1145 mL / NET: 1135 mL    01 Jun 2020 07:01  -  01 Jun 2020 11:18  --------------------------------------------------------  IN: 0 mL / OUT: 40 mL / NET: -40 mL    RADIOLOGY & ADDITIONAL STUDIES:    < from: CT Chest w/ IV Cont (05.29.20 @ 21:26) >    IMPRESSION:   Interval placement of an esophageal stent. No extravasation of enteric contrast or paraesophageal air to suggest leak.    Moderate left pleural effusion and small right loculated pleural effusion with smaller locules of pleural fluid in the periphery of the right upper lobe and right lower lobe. Small right pneumothorax. Two right-sided chest tubes. Associated compressive atelectasis at the lung bases.    < end of copied text >    < from: Xray Chest 1 View- PORTABLE-Routine (06.01.20 @ 05:55) >    INTERPRETATION:  AP chest on June 1, 2020 4:00 AM. Patient is being followed for right chest tubes. Patient is negative for the COVID BI-RADS 2 date.Patient has history of breast cancer treated with right mastectomy. There is history of bone metastases and pelvic cubitus patient had right chest empyema.    Heart suggest enlargement. Prosthetic aortic valve again noted.    2 right chest tubes remain.    There is a mild peripheral and basilar right pleural reaction associated with adjacent atelectasis/infiltrate.    This is similar to May 30.    On May 30 there is a mild left base effusion showing improvement on present study.    IMPRESSION: Again noted are bilateral pleural findings right greater than left. There is some improvement on the left compared to prior.    < end of copied text >    < from: Xray Kidney Ureter Bladder (05.15.20 @ 13:53) >      COMPARISON:  Chest x-ray of the same day at 3:20 AM    NG tube with the tip in the region of the gastroesophageal junction. Esophageal stent.    No evidence of a bowel obstruction.    Skin staples overlying the midline of the abdomen.    Diffuse osseous metastasis. Compression fracture of L2. Calcified fibroid in the pelvis.    Partially imaged chest tubes on the right.    IMPRESSION:      NG tube with the tip in the region of the gastroesophageal junction and should be advanced.    The findings were discussed with MOLLY Shah at 2:27 PM on 5/15/2020.    < end of copied text >    ADVANCE DIRECTIVES: Full code HPI: 70F with PMH as listed admitted 5/11 transferred from [facility] with esophageal perforation s/p esophageal stent. Patient also with right hydropneumothorax, partial right lung collapse, with mediastinal shift s/p chest tube, eventually with empyema s/p VATs with right thoracic cavity washout and pulmonary decortication with right posterior, right medial chest tubes on 5/13. Empyema with polymicrobial infection: Strep mitis infection  Klebsiella oxytoca infection CoNS infection. Patient has had a prolonged hospitalization. s/p PEJ - 5/15. Decubitis ulcer, clinically osteomyelitis per ID. s/p caspofungin for yeast in fluid cultures.     PERTINENT PMH REVIEWED: Yes     PAST MEDICAL & SURGICAL HISTORY:  Hypertension  Multiple sclerosis  S/P mastectomy, right  Breast CA s/p right mastectomy, then recurred in other breast and pelvis in 10/2019.   Osteoporosis  MS (mitral stenosis)  History of bowel resection  H/O breast surgery    SOCIAL HISTORY:  wheelchair bound due to MS                                    Admitted from:  home     Surrogate - daughter Roma    FAMILY HISTORY:  FHx: hyperlipidemia    Baseline ADLs (prior to admission):  somehow got around in her electric wheelchair to get household chores done per daughter Roma     Allergies    Sudafed (Other)    Present Symptoms:     Dyspnea: 0   Nausea/Vomiting: No  Anxiety:  No  Depression: No  Fatigue: Yes   Loss of appetite: Yes     Pain: none currently             Character-            Duration-            Effect-            Factors-            Frequency-            Location-            Severity-    Review of Systems: Reviewed                     Negative: no chest pain           All others negative    MEDICATIONS  (STANDING):  chlorhexidine 4% Liquid 1 Application(s) Topical <User Schedule>  Dakins Solution - 1/2 Strength 1 Application(s) Topical two times a day  enoxaparin Injectable 30 milliGRAM(s) SubCutaneous every 12 hours  latanoprost 0.005% Ophthalmic Solution 1 Drop(s) Both EYES at bedtime  lidocaine 1% Injectable 10 milliLiter(s) Local Injection once  meropenem  IVPB 1000 milliGRAM(s) IV Intermittent every 8 hours  nystatin Cream 1 Application(s) Topical two times a day  pantoprazole  Injectable 40 milliGRAM(s) IV Push every 12 hours  sodium chloride 0.9% lock flush 3 milliLiter(s) IV Push every 8 hours  sodium phosphate IVPB 15 milliMole(s) IV Intermittent once    MEDICATIONS  (PRN):  acetaminophen   Tablet .. 650 milliGRAM(s) Oral every 6 hours PRN Temp greater or equal to 38C (100.4F), Moderate Pain (4 - 6)  oxycodone    5 mG/acetaminophen 325 mG 1 Tablet(s) Oral every 6 hours PRN Moderate Pain (4 - 6)  sodium chloride 0.9% lock flush 10 milliLiter(s) IV Push every 1 hour PRN Pre/post blood products, medications, blood draw, and to maintain line patency    PHYSICAL EXAM:    Vital Signs Last 24 Hrs  T(C): 36.7 (01 Jun 2020 05:59), Max: 36.9 (31 May 2020 17:39)  T(F): 98.1 (01 Jun 2020 05:59), Max: 98.5 (31 May 2020 17:39)  HR: 90 (01 Jun 2020 05:59) (89 - 91)  BP: 125/77 (01 Jun 2020 05:59) (119/79 - 143/85)  BP(mean): --  RR: 18 (01 Jun 2020 05:59) (18 - 18)  SpO2: 100% (01 Jun 2020 05:59) (91% - 100%)    General: alert  oriented x 3. cachectic, bedbound     Karnofsky: 30 %    HEENT: normal      Lungs: comfortable     CV: normal      GI: normal      : campos    MSK: bedbound/wheelchair bound    Skin: no rash    LABS:                        7.8    12.49 )-----------( 512      ( 01 Jun 2020 07:54 )             24.7     06-01    131<L>  |  97<L>  |  34.0<H>  ----------------------------<  86  4.7   |  21.0<L>  |  0.70    Ca    7.1<L>      01 Jun 2020 07:54  Phos  2.2     06-01  Mg     1.8     06-01    TPro  5.3<L>  /  Alb  1.8<L>  /  TBili  <0.2<L>  /  DBili  x   /  AST  36<H>  /  ALT  36<H>  /  AlkPhos  250<H>  06-01    I&O's Summary    31 May 2020 07:01  -  01 Jun 2020 07:00  --------------------------------------------------------  IN: 2280 mL / OUT: 1145 mL / NET: 1135 mL    01 Jun 2020 07:01  -  01 Jun 2020 11:18  --------------------------------------------------------  IN: 0 mL / OUT: 40 mL / NET: -40 mL    RADIOLOGY & ADDITIONAL STUDIES:    < from: CT Chest w/ IV Cont (05.29.20 @ 21:26) >    IMPRESSION:   Interval placement of an esophageal stent. No extravasation of enteric contrast or paraesophageal air to suggest leak.    Moderate left pleural effusion and small right loculated pleural effusion with smaller locules of pleural fluid in the periphery of the right upper lobe and right lower lobe. Small right pneumothorax. Two right-sided chest tubes. Associated compressive atelectasis at the lung bases.    < end of copied text >    < from: Xray Chest 1 View- PORTABLE-Routine (06.01.20 @ 05:55) >    INTERPRETATION:  AP chest on June 1, 2020 4:00 AM. Patient is being followed for right chest tubes. Patient is negative for the COVID BI-RADS 2 date.Patient has history of breast cancer treated with right mastectomy. There is history of bone metastases and pelvic cubitus patient had right chest empyema.    Heart suggest enlargement. Prosthetic aortic valve again noted.    2 right chest tubes remain.    There is a mild peripheral and basilar right pleural reaction associated with adjacent atelectasis/infiltrate.    This is similar to May 30.    On May 30 there is a mild left base effusion showing improvement on present study.    IMPRESSION: Again noted are bilateral pleural findings right greater than left. There is some improvement on the left compared to prior.    < end of copied text >    < from: Xray Kidney Ureter Bladder (05.15.20 @ 13:53) >      COMPARISON:  Chest x-ray of the same day at 3:20 AM    NG tube with the tip in the region of the gastroesophageal junction. Esophageal stent.    No evidence of a bowel obstruction.    Skin staples overlying the midline of the abdomen.    Diffuse osseous metastasis. Compression fracture of L2. Calcified fibroid in the pelvis.    Partially imaged chest tubes on the right.    IMPRESSION:      NG tube with the tip in the region of the gastroesophageal junction and should be advanced.    The findings were discussed with MOLLY Shah at 2:27 PM on 5/15/2020.    < end of copied text >    ADVANCE DIRECTIVES: Full code

## 2020-06-02 LAB
ALBUMIN SERPL ELPH-MCNC: 1.8 G/DL — LOW (ref 3.3–5.2)
ALP SERPL-CCNC: 239 U/L — HIGH (ref 40–120)
ALT FLD-CCNC: 40 U/L — HIGH
ANION GAP SERPL CALC-SCNC: 13 MMOL/L — SIGNIFICANT CHANGE UP (ref 5–17)
AST SERPL-CCNC: 36 U/L — HIGH
BILIRUB SERPL-MCNC: <0.2 MG/DL — LOW (ref 0.4–2)
BUN SERPL-MCNC: 33 MG/DL — HIGH (ref 8–20)
CALCIUM SERPL-MCNC: 7.4 MG/DL — LOW (ref 8.6–10.2)
CHLORIDE SERPL-SCNC: 100 MMOL/L — SIGNIFICANT CHANGE UP (ref 98–107)
CO2 SERPL-SCNC: 19 MMOL/L — LOW (ref 22–29)
CREAT SERPL-MCNC: 0.64 MG/DL — SIGNIFICANT CHANGE UP (ref 0.5–1.3)
CULTURE RESULTS: SIGNIFICANT CHANGE UP
CULTURE RESULTS: SIGNIFICANT CHANGE UP
GLUCOSE SERPL-MCNC: 87 MG/DL — SIGNIFICANT CHANGE UP (ref 70–99)
HCT VFR BLD CALC: 27.4 % — LOW (ref 34.5–45)
HGB BLD-MCNC: 8.8 G/DL — LOW (ref 11.5–15.5)
MAGNESIUM SERPL-MCNC: 2.3 MG/DL — SIGNIFICANT CHANGE UP (ref 1.8–2.6)
MCHC RBC-ENTMCNC: 28.1 PG — SIGNIFICANT CHANGE UP (ref 27–34)
MCHC RBC-ENTMCNC: 32.1 GM/DL — SIGNIFICANT CHANGE UP (ref 32–36)
MCV RBC AUTO: 87.5 FL — SIGNIFICANT CHANGE UP (ref 80–100)
PHOSPHATE SERPL-MCNC: 2.8 MG/DL — SIGNIFICANT CHANGE UP (ref 2.4–4.7)
PLATELET # BLD AUTO: 574 K/UL — HIGH (ref 150–400)
POTASSIUM SERPL-MCNC: 4.7 MMOL/L — SIGNIFICANT CHANGE UP (ref 3.5–5.3)
POTASSIUM SERPL-SCNC: 4.7 MMOL/L — SIGNIFICANT CHANGE UP (ref 3.5–5.3)
PROT SERPL-MCNC: 5.6 G/DL — LOW (ref 6.6–8.7)
RBC # BLD: 3.13 M/UL — LOW (ref 3.8–5.2)
RBC # FLD: 18.6 % — HIGH (ref 10.3–14.5)
SODIUM SERPL-SCNC: 132 MMOL/L — LOW (ref 135–145)
SPECIMEN SOURCE: SIGNIFICANT CHANGE UP
SPECIMEN SOURCE: SIGNIFICANT CHANGE UP
WBC # BLD: 12.69 K/UL — HIGH (ref 3.8–10.5)
WBC # FLD AUTO: 12.69 K/UL — HIGH (ref 3.8–10.5)

## 2020-06-02 PROCEDURE — 99024 POSTOP FOLLOW-UP VISIT: CPT

## 2020-06-02 PROCEDURE — 71045 X-RAY EXAM CHEST 1 VIEW: CPT | Mod: 26

## 2020-06-02 PROCEDURE — 99232 SBSQ HOSP IP/OBS MODERATE 35: CPT

## 2020-06-02 PROCEDURE — 99233 SBSQ HOSP IP/OBS HIGH 50: CPT

## 2020-06-02 RX ORDER — ACETAMINOPHEN 500 MG
1000 TABLET ORAL ONCE
Refills: 0 | Status: COMPLETED | OUTPATIENT
Start: 2020-06-02 | End: 2020-06-02

## 2020-06-02 RX ORDER — MULTIVIT-MIN/FERROUS GLUCONATE 9 MG/15 ML
15 LIQUID (ML) ORAL DAILY
Refills: 0 | Status: DISCONTINUED | OUTPATIENT
Start: 2020-06-02 | End: 2020-06-09

## 2020-06-02 RX ORDER — MAGNESIUM SULFATE 500 MG/ML
2 VIAL (ML) INJECTION ONCE
Refills: 0 | Status: COMPLETED | OUTPATIENT
Start: 2020-06-02 | End: 2020-06-02

## 2020-06-02 RX ORDER — LOPERAMIDE HCL 2 MG
2 TABLET ORAL EVERY 4 HOURS
Refills: 0 | Status: DISCONTINUED | OUTPATIENT
Start: 2020-06-02 | End: 2020-06-09

## 2020-06-02 RX ADMIN — Medication 1 APPLICATION(S): at 05:33

## 2020-06-02 RX ADMIN — LATANOPROST 1 DROP(S): 0.05 SOLUTION/ DROPS OPHTHALMIC; TOPICAL at 21:11

## 2020-06-02 RX ADMIN — ENOXAPARIN SODIUM 30 MILLIGRAM(S): 100 INJECTION SUBCUTANEOUS at 11:27

## 2020-06-02 RX ADMIN — Medication 400 MILLIGRAM(S): at 00:30

## 2020-06-02 RX ADMIN — ENOXAPARIN SODIUM 30 MILLIGRAM(S): 100 INJECTION SUBCUTANEOUS at 21:11

## 2020-06-02 RX ADMIN — MEROPENEM 100 MILLIGRAM(S): 1 INJECTION INTRAVENOUS at 14:21

## 2020-06-02 RX ADMIN — Medication 50 GRAM(S): at 00:55

## 2020-06-02 RX ADMIN — NYSTATIN CREAM 1 APPLICATION(S): 100000 CREAM TOPICAL at 18:38

## 2020-06-02 RX ADMIN — CHLORHEXIDINE GLUCONATE 1 APPLICATION(S): 213 SOLUTION TOPICAL at 05:33

## 2020-06-02 RX ADMIN — Medication 15 MILLILITER(S): at 14:22

## 2020-06-02 RX ADMIN — SODIUM CHLORIDE 3 MILLILITER(S): 9 INJECTION INTRAMUSCULAR; INTRAVENOUS; SUBCUTANEOUS at 21:16

## 2020-06-02 RX ADMIN — PANTOPRAZOLE SODIUM 40 MILLIGRAM(S): 20 TABLET, DELAYED RELEASE ORAL at 05:34

## 2020-06-02 RX ADMIN — SODIUM CHLORIDE 3 MILLILITER(S): 9 INJECTION INTRAMUSCULAR; INTRAVENOUS; SUBCUTANEOUS at 14:18

## 2020-06-02 RX ADMIN — SODIUM CHLORIDE 3 MILLILITER(S): 9 INJECTION INTRAMUSCULAR; INTRAVENOUS; SUBCUTANEOUS at 05:45

## 2020-06-02 RX ADMIN — CHLORHEXIDINE GLUCONATE 1 APPLICATION(S): 213 SOLUTION TOPICAL at 21:00

## 2020-06-02 RX ADMIN — MEROPENEM 100 MILLIGRAM(S): 1 INJECTION INTRAVENOUS at 21:11

## 2020-06-02 RX ADMIN — PANTOPRAZOLE SODIUM 40 MILLIGRAM(S): 20 TABLET, DELAYED RELEASE ORAL at 18:38

## 2020-06-02 RX ADMIN — Medication 1 APPLICATION(S): at 09:36

## 2020-06-02 RX ADMIN — NYSTATIN CREAM 1 APPLICATION(S): 100000 CREAM TOPICAL at 05:34

## 2020-06-02 RX ADMIN — MEROPENEM 100 MILLIGRAM(S): 1 INJECTION INTRAVENOUS at 05:34

## 2020-06-02 NOTE — PROGRESS NOTE ADULT - SUBJECTIVE AND OBJECTIVE BOX
[facility]  INFECTIOUS DISEASES AND INTERNAL MEDICINE at [place]  =======================================================  Phong Wang MD  Diplomates American Board of Internal Medicine and Infectious Diseases  Telephone [phone]  Fax            [phone]  =======================================================    [id number]  GEORGE STANLYE   follow up: empyema     s/p EGD and esophageal stent; s/p VATS 5/14  s/p gastrojejunal tube on 5/15/2020  s/p debridement of sacral decubitus    still with Chylous drainage from CT tube      =======================================================  REVIEW OF SYSTEMS:  CONSTITUTIONAL:  No Fever or chills  HEENT:  No diplopia or blurred vision.  No earache, sore throat or runny nose.  CARDIOVASCULAR:  No pressure, squeezing, strangling, tightness, heaviness or aching about the chest, neck, axilla or epigastrium.  RESPIRATORY:  MILD shortness of breath  GASTROINTESTINAL:  No nausea, vomiting or diarrhea.  GENITOURINARY:  No dysuria, frequency or urgency. No Blood in urine  MUSCULOSKELETAL:  no joint aches, no muscle pain  SKIN:  No change in skin, hair or nails.  NEUROLOGIC:  No Headaches, seizures or weakness.  PSYCHIATRIC:  No disorder of thought or mood.  ENDOCRINE:  No heat or cold intolerance  HEMATOLOGICAL:  No easy bruising or bleeding.   =======================================================  Allergies  Sudafed (Other)    ======================================================  Physical Exam:  ============   (see vitals section below)    General:  No acute distress. FRAIL  Eye: Pupils are equal, round and reactive to light, Extraocular movements are intact, Normal conjunctiva.  HENT: Normocephalic, Oral mucosa is moist, No pharyngeal erythema, No sinus tenderness.  Neck: Supple, No lymphadenopathy.  Respiratory: Lungs  with diminished air entry at bases  1st chest tube to water seal  2nd chest tube connected to SOURAV drain.  TURBID cream COLOR FLUID  Cardiovascular: Normal rate, Regular rhythm  Gastrointestinal: Soft, Non-tender, Non-distended, Normal bowel sounds.  PEJ tube present in abd  Genitourinary:  no dysuria  Lymphatics: No lymphadenopathy neck,   Musculoskeletal: Normal range of motion, Normal strength.   decubitus ulcer on right hip   Neurologic: Alert, Oriented, No focal deficits, Cranial Nerves II-XII are grossly intact.  Psychiatric: Appropriate mood & affect.    =======================================================  Vitals:  ============  T(F): 98 (02 Jun 2020 12:00), Max: 98.5 (01 Jun 2020 23:09)  HR: 90 (02 Jun 2020 12:00)  BP: 116/75 (02 Jun 2020 12:00)  RR: 16 (02 Jun 2020 12:00)  SpO2: 98% (02 Jun 2020 12:00) (95% - 98%)  temp max in last 48H T(F): , Max: 98.5 (05-31-20 @ 17:39)    =======================================================  Current Antibiotics:  meropenem  IVPB 1000 milliGRAM(s) IV Intermittent every 8 hours    Other medications:  chlorhexidine 4% Liquid 1 Application(s) Topical <User Schedule>  Dakins Solution - 1/2 Strength 1 Application(s) Topical two times a day  enoxaparin Injectable 30 milliGRAM(s) SubCutaneous every 12 hours  latanoprost 0.005% Ophthalmic Solution 1 Drop(s) Both EYES at bedtime  lidocaine 1% Injectable 10 milliLiter(s) Local Injection once  multivitamin/minerals/iron Oral Solution (CENTRUM) 15 milliLiter(s) Oral daily  nystatin Cream 1 Application(s) Topical two times a day  pantoprazole  Injectable 40 milliGRAM(s) IV Push every 12 hours  sodium chloride 0.9% lock flush 3 milliLiter(s) IV Push every 8 hours      =======================================================  Labs:                        8.8    12.69 )-----------( 574      ( 02 Jun 2020 06:34 )             27.4   WBC Count: 12.69 K/uL (06-02-20 @ 06:34)  WBC Count: 12.49 K/uL (06-01-20 @ 07:54)  WBC Count: 12.02 K/uL (05-30-20 @ 19:57)       06-02    132<L>  |  100  |  33.0<H>  ----------------------------<  87  4.7   |  19.0<L>  |  0.64    Ca    7.4<L>      02 Jun 2020 06:34  Phos  2.8     06-02  Mg     2.3     06-02    TPro  5.6<L>  /  Alb  1.8<L>  /  TBili  <0.2<L>  /  DBili  x   /  AST  36<H>  /  ALT  40<H>  /  AlkPhos  239<H>  06-02      Culture - Blood (collected 05-29-20 @ 11:31)  Source: .Blood Blood    Culture - Blood (collected 05-29-20 @ 11:31)  Source: .Blood Blood    Culture - Blood (collected 05-28-20 @ 19:56)  Source: .Blood Blood-Peripheral    Culture - Blood (collected 05-28-20 @ 19:56)  Source: .Blood Blood-Peripheral    Culture - Blood (collected 05-26-20 @ 14:22)  Source: .Blood Blood-Peripheral  Final Report (05-31-20 @ 15:00):    No growth at 5 days.      Creatinine, Serum: 0.64 mg/dL (06-02-20 @ 06:34)  Creatinine, Serum: 0.70 mg/dL (06-01-20 @ 07:54)  Creatinine, Serum: 0.77 mg/dL (05-30-20 @ 19:57)    Ferritin, Serum: 808 ng/mL (05-09-20 @ 15:27)      WBC Count: 12.69 K/uL (06-02-20 @ 06:34)  WBC Count: 12.49 K/uL (06-01-20 @ 07:54)  WBC Count: 12.02 K/uL (05-30-20 @ 19:57)      COVID-19 PCR: NotDetec (05-12-20 @ 00:00)      Alkaline Phosphatase, Serum: 239 U/L (06-02-20 @ 06:34)  Alkaline Phosphatase, Serum: 250 U/L (06-01-20 @ 07:54)  Alkaline Phosphatase, Serum: 314 U/L (05-30-20 @ 19:57)  Alanine Aminotransferase (ALT/SGPT): 40 U/L (06-02-20 @ 06:34)  Alanine Aminotransferase (ALT/SGPT): 36 U/L (06-01-20 @ 07:54)  Alanine Aminotransferase (ALT/SGPT): 36 U/L (05-30-20 @ 19:57)  Aspartate Aminotransferase (AST/SGOT): 36 U/L (06-02-20 @ 06:34)  Aspartate Aminotransferase (AST/SGOT): 36 U/L (06-01-20 @ 07:54)  Aspartate Aminotransferase (AST/SGOT): 34 U/L (05-30-20 @ 19:57)  Bilirubin Total, Serum: <0.2 mg/dL (06-02-20 @ 06:34)  Bilirubin Total, Serum: <0.2 mg/dL (06-01-20 @ 07:54)  Bilirubin Total, Serum: <0.2 mg/dL (05-30-20 @ 19:57)

## 2020-06-02 NOTE — PROGRESS NOTE ADULT - PROBLEM SELECTOR PLAN 2
S/p Right thoracotomy / washout / VATS decort 5/13/20.  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  Right Chest tube to waterseal.  Follow up CXR   Pleural fluid + for yeast, coag Negative Staph, Klebsiella, and strep mitis/oralis. Continue IV antibiotics per ID.   Vancomycin and Caspofungin completed 5/26/20 and Zosyn to be completed 6/9/20.  PICC line to be placed closer to time of discharge if needed  Blood cultures negative 5/8 and negative to date from 5/28.

## 2020-06-02 NOTE — PROGRESS NOTE ADULT - SUBJECTIVE AND OBJECTIVE BOX
Subjective: Patient lying in bed, no acute distress noted, stated "feeling okay". Patient denies fever, chills, chest pain, palpitation, dizziness, headache, shortness of breath, abdominal pain, N/V/D.    V/S  T(C): 36.9 (06-01-20 @ 23:09), Max: 36.9 (06-01-20 @ 23:09)  HR: 98 (06-01-20 @ 23:09) (90 - 98)  BP: 104/71 (06-01-20 @ 23:09) (102/70 - 125/77)  RR: 20 (06-01-20 @ 23:09) (18 - 20)  SpO2: 96% (06-01-20 @ 23:09) (96% - 100%) on room air                                             Tele: Sinus rhythm     CHEST TUBE: Right chest tube to water seal.  Right SOURAV to bulb suction     OUTPUT:             per 24 hours     Drainage:    AIR LEAKS:  [x] YES. minimal air leak     MEDICATIONS  (STANDING):  chlorhexidine 4% Liquid 1 Application(s) Topical <User Schedule>  Dakins Solution - 1/2 Strength 1 Application(s) Topical two times a day  enoxaparin Injectable 30 milliGRAM(s) SubCutaneous every 12 hours  latanoprost 0.005% Ophthalmic Solution 1 Drop(s) Both EYES at bedtime  lidocaine 1% Injectable 10 milliLiter(s) Local Injection once  meropenem  IVPB 1000 milliGRAM(s) IV Intermittent every 8 hours  nystatin Cream 1 Application(s) Topical two times a day  pantoprazole  Injectable 40 milliGRAM(s) IV Push every 12 hours  sodium chloride 0.9% lock flush 3 milliLiter(s) IV Push every 8 hours      06-01    131<L>  |  97<L>  |  34.0<H>  ----------------------------<  86  4.7   |  21.0<L>  |  0.70    Ca    7.1<L>      01 Jun 2020 07:54  Phos  2.2     06-01  Mg     1.8     06-01    TPro  5.3<L>  /  Alb  1.8<L>  /  TBili  <0.2<L>  /  DBili  x   /  AST  36<H>  /  ALT  36<H>  /  AlkPhos  250<H>  06-01                               7.8    12.49 )-----------( 512      ( 01 Jun 2020 07:54 )             24.7                 CAPILLARY BLOOD GLUCOSE               CXR:  < from: Xray Chest 1 View- PORTABLE-Routine (06.01.20 @ 05:55) >    INTERPRETATION:  AP chest on June 1, 2020 4:00 AM. Patient is being followed for right chest tubes. Patient is negative for the COVID BI-RADS 2 date.Patient has history of breast cancer treated with right mastectomy. There is history of bone metastases and pelvic cubitus patient had right chest empyema.    Heart suggest enlargement. Prosthetic aortic valve again noted.    2 right chest tubes remain.    There is a mild peripheral and basilar right pleural reaction associated with adjacent atelectasis/infiltrate.    This is similar to May 30.    On May 30 there is a mild left base effusion showing improvement on present study.    IMPRESSION: Again noted are bilateral pleural findings right greater than left. There is some improvement on the left compared to prior.      < end of copied text >      Physical Exam:  General: NAD  Neurology: Awake, nonfocal  Eyes: PERRLA/ EOMI  Neck: Neck supple, trachea midline, No JVD  Respiratory: Decreased BS at Right base.   CV: S1S2, no murmurs, rubs or gallops  Abdominal: Soft, NT, ND. GJ tube: J port to feeding, G tube capped, site dry and intact.   Incisions: Right chest wall staples intact,   Tubes: CT to WS< +air leak  Dl to bulb, clear output        PAST MEDICAL & SURGICAL HISTORY:  Breast cancer metastasized to bone  Hypertension  Multiple sclerosis  S/P mastectomy, right  Breast CA  Osteoporosis  MS (mitral stenosis)  History of bowel resection  H/O breast surgery

## 2020-06-02 NOTE — PROGRESS NOTE ADULT - ASSESSMENT
70F with MS (wheelchair bound), metastatic breast cancer to pelvis, thoracic, and lumbar spine, transferred from [facility] with esophageal perforation, empyema s/p esophageal stent, VATs (5/13), Empyema with polymicrobial infection, Strep mitis infection, Klebsiella oxytoca infection, CoNS infection, sacral decubitus ulcer, clinically osteomyelitis per ID.     #1 Metastatic breast cancer - to bone - thoracic/lumbar and pelvis. per daughter, had been on Kisquali (ribociclib) oral tablets per daughter with Dr. Eddie Fairbanks. I left a message for call back from Dr. Fairbanks - to discuss her chronic infections, poor functional status and if it is even feasible to restart oral chemotherapy. Poor prognosis given spread to bone.   #2 Esophageal perforation - s/p stent with CT sugery on 5/13. was not a candidate for reconstructive surgery. also s/p gastrojejunostomy tube for feeds. still tolerates some intake by mouth.   #3 Empyema - s/p VATS with right thoracic cavity washout and pulmonary decortication with right posterior, right medial chest tubes. polymicrobial infection with Strep mitis infection, Klebsiella oxytoca infection, and CoNS, as well as yeast, s/p vancomycin and caspofungin (completed 5/26), on meropenem to be completed 6/9.   #4 sacral decubitus - clinically osteomyelitis per ID. will not heal, patient is bedbound. wound care.   #5 Encounter for palliative care - spoke with patient with MEENU Soto regarding overall condition and goals. I addressed condition given advanced cancer, and acute issues being such that if she were to decompensate, I am unsure heroic measures such as CPR or mechanical ventilation would be therapeutic to her. She stated she needs time to process everything and discuss with her daughter Roma before making a final decision.

## 2020-06-02 NOTE — PROGRESS NOTE ADULT - SUBJECTIVE AND OBJECTIVE BOX
OVERNIGHT EVENTS: upgraded diet     Present Symptoms:     Dyspnea: 0 1 2 3   Nausea/Vomiting: Yes No  Anxiety:  Yes No  Depression: Yes No  Fatigue: Yes No  Loss of appetite: Yes No    Pain:             Character-            Duration-            Effect-            Factors-            Frequency-            Location-            Severity-    Review of Systems: Reviewed                     Negative:                     Positive:  Unable to obtain due to poor mentation   All others negative    MEDICATIONS  (STANDING):  chlorhexidine 4% Liquid 1 Application(s) Topical <User Schedule>  Dakins Solution - 1/2 Strength 1 Application(s) Topical two times a day  enoxaparin Injectable 30 milliGRAM(s) SubCutaneous every 12 hours  latanoprost 0.005% Ophthalmic Solution 1 Drop(s) Both EYES at bedtime  lidocaine 1% Injectable 10 milliLiter(s) Local Injection once  meropenem  IVPB 1000 milliGRAM(s) IV Intermittent every 8 hours  multivitamin/minerals/iron Oral Solution (CENTRUM) 15 milliLiter(s) Oral daily  nystatin Cream 1 Application(s) Topical two times a day  pantoprazole  Injectable 40 milliGRAM(s) IV Push every 12 hours  sodium chloride 0.9% lock flush 3 milliLiter(s) IV Push every 8 hours    MEDICATIONS  (PRN):  acetaminophen   Tablet .. 650 milliGRAM(s) Oral every 6 hours PRN Temp greater or equal to 38C (100.4F), Moderate Pain (4 - 6)  loperamide 2 milliGRAM(s) Oral every 4 hours PRN Diarrhea  oxycodone    5 mG/acetaminophen 325 mG 1 Tablet(s) Oral every 6 hours PRN Moderate Pain (4 - 6)  sodium chloride 0.9% lock flush 10 milliLiter(s) IV Push every 1 hour PRN Pre/post blood products, medications, blood draw, and to maintain line patency    PHYSICAL EXAM:    Vital Signs Last 24 Hrs  T(C): 36.7 (02 Jun 2020 12:00), Max: 36.9 (01 Jun 2020 23:09)  T(F): 98 (02 Jun 2020 12:00), Max: 98.5 (01 Jun 2020 23:09)  HR: 90 (02 Jun 2020 12:00) (86 - 98)  BP: 116/75 (02 Jun 2020 12:00) (102/70 - 116/75)  BP(mean): --  RR: 16 (02 Jun 2020 12:00) (16 - 20)  SpO2: 98% (02 Jun 2020 12:00) (95% - 98%)    General: alert  oriented x ____ lethargic agitated                  cachexia  nonverbal  coma    Karnofsky:  %    HEENT: normal  dry mouth  ET tube/trach    Lungs: comfortable tachypnea/labored breathing  excessive secretions    CV: normal  tachycardia    GI: normal  distended  tender  no BS               PEG/NG/OG tube  constipation  last BM:     : normal  incontinent  oliguria/anuria  campos    MSK: normal  weakness  edema             ambulatory  bedbound/wheelchair bound    Skin: normal  pressure ulcers- Stage_____  no rash    LABS:                      8.8    12.69 )-----------( 574      ( 02 Jun 2020 06:34 )             27.4     06-02    132<L>  |  100  |  33.0<H>  ----------------------------<  87  4.7   |  19.0<L>  |  0.64    Ca    7.4<L>      02 Jun 2020 06:34  Phos  2.8     06-02  Mg     2.3     06-02    TPro  5.6<L>  /  Alb  1.8<L>  /  TBili  <0.2<L>  /  DBili  x   /  AST  36<H>  /  ALT  40<H>  /  AlkPhos  239<H>  06-02    I&O's Summary    01 Jun 2020 07:01  -  02 Jun 2020 07:00  --------------------------------------------------------  IN: 1240 mL / OUT: 645 mL / NET: 595 mL        RADIOLOGY & ADDITIONAL STUDIES:    ADVANCE DIRECTIVES:   DNR YES NO  Completed on:                     MOLST  YES NO   Completed on:  Living Will  YES NO   Completed on: OVERNIGHT EVENTS: upgraded diet     Present Symptoms:     Dyspnea: 0  Nausea/Vomiting: No  Anxiety:  No  Depression: No  Fatigue: No  Loss of appetite: No    Pain: none             Character-            Duration-            Effect-            Factors-            Frequency-            Location-            Severity-    Review of Systems: Reviewed                     Negative: no pain                 All others negative    MEDICATIONS  (STANDING):  chlorhexidine 4% Liquid 1 Application(s) Topical <User Schedule>  Dakins Solution - 1/2 Strength 1 Application(s) Topical two times a day  enoxaparin Injectable 30 milliGRAM(s) SubCutaneous every 12 hours  latanoprost 0.005% Ophthalmic Solution 1 Drop(s) Both EYES at bedtime  lidocaine 1% Injectable 10 milliLiter(s) Local Injection once  meropenem  IVPB 1000 milliGRAM(s) IV Intermittent every 8 hours  multivitamin/minerals/iron Oral Solution (CENTRUM) 15 milliLiter(s) Oral daily  nystatin Cream 1 Application(s) Topical two times a day  pantoprazole  Injectable 40 milliGRAM(s) IV Push every 12 hours  sodium chloride 0.9% lock flush 3 milliLiter(s) IV Push every 8 hours    MEDICATIONS  (PRN):  acetaminophen   Tablet .. 650 milliGRAM(s) Oral every 6 hours PRN Temp greater or equal to 38C (100.4F), Moderate Pain (4 - 6)  loperamide 2 milliGRAM(s) Oral every 4 hours PRN Diarrhea  oxycodone    5 mG/acetaminophen 325 mG 1 Tablet(s) Oral every 6 hours PRN Moderate Pain (4 - 6)  sodium chloride 0.9% lock flush 10 milliLiter(s) IV Push every 1 hour PRN Pre/post blood products, medications, blood draw, and to maintain line patency    PHYSICAL EXAM:    Vital Signs Last 24 Hrs  T(C): 36.7 (02 Jun 2020 12:00), Max: 36.9 (01 Jun 2020 23:09)  T(F): 98 (02 Jun 2020 12:00), Max: 98.5 (01 Jun 2020 23:09)  HR: 90 (02 Jun 2020 12:00) (86 - 98)  BP: 116/75 (02 Jun 2020 12:00) (102/70 - 116/75)  BP(mean): --  RR: 16 (02 Jun 2020 12:00) (16 - 20)  SpO2: 98% (02 Jun 2020 12:00) (95% - 98%)    General: alert  oriented x 4     Karnofsky:  30 %    HEENT: normal      Lungs: comfortable     CV: normal      GI: normal      : beth     MSK: bedbound/wheelchair bound    Skin: no rash    LABS:                      8.8    12.69 )-----------( 574      ( 02 Jun 2020 06:34 )             27.4     06-02    132<L>  |  100  |  33.0<H>  ----------------------------<  87  4.7   |  19.0<L>  |  0.64    Ca    7.4<L>      02 Jun 2020 06:34  Phos  2.8     06-02  Mg     2.3     06-02    TPro  5.6<L>  /  Alb  1.8<L>  /  TBili  <0.2<L>  /  DBili  x   /  AST  36<H>  /  ALT  40<H>  /  AlkPhos  239<H>  06-02    I&O's Summary    01 Jun 2020 07:01  -  02 Jun 2020 07:00  --------------------------------------------------------  IN: 1240 mL / OUT: 645 mL / NET: 595 mL    RADIOLOGY & ADDITIONAL STUDIES:    ADVANCE DIRECTIVES: full code

## 2020-06-02 NOTE — PROGRESS NOTE ADULT - PROBLEM SELECTOR PLAN 1
S/p esophageal stent placed 5/13/20 with NGT placed.   S/p GJ tube placement 5/15 (open procedure).  G port capped(5/27), slow feeds  through the J tube @ 50ml/hr and to maintain rate for now per Dr. Murcia.  Encourage deep breathing, chest PT, incentive spirometry.   Right chest to waterseal, SOURAV to bulb suction  Labs and CXR every other day as per Dr. Murcia (next 6/3)  Palliative care consult in progress

## 2020-06-02 NOTE — CHART NOTE - NSCHARTNOTEFT_GEN_A_CORE
Source: Patient [ ]  Family [ ]   other [x ] EMR and PA     Enteral /Parenteral Nutrition: Diet, Full Liquid:   Tube Feeding Modality: Jejunostomy  Pivot 1.5 Derek  Total Volume for 24 Hours (mL): 1200  Continuous  Starting Tube Feed Rate {mL per Hour}: 50  Until Goal Tube Feed Rate (mL per Hour): 50  Tube Feed Duration (in Hours): 24  Tube Feed Start Time: 13:00 (05-25-20 @ 12:55)      Current Weight:   (5/28) 99 lbs  (5/26) 155 lbs  (5/23) 161 lbs  (5/21) 165 lbs    % Weight Change : (66# wt change over 1 week, Unsure of accuracy of wt's secondary to inconsistency, will continue to monitor wt's for trends) (1+ generalized edema noted)    Pertinent Medications: MEDICATIONS  (STANDING):  chlorhexidine 4% Liquid 1 Application(s) Topical <User Schedule>  Dakins Solution - 1/2 Strength 1 Application(s) Topical two times a day  enoxaparin Injectable 30 milliGRAM(s) SubCutaneous every 12 hours  latanoprost 0.005% Ophthalmic Solution 1 Drop(s) Both EYES at bedtime  lidocaine 1% Injectable 10 milliLiter(s) Local Injection once  meropenem  IVPB 1000 milliGRAM(s) IV Intermittent every 8 hours  multivitamin/minerals/iron Oral Solution (CENTRUM) 15 milliLiter(s) Oral daily  nystatin Cream 1 Application(s) Topical two times a day  pantoprazole  Injectable 40 milliGRAM(s) IV Push every 12 hours  sodium chloride 0.9% lock flush 3 milliLiter(s) IV Push every 8 hours    MEDICATIONS  (PRN):  acetaminophen   Tablet .. 650 milliGRAM(s) Oral every 6 hours PRN Temp greater or equal to 38C (100.4F), Moderate Pain (4 - 6)  loperamide 2 milliGRAM(s) Oral every 4 hours PRN Diarrhea  oxycodone    5 mG/acetaminophen 325 mG 1 Tablet(s) Oral every 6 hours PRN Moderate Pain (4 - 6)  sodium chloride 0.9% lock flush 10 milliLiter(s) IV Push every 1 hour PRN Pre/post blood products, medications, blood draw, and to maintain line patency    Pertinent Labs: CBC Full  -  ( 02 Jun 2020 06:34 )  WBC Count : 12.69 K/uL  RBC Count : 3.13 M/uL  Hemoglobin : 8.8 g/dL  Hematocrit : 27.4 %  Platelet Count - Automated : 574 K/uL  Mean Cell Volume : 87.5 fl  Mean Cell Hemoglobin : 28.1 pg  Mean Cell Hemoglobin Concentration : 32.1 gm/dL  Auto Neutrophil # : x  Auto Lymphocyte # : x  Auto Monocyte # : x  Auto Eosinophil # : x  Auto Basophil # : x  Auto Neutrophil % : x  Auto Lymphocyte % : x  Auto Monocyte % : x  Auto Eosinophil % : x  Auto Basophil % : x        Skin: unstageable right ischium, R heel unstageable, L shin unstageable, R knee unstageable        Nutrition focused physical exam Previously conducted - found signs of malnutrition [ ]absent [x ]present    Subcutaneous fat loss: [x ] Orbital fat pads region, [ ]Buccal fat region, [ ]Triceps region,  [ ]Ribs region    Muscle wasting: [x ]Temples region, [ ]Clavicle region, [x ]Shoulder region, [ ]Scapula region, [ ]Interosseous region,  [ ]thigh region, [ ]Calf region    Estimated Needs:   [x ] no change since previous assessment  [ ] recalculated:     Current Nutrition Diagnosis:  Pt remains at high nutrition risk secondary to severe protein calorie malnutrition (acute on chronic) related to inability to meet increased protein energy needs in setting of metastatic breast ca, esophageal perforation, +NGT, s/p GJ tube placement and skin breakdown as evidenced by pt previously meeting <50% estimated energy requirements >5 days, moderate muscle wasting, and 11% wt loss. Pt currently receiving enteral feeds of Pivot @ 50ml/hr x20 hrs daily- provides 1000ml, 1500kcal, 94g protein, 759ml free water, + taking a full liquid diet fairly well. Aware pt now with loose stool, Imodium ordered. [facility] PA[facility]. Calorie count to be initiated. Recommendations below:     Recommendations:   1. RX: MVI and Vit. C daily   2. Obtain new wt as feasible   3. Consider changing enteral feeds to Jevity @ 50ml/hr (x20 hours) 1000ml/day; 1500kcal, 64gm protein to help bulk stool   4. advance diet to Mech soft with thin liquids (Results and Recommendations to follow)       Monitoring and Evaluation:   [x ] PO intake [x ] Tolerance to diet prescription [X] Weights  [X] Follow up per protocol [X] Labs:

## 2020-06-02 NOTE — CHART NOTE - NSCHARTNOTEFT_GEN_A_CORE
pt POD 20 s/p R chest washout/decortication and esophageal stent placement. R CT to pleuravac and vaibhav drain to SOURAV bulb remain in place. will stay for now. no air leak noted, + tidaling, chyle like drainage noted. Palliative consult appreciated yesterday. per Dr. Murcia, ok to advance diet to mechanical soft with thins. will ask nutrition to start calorie count and continue supplemental tube feeds via J tube for now. no need for low fat diet or octreotide at this time since drainage remains low and likely mixed empyema and chyle leak. pt can take small pills PO, however large pills should be crushed and given via G port. added centrum liquids vitamin and imodium today.   fluctuating H/H with +occult. improved today, cont to monitor, Fe in centrum.      d/w Dr. Murcia

## 2020-06-02 NOTE — PROGRESS NOTE ADULT - ASSESSMENT
69 year old non-ambulatory Female with a PMHx significant for multiple sclerosis, Breast Cancer s/p R mastectomy, Osteoporosis, Mitral stenosis, right ankle fracture, chronic right sided sacral ulcer, chronic midline back pain since being dropped from a jame lift (7/17/29), admitted to [place] after presenting with sepsis in the setting of a UTI with chronic campos use and known large right ischial decubitus ulcer. Patient found to have a Moderately large Right hydropneumothorax resulting in partial right lung collapse and slight cardiomediastinal shift to the left on CXR with chest tube placed 5/8/20. Large bore chest tube placed 5/10/20 for persistent Right pneumothorax. Patient was followed by ID and was given Vanco and Zosyn originally for her presumed urosepsis, Caspofungin was added after pleural fluid was + for fungal elements. CT chest confirmed +Empyema in the Right pleural space. Patient ultimately transferred to [facility] late 5/11/20 after she was found to have a distal esophageal perforation on CT chest and Esophogram.     5/13/20 patient underwent EGD with esophageal stent placed, NGT placed, VATS with right thoracic cavity washout and pulmonary decortication with Dl drain, right posterior, and right medial chest tubes placed with Dr. Murcia.  On 5/14 patient was taken to IR for unsuccessful G/J tube placement. 5/15 patient was taken to the OR for feeding tube placement with Dr. Murcia.  The G tube is to gravity drainage, and slow feeds are advancing through the J tube. S/P esophagram 5/18 which showed narrowing distal stented area. Tolerating tube feeds.   5/27: G port clamped. Right Chest tube 31 to waterseal, Right CT #2 d/c.   5/29 Ct Chest with no extrav., mod L effusion, small loculated R.

## 2020-06-02 NOTE — GOALS OF CARE CONVERSATION - ADVANCED CARE PLANNING - CONVERSATION DETAILS
Spoke to pt about her wishes regarding resuscitation. She states that she does want CPR and intubation initiated but she would not want to be kept alive on machines. Pt was encouraged to have discussion with her HCP idicatind what her wishes would be.      Palliative care Social Work note. 6/1  SW met with patient to provide supportive counseling in coping with dx of catastrophic illness. patient actively engaged in discussion regarding difficulties at home and dealing with cancer over 2 years. Patient reports increased issues with swallowing and was unable to take chemotherapy but remains hopeful that she can crush further medications for future treatment. patient lives with son and dgt lives near by. patient reports having a lot of difficulty getting self out of bed at home and managing has become increasingly difficult. SW explored ACS services with patient and palliative care following.    Palliative Care Social work note 6/2. SW met with patient with Dr Doe to address symptom management as well as understanding of complexities of illness and prognosis. Questions clarified and support offered. End of life decisions discussed with regards to directives. patient verbalized wanting to think about discussion and no decision made at this point. Palliative care to follow.

## 2020-06-02 NOTE — PROGRESS NOTE ADULT - PROBLEM SELECTOR PLAN 7
H/H trending downward.  Type and cross with +antibodies.   1unit PRBC transfused 5/25.  FOBT positive yesterday  Will trend CBC, transfuse as needed  Will continue Protonix IV Q12H  Will consider GI consult if needed  Continue to monitor for S/S of acute blood loss.

## 2020-06-02 NOTE — PROGRESS NOTE ADULT - ASSESSMENT
HPI: This 69 year old non-ambulatory Female with a PMHx significant for multiple sclerosis, Breast Cancer s/p R mastectomy, Osteoporosis, Mitral stenosis, right ankle fracture, chronic right sided sacral ulcer, chronic midline back pain since being dropped from a jame lift (7/17/29), admitted to [place] after presenting with sepsis in the setting of a UTI with chronic campos use and known large right ischial decubitus ulcer. Patient found to have a Moderately large Right hydropneumothorax resulting in partial right lung collapse and slight cardiomediastinal shift to the left on CXR with chest tube placed 5/8/20. Large bore chest tube placed 5/10/20 for persistent Right pneumothorax. Patient was followed by ID and was given Vanco and Zosyn originally for her presumed urosepsis, Caspofungin was added after pleural fluid was + for fungal elements. CT chest confirmed +Empyema in the Right pleural space. Patient ultimately transferred to [facility] late 5/11/20 after she was found to have a distal esophageal perforation on CT chest and Esophogram.     Of note, patient admitted 10/9/2019 for lower back and bilateral knee pain, found to have compression fracture of L2 with imaging subsequently found to have multiple lytic lesions on the spine and pelvis.  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 Matthew Fairbanks. (12 May 2020 02:24)    patient is admitted to surgical service, pre-operatively planned for an esophageal stent 5/13.  Patient's labs/ cultures from [facility] reviewed.     Empyema with polymicrobial infection:  Strep mitis infection  Klebsiella oxytoca infection  CoNS infection  Perforated esophagus  Fevers    Plan:  s/p esophageal stent  and VATS 5/13  s/p PEJ on 5/15    YEAST from fluid culture being worked up  NOT Candida auris; specimen sent to [facility] labs      COMPLETED CASPO/ VANCO         - Continue MERREM   original end date was  6/9/2020;    BLOOD CX sent 5/28; REPEAT blood cx 5/29  WILL MAINTAIN same end date of 6/9/2020    - continue Chest tubes    - regarding DECUBITUS Ulcer, present prior to admission in this bed-ridden patient, CLINICAL osteomyelitis  - continue wound care even after course of Antibiotics   - CONSIDER surgical evaluation for DIVERTING COLOSTOMY  - osteomyelitis with little chance of improvement if FECAL contamination persists

## 2020-06-03 LAB
ALBUMIN SERPL ELPH-MCNC: 1.6 G/DL — LOW (ref 3.3–5.2)
ALP SERPL-CCNC: 182 U/L — HIGH (ref 40–120)
ALT FLD-CCNC: 32 U/L — SIGNIFICANT CHANGE UP
ANION GAP SERPL CALC-SCNC: 9 MMOL/L — SIGNIFICANT CHANGE UP (ref 5–17)
AST SERPL-CCNC: 30 U/L — SIGNIFICANT CHANGE UP
BILIRUB SERPL-MCNC: <0.2 MG/DL — LOW (ref 0.4–2)
BUN SERPL-MCNC: 28 MG/DL — HIGH (ref 8–20)
CALCIUM SERPL-MCNC: 6.6 MG/DL — LOW (ref 8.6–10.2)
CHLORIDE SERPL-SCNC: 107 MMOL/L — SIGNIFICANT CHANGE UP (ref 98–107)
CO2 SERPL-SCNC: 19 MMOL/L — LOW (ref 22–29)
CREAT SERPL-MCNC: 0.61 MG/DL — SIGNIFICANT CHANGE UP (ref 0.5–1.3)
CULTURE RESULTS: SIGNIFICANT CHANGE UP
CULTURE RESULTS: SIGNIFICANT CHANGE UP
GLUCOSE SERPL-MCNC: 96 MG/DL — SIGNIFICANT CHANGE UP (ref 70–99)
HCT VFR BLD CALC: 24.5 % — LOW (ref 34.5–45)
HGB BLD-MCNC: 7.9 G/DL — LOW (ref 11.5–15.5)
MCHC RBC-ENTMCNC: 28.3 PG — SIGNIFICANT CHANGE UP (ref 27–34)
MCHC RBC-ENTMCNC: 32.2 GM/DL — SIGNIFICANT CHANGE UP (ref 32–36)
MCV RBC AUTO: 87.8 FL — SIGNIFICANT CHANGE UP (ref 80–100)
PLATELET # BLD AUTO: 633 K/UL — HIGH (ref 150–400)
POTASSIUM SERPL-MCNC: 4.2 MMOL/L — SIGNIFICANT CHANGE UP (ref 3.5–5.3)
POTASSIUM SERPL-SCNC: 4.2 MMOL/L — SIGNIFICANT CHANGE UP (ref 3.5–5.3)
PROT SERPL-MCNC: 5 G/DL — LOW (ref 6.6–8.7)
RBC # BLD: 2.79 M/UL — LOW (ref 3.8–5.2)
RBC # FLD: 18.6 % — HIGH (ref 10.3–14.5)
SODIUM SERPL-SCNC: 135 MMOL/L — SIGNIFICANT CHANGE UP (ref 135–145)
SPECIMEN SOURCE: SIGNIFICANT CHANGE UP
SPECIMEN SOURCE: SIGNIFICANT CHANGE UP
WBC # BLD: 12.5 K/UL — HIGH (ref 3.8–10.5)
WBC # FLD AUTO: 12.5 K/UL — HIGH (ref 3.8–10.5)

## 2020-06-03 PROCEDURE — 99232 SBSQ HOSP IP/OBS MODERATE 35: CPT

## 2020-06-03 PROCEDURE — 99233 SBSQ HOSP IP/OBS HIGH 50: CPT

## 2020-06-03 RX ADMIN — PANTOPRAZOLE SODIUM 40 MILLIGRAM(S): 20 TABLET, DELAYED RELEASE ORAL at 17:42

## 2020-06-03 RX ADMIN — MEROPENEM 100 MILLIGRAM(S): 1 INJECTION INTRAVENOUS at 06:09

## 2020-06-03 RX ADMIN — LATANOPROST 1 DROP(S): 0.05 SOLUTION/ DROPS OPHTHALMIC; TOPICAL at 22:55

## 2020-06-03 RX ADMIN — Medication 1 APPLICATION(S): at 17:42

## 2020-06-03 RX ADMIN — MEROPENEM 100 MILLIGRAM(S): 1 INJECTION INTRAVENOUS at 14:29

## 2020-06-03 RX ADMIN — Medication 15 MILLILITER(S): at 12:51

## 2020-06-03 RX ADMIN — PANTOPRAZOLE SODIUM 40 MILLIGRAM(S): 20 TABLET, DELAYED RELEASE ORAL at 06:09

## 2020-06-03 RX ADMIN — MEROPENEM 100 MILLIGRAM(S): 1 INJECTION INTRAVENOUS at 22:52

## 2020-06-03 RX ADMIN — SODIUM CHLORIDE 3 MILLILITER(S): 9 INJECTION INTRAMUSCULAR; INTRAVENOUS; SUBCUTANEOUS at 14:28

## 2020-06-03 RX ADMIN — NYSTATIN CREAM 1 APPLICATION(S): 100000 CREAM TOPICAL at 17:42

## 2020-06-03 RX ADMIN — ENOXAPARIN SODIUM 30 MILLIGRAM(S): 100 INJECTION SUBCUTANEOUS at 10:00

## 2020-06-03 RX ADMIN — SODIUM CHLORIDE 3 MILLILITER(S): 9 INJECTION INTRAMUSCULAR; INTRAVENOUS; SUBCUTANEOUS at 22:51

## 2020-06-03 RX ADMIN — ENOXAPARIN SODIUM 30 MILLIGRAM(S): 100 INJECTION SUBCUTANEOUS at 22:54

## 2020-06-03 RX ADMIN — Medication 1 APPLICATION(S): at 06:12

## 2020-06-03 RX ADMIN — SODIUM CHLORIDE 3 MILLILITER(S): 9 INJECTION INTRAMUSCULAR; INTRAVENOUS; SUBCUTANEOUS at 06:09

## 2020-06-03 RX ADMIN — NYSTATIN CREAM 1 APPLICATION(S): 100000 CREAM TOPICAL at 06:12

## 2020-06-03 NOTE — PROGRESS NOTE ADULT - ASSESSMENT
70F with MS (wheelchair bound), metastatic breast cancer to pelvis, thoracic, and lumbar spine, transferred from [facility] with esophageal perforation, empyema s/p esophageal stent, VATs (5/13), Empyema with polymicrobial infection, Strep mitis infection, Klebsiella oxytoca infection, CoNS infection, sacral decubitus ulcer, clinically osteomyelitis per ID.     #1 Metastatic breast cancer - to bone - thoracic/lumbar and pelvis. per daughter, had been on Kisquali (ribociclib) oral tablets per daughter with Dr. Eddie Fairbanks. Spoke with Dr. Fairbanks today, he will get back to me regarding recommendation for regimen going forward, possibly resumption of her hormonal treatment if feasible. Poor prognosis given spread to bone.   #2 Esophageal perforation - s/p stent with CT sugery on 5/13. was not a candidate for reconstructive surgery. also s/p gastrojejunostomy tube for feeds. still tolerates some intake by mouth.   #3 Empyema - s/p VATS with right thoracic cavity washout and pulmonary decortication with right posterior, right medial chest tubes. polymicrobial infection with Strep mitis infection, Klebsiella oxytoca infection, and CoNS, as well as yeast, s/p vancomycin and caspofungin (completed 5/26), on meropenem to be completed 6/9.   #4 sacral decubitus - clinically osteomyelitis per ID. will not heal, patient is bedbound. wound care.   #5 Encounter for palliative care - patient likely to need long term care facility once medically stabilized for discharge. Spoke with Dr. Fairbanks, he is likely going to restart her hormonal therapy, but will get back to me.

## 2020-06-03 NOTE — PROGRESS NOTE ADULT - ASSESSMENT
HPI: This 69 year old non-ambulatory Female with a PMHx significant for multiple sclerosis, Breast Cancer s/p R mastectomy, Osteoporosis, Mitral stenosis, right ankle fracture, chronic right sided sacral ulcer, chronic midline back pain since being dropped from a jaem lift (7/17/29), admitted to [facility] after presenting with sepsis in the setting of a UTI with chronic campos use and known large right ischial decubitus ulcer. Patient found to have a Moderately large Right hydropneumothorax resulting in partial right lung collapse and slight cardiomediastinal shift to the left on CXR with chest tube placed 5/8/20. Large bore chest tube placed 5/10/20 for persistent Right pneumothorax. Patient was followed by ID and was given Vanco and Zosyn originally for her presumed urosepsis, Caspofungin was added after pleural fluid was + for fungal elements. CT chest confirmed +Empyema in the Right pleural space. Patient ultimately transferred to [facility] late 5/11/20 after she was found to have a distal esophageal perforation on CT chest and Esophogram.     Of note, patient admitted 10/9/2019 for lower back and bilateral knee pain, found to have compression fracture of L2 with imaging subsequently found to have multiple lytic lesions on the spine and pelvis.  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 Matthew Fairbanks. (12 May 2020 02:24)    patient is admitted to surgical service, pre-operatively planned for an esophageal stent 5/13.  Patient's labs/ cultures from [facility] reviewed.     Empyema with polymicrobial infection:  Strep mitis infection  Klebsiella oxytoca infection  CoNS infection  Perforated esophagus  Fevers    Plan:  s/p esophageal stent  and VATS 5/13  s/p PEJ on 5/15    YEAST from fluid culture being worked up  NOT Candida auris; specimen sent to [facility] labs      COMPLETED CASPO/ VANCO         - Continue MERREM   original end date was  6/9/2020;    BLOOD CX sent 5/28; REPEAT blood cx 5/29  WILL MAINTAIN same end date of 6/9/2020    - continue Chest tubes    - regarding DECUBITUS Ulcer, present prior to admission in this bed-ridden patient, CLINICAL osteomyelitis  - continue wound care even after course of Antibiotics   - CONSIDER surgical evaluation for DIVERTING COLOSTOMY  - osteomyelitis with little chance of improvement if FECAL contamination persists

## 2020-06-03 NOTE — CHART NOTE - NSCHARTNOTEFT_GEN_A_CORE
Thoracic     69 year old non-ambulatory Female with a PMHx significant for multiple sclerosis, Breast Cancer s/p R mastectomy, Osteoporosis, Mitral stenosis, right ankle fracture, chronic right sided sacral ulcer, chronic midline back pain since being dropped from a jame lift (7/17/29), admitted to [facility] after presenting with sepsis in the setting of a UTI with chronic campos use and known large right ischial decubitus ulcer. Patient found to have a Moderately large Right hydropneumothorax resulting in partial right lung collapse and slight cardiomediastinal shift to the left on CXR with chest tube placed 5/8/20. Large bore chest tube placed 5/10/20 for persistent Right pneumothorax. Patient was followed by ID and was given Vanco and Zosyn originally for her presumed urosepsis, Caspofungin was added after pleural fluid was + for fungal elements. CT chest confirmed +Empyema in the Right pleural space. Patient ultimately transferred to [facility] late 5/11/20 after she was found to have a distal esophageal perforation on CT chest and Esophogram.     5/13/20 patient underwent EGD with esophageal stent placed, NGT placed, VATS with right thoracic cavity washout and pulmonary decortication with Dl drain, right posterior, and right medial chest tubes placed with Dr. Murcia.  On 5/14 patient was taken to IR for unsuccessful G/J tube placement. 5/15 patient was taken to the OR for feeding tube placement with Dr. Murcia.  The G tube is to gravity drainage, and slow feeds are advancing through the J tube. S/P esophagram 5/18 which showed narrowing distal stented area. Tolerating tube feeds.   5/27: G port clamped. Right Chest tube 31 to waterseal, Right CT #2 d/c.   5/29 Ct Chest with no extrav., mod L effusion, small loculated R.       Pt in bed resting.  Spoke to palliative care today.  Plan is for potential discharge to [facility].

## 2020-06-03 NOTE — PROGRESS NOTE ADULT - PROBLEM SELECTOR PLAN 1
S/p esophageal stent placed 5/13/20 with NGT placed.   S/p GJ tube placement 5/15 (open procedure).  G port capped(5/27), slow feeds  through the J tube @ 50ml/hr and to maintain rate for now per Dr. Murcia.  Feeds changed to Jevity (6/2) as per nutrition consult  Encourage deep breathing, chest PT, incentive spirometry.   Right chest to waterseal, SOURAV to bulb suction  Labs and CXR every other day as per Dr. Murcia (next 6/4)  Palliative care consult in progress

## 2020-06-03 NOTE — PROGRESS NOTE ADULT - SUBJECTIVE AND OBJECTIVE BOX
Subjective: Patient lying bed, no acute distress notes, denies fever, chills, chest pain, palpitation, shortness of breath, abdominal pain, N/V.       V/S  T(C): 37.1 (06-02-20 @ 21:01), Max: 37.1 (06-02-20 @ 21:01)  HR: 97 (06-02-20 @ 21:01) (86 - 97)  BP: 110/68 (06-02-20 @ 21:01) (106/67 - 118/79)  RR: 16 (06-02-20 @ 21:01) (16 - 17)  SpO2: 97% (06-02-20 @ 21:01) (95% - 99%) on room air                                          Tele: Sinus rhythm     CHEST TUBE:    Right chest tube to water seal          OUTPUT:             per 24 hours     Drainage:    AIR LEAKS:  [ ] YES [x ] NO      MEDICATIONS  (STANDING):  chlorhexidine 4% Liquid 1 Application(s) Topical <User Schedule>  Dakins Solution - 1/2 Strength 1 Application(s) Topical two times a day  enoxaparin Injectable 30 milliGRAM(s) SubCutaneous every 12 hours  latanoprost 0.005% Ophthalmic Solution 1 Drop(s) Both EYES at bedtime  lidocaine 1% Injectable 10 milliLiter(s) Local Injection once  meropenem  IVPB 1000 milliGRAM(s) IV Intermittent every 8 hours  multivitamin/minerals/iron Oral Solution (CENTRUM) 15 milliLiter(s) Oral daily  nystatin Cream 1 Application(s) Topical two times a day  pantoprazole  Injectable 40 milliGRAM(s) IV Push every 12 hours  sodium chloride 0.9% lock flush 3 milliLiter(s) IV Push every 8 hours      06-02    132<L>  |  100  |  33.0<H>  ----------------------------<  87  4.7   |  19.0<L>  |  0.64    Ca    7.4<L>      02 Jun 2020 06:34  Phos  2.8     06-02  Mg     2.3     06-02    TPro  5.6<L>  /  Alb  1.8<L>  /  TBili  <0.2<L>  /  DBili  x   /  AST  36<H>  /  ALT  40<H>  /  AlkPhos  239<H>  06-02                               8.8    12.69 )-----------( 574      ( 02 Jun 2020 06:34 )             27.4                 CAPILLARY BLOOD GLUCOSE               CXR:      Physical Exam:    Neuro:     Pulm:     CV:    Abd:     Extremities:     Incision:              PAST MEDICAL & SURGICAL HISTORY:  Breast cancer metastasized to bone  Hypertension  Multiple sclerosis  S/P mastectomy, right  Breast CA  Osteoporosis  MS (mitral stenosis)  History of bowel resection  H/O breast surgery Subjective: Patient lying bed, no acute distress notes, denies fever, chills, chest pain, palpitation, shortness of breath, abdominal pain, N/V.       V/S  T(C): 37.1 (06-02-20 @ 21:01), Max: 37.1 (06-02-20 @ 21:01)  HR: 97 (06-02-20 @ 21:01) (86 - 97)  BP: 110/68 (06-02-20 @ 21:01) (106/67 - 118/79)  RR: 16 (06-02-20 @ 21:01) (16 - 17)  SpO2: 97% (06-02-20 @ 21:01) (95% - 99%) on room air                                          Tele: Sinus rhythm     CHEST TUBE:    Right chest tube to water seal          OUTPUT:             per 24 hours     Drainage:    AIR LEAKS:  Minimal air leak present    MEDICATIONS  (STANDING):  chlorhexidine 4% Liquid 1 Application(s) Topical <User Schedule>  Dakins Solution - 1/2 Strength 1 Application(s) Topical two times a day  enoxaparin Injectable 30 milliGRAM(s) SubCutaneous every 12 hours  latanoprost 0.005% Ophthalmic Solution 1 Drop(s) Both EYES at bedtime  lidocaine 1% Injectable 10 milliLiter(s) Local Injection once  meropenem  IVPB 1000 milliGRAM(s) IV Intermittent every 8 hours  multivitamin/minerals/iron Oral Solution (CENTRUM) 15 milliLiter(s) Oral daily  nystatin Cream 1 Application(s) Topical two times a day  pantoprazole  Injectable 40 milliGRAM(s) IV Push every 12 hours  sodium chloride 0.9% lock flush 3 milliLiter(s) IV Push every 8 hours      06-02    132<L>  |  100  |  33.0<H>  ----------------------------<  87  4.7   |  19.0<L>  |  0.64    Ca    7.4<L>      02 Jun 2020 06:34  Phos  2.8     06-02  Mg     2.3     06-02    TPro  5.6<L>  /  Alb  1.8<L>  /  TBili  <0.2<L>  /  DBili  x   /  AST  36<H>  /  ALT  40<H>  /  AlkPhos  239<H>  06-02                               8.8    12.69 )-----------( 574      ( 02 Jun 2020 06:34 )             27.4          CXR:    < from: Xray Chest 1 View- PORTABLE-Routine (06.02.20 @ 05:51) >  IEWS: Portable AP radiography of the chest performed.    FINDINGS: Heart size appears within normal limits. No superior mediastinal widening is identified. 2 right-sided thoracostomy catheters are without significant change in position. Note is made of the indwelling esophageal stent, unchanged in position. Right axillary metallic clips noted. Bilateral lung parenchymal airspace opacities redemonstrated with bilateral pleural effusions, right greater than left. There is no evidence for pneumothorax. No mediastinal shift is noted.  Mixed osteosclerotic and osteolytic lesions of the skeletal structures identified.    IMPRESSION: No significant interval change    < end of copied text >    Physical Exam:  General: NAD  Neurology: Awake, nonfocal  Eyes: PERRLA/ EOMI  Neck: Neck supple, trachea midline, No JVD  Respiratory: Decreased BS at Right base.   CV: S1S2, no murmurs, rubs or gallops  Abdominal: Soft, NT, ND. GJ tube: J port to feeding, G port capped, site dry and intact.   Incisions: Right chest wall staples intact,   Tubes: CT to WS< +minimal air leak  Dl to bulb, clear output          PAST MEDICAL & SURGICAL HISTORY:  Breast cancer metastasized to bone  Hypertension  Multiple sclerosis  S/P mastectomy, right  Breast CA  Osteoporosis  MS (mitral stenosis)  History of bowel resection  H/O breast surgery

## 2020-06-03 NOTE — PROGRESS NOTE ADULT - PROBLEM SELECTOR PLAN 5
Wound care consult / plastic surgery consult appreciated.   Wound care recommends Santyl and plastics eval.  Dr. Parrish from plastics recommends Dakins soaked packing to clean wound bid.  Air flow mattress.  Turn and position q2 hours.  Optimize nutrition via feeding tube, vitamins supplements added  Pain control PRN.

## 2020-06-03 NOTE — PROGRESS NOTE ADULT - SUBJECTIVE AND OBJECTIVE BOX
OVERNIGHT EVENTS: no acute events.     Present Symptoms:     Dyspnea: 0  Nausea/Vomiting: No  Anxiety:  No  Depression: No  Fatigue: No  Loss of appetite: No    Pain: none             Character-            Duration-            Effect-            Factors-            Frequency-            Location-            Severity-    Review of Systems: Reviewed                     Negative: no pain                 All others negative    MEDICATIONS  (STANDING):  chlorhexidine 4% Liquid 1 Application(s) Topical <User Schedule>  Dakins Solution - 1/2 Strength 1 Application(s) Topical two times a day  enoxaparin Injectable 30 milliGRAM(s) SubCutaneous every 12 hours  latanoprost 0.005% Ophthalmic Solution 1 Drop(s) Both EYES at bedtime  lidocaine 1% Injectable 10 milliLiter(s) Local Injection once  meropenem  IVPB 1000 milliGRAM(s) IV Intermittent every 8 hours  multivitamin/minerals/iron Oral Solution (CENTRUM) 15 milliLiter(s) Oral daily  nystatin Cream 1 Application(s) Topical two times a day  pantoprazole  Injectable 40 milliGRAM(s) IV Push every 12 hours  sodium chloride 0.9% lock flush 3 milliLiter(s) IV Push every 8 hours    MEDICATIONS  (PRN):  acetaminophen   Tablet .. 650 milliGRAM(s) Oral every 6 hours PRN Temp greater or equal to 38C (100.4F), Moderate Pain (4 - 6)  loperamide 2 milliGRAM(s) Oral every 4 hours PRN Diarrhea  sodium chloride 0.9% lock flush 10 milliLiter(s) IV Push every 1 hour PRN Pre/post blood products, medications, blood draw, and to maintain line patency    PHYSICAL EXAM:    Vital Signs Last 24 Hrs  T(C): 36.8 (03 Jun 2020 07:56), Max: 37.1 (02 Jun 2020 21:01)  T(F): 98.3 (03 Jun 2020 07:56), Max: 98.8 (02 Jun 2020 21:01)  HR: 94 (03 Jun 2020 07:56) (90 - 97)  BP: 139/99 (03 Jun 2020 07:56) (105/74 - 139/99)  BP(mean): --  RR: 18 (03 Jun 2020 07:56) (16 - 18)  SpO2: 95% (03 Jun 2020 07:56) (95% - 99%)    General: alert  oriented x 4     Karnofsky:  30 %    HEENT: normal      Lungs: comfortable     CV: normal      GI: normal      : beth     MSK: bedbound/wheelchair bound    Skin: no rash    LABS:                          7.9    12.50 )-----------( 633      ( 03 Jun 2020 08:16 )             24.5     06-03    135  |  107  |  28.0<H>  ----------------------------<  96  4.2   |  19.0<L>  |  0.61    Ca    6.6<L>      03 Jun 2020 08:16  Phos  2.8     06-02  Mg     2.3     06-02    TPro  5.0<L>  /  Alb  1.6<L>  /  TBili  <0.2<L>  /  DBili  x   /  AST  30  /  ALT  32  /  AlkPhos  182<H>  06-03    I&O's Summary    02 Jun 2020 07:01  -  03 Jun 2020 07:00  --------------------------------------------------------  IN: 1750 mL / OUT: 1845 mL / NET: -95 mL    RADIOLOGY & ADDITIONAL STUDIES:    ADVANCE DIRECTIVES: Full code

## 2020-06-03 NOTE — PROGRESS NOTE ADULT - PROBLEM SELECTOR PLAN 2
S/p Right thoracotomy / washout / VATS decort 5/13/20.  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  Right Chest tube to waterseal.  Follow up CXR   Pleural fluid + for yeast, coag Negative Staph, Klebsiella, and strep mitis/oralis. Continue IV antibiotics per ID.   Vancomycin and Caspofungin completed 5/26/20 and Zosyn to be completed 6/9/20.  PICC line to be placed closer to time of discharge if needed  Blood cultures negative 5/8 and negative to date from 5/28 and 5/29

## 2020-06-03 NOTE — PROGRESS NOTE ADULT - ASSESSMENT
69 year old non-ambulatory Female with a PMHx significant for multiple sclerosis, Breast Cancer s/p R mastectomy, Osteoporosis, Mitral stenosis, right ankle fracture, chronic right sided sacral ulcer, chronic midline back pain since being dropped from a jame lift (7/17/29), admitted to [facility] after presenting with sepsis in the setting of a UTI with chronic campos use and known large right ischial decubitus ulcer. Patient found to have a Moderately large Right hydropneumothorax resulting in partial right lung collapse and slight cardiomediastinal shift to the left on CXR with chest tube placed 5/8/20. Large bore chest tube placed 5/10/20 for persistent Right pneumothorax. Patient was followed by ID and was given Vanco and Zosyn originally for her presumed urosepsis, Caspofungin was added after pleural fluid was + for fungal elements. CT chest confirmed +Empyema in the Right pleural space. Patient ultimately transferred to [facility] late 5/11/20 after she was found to have a distal esophageal perforation on CT chest and Esophogram.     5/13/20 patient underwent EGD with esophageal stent placed, NGT placed, VATS with right thoracic cavity washout and pulmonary decortication with Dl drain, right posterior, and right medial chest tubes placed with Dr. Murcia.  On 5/14 patient was taken to IR for unsuccessful G/J tube placement. 5/15 patient was taken to the OR for feeding tube placement with Dr. Murcia.  The G tube is to gravity drainage, and slow feeds are advancing through the J tube. S/P esophagram 5/18 which showed narrowing distal stented area. Tolerating tube feeds.   5/27: G port clamped. Right Chest tube 31 to waterseal, Right CT #2 d/c.   5/29 Ct Chest with no extrav., mod L effusion, small loculated R.

## 2020-06-03 NOTE — PROGRESS NOTE ADULT - SUBJECTIVE AND OBJECTIVE BOX
[facility]  INFECTIOUS DISEASES AND INTERNAL MEDICINE at [place]  =======================================================  Phong Wang MD  Diplomates American Board of Internal Medicine and Infectious Diseases  Telephone [phone]  Fax            [phone]  =======================================================    [id number]  GEORGE STANLEY   follow up: empyema    s/p EGD and esophageal stent; s/p VATS 5/14  s/p gastrojejunal tube on 5/15/2020  s/p debridement of sacral decubitus    still with Chylous drainage from CT tube   no new events'     =======================================================  REVIEW OF SYSTEMS:  CONSTITUTIONAL:  No Fever or chills  HEENT:  No diplopia or blurred vision.  No earache, sore throat or runny nose.  CARDIOVASCULAR:  No pressure, squeezing, strangling, tightness, heaviness or aching about the chest, neck, axilla or epigastrium.  RESPIRATORY:  MILD shortness of breath  GASTROINTESTINAL:  No nausea, vomiting or diarrhea.  GENITOURINARY:  No dysuria, frequency or urgency. No Blood in urine  MUSCULOSKELETAL:  no joint aches, no muscle pain  SKIN:  No change in skin, hair or nails.  NEUROLOGIC:  No Headaches, seizures or weakness.  PSYCHIATRIC:  No disorder of thought or mood.  ENDOCRINE:  No heat or cold intolerance  HEMATOLOGICAL:  No easy bruising or bleeding.   =======================================================  Allergies  Sudafed (Other)    ======================================================  Physical Exam:  ============   (see vitals section below)    General:  No acute distress. FRAIL  Eye: Pupils are equal, round and reactive to light, Extraocular movements are intact, Normal conjunctiva.  HENT: Normocephalic, Oral mucosa is moist, No pharyngeal erythema, No sinus tenderness.  Neck: Supple, No lymphadenopathy.  Respiratory: Lungs  with diminished air entry at bases  1st chest tube to water seal  2nd chest tube connected to SOURAV drain.  TURBID cream COLOR FLUID  Cardiovascular: Normal rate, Regular rhythm  Gastrointestinal: Soft, Non-tender, Non-distended, Normal bowel sounds.  PEJ tube present in abd  Genitourinary:  no dysuria  Lymphatics: No lymphadenopathy neck,   Musculoskeletal: Normal range of motion, Normal strength.   decubitus ulcer on right hip   Neurologic: Alert, Oriented, No focal deficits, Cranial Nerves II-XII are grossly intact.  Psychiatric: Appropriate mood & affect.    =======================================================   Vitals:  ============  T(F): 98.3 (03 Jun 2020 07:56), Max: 98.8 (02 Jun 2020 21:01)  HR: 94 (03 Jun 2020 07:56)  BP: 139/99 (03 Jun 2020 07:56)  RR: 18 (03 Jun 2020 07:56)  SpO2: 95% (03 Jun 2020 07:56) (95% - 99%)  temp max in last 48H T(F): , Max: 98.8 (06-02-20 @ 21:01)    =======================================================  Current Antibiotics:  meropenem  IVPB 1000 milliGRAM(s) IV Intermittent every 8 hours    Other medications:  chlorhexidine 4% Liquid 1 Application(s) Topical <User Schedule>  Dakins Solution - 1/2 Strength 1 Application(s) Topical two times a day  enoxaparin Injectable 30 milliGRAM(s) SubCutaneous every 12 hours  latanoprost 0.005% Ophthalmic Solution 1 Drop(s) Both EYES at bedtime  lidocaine 1% Injectable 10 milliLiter(s) Local Injection once  multivitamin/minerals/iron Oral Solution (CENTRUM) 15 milliLiter(s) Oral daily  nystatin Cream 1 Application(s) Topical two times a day  pantoprazole  Injectable 40 milliGRAM(s) IV Push every 12 hours  sodium chloride 0.9% lock flush 3 milliLiter(s) IV Push every 8 hours      =======================================================  Labs:                        7.9    12.50 )-----------( 633      ( 03 Jun 2020 08:16 )             24.5      06-03    135  |  107  |  28.0<H>  ----------------------------<  96  4.2   |  19.0<L>  |  0.61    Ca    6.6<L>      03 Jun 2020 08:16  Phos  2.8     06-02  Mg     2.3     06-02    TPro  5.0<L>  /  Alb  1.6<L>  /  TBili  <0.2<L>  /  DBili  x   /  AST  30  /  ALT  32  /  AlkPhos  182<H>  06-03      Culture - Blood (collected 05-29-20 @ 11:31)  Source: .Blood Blood    Culture - Blood (collected 05-29-20 @ 11:31)  Source: .Blood Blood    Culture - Blood (collected 05-28-20 @ 19:56)  Source: .Blood Blood-Peripheral  Final Report (06-02-20 @ 21:00):    No growth at 5 days.    Culture - Blood (collected 05-28-20 @ 19:56)  Source: .Blood Blood-Peripheral  Final Report (06-02-20 @ 21:00):    No growth at 5 days.    Culture - Blood (collected 05-26-20 @ 14:22)  Source: .Blood Blood-Peripheral  Final Report (05-31-20 @ 15:00):    No growth at 5 days.      Creatinine, Serum: 0.61 mg/dL (06-03-20 @ 08:16)  Creatinine, Serum: 0.64 mg/dL (06-02-20 @ 06:34)  Creatinine, Serum: 0.70 mg/dL (06-01-20 @ 07:54)  Creatinine, Serum: 0.77 mg/dL (05-30-20 @ 19:57)            WBC Count: 12.50 K/uL (06-03-20 @ 08:16)  WBC Count: 12.69 K/uL (06-02-20 @ 06:34)  WBC Count: 12.49 K/uL (06-01-20 @ 07:54)  WBC Count: 12.02 K/uL (05-30-20 @ 19:57)      COVID-19 PCR: NotDetec (05-12-20 @ 00:00)      Alkaline Phosphatase, Serum: 182 U/L (06-03-20 @ 08:16)  Alkaline Phosphatase, Serum: 239 U/L (06-02-20 @ 06:34)  Alkaline Phosphatase, Serum: 250 U/L (06-01-20 @ 07:54)  Alkaline Phosphatase, Serum: 314 U/L (05-30-20 @ 19:57)  Alanine Aminotransferase (ALT/SGPT): 32 U/L (06-03-20 @ 08:16)  Alanine Aminotransferase (ALT/SGPT): 40 U/L (06-02-20 @ 06:34)  Alanine Aminotransferase (ALT/SGPT): 36 U/L (06-01-20 @ 07:54)  Alanine Aminotransferase (ALT/SGPT): 36 U/L (05-30-20 @ 19:57)  Aspartate Aminotransferase (AST/SGOT): 30 U/L (06-03-20 @ 08:16)  Aspartate Aminotransferase (AST/SGOT): 36 U/L (06-02-20 @ 06:34)  Aspartate Aminotransferase (AST/SGOT): 36 U/L (06-01-20 @ 07:54)  Aspartate Aminotransferase (AST/SGOT): 34 U/L (05-30-20 @ 19:57)  Bilirubin Total, Serum: <0.2 mg/dL (06-03-20 @ 08:16)  Bilirubin Total, Serum: <0.2 mg/dL (06-02-20 @ 06:34)  Bilirubin Total, Serum: <0.2 mg/dL (06-01-20 @ 07:54)  Bilirubin Total, Serum: <0.2 mg/dL (05-30-20 @ 19:57)

## 2020-06-04 PROCEDURE — 99232 SBSQ HOSP IP/OBS MODERATE 35: CPT

## 2020-06-04 PROCEDURE — 99024 POSTOP FOLLOW-UP VISIT: CPT

## 2020-06-04 RX ORDER — ACETAMINOPHEN 500 MG
1000 TABLET ORAL ONCE
Refills: 0 | Status: COMPLETED | OUTPATIENT
Start: 2020-06-04 | End: 2020-06-04

## 2020-06-04 RX ADMIN — ENOXAPARIN SODIUM 30 MILLIGRAM(S): 100 INJECTION SUBCUTANEOUS at 13:17

## 2020-06-04 RX ADMIN — SODIUM CHLORIDE 3 MILLILITER(S): 9 INJECTION INTRAMUSCULAR; INTRAVENOUS; SUBCUTANEOUS at 13:18

## 2020-06-04 RX ADMIN — CHLORHEXIDINE GLUCONATE 1 APPLICATION(S): 213 SOLUTION TOPICAL at 13:17

## 2020-06-04 RX ADMIN — LATANOPROST 1 DROP(S): 0.05 SOLUTION/ DROPS OPHTHALMIC; TOPICAL at 21:07

## 2020-06-04 RX ADMIN — Medication 1 APPLICATION(S): at 18:10

## 2020-06-04 RX ADMIN — NYSTATIN CREAM 1 APPLICATION(S): 100000 CREAM TOPICAL at 05:49

## 2020-06-04 RX ADMIN — MEROPENEM 100 MILLIGRAM(S): 1 INJECTION INTRAVENOUS at 21:07

## 2020-06-04 RX ADMIN — SODIUM CHLORIDE 3 MILLILITER(S): 9 INJECTION INTRAMUSCULAR; INTRAVENOUS; SUBCUTANEOUS at 05:45

## 2020-06-04 RX ADMIN — Medication 1 APPLICATION(S): at 05:48

## 2020-06-04 RX ADMIN — SODIUM CHLORIDE 3 MILLILITER(S): 9 INJECTION INTRAMUSCULAR; INTRAVENOUS; SUBCUTANEOUS at 21:07

## 2020-06-04 RX ADMIN — ENOXAPARIN SODIUM 30 MILLIGRAM(S): 100 INJECTION SUBCUTANEOUS at 21:08

## 2020-06-04 RX ADMIN — PANTOPRAZOLE SODIUM 40 MILLIGRAM(S): 20 TABLET, DELAYED RELEASE ORAL at 05:46

## 2020-06-04 RX ADMIN — Medication 400 MILLIGRAM(S): at 02:07

## 2020-06-04 RX ADMIN — MEROPENEM 100 MILLIGRAM(S): 1 INJECTION INTRAVENOUS at 13:18

## 2020-06-04 RX ADMIN — MEROPENEM 100 MILLIGRAM(S): 1 INJECTION INTRAVENOUS at 05:47

## 2020-06-04 RX ADMIN — NYSTATIN CREAM 1 APPLICATION(S): 100000 CREAM TOPICAL at 18:10

## 2020-06-04 RX ADMIN — PANTOPRAZOLE SODIUM 40 MILLIGRAM(S): 20 TABLET, DELAYED RELEASE ORAL at 18:10

## 2020-06-04 RX ADMIN — Medication 15 MILLILITER(S): at 13:18

## 2020-06-04 NOTE — PROGRESS NOTE ADULT - ASSESSMENT
69 year old non-ambulatory Female with a PMHx significant for multiple sclerosis, Breast Cancer s/p R mastectomy, Osteoporosis, Mitral stenosis, right ankle fracture, chronic right sided sacral ulcer, chronic midline back pain since being dropped from a jame lift (7/17/29), admitted to [place] after presenting with sepsis in the setting of a UTI with chronic campos use and known large right ischial decubitus ulcer. Patient found to have a Moderately large Right hydropneumothorax resulting in partial right lung collapse and slight cardiomediastinal shift to the left on CXR with chest tube placed 5/8/20. Large bore chest tube placed 5/10/20 for persistent Right pneumothorax. Patient was followed by ID and was given Vanco and Zosyn originally for her presumed urosepsis, Caspofungin was added after pleural fluid was + for fungal elements. CT chest confirmed +Empyema in the Right pleural space. Patient ultimately transferred to [facility] late 5/11/20 after she was found to have a distal esophageal perforation on CT chest and Esophogram.     5/13/20 patient underwent EGD with esophageal stent placed, NGT placed, VATS with right thoracic cavity washout and pulmonary decortication with Dl drain, right posterior, and right medial chest tubes placed with Dr. Murcia.  On 5/14 patient was taken to IR for unsuccessful G/J tube placement. 5/15 patient was taken to the OR for feeding tube placement with Dr. Murcia.  The G tube is to gravity drainage, and slow feeds are advancing through the J tube. S/P esophagram 5/18 which showed narrowing distal stented area. Tolerating tube feeds.   5/27: G port clamped. Right Chest tube 31 to waterseal, Right CT #2 d/c.   5/29 Ct Chest with no extrav., mod L effusion, small loculated R.

## 2020-06-04 NOTE — PROGRESS NOTE ADULT - SUBJECTIVE AND OBJECTIVE BOX
Subjective: Patient lying in bed, no acute distress noted, denies fever, chills, chest pain, shortness of breath, dizziness, headache, abdominal pain, N/V.     V/S  T(C): 36.9 (06-03-20 @ 22:12), Max: 36.9 (06-03-20 @ 22:12)  HR: 102 (06-03-20 @ 22:12) (93 - 102)  BP: 135/83 (06-03-20 @ 22:12) (105/74 - 139/99)  RR: 18 (06-03-20 @ 22:12) (16 - 18)  SpO2: 96% (06-03-20 @ 22:12) (94% - 97%) on room air.                                            Tele: Sinus rhythm    CHEST TUBE:   Right chest to water seal, Right SOURAV to bulb suction         OUTPUT: 7 AM - 7 pm:   CT-150 ml, SOURAV 50 ml. 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    MEDICATIONS  (STANDING):  chlorhexidine 4% Liquid 1 Application(s) Topical <User Schedule>  Dakins Solution - 1/2 Strength 1 Application(s) Topical two times a day  enoxaparin Injectable 30 milliGRAM(s) SubCutaneous every 12 hours  latanoprost 0.005% Ophthalmic Solution 1 Drop(s) Both EYES at bedtime  lidocaine 1% Injectable 10 milliLiter(s) Local Injection once  meropenem  IVPB 1000 milliGRAM(s) IV Intermittent every 8 hours  multivitamin/minerals/iron Oral Solution (CENTRUM) 15 milliLiter(s) Oral daily  nystatin Cream 1 Application(s) Topical two times a day  pantoprazole  Injectable 40 milliGRAM(s) IV Push every 12 hours  sodium chloride 0.9% lock flush 3 milliLiter(s) IV Push every 8 hours      06-03    135  |  107  |  28.0<H>  ----------------------------<  96  4.2   |  19.0<L>  |  0.61    Ca    6.6<L>      03 Jun 2020 08:16  Phos  2.8     06-02  Mg     2.3     06-02    TPro  5.0<L>  /  Alb  1.6<L>  /  TBili  <0.2<L>  /  DBili  x   /  AST  30  /  ALT  32  /  AlkPhos  182<H>  06-03                               7.9    12.50 )-----------( 633      ( 03 Jun 2020 08:16 )             24.5                  CXR:  < from: Xray Chest 1 View- PORTABLE-Routine (06.02.20 @ 05:51) >    FINDINGS: Heart size appears within normal limits. No superior mediastinal widening is identified. 2 right-sided thoracostomy catheters are without significant change in position. Note is made of the indwelling esophageal stent, unchanged in position. Right axillary metallic clips noted. Bilateral lung parenchymal airspace opacities redemonstrated with bilateral pleural effusions, right greater than left. There is no evidence for pneumothorax. No mediastinal shift is noted.  Mixed osteosclerotic and osteolytic lesions of the skeletal structures identified.    IMPRESSION: No significant interval change      < end of copied text >    < from: CT Chest No Cont (05.09.20 @ 15:53) >  IMPRESSION:     Large right-sided pneumothorax including small amount of high attenuation fluid within the pleural space likely reflecting hemothorax.  Right-sided chest tube appears to traverse the major fissure.    Right lower lobe airspace consolidation, may reflect pneumonia in the appropriate clinical setting.    Diffuse osseous metastatic disease.    < end of copied text >    Physical Exam:  General: NAD  Neurology: Awake, alert oriented x4, nonfocal  Eyes: PERRLA/ EOMI  Neck: Neck supple, trachea midline, No JVD  Respiratory: Decreased BS at Right side, +right side chest tube, dressing intact.   CV: Regular rate and rhythm, S1S2, no murmurs, rubs or gallops  Abdominal: Soft, NT, ND. GJ tube: J port to feeding, G port capped, + sutures, site dry and intact.   Incisions: Right chest wall, dry and intact,   Tubes: CT to WS< +minimal air leak, SOURAV to bulb suction        PAST MEDICAL & SURGICAL HISTORY:  Breast cancer metastasized to bone  Hypertension  Multiple sclerosis  S/P mastectomy, right  Breast CA  Osteoporosis  MS (mitral stenosis)  History of bowel resection  H/O breast surgery Subjective: Patient lying in bed, no acute distress noted, denies fever, chills, chest pain, shortness of breath, dizziness, headache, abdominal pain, N/V.     V/S  T(C): 36.9 (06-03-20 @ 22:12), Max: 36.9 (06-03-20 @ 22:12)  HR: 102 (06-03-20 @ 22:12) (93 - 102)  BP: 135/83 (06-03-20 @ 22:12) (105/74 - 139/99)  RR: 18 (06-03-20 @ 22:12) (16 - 18)  SpO2: 96% (06-03-20 @ 22:12) (94% - 97%) on room air.                                            Tele: Sinus rhythm    CHEST TUBE:   Right chest to water seal, Right SOURAV to bulb suction         OUTPUT: 7 AM - 7 pm:   CT-150 ml, SOURAV 50 ml. 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    MEDICATIONS  (STANDING):  chlorhexidine 4% Liquid 1 Application(s) Topical <User Schedule>  Dakins Solution - 1/2 Strength 1 Application(s) Topical two times a day  enoxaparin Injectable 30 milliGRAM(s) SubCutaneous every 12 hours  latanoprost 0.005% Ophthalmic Solution 1 Drop(s) Both EYES at bedtime  lidocaine 1% Injectable 10 milliLiter(s) Local Injection once  meropenem  IVPB 1000 milliGRAM(s) IV Intermittent every 8 hours  multivitamin/minerals/iron Oral Solution (CENTRUM) 15 milliLiter(s) Oral daily  nystatin Cream 1 Application(s) Topical two times a day  pantoprazole  Injectable 40 milliGRAM(s) IV Push every 12 hours  sodium chloride 0.9% lock flush 3 milliLiter(s) IV Push every 8 hours      06-03    135  |  107  |  28.0<H>  ----------------------------<  96  4.2   |  19.0<L>  |  0.61    Ca    6.6<L>      03 Jun 2020 08:16  Phos  2.8     06-02  Mg     2.3     06-02    TPro  5.0<L>  /  Alb  1.6<L>  /  TBili  <0.2<L>  /  DBili  x   /  AST  30  /  ALT  32  /  AlkPhos  182<H>  06-03                               7.9    12.50 )-----------( 633      ( 03 Jun 2020 08:16 )             24.5                  CXR:  < from: Xray Chest 1 View- PORTABLE-Routine (06.02.20 @ 05:51) >    FINDINGS: Heart size appears within normal limits. No superior mediastinal widening is identified. 2 right-sided thoracostomy catheters are without significant change in position. Note is made of the indwelling esophageal stent, unchanged in position. Right axillary metallic clips noted. Bilateral lung parenchymal airspace opacities redemonstrated with bilateral pleural effusions, right greater than left. There is no evidence for pneumothorax. No mediastinal shift is noted.  Mixed osteosclerotic and osteolytic lesions of the skeletal structures identified.    IMPRESSION: No significant interval change      < end of copied text >    < from: CT Chest No Cont (05.09.20 @ 15:53) >  IMPRESSION:     Large right-sided pneumothorax including small amount of high attenuation fluid within the pleural space likely reflecting hemothorax.  Right-sided chest tube appears to traverse the major fissure.    Right lower lobe airspace consolidation, may reflect pneumonia in the appropriate clinical setting.    Diffuse osseous metastatic disease.    < end of copied text >    Physical Exam:  General: NAD  Neurology: Awake, alert oriented x4, nonfocal  Eyes: PERRLA/ EOMI  Neck: Neck supple, trachea midline, No JVD  Respiratory: Decreased BS at Right side, +right side chest tube, dressing intact.   CV: Regular rate and rhythm, S1S2, no murmurs, rubs or gallops  Abdominal: Soft, NT, ND. GJ tube: J port to feeding, G port capped, + sutures, site dry and intact.   Incisions: Right chest wall, dry and intact,   Tubes: CT to WS< +minimal air leak, SOURAV to bulb suction        PAST MEDICAL & SURGICAL HISTORY:  Breast cancer metastasized to bone  Hypertension  Multiple sclerosis  S/P mastectomy, right  Breast CA  Osteoporosis  MS (mitral stenosis)  History of bowel resection  H/O breast surgery

## 2020-06-04 NOTE — PROGRESS NOTE ADULT - SUBJECTIVE AND OBJECTIVE BOX
[facility]  INFECTIOUS DISEASES AND INTERNAL MEDICINE at [place]  =======================================================  Phong Wang MD  Diplomates American Board of Internal Medicine and Infectious Diseases  Telephone [phone]  Fax            [phone]  =======================================================    [id number]  GEORGE STANLEY   follow up: empyema    s/p EGD and esophageal stent; s/p VATS 5/14  s/p gastrojejunal tube on 5/15/2020  s/p debridement of sacral decubitus    still with Chylous drainage from CT tube        =======================================================  REVIEW OF SYSTEMS:  CONSTITUTIONAL:  No Fever or chills  HEENT:  No diplopia or blurred vision.  No earache, sore throat or runny nose.  CARDIOVASCULAR:  No pressure, squeezing, strangling, tightness, heaviness or aching about the chest, neck, axilla or epigastrium.  RESPIRATORY:  MILD shortness of breath  GASTROINTESTINAL:  No nausea, vomiting or diarrhea.  GENITOURINARY:  No dysuria, frequency or urgency. No Blood in urine  MUSCULOSKELETAL:  no joint aches, no muscle pain  SKIN:  No change in skin, hair or nails.  NEUROLOGIC:  No Headaches, seizures or weakness.  PSYCHIATRIC:  No disorder of thought or mood.  ENDOCRINE:  No heat or cold intolerance  HEMATOLOGICAL:  No easy bruising or bleeding.   =======================================================  Allergies  Sudafed (Other)    ======================================================  Physical Exam:  ============   (see vitals section below)    General:  No acute distress. FRAIL  Eye: Pupils are equal, round and reactive to light, Extraocular movements are intact, Normal conjunctiva.  HENT: Normocephalic, Oral mucosa is moist, No pharyngeal erythema, No sinus tenderness.  Neck: Supple, No lymphadenopathy.  Respiratory: Lungs  with diminished air entry at bases  1st chest tube to water seal  2nd chest tube connected to SOURAV drain.  TURBID cream COLOR FLUID  Cardiovascular: Normal rate, Regular rhythm  Gastrointestinal: Soft, Non-tender, Non-distended, Normal bowel sounds.  PEJ tube present in abd  Genitourinary:  no dysuria  Lymphatics: No lymphadenopathy neck,   Musculoskeletal: Normal range of motion, Normal strength.   decubitus ulcer on right hip   Neurologic: Alert, Oriented, No focal deficits, Cranial Nerves II-XII are grossly intact.  Psychiatric: Appropriate mood & affect.    =======================================================    Vitals:  ============  T(F): 97.8 (04 Jun 2020 05:40), Max: 98.5 (03 Jun 2020 22:12)  HR: 94 (04 Jun 2020 05:40)  BP: 92/61 (04 Jun 2020 05:40)  RR: 16 (04 Jun 2020 05:40)  SpO2: 98% (04 Jun 2020 05:40) (94% - 98%)  temp max in last 48H T(F): , Max: 98.8 (06-02-20 @ 21:01)    =======================================================  Current Antibiotics:  meropenem  IVPB 1000 milliGRAM(s) IV Intermittent every 8 hours    Other medications:  chlorhexidine 4% Liquid 1 Application(s) Topical <User Schedule>  Dakins Solution - 1/2 Strength 1 Application(s) Topical two times a day  enoxaparin Injectable 30 milliGRAM(s) SubCutaneous every 12 hours  latanoprost 0.005% Ophthalmic Solution 1 Drop(s) Both EYES at bedtime  lidocaine 1% Injectable 10 milliLiter(s) Local Injection once  multivitamin/minerals/iron Oral Solution (CENTRUM) 15 milliLiter(s) Oral daily  nystatin Cream 1 Application(s) Topical two times a day  pantoprazole  Injectable 40 milliGRAM(s) IV Push every 12 hours  sodium chloride 0.9% lock flush 3 milliLiter(s) IV Push every 8 hours      =======================================================  Labs:                        7.9    12.50 )-----------( 633      ( 03 Jun 2020 08:16 )             24.5      06-03    135  |  107  |  28.0<H>  ----------------------------<  96  4.2   |  19.0<L>  |  0.61    Ca    6.6<L>      03 Jun 2020 08:16    TPro  5.0<L>  /  Alb  1.6<L>  /  TBili  <0.2<L>  /  DBili  x   /  AST  30  /  ALT  32  /  AlkPhos  182<H>  06-03      Culture - Blood (collected 05-29-20 @ 11:31)  Source: .Blood Blood  Final Report (06-03-20 @ 13:00):    No growth at 5 days.    Culture - Blood (collected 05-29-20 @ 11:31)  Source: .Blood Blood  Final Report (06-03-20 @ 13:00):    No growth at 5 days.    Culture - Blood (collected 05-28-20 @ 19:56)  Source: .Blood Blood-Peripheral  Final Report (06-02-20 @ 21:00):    No growth at 5 days.    Culture - Blood (collected 05-28-20 @ 19:56)  Source: .Blood Blood-Peripheral  Final Report (06-02-20 @ 21:00):    No growth at 5 days.    Culture - Blood (collected 05-26-20 @ 14:22)  Source: .Blood Blood-Peripheral  Final Report (05-31-20 @ 15:00):    No growth at 5 days.    Creatinine, Serum: 0.61 mg/dL (06-03-20 @ 08:16)  Creatinine, Serum: 0.64 mg/dL (06-02-20 @ 06:34)  Creatinine, Serum: 0.70 mg/dL (06-01-20 @ 07:54)  Creatinine, Serum: 0.77 mg/dL (05-30-20 @ 19:57)    WBC Count: 12.50 K/uL (06-03-20 @ 08:16)  WBC Count: 12.69 K/uL (06-02-20 @ 06:34)  WBC Count: 12.49 K/uL (06-01-20 @ 07:54)  WBC Count: 12.02 K/uL (05-30-20 @ 19:57)    COVID-19 PCR: NotDetec (05-12-20 @ 00:00)    Alkaline Phosphatase, Serum: 182 U/L (06-03-20 @ 08:16)  Alkaline Phosphatase, Serum: 239 U/L (06-02-20 @ 06:34)  Alkaline Phosphatase, Serum: 250 U/L (06-01-20 @ 07:54)  Alanine Aminotransferase (ALT/SGPT): 32 U/L (06-03-20 @ 08:16)  Alanine Aminotransferase (ALT/SGPT): 40 U/L (06-02-20 @ 06:34)  Alanine Aminotransferase (ALT/SGPT): 36 U/L (06-01-20 @ 07:54)  Aspartate Aminotransferase (AST/SGOT): 30 U/L (06-03-20 @ 08:16)  Aspartate Aminotransferase (AST/SGOT): 36 U/L (06-02-20 @ 06:34)  Aspartate Aminotransferase (AST/SGOT): 36 U/L (06-01-20 @ 07:54)  Bilirubin Total, Serum: <0.2 mg/dL (06-03-20 @ 08:16)  Bilirubin Total, Serum: <0.2 mg/dL (06-02-20 @ 06:34)  Bilirubin Total, Serum: <0.2 mg/dL (06-01-20 @ 07:54)

## 2020-06-04 NOTE — PROGRESS NOTE ADULT - PROBLEM SELECTOR PLAN 7
H/H trending downward.  Type and cross with +antibodies.   1unit PRBC transfused 5/25.  FOBT positive (6/2)  Will trend CBC, transfuse as needed  Will continue Protonix IV Q12H  Will consider GI consult if needed  Continue to monitor for S/S of acute blood loss.

## 2020-06-04 NOTE — PROGRESS NOTE ADULT - ASSESSMENT
HPI: This 69 year old non-ambulatory Female with a PMHx significant for multiple sclerosis, Breast Cancer s/p R mastectomy, Osteoporosis, Mitral stenosis, right ankle fracture, chronic right sided sacral ulcer, chronic midline back pain since being dropped from a jame lift (7/17/29), admitted to [place] after presenting with sepsis in the setting of a UTI with chronic campos use and known large right ischial decubitus ulcer. Patient found to have a Moderately large Right hydropneumothorax resulting in partial right lung collapse and slight cardiomediastinal shift to the left on CXR with chest tube placed 5/8/20. Large bore chest tube placed 5/10/20 for persistent Right pneumothorax. Patient was followed by ID and was given Vanco and Zosyn originally for her presumed urosepsis, Caspofungin was added after pleural fluid was + for fungal elements. CT chest confirmed +Empyema in the Right pleural space. Patient ultimately transferred to [facility] late 5/11/20 after she was found to have a distal esophageal perforation on CT chest and Esophogram.     Of note, patient admitted 10/9/2019 for lower back and bilateral knee pain, found to have compression fracture of L2 with imaging subsequently found to have multiple lytic lesions on the spine and pelvis.  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 Matthew Fairbanks. (12 May 2020 02:24)    patient is admitted to surgical service, pre-operatively planned for an esophageal stent 5/13.  Patient's labs/ cultures from [facility] reviewed.     Empyema with polymicrobial infection:  Strep mitis infection  Klebsiella oxytoca infection  CoNS infection  Perforated esophagus  Fevers    Plan:  s/p esophageal stent  and VATS 5/13  s/p PEJ on 5/15    YEAST from fluid culture being worked up  NOT Candida auris; specimen sent to [facility] labs      COMPLETED CASPO/ VANCO      - Continue MERREM   original end date was  6/9/2020;    BLOOD CX sent 5/28; REPEAT blood cx 5/29  WILL MAINTAIN same end date of 6/9/2020    - continue Chest tubes    - regarding DECUBITUS Ulcer, present prior to admission in this bed-ridden patient, CLINICAL osteomyelitis  - continue wound care even after course of Antibiotics   - CONSIDER surgical evaluation for DIVERTING COLOSTOMY  - osteomyelitis with little chance of improvement if FECAL contamination persists

## 2020-06-04 NOTE — PROGRESS NOTE ADULT - PROBLEM SELECTOR PLAN 3
Maintain right CT to waterseal, SOURAV to bulb suction  Will continue monitoring  CXR every other day or as needed per Dr. Murcia

## 2020-06-05 LAB
ALBUMIN SERPL ELPH-MCNC: 1.5 G/DL — LOW (ref 3.3–5.2)
ALP SERPL-CCNC: 153 U/L — HIGH (ref 40–120)
ALT FLD-CCNC: 31 U/L — SIGNIFICANT CHANGE UP
ANION GAP SERPL CALC-SCNC: 9 MMOL/L — SIGNIFICANT CHANGE UP (ref 5–17)
AST SERPL-CCNC: 26 U/L — SIGNIFICANT CHANGE UP
B PERT IGG+IGM PNL SER: ABNORMAL
BILIRUB DIRECT SERPL-MCNC: <0.1 MG/DL — SIGNIFICANT CHANGE UP (ref 0–0.3)
BILIRUB INDIRECT FLD-MCNC: SIGNIFICANT CHANGE UP MG/DL (ref 0.2–1)
BILIRUB SERPL-MCNC: <0.2 MG/DL — LOW (ref 0.4–2)
BUN SERPL-MCNC: 22 MG/DL — HIGH (ref 8–20)
CALCIUM SERPL-MCNC: 6.9 MG/DL — LOW (ref 8.6–10.2)
CHLORIDE SERPL-SCNC: 104 MMOL/L — SIGNIFICANT CHANGE UP (ref 98–107)
CO2 SERPL-SCNC: 20 MMOL/L — LOW (ref 22–29)
COLOR FLD: ABNORMAL
CREAT SERPL-MCNC: 0.52 MG/DL — SIGNIFICANT CHANGE UP (ref 0.5–1.3)
FLUID INTAKE SUBSTANCE CLASS: SIGNIFICANT CHANGE UP
FLUID SEGMENTED GRANULOCYTES: 80 % — SIGNIFICANT CHANGE UP
GLUCOSE SERPL-MCNC: 94 MG/DL — SIGNIFICANT CHANGE UP (ref 70–99)
HCT VFR BLD CALC: 24.1 % — LOW (ref 34.5–45)
HGB BLD-MCNC: 7.7 G/DL — LOW (ref 11.5–15.5)
LYMPHOCYTES # FLD: 15 % — SIGNIFICANT CHANGE UP
MAGNESIUM SERPL-MCNC: 1.6 MG/DL — SIGNIFICANT CHANGE UP (ref 1.6–2.6)
MCHC RBC-ENTMCNC: 28.1 PG — SIGNIFICANT CHANGE UP (ref 27–34)
MCHC RBC-ENTMCNC: 32 GM/DL — SIGNIFICANT CHANGE UP (ref 32–36)
MCV RBC AUTO: 88 FL — SIGNIFICANT CHANGE UP (ref 80–100)
MONOS+MACROS # FLD: 5 % — SIGNIFICANT CHANGE UP
PHOSPHATE SERPL-MCNC: 3 MG/DL — SIGNIFICANT CHANGE UP (ref 2.4–4.7)
PLATELET # BLD AUTO: 640 K/UL — HIGH (ref 150–400)
POTASSIUM SERPL-MCNC: 4 MMOL/L — SIGNIFICANT CHANGE UP (ref 3.5–5.3)
POTASSIUM SERPL-SCNC: 4 MMOL/L — SIGNIFICANT CHANGE UP (ref 3.5–5.3)
PROT SERPL-MCNC: 4.9 G/DL — LOW (ref 6.6–8.7)
RBC # BLD: 2.74 M/UL — LOW (ref 3.8–5.2)
RBC # FLD: 18.8 % — HIGH (ref 10.3–14.5)
RCV VOL RI: HIGH /UL (ref 0–0)
SODIUM SERPL-SCNC: 133 MMOL/L — LOW (ref 135–145)
TOTAL NUCLEATED CELL COUNT, BODY FLUID: SIGNIFICANT CHANGE UP /UL
TUBE TYPE: SIGNIFICANT CHANGE UP
WBC # BLD: 11.2 K/UL — HIGH (ref 3.8–10.5)
WBC # FLD AUTO: 11.2 K/UL — HIGH (ref 3.8–10.5)

## 2020-06-05 PROCEDURE — 99233 SBSQ HOSP IP/OBS HIGH 50: CPT

## 2020-06-05 PROCEDURE — 99232 SBSQ HOSP IP/OBS MODERATE 35: CPT

## 2020-06-05 PROCEDURE — 71045 X-RAY EXAM CHEST 1 VIEW: CPT | Mod: 26

## 2020-06-05 RX ORDER — MAGNESIUM SULFATE 500 MG/ML
2 VIAL (ML) INJECTION ONCE
Refills: 0 | Status: COMPLETED | OUTPATIENT
Start: 2020-06-05 | End: 2020-06-05

## 2020-06-05 RX ADMIN — NYSTATIN CREAM 1 APPLICATION(S): 100000 CREAM TOPICAL at 05:01

## 2020-06-05 RX ADMIN — SODIUM CHLORIDE 3 MILLILITER(S): 9 INJECTION INTRAMUSCULAR; INTRAVENOUS; SUBCUTANEOUS at 15:01

## 2020-06-05 RX ADMIN — Medication 50 GRAM(S): at 09:10

## 2020-06-05 RX ADMIN — MEROPENEM 100 MILLIGRAM(S): 1 INJECTION INTRAVENOUS at 05:01

## 2020-06-05 RX ADMIN — MEROPENEM 100 MILLIGRAM(S): 1 INJECTION INTRAVENOUS at 21:02

## 2020-06-05 RX ADMIN — PANTOPRAZOLE SODIUM 40 MILLIGRAM(S): 20 TABLET, DELAYED RELEASE ORAL at 15:29

## 2020-06-05 RX ADMIN — Medication 650 MILLIGRAM(S): at 01:23

## 2020-06-05 RX ADMIN — Medication 1 APPLICATION(S): at 04:57

## 2020-06-05 RX ADMIN — MEROPENEM 100 MILLIGRAM(S): 1 INJECTION INTRAVENOUS at 14:42

## 2020-06-05 RX ADMIN — CHLORHEXIDINE GLUCONATE 1 APPLICATION(S): 213 SOLUTION TOPICAL at 04:57

## 2020-06-05 RX ADMIN — ENOXAPARIN SODIUM 30 MILLIGRAM(S): 100 INJECTION SUBCUTANEOUS at 21:02

## 2020-06-05 RX ADMIN — PANTOPRAZOLE SODIUM 40 MILLIGRAM(S): 20 TABLET, DELAYED RELEASE ORAL at 05:01

## 2020-06-05 RX ADMIN — Medication 650 MILLIGRAM(S): at 21:03

## 2020-06-05 RX ADMIN — LATANOPROST 1 DROP(S): 0.05 SOLUTION/ DROPS OPHTHALMIC; TOPICAL at 21:02

## 2020-06-05 RX ADMIN — SODIUM CHLORIDE 3 MILLILITER(S): 9 INJECTION INTRAMUSCULAR; INTRAVENOUS; SUBCUTANEOUS at 04:57

## 2020-06-05 RX ADMIN — Medication 1 APPLICATION(S): at 16:29

## 2020-06-05 RX ADMIN — NYSTATIN CREAM 1 APPLICATION(S): 100000 CREAM TOPICAL at 16:28

## 2020-06-05 RX ADMIN — SODIUM CHLORIDE 3 MILLILITER(S): 9 INJECTION INTRAMUSCULAR; INTRAVENOUS; SUBCUTANEOUS at 21:04

## 2020-06-05 RX ADMIN — ENOXAPARIN SODIUM 30 MILLIGRAM(S): 100 INJECTION SUBCUTANEOUS at 09:10

## 2020-06-05 NOTE — PROGRESS NOTE ADULT - ASSESSMENT
69 year old non-ambulatory Female with a PMHx significant for multiple sclerosis, Breast Cancer s/p R mastectomy, Osteoporosis, Mitral stenosis, right ankle fracture, chronic right sided sacral ulcer, chronic midline back pain since being dropped from a jame lift (7/17/29), admitted to [place] after presenting with sepsis in the setting of a UTI with chronic campos use and known large right ischial decubitus ulcer. Patient found to have a Moderately large Right hydropneumothorax resulting in partial right lung collapse and slight cardiomediastinal shift to the left on CXR with chest tube placed 5/8/20. Large bore chest tube placed 5/10/20 for persistent Right pneumothorax. Patient was followed by ID and was given Vanco and Zosyn originally for her presumed urosepsis, Caspofungin was added after pleural fluid was + for fungal elements. CT chest confirmed +Empyema in the Right pleural space. Patient ultimately transferred to [facility] late 5/11/20 after she was found to have a distal esophageal perforation on CT chest and Esophogram.     5/13/20 patient underwent EGD with esophageal stent placed, NGT placed, VATS with right thoracic cavity washout and pulmonary decortication with Dl drain, right posterior, and right medial chest tubes placed with Dr. Murcia.  On 5/14 patient was taken to IR for unsuccessful G/J tube placement. 5/15 patient was taken to the OR for feeding tube placement with Dr. Murcia.  The G tube is to gravity drainage, and slow feeds are advancing through the J tube. S/P esophagram 5/18 which showed narrowing distal stented area. Tolerating tube feeds.   5/27: G port clamped. Right Chest tube 31 to waterseal, Right CT #2 d/c.   5/29 Ct Chest with no extrav., mod L effusion, small loculated R.

## 2020-06-05 NOTE — PROGRESS NOTE ADULT - ASSESSMENT
HPI: This 69 year old non-ambulatory Female with a PMHx significant for multiple sclerosis, Breast Cancer s/p R mastectomy, Osteoporosis, Mitral stenosis, right ankle fracture, chronic right sided sacral ulcer, chronic midline back pain since being dropped from a jame lift (7/17/29), admitted to [place] after presenting with sepsis in the setting of a UTI with chronic campos use and known large right ischial decubitus ulcer. Patient found to have a Moderately large Right hydropneumothorax resulting in partial right lung collapse and slight cardiomediastinal shift to the left on CXR with chest tube placed 5/8/20. Large bore chest tube placed 5/10/20 for persistent Right pneumothorax. Patient was followed by ID and was given Vanco and Zosyn originally for her presumed urosepsis, Caspofungin was added after pleural fluid was + for fungal elements. CT chest confirmed +Empyema in the Right pleural space. Patient ultimately transferred to [facility] late 5/11/20 after she was found to have a distal esophageal perforation on CT chest and Esophogram.     Of note, patient admitted 10/9/2019 for lower back and bilateral knee pain, found to have compression fracture of L2 with imaging subsequently found to have multiple lytic lesions on the spine and pelvis.  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 Matthew Fairbanks. (12 May 2020 02:24)    patient is admitted to surgical service, pre-operatively planned for an esophageal stent 5/13.  Patient's labs/ cultures from [facility] reviewed.     Empyema with polymicrobial infection:  Strep mitis infection  Klebsiella oxytoca infection  CoNS infection  Perforated esophagus  Fevers    Plan:  s/p esophageal stent  and VATS 5/13  s/p PEJ on 5/15    YEAST from fluid culture being worked up  NOT Candida auris; specimen sent to [facility] labs      COMPLETED CASPO/ VANCO      - Continue MERREM   original end date was  6/9/2020;    BLOOD CX sent 5/28; REPEAT blood cx 5/29  WILL MAINTAIN same end date of 6/9/2020    - continue Chest tubes    - regarding DECUBITUS Ulcer, present prior to admission in this bed-ridden patient, CLINICAL osteomyelitis  - continue wound care even after course of Antibiotics   - CONSIDER surgical evaluation for DIVERTING COLOSTOMY  - osteomyelitis with little chance of improvement if FECAL contamination persists

## 2020-06-05 NOTE — PROGRESS NOTE ADULT - SUBJECTIVE AND OBJECTIVE BOX
Subjective:  pt in bed NAD no issues overnight pt reports being comfortable     VITAL SIGNS  T(C): 36.6 (06-05-20 @ 08:45), Max: 36.7 (06-04-20 @ 15:16)  HR: 85 (06-05-20 @ 08:45) (82 - 95)  BP: 115/75 (06-05-20 @ 08:45) (101/70 - 115/75)  RR: 17 (06-05-20 @ 08:45) (16 - 18)  SpO2: 98% (06-05-20 @ 08:45) (94% - 98%) RA           Daily     Daily   Admit Wt: Drug Dosing Weight  Height (cm): 167.64 (11 May 2020 22:12)  Weight (kg): 56.7 (15 May 2020 09:49)  BMI (kg/m2): 20.2 (15 May 2020 09:49)  BSA (m2): 1.64 (15 May 2020 09:49)  Telemetry:   SR       MEDICATIONS  acetaminophen   Tablet .. 650 milliGRAM(s) Oral every 6 hours PRN  chlorhexidine 4% Liquid 1 Application(s) Topical <User Schedule>  Dakins Solution - 1/2 Strength 1 Application(s) Topical two times a day  enoxaparin Injectable 30 milliGRAM(s) SubCutaneous every 12 hours  latanoprost 0.005% Ophthalmic Solution 1 Drop(s) Both EYES at bedtime  lidocaine 1% Injectable 10 milliLiter(s) Local Injection once  loperamide 2 milliGRAM(s) Oral every 4 hours PRN  meropenem  IVPB 1000 milliGRAM(s) IV Intermittent every 8 hours  multivitamin/minerals/iron Oral Solution (CENTRUM) 15 milliLiter(s) Oral daily  nystatin Cream 1 Application(s) Topical two times a day  pantoprazole  Injectable 40 milliGRAM(s) IV Push every 12 hours  sodium chloride 0.9% lock flush 10 milliLiter(s) IV Push every 1 hour PRN  sodium chloride 0.9% lock flush 3 milliLiter(s) IV Push every 8 hours    MEDICATIONS  (PRN):  acetaminophen   Tablet .. 650 milliGRAM(s) Oral every 6 hours PRN Temp greater or equal to 38C (100.4F), Moderate Pain (4 - 6)  loperamide 2 milliGRAM(s) Oral every 4 hours PRN Diarrhea  sodium chloride 0.9% lock flush 10 milliLiter(s) IV Push every 1 hour PRN Pre/post blood products, medications, blood draw, and to maintain line patency        PHYSICAL EXAM  General: NAD  Neurology: Awake, alert oriented x4, nonfocal  Eyes: PERRLA/ EOMI  Neck: Neck supple, trachea midline, No JVD  Respiratory: Decreased BS at Right side, +right side chest tube, dressing intact.   CV: Regular rate and rhythm, S1S2, no murmurs, rubs or gallops  Abdominal: Soft, NT, ND. GJ tube: J port to feeding, G port capped, + sutures, site dry and intact.   Incisions: Right chest wall, dry and intact,   Tubes: CT to WS SOURAV to bulb suction      Chest tubes: 530 cc x 24 hours   50cc x 24 hours       I&O's Detail    04 Jun 2020 07:01  -  05 Jun 2020 07:00  --------------------------------------------------------  IN:    Free Water: 450 mL    ns in tub fed  [id number]: 900 mL    Oral Fluid: 240 mL    Solution: 100 mL  Total IN: 1690 mL    OUT:    Chest Tube: 530 mL    Drain: 50 mL    Indwelling Catheter - Urethral: 1000 mL  Total OUT: 1580 mL    Total NET: 110 mL      05 Jun 2020 07:01  -  05 Jun 2020 13:15  --------------------------------------------------------  IN:    ns in tub fed  [id number]: 200 mL    Solution: 50 mL  Total IN: 250 mL    OUT:    Indwelling Catheter - Urethral: 355 mL  Total OUT: 355 mL    Total NET: -105 mL          LABS  06-05    133<L>  |  104  |  22.0<H>  ----------------------------<  94  4.0   |  20.0<L>  |  0.52    Ca    6.9<L>      05 Jun 2020 05:10  Phos  3.0     06-05  Mg     1.6     06-05    TPro  4.9<L>  /  Alb  1.5<L>  /  TBili  <0.2<L>  /  DBili  <0.1  /  AST  26  /  ALT  31  /  AlkPhos  153<H>  06-05                                 7.7    11.20 )-----------( 640      ( 05 Jun 2020 05:10 )             24.1                LIVER FUNCTIONS - ( 05 Jun 2020 05:10 )  Alb: 1.5 g/dL / Pro: 4.9 g/dL / ALK PHOS: 153 U/L / ALT: 31 U/L / AST: 26 U/L / GGT: x              CAPILLARY BLOOD GLUCOSE               Today's CXR: < from: Xray Chest 1 View- PORTABLE-Routine (06.05.20 @ 07:11) >  History: s/p distal esophageal perf s/p stent.     Date and time of exam: 6/5/2020 5:15 AM.    Technique: A single AP view of the chest was obtained.    Comparison exam: 6/2/2020 3:58 AM.    Findings:  No change in lines and tubes. No evidence of pneumothorax. Persistent opacification at the left lung base. Persistent pleural thickening or loculated effusion in the right hemithorax..    Impression:  Stable exam without significant change since the previous study..    < end of copied text >      PAST MEDICAL & SURGICAL HISTORY:  Breast cancer metastasized to bone  Hypertension  Multiple sclerosis  S/P mastectomy, right  Breast CA  Osteoporosis  MS (mitral stenosis)  History of bowel resection  H/O breast surgery

## 2020-06-05 NOTE — PROGRESS NOTE ADULT - ATTENDING COMMENTS
Thank you for the opportunity to assist with the care of this patient.   [facility] [phone].

## 2020-06-05 NOTE — PROGRESS NOTE ADULT - ASSESSMENT
70F with MS (wheelchair bound), metastatic breast cancer to pelvis, thoracic, and lumbar spine, transferred from [facility] with esophageal perforation, empyema s/p esophageal stent, VATs (5/13), Empyema with polymicrobial infection, Strep mitis infection, Klebsiella oxytoca infection, CoNS infection, sacral decubitus ulcer, clinically osteomyelitis per ID.     #1 Metastatic breast cancer - to bone - thoracic/lumbar and pelvis. per daughter, had been on Kisquali (ribociclib) oral tablets per daughter with Dr. Eddie Fairbakns. Spoke with Dr. Fairbanks, his office has yet to get back to me regarding plans moving forward, ? possibility of resuming hormonal therapy, no plans for resumption of kisquali. Poor prognosis given spread to bone.   #2 Esophageal perforation - s/p stent with CT sugery on 5/13. was not a candidate for reconstructive surgery. s/p gastrojejunostomy tube for feeds. still tolerates some intake by mouth.   #3 Empyema - s/p VATS with right thoracic cavity washout and pulmonary decortication with right posterior, right medial chest tubes. polymicrobial infection with Strep mitis infection, Klebsiella oxytoca infection, and CoNS, as well as yeast, s/p vancomycin and caspofungin (completed 5/26), on meropenem to be completed 6/9.   #4 sacral decubitus - clinically osteomyelitis per ID. will not heal, patient is bedbound. wound care. ? diverting colostomy?   #5 Encounter for palliative care - readdressing advanced directives with patient today. She states she has not had a chance to discuss with her children. I encouraged her to speak to her kids this weekend so we can establish directives. I again reiterated to her my thoughts and medical recommendations in consideration of her chronic illnesses, advanced cancer, cachexia, debility, and multiple infections/wounds. 70F with MS (wheelchair bound), metastatic breast cancer to pelvis, thoracic, and lumbar spine, transferred from [facility] with esophageal perforation, empyema s/p esophageal stent, VATs (5/13), Empyema with polymicrobial infection, Strep mitis infection, Klebsiella oxytoca infection, CoNS infection, sacral decubitus ulcer, clinically osteomyelitis per ID.     #1 Metastatic breast cancer - to bone - thoracic/lumbar and pelvis. per daughter, had been on Kisquali (ribociclib) oral tablets per daughter with Dr. Eddie Fairbanks. Spoke with Dr. Fairbanks, his office has yet to get back to me regarding plans moving forward, ? possibility of resuming hormonal therapy, no plans for resumption of kisquali. Poor prognosis given spread to bone.   #2 Esophageal perforation - s/p stent with CT sugery on 5/13. was not a candidate for reconstructive surgery. s/p gastrojejunostomy tube for feeds. still tolerates some intake by mouth.   #3 Empyema - s/p VATS with right thoracic cavity washout and pulmonary decortication with right posterior, right medial chest tubes. polymicrobial infection with Strep mitis infection, Klebsiella oxytoca infection, and CoNS, as well as yeast, s/p vancomycin and caspofungin (completed 5/26), on meropenem to be completed 6/9.   #4 sacral decubitus - clinically osteomyelitis per ID. will not heal, patient is bedbound. wound care. ? diverting colostomy?   #5 Encounter for palliative care - readdressed advanced directives with patient today. She states she has not had a chance to discuss with her children. I encouraged her to speak to her kids this weekend so we can establish directives. I again reiterated to her my thoughts and medical recommendations in consideration of her chronic illnesses, advanced cancer, cachexia, debility, and multiple infections/wounds. Additionally, I called Dr. Fairbanks's office again today to inquire regarding treatment plans moving forward, left a message and never received a call back. He had mentioned possibility of giving injection hormonal treatment if able. Recommend primary team follow up with Dr. Fairbanks regarding his plans.

## 2020-06-05 NOTE — PROGRESS NOTE ADULT - PROBLEM SELECTOR PLAN 2
S/p Right thoracotomy / washout / VATS decort 5/13/20.  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  Right Chest tube to waterseal.  Follow up CXR   Pleural fluid + for yeast, coag Negative Staph, Klebsiella, and strep mitis/oralis. Continue IV antibiotics per ID.   Vancomycin and Caspofungin completed 5/26/20 and Zosyn to be completed 6/9/20.  PICC line to be placed closer to time of discharge if needed  Blood cultures negative 5/8 and negative to date from 5/28 and 5/29  Triglycerides and cell count pending

## 2020-06-06 LAB — TRIGL FLD-MCNC: 863 MG/DL — SIGNIFICANT CHANGE UP

## 2020-06-06 PROCEDURE — 71045 X-RAY EXAM CHEST 1 VIEW: CPT | Mod: 26

## 2020-06-06 RX ADMIN — MEROPENEM 100 MILLIGRAM(S): 1 INJECTION INTRAVENOUS at 13:24

## 2020-06-06 RX ADMIN — Medication 1 APPLICATION(S): at 05:17

## 2020-06-06 RX ADMIN — MEROPENEM 100 MILLIGRAM(S): 1 INJECTION INTRAVENOUS at 21:15

## 2020-06-06 RX ADMIN — ENOXAPARIN SODIUM 30 MILLIGRAM(S): 100 INJECTION SUBCUTANEOUS at 21:16

## 2020-06-06 RX ADMIN — NYSTATIN CREAM 1 APPLICATION(S): 100000 CREAM TOPICAL at 16:33

## 2020-06-06 RX ADMIN — PANTOPRAZOLE SODIUM 40 MILLIGRAM(S): 20 TABLET, DELAYED RELEASE ORAL at 05:18

## 2020-06-06 RX ADMIN — NYSTATIN CREAM 1 APPLICATION(S): 100000 CREAM TOPICAL at 05:18

## 2020-06-06 RX ADMIN — Medication 15 MILLILITER(S): at 09:56

## 2020-06-06 RX ADMIN — SODIUM CHLORIDE 3 MILLILITER(S): 9 INJECTION INTRAMUSCULAR; INTRAVENOUS; SUBCUTANEOUS at 05:20

## 2020-06-06 RX ADMIN — PANTOPRAZOLE SODIUM 40 MILLIGRAM(S): 20 TABLET, DELAYED RELEASE ORAL at 16:32

## 2020-06-06 RX ADMIN — Medication 1 APPLICATION(S): at 16:33

## 2020-06-06 RX ADMIN — LATANOPROST 1 DROP(S): 0.05 SOLUTION/ DROPS OPHTHALMIC; TOPICAL at 21:16

## 2020-06-06 RX ADMIN — SODIUM CHLORIDE 3 MILLILITER(S): 9 INJECTION INTRAMUSCULAR; INTRAVENOUS; SUBCUTANEOUS at 21:13

## 2020-06-06 RX ADMIN — Medication 650 MILLIGRAM(S): at 21:24

## 2020-06-06 RX ADMIN — SODIUM CHLORIDE 3 MILLILITER(S): 9 INJECTION INTRAMUSCULAR; INTRAVENOUS; SUBCUTANEOUS at 13:23

## 2020-06-06 RX ADMIN — CHLORHEXIDINE GLUCONATE 1 APPLICATION(S): 213 SOLUTION TOPICAL at 05:17

## 2020-06-06 RX ADMIN — MEROPENEM 100 MILLIGRAM(S): 1 INJECTION INTRAVENOUS at 05:18

## 2020-06-06 RX ADMIN — ENOXAPARIN SODIUM 30 MILLIGRAM(S): 100 INJECTION SUBCUTANEOUS at 09:56

## 2020-06-06 NOTE — PROGRESS NOTE ADULT - SUBJECTIVE AND OBJECTIVE BOX
Subjective:  Pt in bed NAD no issues overnight     T(C): 36.7 (06-06-20 @ 05:06), Max: 37.1 (06-05-20 @ 17:53)  HR: 93 (06-06-20 @ 05:06) (93 - 100)  BP: 122/78 (06-06-20 @ 05:06) (110/73 - 122/78)    RR: 17 (06-06-20 @ 05:06) (17 - 18)  SpO2: 95% (06-06-20 @ 05:06) (95% - 97%) RA   Tele: SR     CHEST TUBE:    #1  720cc   per 24 hours    AIR LEAKS:  [ ] YES [x ] NO     Dl 10cc x 24 hours         06-05    133<L>  |  104  |  22.0<H>  ----------------------------<  94  4.0   |  20.0<L>  |  0.52    Ca    6.9<L>      05 Jun 2020 05:10  Phos  3.0     06-05  Mg     1.6     06-05    TPro  4.9<L>  /  Alb  1.5<L>  /  TBili  <0.2<L>  /  DBili  <0.1  /  AST  26  /  ALT  31  /  AlkPhos  153<H>  06-05                               7.7    11.20 )-----------( 640      ( 05 Jun 2020 05:10 )             24.1                 CAPILLARY BLOOD GLUCOSE               CXR: Pending today     PHYSICAL EXAM  General: NAD  Neurology: Awake, alert oriented x4, nonfocal  Eyes: PERRLA/ EOMI  Neck: Neck supple, trachea midline, No JVD  Respiratory: Decreased BS at Right side, +right side chest tube, dressing intact.   CV: Regular rate and rhythm, S1S2, no murmurs, rubs or gallops  Abdominal: Soft, NT, ND. GJ tube: J port to feeding, G port capped, + sutures, site dry and intact.   Incisions: Right chest wall, dry and intact,   Tubes: CT to WS SOURAV to bulb suction            Assessment:  70yFemale    with PAST MEDICAL & SURGICAL HISTORY:  Breast cancer metastasized to bone  Hypertension  Multiple sclerosis  S/P mastectomy, right  Breast CA  Osteoporosis  MS (mitral stenosis)  History of bowel resection  H/O breast surgery

## 2020-06-06 NOTE — PROGRESS NOTE ADULT - ASSESSMENT
69 year old non-ambulatory Female with a PMHx significant for multiple sclerosis, Breast Cancer s/p R mastectomy, Osteoporosis, Mitral stenosis, right ankle fracture, chronic right sided sacral ulcer, chronic midline back pain since being dropped from a jame lift (7/17/29), admitted to [place] after presenting with sepsis in the setting of a UTI with chronic campos use and known large right ischial decubitus ulcer. Patient found to have a Moderately large Right hydropneumothorax resulting in partial right lung collapse and slight cardiomediastinal shift to the left on CXR with chest tube placed 5/8/20. Large bore chest tube placed 5/10/20 for persistent Right pneumothorax. Patient was followed by ID and was given Vanco and Zosyn originally for her presumed urosepsis, Caspofungin was added after pleural fluid was + for fungal elements. CT chest confirmed +Empyema in the Right pleural space. Patient ultimately transferred to [facility] late 5/11/20 after she was found to have a distal esophageal perforation on CT chest and Esophogram.     5/13/20 patient underwent EGD with esophageal stent placed, NGT placed, VATS with right thoracic cavity washout and pulmonary decortication with Dl drain, right posterior, and right medial chest tubes placed with Dr. Murcia.  On 5/14 patient was taken to IR for unsuccessful G/J tube placement. 5/15 patient was taken to the OR for feeding tube placement with Dr. Murcia.  The G tube is to gravity drainage, and slow feeds are advancing through the J tube. S/P esophagram 5/18 which showed narrowing distal stented area. Tolerating tube feeds.   5/27: G port clamped. Right Chest tube 31 to waterseal, Right CT #2 d/c.   5/29 Ct Chest with no extrav., mod L effusion, small loculated R.

## 2020-06-06 NOTE — PROGRESS NOTE ADULT - PROBLEM SELECTOR PLAN 3
Patient is states they are ready to go home. Discharge instructions provided to family with review. Family verbalized understanding and signed. Vital signs stable. Iv removed no signs of distress, cath intact, and bandage applied. Patient will be transported to car. Will continue to monitor. Maintain right CT to waterseal, SOURAV to bulb suction  Will continue monitoring  CXR every other day or as needed per Dr. Murcia

## 2020-06-07 DIAGNOSIS — I89.8 OTHER SPECIFIED NONINFECTIVE DISORDERS OF LYMPHATIC VESSELS AND LYMPH NODES: ICD-10-CM

## 2020-06-07 LAB
ANION GAP SERPL CALC-SCNC: 11 MMOL/L — SIGNIFICANT CHANGE UP (ref 5–17)
BUN SERPL-MCNC: 23 MG/DL — HIGH (ref 8–20)
CALCIUM SERPL-MCNC: 7.8 MG/DL — LOW (ref 8.6–10.2)
CHLORIDE SERPL-SCNC: 99 MMOL/L — SIGNIFICANT CHANGE UP (ref 98–107)
CO2 SERPL-SCNC: 21 MMOL/L — LOW (ref 22–29)
CREAT SERPL-MCNC: 0.61 MG/DL — SIGNIFICANT CHANGE UP (ref 0.5–1.3)
GLUCOSE SERPL-MCNC: 111 MG/DL — HIGH (ref 70–99)
HCT VFR BLD CALC: 24.9 % — LOW (ref 34.5–45)
HGB BLD-MCNC: 7.9 G/DL — LOW (ref 11.5–15.5)
MAGNESIUM SERPL-MCNC: 1.9 MG/DL — SIGNIFICANT CHANGE UP (ref 1.6–2.6)
MCHC RBC-ENTMCNC: 27.6 PG — SIGNIFICANT CHANGE UP (ref 27–34)
MCHC RBC-ENTMCNC: 31.7 GM/DL — LOW (ref 32–36)
MCV RBC AUTO: 87.1 FL — SIGNIFICANT CHANGE UP (ref 80–100)
PHOSPHATE SERPL-MCNC: 3.5 MG/DL — SIGNIFICANT CHANGE UP (ref 2.4–4.7)
PLATELET # BLD AUTO: 746 K/UL — HIGH (ref 150–400)
POTASSIUM SERPL-MCNC: 4.8 MMOL/L — SIGNIFICANT CHANGE UP (ref 3.5–5.3)
POTASSIUM SERPL-SCNC: 4.8 MMOL/L — SIGNIFICANT CHANGE UP (ref 3.5–5.3)
RBC # BLD: 2.86 M/UL — LOW (ref 3.8–5.2)
RBC # FLD: 18.4 % — HIGH (ref 10.3–14.5)
SODIUM SERPL-SCNC: 131 MMOL/L — LOW (ref 135–145)
WBC # BLD: 13.32 K/UL — HIGH (ref 3.8–10.5)
WBC # FLD AUTO: 13.32 K/UL — HIGH (ref 3.8–10.5)

## 2020-06-07 RX ORDER — ACETAMINOPHEN 500 MG
1000 TABLET ORAL ONCE
Refills: 0 | Status: COMPLETED | OUTPATIENT
Start: 2020-06-07 | End: 2020-06-07

## 2020-06-07 RX ADMIN — SODIUM CHLORIDE 3 MILLILITER(S): 9 INJECTION INTRAMUSCULAR; INTRAVENOUS; SUBCUTANEOUS at 23:14

## 2020-06-07 RX ADMIN — MEROPENEM 100 MILLIGRAM(S): 1 INJECTION INTRAVENOUS at 05:14

## 2020-06-07 RX ADMIN — PANTOPRAZOLE SODIUM 40 MILLIGRAM(S): 20 TABLET, DELAYED RELEASE ORAL at 17:12

## 2020-06-07 RX ADMIN — CHLORHEXIDINE GLUCONATE 1 APPLICATION(S): 213 SOLUTION TOPICAL at 05:14

## 2020-06-07 RX ADMIN — NYSTATIN CREAM 1 APPLICATION(S): 100000 CREAM TOPICAL at 05:13

## 2020-06-07 RX ADMIN — Medication 650 MILLIGRAM(S): at 05:12

## 2020-06-07 RX ADMIN — ENOXAPARIN SODIUM 30 MILLIGRAM(S): 100 INJECTION SUBCUTANEOUS at 10:11

## 2020-06-07 RX ADMIN — MEROPENEM 100 MILLIGRAM(S): 1 INJECTION INTRAVENOUS at 23:16

## 2020-06-07 RX ADMIN — LATANOPROST 1 DROP(S): 0.05 SOLUTION/ DROPS OPHTHALMIC; TOPICAL at 23:16

## 2020-06-07 RX ADMIN — Medication 1 APPLICATION(S): at 17:11

## 2020-06-07 RX ADMIN — SODIUM CHLORIDE 3 MILLILITER(S): 9 INJECTION INTRAMUSCULAR; INTRAVENOUS; SUBCUTANEOUS at 05:13

## 2020-06-07 RX ADMIN — Medication 1 APPLICATION(S): at 05:13

## 2020-06-07 RX ADMIN — MEROPENEM 100 MILLIGRAM(S): 1 INJECTION INTRAVENOUS at 17:11

## 2020-06-07 RX ADMIN — Medication 15 MILLILITER(S): at 10:12

## 2020-06-07 RX ADMIN — ENOXAPARIN SODIUM 30 MILLIGRAM(S): 100 INJECTION SUBCUTANEOUS at 23:15

## 2020-06-07 RX ADMIN — SODIUM CHLORIDE 3 MILLILITER(S): 9 INJECTION INTRAMUSCULAR; INTRAVENOUS; SUBCUTANEOUS at 18:27

## 2020-06-07 RX ADMIN — PANTOPRAZOLE SODIUM 40 MILLIGRAM(S): 20 TABLET, DELAYED RELEASE ORAL at 05:12

## 2020-06-07 RX ADMIN — NYSTATIN CREAM 1 APPLICATION(S): 100000 CREAM TOPICAL at 17:11

## 2020-06-07 NOTE — CHART NOTE - NSCHARTNOTEFT_GEN_A_CORE
Enteral Nutrition RX:    RX: Vital 1.5 at goal rate of 50 ml/hr (x20 hrs) to provide 1000 ml, 1500 kcal, 68g protein, 764 ml free water. Additional free water per MD discretion. Add Pro-Stat once daily for an additional 100kcal and 15g protein per serving. Total: 1600kcal, 83g pro    RD to remain available.

## 2020-06-07 NOTE — PROGRESS NOTE ADULT - ASSESSMENT
69 year old non-ambulatory Female with a PMHx significant for multiple sclerosis, Breast Cancer s/p R mastectomy, Osteoporosis, Mitral stenosis, right ankle fracture, chronic right sided sacral ulcer, chronic midline back pain since being dropped from a jame lift (7/17/29), admitted to [place] after presenting with sepsis in the setting of a UTI with chronic campos use and known large right ischial decubitus ulcer. Patient found to have a Moderately large Right hydropneumothorax resulting in partial right lung collapse and slight cardiomediastinal shift to the left on CXR with chest tube placed 5/8/20. Large bore chest tube placed 5/10/20 for persistent Right pneumothorax. Patient was followed by ID and was given Vanco and Zosyn originally for her presumed urosepsis, Caspofungin was added after pleural fluid was + for fungal elements. CT chest confirmed +Empyema in the Right pleural space. Patient ultimately transferred to [facility] late 5/11/20 after she was found to have a distal esophageal perforation on CT chest and Esophogram.     5/13/20 patient underwent EGD with esophageal stent placed, NGT placed, VATS with right thoracic cavity washout and pulmonary decortication with Dl drain, right posterior, and right medial chest tubes placed with Dr. Murcia.  On 5/14 patient was taken to IR for unsuccessful G/J tube placement. 5/15 patient was taken to the OR for feeding tube placement with Dr. Murcia.  The G tube is to gravity drainage, and slow feeds are advancing through the J tube. S/P esophagram 5/18 which showed narrowing distal stented area. Tolerating tube feeds.   5/27: G port clamped. Right Chest tube 31 to waterseal, Right CT #2 d/c.  Pleural fluid sent for triglycerides > 99 high normal   5/29 Ct Chest with no extrav., mod L effusion, small loculated R.   6/6 pleural fluid resent for triglycerides and found to be 863.  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

## 2020-06-07 NOTE — PROGRESS NOTE ADULT - PROBLEM SELECTOR PLAN 2
S/p Right thoracotomy / washout / VATS decort 5/13/20.  Right Chest tube to waterseal.  Follow up CXR   Pleural fluid + for yeast, coag Negative Staph, Klebsiella, and strep mitis/oralis. Continue IV antibiotics per ID.   Vancomycin and Caspofungin completed 5/26/20 and Zosyn to be completed 6/9/20.  PICC line to be placed closer to time of discharge if needed  Blood cultures negative 5/8 and negative to date from 5/28 and 5/29  Triglycerides and cell count pending

## 2020-06-07 NOTE — PROGRESS NOTE ADULT - PROBLEM SELECTOR PLAN 5
Chronic debility. +Chronic decubitus ulcer on right ischium.  Non-ambulatory for 4-5 years as per patient.   Interferon to be held per neuro as patient is actively infected, and not indicated as per neuro.   Continue Fall risk protocol.

## 2020-06-07 NOTE — PROGRESS NOTE ADULT - PROBLEM SELECTOR PLAN 1
S/p esophageal stent placed 5/13/20 with NGT placed.   S/p GJ tube placement 5/15 (open procedure).  G port capped(5/27), slow feeds  through the J tube @ 50ml/hr and to maintain rate for now per Dr. Murcia.  Encourage deep breathing, chest PT, incentive spirometry.   Right chest to waterseal, SOURAV to bulb suction  Labs and CXR every other day as per Dr. Murcia (next 6/8)  Palliative care consult in progress

## 2020-06-07 NOTE — PROGRESS NOTE ADULT - SUBJECTIVE AND OBJECTIVE BOX
Subjective: PT lying in bed.  Denies CP or SOB.  NAD noted.      Tele:  SR                        T(F): 97.4 (06-07-20 @ 10:10), Max: 98.9 (06-06-20 @ 20:54)  HR: 105 (06-07-20 @ 04:45) (105 - 112)  BP: 94/65 (06-07-20 @ 10:10) (94/65 - 120/71)  RR: 18 (06-07-20 @ 04:45) (18 - 18)  SpO2: 99% (06-07-20 @ 04:45) (99% - 100%)    Allergies:  Sudafed (Other)      06-07    131<L>  |  99  |  23.0<H>  ----------------------------<  111<H>  4.8   |  21.0<L>  |  0.61    Ca    7.8<L>      07 Jun 2020 06:52  Phos  3.5     06-07  Mg     1.9     06-07                                 7.9    13.32 )-----------( 746      ( 07 Jun 2020 06:52 )             24.9             CXR: < from: Xray Chest 1 View- PORTABLE-Urgent (06.06.20 @ 10:17) >  EXAM:  XR CHEST PORTABLE URGENT 1V                          PROCEDURE DATE:  06/06/2020      INTERPRETATION:  History: CHF    Portable radiograph of the chest is performed. comparison is made to 6/5/2020.    The heart is enlarged. Esophageal stents is again appreciated. Right-sided chest tube or drain is again noted. There is a small loculated right pleural effusion is again seen with suggestion of underlying infiltrate. There is again obscuration of the left diaphragmatic surface. There are diffuse sclerotic bone metastases. There is no evidence of pneumothorax. Right axillary clips are again seen.    Impression: No significant change    KURTIS WINKLER M.D.,ATTENDING RADIOLOGIST  This document has been electronically signed. Jun 6 2020 10:44AM    < end of copied text >      I&O's Detail    06 Jun 2020 07:01  -  07 Jun 2020 07:00  --------------------------------------------------------  IN:    Enteral Tube Flush: 240 mL    ns in tub fed  [id number]: 600 mL    Solution: 50 mL  Total IN: 890 mL    OUT:    Chest Tube: 450 mL    Drain: 53 mL    Indwelling Catheter - Urethral: 1000 mL  Total OUT: 1503 mL    Total NET: -613 mL      CHEST TUBE:  [x ] YES [ ] NO  OUTPUT:  Right CT 100ml overnight and 450ml over the last 24 hours.                                                             Dl 10ml overnight and 50ml over the last 24 hours.         Active Medications:  acetaminophen   Tablet .. 650 milliGRAM(s) Oral every 6 hours PRN  chlorhexidine 4% Liquid 1 Application(s) Topical <User Schedule>  Dakins Solution - 1/2 Strength 1 Application(s) Topical two times a day  enoxaparin Injectable 30 milliGRAM(s) SubCutaneous every 12 hours  latanoprost 0.005% Ophthalmic Solution 1 Drop(s) Both EYES at bedtime  lidocaine 1% Injectable 10 milliLiter(s) Local Injection once  loperamide 2 milliGRAM(s) Oral every 4 hours PRN  meropenem  IVPB 1000 milliGRAM(s) IV Intermittent every 8 hours  multivitamin/minerals/iron Oral Solution (CENTRUM) 15 milliLiter(s) Oral daily  nystatin Cream 1 Application(s) Topical two times a day  pantoprazole  Injectable 40 milliGRAM(s) IV Push every 12 hours  sodium chloride 0.9% lock flush 10 milliLiter(s) IV Push every 1 hour PRN  sodium chloride 0.9% lock flush 3 milliLiter(s) IV Push every 8 hours      Physical Exam:    Neurology: Awake, alert oriented x4.  Respiratory: Decreased BS at Right side, +right side chest tube, dressing intact.   CV: Regular rate and rhythm, S1S2.  Abdominal: Soft, NT, ND. GJ tube: J port to feeding, G port capped, + sutures, site dry and intact.   Incisions: Right chest wall, dry and intact,   Tubes: CT to WS SOURAV to bulb suction       PAST MEDICAL & SURGICAL HISTORY:  Breast cancer metastasized to bone  Hypertension  Multiple sclerosis  S/P mastectomy, right  Breast CA  Osteoporosis  MS (mitral stenosis)  History of bowel resection  H/O breast surgery

## 2020-06-07 NOTE — PROGRESS NOTE ADULT - PROBLEM SELECTOR PLAN 7
Multiple lytic lesions on the spine and pelvis.   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  Follows as an outpatient with Dr. Matthew Fairbanks.  Continue Letrozole once able.  Heme/onc consult appreciated.

## 2020-06-08 PROCEDURE — 99232 SBSQ HOSP IP/OBS MODERATE 35: CPT

## 2020-06-08 PROCEDURE — 99223 1ST HOSP IP/OBS HIGH 75: CPT

## 2020-06-08 PROCEDURE — 99024 POSTOP FOLLOW-UP VISIT: CPT

## 2020-06-08 PROCEDURE — 71045 X-RAY EXAM CHEST 1 VIEW: CPT | Mod: 26

## 2020-06-08 RX ORDER — HYDROMORPHONE HYDROCHLORIDE 2 MG/ML
0.25 INJECTION INTRAMUSCULAR; INTRAVENOUS; SUBCUTANEOUS ONCE
Refills: 0 | Status: DISCONTINUED | OUTPATIENT
Start: 2020-06-08 | End: 2020-06-08

## 2020-06-08 RX ORDER — ONDANSETRON 8 MG/1
4 TABLET, FILM COATED ORAL ONCE
Refills: 0 | Status: COMPLETED | OUTPATIENT
Start: 2020-06-08 | End: 2020-06-08

## 2020-06-08 RX ORDER — SODIUM CHLORIDE 9 MG/ML
10 INJECTION INTRAMUSCULAR; INTRAVENOUS; SUBCUTANEOUS
Refills: 0 | Status: DISCONTINUED | OUTPATIENT
Start: 2020-06-08 | End: 2020-06-09

## 2020-06-08 RX ORDER — ASCORBIC ACID 60 MG
500 TABLET,CHEWABLE ORAL THREE TIMES A DAY
Refills: 0 | Status: DISCONTINUED | OUTPATIENT
Start: 2020-06-08 | End: 2020-06-09

## 2020-06-08 RX ORDER — FERROUS SULFATE 325(65) MG
325 TABLET ORAL DAILY
Refills: 0 | Status: DISCONTINUED | OUTPATIENT
Start: 2020-06-08 | End: 2020-06-09

## 2020-06-08 RX ORDER — PANTOPRAZOLE SODIUM 20 MG/1
40 TABLET, DELAYED RELEASE ORAL
Refills: 0 | Status: DISCONTINUED | OUTPATIENT
Start: 2020-06-08 | End: 2020-06-08

## 2020-06-08 RX ORDER — PANTOPRAZOLE SODIUM 20 MG/1
40 TABLET, DELAYED RELEASE ORAL
Refills: 0 | Status: DISCONTINUED | OUTPATIENT
Start: 2020-06-08 | End: 2020-06-09

## 2020-06-08 RX ORDER — FOLIC ACID 0.8 MG
1 TABLET ORAL DAILY
Refills: 0 | Status: DISCONTINUED | OUTPATIENT
Start: 2020-06-08 | End: 2020-06-09

## 2020-06-08 RX ADMIN — Medication 1 MILLIGRAM(S): at 12:52

## 2020-06-08 RX ADMIN — MEROPENEM 100 MILLIGRAM(S): 1 INJECTION INTRAVENOUS at 21:12

## 2020-06-08 RX ADMIN — SODIUM CHLORIDE 3 MILLILITER(S): 9 INJECTION INTRAMUSCULAR; INTRAVENOUS; SUBCUTANEOUS at 05:42

## 2020-06-08 RX ADMIN — MEROPENEM 100 MILLIGRAM(S): 1 INJECTION INTRAVENOUS at 16:04

## 2020-06-08 RX ADMIN — Medication 1 APPLICATION(S): at 06:17

## 2020-06-08 RX ADMIN — ENOXAPARIN SODIUM 30 MILLIGRAM(S): 100 INJECTION SUBCUTANEOUS at 21:12

## 2020-06-08 RX ADMIN — Medication 15 MILLILITER(S): at 12:52

## 2020-06-08 RX ADMIN — ONDANSETRON 4 MILLIGRAM(S): 8 TABLET, FILM COATED ORAL at 12:26

## 2020-06-08 RX ADMIN — MEROPENEM 100 MILLIGRAM(S): 1 INJECTION INTRAVENOUS at 05:38

## 2020-06-08 RX ADMIN — Medication 400 MILLIGRAM(S): at 00:10

## 2020-06-08 RX ADMIN — PANTOPRAZOLE SODIUM 40 MILLIGRAM(S): 20 TABLET, DELAYED RELEASE ORAL at 05:37

## 2020-06-08 RX ADMIN — HYDROMORPHONE HYDROCHLORIDE 0.25 MILLIGRAM(S): 2 INJECTION INTRAMUSCULAR; INTRAVENOUS; SUBCUTANEOUS at 21:12

## 2020-06-08 RX ADMIN — HYDROMORPHONE HYDROCHLORIDE 0.25 MILLIGRAM(S): 2 INJECTION INTRAMUSCULAR; INTRAVENOUS; SUBCUTANEOUS at 05:36

## 2020-06-08 RX ADMIN — SODIUM CHLORIDE 3 MILLILITER(S): 9 INJECTION INTRAMUSCULAR; INTRAVENOUS; SUBCUTANEOUS at 22:00

## 2020-06-08 RX ADMIN — NYSTATIN CREAM 1 APPLICATION(S): 100000 CREAM TOPICAL at 06:17

## 2020-06-08 RX ADMIN — CHLORHEXIDINE GLUCONATE 1 APPLICATION(S): 213 SOLUTION TOPICAL at 06:17

## 2020-06-08 RX ADMIN — ENOXAPARIN SODIUM 30 MILLIGRAM(S): 100 INJECTION SUBCUTANEOUS at 11:59

## 2020-06-08 RX ADMIN — NYSTATIN CREAM 1 APPLICATION(S): 100000 CREAM TOPICAL at 17:30

## 2020-06-08 RX ADMIN — Medication 500 MILLIGRAM(S): at 21:11

## 2020-06-08 RX ADMIN — LATANOPROST 1 DROP(S): 0.05 SOLUTION/ DROPS OPHTHALMIC; TOPICAL at 21:51

## 2020-06-08 RX ADMIN — Medication 500 MILLIGRAM(S): at 12:52

## 2020-06-08 RX ADMIN — Medication 1 APPLICATION(S): at 17:30

## 2020-06-08 RX ADMIN — Medication 325 MILLIGRAM(S): at 12:00

## 2020-06-08 RX ADMIN — PANTOPRAZOLE SODIUM 40 MILLIGRAM(S): 20 TABLET, DELAYED RELEASE ORAL at 17:29

## 2020-06-08 RX ADMIN — SODIUM CHLORIDE 3 MILLILITER(S): 9 INJECTION INTRAMUSCULAR; INTRAVENOUS; SUBCUTANEOUS at 16:02

## 2020-06-08 NOTE — PROGRESS NOTE ADULT - PROBLEM SELECTOR PLAN 2
S/p Right thoracotomy / washout / VATS decort 5/13/20.  Right Chest tube to waterseal.  Follow up CXR   Pleural fluid + for yeast, coag Negative Staph, Klebsiella, and strep mitis/oralis. Continue IV antibiotics per ID.   Vancomycin and Caspofungin completed 5/26/20 and Zosyn to be completed 6/9/20.  Blood cultures negative 5/8 and negative to date from 5/28 and 5/29  Triglycerides elevated >863, tube feds and supplements changed as per nutrition. S/p Right thoracotomy / washout / VATS decort 5/13/20.  Right Chest tube to waterseal.  Follow up CXR   Pleural fluid + for yeast, coag Negative Staph, Klebsiella, and strep mitis/oralis. Continue IV antibiotics per ID.   Vancomycin and Caspofungin completed 5/26/20 and Meropenum to be completed 6/9/20.  Blood cultures negative 5/8 and negative to date from 5/28 and 5/29  Triglycerides elevated >863, tube feds and supplements changed as per nutrition.

## 2020-06-08 NOTE — CONSULT NOTE ADULT - REASON FOR ADMISSION
Transfer from [facility] for Distal Esophageal perforation
Transfer from [facility] for Distal Esophageal perforation
Transfer from [facility] for Distal Esophageal perforation
Transfer from [facility] for Distal Esophageal perforation
Transfer from [facility] for Distal Esophageal perforation

## 2020-06-08 NOTE — PROGRESS NOTE ADULT - ASSESSMENT
HPI: This 69 year old non-ambulatory Female with a PMHx significant for multiple sclerosis, Breast Cancer s/p R mastectomy, Osteoporosis, Mitral stenosis, right ankle fracture, chronic right sided sacral ulcer, chronic midline back pain since being dropped from a jame lift (7/17/29), admitted to [place] after presenting with sepsis in the setting of a UTI with chronic campos use and known large right ischial decubitus ulcer. Patient found to have a Moderately large Right hydropneumothorax resulting in partial right lung collapse and slight cardiomediastinal shift to the left on CXR with chest tube placed 5/8/20. Large bore chest tube placed 5/10/20 for persistent Right pneumothorax. Patient was followed by ID and was given Vanco and Zosyn originally for her presumed urosepsis, Caspofungin was added after pleural fluid was + for fungal elements. CT chest confirmed +Empyema in the Right pleural space. Patient ultimately transferred to [facility] late 5/11/20 after she was found to have a distal esophageal perforation on CT chest and Esophogram.     Of note, patient admitted 10/9/2019 for lower back and bilateral knee pain, found to have compression fracture of L2 with imaging subsequently found to have multiple lytic lesions on the spine and pelvis.  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 Matthew Fairbanks. (12 May 2020 02:24)    patient is admitted to surgical service, pre-operatively planned for an esophageal stent 5/13.  Patient's labs/ cultures from [facility] reviewed.     Empyema with polymicrobial infection:  Strep mitis infection  Klebsiella oxytoca infection  CoNS infection  Perforated esophagus  Fevers    Plan:  s/p esophageal stent  and VATS 5/13  s/p PEJ on 5/15    YEAST from fluid culture being worked up  NOT Candida auris; specimen sent to [facility] labs      COMPLETED CASPO/ VANCO      - Continue MERREM   BLOOD CX sent 5/28; REPEAT blood cx 5/29  WILL MAINTAIN end date of 6/9/2020    - continue Chest tubes    - regarding DECUBITUS Ulcer, present prior to admission in this bed-ridden patient, CLINICAL osteomyelitis  - continue wound care even after course of Antibiotics   - CONSIDER surgical evaluation for DIVERTING COLOSTOMY  - osteomyelitis with little chance of improvement if FECAL contamination persists

## 2020-06-08 NOTE — PROGRESS NOTE ADULT - SUBJECTIVE AND OBJECTIVE BOX
[facility]  INFECTIOUS DISEASES AND INTERNAL MEDICINE at [place]  =======================================================  Phong Wang MD  Diplomates American Board of Internal Medicine and Infectious Diseases  Telephone [phone]  Fax            [phone]  =======================================================    [id number]  GEORGE STANLEY   follow up: empyema    s/p EGD and esophageal stent; s/p VATS 5/14  s/p gastrojejunal tube on 5/15/2020  s/p debridement of sacral decubitus    still with Chylous drainage from CT tube     triglycerides in chest tube fluid    =======================================================  REVIEW OF SYSTEMS:  CONSTITUTIONAL:  No Fever or chills  HEENT:  No diplopia or blurred vision.  No earache, sore throat or runny nose.  CARDIOVASCULAR:  No pressure, squeezing, strangling, tightness, heaviness or aching about the chest, neck, axilla or epigastrium.  RESPIRATORY:  MILD shortness of breath  GASTROINTESTINAL:  No nausea, vomiting or diarrhea.  GENITOURINARY:  No dysuria, frequency or urgency. No Blood in urine  MUSCULOSKELETAL:  no joint aches, no muscle pain  SKIN:  No change in skin, hair or nails.  NEUROLOGIC:  No Headaches, seizures or weakness.  PSYCHIATRIC:  No disorder of thought or mood.  ENDOCRINE:  No heat or cold intolerance  HEMATOLOGICAL:  No easy bruising or bleeding.   =======================================================  Allergies  Sudafed (Other)    ======================================================  Physical Exam:  ============   (see vitals section below)    General:  No acute distress. FRAIL  Eye: Pupils are equal, round and reactive to light, Extraocular movements are intact, Normal conjunctiva.  HENT: Normocephalic, Oral mucosa is moist, No pharyngeal erythema, No sinus tenderness.  Neck: Supple, No lymphadenopathy.  Respiratory: Lungs  with diminished air entry at bases  1st chest tube to water seal  2nd chest tube connected to SOURAV drain.  TURBID cream COLOR FLUID, slight tinged of blood  Cardiovascular: Normal rate, Regular rhythm  Gastrointestinal: Soft, Non-tender, Non-distended, Normal bowel sounds.  PEJ tube present in abd  Genitourinary:  no dysuria  Lymphatics: No lymphadenopathy neck,   Musculoskeletal: Normal range of motion, Normal strength.   decubitus ulcer on right hip   Neurologic: Alert, Oriented, No focal deficits, Cranial Nerves II-XII are grossly intact.  Psychiatric: Appropriate mood & affect.    =======================================================  Vitals:  ============  T(F): 99.1 (08 Jun 2020 11:50), Max: 99.1 (08 Jun 2020 11:50)  HR: 102 (08 Jun 2020 11:50)  BP: 97/68 (08 Jun 2020 11:50)  RR: 16 (08 Jun 2020 11:50)  SpO2: 97% (08 Jun 2020 11:50) (95% - 97%)  temp max in last 48H T(F): , Max: 99.1 (06-08-20 @ 11:50)    =======================================================  Current Antibiotics:  meropenem  IVPB 1000 milliGRAM(s) IV Intermittent every 8 hours    Other medications:  ascorbic acid 500 milliGRAM(s) Oral three times a day  chlorhexidine 4% Liquid 1 Application(s) Topical <User Schedule>  Dakins Solution - 1/2 Strength 1 Application(s) Topical two times a day  enoxaparin Injectable 30 milliGRAM(s) SubCutaneous every 12 hours  ferrous    sulfate 325 milliGRAM(s) Oral daily  folic acid 1 milliGRAM(s) Oral daily  latanoprost 0.005% Ophthalmic Solution 1 Drop(s) Both EYES at bedtime  multivitamin 1 Tablet(s) Oral daily  multivitamin/minerals/iron Oral Solution (CENTRUM) 15 milliLiter(s) Oral daily  nystatin Cream 1 Application(s) Topical two times a day  pantoprazole  Injectable 40 milliGRAM(s) IV Push two times a day  sodium chloride 0.9% lock flush 3 milliLiter(s) IV Push every 8 hours      =======================================================  Labs:                        7.9    13.32 )-----------( 746      ( 07 Jun 2020 06:52 )             24.9      06-07    131<L>  |  99  |  23.0<H>  ----------------------------<  111<H>  4.8   |  21.0<L>  |  0.61    Ca    7.8<L>      07 Jun 2020 06:52  Phos  3.5     06-07  Mg     1.9     06-07        Culture - Blood (collected 05-29-20 @ 11:31)  Source: .Blood Blood  Final Report (06-03-20 @ 13:00):    No growth at 5 days.    Culture - Blood (collected 05-29-20 @ 11:31)  Source: .Blood Blood  Final Report (06-03-20 @ 13:00):    No growth at 5 days.    Culture - Blood (collected 05-28-20 @ 19:56)  Source: .Blood Blood-Peripheral  Final Report (06-02-20 @ 21:00):    No growth at 5 days.    Culture - Blood (collected 05-28-20 @ 19:56)  Source: .Blood Blood-Peripheral  Final Report (06-02-20 @ 21:00):    No growth at 5 days.    Culture - Blood (collected 05-26-20 @ 14:22)  Source: .Blood Blood-Peripheral  Final Report (05-31-20 @ 15:00):    No growth at 5 days.    Creatinine, Serum: 0.61 mg/dL (06-07-20 @ 06:52)  Creatinine, Serum: 0.52 mg/dL (06-05-20 @ 05:10)    WBC Count: 13.32 K/uL (06-07-20 @ 06:52)  WBC Count: 11.20 K/uL (06-05-20 @ 05:10)    Alkaline Phosphatase, Serum: 153 U/L (06-05-20 @ 05:10)  Alanine Aminotransferase (ALT/SGPT): 31 U/L (06-05-20 @ 05:10)  Aspartate Aminotransferase (AST/SGOT): 26 U/L (06-05-20 @ 05:10)  Bilirubin Total, Serum: <0.2 mg/dL (06-05-20 @ 05:10)  Bilirubin Direct, Serum: <0.1 mg/dL (06-05-20 @ 05:10)

## 2020-06-08 NOTE — CONSULT NOTE ADULT - SUBJECTIVE AND OBJECTIVE BOX
70yF was admitted on 05-11 from [place] for a Distal Esophageal perforation .      HPI:  69 year old non-ambulatory Female with a PMHx significant for multiple sclerosis, Breast Cancer s/p R mastectomy, Osteoporosis, Mitral stenosis, right ankle fracture, chronic right sided sacral ulcer, chronic midline back pain since being dropped from a jame lift (7/17/29), admitted to [place] after presenting with sepsis in the setting of a UTI with chronic campos use and known large right ischial decubitus ulcer. Patient found to have a Moderately large Right hydropneumothorax resulting in partial right lung collapse and slight cardiomediastinal shift to the left on CXR with chest tube placed 5/8/20. Large bore chest tube placed 5/10/20 for persistent Right pneumothorax. Patient was followed by ID and was given Vanco and Zosyn originally for her presumed urosepsis, Caspofungin was added after pleural fluid was + for fungal elements. CT chest confirmed +Empyema in the Right pleural space. Patient ultimately transferred to [facility] late 5/11/20 after she was found to have a distal esophageal perforation on CT chest and Esophogram.     Of note, patient admitted 10/9/2019 for lower back and bilateral knee pain, found to have compression fracture of L2 with imaging subsequently found to have multiple lytic lesions on the spine and pelvis.  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 Matthew Fairbanks. (12 May 2020 02:24)      Patient reports that she has a neurologist that sees her and manages her MS, which is non-relapsing progressive type. She reports that she functionally was dependent for most of her care and mobility and assisted by her son. Her home is WC accessible with a ramp. She also has a special mattress which is meant to prevent wounds. Patient would like to go to rehab as she has new medical issues and wants to try to improve physically as well.     REVIEW OF SYSTEMS  Constitutional - No fever, No weight loss, +fatigue  HEENT - No eye pain, No visual disturbances, No difficulty hearing, No tinnitus, No vertigo, No neck pain  Respiratory - No cough, No wheezing, No shortness of breath  Cardiovascular - No chest pain, No palpitations  Gastrointestinal - No abdominal pain, No nausea, No vomiting, No diarrhea, No constipation  Genitourinary - No dysuria, No frequency, No hematuria, +incontinence  Neurological - No headaches, No memory loss, +loss of strength, +numbness, No tremors  Skin - No itching, No rashes, +lesions   Endocrine - No temperature intolerance  Musculoskeletal - +joint pain, No joint swelling, +muscle pain  Psychiatric - No depression, No anxiety    VITALS  T(C): 36.7 (06-08-20 @ 05:35), Max: 36.8 (06-07-20 @ 23:11)  HR: 92 (06-08-20 @ 05:35) (92 - 98)  BP: 134/81 (06-08-20 @ 05:35) (102/68 - 134/81)  RR: 17 (06-08-20 @ 05:35) (16 - 18)  SpO2: 95% (06-08-20 @ 05:35) (95% - 96%)  Wt(kg): --    PAST MEDICAL & SURGICAL HISTORY  Breast cancer metastasized to bone  Hypertension  Multiple sclerosis  S/P mastectomy, right  Breast CA  Osteoporosis  MS (mitral stenosis)  History of bowel resection  H/O breast surgery      SOCIAL HISTORY  Smoking - Denied  EtOH - Denied   Drugs - Denied    FUNCTIONAL HISTORY  Lives with son, WC accessible  Dependent  Nonambulatory  Motorized WC for ambulation  CURRENT FUNCTIONAL STATUS      FAMILY HISTORY   FHx: hyperlipidemia  No pertinent family history in first degree relatives      RECENT LABS/IMAGING  CBC Full  -  ( 07 Jun 2020 06:52 )  WBC Count : 13.32 K/uL  RBC Count : 2.86 M/uL  Hemoglobin : 7.9 g/dL  Hematocrit : 24.9 %  Platelet Count - Automated : 746 K/uL  Mean Cell Volume : 87.1 fl  Mean Cell Hemoglobin : 27.6 pg  Mean Cell Hemoglobin Concentration : 31.7 gm/dL  Auto Neutrophil # : x  Auto Lymphocyte # : x  Auto Monocyte # : x  Auto Eosinophil # : x  Auto Basophil # : x  Auto Neutrophil % : x  Auto Lymphocyte % : x  Auto Monocyte % : x  Auto Eosinophil % : x  Auto Basophil % : x    06-07    131<L>  |  99  |  23.0<H>  ----------------------------<  111<H>  4.8   |  21.0<L>  |  0.61    Ca    7.8<L>      07 Jun 2020 06:52  Phos  3.5     06-07  Mg     1.9     06-07          ALLERGIES  Sudafed (Other)      MEDICATIONS   acetaminophen   Tablet .. 650 milliGRAM(s) Oral every 6 hours PRN  ascorbic acid 500 milliGRAM(s) Oral three times a day  chlorhexidine 4% Liquid 1 Application(s) Topical <User Schedule>  Dakins Solution - 1/2 Strength 1 Application(s) Topical two times a day  enoxaparin Injectable 30 milliGRAM(s) SubCutaneous every 12 hours  ferrous    sulfate 325 milliGRAM(s) Oral daily  folic acid 1 milliGRAM(s) Oral daily  latanoprost 0.005% Ophthalmic Solution 1 Drop(s) Both EYES at bedtime  loperamide 2 milliGRAM(s) Oral every 4 hours PRN  meropenem  IVPB 1000 milliGRAM(s) IV Intermittent every 8 hours  multivitamin 1 Tablet(s) Oral daily  multivitamin/minerals/iron Oral Solution (CENTRUM) 15 milliLiter(s) Oral daily  nystatin Cream 1 Application(s) Topical two times a day  pantoprazole  Injectable 40 milliGRAM(s) IV Push two times a day  sodium chloride 0.9% lock flush 3 milliLiter(s) IV Push every 8 hours  sodium chloride 0.9% lock flush 10 milliLiter(s) IV Push every 1 hour PRN      ----------------------------------------------------------------------------------------  PHYSICAL EXAM  Constitutional - NAD, Comfortable  HEENT - NCAT, EOMI  Neck - Supple, No limited ROM  Chest - Breathing comfortably, No wheezing, +Chest tube  Cardiovascular - S1S2   Abdomen - Soft   Extremities - BUE/BLE spasticity  Neurologic Exam -                    Cognitive - Awake, Alert, AAO to self, place, date, year, situation     Communication - Fluent, No dysarthria     Motor - Spastic 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

## 2020-06-08 NOTE — PROGRESS NOTE ADULT - ASSESSMENT
69 year old non-ambulatory Female with a PMHx significant for multiple sclerosis, Breast Cancer s/p R mastectomy, Osteoporosis, Mitral stenosis, right ankle fracture, chronic right sided sacral ulcer, chronic midline back pain since being dropped from a jame lift (7/17/29), admitted to [place] after presenting with sepsis in the setting of a UTI with chronic campos use and known large right ischial decubitus ulcer. Patient found to have a Moderately large Right hydropneumothorax resulting in partial right lung collapse and slight cardiomediastinal shift to the left on CXR with chest tube placed 5/8/20. Large bore chest tube placed 5/10/20 for persistent Right pneumothorax. Patient was followed by ID and was given Vanco and Zosyn originally for her presumed urosepsis, Caspofungin was added after pleural fluid was + for fungal elements. CT chest confirmed +Empyema in the Right pleural space. Patient ultimately transferred to [facility] late 5/11/20 after she was found to have a distal esophageal perforation on CT chest and Esophogram.     5/13/20 patient underwent EGD with esophageal stent placed, NGT placed, VATS with right thoracic cavity washout and pulmonary decortication with Dl drain, right posterior, and right medial chest tubes placed with Dr. Murcia.  On 5/14 patient was taken to IR for unsuccessful G/J tube placement. 5/15 patient was taken to the OR for feeding tube placement with Dr. Murcia.  The G tube is to gravity drainage, and slow feeds are advancing through the J tube. S/P esophagram 5/18 which showed narrowing distal stented area. Tolerating tube feeds.   5/27: G port clamped. Right Chest tube 31 to waterseal, Right CT #2 d/c.  Pleural fluid sent for triglycerides > 99 high normal   5/29 Ct Chest with no extrav., mod L effusion, small loculated R.   6/6 pleural fluid resent for triglycerides and found to be 863.  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

## 2020-06-08 NOTE — PROGRESS NOTE ADULT - SUBJECTIVE AND OBJECTIVE BOX
Subjective: Patient lying in bed, no acte distress noted, denies fever, chills, chest pain, palpitation, shortness of breath, cough, abdominal pain, N/V.       V/S  T(C): 36.8 (06-07-20 @ 23:11), Max: 37.1 (06-07-20 @ 04:45)  HR: 98 (06-07-20 @ 23:11) (94 - 105)  BP: 102/68 (06-07-20 @ 23:11) (94/65 - 120/71)  RR: 16 (06-07-20 @ 23:11) (16 - 18)  SpO2: 96% (06-07-20 @ 23:11) (95% - 99%) on room air                                            Tele:    CHEST TUBE:  Right chest tube to water seal. Right SOURAV to bulb suction     OUTPUT:           per 24 hours     Drainage:    AIR LEAKS:  [ x] YES [ ] NO      MEDICATIONS  (STANDING):  chlorhexidine 4% Liquid 1 Application(s) Topical <User Schedule>  Dakins Solution - 1/2 Strength 1 Application(s) Topical two times a day  enoxaparin Injectable 30 milliGRAM(s) SubCutaneous every 12 hours  latanoprost 0.005% Ophthalmic Solution 1 Drop(s) Both EYES at bedtime  lidocaine 1% Injectable 10 milliLiter(s) Local Injection once  meropenem  IVPB 1000 milliGRAM(s) IV Intermittent every 8 hours  multivitamin/minerals/iron Oral Solution (CENTRUM) 15 milliLiter(s) Oral daily  nystatin Cream 1 Application(s) Topical two times a day  pantoprazole  Injectable 40 milliGRAM(s) IV Push every 12 hours  sodium chloride 0.9% lock flush 3 milliLiter(s) IV Push every 8 hours      06-07    131<L>  |  99  |  23.0<H>  ----------------------------<  111<H>  4.8   |  21.0<L>  |  0.61    Ca    7.8<L>      07 Jun 2020 06:52  Phos  3.5     06-07  Mg     1.9     06-07                                 7.9    13.32 )-----------( 746      ( 07 Jun 2020 06:52 )             24.9                  CXR:    < from: Xray Chest 1 View- PORTABLE-Urgent (06.06.20 @ 10:17) >    INTERPRETATION:  History: CHF    Portable radiograph of the chest is performed. comparison is made to 6/5/2020.    The heart is enlarged. Esophageal stents is again appreciated. Right-sided chest tube or drain is again noted. There is a small loculated right pleural effusion is again seen with suggestion of underlying infiltrate. There is again obscuration of the left diaphragmatic surface. There are diffuse sclerotic bone metastases. There is no evidence of pneumothorax. Right axillary clips are again seen.    Impression: No significant change       end of copied text >    < from: CT Chest w/ IV Cont (05.29.20 @ 21:26) >  IMPRESSION:   Interval placement of an esophageal stent. No extravasation of enteric contrast or paraesophageal air to suggest leak.    Moderate left pleural effusion and small right loculated pleural effusion with smaller locules of pleural fluid in the periphery of the right upper lobe and right lower lobe. Small right pneumothorax. Two right-sided chest tubes. Associated compressive atelectasis at the lung bases.        < end of copied text >    Physical Exam:  Neuro: A+O x 3, non-focal, speech clear and intact  HEENT: PERRL, EOMI, oral mucosa pink and moist  Neck: supple, no JVD  CV: regular rate, regular rhythm, +S1S2, no murmurs or rub  Pulm/chest: lung sounds CTA and equal bilaterally, no accessory muscle use noted  Abd: soft, NT, ND, +BS  Ext: DAVIDSON x 4, no C/C/E  Skin: warm, well perfused, no rashes              PAST MEDICAL & SURGICAL HISTORY:  Breast cancer metastasized to bone  Hypertension  Multiple sclerosis  S/P mastectomy, right  Breast CA  Osteoporosis  MS (mitral stenosis)  History of bowel resection  H/O breast surgery Subjective: Patient lying in bed, no acte distress noted, denies fever, chills, chest pain, palpitation, shortness of breath, cough, abdominal pain, N/V.       V/S  T(C): 36.8 (06-07-20 @ 23:11), Max: 37.1 (06-07-20 @ 04:45)  HR: 98 (06-07-20 @ 23:11) (94 - 105)  BP: 102/68 (06-07-20 @ 23:11) (94/65 - 120/71)  RR: 16 (06-07-20 @ 23:11) (16 - 18)  SpO2: 96% (06-07-20 @ 23:11) (95% - 99%) on room air                                            Tele:    CHEST TUBE:  Right chest tube to water seal. Right SOURAV to bulb suction     OUTPUT:           per 24 hours     Drainage:    AIR LEAKS:  [ x] YES [ ] NO      MEDICATIONS  (STANDING):  chlorhexidine 4% Liquid 1 Application(s) Topical <User Schedule>  Dakins Solution - 1/2 Strength 1 Application(s) Topical two times a day  enoxaparin Injectable 30 milliGRAM(s) SubCutaneous every 12 hours  latanoprost 0.005% Ophthalmic Solution 1 Drop(s) Both EYES at bedtime  lidocaine 1% Injectable 10 milliLiter(s) Local Injection once  meropenem  IVPB 1000 milliGRAM(s) IV Intermittent every 8 hours  multivitamin/minerals/iron Oral Solution (CENTRUM) 15 milliLiter(s) Oral daily  nystatin Cream 1 Application(s) Topical two times a day  pantoprazole  Injectable 40 milliGRAM(s) IV Push every 12 hours  sodium chloride 0.9% lock flush 3 milliLiter(s) IV Push every 8 hours      06-07    131<L>  |  99  |  23.0<H>  ----------------------------<  111<H>  4.8   |  21.0<L>  |  0.61    Ca    7.8<L>      07 Jun 2020 06:52  Phos  3.5     06-07  Mg     1.9     06-07                                 7.9    13.32 )-----------( 746      ( 07 Jun 2020 06:52 )             24.9                  CXR:    < from: Xray Chest 1 View- PORTABLE-Urgent (06.06.20 @ 10:17) >    INTERPRETATION:  History: CHF    Portable radiograph of the chest is performed. comparison is made to 6/5/2020.    The heart is enlarged. Esophageal stents is again appreciated. Right-sided chest tube or drain is again noted. There is a small loculated right pleural effusion is again seen with suggestion of underlying infiltrate. There is again obscuration of the left diaphragmatic surface. There are diffuse sclerotic bone metastases. There is no evidence of pneumothorax. Right axillary clips are again seen.    Impression: No significant change       end of copied text >    < from: CT Chest w/ IV Cont (05.29.20 @ 21:26) >  IMPRESSION:   Interval placement of an esophageal stent. No extravasation of enteric contrast or paraesophageal air to suggest leak.    Moderate left pleural effusion and small right loculated pleural effusion with smaller locules of pleural fluid in the periphery of the right upper lobe and right lower lobe. Small right pneumothorax. Two right-sided chest tubes. Associated compressive atelectasis at the lung bases.        < end of copied text >    Physical Exam:  Neuro: A+O x 3, non-focal, speech clear and intact  HEENT: PERRL, EOMI  Neck: supple, no JVD  CV: regular rate, regular rhythm, +S1S2, no murmurs or rub  Pulm/chest: Decreased breath sounds at Right. Right CT x1 to water seal. +air leak, SOURAV to bulb suction, dressing intact.   Abd: soft, NT, ND, +BS, GJ tube: J port to feeding, G port capped, Rectal tube to drainage bag.   : Starks cath to self drainage   Ext: +2 edema BLE, +PP pulses bilaterally  Skin: warm, well perfused      PAST MEDICAL & SURGICAL HISTORY:  Breast cancer metastasized to bone  Hypertension  Multiple sclerosis  S/P mastectomy, right  Breast CA  Osteoporosis  MS (mitral stenosis)  History of bowel resection  H/O breast surgery Subjective: Patient lying in bed, no acte distress noted, denies fever, chills, chest pain, palpitation, shortness of breath, cough, abdominal pain, N/V.       V/S  T(C): 36.8 (06-07-20 @ 23:11), Max: 37.1 (06-07-20 @ 04:45)  HR: 98 (06-07-20 @ 23:11) (94 - 105)  BP: 102/68 (06-07-20 @ 23:11) (94/65 - 120/71)  RR: 16 (06-07-20 @ 23:11) (16 - 18)  SpO2: 96% (06-07-20 @ 23:11) (95% - 99%) on room air                                            Tele: SR with PACs    CHEST TUBE:  Right chest tube to water seal. Right SOURAV to bulb suction     OUTPUT:           per 24 hours     Drainage:    AIR LEAKS:  [ x] YES [ ] NO      MEDICATIONS  (STANDING):  chlorhexidine 4% Liquid 1 Application(s) Topical <User Schedule>  Dakins Solution - 1/2 Strength 1 Application(s) Topical two times a day  enoxaparin Injectable 30 milliGRAM(s) SubCutaneous every 12 hours  latanoprost 0.005% Ophthalmic Solution 1 Drop(s) Both EYES at bedtime  lidocaine 1% Injectable 10 milliLiter(s) Local Injection once  meropenem  IVPB 1000 milliGRAM(s) IV Intermittent every 8 hours  multivitamin/minerals/iron Oral Solution (CENTRUM) 15 milliLiter(s) Oral daily  nystatin Cream 1 Application(s) Topical two times a day  pantoprazole  Injectable 40 milliGRAM(s) IV Push every 12 hours  sodium chloride 0.9% lock flush 3 milliLiter(s) IV Push every 8 hours      06-07    131<L>  |  99  |  23.0<H>  ----------------------------<  111<H>  4.8   |  21.0<L>  |  0.61    Ca    7.8<L>      07 Jun 2020 06:52  Phos  3.5     06-07  Mg     1.9     06-07                                 7.9    13.32 )-----------( 746      ( 07 Jun 2020 06:52 )             24.9                  CXR:    < from: Xray Chest 1 View- PORTABLE-Urgent (06.06.20 @ 10:17) >    INTERPRETATION:  History: CHF    Portable radiograph of the chest is performed. comparison is made to 6/5/2020.    The heart is enlarged. Esophageal stents is again appreciated. Right-sided chest tube or drain is again noted. There is a small loculated right pleural effusion is again seen with suggestion of underlying infiltrate. There is again obscuration of the left diaphragmatic surface. There are diffuse sclerotic bone metastases. There is no evidence of pneumothorax. Right axillary clips are again seen.    Impression: No significant change       end of copied text >    < from: CT Chest w/ IV Cont (05.29.20 @ 21:26) >  IMPRESSION:   Interval placement of an esophageal stent. No extravasation of enteric contrast or paraesophageal air to suggest leak.    Moderate left pleural effusion and small right loculated pleural effusion with smaller locules of pleural fluid in the periphery of the right upper lobe and right lower lobe. Small right pneumothorax. Two right-sided chest tubes. Associated compressive atelectasis at the lung bases.        < end of copied text >    Physical Exam:  Neuro: A+O x 3, non-focal, speech clear and intact  HEENT: PERRL, EOMI  Neck: supple, no JVD  CV: regular rate, regular rhythm, +S1S2, no murmurs or rub  Pulm/chest: Decreased breath sounds at Right. Right CT x1 to water seal. +air leak, SOURAV to bulb suction, dressing intact.   Abd: soft, NT, ND, +BS, GJ tube: J port to feeding, G port capped, Rectal tube to drainage bag.   : Starks cath to self drainage   Ext: +2 edema BLE, +PP pulses bilaterally  Skin: warm, well perfused      PAST MEDICAL & SURGICAL HISTORY:  Breast cancer metastasized to bone  Hypertension  Multiple sclerosis  S/P mastectomy, right  Breast CA  Osteoporosis  MS (mitral stenosis)  History of bowel resection  H/O breast surgery

## 2020-06-09 ENCOUNTER — TRANSCRIPTION ENCOUNTER (OUTPATIENT)
Age: 70
End: 2020-06-09

## 2020-06-09 VITALS
HEART RATE: 89 BPM | RESPIRATION RATE: 18 BRPM | TEMPERATURE: 98 F | OXYGEN SATURATION: 97 % | DIASTOLIC BLOOD PRESSURE: 55 MMHG | SYSTOLIC BLOOD PRESSURE: 90 MMHG

## 2020-06-09 LAB
ALBUMIN SERPL ELPH-MCNC: 1.8 G/DL — LOW (ref 3.3–5.2)
ALP SERPL-CCNC: 140 U/L — HIGH (ref 40–120)
ALT FLD-CCNC: 31 U/L — SIGNIFICANT CHANGE UP
ANION GAP SERPL CALC-SCNC: 10 MMOL/L — SIGNIFICANT CHANGE UP (ref 5–17)
AST SERPL-CCNC: 26 U/L — SIGNIFICANT CHANGE UP
BILIRUB SERPL-MCNC: <0.2 MG/DL — LOW (ref 0.4–2)
BUN SERPL-MCNC: 24 MG/DL — HIGH (ref 8–20)
CALCIUM SERPL-MCNC: 7.5 MG/DL — LOW (ref 8.6–10.2)
CHLORIDE SERPL-SCNC: 99 MMOL/L — SIGNIFICANT CHANGE UP (ref 98–107)
CO2 SERPL-SCNC: 23 MMOL/L — SIGNIFICANT CHANGE UP (ref 22–29)
CREAT SERPL-MCNC: 0.61 MG/DL — SIGNIFICANT CHANGE UP (ref 0.5–1.3)
GLUCOSE SERPL-MCNC: 109 MG/DL — HIGH (ref 70–99)
HCT VFR BLD CALC: 22.4 % — LOW (ref 34.5–45)
HGB BLD-MCNC: 7.1 G/DL — LOW (ref 11.5–15.5)
MAGNESIUM SERPL-MCNC: 1.7 MG/DL — SIGNIFICANT CHANGE UP (ref 1.6–2.6)
MCHC RBC-ENTMCNC: 27.7 PG — SIGNIFICANT CHANGE UP (ref 27–34)
MCHC RBC-ENTMCNC: 31.7 GM/DL — LOW (ref 32–36)
MCV RBC AUTO: 87.5 FL — SIGNIFICANT CHANGE UP (ref 80–100)
PHOSPHATE SERPL-MCNC: 3.3 MG/DL — SIGNIFICANT CHANGE UP (ref 2.4–4.7)
PLATELET # BLD AUTO: 659 K/UL — HIGH (ref 150–400)
POTASSIUM SERPL-MCNC: 4.8 MMOL/L — SIGNIFICANT CHANGE UP (ref 3.5–5.3)
POTASSIUM SERPL-SCNC: 4.8 MMOL/L — SIGNIFICANT CHANGE UP (ref 3.5–5.3)
PROT SERPL-MCNC: 5.4 G/DL — LOW (ref 6.6–8.7)
RBC # BLD: 2.56 M/UL — LOW (ref 3.8–5.2)
RBC # FLD: 18 % — HIGH (ref 10.3–14.5)
SODIUM SERPL-SCNC: 132 MMOL/L — LOW (ref 135–145)
WBC # BLD: 11.12 K/UL — HIGH (ref 3.8–10.5)
WBC # FLD AUTO: 11.12 K/UL — HIGH (ref 3.8–10.5)

## 2020-06-09 PROCEDURE — 80053 COMPREHEN METABOLIC PANEL: CPT

## 2020-06-09 PROCEDURE — 99232 SBSQ HOSP IP/OBS MODERATE 35: CPT

## 2020-06-09 PROCEDURE — 36415 COLL VENOUS BLD VENIPUNCTURE: CPT

## 2020-06-09 PROCEDURE — 87640 STAPH A DNA AMP PROBE: CPT

## 2020-06-09 PROCEDURE — 82272 OCCULT BLD FECES 1-3 TESTS: CPT

## 2020-06-09 PROCEDURE — 83880 ASSAY OF NATRIURETIC PEPTIDE: CPT

## 2020-06-09 PROCEDURE — 86922 COMPATIBILITY TEST ANTIGLOB: CPT

## 2020-06-09 PROCEDURE — 84133 ASSAY OF URINE POTASSIUM: CPT

## 2020-06-09 PROCEDURE — 83935 ASSAY OF URINE OSMOLALITY: CPT

## 2020-06-09 PROCEDURE — 74177 CT ABD & PELVIS W/CONTRAST: CPT

## 2020-06-09 PROCEDURE — 86900 BLOOD TYPING SEROLOGIC ABO: CPT

## 2020-06-09 PROCEDURE — 71260 CT THORAX DX C+: CPT

## 2020-06-09 PROCEDURE — 84443 ASSAY THYROID STIM HORMONE: CPT

## 2020-06-09 PROCEDURE — 84300 ASSAY OF URINE SODIUM: CPT

## 2020-06-09 PROCEDURE — 86901 BLOOD TYPING SEROLOGIC RH(D): CPT

## 2020-06-09 PROCEDURE — 86850 RBC ANTIBODY SCREEN: CPT

## 2020-06-09 PROCEDURE — 86860 RBC ANTIBODY ELUTION: CPT

## 2020-06-09 PROCEDURE — 85730 THROMBOPLASTIN TIME PARTIAL: CPT

## 2020-06-09 PROCEDURE — 87635 SARS-COV-2 COVID-19 AMP PRB: CPT

## 2020-06-09 PROCEDURE — 76000 FLUOROSCOPY <1 HR PHYS/QHP: CPT

## 2020-06-09 PROCEDURE — 74018 RADEX ABDOMEN 1 VIEW: CPT

## 2020-06-09 PROCEDURE — 85610 PROTHROMBIN TIME: CPT

## 2020-06-09 PROCEDURE — 80076 HEPATIC FUNCTION PANEL: CPT

## 2020-06-09 PROCEDURE — 86880 COOMBS TEST DIRECT: CPT

## 2020-06-09 PROCEDURE — 74220 X-RAY XM ESOPHAGUS 1CNTRST: CPT

## 2020-06-09 PROCEDURE — 94760 N-INVAS EAR/PLS OXIMETRY 1: CPT

## 2020-06-09 PROCEDURE — 83930 ASSAY OF BLOOD OSMOLALITY: CPT

## 2020-06-09 PROCEDURE — 84134 ASSAY OF PREALBUMIN: CPT

## 2020-06-09 PROCEDURE — 93005 ELECTROCARDIOGRAM TRACING: CPT

## 2020-06-09 PROCEDURE — 87641 MR-STAPH DNA AMP PROBE: CPT

## 2020-06-09 PROCEDURE — 83735 ASSAY OF MAGNESIUM: CPT

## 2020-06-09 PROCEDURE — L8699: CPT

## 2020-06-09 PROCEDURE — 86923 COMPATIBILITY TEST ELECTRIC: CPT

## 2020-06-09 PROCEDURE — 99024 POSTOP FOLLOW-UP VISIT: CPT

## 2020-06-09 PROCEDURE — 99285 EMERGENCY DEPT VISIT HI MDM: CPT | Mod: 25

## 2020-06-09 PROCEDURE — 84100 ASSAY OF PHOSPHORUS: CPT

## 2020-06-09 PROCEDURE — 36430 TRANSFUSION BLD/BLD COMPNT: CPT

## 2020-06-09 PROCEDURE — 82962 GLUCOSE BLOOD TEST: CPT

## 2020-06-09 PROCEDURE — 85027 COMPLETE CBC AUTOMATED: CPT

## 2020-06-09 PROCEDURE — C1874: CPT

## 2020-06-09 PROCEDURE — 89051 BODY FLUID CELL COUNT: CPT

## 2020-06-09 PROCEDURE — 87040 BLOOD CULTURE FOR BACTERIA: CPT

## 2020-06-09 PROCEDURE — 82330 ASSAY OF CALCIUM: CPT

## 2020-06-09 PROCEDURE — 84478 ASSAY OF TRIGLYCERIDES: CPT

## 2020-06-09 PROCEDURE — 81001 URINALYSIS AUTO W/SCOPE: CPT

## 2020-06-09 PROCEDURE — 86902 BLOOD TYPE ANTIGEN DONOR EA: CPT

## 2020-06-09 PROCEDURE — 86870 RBC ANTIBODY IDENTIFICATION: CPT

## 2020-06-09 PROCEDURE — 83036 HEMOGLOBIN GLYCOSYLATED A1C: CPT

## 2020-06-09 PROCEDURE — 80048 BASIC METABOLIC PNL TOTAL CA: CPT

## 2020-06-09 PROCEDURE — 80202 ASSAY OF VANCOMYCIN: CPT

## 2020-06-09 PROCEDURE — P9016: CPT

## 2020-06-09 PROCEDURE — 82570 ASSAY OF URINE CREATININE: CPT

## 2020-06-09 PROCEDURE — 71045 X-RAY EXAM CHEST 1 VIEW: CPT

## 2020-06-09 RX ORDER — ENOXAPARIN SODIUM 100 MG/ML
30 INJECTION SUBCUTANEOUS
Qty: 0 | Refills: 0 | DISCHARGE
Start: 2020-06-09

## 2020-06-09 RX ORDER — FOLIC ACID 0.8 MG
1 TABLET ORAL
Qty: 0 | Refills: 0 | DISCHARGE
Start: 2020-06-09

## 2020-06-09 RX ORDER — MAGNESIUM SULFATE 500 MG/ML
2 VIAL (ML) INJECTION ONCE
Refills: 0 | Status: DISCONTINUED | OUTPATIENT
Start: 2020-06-09 | End: 2020-06-09

## 2020-06-09 RX ORDER — SODIUM HYPOCHLORITE 0.125 %
1 SOLUTION, NON-ORAL MISCELLANEOUS
Qty: 0 | Refills: 0 | DISCHARGE
Start: 2020-06-09

## 2020-06-09 RX ORDER — NYSTATIN CREAM 100000 [USP'U]/G
1 CREAM TOPICAL
Qty: 0 | Refills: 0 | DISCHARGE
Start: 2020-06-09

## 2020-06-09 RX ORDER — FERROUS SULFATE 325(65) MG
1 TABLET ORAL
Qty: 0 | Refills: 0 | DISCHARGE
Start: 2020-06-09

## 2020-06-09 RX ORDER — PANTOPRAZOLE SODIUM 20 MG/1
1 TABLET, DELAYED RELEASE ORAL
Qty: 0 | Refills: 0 | DISCHARGE
Start: 2020-06-09

## 2020-06-09 RX ORDER — LIDOCAINE HCL 20 MG/ML
20 VIAL (ML) INJECTION ONCE
Refills: 0 | Status: COMPLETED | OUTPATIENT
Start: 2020-06-09 | End: 2020-06-09

## 2020-06-09 RX ORDER — LOPERAMIDE HCL 2 MG
1 TABLET ORAL
Qty: 0 | Refills: 0 | DISCHARGE
Start: 2020-06-09

## 2020-06-09 RX ADMIN — Medication 325 MILLIGRAM(S): at 11:35

## 2020-06-09 RX ADMIN — CHLORHEXIDINE GLUCONATE 1 APPLICATION(S): 213 SOLUTION TOPICAL at 07:11

## 2020-06-09 RX ADMIN — Medication 1 MILLIGRAM(S): at 11:35

## 2020-06-09 RX ADMIN — ENOXAPARIN SODIUM 30 MILLIGRAM(S): 100 INJECTION SUBCUTANEOUS at 10:00

## 2020-06-09 RX ADMIN — SODIUM CHLORIDE 3 MILLILITER(S): 9 INJECTION INTRAMUSCULAR; INTRAVENOUS; SUBCUTANEOUS at 06:15

## 2020-06-09 RX ADMIN — Medication 1 TABLET(S): at 11:35

## 2020-06-09 RX ADMIN — Medication 1 APPLICATION(S): at 06:11

## 2020-06-09 RX ADMIN — SODIUM CHLORIDE 3 MILLILITER(S): 9 INJECTION INTRAMUSCULAR; INTRAVENOUS; SUBCUTANEOUS at 13:14

## 2020-06-09 RX ADMIN — PANTOPRAZOLE SODIUM 40 MILLIGRAM(S): 20 TABLET, DELAYED RELEASE ORAL at 06:11

## 2020-06-09 RX ADMIN — Medication 15 MILLILITER(S): at 11:35

## 2020-06-09 RX ADMIN — NYSTATIN CREAM 1 APPLICATION(S): 100000 CREAM TOPICAL at 06:11

## 2020-06-09 RX ADMIN — MEROPENEM 100 MILLIGRAM(S): 1 INJECTION INTRAVENOUS at 06:11

## 2020-06-09 RX ADMIN — Medication 20 MILLILITER(S): at 09:21

## 2020-06-09 RX ADMIN — Medication 500 MILLIGRAM(S): at 13:14

## 2020-06-09 RX ADMIN — MEROPENEM 100 MILLIGRAM(S): 1 INJECTION INTRAVENOUS at 13:14

## 2020-06-09 RX ADMIN — Medication 500 MILLIGRAM(S): at 06:11

## 2020-06-09 NOTE — PROGRESS NOTE ADULT - SUBJECTIVE AND OBJECTIVE BOX
[facility]  INFECTIOUS DISEASES AND INTERNAL MEDICINE at [place]  =======================================================  Phong Wang MD  Diplomates American Board of Internal Medicine and Infectious Diseases  Telephone [phone]  Fax            [phone]  =======================================================    [id number]  GEORGE STANLEY   follow up: empyema    s/p EGD and esophageal stent; s/p VATS 5/14  s/p gastrojejunal tube on 5/15/2020  s/p debridement of sacral decubitus    no new issues  on tube feeds and PO eating.     =======================================================  REVIEW OF SYSTEMS:  CONSTITUTIONAL:  No Fever or chills  HEENT:  No diplopia or blurred vision.  No earache, sore throat or runny nose.  CARDIOVASCULAR:  No pressure, squeezing, strangling, tightness, heaviness or aching about the chest, neck, axilla or epigastrium.  RESPIRATORY:  MILD shortness of breath  GASTROINTESTINAL:  No nausea, vomiting or diarrhea.  GENITOURINARY:  No dysuria, frequency or urgency. No Blood in urine  MUSCULOSKELETAL:  no joint aches, no muscle pain  SKIN:  No change in skin, hair or nails.  NEUROLOGIC:  No Headaches, seizures or weakness.  PSYCHIATRIC:  No disorder of thought or mood.  ENDOCRINE:  No heat or cold intolerance  HEMATOLOGICAL:  No easy bruising or bleeding.   =======================================================    Allergies  Sudafed (Other)    ======================================================  Physical Exam:  ============   (see vitals section below)    General:  No acute distress. FRAIL  Eye: Pupils are equal, round and reactive to light, Extraocular movements are intact, Normal conjunctiva.  HENT: Normocephalic, Oral mucosa is moist, No pharyngeal erythema, No sinus tenderness.  Neck: Supple, No lymphadenopathy.  Respiratory: Lungs  with diminished air entry at bases  chest tube  in place TURBID cream COLOR FLUID, slight tinged of blood  Cardiovascular: Normal rate, Regular rhythm  Gastrointestinal: Soft, Non-tender, Non-distended, Normal bowel sounds.  PEJ tube present in abd  Genitourinary:  no dysuria  Lymphatics: No lymphadenopathy neck,   Musculoskeletal: Normal range of motion, Normal strength.   decubitus ulcer on right hip   Neurologic: Alert, Oriented, No focal deficits, Cranial Nerves II-XII are grossly intact.  Psychiatric: Appropriate mood & affect.    =======================================================  Vitals:  ============  T(F): 98.3 (09 Jun 2020 10:04), Max: 99.4 (08 Jun 2020 21:00)  HR: 87 (09 Jun 2020 10:04)  BP: 89/59 (09 Jun 2020 10:04)  RR: 17 (09 Jun 2020 10:04)  SpO2: 96% (09 Jun 2020 10:04) (94% - 97%)  temp max in last 48H T(F): , Max: 99.4 (06-08-20 @ 21:00)    =======================================================  Current Antibiotics:  meropenem  IVPB 1000 milliGRAM(s) IV Intermittent every 8 hours    Other medications:  ascorbic acid 500 milliGRAM(s) Oral three times a day  chlorhexidine 4% Liquid 1 Application(s) Topical <User Schedule>  Dakins Solution - 1/2 Strength 1 Application(s) Topical two times a day  enoxaparin Injectable 30 milliGRAM(s) SubCutaneous every 12 hours  ferrous    sulfate 325 milliGRAM(s) Oral daily  folic acid 1 milliGRAM(s) Oral daily  latanoprost 0.005% Ophthalmic Solution 1 Drop(s) Both EYES at bedtime  magnesium sulfate  IVPB 2 Gram(s) IV Intermittent once  multivitamin 1 Tablet(s) Oral daily  multivitamin/minerals/iron Oral Solution (CENTRUM) 15 milliLiter(s) Oral daily  nystatin Cream 1 Application(s) Topical two times a day  pantoprazole  Injectable 40 milliGRAM(s) IV Push two times a day  sodium chloride 0.9% lock flush 3 milliLiter(s) IV Push every 8 hours      =======================================================  Labs:                        7.1    11.12 )-----------( 659      ( 09 Jun 2020 06:21 )             22.4      06-09    132<L>  |  99  |  24.0<H>  ----------------------------<  109<H>  4.8   |  23.0  |  0.61    Ca    7.5<L>      09 Jun 2020 06:21  Phos  3.3     06-09  Mg     1.7     06-09    TPro  5.4<L>  /  Alb  1.8<L>  /  TBili  <0.2<L>  /  DBili  x   /  AST  26  /  ALT  31  /  AlkPhos  140<H>  06-09      Culture - Blood (collected 05-29-20 @ 11:31)  Source: .Blood Blood  Final Report (06-03-20 @ 13:00):    No growth at 5 days.    Culture - Blood (collected 05-29-20 @ 11:31)  Source: .Blood Blood  Final Report (06-03-20 @ 13:00):    No growth at 5 days.    Culture - Blood (collected 05-28-20 @ 19:56)  Source: .Blood Blood-Peripheral  Final Report (06-02-20 @ 21:00):    No growth at 5 days.    Culture - Blood (collected 05-28-20 @ 19:56)  Source: .Blood Blood-Peripheral  Final Report (06-02-20 @ 21:00):    No growth at 5 days.    Culture - Blood (collected 05-26-20 @ 14:22)  Source: .Blood Blood-Peripheral  Final Report (05-31-20 @ 15:00):    No growth at 5 days.    Creatinine, Serum: 0.61 mg/dL (06-09-20 @ 06:21)  Creatinine, Serum: 0.61 mg/dL (06-07-20 @ 06:52)  Creatinine, Serum: 0.52 mg/dL (06-05-20 @ 05:10)    WBC Count: 11.12 K/uL (06-09-20 @ 06:21)  WBC Count: 13.32 K/uL (06-07-20 @ 06:52)  WBC Count: 11.20 K/uL (06-05-20 @ 05:10)    Alkaline Phosphatase, Serum: 140 U/L (06-09-20 @ 06:21)  Alanine Aminotransferase (ALT/SGPT): 31 U/L (06-09-20 @ 06:21)  Aspartate Aminotransferase (AST/SGOT): 26 U/L (06-09-20 @ 06:21)  Bilirubin Total, Serum: <0.2 mg/dL (06-09-20 @ 06:21)

## 2020-06-09 NOTE — PROGRESS NOTE ADULT - PROBLEM SELECTOR PROBLEM 6
Carcinoma of right breast metastatic to bone
Pressure injury of right hip, stage 4
Pressure injury of right hip, stage 4
Carcinoma of right breast metastatic to bone
Carcinoma of right breast metastatic to bone
Pressure injury of right hip, stage 4

## 2020-06-09 NOTE — PROGRESS NOTE ADULT - PROBLEM SELECTOR PLAN 3
Maintain right CT to waterseal, Right SOURAV to bulb suction  Will continue monitoring  CXR every other day or as needed per Dr. Murcia

## 2020-06-09 NOTE — DISCHARGE NOTE PROVIDER - INSTRUCTIONS
Clear liquid diet.   Vital 1.5 Tube feeds at 50cc/hr x 20 hours daily via J TUBE ONLY ( DO NOT USE G TUBE FOR FEEDS).   Flush J tube with 50cc free water every 6 hours.   May use G tube ONLY for larger oral meds unable to be swallowed. Otherwise keep G tube clamped.   Add Prosource package once daily.   Change dressing to chest tube sites daily.   Chest tube to pleurevac on water seal.   Dl to SOURAV bulb suction.   Rectal tube and campos in place.   1/s Strength Dakin's to sacral wound twice daily.   Activity as tolerated. Bedbound.   Shower, no tube baths. Do not submerge wounds in water.   Maintain staples and sutures for poor wound healing.   Tylenol for pain control.

## 2020-06-09 NOTE — DISCHARGE NOTE PROVIDER - NSDCCPTREATMENT_GEN_ALL_CORE_FT
PRINCIPAL PROCEDURE  Procedure: Placement of esophageal stent  Findings and Treatment:       SECONDARY PROCEDURE  Procedure: VATS, with partial lung decortication  Findings and Treatment:     Procedure: Insertion, gastric tube, with diagnostic aspiration  Findings and Treatment:

## 2020-06-09 NOTE — PROGRESS NOTE ADULT - SUBJECTIVE AND OBJECTIVE BOX
Patient feeling better today.  Reports that her pain is well controlled.  Has aide to assist with breakfast.  Looking to go to her rehab facility.     REVIEW OF SYSTEMS  Constitutional - No fever,  +fatigue  Neurological - No headaches, No memory loss, +loss of strength, +numbness, No tremors    FUNCTIONAL PROGRESS  Total A with  mobility       VITALS  T(C): 36.9 (06-09-20 @ 06:08), Max: 37.4 (06-08-20 @ 21:00)  HR: 100 (06-09-20 @ 06:08) (98 - 103)  BP: 107/67 (06-09-20 @ 06:08) (97/68 - 123/80)  RR: 15 (06-09-20 @ 06:08) (15 - 18)  SpO2: 94% (06-09-20 @ 06:08) (94% - 97%)  Wt(kg): --    MEDICATIONS   acetaminophen   Tablet .. 650 milliGRAM(s) every 6 hours PRN  ascorbic acid 500 milliGRAM(s) three times a day  chlorhexidine 4% Liquid 1 Application(s) <User Schedule>  Dakins Solution - 1/2 Strength 1 Application(s) two times a day  enoxaparin Injectable 30 milliGRAM(s) every 12 hours  ferrous    sulfate 325 milliGRAM(s) daily  folic acid 1 milliGRAM(s) daily  latanoprost 0.005% Ophthalmic Solution 1 Drop(s) at bedtime  loperamide 2 milliGRAM(s) every 4 hours PRN  magnesium sulfate  IVPB 2 Gram(s) once  meropenem  IVPB 1000 milliGRAM(s) every 8 hours  multivitamin 1 Tablet(s) daily  multivitamin/minerals/iron Oral Solution (CENTRUM) 15 milliLiter(s) daily  nystatin Cream 1 Application(s) two times a day  pantoprazole  Injectable 40 milliGRAM(s) two times a day  sodium chloride 0.9% lock flush 3 milliLiter(s) every 8 hours  sodium chloride 0.9% lock flush 10 milliLiter(s) every 1 hour PRN      RECENT LABS - Reviewed                        7.1    11.12 )-----------( 659      ( 09 Jun 2020 06:21 )             22.4     06-09    132<L>  |  99  |  24.0<H>  ----------------------------<  109<H>  4.8   |  23.0  |  0.61    Ca    7.5<L>      09 Jun 2020 06:21  Phos  3.3     06-09  Mg     1.7     06-09    TPro  5.4<L>  /  Alb  1.8<L>  /  TBili  <0.2<L>  /  DBili  x   /  AST  26  /  ALT  31  /  AlkPhos  140<H>  06-09      	      ----------------------------------------------------------------------------------------  PHYSICAL EXAM  Constitutional - NAD, Comfortable  HEENT - NCAT, EOMI  Neck - Supple, No limited ROM  Chest - Breathing comfortably, No wheezing, +Chest tube  Cardiovascular - S1S2   Abdomen - Soft   Extremities - BUE/BLE spasticity  Neurologic Exam -                    Cognitive - Awake, Alert, AAO to self, place, date, year, situation     Communication - Fluent, No dysarthria     Motor - 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     Will sign off at this time. Thank you for allowing me to be part of your patient's care. Please reconsult PMR for additional rehab recommendations or dispo needs if functional status changes.

## 2020-06-09 NOTE — PROGRESS NOTE ADULT - REASON FOR ADMISSION
Transfer from [facility] for Distal Esophageal perforation
Transfer from [facility] for Distal Esophageal perforation
Transfer from [facility] for Distal Esophageal perforation
Transfer from [facility] for Distal Esophageal perforation
Transfer from [facility] for Distal Esophageal perforation
Transfer from [facility] for Distal Esophageal perforation
Transfer from [facility] for Distal Esophageal perforation
Transfer from [facility] for Distal Esophageal perforation
Transfer from [facility] for Distal Esophageal perforation
Transfer from [facility] for Distal Esophageal perforation
Transfer from [facility] for Distal Esophageal perforation
Transfer from [facility] for Distal Esophageal perforation
Transfer from [facility] for Distal Esophageal perforation
Transfer from [facility] for Distal Esophageal perforation
Transfer from [facility] for Distal Esophageal perforation
Transfer from [facility] for Distal Esophageal perforation
Transfer from [facility] for Distal Esophageal perforation
Transfer from [facility] for Distal Esophageal perforation
Transfer from [facility] for Distal Esophageal perforation
Transfer from [facility] for Distal Esophageal perforation
Transfer from [facility] for Distal Esophageal perforation
Transfer from [facility] for Distal Esophageal perforation
Transfer from [facility] for Distal Esophageal perforation
Transfer from [facility] for Distal Esophageal perforation
Transfer from [facility] for Distal Esophageal perforation
Transfer from [facility] for Distal Esophageal perforation
Transfer from [facility] for Distal Esophageal perforation
Transfer from [facility] for Distal Esophageal perforation
Transfer from [facility] for Distal Esophageal perforation
Transfer from [facility] for Distal Esophageal perforation
Transfer from [facility] for Distal Esophageal perforation
Transfer from [facility] for Distal Esophageal perforation
Transfer from [facility] for Distal Esophageal perforation
Transfer from [facility] for Distal Esophageal perforation
Transfer from [facility] for Distal Esophageal perforation

## 2020-06-09 NOTE — DISCHARGE NOTE PROVIDER - CARE PROVIDERS DIRECT ADDRESSES
[email or web address]

## 2020-06-09 NOTE — PROGRESS NOTE ADULT - ASSESSMENT
69 year old non-ambulatory Female with a PMHx significant for multiple sclerosis, Breast Cancer s/p R mastectomy, Osteoporosis, Mitral stenosis, right ankle fracture, chronic right sided sacral ulcer, chronic midline back pain since being dropped from a jame lift (7/17/29), admitted to [place] after presenting with sepsis in the setting of a UTI with chronic campos use and known large right ischial decubitus ulcer. Patient found to have a Moderately large Right hydropneumothorax resulting in partial right lung collapse and slight cardiomediastinal shift to the left on CXR with chest tube placed 5/8/20. Large bore chest tube placed 5/10/20 for persistent Right pneumothorax. Patient was followed by ID and was given Vanco and Zosyn originally for her presumed urosepsis, Caspofungin was added after pleural fluid was + for fungal elements. CT chest confirmed +Empyema in the Right pleural space. Patient ultimately transferred to [facility] late 5/11/20 after she was found to have a distal esophageal perforation on CT chest and Esophogram.     5/13/20 patient underwent EGD with esophageal stent placed, NGT placed, VATS with right thoracic cavity washout and pulmonary decortication with Dl drain, right posterior, and right medial chest tubes placed with Dr. Murcia.  On 5/14 patient was taken to IR for unsuccessful G/J tube placement. 5/15 patient was taken to the OR for feeding tube placement with Dr. Murcia.  The G tube is to gravity drainage, and slow feeds are advancing through the J tube. S/P esophagram 5/18 which showed narrowing distal stented area. Tolerating tube feeds.   5/27: G port clamped. Right Chest tube 31 to waterseal, Right CT #2 d/c.  Pleural fluid sent for triglycerides > 99 high normal   5/29 Ct Chest with no extrav., mod L effusion, small loculated R.   6/6 pleural fluid resent for triglycerides and found to be 863.  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

## 2020-06-09 NOTE — DISCHARGE NOTE PROVIDER - CARE PROVIDER_API CALL
Candy Murcia  THORACIC AND CARDIAC SURGERY  [address] [place] [address]  Phone: [phone]  Fax: [phone]  Follow Up Time:     Al Waters  NEUROLOGY  [address] [place] [id number]  Phone: [phone]  Fax: [phone]  Follow Up Time:     Matthew Fairbanks  INTERNAL MEDICINE  [address] [address]  Phone: [phone]  Fax: [phone]  Follow Up Time:

## 2020-06-09 NOTE — PROGRESS NOTE ADULT - PROBLEM SELECTOR PLAN 8
- Antihypertensives held in the setting of hypotension given extensive infection/sepsis.
H/H stable.  Type and cross with +antibodies.   1unit PRBC transfused 5/25.  FOBT positive (6/2)  D/W Thoracic team 6/8, no plan for GI consult as there is no active signs of bleeding  Will trend CBC, continue to monitor for S/S of acute blood loss.  Will continue Protonix IV Q12H  Will continue Vit C, Folic acid, Ferous sulfate and Multivitamin
H/H stable.  Type and cross with +antibodies.   1unit PRBC transfused 5/25.  FOBT positive (6/2)  Will trend CBC, transfuse as needed  Will continue Protonix IV Q12H  Will consider GI consult if needed  Continue to monitor for S/S of acute blood loss.
SCDs and Lovenox for DVT prophylaxis.  Protonix for GI prophylaxis.    Dispo: Will need  PICC eval if plan for d/c prior to finishing IV antibiotic course (6/9).   Plan and care needed to be discussed with Dr. Murcia in AM
SCDs and Lovenox for DVT prophylaxis.  Protonix for GI prophylaxis.    Dispo: Will need  PICC line if d/c prior to finishing IV antibiotic course (6/9).   Plan and care needed to be discussed with Dr. Murcia in AM
SCDs and Lovenox for DVT prophylaxis.  Protonix for GI prophylaxis.    Dispo: Will need eventual PICC if plan for d/c prior to finishing IV antibiotic course.     Case and plan to be discussed with attendings and Thoracic Surgery team in AM rounds.
SCDs and Lovenox for DVT prophylaxis.  Protonix for GI prophylaxis.    Dispo: Will need eventual PICC if plan for d/c prior to finishing IV antibiotic course.     Discussed Dr. Murcia.
SCDs and Lovenox for DVT prophylaxis.  Protonix for GI prophylaxis.    Dispo: Will need eventual PICC if plan for d/c prior to finishing IV antibiotic course.     Discussed Dr. Murcia.
H/H stable.  Type and cross with +antibodies.   1unit PRBC transfused 5/25.  FOBT positive (6/2)  Will trend CBC, transfuse as needed  Will continue Protonix IV Q12H  Will consider GI consult if needed  Continue to monitor for S/S of acute blood loss.
SCDs and Lovenox for DVT prophylaxis.  Protonix for GI prophylaxis.    Dispo: Patient remains in the hospital due to 2 chest tubes with continued output, 1 chest tube with purulent output and airleak. Continuing IV antibiotics for +Empyema. Will need eventual PICC if plan for d/c prior to finishing IV antibiotic course. Continue to monitoring output from G-tube to assess how much feeds patient is tolerating.  Case and plan to be discussed with attendings and Thoracic Surgery team in AM rounds.
SCDs and Lovenox for DVT prophylaxis.  Protonix for GI prophylaxis.    Dispo: Patient remains in the hospital due to 2 chest tubes with continued output, 1 chest tube with purulent output and airleak. Continuing IV antibiotics for +Empyema. Will need eventual PICC if plan for d/c prior to finishing IV antibiotic course. Continue to monitoring output from G-tube to assess how much feeds patient is tolerating.  Case and plan to be discussed with attendings and Thoracic Surgery team in AM rounds.
SCDs and Lovenox for DVT prophylaxis.  Protonix for GI prophylaxis.    Dispo: Will need  PICC eval if plan for d/c prior to finishing IV antibiotic course (6/9).   Plan and care needed to be discussed with Dr. Murcia in AM

## 2020-06-09 NOTE — PROGRESS NOTE ADULT - PROBLEM SELECTOR PROBLEM 5
Multiple sclerosis
Multiple sclerosis
Pressure injury of right hip, stage 4
Multiple sclerosis
Pressure injury of right hip, stage 4
Pressure injury of right hip, stage 4

## 2020-06-09 NOTE — PROGRESS NOTE ADULT - SUBJECTIVE AND OBJECTIVE BOX
Subjective: Patient lying in bed, no acute distress noted, denies fever, chills, chest pain, palpitation, dizziness, shortness of breath, headache, abdominal pain, N/V.       V/S  T(C): 37.4 (06-08-20 @ 21:00), Max: 37.4 (06-08-20 @ 21:00)  HR: 103 (06-08-20 @ 21:00) (92 - 103)  BP: 123/80 (06-08-20 @ 21:00) (97/68 - 134/81)  RR: 16 (06-08-20 @ 21:00) (16 - 18)  SpO2: 94% (06-08-20 @ 21:00) (94% - 97%) on room air                                               Tele: SR with PACs    CHEST TUBE: Right chest tube to water seal. Right SOURAV       OUTPUT:             per 24 hours     Drainage:  Chylothorax  AIR LEAKS:  [ x] YES [ ] NO      MEDICATIONS  (STANDING):  ascorbic acid 500 milliGRAM(s) Oral three times a day  chlorhexidine 4% Liquid 1 Application(s) Topical <User Schedule>  Dakins Solution - 1/2 Strength 1 Application(s) Topical two times a day  enoxaparin Injectable 30 milliGRAM(s) SubCutaneous every 12 hours  ferrous    sulfate 325 milliGRAM(s) Oral daily  folic acid 1 milliGRAM(s) Oral daily  latanoprost 0.005% Ophthalmic Solution 1 Drop(s) Both EYES at bedtime  meropenem  IVPB 1000 milliGRAM(s) IV Intermittent every 8 hours  multivitamin 1 Tablet(s) Oral daily  multivitamin/minerals/iron Oral Solution (CENTRUM) 15 milliLiter(s) Oral daily  nystatin Cream 1 Application(s) Topical two times a day  pantoprazole  Injectable 40 milliGRAM(s) IV Push two times a day  sodium chloride 0.9% lock flush 3 milliLiter(s) IV Push every 8 hours      06-07    131<L>  |  99  |  23.0<H>  ----------------------------<  111<H>  4.8   |  21.0<L>  |  0.61    Ca    7.8<L>      07 Jun 2020 06:52  Phos  3.5     06-07  Mg     1.9     06-07                                 7.9    13.32 )-----------( 746      ( 07 Jun 2020 06:52 )             24.9                  CXR:  < from: Xray Chest 1 View- PORTABLE-Urgent (06.08.20 @ 07:43) >    INTERPRETATION:  TECHNIQUE: Single portable view of the chest.    COMPARISON: 6/6/2020    CLINICAL HISTORY: chylothoraxs/p distal esophageal perforation / hydropneumothorax    FINDINGS:    Single frontal view of the chest demonstrates right lower lobe chest tube. Small right lower lobe pneumothorax. Second right midlung zone chest tube. Esophageal stent. Small left-sided pleural effusion. Diffuse right lung airspace disease, unchanged. Small tracking right-sided pleural effusion, unchanged. The cardiomediastinal silhouette is normal. No acute osseous abnormalities. Overlying EKG leads and wires are noted. Diffuse osseous sclerotic metastasis.    IMPRESSION: Small right lower lobe pneumothorax. 2 right-sided chest tubes.      < end of copied text >    < from: CT Chest w/ IV Cont (05.29.20 @ 21:26) >  IMPRESSION:   Interval placement of an esophageal stent. No extravasation of enteric contrast or paraesophageal air to suggest leak.    Moderate left pleural effusion and small right loculated pleural effusion with smaller locules of pleural fluid in the periphery of the right upper lobe and right lower lobe. Small right pneumothorax. Two right-sided chest tubes. Associated compressive atelectasis at the lung bases.        < end of copied text >    Physical Exam:  Neuro: A+O x 3, non-focal, speech clear and intact  HEENT: PERRL, EOMI  Neck: supple, no JVD  CV: regular rate, regular rhythm, +S1S2, no murmurs or rub  Pulm/chest: Decreased breath sounds at Right. Right CT x1 to water seal. +air leak, Right SOURAV, unable to maintain suction for long period (Dr. Murcia aware), dressing intact.   Abd: soft, NT, ND, +BS, GJ tube: J port to feeding, G port capped, Rectal tube to drainage bag  : Starks cath to self drainage   Ext: +2 pedal edema, +PP pulses bilaterally  Skin: warm, well perfused      PAST MEDICAL & SURGICAL HISTORY:  Breast cancer metastasized to bone  Hypertension  Multiple sclerosis  S/P mastectomy, right  Breast CA  Osteoporosis  MS (mitral stenosis)  History of bowel resection  H/O breast surgery

## 2020-06-09 NOTE — CHART NOTE - NSCHARTNOTEFT_GEN_A_CORE
69yoF admitted with esophageal perf, s/p R chest washout, decortication, and EGD with esophageal stent placement on 5/13.     Today anemic HCT 22, with previous +FOBT. HD stable. No transfusion planned. Patient with multiple blood ABs.    CT with no air leak however SOURAV not holding suction. Incision at Dl site open and sutures placed   -#0 Prolene x 2. 10cc of 1% Lidocaine used.     2G magnesium administered for hypomagnesia (1.7) today.     Mita Cisneros PA  Thoracic Sx  #[id number] 69yoF admitted with esophageal perf, s/p R chest washout, decortication, and EGD with esophageal stent placement on 5/13.     Today anemic HCT 22, with previous +FOBT. HD stable. No transfusion planned. Patient with multiple blood ABs.    CT with no air leak however SOURAV not holding suction. Incision at Dl site open and sutures placed   -#0 Prolene x 2. 10cc of 1% Lidocaine used.     2G magnesium administered for hypomagnesia (1.7) today.     Mita DURAN  Thoracic Sx

## 2020-06-09 NOTE — DISCHARGE NOTE PROVIDER - NSDCCPCAREPLAN_GEN_ALL_CORE_FT
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

## 2020-06-09 NOTE — PROGRESS NOTE ADULT - PROBLEM SELECTOR PROBLEM 3
Hydropneumothorax

## 2020-06-09 NOTE — DISCHARGE NOTE PROVIDER - HOSPITAL COURSE
68yo F with PMH of MS (wheelchair bound), and metastatic breast cancer to pelvis, thoracic, lumbar spine, presented to [facility] with urosepsis from chronic campos and right hydropneumothorax. Chest tube placed. patient with empyema. Dx with esophageal perforation. Underwent R chest washout and decortication, and esophageal stent placement. Failed IR G tube placement. 5/15 underwent open J-G tube placement. 5/29 Repeat CT scan showed Interval placement of an esophageal stent. No extravasation of enteric contrast or paraesophageal air to suggest leak. Moderate left pleural effusion and small right loculated pleural effusion with smaller locules of pleural fluid in the periphery of the right upper lobe and right lower lobe. Small right pneumothorax. Two right-sided chest tubes. Associated compressive atelectasis at the lung bases.         Patient given 1U PRBC on 5/25 for chronic anemia. Developed Anti-K from asymptomatic delayed serologic transfusion reaction from prior transfusion. FOBT also positive. Protonix BID started. Campos in place for incontinence. Midline placed in LUE 5/12 for IV ABX (completed Meropenem 6/9 as per ID). Rectal tube placed 6/2 for diarrhea and wound care. Patient with unstageable sacral decub wound with osteomyelitis. 1/2 Strength dakins applied BID. Nutrition followed and recommending Vital at 50cc/hr for 20H a day. Patient passed swallow for liquids. Pleural fluid with high triglycerides indicating chylothorax. Diet changed to clear liquids. Interferon for MS held as per neuro as it is not indicated for advanced MS. Patient seen by palliative for GOC discussion.         Patient stable and accepted to [facility] 6/9, with outpatient follow up.

## 2020-06-09 NOTE — PROGRESS NOTE ADULT - PROBLEM SELECTOR PLAN 6
- Multiple lytic lesions on the spine and pelvis.   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  - Follows as an outpatient with Dr. Matthew Fairbanks.  - Continue Letrozole once feeding tube placed   - Heme/onc consult appreciated.
- Multiple lytic lesions on the spine and pelvis.   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  - Follows as an outpatient with Dr. Matthew Fairbanks.  - Continue Letrozole once feeding tube placed   - Heme/onc consult appreciated.
- Multiple lytic lesions on the spine and pelvis.   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  - Follows as an outpatient with Dr. Matthew Fairbanks.  - Continue Letrozole.  - Heme/onc consult appreciated.
Multiple lytic lesions on the spine and pelvis.   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  Follows as an outpatient with Dr. Matthew Fairbanks.  Continue Letrozole once able.  Heme/onc consult appreciated.
Multiple lytic lesions on the spine and pelvis.   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  Follows as an outpatient with Dr. Matthew Fairbanks.  Continue Letrozole once feeding tube placed   Heme/onc consult appreciated.
Wound care consult / plastic surgery consult appreciated.   Wound care recommends Santyl and plastics eval.  Dr. Parrish from plastics recommends Dakins soaked packing to clean wound bid.  Air flow mattress.  Turn and position q2 hours.  Optimize nutrition via feeding tube, vitamins supplements added  Pain control PRN.
Wound care consult / plastic surgery consult appreciated.   Wound care recommends Santyl and plastics eval.  Dr. Parrish from plastics recommends Dakins soaked packing to clean wound bid.  Air flow mattress.  Turn and position q2 hours.  Optimize nutrition via feeding tube, vitamins supplements added  Pain control PRN.  ID recommended diverting colostomy to avoid contamination, was discussed with Thoracic team and there is no anticipated plan given chronicity, currently with rectal tube, plan to discharge with rectal tube.
Multiple lytic lesions on the spine and pelvis.   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  Follows as an outpatient with Dr. Matthew Fairbanks.  Continue Letrozole once able.  Heme/onc consult appreciated.
Multiple lytic lesions on the spine and pelvis.   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  Follows as an outpatient with Dr. Matthew Fairbanks.  Continue Letrozole once able.  Heme/onc consult appreciated.
Wound care consult / plastic surgery consult appreciated.   Wound care recommends Santyl and plastics eval.  Dr. Parrish from plastics recommends Dakins soaked packing to clean wound bid.  Air flow mattress.  Turn and position q2 hours.  Optimize nutrition via feeding tube, vitamins supplements added  Pain control PRN.

## 2020-06-09 NOTE — PROGRESS NOTE ADULT - PROBLEM SELECTOR PROBLEM 1
Esophageal perforation

## 2020-06-09 NOTE — DISCHARGE NOTE PROVIDER - NSDCFUADDAPPT_GEN_ALL_CORE_FT
Please follow up with Dr. Murcia in 1-2 weeks. contact info below.     Please also follow up with your oncologist Dr. Fairbanks, and Dr. Waters from neurology for your MS.

## 2020-06-09 NOTE — PROGRESS NOTE ADULT - PROBLEM SELECTOR PLAN 7
Multiple lytic lesions on the spine and pelvis.   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  Follows as an outpatient with Dr. Matthew Fairbanks.  Continue Letrozole once able.  Discussed with patient and is requesting to continue chemo/hormonal therapy, called Dr. Fairbanks 6/8, pending call back, will follow up in AM  Heme/onc consult appreciated.

## 2020-06-09 NOTE — DISCHARGE NOTE PROVIDER - PROVIDER TOKENS
PROVIDER:[TOKEN:[[id number]]],PROVIDER:[TOKEN:[[id number]]],PROVIDER:[TOKEN:[[id number]]]

## 2020-06-09 NOTE — PROGRESS NOTE ADULT - NSHPATTENDINGPLANDISCUSS_GEN_ALL_CORE
medical team
primary team
Brittany CORRIGAN

## 2020-06-09 NOTE — DISCHARGE NOTE PROVIDER - NSDCMRMEDTOKEN_GEN_ALL_CORE_FT
acetaminophen 325 mg oral tablet: 2 tab(s) orally every 6 hours, As needed, Temp greater or equal to 38C (100.4F), Mild Pain (1 - 3)  ascorbic acid 500 mg oral tablet: 1 tab(s) orally once a day  enoxaparin: 30 milligram(s) subcutaneous every 12 hours for DVT prophylaxis  ferrous sulfate 325 mg (65 mg elemental iron) oral tablet: 1 tab(s) orally once a day  folic acid 1 mg oral tablet: 1 tab(s) orally once a day  latanoprost 0.005% ophthalmic solution: 1 drop(s) to each affected eye once a day (at bedtime)  letrozole 2.5 mg oral tablet: 1 tab(s) orally once a day  loperamide 2 mg oral capsule: 1 cap(s) orally every 4 hours, As needed, Diarrhea  Multiple Vitamins oral tablet: 1 tab(s) orally once a day  nystatin 100,000 units/g topical cream: 1 application topically 2 times a day  ondansetron 4 mg oral tablet, disintegratin tab(s) orally every 6 hours, As needed, Nausea and/or Vomiting  Protonix 40 mg oral delayed release tablet: 1 tab(s) orally 2 times a day  sodium hypochlorite 0.25% topical solution: 1 application topically 2 times a day

## 2020-06-09 NOTE — GOALS OF CARE CONVERSATION - ADVANCED CARE PLANNING - CONVERSATION DETAILS
Spoke to pt about her wishes regarding resuscitation. She states that she does want CPR and intubation initiated but she would not want to be kept alive on machines. Pt was encouraged to have discussion with her HCP idicatind what her wishes would be.      Palliative care Social Work note. 6/1  SW met with patient to provide supportive counseling in coping with dx of catastrophic illness. patient actively engaged in discussion regarding difficulties at home and dealing with cancer over 2 years. Patient reports increased issues with swallowing and was unable to take chemotherapy but remains hopeful that she can crush further medications for future treatment. patient lives with son and dgt lives near by. patient reports having a lot of difficulty getting self out of bed at home and managing has become increasingly difficult. SW explored ACS services with patient and palliative care following.    Palliative Care Social work note 6/2. SW met with patient with Dr Doe to address symptom management as well as understanding of complexities of illness and prognosis. Questions clarified and support offered. End of life decisions discussed with regards to directives. patient verbalized wanting to think about discussion and no decision made at this point. Palliative care to follow.    Palliative Care Social work note 6/9. SW met with patient to provide supportive counseling . patient minimally interacts and engages only with few words. Patient pleasant but is guarded with discussing her wishes as disease issues progress. SW inquired with patient if she had further discussions regarding advance care planning with her daughter as was planned. According to patient she has not spoken with daughter over weekend and is hoping to touch base with her soon. SW reviewed importance of planning for all of us as we explore goals of care and importance to each individual. patient acknowledges what SW is saying but is guarded to make any comments.

## 2020-06-09 NOTE — PROGRESS NOTE ADULT - PROBLEM SELECTOR PROBLEM 2
Empyema of right pleural space

## 2020-06-09 NOTE — PROGRESS NOTE ADULT - PROBLEM SELECTOR PLAN 1
S/p esophageal stent placed 5/13/20 with NGT placed.   S/p GJ tube placement 5/15 (open procedure).  G port capped(5/27), slow feeds  through the J tube @ 50ml/hr and to maintain rate for now per Dr. Murcia.  Tolerating clear liquids PO, large meds to be given via G port  Encourage deep breathing, chest PT, incentive spirometry.   Right chest to waterseal, SOURAV to bulb suction  Labs and CXR every other day as per Dr. Murcia (next 6/10)  Palliative care consult in progress  Rehabilitation consult initiated, recommending BALDO

## 2020-06-09 NOTE — PROGRESS NOTE ADULT - PROBLEM SELECTOR PLAN 4
- Chronic debility. +Chronic decubitus ulcer on right ischium and chronic Starks use.   - Non-ambulatory for 4-5 years as per patient.   - Interferon to be held per neuro- as patient is actively infected   - Fall risk protocol.
- Chronic debility. +Chronic decubitus ulcer on right ischium and chronic campos use.   - Non-ambulatory for 4-5 years as per patient.   - Interferon has been held since admission to [facility].   - Fall risk protocol.
- Chronic debility. +Chronic decubitus ulcer on right ischium and chronic campos use.   - Non-ambulatory for 4-5 years as per patient.   - Interferon to be held per neuro- as patient is actively infected   - Fall risk protocol.
Chronic debility. +Chronic decubitus ulcer on right ischium and chronic Starks use.   Non-ambulatory for 4-5 years as per patient.   Interferon to be held per neuro as patient is actively infected.  Fall risk protocol.
Chronic debility. +Chronic decubitus ulcer on right ischium and chronic Starks use.   Non-ambulatory for 4-5 years as per patient.   Interferon to be held per neuro- as patient is actively infected   Fall risk protocol.
Chronic debility. +Chronic decubitus ulcer on right ischium.  Non-ambulatory for 4-5 years as per patient.   Interferon to be held per neuro as patient is actively infected, and not indicated as per neuro.   Continue Fall risk protocol.
Chronic debility. +Chronic decubitus ulcer on right ischium.  Non-ambulatory for 4-5 years as per patient.   Interferon to be held per neuro as patient is actively infected.  Continue Fall risk protocol.
Triglycerides from pleural fluid found to be 863.  + chylothorax.  Discussed with nutrition and tube feeds changed to Vital @50ml/hr x 20 hours per day, with additional prosource 1 pkt per day  Will hold off Octreotide (low output chylothorax) will continue monitoring.  Free water reduced to 50 ml Q6H
Triglycerides sent yesterday and found to be 863.  + chylothorax.  Discussed with nutrition and tube feeds changed to Vital @50ml/hr x 20 hours per day, with addition of prosource 1 pkt per day.  May continue clear liquids as well, per Dr. Garcia.
Chronic debility. +Chronic decubitus ulcer on right ischium.  Non-ambulatory for 4-5 years as per patient.   Interferon to be held per neuro as patient is actively infected, and not indicated as per neuro.   Continue Fall risk protocol.
Chronic debility. +Chronic decubitus ulcer on right ischium.  Non-ambulatory for 4-5 years as per patient.   Interferon to be held per neuro as patient is actively infected.  Continue Fall risk protocol.
Triglycerides sent yesterday and found to be 863.  + chylothorax.  Discussed with nutrition and tube feeds changed to Vital @50ml/hr x 20 hours per day, with addition of prosource 1 pkt per day.  May continue clear liquids as well, per Dr. Garcia.

## 2020-06-09 NOTE — PROGRESS NOTE ADULT - PROBLEM SELECTOR PROBLEM 4
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
Multiple sclerosis
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
Multiple sclerosis
Multiple sclerosis

## 2020-06-09 NOTE — PROGRESS NOTE ADULT - PROBLEM SELECTOR PLAN 2
S/p Right thoracotomy / washout / VATS decort 5/13/20.  Right Chest tube to waterseal.  Follow up CXR   Pleural fluid + for yeast, coag Negative Staph, Klebsiella, and strep mitis/oralis. Continue IV antibiotics per ID.   Vancomycin and Caspofungin completed 5/26/20 and Meropenum last dose today  Blood cultures negative 5/8 and negative to date from 5/28 and 5/29

## 2020-06-09 NOTE — PROGRESS NOTE ADULT - PROBLEM SELECTOR PROBLEM 8
Anemia
Anemia
Essential hypertension
Prophylactic measure
Anemia
Prophylactic measure

## 2020-06-09 NOTE — PROGRESS NOTE ADULT - PROBLEM SELECTOR PLAN 9
- SCDs for DVT prophylaxis, chemical anticoagulation held as patient went to OR yesterday. Will discuss restarting chemical AC in AM rounds.   - Protonix for GI prophylaxis
SCDs and Lovenox for DVT prophylaxis.  Protonix for GI prophylaxis.      Discussed with Dr. Garcia
SCDs and Lovenox for DVT prophylaxis.  Protonix for GI prophylaxis.    Plan and care needed to be discussed with Thoracic team in AM
SCDs and Lovenox for DVT prophylaxis.  Protonix for GI prophylaxis.    Plan and care needed to be discussed with Thoracic team in AM

## 2020-07-08 LAB
CULTURE RESULTS: SIGNIFICANT CHANGE UP
SPECIMEN SOURCE: SIGNIFICANT CHANGE UP

## 2020-07-12 ENCOUNTER — INPATIENT (INPATIENT)
Facility: HOSPITAL | Age: 70
LOS: 10 days | Discharge: HOME | DRG: 919 | End: 2020-07-23
Attending: THORACIC SURGERY (CARDIOTHORACIC VASCULAR SURGERY) | Admitting: THORACIC SURGERY (CARDIOTHORACIC VASCULAR SURGERY)
Payer: MEDICARE

## 2020-07-12 VITALS
SYSTOLIC BLOOD PRESSURE: 120 MMHG | OXYGEN SATURATION: 94 % | RESPIRATION RATE: 22 BRPM | HEART RATE: 118 BPM | TEMPERATURE: 98 F | DIASTOLIC BLOOD PRESSURE: 62 MMHG

## 2020-07-12 DIAGNOSIS — J93.82 OTHER AIR LEAK: ICD-10-CM

## 2020-07-12 DIAGNOSIS — Z98.890 OTHER SPECIFIED POSTPROCEDURAL STATES: Chronic | ICD-10-CM

## 2020-07-12 DIAGNOSIS — Z29.9 ENCOUNTER FOR PROPHYLACTIC MEASURES, UNSPECIFIED: ICD-10-CM

## 2020-07-12 DIAGNOSIS — Z92.89 PERSONAL HISTORY OF OTHER MEDICAL TREATMENT: Chronic | ICD-10-CM

## 2020-07-12 DIAGNOSIS — K22.3 PERFORATION OF ESOPHAGUS: ICD-10-CM

## 2020-07-12 DIAGNOSIS — I89.8 OTHER SPECIFIED NONINFECTIVE DISORDERS OF LYMPHATIC VESSELS AND LYMPH NODES: ICD-10-CM

## 2020-07-12 DIAGNOSIS — J86.9 PYOTHORAX WITHOUT FISTULA: ICD-10-CM

## 2020-07-12 DIAGNOSIS — Z46.59 ENCOUNTER FOR FITTING AND ADJUSTMENT OF OTHER GASTROINTESTINAL APPLIANCE AND DEVICE: Chronic | ICD-10-CM

## 2020-07-12 DIAGNOSIS — J94.8 OTHER SPECIFIED PLEURAL CONDITIONS: ICD-10-CM

## 2020-07-12 LAB
ALBUMIN SERPL ELPH-MCNC: 1.9 G/DL — LOW (ref 3.3–5.2)
ALP SERPL-CCNC: 117 U/L — SIGNIFICANT CHANGE UP (ref 40–120)
ALT FLD-CCNC: 22 U/L — SIGNIFICANT CHANGE UP
AMYLASE P1 CFR SERPL: 30 U/L — LOW (ref 36–128)
ANION GAP SERPL CALC-SCNC: 14 MMOL/L — SIGNIFICANT CHANGE UP (ref 5–17)
APPEARANCE UR: ABNORMAL
APTT BLD: 28.9 SEC — SIGNIFICANT CHANGE UP (ref 27.5–35.5)
AST SERPL-CCNC: 21 U/L — SIGNIFICANT CHANGE UP
BACTERIA # UR AUTO: ABNORMAL
BASOPHILS # BLD AUTO: 0.03 K/UL — SIGNIFICANT CHANGE UP (ref 0–0.2)
BASOPHILS NFR BLD AUTO: 0.3 % — SIGNIFICANT CHANGE UP (ref 0–2)
BILIRUB DIRECT SERPL-MCNC: 0.1 MG/DL — SIGNIFICANT CHANGE UP (ref 0–0.3)
BILIRUB INDIRECT FLD-MCNC: <0.1 MG/DL — LOW (ref 0.2–1)
BILIRUB SERPL-MCNC: <0.2 MG/DL — LOW (ref 0.4–2)
BILIRUB UR-MCNC: NEGATIVE — SIGNIFICANT CHANGE UP
BLD GP AB SCN SERPL QL: SIGNIFICANT CHANGE UP
BUN SERPL-MCNC: 22 MG/DL — HIGH (ref 8–20)
CALCIUM SERPL-MCNC: 7.7 MG/DL — LOW (ref 8.6–10.2)
CHLORIDE SERPL-SCNC: 94 MMOL/L — LOW (ref 98–107)
CO2 SERPL-SCNC: 24 MMOL/L — SIGNIFICANT CHANGE UP (ref 22–29)
COLOR SPEC: YELLOW — SIGNIFICANT CHANGE UP
CREAT SERPL-MCNC: 0.54 MG/DL — SIGNIFICANT CHANGE UP (ref 0.5–1.3)
DIFF PNL FLD: ABNORMAL
EOSINOPHIL # BLD AUTO: 0.25 K/UL — SIGNIFICANT CHANGE UP (ref 0–0.5)
EOSINOPHIL NFR BLD AUTO: 2.9 % — SIGNIFICANT CHANGE UP (ref 0–6)
EPI CELLS # UR: SIGNIFICANT CHANGE UP
GLUCOSE SERPL-MCNC: 96 MG/DL — SIGNIFICANT CHANGE UP (ref 70–99)
GLUCOSE UR QL: NEGATIVE MG/DL — SIGNIFICANT CHANGE UP
HCT VFR BLD CALC: 25.8 % — LOW (ref 34.5–45)
HGB BLD-MCNC: 8.1 G/DL — LOW (ref 11.5–15.5)
IMM GRANULOCYTES NFR BLD AUTO: 0.8 % — SIGNIFICANT CHANGE UP (ref 0–1.5)
INR BLD: 1.11 RATIO — SIGNIFICANT CHANGE UP (ref 0.88–1.16)
KETONES UR-MCNC: NEGATIVE — SIGNIFICANT CHANGE UP
LDH SERPL L TO P-CCNC: 199 U/L — HIGH (ref 98–192)
LDH SERPL L TO P-CCNC: 268 U/L — HIGH (ref 98–192)
LEUKOCYTE ESTERASE UR-ACNC: ABNORMAL
LIDOCAIN IGE QN: 28 U/L — SIGNIFICANT CHANGE UP (ref 22–51)
LYMPHOCYTES # BLD AUTO: 1.73 K/UL — SIGNIFICANT CHANGE UP (ref 1–3.3)
LYMPHOCYTES # BLD AUTO: 19.9 % — SIGNIFICANT CHANGE UP (ref 13–44)
MAGNESIUM SERPL-MCNC: 1.4 MG/DL — LOW (ref 1.8–2.6)
MCHC RBC-ENTMCNC: 28.4 PG — SIGNIFICANT CHANGE UP (ref 27–34)
MCHC RBC-ENTMCNC: 31.4 GM/DL — LOW (ref 32–36)
MCV RBC AUTO: 90.5 FL — SIGNIFICANT CHANGE UP (ref 80–100)
MONOCYTES # BLD AUTO: 0.56 K/UL — SIGNIFICANT CHANGE UP (ref 0–0.9)
MONOCYTES NFR BLD AUTO: 6.4 % — SIGNIFICANT CHANGE UP (ref 2–14)
NEUTROPHILS # BLD AUTO: 6.07 K/UL — SIGNIFICANT CHANGE UP (ref 1.8–7.4)
NEUTROPHILS NFR BLD AUTO: 69.7 % — SIGNIFICANT CHANGE UP (ref 43–77)
NITRITE UR-MCNC: NEGATIVE — SIGNIFICANT CHANGE UP
NT-PROBNP SERPL-SCNC: 1525 PG/ML — HIGH (ref 0–300)
PH FLD: 7.5 — SIGNIFICANT CHANGE UP
PH UR: 9 — HIGH (ref 5–8)
PHOSPHATE SERPL-MCNC: 2.6 MG/DL — SIGNIFICANT CHANGE UP (ref 2.4–4.7)
PLATELET # BLD AUTO: 587 K/UL — HIGH (ref 150–400)
POTASSIUM SERPL-MCNC: 3.9 MMOL/L — SIGNIFICANT CHANGE UP (ref 3.5–5.3)
POTASSIUM SERPL-SCNC: 3.9 MMOL/L — SIGNIFICANT CHANGE UP (ref 3.5–5.3)
PREALB SERPL-MCNC: 13 MG/DL — LOW (ref 18–38)
PROT SERPL-MCNC: 5.8 G/DL — LOW (ref 6.6–8.7)
PROT UR-MCNC: 30 MG/DL
PROTHROM AB SERPL-ACNC: 12.8 SEC — SIGNIFICANT CHANGE UP (ref 10.6–13.6)
RBC # BLD: 2.85 M/UL — LOW (ref 3.8–5.2)
RBC # FLD: 18.8 % — HIGH (ref 10.3–14.5)
RBC CASTS # UR COMP ASSIST: SIGNIFICANT CHANGE UP /HPF (ref 0–4)
SARS-COV-2 RNA SPEC QL NAA+PROBE: SIGNIFICANT CHANGE UP
SODIUM SERPL-SCNC: 132 MMOL/L — LOW (ref 135–145)
SP GR SPEC: 1.01 — SIGNIFICANT CHANGE UP (ref 1.01–1.02)
SPECIMEN SOURCE FLD: SIGNIFICANT CHANGE UP
T4 AB SER-ACNC: 6.8 UG/DL — SIGNIFICANT CHANGE UP (ref 4.5–12)
TRI-PHOS CRY UR QL COMP ASSIST: ABNORMAL
TRIGL SERPL-MCNC: 113 MG/DL — SIGNIFICANT CHANGE UP (ref 10–200)
TRIGL SERPL-MCNC: 123 MG/DL — SIGNIFICANT CHANGE UP (ref 10–200)
TSH SERPL-MCNC: 3.1 UIU/ML — SIGNIFICANT CHANGE UP (ref 0.27–4.2)
UROBILINOGEN FLD QL: NEGATIVE MG/DL — SIGNIFICANT CHANGE UP
WBC # BLD: 8.71 K/UL — SIGNIFICANT CHANGE UP (ref 3.8–10.5)
WBC # FLD AUTO: 8.71 K/UL — SIGNIFICANT CHANGE UP (ref 3.8–10.5)
WBC UR QL: SIGNIFICANT CHANGE UP

## 2020-07-12 PROCEDURE — 99285 EMERGENCY DEPT VISIT HI MDM: CPT

## 2020-07-12 PROCEDURE — 49452 REPLACE G-J TUBE PERC: CPT

## 2020-07-12 PROCEDURE — 74176 CT ABD & PELVIS W/O CONTRAST: CPT | Mod: 26

## 2020-07-12 PROCEDURE — 71045 X-RAY EXAM CHEST 1 VIEW: CPT | Mod: 26

## 2020-07-12 PROCEDURE — 71250 CT THORAX DX C-: CPT | Mod: 26

## 2020-07-12 PROCEDURE — 99024 POSTOP FOLLOW-UP VISIT: CPT

## 2020-07-12 RX ORDER — NYSTATIN CREAM 100000 [USP'U]/G
1 CREAM TOPICAL
Refills: 0 | Status: DISCONTINUED | OUTPATIENT
Start: 2020-07-12 | End: 2020-07-23

## 2020-07-12 RX ORDER — FOLIC ACID 0.8 MG
1 TABLET ORAL DAILY
Refills: 0 | Status: DISCONTINUED | OUTPATIENT
Start: 2020-07-12 | End: 2020-07-23

## 2020-07-12 RX ORDER — FERROUS SULFATE 325(65) MG
300 TABLET ORAL
Refills: 0 | Status: DISCONTINUED | OUTPATIENT
Start: 2020-07-12 | End: 2020-07-23

## 2020-07-12 RX ORDER — SODIUM CHLORIDE 9 MG/ML
3 INJECTION INTRAMUSCULAR; INTRAVENOUS; SUBCUTANEOUS EVERY 8 HOURS
Refills: 0 | Status: DISCONTINUED | OUTPATIENT
Start: 2020-07-12 | End: 2020-07-23

## 2020-07-12 RX ORDER — LATANOPROST 0.05 MG/ML
1 SOLUTION/ DROPS OPHTHALMIC; TOPICAL AT BEDTIME
Refills: 0 | Status: DISCONTINUED | OUTPATIENT
Start: 2020-07-12 | End: 2020-07-23

## 2020-07-12 RX ORDER — FUROSEMIDE 40 MG
20 TABLET ORAL
Refills: 0 | Status: DISCONTINUED | OUTPATIENT
Start: 2020-07-12 | End: 2020-07-15

## 2020-07-12 RX ORDER — ASCORBIC ACID 60 MG
500 TABLET,CHEWABLE ORAL DAILY
Refills: 0 | Status: DISCONTINUED | OUTPATIENT
Start: 2020-07-12 | End: 2020-07-23

## 2020-07-12 RX ORDER — SODIUM HYPOCHLORITE 0.125 %
1 SOLUTION, NON-ORAL MISCELLANEOUS
Refills: 0 | Status: DISCONTINUED | OUTPATIENT
Start: 2020-07-13 | End: 2020-07-23

## 2020-07-12 RX ORDER — ENOXAPARIN SODIUM 100 MG/ML
40 INJECTION SUBCUTANEOUS DAILY
Refills: 0 | Status: DISCONTINUED | OUTPATIENT
Start: 2020-07-12 | End: 2020-07-23

## 2020-07-12 RX ORDER — ACETAMINOPHEN 500 MG
650 TABLET ORAL EVERY 6 HOURS
Refills: 0 | Status: DISCONTINUED | OUTPATIENT
Start: 2020-07-12 | End: 2020-07-23

## 2020-07-12 RX ORDER — ASCORBIC ACID 60 MG
500 TABLET,CHEWABLE ORAL DAILY
Refills: 0 | Status: DISCONTINUED | OUTPATIENT
Start: 2020-07-12 | End: 2020-07-12

## 2020-07-12 RX ORDER — MULTIVIT-MIN/FERROUS GLUCONATE 9 MG/15 ML
15 LIQUID (ML) ORAL DAILY
Refills: 0 | Status: DISCONTINUED | OUTPATIENT
Start: 2020-07-12 | End: 2020-07-23

## 2020-07-12 RX ORDER — LETROZOLE 2.5 MG/1
2.5 TABLET, FILM COATED ORAL DAILY
Refills: 0 | Status: DISCONTINUED | OUTPATIENT
Start: 2020-07-12 | End: 2020-07-23

## 2020-07-12 RX ORDER — PANTOPRAZOLE SODIUM 20 MG/1
40 TABLET, DELAYED RELEASE ORAL
Refills: 0 | Status: DISCONTINUED | OUTPATIENT
Start: 2020-07-12 | End: 2020-07-23

## 2020-07-12 RX ADMIN — LATANOPROST 1 DROP(S): 0.05 SOLUTION/ DROPS OPHTHALMIC; TOPICAL at 23:02

## 2020-07-12 RX ADMIN — SODIUM CHLORIDE 3 MILLILITER(S): 9 INJECTION INTRAMUSCULAR; INTRAVENOUS; SUBCUTANEOUS at 23:00

## 2020-07-12 RX ADMIN — PANTOPRAZOLE SODIUM 40 MILLIGRAM(S): 20 TABLET, DELAYED RELEASE ORAL at 17:04

## 2020-07-12 RX ADMIN — ENOXAPARIN SODIUM 40 MILLIGRAM(S): 100 INJECTION SUBCUTANEOUS at 17:04

## 2020-07-12 NOTE — ED ADULT TRIAGE NOTE - ADDITIONAL SAFETY/BANDS...
Bedside shift change report given to Alexis Sanchez RN (oncoming nurse) by Kati Davidson. Melanie Phoenix   (offgoing nurse).  Report included the following information Ami OLMSTEAD and MAR. ' Additional Safety/Bands:

## 2020-07-12 NOTE — ED ADULT NURSE REASSESSMENT NOTE - NS ED NURSE REASSESS COMMENT FT1
pt status unchanged, refer to flowsheet and chart, pt safety maintained, pt hemodynamically stable, pt continues to be in no apparent distress, pt oxygenating sufficiently and pt has no medical complaints at this time. Refer to provider documentation for POC, pending surgery consult

## 2020-07-12 NOTE — H&P ADULT - PROBLEM SELECTOR PLAN 4
protonix BID  lovenox/SCDs maintain current vaibhav drain  will place to pleurevac instead of SOURAV bulb  send fluid for triglycerides

## 2020-07-12 NOTE — PROGRESS NOTE ADULT - SUBJECTIVE AND OBJECTIVE BOX
HISTORY OF PRESENT ILLNESS:  70F, pmhx of MS (wheelchair bound), and metastatic breast cancer to pelvis, thoracic, lumbar spine, presented to [facility] with urosepsis from chronic campos and right hydropneumothorax. Chest tube placed. patient with empyema. Dx with esophageal perforation. Underwent R chest washout and decortication, and esophageal stent placement. Failed IR G tube placement. 5/15 underwent open J-G tube placement. 5/29 Repeat CT scan showed Interval placement of an esophageal stent. No extravasation of enteric contrast or paraesophageal air to suggest leak. Moderate left pleural effusion and small right loculated pleural effusion with smaller locules of pleural fluid in the periphery of the right upper lobe and right lower lobe. Small right pneumothorax. Two right-sided chest tubes. Associated compressive atelectasis at the lung bases.     PAST MEDICAL & SURGICAL HISTORY:  Breast cancer metastasized to bone  Hypertension  Multiple sclerosis  S/P mastectomy, right  Breast CA  Osteoporosis  MS (mitral stenosis)  History of bowel resection  H/O breast surgery    Allergies: Sudafed (Other)    SOCIAL HISTORY:  non smoker, non drinker, currently resides at nursing home     FAMILY HISTORY:  FHx: hyperlipidemia    Review of Systems  CONSTITUTIONAL:  Fevers[ ] chills[ ] sweats[ ] fatigue[ ] weight loss[ ] weight gain [ ]                                     NEGATIVE [ ]   NEURO:  parathesias[ ] seizures [ ]  syncope [ ]  confusion [ ]                                                                                NEGATIVE[ ]   EYES: glasses[ ]  blurry vision[ ]  discharge[ ] pain[ ] glaucoma [ ]                                                                          NEGATIVE[ ]   ENMT:  difficulty hearing [ ]  vertigo[ ]  dysphagia[ ] epistaxis[ ] recent dental work [ ]                                    NEGATIVE[ ]   CV:  chest pain[ ] palpitations[ ] JACKSON [ ] diaphoresis [ ]                                                                                           NEGATIVE[ ]   RESPIRATORY:  wheezing[ ] SOB[ ] cough [ ] sputum[ ] hemoptysis[ ]                                                                  NEGATIVE[ ]   GI:  nausea[ ]  vommiting [ ]  diarrhea[ ] constipation [ ] melena [ ]                                                                      NEGATIVE[ ]   : hematuria[ ]  dysuria[ ] urgency[ ] incontinence[ ]                                                                                            NEGATIVE[ ]   MUSKULOSKELETAL:  arthritis[ ]  joint swelling [ ] muscle weakness [ ]                                                                NEGATIVE[ ]   SKIN/BREAST:  rash[ ] itching [ ]  hair loss[ ] masses[ ]                                                                                              NEGATIVE[ ]   PSYCH:  dementia [ ] depresion [ ] anxiety[ ]                                                                                                               NEGATIVE[ ]   HEME/LYMPH:  bruises easily[ ] enlarged lymph nodes[ ] tender lymph nodes[ ]                                               NEGATIVE[ ]   ENDOCRINE:  cold intolerance[ ] heat intolerance[ ] polydipsia[ ]                                                                          NEGATIVE[ ]     PHYSICAL EXAM  Vital Signs Last 24 Hrs  T(C): 37.4 (12 Jul 2020 15:16), Max: 37.4 (12 Jul 2020 15:16)  T(F): 99.4 (12 Jul 2020 15:16), Max: 99.4 (12 Jul 2020 15:16)  HR: 98 (12 Jul 2020 15:16) (92 - 118)  BP: 99/54 (12 Jul 2020 15:16) (99/54 - 120/62)  BP(mean): --  RR: 22 (12 Jul 2020 15:16) (22 - 22)  SpO2: 96% (12 Jul 2020 15:16) (94% - 96%)    CONSTITUTIONAL:                                                                          WNL[ ]   Neuro: WNL[ ] Normal exam oriented to person/place & time with no focal motor or sensory  deficits. Other __________                    Eyes: WNL[ ]   Normal exam of conjunctiva & lids, pupils equally reactive. Other______________________________     ENT: WNL[ ]    Normal exam of nasal/oral mucosa with absence of cyanosis. Other_____________________________  Neck: WNL[ ]  Normal exam of jugular veins, trachea & thyroid. Other_________________________________________  Chest: WNL[ ] Normal lung exam with good air movement absence of wheezes, rales, or rhonchi: Other_________________________________________                                                                                CV:  Auscultation: normal [ ] S3[ ] S4[ ] Irregular [ ] Rub[ ] Clicks[ ]    Murmurs none:[ ]systolic [ ]  diastolic [ ] holosystolic [ ]  Carotids: No Bruits[ ] Other____________ Abdominal Aorta: normal [ ] nonpalpable[ ]Other___________                                                                                      GI: WNL[ ] Normal exam of abdomen, liver & spleen with no noted masses or tenderness. Other______________________                                                                                                        Extremities: WNL[ ] Normal no evidence of cyanosis or deformity Edema: none[ ]trace[ ]1+[ ]2+[ ]3+[ ]4+[ ]  Lower Extremity Pulses: Right[ ] Left[ ]Varicosities[ ]  SKIN :WNL[ ] Normal exam to inspection & palation. Other:____________________ HISTORY OF PRESENT ILLNESS:      PAST MEDICAL & SURGICAL HISTORY:  Breast cancer metastasized to bone  Hypertension  Multiple sclerosis  S/P mastectomy, right  Breast CA  Osteoporosis  MS (mitral stenosis)  History of bowel resection  H/O breast surgery    Allergies: Sudafed (Other)    SOCIAL HISTORY:  non smoker, non drinker, currently resides at nursing home     FAMILY HISTORY:  FHx: hyperlipidemia    Review of Systems  CONSTITUTIONAL:  Fevers[ ] chills[ ] sweats[ ] fatigue[ ] weight loss[ ] weight gain [ ]                                     NEGATIVE [ ]   NEURO:  parathesias[ ] seizures [ ]  syncope [ ]  confusion [ ]                                                                                NEGATIVE[ ]   EYES: glasses[ ]  blurry vision[ ]  discharge[ ] pain[ ] glaucoma [ ]                                                                          NEGATIVE[ ]   ENMT:  difficulty hearing [ ]  vertigo[ ]  dysphagia[ ] epistaxis[ ] recent dental work [ ]                                    NEGATIVE[ ]   CV:  chest pain[ ] palpitations[ ] JACKSON [ ] diaphoresis [ ]                                                                                           NEGATIVE[ ]   RESPIRATORY:  wheezing[ ] SOB[ ] cough [ ] sputum[ ] hemoptysis[ ]                                                                  NEGATIVE[ ]   GI:  nausea[ ]  vommiting [ ]  diarrhea[ ] constipation [ ] melena [ ]                                                                      NEGATIVE[ ]   : hematuria[ ]  dysuria[ ] urgency[ ] incontinence[ ]                                                                                            NEGATIVE[ ]   MUSKULOSKELETAL:  arthritis[ ]  joint swelling [ ] muscle weakness [ ]                                                                NEGATIVE[ ]   SKIN/BREAST:  rash[ ] itching [ ]  hair loss[ ] masses[ ]                                                                                              NEGATIVE[ ]   PSYCH:  dementia [ ] depresion [ ] anxiety[ ]                                                                                                               NEGATIVE[ ]   HEME/LYMPH:  bruises easily[ ] enlarged lymph nodes[ ] tender lymph nodes[ ]                                               NEGATIVE[ ]   ENDOCRINE:  cold intolerance[ ] heat intolerance[ ] polydipsia[ ]                                                                          NEGATIVE[ ]     PHYSICAL EXAM  Vital Signs Last 24 Hrs  T(C): 37.4 (12 Jul 2020 15:16), Max: 37.4 (12 Jul 2020 15:16)  T(F): 99.4 (12 Jul 2020 15:16), Max: 99.4 (12 Jul 2020 15:16)  HR: 98 (12 Jul 2020 15:16) (92 - 118)  BP: 99/54 (12 Jul 2020 15:16) (99/54 - 120/62)  BP(mean): --  RR: 22 (12 Jul 2020 15:16) (22 - 22)  SpO2: 96% (12 Jul 2020 15:16) (94% - 96%)    CONSTITUTIONAL:                                                                          WNL[ ]   Neuro: WNL[ ] Normal exam oriented to person/place & time with no focal motor or sensory  deficits. Other __________                    Eyes: WNL[ ]   Normal exam of conjunctiva & lids, pupils equally reactive. Other______________________________     ENT: WNL[ ]    Normal exam of nasal/oral mucosa with absence of cyanosis. Other_____________________________  Neck: WNL[ ]  Normal exam of jugular veins, trachea & thyroid. Other_________________________________________  Chest: WNL[ ] Normal lung exam with good air movement absence of wheezes, rales, or rhonchi: Other_________________________________________                                                                                CV:  Auscultation: normal [ ] S3[ ] S4[ ] Irregular [ ] Rub[ ] Clicks[ ]    Murmurs none:[ ]systolic [ ]  diastolic [ ] holosystolic [ ]  Carotids: No Bruits[ ] Other____________ Abdominal Aorta: normal [ ] nonpalpable[ ]Other___________                                                                                      GI: WNL[ ] Normal exam of abdomen, liver & spleen with no noted masses or tenderness. Other______________________                                                                                                        Extremities: WNL[ ] Normal no evidence of cyanosis or deformity Edema: none[ ]trace[ ]1+[ ]2+[ ]3+[ ]4+[ ]  Lower Extremity Pulses: Right[ ] Left[ ]Varicosities[ ]  SKIN :WNL[ ] Normal exam to inspection & palation. Other:____________________

## 2020-07-12 NOTE — ED ADULT NURSE REASSESSMENT NOTE - NS ED NURSE REASSESS COMMENT FT1
pt status unchanged, refer to flowsheet and chart, pt safety maintained, pt hemodynamically stable, pt to be transferred to [address] [place] [address]/CTU, ELINOR Juares updated on pt, bedside report to be given

## 2020-07-12 NOTE — ED PROVIDER NOTE - NS ED ROS FT
CONSTITUTIONAL: No fevers, no chills  Eyes: No vision changes  Cardiovascular: No Chest pain  Respiratory: No SOB  Gastrointestinal: No n/v/c/d, no abd pain  Genitourinary: no dysuria, no hematuria  SKIN: no rashes.  MSK: no weakness, no myalgias, no arthralgias  NEURO: no headache, no weakness, no numbness  PSYCHIATRIC: no known mental health issues.

## 2020-07-12 NOTE — ED ADULT NURSE REASSESSMENT NOTE - NS ED NURSE REASSESS COMMENT FT1
pt status unchanged, refer to flowsheet and chart, pt safety maintained, pt hemodynamically stable, pt to be admitted post CT Surgery consult, Pt chest tube site cleaned and re-dressed. Pt presents to ED with lightly dressed unstageable pressure ulcer of right buttocks/hip. MD and CT Surgery aware, wet to dry dressing replaced.

## 2020-07-12 NOTE — H&P ADULT - HISTORY OF PRESENT ILLNESS
70F, well known to thoracic surgery service, pmhx of MS (wheelchair bound), and metastatic breast cancer to pelvis, thoracic, lumbar spine, chronic indwelling campos, chronic unstageable sacral wound, with recent empyema requiring chest tube found to have esophageal perforation, s/p R chest washout, decortication and esophageal stent placement 5/13, subsequent open G-J tube placement 5/15,  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 plastic surgery was consulted and following for her unstageable sacral wound. pt required rectal tube to prevent contamination. At that time, diverting colostomy was offered but family refused.  She completed IV antibiotics for her empyema and was discharged to rehab 6/9 with 2 CTs in place.  She now is sent to ED from rehab after one of her chest tubes was dislodged while turning the pt.  Thoracic surgery asked to consult. CXR without obvious pneumothorax.  Upon evaluation, noted that SOURAV was not holding self suction.  Notable air leak in SOURAV bulb.  Decision made to admit pt for further monitoring/work up, and possible removal of CT. Pt without any complaints at this time.  She denies CP, palpitations, SOB, cough, fever, chills, itchiness/rash, diaphoresis, vision changes, HA, dizziness/lightheadedness, numbness/tingling, abd pain, N/V

## 2020-07-12 NOTE — H&P ADULT - PROBLEM SELECTOR PLAN 1
s/p esophageal stent 5/13  NPO   hold TF abbey notable in SOURAV bulb  place vaibhav tube to pleurevac    admit to CT surgery service for further work up and plan  d/w Dr. Mcadams

## 2020-07-12 NOTE — ED PROVIDER NOTE - CLINICAL SUMMARY MEDICAL DECISION MAKING FREE TEXT BOX
Pt is a 69 y/o F presents c/o chest tube dislodgement, will get labs, cxr, consult ct surg, reassess

## 2020-07-12 NOTE — ED PROVIDER NOTE - PROGRESS NOTE DETAILS
CT Surg consulted, requests cxr, will see pt Per CT surgery- will admit to Dr. Mcadams, requesting CT c/a/p. Silvia Shi DO

## 2020-07-12 NOTE — ED PROVIDER NOTE - OBJECTIVE STATEMENT
Pt is a 69 y/o F w/PMH of MS (wheelchair bound), metastatic breast cancer to pelvis, thoracic, lumbar spine, chronic campos right hydropneumothorax s/p chest tube presents c/o dislodged chest tube. Pt is a 71 y/o F w/PMH of MS (wheelchair bound), metastatic breast cancer to pelvis, thoracic, lumbar spine, chronic campos right hydropneumothorax s/p chest tube presents c/o dislodged chest tube. patient had 2 chest tubes placed for esophageal perforation with resulting empyema. 1 of the chest tubes became dislodged while changing patient.

## 2020-07-12 NOTE — H&P ADULT - NSICDXPASTMEDICALHX_GEN_ALL_CORE_FT
PAST MEDICAL HISTORY:  Breast CA     Breast cancer metastasized to bone     Esophageal perforation s/p stent placement 5/13    H/O pleural empyema s/p R VATS chest wash out decortication 5/13    Hypertension     MS (mitral stenosis)     Multiple sclerosis     Osteoporosis     S/P mastectomy, right

## 2020-07-12 NOTE — ED ADULT TRIAGE NOTE - CHIEF COMPLAINT QUOTE
Patient A&Ox4, denies any pain or discomfort. Denies any chest pain, shortness of breath, nausea or dizziness. Patient comes from [facility] in [place] for dislodgement of right sided chest tube during routine care. Respirations even & unlabored. On O2 @3LPM, negative obvious distress. Has PEG tube & indwelling urethra catheter. Dr. Shi at bedside for eval.

## 2020-07-12 NOTE — H&P ADULT - ASSESSMENT
70F, well known to thoracic surgery service, pmhx of MS (wheelchair bound), and metastatic breast cancer to pelvis, thoracic, lumbar spine, chronic indwelling campos, chronic unstageable sacral wound, with recent empyema requiring chest tube found to have esophageal perforation, s/p R chest washout, decortication and esophageal stent placement 5/13, subsequent open G-J tube placement 5/15, now presents from rehab with dislodged chest tube, CXR without obvious pneumothorax, however, SOURAV not holding self suction concerning for air leak, admitted to thoracic surgery for further work up.

## 2020-07-12 NOTE — ED ADULT NURSE NOTE - CHIEF COMPLAINT QUOTE
Patient A&Ox4, denies any pain or discomfort. Denies any chest pain, shortness of breath, nausea or dizziness. Patient comes from [facility] in [place] for dislodgement of right sided chest tube during routine care. Respirations even & unlabored. On O2 @3LPM, negative obvious distress. Has PEG tube & indwelling urethra catheter. Dr. Shi at bedside for eval.

## 2020-07-12 NOTE — H&P ADULT - NSHPPHYSICALEXAM_GEN_ALL_CORE
Physical Exam  Constitutional: NAD, frail  Neuro: A+O x 3, non-focal, speech clear and intact  Neck: supple, no JVD  CV: S1S2 RRR  Pulm/chest: diminished BS b/l with crackles,  Abd: +BS soft NT ND, PEG site intact   Ext: no clubbing/cyanosis/edema  Skin: warm, well perfused,   Psych: calm, appropriate affect   sacrum: large unstageable wound contaminated with feces, down to bone  lines/tubes: R vaibhav to SOURAV, not holding self suction, one non healed chest tube site (likely where tube was just dislodged from), 2 prolene sutures at other chest tube sites,

## 2020-07-12 NOTE — ED PROVIDER NOTE - PHYSICAL EXAMINATION
General: well appearing, interactive, well nourished, NAD  HEENT: pupils equal and reactive, normal external ears bilaterally   Cardiac: RRR, no MRG appreciated  Resp: lungs clear to auscultation bilaterally, symmetric chest wall rise  Abd: soft, nontender, nondistended,   : indwelling campos, no CVA tenderness  Neuro: Moving all extremities  Skin:  normal color for race

## 2020-07-12 NOTE — ED PROVIDER NOTE - ATTENDING CONTRIBUTION TO CARE
HPI: 69yo female with PMH MS, metastatic breast ca, R hydropneumothorax s/p R chest tube presenting from [facility] for dislodged R CT. Tube accidentally became dislodged this morning while cleaning patient. patient has no complaints at this time.     PE:  Gen: NAD  Head: NCAT  HEENT: Oral mucosa moist, normal conjunctiva  CV: RRR no murmurs, normal perfusion  Resp: CTA b/l, no wheezing, rales, rhonchi, no respiratory distress  GI: Abd Soft NTND  Neuro: No focal neuro deficits  MSK: no deformity  Skin: No rash, no bruising    MDM: 69yo F with dislodged chest tube- d/w CT surgery, XR, labs, reassess. Silvia Shi DO         I performed a history and physical exam of the patient and discussed their management with the resident. I reviewed the resident's note and agree with the documented findings and plan of care. My medical decision making and observations are found above.

## 2020-07-12 NOTE — ED ADULT NURSE NOTE - OBJECTIVE STATEMENT
Pt BIBA from nursing home for dislodged chest tube, believed to be Pleurex Port. Pt breathing even and unlabored, pt in no apparent distress, denies chest pain and SOB.

## 2020-07-12 NOTE — ED PROVIDER NOTE - PMH
Breast CA    Breast cancer metastasized to bone    Hypertension    MS (mitral stenosis)    Multiple sclerosis    Osteoporosis    S/P mastectomy, right

## 2020-07-13 LAB
ANION GAP SERPL CALC-SCNC: 12 MMOL/L — SIGNIFICANT CHANGE UP (ref 5–17)
BASOPHILS # BLD AUTO: 0.04 K/UL — SIGNIFICANT CHANGE UP (ref 0–0.2)
BASOPHILS NFR BLD AUTO: 0.5 % — SIGNIFICANT CHANGE UP (ref 0–2)
BUN SERPL-MCNC: 21 MG/DL — HIGH (ref 8–20)
CALCIUM SERPL-MCNC: 7.5 MG/DL — LOW (ref 8.6–10.2)
CHLORIDE SERPL-SCNC: 95 MMOL/L — LOW (ref 98–107)
CO2 SERPL-SCNC: 24 MMOL/L — SIGNIFICANT CHANGE UP (ref 22–29)
CREAT SERPL-MCNC: 0.5 MG/DL — SIGNIFICANT CHANGE UP (ref 0.5–1.3)
EOSINOPHIL # BLD AUTO: 0.23 K/UL — SIGNIFICANT CHANGE UP (ref 0–0.5)
EOSINOPHIL NFR BLD AUTO: 3.1 % — SIGNIFICANT CHANGE UP (ref 0–6)
GLUCOSE SERPL-MCNC: 111 MG/DL — HIGH (ref 70–99)
HCT VFR BLD CALC: 23.9 % — LOW (ref 34.5–45)
HCT VFR BLD CALC: 24.9 % — LOW (ref 34.5–45)
HGB BLD-MCNC: 7.2 G/DL — LOW (ref 11.5–15.5)
HGB BLD-MCNC: 7.6 G/DL — LOW (ref 11.5–15.5)
IMM GRANULOCYTES NFR BLD AUTO: 1.1 % — SIGNIFICANT CHANGE UP (ref 0–1.5)
LYMPHOCYTES # BLD AUTO: 1.36 K/UL — SIGNIFICANT CHANGE UP (ref 1–3.3)
LYMPHOCYTES # BLD AUTO: 18.5 % — SIGNIFICANT CHANGE UP (ref 13–44)
MCHC RBC-ENTMCNC: 27.9 PG — SIGNIFICANT CHANGE UP (ref 27–34)
MCHC RBC-ENTMCNC: 28 PG — SIGNIFICANT CHANGE UP (ref 27–34)
MCHC RBC-ENTMCNC: 30.1 GM/DL — LOW (ref 32–36)
MCHC RBC-ENTMCNC: 30.5 GM/DL — LOW (ref 32–36)
MCV RBC AUTO: 91.9 FL — SIGNIFICANT CHANGE UP (ref 80–100)
MCV RBC AUTO: 92.6 FL — SIGNIFICANT CHANGE UP (ref 80–100)
MONOCYTES # BLD AUTO: 0.39 K/UL — SIGNIFICANT CHANGE UP (ref 0–0.9)
MONOCYTES NFR BLD AUTO: 5.3 % — SIGNIFICANT CHANGE UP (ref 2–14)
NEUTROPHILS # BLD AUTO: 5.25 K/UL — SIGNIFICANT CHANGE UP (ref 1.8–7.4)
NEUTROPHILS NFR BLD AUTO: 71.5 % — SIGNIFICANT CHANGE UP (ref 43–77)
PLATELET # BLD AUTO: 496 K/UL — HIGH (ref 150–400)
PLATELET # BLD AUTO: 504 K/UL — HIGH (ref 150–400)
POTASSIUM SERPL-MCNC: 3.9 MMOL/L — SIGNIFICANT CHANGE UP (ref 3.5–5.3)
POTASSIUM SERPL-SCNC: 3.9 MMOL/L — SIGNIFICANT CHANGE UP (ref 3.5–5.3)
RBC # BLD: 2.58 M/UL — LOW (ref 3.8–5.2)
RBC # BLD: 2.71 M/UL — LOW (ref 3.8–5.2)
RBC # FLD: 18.8 % — HIGH (ref 10.3–14.5)
RBC # FLD: 18.9 % — HIGH (ref 10.3–14.5)
SARS-COV-2 IGG SERPL QL IA: NEGATIVE — SIGNIFICANT CHANGE UP
SARS-COV-2 IGM SERPL IA-ACNC: 0.08 INDEX — SIGNIFICANT CHANGE UP
SODIUM SERPL-SCNC: 131 MMOL/L — LOW (ref 135–145)
WBC # BLD: 7.35 K/UL — SIGNIFICANT CHANGE UP (ref 3.8–10.5)
WBC # BLD: 8.85 K/UL — SIGNIFICANT CHANGE UP (ref 3.8–10.5)
WBC # FLD AUTO: 7.35 K/UL — SIGNIFICANT CHANGE UP (ref 3.8–10.5)
WBC # FLD AUTO: 8.85 K/UL — SIGNIFICANT CHANGE UP (ref 3.8–10.5)

## 2020-07-13 PROCEDURE — 99024 POSTOP FOLLOW-UP VISIT: CPT

## 2020-07-13 PROCEDURE — 71045 X-RAY EXAM CHEST 1 VIEW: CPT | Mod: 26

## 2020-07-13 RX ADMIN — LATANOPROST 1 DROP(S): 0.05 SOLUTION/ DROPS OPHTHALMIC; TOPICAL at 22:24

## 2020-07-13 RX ADMIN — ENOXAPARIN SODIUM 40 MILLIGRAM(S): 100 INJECTION SUBCUTANEOUS at 12:52

## 2020-07-13 RX ADMIN — NYSTATIN CREAM 1 APPLICATION(S): 100000 CREAM TOPICAL at 07:12

## 2020-07-13 RX ADMIN — Medication 300 MILLIGRAM(S): at 08:16

## 2020-07-13 RX ADMIN — Medication 20 MILLIGRAM(S): at 17:00

## 2020-07-13 RX ADMIN — Medication 1 APPLICATION(S): at 07:11

## 2020-07-13 RX ADMIN — Medication 500 MILLIGRAM(S): at 12:51

## 2020-07-13 RX ADMIN — Medication 1 APPLICATION(S): at 17:01

## 2020-07-13 RX ADMIN — NYSTATIN CREAM 1 APPLICATION(S): 100000 CREAM TOPICAL at 17:00

## 2020-07-13 RX ADMIN — SODIUM CHLORIDE 3 MILLILITER(S): 9 INJECTION INTRAMUSCULAR; INTRAVENOUS; SUBCUTANEOUS at 22:22

## 2020-07-13 RX ADMIN — Medication 1 MILLIGRAM(S): at 12:51

## 2020-07-13 RX ADMIN — LETROZOLE 2.5 MILLIGRAM(S): 2.5 TABLET, FILM COATED ORAL at 12:51

## 2020-07-13 RX ADMIN — Medication 300 MILLIGRAM(S): at 17:00

## 2020-07-13 RX ADMIN — Medication 15 MILLILITER(S): at 12:51

## 2020-07-13 RX ADMIN — SODIUM CHLORIDE 3 MILLILITER(S): 9 INJECTION INTRAMUSCULAR; INTRAVENOUS; SUBCUTANEOUS at 07:12

## 2020-07-13 RX ADMIN — SODIUM CHLORIDE 3 MILLILITER(S): 9 INJECTION INTRAMUSCULAR; INTRAVENOUS; SUBCUTANEOUS at 13:01

## 2020-07-13 RX ADMIN — PANTOPRAZOLE SODIUM 40 MILLIGRAM(S): 20 TABLET, DELAYED RELEASE ORAL at 07:11

## 2020-07-13 RX ADMIN — Medication 20 MILLIGRAM(S): at 07:11

## 2020-07-13 RX ADMIN — Medication 300 MILLIGRAM(S): at 12:52

## 2020-07-13 RX ADMIN — PANTOPRAZOLE SODIUM 40 MILLIGRAM(S): 20 TABLET, DELAYED RELEASE ORAL at 17:00

## 2020-07-13 NOTE — PROGRESS NOTE ADULT - SUBJECTIVE AND OBJECTIVE BOX
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 plastic surgery was consulted and following for her unstageable sacral wound. pt required rectal tube to prevent contamination. At that time, diverting colostomy was offered but family refused.  She completed IV antibiotics for her empyema and was discharged to rehab  with 2 CTs in place.  She now is sent to ED from rehab after one of her chest tubes was dislodged while turning the pt.  Thoracic surgery asked to consult. CXR without obvious pneumothorax.  Upon evaluation, noted that SOURAV was not holding self suction.  Notable air leak in SOURAV bulb.  Decision made to admit pt for further monitoring/work up, and possible removal of CT. Pt without any complaints at this time.  She denies CP, palpitations, SOB, cough, fever, chills, itchiness/rash, diaphoresis, vision changes, HA, dizziness/lightheadedness, numbness/tingling, abd pain, N/V (2020 16:25)  Brief summary:        PAST MEDICAL & SURGICAL HISTORY:  Esophageal perforation: s/p stent placement   H/O pleural empyema: s/p R VATS chest wash out decortication   Breast cancer metastasized to bone  Hypertension  Multiple sclerosis  S/P mastectomy, right  Breast CA  Osteoporosis  MS (mitral stenosis)  Encounter for feeding tube placement: open G-Jtube placement 5/15  History of thoracic surgery: right VATS decortication, chest wastout   History of bowel resection  H/O breast surgery        acetaminophen    Suspension .. 650 milliGRAM(s) Enteral Tube every 6 hours PRN  ascorbic acid 500 milliGRAM(s) Oral daily  Dakins Solution - 1/2 Strength 1 Application(s) Topical two times a day  enoxaparin Injectable 40 milliGRAM(s) SubCutaneous daily  ferrous    sulfate Liquid 300 milliGRAM(s) Oral three times a day with meals  folic acid 1 milliGRAM(s) Oral daily  furosemide    Tablet 20 milliGRAM(s) Oral two times a day  latanoprost 0.005% Ophthalmic Solution 1 Drop(s) Both EYES at bedtime  letrozole 2.5 milliGRAM(s) Oral daily  multivitamin/minerals/iron Oral Solution (CENTRUM) 15 milliLiter(s) Enteral Tube daily  nystatin Powder 1 Application(s) Topical two times a day  pantoprazole  Injectable 40 milliGRAM(s) IV Push two times a day  sodium chloride 0.9% lock flush 3 milliLiter(s) IV Push every 8 hours  MEDICATIONS  (PRN):  acetaminophen    Suspension .. 650 milliGRAM(s) Enteral Tube every 6 hours PRN Temp greater or equal to 38.5C (101.3F), Mild Pain (1 - 3), Moderate Pain (4 - 6), Severe Pain (7 - 10)      Daily     Daily Weight in k.9 (2020 07:31)                              7.6    8.85  )-----------( 496      ( 2020 13:26 )             24.9   07-13    131<L>  |  95<L>  |  21.0<H>  ----------------------------<  111<H>  3.9   |  24.0  |  0.50    Ca    7.5<L>      2020 06:47  Phos  2.6       Mg     1.4     12    TPro  5.8<L>  /  Alb  1.9<L>  /  TBili  <0.2<L>  /  DBili  0.1  /  AST  21  /  ALT  22  /  AlkPhos  117  07-12      PT/INR - ( 2020 17:00 )   PT: 12.8 sec;   INR: 1.11 ratio         PTT - ( 2020 17:00 )  PTT:28.9 sec      Objective:  T(C): 36.7 (20 @ 15:39), Max: 36.7 (20 @ 18:50)  HR: 104 (20 @ 15:39) (85 - 104)  BP: 92/54 (20 @ 15:39) (86/56 - 109/59)  RR: 18 (20 @ 15:39) (18 - 22)  SpO2: 100% (20 @ 15:39) (96% - 100%)  Wt(kg): --CAPILLARY BLOOD GLUCOSE      I&O's Summary    2020 07:01  -  2020 15:44  --------------------------------------------------------  IN: 0 mL / OUT: 300 mL / NET: -300 mL        Physical Exam  Neuro: A+O x 3, non-focal, speech clear and intact, MAEx4  Pulm: Diminished breath sounds bilaterally  CV: RRR, , +S1S2, no m/r/g  Abd: soft, NT, ND, +BS, J-tube in place, c/d/i  Ext: +DP Pulses b/l, no edema  Chest tubes: RT sided chest tube in place. area c/d/i, placed to pleuravac    Imaging:  CXR: < from: Xray Chest 1 View- PORTABLE-Routine (20 @ 05:05) >  INTERPRETATION:  Portable chest radiograph        CLINICAL INFORMATION:   RIGHT chest tube. Follow-up.    TECHNIQUE:  Portable  AP view of the chest was obtained.    COMPARISON: 2020 available for review.    FINDINGS: RIGHT chest tube coiled overlying LEFT lower hemithorax. No pneumothorax.  The lungs are clear of airspace consolidations or effusions. No pneumothorax.  LEFT lung shows a LEFT lower lobe and LEFT lateral mild-moderate pleural effusion.  Cardiac chambers normal size. Esophageal stent in place. Visualized osseous structures are intact.        IMPRESSION:   Residual mild/moderate LEFT effusion. RIGHT chest tube in place. No pneumothorax    < end of copied text >      < from: CT Chest No Cont (20 @ 17:07) >  PROCEDURE:   CT of the Chest, Abdomen and Pelvis was performed without intravenous contrast.   Intravenous contrast: None.  Oral contrast: None.  Sagittal and coronal reformats were performed.    FINDINGS:  CHEST:   LUNGS AND LARGE AIRWAYS: Patent central airways. Near complete atelectasis of the left lower lobe with partial aeration the superior segment. Compressive atelectasis in the medial right lung base and anterior right lower lobe. Subsegmental atelectasis in the posterior right middle lobe. Linear scarring in the lingula.  PLEURA: Right-sided chest tube with tip in the periphery of the right lateral upper hemithorax. Mild right hydropneumothorax with fluid extending into the right major fissure. Moderate left pleural effusion.  VESSELS: Within normal limits.  HEART: Heart size is normal. No pericardial effusion.  MEDIASTINUM AND FINA: Esophageal stent identified in the distal aspect is proximal to the GE junction.  CHEST WALL AND LOWER NECK: Anasarca.    ABDOMEN AND PELVIS:  LIVER: Hepatomegaly. Surface contour nodularity suggesting cirrhosis. No focal masses on this noncontrast study.  BILE DUCTS: Normal caliber.  GALLBLADDER: Gallstone again seen.  SPLEEN: Within normal limits.  PANCREAS: Within normal limits.  ADRENALS: Nodular thickening of the left adrenal gland.  KIDNEYS/URETERS: No hydronephrosis bilaterally. No renal calculus or renal mass. The ureters are unremarkable.    BLADDER: Bladder is collapsed on a Campos catheter  REPRODUCTIVE ORGANS: Calcified fundal fibroid. No adnexal masses. Mild to moderate presacral edema.    BOWEL: G-tube with a jejunostomy extension. Mild rectosigmoid colon wall thickening may represent mild proctitis versus underdistention. No bowel obstruction. Prior small bowel resection and anastomosis in the anterior upper pelvis.  PERITONEUM: Trace to small ascites.  VESSELS: Atherosclerotic changes without aneurysmal dilatation.  RETROPERITONEUM/LYMPH NODES: No lymphadenopathy.    ABDOMINAL WALL: Large soft tissue defect in the posterior inferior right pelvis at the level of the inferior pubic ramus with associated subcutaneous edema. Anasarca.  BONES: Extensive blastic metastasis throughout the visualized osseous structures. Old bilateral superior and inferior pubic rami fractures.    IMPRESSION:     Right chest tube is identified with a mild right hydropneumothorax with associated right lower lobe compressive atelectasis. Moderate left pleural effusion with near complete atelectasis of the left lower lobe. Esophageal stent again identified.    Mild thickening of the rectosigmoid colon, correlate for proctitis. No bowel obstruction.    No hydronephrosis.    Large soft tissue defect adjacent to the posterior right inferior pubic ramus bone with adjacent subcutaneous edema    Extensive blastic metastasis.    < end of copied text >

## 2020-07-14 LAB
ANION GAP SERPL CALC-SCNC: 13 MMOL/L — SIGNIFICANT CHANGE UP (ref 5–17)
BUN SERPL-MCNC: 21 MG/DL — HIGH (ref 8–20)
CALCIUM SERPL-MCNC: 7.4 MG/DL — LOW (ref 8.6–10.2)
CHLORIDE SERPL-SCNC: 95 MMOL/L — LOW (ref 98–107)
CO2 SERPL-SCNC: 24 MMOL/L — SIGNIFICANT CHANGE UP (ref 22–29)
CREAT SERPL-MCNC: 0.57 MG/DL — SIGNIFICANT CHANGE UP (ref 0.5–1.3)
GLUCOSE SERPL-MCNC: 111 MG/DL — HIGH (ref 70–99)
HCT VFR BLD CALC: 27.9 % — LOW (ref 34.5–45)
HGB BLD-MCNC: 8.5 G/DL — LOW (ref 11.5–15.5)
MAGNESIUM SERPL-MCNC: 1.6 MG/DL — SIGNIFICANT CHANGE UP (ref 1.6–2.6)
MCHC RBC-ENTMCNC: 28 PG — SIGNIFICANT CHANGE UP (ref 27–34)
MCHC RBC-ENTMCNC: 30.5 GM/DL — LOW (ref 32–36)
MCV RBC AUTO: 91.8 FL — SIGNIFICANT CHANGE UP (ref 80–100)
PHOSPHATE SERPL-MCNC: 2.6 MG/DL — SIGNIFICANT CHANGE UP (ref 2.4–4.7)
PLATELET # BLD AUTO: 493 K/UL — HIGH (ref 150–400)
POTASSIUM SERPL-MCNC: 4.3 MMOL/L — SIGNIFICANT CHANGE UP (ref 3.5–5.3)
POTASSIUM SERPL-SCNC: 4.3 MMOL/L — SIGNIFICANT CHANGE UP (ref 3.5–5.3)
RBC # BLD: 3.04 M/UL — LOW (ref 3.8–5.2)
RBC # FLD: 18.8 % — HIGH (ref 10.3–14.5)
SODIUM SERPL-SCNC: 132 MMOL/L — LOW (ref 135–145)
WBC # BLD: 9.44 K/UL — SIGNIFICANT CHANGE UP (ref 3.8–10.5)
WBC # FLD AUTO: 9.44 K/UL — SIGNIFICANT CHANGE UP (ref 3.8–10.5)

## 2020-07-14 PROCEDURE — 99222 1ST HOSP IP/OBS MODERATE 55: CPT

## 2020-07-14 PROCEDURE — 99024 POSTOP FOLLOW-UP VISIT: CPT

## 2020-07-14 PROCEDURE — 71045 X-RAY EXAM CHEST 1 VIEW: CPT | Mod: 26

## 2020-07-14 RX ORDER — LINEZOLID 600 MG/300ML
600 INJECTION, SOLUTION INTRAVENOUS EVERY 12 HOURS
Refills: 0 | Status: DISCONTINUED | OUTPATIENT
Start: 2020-07-14 | End: 2020-07-14

## 2020-07-14 RX ORDER — FLUCONAZOLE 150 MG/1
400 TABLET ORAL EVERY 24 HOURS
Refills: 0 | Status: DISCONTINUED | OUTPATIENT
Start: 2020-07-14 | End: 2020-07-23

## 2020-07-14 RX ORDER — MEROPENEM 1 G/30ML
1000 INJECTION INTRAVENOUS EVERY 8 HOURS
Refills: 0 | Status: DISCONTINUED | OUTPATIENT
Start: 2020-07-14 | End: 2020-07-14

## 2020-07-14 RX ORDER — MEROPENEM 1 G/30ML
1000 INJECTION INTRAVENOUS EVERY 8 HOURS
Refills: 0 | Status: DISCONTINUED | OUTPATIENT
Start: 2020-07-14 | End: 2020-07-15

## 2020-07-14 RX ADMIN — SODIUM CHLORIDE 3 MILLILITER(S): 9 INJECTION INTRAMUSCULAR; INTRAVENOUS; SUBCUTANEOUS at 05:25

## 2020-07-14 RX ADMIN — LINEZOLID 300 MILLIGRAM(S): 600 INJECTION, SOLUTION INTRAVENOUS at 12:27

## 2020-07-14 RX ADMIN — SODIUM CHLORIDE 3 MILLILITER(S): 9 INJECTION INTRAMUSCULAR; INTRAVENOUS; SUBCUTANEOUS at 20:51

## 2020-07-14 RX ADMIN — Medication 1 APPLICATION(S): at 17:34

## 2020-07-14 RX ADMIN — Medication 20 MILLIGRAM(S): at 05:10

## 2020-07-14 RX ADMIN — Medication 20 MILLIGRAM(S): at 17:34

## 2020-07-14 RX ADMIN — Medication 1 MILLIGRAM(S): at 08:24

## 2020-07-14 RX ADMIN — PANTOPRAZOLE SODIUM 40 MILLIGRAM(S): 20 TABLET, DELAYED RELEASE ORAL at 17:33

## 2020-07-14 RX ADMIN — Medication 300 MILLIGRAM(S): at 11:12

## 2020-07-14 RX ADMIN — Medication 15 MILLILITER(S): at 08:24

## 2020-07-14 RX ADMIN — MEROPENEM 100 MILLIGRAM(S): 1 INJECTION INTRAVENOUS at 20:31

## 2020-07-14 RX ADMIN — Medication 300 MILLIGRAM(S): at 17:33

## 2020-07-14 RX ADMIN — LETROZOLE 2.5 MILLIGRAM(S): 2.5 TABLET, FILM COATED ORAL at 11:11

## 2020-07-14 RX ADMIN — NYSTATIN CREAM 1 APPLICATION(S): 100000 CREAM TOPICAL at 17:32

## 2020-07-14 RX ADMIN — Medication 500 MILLIGRAM(S): at 08:24

## 2020-07-14 RX ADMIN — LATANOPROST 1 DROP(S): 0.05 SOLUTION/ DROPS OPHTHALMIC; TOPICAL at 21:17

## 2020-07-14 RX ADMIN — Medication 1 APPLICATION(S): at 05:04

## 2020-07-14 RX ADMIN — NYSTATIN CREAM 1 APPLICATION(S): 100000 CREAM TOPICAL at 05:09

## 2020-07-14 RX ADMIN — MEROPENEM 100 MILLIGRAM(S): 1 INJECTION INTRAVENOUS at 11:11

## 2020-07-14 RX ADMIN — PANTOPRAZOLE SODIUM 40 MILLIGRAM(S): 20 TABLET, DELAYED RELEASE ORAL at 05:10

## 2020-07-14 RX ADMIN — ENOXAPARIN SODIUM 40 MILLIGRAM(S): 100 INJECTION SUBCUTANEOUS at 21:17

## 2020-07-14 RX ADMIN — Medication 300 MILLIGRAM(S): at 08:24

## 2020-07-14 RX ADMIN — FLUCONAZOLE 100 MILLIGRAM(S): 150 TABLET ORAL at 17:34

## 2020-07-14 RX ADMIN — SODIUM CHLORIDE 3 MILLILITER(S): 9 INJECTION INTRAMUSCULAR; INTRAVENOUS; SUBCUTANEOUS at 07:22

## 2020-07-14 NOTE — CHART NOTE - NSCHARTNOTEFT_GEN_A_CORE
CTS ACP Addendum    Briefly, 70F well known to service with h/o MS, recent hospitalization for esophageal perforation and empyema, s/p VATS washout and chest tubes and was sent to nursing home June 8. She presented to ED yesterday with chest tube dislodged. No ptx, patient stable. Second tube (SOURAV) attached to self suction but not maintaining suction so connected to pleurivac. Intermittent air leak noted.     Patient seen and examined. Notes, flowsheets, medications, radiologic images and labs reviewed.     Pleural fluid culture + yeast + pseudomonas  Urine culture + proteus > 100K    Plan:  - ID consult   - ABX started  - Maintain SOURAV to pleurivac.  - Keep in house for now, hold discharge   - diuretics for edema  - mild hypotension, will add free water via J tube    Case discussed with Dr. Murcia.

## 2020-07-14 NOTE — PROGRESS NOTE ADULT - SUBJECTIVE AND OBJECTIVE BOX
Subjective: Patient lying in bed, no acute distress noted, stated "I am feeling okay" denies fever, chills, shortness of breath, chest pain, palpitation, dizziness, headache, numbness, tingling, abdominal pain N/V/D.       V/S  T(C): 36.8 (07-13-20 @ 22:06), Max: 36.8 (07-13-20 @ 22:06)  HR: 97 (07-13-20 @ 22:06) (86 - 104)  BP: 95/68 (07-13-20 @ 22:06) (86/56 - 95/68)  RR: 18 (07-13-20 @ 22:06) (18 - 18)  SpO2: 100% (07-13-20 @ 22:06) (99% - 100%) on room air                                          Tele: Sinus tachycardia    CHEST TUBE: Right chest tube to waterseal       AIR LEAKS:  [ x] YES [ ] NO      MEDICATIONS  (STANDING):  ascorbic acid 500 milliGRAM(s) Oral daily  Dakins Solution - 1/2 Strength 1 Application(s) Topical two times a day  enoxaparin Injectable 40 milliGRAM(s) SubCutaneous daily  ferrous    sulfate Liquid 300 milliGRAM(s) Oral three times a day with meals  folic acid 1 milliGRAM(s) Oral daily  furosemide    Tablet 20 milliGRAM(s) Oral two times a day  latanoprost 0.005% Ophthalmic Solution 1 Drop(s) Both EYES at bedtime  letrozole 2.5 milliGRAM(s) Oral daily  multivitamin/minerals/iron Oral Solution (CENTRUM) 15 milliLiter(s) Enteral Tube daily  nystatin Powder 1 Application(s) Topical two times a day  pantoprazole  Injectable 40 milliGRAM(s) IV Push two times a day  sodium chloride 0.9% lock flush 3 milliLiter(s) IV Push every 8 hours      07-13    131<L>  |  95<L>  |  21.0<H>  ----------------------------<  111<H>  3.9   |  24.0  |  0.50    Ca    7.5<L>      13 Jul 2020 06:47  Phos  2.6     07-12  Mg     1.4     07-12    TPro  5.8<L>  /  Alb  1.9<L>  /  TBili  <0.2<L>  /  DBili  0.1  /  AST  21  /  ALT  22  /  AlkPhos  117  07-12                               7.6    8.85  )-----------( 496      ( 13 Jul 2020 13:26 )             24.9        PT/INR - ( 12 Jul 2020 17:00 )   PT: 12.8 sec;   INR: 1.11 ratio         PTT - ( 12 Jul 2020 17:00 )  PTT:28.9 sec                  CXR:  < from: Xray Chest 1 View- PORTABLE-Routine (07.13.20 @ 05:05) >  COMPARISON: 7/12/2020 available for review.    FINDINGS: RIGHT chest tube coiled overlying LEFT lower hemithorax. No pneumothorax.  The lungs are clear of airspace consolidations or effusions. No pneumothorax.  LEFT lung shows a LEFT lower lobe and LEFT lateral mild-moderate pleural effusion.  Cardiac chambers normal size. Esophageal stent in place. Visualized osseous structures are intact.        IMPRESSION:   Residual mild/moderate LEFT effusion. RIGHT chest tube in place. No pneumothorax..        < end of copied text >    < from: CT Chest No Cont (07.12.20 @ 17:07) >  IMPRESSION:     Right chest tube is identified with a mild right hydropneumothorax with associated right lower lobe compressive atelectasis. Moderate left pleural effusion with near complete atelectasis of the left lower lobe. Esophageal stent again identified.    Mild thickening of the rectosigmoid colon, correlate for proctitis. No bowel obstruction.    No hydronephrosis.    Large soft tissue defect adjacent to the posterior right inferior pubic ramus bone with adjacent subcutaneous edema    Extensive blastic metastasis.    < end of copied text >    Physical Exam:  Constitutional: Well developed, frail  Neuro: A+O x 3, non-focal, speech clear and intact  HEENT: PERRL, EOMI, oral mucosa pink and moist  Neck: supple, no JVD, trachea midline  CV: regular rate, regular rhythm, +S1S2, no murmurs or rub  Pulm/chest: lung sounds clear, diminished at the bases bilaterally, no accessory muscle use noted. Right chest tube to wareseal, + air leak noted.  Abd: soft, NT, ND, +BS. JG tube to feeding, sutures intact  : Starks to bedside drainage bag  Ext: DAVIDSON x 4, no cyanosis, clubbing, +3 edema BLE, +pp bilaterally  Skin: warm, well perfused, no rashes         PAST MEDICAL & SURGICAL HISTORY:  Esophageal perforation: s/p stent placement 5/13  H/O pleural empyema: s/p R VATS chest wash out decortication 5/13  Breast cancer metastasized to bone  Hypertension  Multiple sclerosis  S/P mastectomy, right  Breast CA  Osteoporosis  MS (mitral stenosis)  Encounter for feeding tube placement: open G-Jtube placement 5/15  History of thoracic surgery: right VATS decortication, chest wastout 5/13  History of bowel resection  H/O breast surgery

## 2020-07-14 NOTE — PROGRESS NOTE ADULT - PROBLEM SELECTOR PLAN 5
Protonix for PUD  lovenox/SCDs for DVT prophylaxis  Care and plan needs to discuss with thoracic team in AM rounds

## 2020-07-15 LAB
-  AMIKACIN: SIGNIFICANT CHANGE UP
-  AMIKACIN: SIGNIFICANT CHANGE UP
-  AMOXICILLIN/CLAVULANIC ACID: SIGNIFICANT CHANGE UP
-  AMPICILLIN/SULBACTAM: SIGNIFICANT CHANGE UP
-  AMPICILLIN: SIGNIFICANT CHANGE UP
-  AMPICILLIN: SIGNIFICANT CHANGE UP
-  AZTREONAM: SIGNIFICANT CHANGE UP
-  AZTREONAM: SIGNIFICANT CHANGE UP
-  CEFAZOLIN: SIGNIFICANT CHANGE UP
-  CEFEPIME: SIGNIFICANT CHANGE UP
-  CEFEPIME: SIGNIFICANT CHANGE UP
-  CEFOXITIN: SIGNIFICANT CHANGE UP
-  CEFTAZIDIME: SIGNIFICANT CHANGE UP
-  CEFTRIAXONE: SIGNIFICANT CHANGE UP
-  CIPROFLOXACIN: SIGNIFICANT CHANGE UP
-  CIPROFLOXACIN: SIGNIFICANT CHANGE UP
-  ERTAPENEM: SIGNIFICANT CHANGE UP
-  GENTAMICIN: SIGNIFICANT CHANGE UP
-  GENTAMICIN: SIGNIFICANT CHANGE UP
-  IMIPENEM: SIGNIFICANT CHANGE UP
-  LEVOFLOXACIN: SIGNIFICANT CHANGE UP
-  LEVOFLOXACIN: SIGNIFICANT CHANGE UP
-  MEROPENEM: SIGNIFICANT CHANGE UP
-  MEROPENEM: SIGNIFICANT CHANGE UP
-  NITROFURANTOIN: SIGNIFICANT CHANGE UP
-  PIPERACILLIN/TAZOBACTAM: SIGNIFICANT CHANGE UP
-  PIPERACILLIN/TAZOBACTAM: SIGNIFICANT CHANGE UP
-  TETRACYCLINE: SIGNIFICANT CHANGE UP
-  TOBRAMYCIN: SIGNIFICANT CHANGE UP
-  TOBRAMYCIN: SIGNIFICANT CHANGE UP
-  TRIMETHOPRIM/SULFAMETHOXAZOLE: SIGNIFICANT CHANGE UP
-  VANCOMYCIN: SIGNIFICANT CHANGE UP
ANION GAP SERPL CALC-SCNC: 10 MMOL/L — SIGNIFICANT CHANGE UP (ref 5–17)
ANISOCYTOSIS BLD QL: SLIGHT — SIGNIFICANT CHANGE UP
BASOPHILS # BLD AUTO: 0 K/UL — SIGNIFICANT CHANGE UP (ref 0–0.2)
BASOPHILS NFR BLD AUTO: 0 % — SIGNIFICANT CHANGE UP (ref 0–2)
BUN SERPL-MCNC: 25 MG/DL — HIGH (ref 8–20)
CALCIUM SERPL-MCNC: 7.4 MG/DL — LOW (ref 8.6–10.2)
CHLORIDE SERPL-SCNC: 92 MMOL/L — LOW (ref 98–107)
CO2 SERPL-SCNC: 27 MMOL/L — SIGNIFICANT CHANGE UP (ref 22–29)
CREAT SERPL-MCNC: 0.63 MG/DL — SIGNIFICANT CHANGE UP (ref 0.5–1.3)
CULTURE RESULTS: SIGNIFICANT CHANGE UP
DACRYOCYTES BLD QL SMEAR: SLIGHT — SIGNIFICANT CHANGE UP
ELLIPTOCYTES BLD QL SMEAR: SLIGHT — SIGNIFICANT CHANGE UP
EOSINOPHIL # BLD AUTO: 0.19 K/UL — SIGNIFICANT CHANGE UP (ref 0–0.5)
EOSINOPHIL NFR BLD AUTO: 1.7 % — SIGNIFICANT CHANGE UP (ref 0–6)
GIANT PLATELETS BLD QL SMEAR: PRESENT — SIGNIFICANT CHANGE UP
GLUCOSE SERPL-MCNC: 104 MG/DL — HIGH (ref 70–99)
HCT VFR BLD CALC: 27.3 % — LOW (ref 34.5–45)
HGB BLD-MCNC: 8.3 G/DL — LOW (ref 11.5–15.5)
HYPOCHROMIA BLD QL: SLIGHT — SIGNIFICANT CHANGE UP
LYMPHOCYTES # BLD AUTO: 0.5 K/UL — LOW (ref 1–3.3)
LYMPHOCYTES # BLD AUTO: 4.4 % — LOW (ref 13–44)
MAGNESIUM SERPL-MCNC: 1.4 MG/DL — LOW (ref 1.6–2.6)
MANUAL SMEAR VERIFICATION: SIGNIFICANT CHANGE UP
MCHC RBC-ENTMCNC: 27.7 PG — SIGNIFICANT CHANGE UP (ref 27–34)
MCHC RBC-ENTMCNC: 30.4 GM/DL — LOW (ref 32–36)
MCV RBC AUTO: 91 FL — SIGNIFICANT CHANGE UP (ref 80–100)
METHOD TYPE: SIGNIFICANT CHANGE UP
MICROCYTES BLD QL: SLIGHT — SIGNIFICANT CHANGE UP
MONOCYTES # BLD AUTO: 0.1 K/UL — SIGNIFICANT CHANGE UP (ref 0–0.9)
MONOCYTES NFR BLD AUTO: 0.9 % — LOW (ref 2–14)
NEUTROPHILS # BLD AUTO: 10.48 K/UL — HIGH (ref 1.8–7.4)
NEUTROPHILS NFR BLD AUTO: 93 % — HIGH (ref 43–77)
ORGANISM # SPEC MICROSCOPIC CNT: SIGNIFICANT CHANGE UP
ORGANISM # SPEC MICROSCOPIC CNT: SIGNIFICANT CHANGE UP
OVALOCYTES BLD QL SMEAR: SLIGHT — SIGNIFICANT CHANGE UP
PLAT MORPH BLD: NORMAL — SIGNIFICANT CHANGE UP
PLATELET # BLD AUTO: 598 K/UL — HIGH (ref 150–400)
POLYCHROMASIA BLD QL SMEAR: SLIGHT — SIGNIFICANT CHANGE UP
POTASSIUM SERPL-MCNC: 4.5 MMOL/L — SIGNIFICANT CHANGE UP (ref 3.5–5.3)
POTASSIUM SERPL-SCNC: 4.5 MMOL/L — SIGNIFICANT CHANGE UP (ref 3.5–5.3)
RBC # BLD: 3 M/UL — LOW (ref 3.8–5.2)
RBC # FLD: 19.1 % — HIGH (ref 10.3–14.5)
RBC BLD AUTO: ABNORMAL
SODIUM SERPL-SCNC: 129 MMOL/L — LOW (ref 135–145)
SPECIMEN SOURCE: SIGNIFICANT CHANGE UP
WBC # BLD: 11.27 K/UL — HIGH (ref 3.8–10.5)
WBC # FLD AUTO: 11.27 K/UL — HIGH (ref 3.8–10.5)

## 2020-07-15 PROCEDURE — 71045 X-RAY EXAM CHEST 1 VIEW: CPT | Mod: 26,77

## 2020-07-15 PROCEDURE — 99232 SBSQ HOSP IP/OBS MODERATE 35: CPT

## 2020-07-15 PROCEDURE — 76942 ECHO GUIDE FOR BIOPSY: CPT | Mod: 26,59

## 2020-07-15 PROCEDURE — 36000 PLACE NEEDLE IN VEIN: CPT

## 2020-07-15 PROCEDURE — 71045 X-RAY EXAM CHEST 1 VIEW: CPT | Mod: 26

## 2020-07-15 PROCEDURE — 93010 ELECTROCARDIOGRAM REPORT: CPT

## 2020-07-15 PROCEDURE — 76937 US GUIDE VASCULAR ACCESS: CPT | Mod: 26

## 2020-07-15 PROCEDURE — 99024 POSTOP FOLLOW-UP VISIT: CPT

## 2020-07-15 PROCEDURE — 74220 X-RAY XM ESOPHAGUS 1CNTRST: CPT | Mod: 26

## 2020-07-15 RX ORDER — SODIUM CHLORIDE 9 MG/ML
250 INJECTION INTRAMUSCULAR; INTRAVENOUS; SUBCUTANEOUS ONCE
Refills: 0 | Status: COMPLETED | OUTPATIENT
Start: 2020-07-15 | End: 2020-07-15

## 2020-07-15 RX ORDER — METRONIDAZOLE 500 MG
500 TABLET ORAL EVERY 8 HOURS
Refills: 0 | Status: DISCONTINUED | OUTPATIENT
Start: 2020-07-15 | End: 2020-07-23

## 2020-07-15 RX ORDER — METRONIDAZOLE 500 MG
TABLET ORAL
Refills: 0 | Status: DISCONTINUED | OUTPATIENT
Start: 2020-07-15 | End: 2020-07-23

## 2020-07-15 RX ORDER — SODIUM CHLORIDE 9 MG/ML
1 INJECTION INTRAMUSCULAR; INTRAVENOUS; SUBCUTANEOUS THREE TIMES A DAY
Refills: 0 | Status: DISCONTINUED | OUTPATIENT
Start: 2020-07-15 | End: 2020-07-23

## 2020-07-15 RX ORDER — DIPHENHYDRAMINE HCL 50 MG
25 CAPSULE ORAL ONCE
Refills: 0 | Status: COMPLETED | OUTPATIENT
Start: 2020-07-15 | End: 2020-07-15

## 2020-07-15 RX ORDER — LACTOBACILLUS ACIDOPHILUS 100MM CELL
1 CAPSULE ORAL
Refills: 0 | Status: DISCONTINUED | OUTPATIENT
Start: 2020-07-15 | End: 2020-07-23

## 2020-07-15 RX ORDER — MAGNESIUM SULFATE 500 MG/ML
2 VIAL (ML) INJECTION
Refills: 0 | Status: COMPLETED | OUTPATIENT
Start: 2020-07-15 | End: 2020-07-15

## 2020-07-15 RX ORDER — METRONIDAZOLE 500 MG
500 TABLET ORAL ONCE
Refills: 0 | Status: COMPLETED | OUTPATIENT
Start: 2020-07-15 | End: 2020-07-15

## 2020-07-15 RX ADMIN — SODIUM CHLORIDE 250 MILLILITER(S): 9 INJECTION INTRAMUSCULAR; INTRAVENOUS; SUBCUTANEOUS at 18:34

## 2020-07-15 RX ADMIN — ENOXAPARIN SODIUM 40 MILLIGRAM(S): 100 INJECTION SUBCUTANEOUS at 21:30

## 2020-07-15 RX ADMIN — Medication 20 MILLIGRAM(S): at 05:19

## 2020-07-15 RX ADMIN — Medication 500 MILLIGRAM(S): at 10:53

## 2020-07-15 RX ADMIN — LATANOPROST 1 DROP(S): 0.05 SOLUTION/ DROPS OPHTHALMIC; TOPICAL at 21:31

## 2020-07-15 RX ADMIN — SODIUM CHLORIDE 3 MILLILITER(S): 9 INJECTION INTRAMUSCULAR; INTRAVENOUS; SUBCUTANEOUS at 10:53

## 2020-07-15 RX ADMIN — Medication 1 TABLET(S): at 17:16

## 2020-07-15 RX ADMIN — Medication 100 MILLIGRAM(S): at 21:30

## 2020-07-15 RX ADMIN — Medication 300 MILLIGRAM(S): at 12:27

## 2020-07-15 RX ADMIN — Medication 50 GRAM(S): at 10:53

## 2020-07-15 RX ADMIN — FLUCONAZOLE 100 MILLIGRAM(S): 150 TABLET ORAL at 17:16

## 2020-07-15 RX ADMIN — NYSTATIN CREAM 1 APPLICATION(S): 100000 CREAM TOPICAL at 17:17

## 2020-07-15 RX ADMIN — Medication 300 MILLIGRAM(S): at 17:16

## 2020-07-15 RX ADMIN — Medication 50 GRAM(S): at 12:28

## 2020-07-15 RX ADMIN — LETROZOLE 2.5 MILLIGRAM(S): 2.5 TABLET, FILM COATED ORAL at 12:27

## 2020-07-15 RX ADMIN — SODIUM CHLORIDE 3 MILLILITER(S): 9 INJECTION INTRAMUSCULAR; INTRAVENOUS; SUBCUTANEOUS at 05:42

## 2020-07-15 RX ADMIN — Medication 1 MILLIGRAM(S): at 10:53

## 2020-07-15 RX ADMIN — Medication 25 MILLIGRAM(S): at 06:40

## 2020-07-15 RX ADMIN — Medication 1 APPLICATION(S): at 17:16

## 2020-07-15 RX ADMIN — PANTOPRAZOLE SODIUM 40 MILLIGRAM(S): 20 TABLET, DELAYED RELEASE ORAL at 17:17

## 2020-07-15 RX ADMIN — SODIUM CHLORIDE 1 GRAM(S): 9 INJECTION INTRAMUSCULAR; INTRAVENOUS; SUBCUTANEOUS at 21:30

## 2020-07-15 RX ADMIN — Medication 15 MILLILITER(S): at 10:53

## 2020-07-15 RX ADMIN — Medication 100 MILLIGRAM(S): at 15:36

## 2020-07-15 RX ADMIN — Medication 1 APPLICATION(S): at 05:20

## 2020-07-15 RX ADMIN — NYSTATIN CREAM 1 APPLICATION(S): 100000 CREAM TOPICAL at 05:19

## 2020-07-15 NOTE — CHART NOTE - NSCHARTNOTEFT_GEN_A_CORE
pt seen and examined. pt with polymicrobial empyema and proteus UTI, on fluconazole, meropenem and zyvoxx, now with diffuse body rash. meropenem changed to levaquin per ID.  pt without IV access, multiple attempts tried. PICC/midline to be placed today (cleared by ID, negative blood cultures 5/12). minimal U/O overnight, bladder scan with >500cc. campos to be changed.        d/w Dr. Murcia in AM rounds

## 2020-07-15 NOTE — PROGRESS NOTE ADULT - PROBLEM SELECTOR PLAN 3
polymicrobial Empyema with strep mitis, Klebsiella oxytoca, and CoNS, patient had completed a course of Merrem (6/9)  Now pleural fluid +yeast and pseudomonas and urine with proteus>100k  ID following, consult appreciated  Continue Linezolid for Enterococcus, Merrem for pseudomonas and Diflucan for candida Patient had completed a course of Merrem (6/9) for polymicrobial Empyema with strep mitis, Klebsiella oxytoca, and CoNS,   Now pleural fluid +yeast and pseudomonas and urine with proteus>100k  ID following, consult appreciated  Continue Linezolid for Enterococcus, Merrem for pseudomonas and Diflucan for candida

## 2020-07-15 NOTE — PROGRESS NOTE ADULT - ASSESSMENT
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 plastic surgery was consulted and following for her unstageable sacral wound. pt required rectal tube to prevent contamination. At that time, diverting colostomy was offered but family refused.  She completed IV antibiotics for her empyema and was discharged to rehab 6/9 with 2 CTs in place.  She now is sent to ED from rehab after one of her chest tubes was dislodged while turning the pt.  Thoracic surgery asked to consult. CXR without obvious pneumothorax.  Upon evaluation, noted that SOURAV was not holding self suction.  Notable air leak in SOURAV bulb.  Decision made to admit pt for further monitoring/work up, and possible removal of CT. Pt without any complaints at this time.  She denies CP, palpitations, SOB, cough, fever, chills, itchiness/rash, diaphoresis, vision changes, HA, dizziness/lightheadedness, numbness/tingling, abd pain, N/V (12 Jul 2020 16:25)    patient was last seen on 6/9/2020 for a polymicrobial Empyema with strep mitis, Klebsiella oxytoca, and CoNS.  At that point, she had completed a course of merrem.     Right SOURAV drain swapped to Pleur-evac.    Fluid culture with Pseudomonas, Enterococcus faecalis and Candida    Impression:  polymicrobial lung infection  multiple sclerosis   chronic ulcers  Drug rash    Plan:    Rash may be related to Merrem  - will stop    Pseudomonas susceptible to all tested agents  - d/c merrem,  start Levaquin 750mg PO /PEG daily,.   will check baseline EKG    - Continue Linezolid for Enterococcus  - continue  Fluconazole for candida found as well  - continue Pleur-evac    - follow up all outstanding cultures  - trend temperature and WBC curve  - repeat cultures from blood and all sources if febrile.

## 2020-07-15 NOTE — PROGRESS NOTE ADULT - SUBJECTIVE AND OBJECTIVE BOX
[facility]  INFECTIOUS DISEASES AND INTERNAL MEDICINE at [place]  =======================================================  Phong Wang MD  Diplomates American Board of Internal Medicine and Infectious Diseases  Tel  [phone]  Fax [phone]  =======================================================    [id number]  GEORGE STANLEY  follow up:  chest infection    developed rash on all of body overnight.      =======================================================  REVIEW OF SYSTEMS:  CONSTITUTIONAL:  No Fever or chills  HEENT:  No diplopia or blurred vision.  No earache, sore throat or runny nose.  CARDIOVASCULAR:  No pressure, squeezing, strangling, tightness, heaviness or aching about the chest, neck, axilla or epigastrium.  RESPIRATORY:  No cough, shortness of breath  GASTROINTESTINAL:  No nausea, vomiting or diarrhea.  GENITOURINARY:  No dysuria, frequency or urgency. No Blood in urine  MUSCULOSKELETAL:  no joint aches, no muscle pain  SKIN:  No change in skin, hair or nails.  NEUROLOGIC:  No Headaches, seizures or weakness.  PSYCHIATRIC:  No disorder of thought or mood.  ENDOCRINE:  No heat or cold intolerance  HEMATOLOGICAL:  No easy bruising or bleeding.   =======================================================  Allergies  Sudafed (Other)    ======================================================  Physical Exam:  ============  (see vitals section below)    General:  No acute distress. FRAIL  Eye: Pupils are equal, round and reactive to light, Extraocular movements are intact, Normal conjunctiva.  HENT: Normocephalic, Oral mucosa is moist, No pharyngeal erythema, No sinus tenderness.  Neck: Supple, No lymphadenopathy.  Respiratory: Lungs with diminished air entry right side  RIGHT side chest tube in place  Cardiovascular: Normal rate, Regular rhythm,   Gastrointestinal: Soft, Non-tender, Non-distended, Normal bowel sounds.  Genitourinary: No costovertebral angle tenderness.  Lymphatics: No lymphadenopathy neck,   Musculoskeletal: contracted extremities  Integumentary: RASH on body, face  Neurologic: Alert, Oriented, No focal deficits, Cranial Nerves II-XII are grossly intact.  Psychiatric: Appropriate mood & affect.    =======================================================   Vitals:  ============  T(F): 97.8 (15 Jul 2020 10:10), Max: 98.3 (15 Jul 2020 05:13)  HR: 90 (15 Jul 2020 10:10)  BP: 96/52 (15 Jul 2020 05:13)  RR: 18 (15 Jul 2020 10:10)  SpO2: 98% (15 Jul 2020 10:10) (94% - 98%)  temp max in last 48H T(F): , Max: 98.3 (07-15-20 @ 05:13)    =======================================================  Current Antibiotics:  fluconAZOLE IVPB 400 milliGRAM(s) IV Intermittent every 24 hours  levoFLOXacin  Tablet 750 milliGRAM(s) Oral every 24 hours  linezolid  IVPB 600 milliGRAM(s) IV Intermittent every 12 hours    Other medications:  ascorbic acid 500 milliGRAM(s) Oral daily  Dakins Solution - 1/2 Strength 1 Application(s) Topical two times a day  enoxaparin Injectable 40 milliGRAM(s) SubCutaneous daily  ferrous    sulfate Liquid 300 milliGRAM(s) Oral three times a day with meals  folic acid 1 milliGRAM(s) Oral daily  furosemide    Tablet 20 milliGRAM(s) Oral two times a day  latanoprost 0.005% Ophthalmic Solution 1 Drop(s) Both EYES at bedtime  letrozole 2.5 milliGRAM(s) Oral daily  magnesium sulfate  IVPB 2 Gram(s) IV Intermittent every 1 hour  multivitamin/minerals/iron Oral Solution (CENTRUM) 15 milliLiter(s) Enteral Tube daily  nystatin Powder 1 Application(s) Topical two times a day  pantoprazole  Injectable 40 milliGRAM(s) IV Push two times a day  sodium chloride 0.9% lock flush 3 milliLiter(s) IV Push every 8 hours      =======================================================  Labs:                        8.3    11.27 )-----------( 598      ( 15 Jul 2020 06:59 )             27.3      07-15    129<L>  |  92<L>  |  25.0<H>  ----------------------------<  104<H>  4.5   |  27.0  |  0.63    Ca    7.4<L>      15 Jul 2020 06:59  Phos  2.6     07-14  Mg     1.4     07-15        Culture - Urine (collected 07-13-20 @ 08:15)  Source: .Urine Catheterized    Culture - Fungal, Body Fluid (collected 07-13-20 @ 03:07)  Source: .Body Fluid Pleural Fluid    Culture - Body Fluid with Gram Stain (collected 07-13-20 @ 03:07)  Source: .Body Fluid Pleural Fluid  Gram Stain (07-13-20 @ 05:06):    Numerous polymorphonuclear leukocytes seen per low power field    Numerous Gram Negative Rods seen per oil power field  Organism: Pseudomonas aeruginosa (07-15-20 @ 08:50)  Organism: Pseudomonas aeruginosa (07-15-20 @ 08:50)    Sensitivities:      -  Amikacin: S <=16      -  Aztreonam: S <=4      -  Cefepime: S <=2      -  Ceftazidime: S <=1      -  Ciprofloxacin: S <=0.25      -  Gentamicin: S <=2      -  Imipenem: S <=1      -  Levofloxacin: S <=0.5      -  Meropenem: S <=1      -  Piperacillin/Tazobactam: S <=8      -  Tobramycin: S <=2      Method Type: PAWAN    Culture - Blood (collected 07-12-20 @ 22:43)  Source: .Blood Blood-Venous    Culture - Blood (collected 07-12-20 @ 22:24)  Source: .Blood Blood-Venous      Creatinine, Serum: 0.63 mg/dL (07-15-20 @ 06:59)  Creatinine, Serum: 0.57 mg/dL (07-14-20 @ 06:56)  Creatinine, Serum: 0.50 mg/dL (07-13-20 @ 06:47)  Creatinine, Serum: 0.54 mg/dL (07-12-20 @ 17:00)            WBC Count: 11.27 K/uL (07-15-20 @ 06:59)  WBC Count: 9.44 K/uL (07-14-20 @ 06:56)  WBC Count: 8.85 K/uL (07-13-20 @ 13:26)  WBC Count: 7.35 K/uL (07-13-20 @ 06:47)  WBC Count: 8.71 K/uL (07-12-20 @ 17:02)      COVID-19 PCR: NotDetec (07-12-20 @ 14:06)    Lactate Dehydrogenase, Serum: 268 U/L (07-12-20 @ 22:20)  Lactate Dehydrogenase, Serum: 199 U/L (07-12-20 @ 17:00)    Alkaline Phosphatase, Serum: 117 U/L (07-12-20 @ 17:00)  Alanine Aminotransferase (ALT/SGPT): 22 U/L (07-12-20 @ 17:00)  Aspartate Aminotransferase (AST/SGOT): 21 U/L (07-12-20 @ 17:00)  Bilirubin Total, Serum: <0.2 mg/dL (07-12-20 @ 17:00)  Bilirubin Direct, Serum: 0.1 mg/dL (07-12-20 @ 17:00)

## 2020-07-15 NOTE — PROGRESS NOTE ADULT - PROBLEM SELECTOR PLAN 5
Protonix for PUD  Lovenox /SCDs for DVT prophylaxis    Care and plan needs to discuss with thoracic team in AM rounds

## 2020-07-15 NOTE — PROGRESS NOTE ADULT - SUBJECTIVE AND OBJECTIVE BOX
Subjective: Patient lying in bed, no acute distress noted, stated "I am feeling okay" denies fever, chills, shortness of breath, chest pain, palpitation, dizziness, headache, numbness, tingling, abdominal pain N/V/D.       V/S  T(C): 36.3 (07-14-20 @ 21:13), Max: 36.8 (07-14-20 @ 09:30)  HR: 106 (07-14-20 @ 21:13) (98 - 106)  BP: 94/69 (07-14-20 @ 21:13) (87/62 - 97/68)  RR: 16 (07-14-20 @ 21:13) (16 - 18)  SpO2: 94% (07-14-20 @ 21:13) (94% - 99%)on 2L O2                                                 Tele: Sinus rhythm/ occasional ST    CHEST TUBE: Right chest tube to waterseal     AIR LEAKS:  [x ] YES [ ] NO      MEDICATIONS  (STANDING):  ascorbic acid 500 milliGRAM(s) Oral daily  Dakins Solution - 1/2 Strength 1 Application(s) Topical two times a day  enoxaparin Injectable 40 milliGRAM(s) SubCutaneous daily  ferrous    sulfate Liquid 300 milliGRAM(s) Oral three times a day with meals  fluconAZOLE IVPB 400 milliGRAM(s) IV Intermittent every 24 hours  folic acid 1 milliGRAM(s) Oral daily  furosemide    Tablet 20 milliGRAM(s) Oral two times a day  latanoprost 0.005% Ophthalmic Solution 1 Drop(s) Both EYES at bedtime  letrozole 2.5 milliGRAM(s) Oral daily  linezolid  IVPB 600 milliGRAM(s) IV Intermittent every 12 hours  meropenem  IVPB 1000 milliGRAM(s) IV Intermittent every 8 hours  multivitamin/minerals/iron Oral Solution (CENTRUM) 15 milliLiter(s) Enteral Tube daily  nystatin Powder 1 Application(s) Topical two times a day  pantoprazole  Injectable 40 milliGRAM(s) IV Push two times a day  sodium chloride 0.9% lock flush 3 milliLiter(s) IV Push every 8 hours      07-14    132<L>  |  95<L>  |  21.0<H>  ----------------------------<  111<H>  4.3   |  24.0  |  0.57    Ca    7.4<L>      14 Jul 2020 06:56  Phos  2.6     07-14  Mg     1.6     07-14                                 8.5    9.44  )-----------( 493      ( 14 Jul 2020 06:56 )             27.9                     CXR:    < from: Xray Chest 1 View- PORTABLE-Routine (07.14.20 @ 04:50) >  eden AP view    HISTORY:  Pleural tube. Hydropneumothorax.    Comparison:  Chest x-ray 7/13/2014 and CT chest 7/12/2020    Right chest tube. Esophageal stent. At least moderate-sized left pleural effusion and small right pleural effusion.    Heart normal in size.    Diffuse sclerotic lesions in the bones consistent with metastatic disease.    Clips in the right axillary region.    IMPRESSION:    Right chest tube.    Bilateral pleural effusions, larger on the left, more prominent on the left and unchanged on the right since 7/13/2020.           < end of copied text >    < from: CT Chest No Cont (07.12.20 @ 17:07) >    IMPRESSION:     Right chest tube is identified with a mild right hydropneumothorax with associated right lower lobe compressive atelectasis. Moderate left pleural effusion with near complete atelectasis of the left lower lobe. Esophageal stent again identified.    Mild thickening of the rectosigmoid colon, correlate for proctitis. No bowel obstruction.    No hydronephrosis.    Large soft tissue defect adjacent to the posterior right inferior pubic ramus bone with adjacent subcutaneous edema    Extensive blastic metastasis.      < end of copied text >      Physical Exam:  Constitutional: Well developed, frail  Neuro: A+O x 3, non-focal, speech clear and intact  HEENT: PERRL, EOMI, oral mucosa pink and moist  Neck: supple, no JVD, trachea midline  CV: regular rate, regular rhythm, +S1S2, no murmurs or rub  Pulm/chest: lung sounds clear, diminished at the bases bilaterally, no accessory muscle use noted. Right chest tube to wareseal, + air leak noted.  Abd: soft, NT, ND, +BS. JG tube to feeding, sutures intact  : Starks to bedside drainage bag  Ext: DAVIDSON x 4, no cyanosis, clubbing, +3 edema BLE, +pp bilaterally  Skin: warm, well perfused, no rashes         PAST MEDICAL & SURGICAL HISTORY:  Esophageal perforation: s/p stent placement 5/13  H/O pleural empyema: s/p R VATS chest wash out decortication 5/13  Breast cancer metastasized to bone  Hypertension  Multiple sclerosis  S/P mastectomy, right  Breast CA  Osteoporosis  MS (mitral stenosis)  Encounter for feeding tube placement: open G-Jtube placement 5/15  History of thoracic surgery: right VATS decortication, chest wastout 5/13  History of bowel resection  H/O breast surgery

## 2020-07-15 NOTE — PROCEDURE NOTE - NSPROCDETAILS_GEN_ALL_CORE
ultrasound assessment/supine position/sterile dressing applied/location identified, draped/prepped, sterile technique used/sterile technique, catheter placed/ultrasound guidance

## 2020-07-16 DIAGNOSIS — R26.89 OTHER ABNORMALITIES OF GAIT AND MOBILITY: ICD-10-CM

## 2020-07-16 LAB
-  AMIKACIN: SIGNIFICANT CHANGE UP
-  AMOXICILLIN/CLAVULANIC ACID: SIGNIFICANT CHANGE UP
-  AMPICILLIN/SULBACTAM: SIGNIFICANT CHANGE UP
-  AMPICILLIN: SIGNIFICANT CHANGE UP
-  AZTREONAM: SIGNIFICANT CHANGE UP
-  CEFAZOLIN: SIGNIFICANT CHANGE UP
-  CEFEPIME: SIGNIFICANT CHANGE UP
-  CEFOXITIN: SIGNIFICANT CHANGE UP
-  CEFTRIAXONE: SIGNIFICANT CHANGE UP
-  CIPROFLOXACIN: SIGNIFICANT CHANGE UP
-  ERTAPENEM: SIGNIFICANT CHANGE UP
-  GENTAMICIN: SIGNIFICANT CHANGE UP
-  IMIPENEM: SIGNIFICANT CHANGE UP
-  LEVOFLOXACIN: SIGNIFICANT CHANGE UP
-  LEVOFLOXACIN: SIGNIFICANT CHANGE UP
-  MEROPENEM: SIGNIFICANT CHANGE UP
-  PIPERACILLIN/TAZOBACTAM: SIGNIFICANT CHANGE UP
-  TOBRAMYCIN: SIGNIFICANT CHANGE UP
-  TRIMETHOPRIM/SULFAMETHOXAZOLE: SIGNIFICANT CHANGE UP
ANION GAP SERPL CALC-SCNC: 14 MMOL/L — SIGNIFICANT CHANGE UP (ref 5–17)
BUN SERPL-MCNC: 25 MG/DL — HIGH (ref 8–20)
CALCIUM SERPL-MCNC: 7.3 MG/DL — LOW (ref 8.6–10.2)
CHLORIDE SERPL-SCNC: 96 MMOL/L — LOW (ref 98–107)
CO2 SERPL-SCNC: 24 MMOL/L — SIGNIFICANT CHANGE UP (ref 22–29)
CREAT SERPL-MCNC: 0.58 MG/DL — SIGNIFICANT CHANGE UP (ref 0.5–1.3)
GLUCOSE BLDC GLUCOMTR-MCNC: 105 MG/DL — HIGH (ref 70–99)
GLUCOSE SERPL-MCNC: 103 MG/DL — HIGH (ref 70–99)
HCT VFR BLD CALC: 21.4 % — LOW (ref 34.5–45)
HCT VFR BLD CALC: 22.1 % — LOW (ref 34.5–45)
HGB BLD-MCNC: 6.7 G/DL — CRITICAL LOW (ref 11.5–15.5)
HGB BLD-MCNC: 6.8 G/DL — CRITICAL LOW (ref 11.5–15.5)
MAGNESIUM SERPL-MCNC: 1.8 MG/DL — SIGNIFICANT CHANGE UP (ref 1.6–2.6)
MCHC RBC-ENTMCNC: 27.5 PG — SIGNIFICANT CHANGE UP (ref 27–34)
MCHC RBC-ENTMCNC: 28.8 PG — SIGNIFICANT CHANGE UP (ref 27–34)
MCHC RBC-ENTMCNC: 30.3 GM/DL — LOW (ref 32–36)
MCHC RBC-ENTMCNC: 31.8 GM/DL — LOW (ref 32–36)
MCV RBC AUTO: 90.6 FL — SIGNIFICANT CHANGE UP (ref 80–100)
MCV RBC AUTO: 90.7 FL — SIGNIFICANT CHANGE UP (ref 80–100)
METHOD TYPE: SIGNIFICANT CHANGE UP
PLATELET # BLD AUTO: 478 K/UL — HIGH (ref 150–400)
PLATELET # BLD AUTO: 522 K/UL — HIGH (ref 150–400)
POTASSIUM SERPL-MCNC: 3.9 MMOL/L — SIGNIFICANT CHANGE UP (ref 3.5–5.3)
POTASSIUM SERPL-SCNC: 3.9 MMOL/L — SIGNIFICANT CHANGE UP (ref 3.5–5.3)
RBC # BLD: 2.36 M/UL — LOW (ref 3.8–5.2)
RBC # BLD: 2.44 M/UL — LOW (ref 3.8–5.2)
RBC # FLD: 18.7 % — HIGH (ref 10.3–14.5)
RBC # FLD: 19 % — HIGH (ref 10.3–14.5)
SODIUM SERPL-SCNC: 134 MMOL/L — LOW (ref 135–145)
WBC # BLD: 8.71 K/UL — SIGNIFICANT CHANGE UP (ref 3.8–10.5)
WBC # BLD: 9.09 K/UL — SIGNIFICANT CHANGE UP (ref 3.8–10.5)
WBC # FLD AUTO: 8.71 K/UL — SIGNIFICANT CHANGE UP (ref 3.8–10.5)
WBC # FLD AUTO: 9.09 K/UL — SIGNIFICANT CHANGE UP (ref 3.8–10.5)

## 2020-07-16 PROCEDURE — 71045 X-RAY EXAM CHEST 1 VIEW: CPT | Mod: 26

## 2020-07-16 PROCEDURE — 99024 POSTOP FOLLOW-UP VISIT: CPT

## 2020-07-16 PROCEDURE — 99232 SBSQ HOSP IP/OBS MODERATE 35: CPT

## 2020-07-16 RX ADMIN — ENOXAPARIN SODIUM 40 MILLIGRAM(S): 100 INJECTION SUBCUTANEOUS at 21:56

## 2020-07-16 RX ADMIN — PANTOPRAZOLE SODIUM 40 MILLIGRAM(S): 20 TABLET, DELAYED RELEASE ORAL at 06:47

## 2020-07-16 RX ADMIN — Medication 1 APPLICATION(S): at 06:46

## 2020-07-16 RX ADMIN — NYSTATIN CREAM 1 APPLICATION(S): 100000 CREAM TOPICAL at 19:07

## 2020-07-16 RX ADMIN — Medication 500 MILLIGRAM(S): at 13:55

## 2020-07-16 RX ADMIN — Medication 100 MILLIGRAM(S): at 06:48

## 2020-07-16 RX ADMIN — SODIUM CHLORIDE 1 GRAM(S): 9 INJECTION INTRAMUSCULAR; INTRAVENOUS; SUBCUTANEOUS at 06:46

## 2020-07-16 RX ADMIN — PANTOPRAZOLE SODIUM 40 MILLIGRAM(S): 20 TABLET, DELAYED RELEASE ORAL at 19:07

## 2020-07-16 RX ADMIN — SODIUM CHLORIDE 3 MILLILITER(S): 9 INJECTION INTRAMUSCULAR; INTRAVENOUS; SUBCUTANEOUS at 05:16

## 2020-07-16 RX ADMIN — Medication 1 TABLET(S): at 06:45

## 2020-07-16 RX ADMIN — LETROZOLE 2.5 MILLIGRAM(S): 2.5 TABLET, FILM COATED ORAL at 13:55

## 2020-07-16 RX ADMIN — SODIUM CHLORIDE 3 MILLILITER(S): 9 INJECTION INTRAMUSCULAR; INTRAVENOUS; SUBCUTANEOUS at 22:00

## 2020-07-16 RX ADMIN — SODIUM CHLORIDE 1 GRAM(S): 9 INJECTION INTRAMUSCULAR; INTRAVENOUS; SUBCUTANEOUS at 21:56

## 2020-07-16 RX ADMIN — SODIUM CHLORIDE 1 GRAM(S): 9 INJECTION INTRAMUSCULAR; INTRAVENOUS; SUBCUTANEOUS at 13:55

## 2020-07-16 RX ADMIN — Medication 300 MILLIGRAM(S): at 13:54

## 2020-07-16 RX ADMIN — SODIUM CHLORIDE 3 MILLILITER(S): 9 INJECTION INTRAMUSCULAR; INTRAVENOUS; SUBCUTANEOUS at 14:06

## 2020-07-16 RX ADMIN — Medication 1 APPLICATION(S): at 18:43

## 2020-07-16 RX ADMIN — NYSTATIN CREAM 1 APPLICATION(S): 100000 CREAM TOPICAL at 05:16

## 2020-07-16 RX ADMIN — Medication 300 MILLIGRAM(S): at 09:54

## 2020-07-16 RX ADMIN — LATANOPROST 1 DROP(S): 0.05 SOLUTION/ DROPS OPHTHALMIC; TOPICAL at 21:56

## 2020-07-16 RX ADMIN — Medication 15 MILLILITER(S): at 13:54

## 2020-07-16 RX ADMIN — Medication 100 MILLIGRAM(S): at 15:00

## 2020-07-16 NOTE — DIETITIAN INITIAL EVALUATION ADULT. - PROBLEM SELECTOR PLAN 4
maintain current vaibhav drain  will place to pleurevac instead of SOURAV bulb  send fluid for triglycerides

## 2020-07-16 NOTE — PROGRESS NOTE ADULT - PROBLEM SELECTOR PLAN 3
Patient had completed a course of Merrem (6/9) for polymicrobial Empyema with strep mitis, Klebsiella oxytoca, and CoNS,   Now pleural fluid +yeast and pseudomonas and urine with proteus>100k  ID following, consult appreciated  D/C Meropenum after rash and itchiness  Continue Linezolid, Levaquin and Diflucan  Base line 12 Lead EKG was obtained, normal QTC, will continue to monitor

## 2020-07-16 NOTE — PROGRESS NOTE ADULT - SUBJECTIVE AND OBJECTIVE BOX
Subjective: Patient lying in bed, no acute distress noted, stated "I am feeling okay" denies fever, chills, shortness of breath, chest pain, palpitation, dizziness, headache, numbness, tingling, abdominal pain N/V/D.     VITAL SIGNS  Vital Signs Last 24 Hrs  T(C): 36.4 (07-15-20 @ 21:27), Max: 36.6 (07-15-20 @ 10:10)  T(F): 97.5 (07-15-20 @ 21:27), Max: 97.8 (07-15-20 @ 10:10)  HR: 83 (07-15-20 @ 21:27) (83 - 100)  BP: 98/66 (07-15-20 @ 21:27) (81/54 - 98/66)  RR: 18 (07-15-20 @ :) (17 - 18)  SpO2: 95% (07-15-20 @ :) (95% - 98%)  on room air         Telemetry/Alarms:  Sinus rhythm    MEDICATIONS  acetaminophen    Suspension .. 650 milliGRAM(s) Enteral Tube every 6 hours PRN  ascorbic acid 500 milliGRAM(s) Oral daily  Dakins Solution - 1/2 Strength 1 Application(s) Topical two times a day  enoxaparin Injectable 40 milliGRAM(s) SubCutaneous daily  ferrous    sulfate Liquid 300 milliGRAM(s) Oral three times a day with meals  fluconAZOLE IVPB 400 milliGRAM(s) IV Intermittent every 24 hours  folic acid 1 milliGRAM(s) Oral daily  lactobacillus acidophilus 1 Tablet(s) Oral two times a day  latanoprost 0.005% Ophthalmic Solution 1 Drop(s) Both EYES at bedtime  letrozole 2.5 milliGRAM(s) Oral daily  levoFLOXacin IVPB      levoFLOXacin IVPB 750 milliGRAM(s) IV Intermittent every 24 hours  linezolid  IVPB 600 milliGRAM(s) IV Intermittent every 12 hours  metroNIDAZOLE  IVPB 500 milliGRAM(s) IV Intermittent every 8 hours  metroNIDAZOLE  IVPB      multivitamin/minerals/iron Oral Solution (CENTRUM) 15 milliLiter(s) Enteral Tube daily  nystatin Powder 1 Application(s) Topical two times a day  pantoprazole  Injectable 40 milliGRAM(s) IV Push two times a day  sodium chloride 1 Gram(s) Oral three times a day  sodium chloride 0.9% lock flush 3 milliLiter(s) IV Push every 8 hours      PHYSICAL EXAM  Constitutional: Well developed, frail, no acute distress noted  Neuro: A+O x 3, non-focal, speech clear and intact  HEENT: PERRL, EOMI, oral mucosa pink and moist  Neck: supple, no JVD, trachea midline  CV: regular rate, regular rhythm, +S1S2, no murmurs or rub  Pulm/chest: lung sounds clear, diminished at the bases bilaterally, no accessory muscle use noted. Right chest tube to wareseal, + minimal air leak noted.  Abd: soft, NT, ND, +BS. JG tube to feeding, sutures intact. Rectal tube to self drainage system, liquid stool noted   : Starks to bedside drainage bag  Ext: DAVIDSON x 4, no cyanosis, no clubbing, +3 edema BLE, +pp bilaterally  Skin: warm, well perfused, no rashes.         14 @ :  -  07-15 @ 07:00  --------------------------------------------------------  IN: 2660 mL / OUT: 595 mL / NET: 2065 mL    07-15 @ :  -  16 @ 05:40  --------------------------------------------------------  IN: 1740 mL / OUT: 1485 mL / NET: 255 mL        Weights:  Daily     Daily Weight in k.4 (2020 04:53)  Admit Wt: Drug Dosing Weight  Height (cm): 167.64 (11 May 2020 22:12)  Weight (kg): 56.7 (15 May 2020 09:49)  BMI (kg/m2): 20.2 (15 May 2020 09:49)  BSA (m2): 1.64 (15 May 2020 09:49)    All laboratory results, radiology and medications reviewed.    LABS  07-15    129<L>  |  92<L>  |  25.0<H>  ----------------------------<  104<H>  4.5   |  27.0  |  0.63    Ca    7.4<L>      15 Jul 2020 06:59  Phos  2.6     07-14  Mg     1.4     -15                                   8.3    11.27 )-----------( 598      ( 15 Jul 2020 06:59 )             27.3                   CXR:  < from: Xray Esophagram Single Contrast (07.15.20 @ 15:32) >  IMPRESSION:      Leak of contrast just inferior to the esophageal stent on the left and collecting along the left lateral aspect of the stent; contrast either between the esophageal lumen and stent or contained within the mediastinum adjacent to the esophagus; no contrast extravasated outside the mediastinum at the time of the study.    Residual contrast in the esophagus and adjacent to the stent at the conclusion of the study; a chest x-ray is recommended to assess for clearing of contrast from the chest.      < end of copied text >    < from: CT Chest No Cont (20 @ 17:07) >    IMPRESSION:     Right chest tube is identified with a mild right hydropneumothorax with associated right lower lobe compressive atelectasis. Moderate left pleural effusion with near complete atelectasis of the left lower lobe. Esophageal stent again identified.    Mild thickening of the rectosigmoid colon, correlate for proctitis. No bowel obstruction.    No hydronephrosis.    Large soft tissue defect adjacent to the posterior right inferior pubic ramus bone with adjacent subcutaneous edema    Extensive blastic metastasis.    < end of copied text >     EKG:  < from: 12 Lead ECG (20 @ 02:31) >    Ventricular Rate 87 BPM    Atrial Rate 87 BPM    P-R Interval 158 ms    QRS Duration 68 ms    Q-T Interval 364 ms    QTC Calculation(Bezet) 438 ms    P Axis 72 degrees    R Axis 30 degrees    T Axis 39 degrees    Diagnosis Line Normal sinus rhythm  Normal ECG    < end of copied text >      PAST MEDICAL & SURGICAL HISTORY:  Esophageal perforation: s/p stent placement   H/O pleural empyema: s/p R VATS chest wash out decortication   Breast cancer metastasized to bone  Hypertension  Multiple sclerosis  S/P mastectomy, right  Breast CA  Osteoporosis  MS (mitral stenosis)  Encounter for feeding tube placement: open G-Jtube placement 5/15  History of thoracic surgery: right VATS decortication, chest wastout   History of bowel resection  H/O breast surgery

## 2020-07-16 NOTE — DIETITIAN INITIAL EVALUATION ADULT. - ENTERAL
Consider changing enteral formula to Pivot @ 55ml/hr (x20 hours) 1100ml/day; 1650 kcal, 103gm protein to better meet needs and aid in wound healing.

## 2020-07-16 NOTE — DIETITIAN INITIAL EVALUATION ADULT. - MALNUTRITION
Severe-Chronic NFPE: Severe muscle mass loss in temple, clavicle shoulder and Moderate fat loss in orbital, buccal, thoracic regions and severe fat loss in triceps. 3+ edema in B/L lower extremities. Severe-Chronic

## 2020-07-16 NOTE — DIETITIAN INITIAL EVALUATION ADULT. - PROBLEM SELECTOR PLAN 1
notable in SOURAV bulb  place vaibhav tube to pleurevac    admit to CT surgery service for further work up and plan  d/w Dr. Mcadams

## 2020-07-16 NOTE — PROGRESS NOTE ADULT - SUBJECTIVE AND OBJECTIVE BOX
[facility]  INFECTIOUS DISEASES AND INTERNAL MEDICINE at [place]  =======================================================  Phong Wang MD  Diplomates American Board of Internal Medicine and Infectious Diseases  Tel  [phone]  Fax [phone]  =======================================================    [id number]  GEORGE STANLEY  follow up:  chest infection    rash improved  no fevers     =======================================================  REVIEW OF SYSTEMS:  CONSTITUTIONAL:  No Fever or chills  HEENT:  No diplopia or blurred vision.  No earache, sore throat or runny nose.  CARDIOVASCULAR:  No pressure, squeezing, strangling, tightness, heaviness or aching about the chest, neck, axilla or epigastrium.  RESPIRATORY:  No cough, shortness of breath  GASTROINTESTINAL:  No nausea, vomiting or diarrhea.  GENITOURINARY:  No dysuria, frequency or urgency. No Blood in urine  MUSCULOSKELETAL:  no joint aches, no muscle pain  SKIN:  No change in skin, hair or nails.  NEUROLOGIC:  No Headaches, seizures or weakness.  PSYCHIATRIC:  No disorder of thought or mood.  ENDOCRINE:  No heat or cold intolerance  HEMATOLOGICAL:  No easy bruising or bleeding.   =======================================================  Allergies  Sudafed (Other)    ======================================================  Physical Exam:  ============  (see vitals section below)    General:  No acute distress. FRAIL  Eye: Pupils are equal, round and reactive to light, Extraocular movements are intact, Normal conjunctiva.  HENT: Normocephalic, Oral mucosa is moist, No pharyngeal erythema, No sinus tenderness.  Neck: Supple, No lymphadenopathy.  Respiratory: Lungs with diminished air entry right side  RIGHT side chest tube in place  Cardiovascular: Normal rate, Regular rhythm,   Gastrointestinal: Soft, Non-tender, Non-distended, Normal bowel sounds.  Genitourinary: No costovertebral angle tenderness.  Lymphatics: No lymphadenopathy neck,   Musculoskeletal: contracted extremities  Integumentary: RASH on body, face  Neurologic: Alert, Oriented, No focal deficits, Cranial Nerves II-XII are grossly intact.  Psychiatric: Appropriate mood & affect.    =======================================================   Vitals:  ============  T(F): 97.5 (15 Jul 2020 21:27), Max: 97.8 (15 Jul 2020 10:10)  HR: 80 (16 Jul 2020 06:31)  BP: 104/67 (16 Jul 2020 06:31)  RR: 16 (16 Jul 2020 06:31)  SpO2: 100% (16 Jul 2020 06:31) (95% - 100%)  temp max in last 48H T(F): , Max: 98.3 (07-15-20 @ 05:13)    =======================================================  Current Antibiotics:  fluconAZOLE IVPB 400 milliGRAM(s) IV Intermittent every 24 hours  levoFLOXacin IVPB      levoFLOXacin IVPB 750 milliGRAM(s) IV Intermittent every 24 hours  linezolid  IVPB 600 milliGRAM(s) IV Intermittent every 12 hours  metroNIDAZOLE  IVPB 500 milliGRAM(s) IV Intermittent every 8 hours  metroNIDAZOLE  IVPB        Other medications:  ascorbic acid 500 milliGRAM(s) Oral daily  Dakins Solution - 1/2 Strength 1 Application(s) Topical two times a day  enoxaparin Injectable 40 milliGRAM(s) SubCutaneous daily  ferrous    sulfate Liquid 300 milliGRAM(s) Oral three times a day with meals  folic acid 1 milliGRAM(s) Oral daily  lactobacillus acidophilus 1 Tablet(s) Oral two times a day  latanoprost 0.005% Ophthalmic Solution 1 Drop(s) Both EYES at bedtime  letrozole 2.5 milliGRAM(s) Oral daily  multivitamin/minerals/iron Oral Solution (CENTRUM) 15 milliLiter(s) Enteral Tube daily  nystatin Powder 1 Application(s) Topical two times a day  pantoprazole  Injectable 40 milliGRAM(s) IV Push two times a day  sodium chloride 1 Gram(s) Oral three times a day  sodium chloride 0.9% lock flush 3 milliLiter(s) IV Push every 8 hours      =======================================================  Labs:                        6.7    8.71  )-----------( 522      ( 16 Jul 2020 09:05 )             22.1      07-16    134<L>  |  96<L>  |  25.0<H>  ----------------------------<  103<H>  3.9   |  24.0  |  0.58    Ca    7.3<L>      16 Jul 2020 08:00  Mg     1.8     07-16        Culture - Urine (collected 07-13-20 @ 08:15)  Source: .Urine Catheterized  Final Report (07-15-20 @ 14:17):    >100,000 CFU/ml Proteus mirabilis    <10,000 CFU/ml Normal Urogenital vince present  Organism: Proteus mirabilis (07-15-20 @ 14:17)  Organism: Proteus mirabilis (07-15-20 @ 14:17)    Sensitivities:      -  Amikacin: S <=16      -  Amoxicillin/Clavulanic Acid: S <=8/4      -  Ampicillin: R >16 These ampicillin results predict results for amoxicillin      -  Ampicillin/Sulbactam: S <=4/2 Enterobacter, Citrobacter, and Serratia may develop resistance during prolonged therapy (3-4 days)      -  Aztreonam: S <=4      -  Cefazolin: S 4 (MIC_CL_COM_ENTERIC_CEFAZU) For uncomplicated UTI with K. pneumoniae, E. coli, or P. mirablis: PAWAN <=16 is sensitive and PAWAN >=32 is resistant. This also predicts results for oral agents cefaclor, cefdinir, cefpodoxime, cefprozil, cefuroxime axetil, cephalexin and locarbef for uncomplicated UTI. Note that some isolates may be susceptible to these agents while testing resistant to cefazolin.      -  Cefepime: S <=2      -  Cefoxitin: S <=8      -  Ceftriaxone: S <=1 Enterobacter, Citrobacter, and Serratia may develop resistance during prolonged therapy      -  Ciprofloxacin: R 2      -  Ertapenem: S <=0.5      -  Gentamicin: R >8      -  Levofloxacin: R 2      -  Meropenem: S <=1      -  Nitrofurantoin: R 64 Should not be used to treat pyelonephritis      -  Piperacillin/Tazobactam: S <=8      -  Tobramycin: R >8      -  Trimethoprim/Sulfamethoxazole: S <=0.5/9.5      Method Type: PAWAN    Culture - Fungal, Body Fluid (collected 07-13-20 @ 03:07)  Source: .Body Fluid Pleural Fluid    Culture - Body Fluid with Gram Stain (collected 07-13-20 @ 03:07)  Source: .Body Fluid Pleural Fluid  Gram Stain (07-13-20 @ 05:06):    Numerous polymorphonuclear leukocytes seen per low power field    Numerous Gram Negative Rods seen per oil power field  Organism: Pseudomonas aeruginosa  Klebsiella pneumoniae  Enterococcus faecalis (07-16-20 @ 07:44)  Organism: Klebsiella pneumoniae (07-16-20 @ 07:44)    Sensitivities:      -  Amikacin: S <=16      -  Amoxicillin/Clavulanic Acid: S <=8/4      -  Ampicillin: R >16 These ampicillin results predict results for amoxicillin      -  Ampicillin/Sulbactam: S <=4/2 Enterobacter, Citrobacter, and Serratia may develop resistance during prolonged therapy (3-4 days)      -  Aztreonam: S <=4      -  Cefazolin: S <=2 Enterobacter, Citrobacter, and Serratia may develop resistance during prolonged therapy (3-4 days)      -  Cefepime: S <=2      -  Cefoxitin: S <=8      -  Ceftriaxone: S <=1 Enterobacter, Citrobacter, and Serratia may develop resistance during prolonged therapy      -  Ciprofloxacin: S <=0.25      -  Ertapenem: S <=0.5      -  Gentamicin: S <=2      -  Imipenem: S <=1      -  Levofloxacin: S <=0.5      -  Meropenem: S <=1      -  Piperacillin/Tazobactam: S <=8      -  Tobramycin: S <=2      -  Trimethoprim/Sulfamethoxazole: S <=0.5/9.5      Method Type: PAWAN  Organism: Enterococcus faecalis (07-15-20 @ 15:10)    Sensitivities:      -  Ampicillin: S <=2 Predicts results to ampicillin/sulbactam, amoxacillin-clavulanate and  piperacillin-tazobactam.      -  Levofloxacin: S 1      -  Tetra/Doxy: R >8      -  Vancomycin: S 2      Method Type: PAWAN  Organism: Pseudomonas aeruginosa (07-15-20 @ 08:50)    Sensitivities:      -  Amikacin: S <=16      -  Aztreonam: S <=4      -  Cefepime: S <=2      -  Ceftazidime: S <=1      -  Ciprofloxacin: S <=0.25      -  Gentamicin: S <=2      -  Imipenem: S <=1      -  Levofloxacin: S <=0.5      -  Meropenem: S <=1      -  Piperacillin/Tazobactam: S <=8      -  Tobramycin: S <=2      Method Type: PAWAN    Culture - Blood (collected 07-12-20 @ 22:43)  Source: .Blood Blood-Venous    Culture - Blood (collected 07-12-20 @ 22:24)  Source: .Blood Blood-Venous      Creatinine, Serum: 0.58 mg/dL (07-16-20 @ 08:00)  Creatinine, Serum: 0.63 mg/dL (07-15-20 @ 06:59)  Creatinine, Serum: 0.57 mg/dL (07-14-20 @ 06:56)  Creatinine, Serum: 0.50 mg/dL (07-13-20 @ 06:47)  Creatinine, Serum: 0.54 mg/dL (07-12-20 @ 17:00)      WBC Count: 8.71 K/uL (07-16-20 @ 09:05)  WBC Count: 9.09 K/uL (07-16-20 @ 08:00)  WBC Count: 11.27 K/uL (07-15-20 @ 06:59)  WBC Count: 9.44 K/uL (07-14-20 @ 06:56)  WBC Count: 8.85 K/uL (07-13-20 @ 13:26)  WBC Count: 7.35 K/uL (07-13-20 @ 06:47)  WBC Count: 8.71 K/uL (07-12-20 @ 17:02)      COVID-19 PCR: NotDetec (07-12-20 @ 14:06)    Lactate Dehydrogenase, Serum: 268 U/L (07-12-20 @ 22:20)  Lactate Dehydrogenase, Serum: 199 U/L (07-12-20 @ 17:00)    Alkaline Phosphatase, Serum: 117 U/L (07-12-20 @ 17:00)  Alanine Aminotransferase (ALT/SGPT): 22 U/L (07-12-20 @ 17:00)  Aspartate Aminotransferase (AST/SGOT): 21 U/L (07-12-20 @ 17:00)  Bilirubin Total, Serum: <0.2 mg/dL (07-12-20 @ 17:00)  Bilirubin Direct, Serum: 0.1 mg/dL (07-12-20 @ 17:00)

## 2020-07-16 NOTE — CHART NOTE - NSCHARTNOTEFT_GEN_A_CORE
Upon Nutritional Assessment by the Registered Dietitian your patient was determined to meet criteria / has evidence of the following diagnosis/diagnoses:          [ ]  Mild Protein Calorie Malnutrition        [ ]  Moderate Protein Calorie Malnutrition        [x ] Severe Protein Calorie Malnutrition        [ ] Unspecified Protein Calorie Malnutrition        [ ] Underweight / BMI <19        [ ] Morbid Obesity / BMI > 40      Findings as based on:  •  Comprehensive nutrition assessment and consultation  •  Calorie counts (nutrient intake analysis)  •  Food acceptance and intake status from observations by staff  •  Follow up  •  Patient education  •  Intervention secondary to interdisciplinary rounds  •   concerns      Treatment:    The following diet has been recommended:    Change enteral formula to Pivot @ 55ml/hr      PROVIDER Section:     By signing this assessment you are acknowledging and agree with the diagnosis/diagnoses assigned by the Registered Dietitian    Comments:

## 2020-07-16 NOTE — DIETITIAN INITIAL EVALUATION ADULT. - ETIOLOGY
Related to inability to meet sufficient protein energy requirements in setting of healing with multiple areas of skin breakdown and recent hospitalizations w/ multiple complications

## 2020-07-16 NOTE — DIETITIAN INITIAL EVALUATION ADULT. - PERTINENT LABORATORY DATA
07-16 Na134 mmol/L<L> Glu 103 mg/dL<H> K+ 3.9 mmol/L Cr  0.58 mg/dL BUN 25.0 mg/dL<H> Phos n/a   Alb n/a   PAB n/a     n/a  HgbA1C

## 2020-07-16 NOTE — DIETITIAN INITIAL EVALUATION ADULT. - OTHER INFO
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 plastic surgery was consulted and following for her unstageable sacral wound. pt required rectal tube to prevent contamination. At that time, diverting colostomy was offered but family refused.  She completed IV antibiotics for her empyema and was discharged to rehab 6/9 with 2 CTs in place.  She now is sent to ED from rehab after one of her chest tubes was dislodged while turning the pt.    Pt reports receiving clear liquid diet PTA and also receives enteral feeds via PEG. Pt is unsure if she has lost any wt, reports #, current wt 136# (3+ edema to B/L lower extremities may be masking further wt loss. Brief NFPE conducted. Stage 4 wound to sacrum, L shin unstageable wound, L heel wound.

## 2020-07-16 NOTE — PROGRESS NOTE ADULT - ASSESSMENT
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 plastic surgery was consulted and following for her unstageable sacral wound. pt required rectal tube to prevent contamination. At that time, diverting colostomy was offered but family refused.  She completed IV antibiotics for her empyema and was discharged to rehab 6/9 with 2 CTs in place.  She now is sent to ED from rehab after one of her chest tubes was dislodged while turning the pt.  Thoracic surgery asked to consult. CXR without obvious pneumothorax.  Upon evaluation, noted that SOURAV was not holding self suction.  Notable air leak in SOURAV bulb.  Decision made to admit pt for further monitoring/work up, and possible removal of CT. Pt without any complaints at this time.  She denies CP, palpitations, SOB, cough, fever, chills, itchiness/rash, diaphoresis, vision changes, HA, dizziness/lightheadedness, numbness/tingling, abd pain, N/V (12 Jul 2020 16:25)    patient was last seen on 6/9/2020 for a polymicrobial Empyema with strep mitis, Klebsiella oxytoca, and CoNS.  At that point, she had completed a course of merrem.     Right SOURAV drain swapped to Pleur-evac.    Fluid culture with Pseudomonas, Enterococcus faecalis and Candida    Impression:  polymicrobial lung infection  multiple sclerosis   chronic ulcers  Drug rash    Plan:    Rash may be related to Merrem  - improving off MERREM    s/p MIDLINE 7/15/2020  Pseudomonas susceptible to all tested agents  Klebsiella and Enterococcus also found on the fluid cx  all are SS to levaquin     - stop Linezolid for now    Urine with Proteus; will not treat at this time, ? colonization    - continue  Fluconazole for candida found as well  - continue Pleur-evac    - follow up all outstanding cultures  - trend temperature and WBC curve  - repeat cultures from blood and all sources if febrile.

## 2020-07-16 NOTE — PROGRESS NOTE ADULT - PROBLEM SELECTOR PLAN 5
Patient with impaired mobility, long term bedridden   Stage 4/unstagable sacral decubitus   Will consult wound care, may need air flow matres  Turn and position Q 2hr  Rectal tube to avoid contamination of the sacral wound

## 2020-07-17 DIAGNOSIS — G35 MULTIPLE SCLEROSIS: ICD-10-CM

## 2020-07-17 DIAGNOSIS — Z51.5 ENCOUNTER FOR PALLIATIVE CARE: ICD-10-CM

## 2020-07-17 DIAGNOSIS — S31.000A UNSPECIFIED OPEN WOUND OF LOWER BACK AND PELVIS WITHOUT PENETRATION INTO RETROPERITONEUM, INITIAL ENCOUNTER: ICD-10-CM

## 2020-07-17 DIAGNOSIS — C50.919 MALIGNANT NEOPLASM OF UNSPECIFIED SITE OF UNSPECIFIED FEMALE BREAST: ICD-10-CM

## 2020-07-17 LAB
ANION GAP SERPL CALC-SCNC: 14 MMOL/L — SIGNIFICANT CHANGE UP (ref 5–17)
BLD GP AB SCN SERPL QL: SIGNIFICANT CHANGE UP
BUN SERPL-MCNC: 20 MG/DL — SIGNIFICANT CHANGE UP (ref 8–20)
CALCIUM SERPL-MCNC: 7.4 MG/DL — LOW (ref 8.6–10.2)
CHLORIDE SERPL-SCNC: 97 MMOL/L — LOW (ref 98–107)
CO2 SERPL-SCNC: 20 MMOL/L — LOW (ref 22–29)
CREAT SERPL-MCNC: 0.51 MG/DL — SIGNIFICANT CHANGE UP (ref 0.5–1.3)
CULTURE RESULTS: SIGNIFICANT CHANGE UP
CULTURE RESULTS: SIGNIFICANT CHANGE UP
GLUCOSE SERPL-MCNC: 75 MG/DL — SIGNIFICANT CHANGE UP (ref 70–99)
HCT VFR BLD CALC: 26.9 % — LOW (ref 34.5–45)
HGB BLD-MCNC: 8.6 G/DL — LOW (ref 11.5–15.5)
MCHC RBC-ENTMCNC: 28.2 PG — SIGNIFICANT CHANGE UP (ref 27–34)
MCHC RBC-ENTMCNC: 32 GM/DL — SIGNIFICANT CHANGE UP (ref 32–36)
MCV RBC AUTO: 88.2 FL — SIGNIFICANT CHANGE UP (ref 80–100)
PLATELET # BLD AUTO: 546 K/UL — HIGH (ref 150–400)
POTASSIUM SERPL-MCNC: 4.2 MMOL/L — SIGNIFICANT CHANGE UP (ref 3.5–5.3)
POTASSIUM SERPL-SCNC: 4.2 MMOL/L — SIGNIFICANT CHANGE UP (ref 3.5–5.3)
RBC # BLD: 3.05 M/UL — LOW (ref 3.8–5.2)
RBC # FLD: 18.6 % — HIGH (ref 10.3–14.5)
SODIUM SERPL-SCNC: 131 MMOL/L — LOW (ref 135–145)
SPECIMEN SOURCE: SIGNIFICANT CHANGE UP
SPECIMEN SOURCE: SIGNIFICANT CHANGE UP
WBC # BLD: 9.28 K/UL — SIGNIFICANT CHANGE UP (ref 3.8–10.5)
WBC # FLD AUTO: 9.28 K/UL — SIGNIFICANT CHANGE UP (ref 3.8–10.5)

## 2020-07-17 PROCEDURE — 71045 X-RAY EXAM CHEST 1 VIEW: CPT | Mod: 26

## 2020-07-17 PROCEDURE — 99232 SBSQ HOSP IP/OBS MODERATE 35: CPT

## 2020-07-17 PROCEDURE — 99223 1ST HOSP IP/OBS HIGH 75: CPT

## 2020-07-17 PROCEDURE — 99024 POSTOP FOLLOW-UP VISIT: CPT

## 2020-07-17 RX ORDER — CHLORHEXIDINE GLUCONATE 213 G/1000ML
1 SOLUTION TOPICAL DAILY
Refills: 0 | Status: DISCONTINUED | OUTPATIENT
Start: 2020-07-17 | End: 2020-07-23

## 2020-07-17 RX ADMIN — Medication 15 MILLILITER(S): at 16:43

## 2020-07-17 RX ADMIN — Medication 1 APPLICATION(S): at 05:48

## 2020-07-17 RX ADMIN — SODIUM CHLORIDE 3 MILLILITER(S): 9 INJECTION INTRAMUSCULAR; INTRAVENOUS; SUBCUTANEOUS at 04:37

## 2020-07-17 RX ADMIN — Medication 100 MILLIGRAM(S): at 15:03

## 2020-07-17 RX ADMIN — FLUCONAZOLE 100 MILLIGRAM(S): 150 TABLET ORAL at 00:55

## 2020-07-17 RX ADMIN — Medication 1 APPLICATION(S): at 16:43

## 2020-07-17 RX ADMIN — Medication 100 MILLIGRAM(S): at 08:41

## 2020-07-17 RX ADMIN — LETROZOLE 2.5 MILLIGRAM(S): 2.5 TABLET, FILM COATED ORAL at 18:03

## 2020-07-17 RX ADMIN — LATANOPROST 1 DROP(S): 0.05 SOLUTION/ DROPS OPHTHALMIC; TOPICAL at 22:01

## 2020-07-17 RX ADMIN — SODIUM CHLORIDE 1 GRAM(S): 9 INJECTION INTRAMUSCULAR; INTRAVENOUS; SUBCUTANEOUS at 22:01

## 2020-07-17 RX ADMIN — PANTOPRAZOLE SODIUM 40 MILLIGRAM(S): 20 TABLET, DELAYED RELEASE ORAL at 16:43

## 2020-07-17 RX ADMIN — Medication 1 TABLET(S): at 05:48

## 2020-07-17 RX ADMIN — SODIUM CHLORIDE 1 GRAM(S): 9 INJECTION INTRAMUSCULAR; INTRAVENOUS; SUBCUTANEOUS at 16:43

## 2020-07-17 RX ADMIN — FLUCONAZOLE 100 MILLIGRAM(S): 150 TABLET ORAL at 16:43

## 2020-07-17 RX ADMIN — NYSTATIN CREAM 1 APPLICATION(S): 100000 CREAM TOPICAL at 05:49

## 2020-07-17 RX ADMIN — Medication 100 MILLIGRAM(S): at 04:35

## 2020-07-17 RX ADMIN — Medication 500 MILLIGRAM(S): at 16:42

## 2020-07-17 RX ADMIN — SODIUM CHLORIDE 3 MILLILITER(S): 9 INJECTION INTRAMUSCULAR; INTRAVENOUS; SUBCUTANEOUS at 15:15

## 2020-07-17 RX ADMIN — SODIUM CHLORIDE 3 MILLILITER(S): 9 INJECTION INTRAMUSCULAR; INTRAVENOUS; SUBCUTANEOUS at 21:54

## 2020-07-17 RX ADMIN — Medication 1 TABLET(S): at 16:42

## 2020-07-17 RX ADMIN — Medication 300 MILLIGRAM(S): at 16:42

## 2020-07-17 RX ADMIN — SODIUM CHLORIDE 1 GRAM(S): 9 INJECTION INTRAMUSCULAR; INTRAVENOUS; SUBCUTANEOUS at 05:48

## 2020-07-17 RX ADMIN — PANTOPRAZOLE SODIUM 40 MILLIGRAM(S): 20 TABLET, DELAYED RELEASE ORAL at 05:48

## 2020-07-17 RX ADMIN — Medication 1 MILLIGRAM(S): at 22:01

## 2020-07-17 RX ADMIN — NYSTATIN CREAM 1 APPLICATION(S): 100000 CREAM TOPICAL at 16:42

## 2020-07-17 RX ADMIN — Medication 100 MILLIGRAM(S): at 22:01

## 2020-07-17 RX ADMIN — ENOXAPARIN SODIUM 40 MILLIGRAM(S): 100 INJECTION SUBCUTANEOUS at 22:01

## 2020-07-17 NOTE — BRIEF OPERATIVE NOTE - OPERATION/FINDINGS
G-J tube partially dislodged and hole noted in shaft.  Tube changed over a wire for a new Avanos 18 fr G-J tube successfully

## 2020-07-17 NOTE — PROGRESS NOTE ADULT - ASSESSMENT
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 plastic surgery was consulted and following for her unstageable sacral wound. pt required rectal tube to prevent contamination. At that time, diverting colostomy was offered but family refused.  She completed IV antibiotics for her empyema and was discharged to rehab 6/9 with 2 CTs in place.  She now is sent to ED from rehab after one of her chest tubes was dislodged while turning the pt.  Thoracic surgery asked to consult. CXR without obvious pneumothorax.  Upon evaluation, noted that SOURAV was not holding self suction.  Notable air leak in SOURAV bulb.  Decision made to admit pt for further monitoring/work up, and possible removal of CT. Pt without any complaints at this time.  She denies CP, palpitations, SOB, cough, fever, chills, itchiness/rash, diaphoresis, vision changes, HA, dizziness/lightheadedness, numbness/tingling, abd pain, N/V (12 Jul 2020 16:25)    patient was last seen on 6/9/2020 for a polymicrobial Empyema with strep mitis, Klebsiella oxytoca, and CoNS.  At that point, she had completed a course of merrem.     Right SOURAV drain swapped to Pleur-evac.    Fluid culture with Pseudomonas, Enterococcus faecalis and Candida    Impression:  polymicrobial lung infection  multiple sclerosis   chronic ulcers  Drug rash    Plan:    Rash may be related to Merrem  - improving off MERREM    s/p MIDLINE 7/15/2020  Pseudomonas susceptible to all tested agents  Klebsiella and Enterococcus also found on the fluid cx  all are SS to levaquin      Urine with Proteus; will not treat at this time, ? colonization    - continue  Fluconazole for candida found as well  - continue Pleur-evac      - will follow with you

## 2020-07-17 NOTE — PROGRESS NOTE ADULT - PROBLEM SELECTOR PLAN 3
Patient had completed a course of Merrem (6/9) for polymicrobial Empyema with strep mitis, Klebsiella oxytoca, and CoNS.  Now pleural fluid +yeast and pseudomonas and urine with proteus>100k.  ID following, consult appreciated.  Discontinued Meropenum after rash and itchiness.  Continue Linezolid, Levaquin and Diflucan.  Baseline 12 Lead EKG was obtained, normal QTC, will continue to monitor.

## 2020-07-17 NOTE — PROGRESS NOTE ADULT - SUBJECTIVE AND OBJECTIVE BOX
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 plastic surgery was consulted and following for her unstageable sacral wound. pt required rectal tube to prevent contamination. At that time, diverting colostomy was offered but family refused.  She completed IV antibiotics for her empyema and was discharged to rehab 6/9 with 2 CTs in place.  She now is sent to ED from rehab after one of her chest tubes was dislodged while turning the pt.  Thoracic surgery asked to consult. CXR without obvious pneumothorax.  Upon evaluation, noted that SOURAV was not holding self suction.  Notable air leak in SOURAV bulb.  Decision made to admit pt for further monitoring/work up, and possible removal of CT. Pt without any complaints at this time.  She denies CP, palpitations, SOB, cough, fever, chills, itchiness/rash, diaphoresis, vision changes, HA, dizziness/lightheadedness, numbness/tingling, abd pain, N/V (12 Jul 2020 16:25)    PAST MEDICAL & SURGICAL HISTORY:  Breast cancer metastasized to bone  Hypertension  Multiple sclerosis  S/P mastectomy, right  Breast CA  Osteoporosis  MS (mitral stenosis)  Esophageal perforation: s/p stent placement 5/13  H/O pleural empyema: s/p R VATS chest wash out decortication 5/13  History of bowel resection  H/O breast surgery  Encounter for feeding tube placement: open G-Jtube placement 5/15  History of thoracic surgery: right VATS decortication, chest wastout 5/13    FAMILY HISTORY:  FHx: hyperlipidemia    Brief Hospital Course: Right sided vaibhav drain attached to pleurvac with noted continued purulent drainage. Pleural fluid +yeast and pseudomonas and urine with proteus>100k. ID following patient and after noted drug reaction to Merrem, patient was started on IV Levaquin, flagyl, and diflucan. LUE midline placed 7/15 for proper IV access. Now patient with noted cracked and dislodged G-J tube, pending IR evaluation for tube exchange.     Significant recent/past 24 hr events: G-J tube cracked and dislodged due to balloon not inflating properly. IR consult placed for possible tube exchange.     Subjective: Patient lying in bed in no acute distress. Denies fevers, chills, lightheadedness, dizziness, HA, CP, palpitations, SOB, cough, abdominal pain, N/V, or any other acute complaints.  ROS negative x 10 systems except as noted above.    MEDICATIONS  (STANDING):  ascorbic acid 500 milliGRAM(s) Oral daily  Dakins Solution - 1/2 Strength 1 Application(s) Topical two times a day  enoxaparin Injectable 40 milliGRAM(s) SubCutaneous daily  ferrous sulfate Liquid 300 milliGRAM(s) Oral three times a day with meals  fluconAZOLE IVPB 400 milliGRAM(s) IV Intermittent every 24 hours  folic acid 1 milliGRAM(s) Oral daily  lactobacillus acidophilus 1 Tablet(s) Oral two times a day  latanoprost 0.005% Ophthalmic Solution 1 Drop(s) Both EYES at bedtime  letrozole 2.5 milliGRAM(s) Oral daily  levoFLOXacin IVPB 750 milliGRAM(s) IV Intermittent every 24 hours  metroNIDAZOLE  IVPB 500 milliGRAM(s) IV Intermittent every 8 hours   multivitamin/minerals/iron Oral Solution (CENTRUM) 15 milliLiter(s) Enteral Tube daily  nystatin Powder 1 Application(s) Topical two times a day  pantoprazole  Injectable 40 milliGRAM(s) IV Push two times a day  sodium chloride 1 Gram(s) Oral three times a day  sodium chloride 0.9% lock flush 3 milliLiter(s) IV Push every 8 hours    MEDICATIONS  (PRN):  acetaminophen    Suspension .. 650 milliGRAM(s) Enteral Tube every 6 hours PRN Temp greater or equal to 38.5C (101.3F), Mild Pain (1 - 3), Moderate Pain (4 - 6), Severe Pain (7 - 10)    Allergies:  Sudafed (Other)    Vitals   T(C): 36.6 (17 Jul 2020 00:49), Max: 36.6 (17 Jul 2020 00:49)  T(F): 97.8 (17 Jul 2020 00:49), Max: 97.8 (17 Jul 2020 00:49)  HR: 84 (17 Jul 2020 00:49) (80 - 92)  BP: 103/64 (17 Jul 2020 00:49) (96/63 - 121/74)  RR: 18 (17 Jul 2020 00:49) (16 - 19)  SpO2: 96% (17 Jul 2020 00:49) (95% - 100%)    I&O's Detail    15 Jul 2020 07:01  -  16 Jul 2020 07:00  --------------------------------------------------------  IN:    Enteral Tube Flush: 90 mL    Free Water: 500 mL    ns in tub fed  [id number]: 300 mL    Solution: 300 mL    Solution: 100 mL    Solution: 150 mL    Solution: 100 mL    Solution: 200 mL  Total IN: 1740 mL    OUT:    Chest Tube: 35 mL    Indwelling Catheter - Urethral: 1000 mL    Rectal Tube: 450 mL  Total OUT: 1485 mL    Total NET: 255 mL      16 Jul 2020 07:01  -  17 Jul 2020 03:40  --------------------------------------------------------  IN:    Free Water: 250 mL    ns in tub fed  [id number]: 500 mL    Oral Fluid: 400 mL    Solution: 150 mL    Solution: 100 mL  Total IN: 1400 mL    OUT:    Indwelling Catheter - Urethral: 250 mL    Rectal Tube: 200 mL  Total OUT: 450 mL    Total NET: 950 mL    Physical Exam  Constitutional: No acute distress noted, lying in bed, appears comfortable  Neuro: A+O x 3, non-focal   HEENT: NC/AT, PERRL, EOMI, oral mucosa pink and moist  Neck: supple, no JVD  CV: RRR +S1S2  Pulm/chest: Decreased breath sounds on the Right, left CTA, no accessory muscle use noted  Abd: soft, NT, ND, +BS  : +Campos with yellow urine in bedside bag  Ext: DAVIDSON x 4, Limited LE strength, legs contracted, +LE sensation intact, +2 pedal edema bilaterally, +DP/PT pulses bilaterally.  Skin: warm, well perfused, +Large sacral decub  Psych: calm, appropriate affect  Tubes: G/J tube dislodged and crack n tubing, Right vaibhav attached to pleurvac to waterseal with purulent/pus drainage, no noted airleak this AM, dressing c/d/i, no surrounding crepitus noted.      LABS                        6.7    8.71  )-----------( 522      ( 16 Jul 2020 09:05 )             22.1     07-16    134<L>  |  96<L>  |  25.0<H>  ----------------------------<  103<H>  3.9   |  24.0  |  0.58    Ca    7.3<L>      16 Jul 2020 08:00  Mg     1.8     07-16    POCT Blood Glucose.: 105 mg/dL (07-16-20 @ 22:36)      Last CXR:  < from: Xray Chest 1 View- PORTABLE-Routine (07.16.20 @ 05:27) >    FINDINGS:   RIGHT chest tube in place. No interval change. RIGHT basilar pleural thickening.   LEFT lung parenchyma shows ill-defined opacity in the LEFT retrocardiacrecommended. Upper lobes clear.   Esophageal stent in place. Heart size normal.   Heart size normal   Visualized osseous structures are intact.   IMPRESSION: No significant interval change..   < end of copied text >

## 2020-07-17 NOTE — CONSULT NOTE ADULT - PROBLEM SELECTOR RECOMMENDATION 4
Patient states has had MS >30 years.    Needs assistance in ADLs for which she reports son helps with some of her care Patient declined diverting colostomy on last admission

## 2020-07-17 NOTE — PROGRESS NOTE ADULT - PROBLEM SELECTOR PLAN 5
Patient with impaired mobility, long term bedridden.  Stage 4/unstagable sacral decubitus.  Wound care to see patient.   Will need air flow mattress.  Turn and position Q 2 hrs.  Rectal tube to avoid contamination of the sacral wound.

## 2020-07-17 NOTE — CONSULT NOTE ADULT - PROBLEM SELECTOR RECOMMENDATION 3
Patient under the care of Dr. Fairbanks.    Patient states she has been in Rehab for some time  and has not had treatement.

## 2020-07-17 NOTE — PROGRESS NOTE ADULT - ASSESSMENT
70F, well known to thoracic surgery service, pmhx of MS (wheelchair bound), and metastatic breast cancer to pelvis, thoracic, lumbar spine, chronic indwelling campos, chronic unstageable sacral wound, with recent empyema requiring chest tube found to have esophageal perforation, s/p R chest washout, decortication and esophageal stent placement 5/13, subsequent open G-J tube placement 5/15. Patient presented from rehab with dislodged chest tube 7/12, CXR without obvious pneumothorax, however, SOURAV not holding self suction concerning for air leak, so patient was admitted to the thoracic surgery service. Right sided vaibhav drain attached to pleurvac with noted airleak and continued purulent drainage. Pleural fluid +yeast and pseudomonas and urine with proteus>100k. ID following patient and after noted drug reaction to Merrem, patient was started on IV Levaquin, flagyl, and diflucan. LUE midline placed 7/15 for proper IV access. Now patient with noted cracked and dislodged G-J tube, pending IR evaluation for tube exchange.

## 2020-07-17 NOTE — PROGRESS NOTE ADULT - SUBJECTIVE AND OBJECTIVE BOX
[facility]  INFECTIOUS DISEASES AND INTERNAL MEDICINE at [place]  =======================================================  Phong Wang MD  Diplomates American Board of Internal Medicine and Infectious Diseases  Tel  [phone]  Fax [phone]  =======================================================    [id number]  GEORGE STANLEY  follow up:  chest infection    rash almost resolved  s/p replacement of GJ tube today by IR     =======================================================  REVIEW OF SYSTEMS:  CONSTITUTIONAL:  No Fever or chills  HEENT:  No diplopia or blurred vision.  No earache, sore throat or runny nose.  CARDIOVASCULAR:  No pressure, squeezing, strangling, tightness, heaviness or aching about the chest, neck, axilla or epigastrium.  RESPIRATORY:  No cough, shortness of breath  GASTROINTESTINAL:  No nausea, vomiting or diarrhea.  GENITOURINARY:  No dysuria, frequency or urgency. No Blood in urine  MUSCULOSKELETAL:  no joint aches, no muscle pain  SKIN:  No change in skin, hair or nails.  NEUROLOGIC:  No Headaches, seizures or weakness.  PSYCHIATRIC:  No disorder of thought or mood.  ENDOCRINE:  No heat or cold intolerance  HEMATOLOGICAL:  No easy bruising or bleeding.   =======================================================  Allergies  Sudafed (Other)    ======================================================  Physical Exam:  ============  (see vitals section below)    General:  No acute distress. FRAIL  Eye: Pupils are equal, round and reactive to light, Extraocular movements are intact, Normal conjunctiva.  HENT: Normocephalic, Oral mucosa is moist, No pharyngeal erythema, No sinus tenderness.  Neck: Supple, No lymphadenopathy.  Respiratory: Lungs with diminished air entry right side  RIGHT side chest tube in place  Cardiovascular: Normal rate, Regular rhythm,   Gastrointestinal: Soft, Non-tender, Non-distended, Normal bowel sounds.  Genitourinary: No costovertebral angle tenderness.  Lymphatics: No lymphadenopathy neck,   Musculoskeletal: contracted extremities  Integumentary: RASH on body, face  Neurologic: Alert, Oriented, No focal deficits, Cranial Nerves II-XII are grossly intact.  Psychiatric: Appropriate mood & affect.    =======================================================    Vitals:  ============  T(F): 97.8 (17 Jul 2020 08:34), Max: 97.8 (17 Jul 2020 00:49)  HR: 84 (17 Jul 2020 08:34)  BP: 104/74 (17 Jul 2020 08:34)  RR: 17 (17 Jul 2020 08:34)  SpO2: 100% (17 Jul 2020 05:45) (95% - 100%)  temp max in last 48H T(F): , Max: 97.8 (07-15-20 @ 16:30)    =======================================================  Current Antibiotics:  fluconAZOLE IVPB 400 milliGRAM(s) IV Intermittent every 24 hours  levoFLOXacin IVPB      levoFLOXacin IVPB 750 milliGRAM(s) IV Intermittent every 24 hours  metroNIDAZOLE  IVPB 500 milliGRAM(s) IV Intermittent every 8 hours  metroNIDAZOLE  IVPB        Other medications:  ascorbic acid 500 milliGRAM(s) Oral daily  Dakins Solution - 1/2 Strength 1 Application(s) Topical two times a day  enoxaparin Injectable 40 milliGRAM(s) SubCutaneous daily  ferrous    sulfate Liquid 300 milliGRAM(s) Oral three times a day with meals  folic acid 1 milliGRAM(s) Oral daily  lactobacillus acidophilus 1 Tablet(s) Oral two times a day  latanoprost 0.005% Ophthalmic Solution 1 Drop(s) Both EYES at bedtime  letrozole 2.5 milliGRAM(s) Oral daily  multivitamin/minerals/iron Oral Solution (CENTRUM) 15 milliLiter(s) Enteral Tube daily  nystatin Powder 1 Application(s) Topical two times a day  pantoprazole  Injectable 40 milliGRAM(s) IV Push two times a day  sodium chloride 1 Gram(s) Oral three times a day  sodium chloride 0.9% lock flush 3 milliLiter(s) IV Push every 8 hours      =======================================================  Labs:                        8.6    9.28  )-----------( 546      ( 17 Jul 2020 05:24 )             26.9      07-17    131<L>  |  97<L>  |  20.0  ----------------------------<  75  4.2   |  20.0<L>  |  0.51    Ca    7.4<L>      17 Jul 2020 05:24  Mg     1.8     07-16        Culture - Urine (collected 07-13-20 @ 08:15)  Source: .Urine Catheterized  Final Report (07-15-20 @ 14:17):    >100,000 CFU/ml Proteus mirabilis    <10,000 CFU/ml Normal Urogenital vince present  Organism: Proteus mirabilis (07-15-20 @ 14:17)  Organism: Proteus mirabilis (07-15-20 @ 14:17)    Sensitivities:      -  Amikacin: S <=16      -  Amoxicillin/Clavulanic Acid: S <=8/4      -  Ampicillin: R >16 These ampicillin results predict results for amoxicillin      -  Ampicillin/Sulbactam: S <=4/2 Enterobacter, Citrobacter, and Serratia may develop resistance during prolonged therapy (3-4 days)      -  Aztreonam: S <=4      -  Cefazolin: S 4 (MIC_CL_COM_ENTERIC_[id number]) For uncomplicated UTI with K. pneumoniae, E. coli, or P. mirablis: PAWAN <=16 is sensitive and PAWAN >=32 is resistant. This also predicts results for oral agents cefaclor, cefdinir, cefpodoxime, cefprozil, cefuroxime axetil, cephalexin and locarbef for uncomplicated UTI. Note that some isolates may be susceptible to these agents while testing resistant to cefazolin.      -  Cefepime: S <=2      -  Cefoxitin: S <=8      -  Ceftriaxone: S <=1 Enterobacter, Citrobacter, and Serratia may develop resistance during prolonged therapy      -  Ciprofloxacin: R 2      -  Ertapenem: S <=0.5      -  Gentamicin: R >8      -  Levofloxacin: R 2      -  Meropenem: S <=1      -  Nitrofurantoin: R 64 Should not be used to treat pyelonephritis      -  Piperacillin/Tazobactam: S <=8      -  Tobramycin: R >8      -  Trimethoprim/Sulfamethoxazole: S <=0.5/9.5      Method Type: PAWAN    Culture - Fungal, Body Fluid (collected 07-13-20 @ 03:07)  Source: .Body Fluid Pleural Fluid    Culture - Body Fluid with Gram Stain (collected 07-13-20 @ 03:07)  Source: .Body Fluid Pleural Fluid  Gram Stain (07-13-20 @ 05:06):    Numerous polymorphonuclear leukocytes seen per low power field    Numerous Gram Negative Rods seen per oil power field  Organism: Pseudomonas aeruginosa  Klebsiella pneumoniae  Enterococcus faecalis (07-16-20 @ 07:44)  Organism: Klebsiella pneumoniae (07-16-20 @ 07:44)    Sensitivities:      -  Amikacin: S <=16      -  Amoxicillin/Clavulanic Acid: S <=8/4      -  Ampicillin: R >16 These ampicillin results predict results for amoxicillin      -  Ampicillin/Sulbactam: S <=4/2 Enterobacter, Citrobacter, and Serratia may develop resistance during prolonged therapy (3-4 days)      -  Aztreonam: S <=4      -  Cefazolin: S <=2 Enterobacter, Citrobacter, and Serratia may develop resistance during prolonged therapy (3-4 days)      -  Cefepime: S <=2      -  Cefoxitin: S <=8      -  Ceftriaxone: S <=1 Enterobacter, Citrobacter, and Serratia may develop resistance during prolonged therapy      -  Ciprofloxacin: S <=0.25      -  Ertapenem: S <=0.5      -  Gentamicin: S <=2      -  Imipenem: S <=1      -  Levofloxacin: S <=0.5      -  Meropenem: S <=1      -  Piperacillin/Tazobactam: S <=8      -  Tobramycin: S <=2      -  Trimethoprim/Sulfamethoxazole: S <=0.5/9.5      Method Type: PAWAN  Organism: Enterococcus faecalis (07-15-20 @ 15:10)    Sensitivities:      -  Ampicillin: S <=2 Predicts results to ampicillin/sulbactam, amoxacillin-clavulanate and  piperacillin-tazobactam.      -  Levofloxacin: S 1      -  Tetra/Doxy: R >8      -  Vancomycin: S 2      Method Type: PAWAN    Organism: Pseudomonas aeruginosa (07-15-20 @ 08:50)    Sensitivities:      -  Amikacin: S <=16      -  Aztreonam: S <=4      -  Cefepime: S <=2      -  Ceftazidime: S <=1      -  Ciprofloxacin: S <=0.25      -  Gentamicin: S <=2      -  Imipenem: S <=1      -  Levofloxacin: S <=0.5      -  Meropenem: S <=1      -  Piperacillin/Tazobactam: S <=8      -  Tobramycin: S <=2      Method Type: PAWAN    Culture - Blood (collected 07-12-20 @ 22:43)  Source: .Blood Blood-Venous    Culture - Blood (collected 07-12-20 @ 22:24)  Source: .Blood Blood-Venous    Creatinine, Serum: 0.51 mg/dL (07-17-20 @ 05:24)  Creatinine, Serum: 0.58 mg/dL (07-16-20 @ 08:00)  Creatinine, Serum: 0.63 mg/dL (07-15-20 @ 06:59)  Creatinine, Serum: 0.57 mg/dL (07-14-20 @ 06:56)  Creatinine, Serum: 0.50 mg/dL (07-13-20 @ 06:47)  Creatinine, Serum: 0.54 mg/dL (07-12-20 @ 17:00)    WBC Count: 9.28 K/uL (07-17-20 @ 05:24)  WBC Count: 8.71 K/uL (07-16-20 @ 09:05)  WBC Count: 9.09 K/uL (07-16-20 @ 08:00)  WBC Count: 11.27 K/uL (07-15-20 @ 06:59)  WBC Count: 9.44 K/uL (07-14-20 @ 06:56)  WBC Count: 8.85 K/uL (07-13-20 @ 13:26)  WBC Count: 7.35 K/uL (07-13-20 @ 06:47)  WBC Count: 8.71 K/uL (07-12-20 @ 17:02)      COVID-19 PCR: NotDetec (07-12-20 @ 14:06)    Lactate Dehydrogenase, Serum: 268 U/L (07-12-20 @ 22:20)  Lactate Dehydrogenase, Serum: 199 U/L (07-12-20 @ 17:00)

## 2020-07-17 NOTE — CONSULT NOTE ADULT - PROBLEM SELECTOR RECOMMENDATION 6
Patient known to our service. At the time advanced directives were discussed  and plan was be able to go to rehab and get better in hopes to resume treatment for her breast cancer. Advance directives were also discussed for which patient did not make any decisions as she wished to speak to her daughter first before making any decisions.   Met with patient.   She was alert and generally comfortable.  She did not speak with her daughter regards to her thoughts about advance directives - CPR, intubation/mechanical ventilation.    Patient is frail, debilitated with multiple medical issues. She is at risk for continued decline and rehospitalization. Will plan to reengage patient with [facility] discussions.

## 2020-07-17 NOTE — CONSULT NOTE ADULT - SUBJECTIVE AND OBJECTIVE BOX
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 plastic surgery was consulted and following for her unstageable sacral wound. pt required rectal tube to prevent contamination. At that time, diverting colostomy was offered but family refused.  She completed IV antibiotics for her empyema and was discharged to rehab 6/9 with 2 CTs in place.  She now is sent to ED from rehab after one of her chest tubes was dislodged while turning the pt.  Thoracic surgery asked to consult. CXR without obvious pneumothorax.  Upon evaluation, noted that SOURAV was not holding self suction.  Notable air leak in SOURAV bulb.  Decision made to admit pt for further monitoring/work up, and possible removal of CT. Pt without any complaints at this time.  She denies CP, palpitations, SOB, cough, fever, chills, itchiness/rash, diaphoresis, vision changes, HA, dizziness/lightheadedness, numbness/tingling, abd pain, N/V     PERTINENT PMH REVIEWED:  [x ] YES [ ] NO         Breast CA     Breast cancer metastasized to bone     Esophageal perforation s/p stent placement 5/13    H/O pleural empyema s/p R VATS chest wash out decortication 5/13    Hypertension     MS (mitral stenosis)     Multiple sclerosis     Osteoporosis     S/P mastectomy, right.     PAST SURGICAL HISTORY:  Encounter for feeding tube placement open G-Jtube placement 5/15    H/O breast surgery     History of bowel resection     History of thoracic surgery right VATS decortication, chest wastout 5/13.     FAMILY HISTORY:  FHx: hyperlipidemia.    SOCIAL HISTORY:  EtOH [ ] Yes  [x ] No                                    Drugs [ ] Yes [ x] No                                   [ ] smoker [x ] nonsmoker                                    Admitted from: [x ] home [ ] SNF _________ [ ] [facility] ________    Surrogate/HCP/Guardian: Roma Lawton    FAMILY HISTORY:  FHx: hyperlipidemia      Baseline ADLs (prior to admission):  Independent [ ] moderately [ ] fully   Dependent   [ x] moderately [ ]fully    MEDICATIONS  (STANDING):  ascorbic acid 500 milliGRAM(s) Oral daily  Dakins Solution - 1/2 Strength 1 Application(s) Topical two times a day  enoxaparin Injectable 40 milliGRAM(s) SubCutaneous daily  ferrous    sulfate Liquid 300 milliGRAM(s) Oral three times a day with meals  fluconAZOLE IVPB 400 milliGRAM(s) IV Intermittent every 24 hours  folic acid 1 milliGRAM(s) Oral daily  lactobacillus acidophilus 1 Tablet(s) Oral two times a day  latanoprost 0.005% Ophthalmic Solution 1 Drop(s) Both EYES at bedtime  letrozole 2.5 milliGRAM(s) Oral daily  levoFLOXacin IVPB      levoFLOXacin IVPB 750 milliGRAM(s) IV Intermittent every 24 hours  metroNIDAZOLE  IVPB 500 milliGRAM(s) IV Intermittent every 8 hours  metroNIDAZOLE  IVPB      multivitamin/minerals/iron Oral Solution (CENTRUM) 15 milliLiter(s) Enteral Tube daily  nystatin Powder 1 Application(s) Topical two times a day  pantoprazole  Injectable 40 milliGRAM(s) IV Push two times a day  sodium chloride 1 Gram(s) Oral three times a day  sodium chloride 0.9% lock flush 3 milliLiter(s) IV Push every 8 hours    MEDICATIONS  (PRN):  acetaminophen    Suspension .. 650 milliGRAM(s) Enteral Tube every 6 hours PRN Temp greater or equal to 38.5C (101.3F), Mild Pain (1 - 3), Moderate Pain (4 - 6), Severe Pain (7 - 10)      Allergies    Sudafed (Other)    Intolerances        REVIEW OF SYSTEMS     see HPI  [ ] Unable to obtain due to poor mentation     General: no fevers, no fatigue, no loss of appetite    Skin: no rashes, skin changes  	  Ophthalmologic: no eye pain or blurred vision  	  ENMT:	no sore throat, no ear pain    Respiratory and Thorax: no cough,  no  SOB 	    Cardiovascular:	no chest pain, no leg swelling    Gastrointestinal:	see HPI  Genitourinary:	no FUD    Musculoskeletal: no muscle pain	    Neurological: no seizures, no dizziness    Hematology/Lymphatics: no petechia or purpura	    Endocrine: no polyuria, no polydipsia	      Karnofsky Performance Score/Palliative Performance Status Version 2:   30  %    Vital Signs Last 24 Hrs  T(C): 36.6 (17 Jul 2020 08:34), Max: 36.6 (17 Jul 2020 00:49)  T(F): 97.8 (17 Jul 2020 08:34), Max: 97.8 (17 Jul 2020 00:49)  HR: 84 (17 Jul 2020 08:34) (84 - 92)  BP: 104/74 (17 Jul 2020 08:34) (96/64 - 121/74)  BP(mean): --  RR: 17 (17 Jul 2020 08:34) (16 - 19)  SpO2: 100% (17 Jul 2020 05:45) (95% - 100%)    PHYSICAL EXAM:    General: Awake alert NAD  HEENT: [ x] normal  [ ] dry mouth  [ ] ET tube/trach    Lungs: [ x] comfortable [ ] tachypnea/labored breathing  [ ] excessive secretions    CV: [x ] normal  [ ] tachycardia    GI: +EG    : [ x] normal  [ ] incontinent  [ ] oliguria/anuria  [ ] campos    MSK: [ ] normal  [x ] weakness  [ ] edema             [ ] ambulatory  [ ] bedbound/wheelchair bound    Skin: sacral wound    LABS:                        8.6    9.28  )-----------( 546      ( 17 Jul 2020 05:24 )             26.9     07-17    131<L>  |  97<L>  |  20.0  ----------------------------<  75  4.2   |  20.0<L>  |  0.51    Ca    7.4<L>      17 Jul 2020 05:24  Mg     1.8     07-16          I&O's Summary    16 Jul 2020 07:01  -  17 Jul 2020 07:00  --------------------------------------------------------  IN: 1400 mL / OUT: 900 mL / NET: 500 mL        RADIOLOGY & ADDITIONAL STUDIES:    < from: CT Abdomen and Pelvis No Cont (07.12.20 @ 17:06) >        INTERPRETATION:  CLINICAL INFORMATION: Right-sided empyema, chest tube dislodged    COMPARISON: 5/29/2020    PROCEDURE:   CT of the Chest, Abdomen and Pelvis was performed without intravenous contrast.   Intravenous contrast: None.  Oral contrast: None.  Sagittal and coronal reformats were performed.    FINDINGS:  CHEST:   LUNGS AND LARGE AIRWAYS: Patent central airways. Near complete atelectasis of the left lower lobe with partial aeration the superior segment. Compressive atelectasis in the medial right lung base and anterior right lower lobe. Subsegmental atelectasis in the posterior right middle lobe. Linear scarring in the lingula.  PLEURA: Right-sided chest tube with tip in the periphery of the right lateral upper hemithorax. Mild right hydropneumothorax with fluid extending into the right major fissure. Moderate left pleural effusion.  VESSELS: Within normal limits.  HEART: Heart size is normal. No pericardial effusion.  MEDIASTINUM AND FINA: Esophageal stent identified in the distal aspect is proximal to the GE junction.  CHEST WALL AND LOWER NECK: Anasarca.    ABDOMEN AND PELVIS:  LIVER: Hepatomegaly. Surface contour nodularity suggesting cirrhosis. No focal masses on this noncontrast study.  BILE DUCTS: Normal caliber.  GALLBLADDER: Gallstone again seen.  SPLEEN: Within normal limits.  PANCREAS: Within normal limits.  ADRENALS: Nodular thickening of the left adrenal gland.  KIDNEYS/URETERS: No hydronephrosis bilaterally. No renal calculus or renal mass. The ureters are unremarkable.    BLADDER: Bladder is collapsed on a Campos catheter  REPRODUCTIVE ORGANS: Calcified fundal fibroid. No adnexal masses. Mild to moderate presacral edema.    BOWEL: G-tube with a jejunostomy extension. Mild rectosigmoid colon wall thickening may represent mild proctitis versus underdistention. No bowel obstruction. Prior small bowel resection and anastomosis in the anterior upper pelvis.  PERITONEUM: Trace to small ascites.  VESSELS: Atherosclerotic changes without aneurysmal dilatation.  RETROPERITONEUM/LYMPH NODES: No lymphadenopathy.    ABDOMINAL WALL: Large soft tissue defect in the posterior inferior right pelvis at the level of the inferior pubic ramus with associated subcutaneous edema. Anasarca.  BONES: Extensive blastic metastasis throughout the visualized osseous structures. Old bilateral superior and inferior pubic rami fractures.    IMPRESSION:     Right chest tube is identified with a mild right hydropneumothorax with associated right lower lobe compressive atelectasis. Moderate left pleural effusion with near complete atelectasis of the left lower lobe. Esophageal stent again identified.    Mild thickening of the rectosigmoid colon, correlate for proctitis. No bowel obstruction.    No hydronephrosis.    Large soft tissue defect adjacent to the posterior right inferior pubic ramus bone with adjacent subcutaneous edema    Extensive blastic metastasis.            < end of copied text >        ADVANCE DIRECTIVES:  [ ] YES [ ] NO   DNR [ ] YES [ ] NO  Completed on:                     MOLST  [ ] YES [ ] NO   Completed on:  Living Will  [ ] YES [ ] NO   Completed on:
[facility]  INFECTIOUS DISEASES AND INTERNAL MEDICINE at [place]  =======================================================  Phong Wang MD  Diplomates American Board of Internal Medicine and Infectious Diseases  Tel  [phone]  Fax [phone]  =======================================================    [id number]  GEORGE STANLEY   HPI:  This 70F with MS (wheelchair bound), and metastatic breast cancer to pelvis, thoracic, lumbar spine, chronic indwelling campos, chronic unstageable sacral wound, with recent empyema requiring chest tube found to have esophageal perforation, s/p R chest washout, decortication and esophageal stent placement 5/13, subsequent open G-J tube placement 5/15,  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 plastic surgery was consulted and following for her unstageable sacral wound. pt required rectal tube to prevent contamination. At that time, diverting colostomy was offered but family refused.  She completed IV antibiotics for her empyema and was discharged to rehab 6/9 with 2 CTs in place.  She now is sent to ED from rehab after one of her chest tubes was dislodged while turning the pt.  Thoracic surgery asked to consult. CXR without obvious pneumothorax.  Upon evaluation, noted that SOURAV was not holding self suction.  Notable air leak in SOURAV bulb.  Decision made to admit pt for further monitoring/work up, and possible removal of CT. Pt without any complaints at this time.  She denies CP, palpitations, SOB, cough, fever, chills, itchiness/rash, diaphoresis, vision changes, HA, dizziness/lightheadedness, numbness/tingling, abd pain, N/V (12 Jul 2020 16:25)    patient was last seen on 6/9/2020 for a polymicrobial Empyema with strep mitis, Klebsiella oxytoca, and CoNS.  At that point, she had completed a course of merrem.     Right SOURAV drain swapped to Pleur-evac.    I have personally reviewed the labs and data; pertinent labs and data are listed in this note; please see below.   =======================================================  Past Medical & Surgical Hx:  =====================  PAST MEDICAL & SURGICAL HISTORY:  Esophageal perforation: s/p stent placement 5/13  H/O pleural empyema: s/p R VATS chest wash out decortication 5/13  Breast cancer metastasized to bone  Hypertension  Multiple sclerosis  S/P mastectomy, right  Breast CA  Osteoporosis  MS (mitral stenosis)  Encounter for feeding tube placement: open G-Jtube placement 5/15  History of thoracic surgery: right VATS decortication, chest washout 5/13  History of bowel resection  H/O breast surgery    Problem List:  ==========  HEALTH ISSUES - PROBLEM Dx:  Air leak: Air leak  Need for prophylactic measure: Need for prophylactic measure  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  No earache, sore throat or runny nose.  CARDIOVASCULAR:  No pressure, squeezing, strangling, tightness, heaviness or aching about the chest, neck, axilla or epigastrium.  RESPIRATORY:  No cough, shortness of breath  GASTROINTESTINAL:  No nausea, vomiting or diarrhea.  GENITOURINARY:  No dysuria, frequency or urgency. No Blood in urine  MUSCULOSKELETAL:  no joint aches, no muscle pain  SKIN:  No change in skin, hair or nails.  NEUROLOGIC:  No Headaches, seizures or weakness.  PSYCHIATRIC:  No disorder of thought or mood.  ENDOCRINE:  No heat or cold intolerance  HEMATOLOGICAL:  No easy bruising or bleeding.   =======================================================  Allergies  Sudafed (Other)        Antibiotics:  linezolid  IVPB 600 milliGRAM(s) IV Intermittent every 12 hours  meropenem  IVPB 1000 milliGRAM(s) IV Intermittent every 8 hours    Other medications:  ascorbic acid 500 milliGRAM(s) Oral daily  Dakins Solution - 1/2 Strength 1 Application(s) Topical two times a day  enoxaparin Injectable 40 milliGRAM(s) SubCutaneous daily  ferrous    sulfate Liquid 300 milliGRAM(s) Oral three times a day with meals  folic acid 1 milliGRAM(s) Oral daily  furosemide    Tablet 20 milliGRAM(s) Oral two times a day  latanoprost 0.005% Ophthalmic Solution 1 Drop(s) Both EYES at bedtime  letrozole 2.5 milliGRAM(s) Oral daily  multivitamin/minerals/iron Oral Solution (CENTRUM) 15 milliLiter(s) Enteral Tube daily  nystatin Powder 1 Application(s) Topical two times a day  pantoprazole  Injectable 40 milliGRAM(s) IV Push two times a day  sodium chloride 0.9% lock flush 3 milliLiter(s) IV Push every 8 hours    ======================================================  Physical Exam:  ============  T(F): 98.2 (14 Jul 2020 09:30), Max: 98.2 (13 Jul 2020 22:06)  HR: 102 (14 Jul 2020 09:30)  BP: 94/63 (14 Jul 2020 09:30)  RR: 17 (14 Jul 2020 09:30)  SpO2: 99% (14 Jul 2020 09:30) (99% - 100%)  temp max in last 48H T(F): , Max: 99.4 (07-12-20 @ 15:16)    General:  No acute distress. FRAIL  Eye: Pupils are equal, round and reactive to light, Extraocular movements are intact, Normal conjunctiva.  HENT: Normocephalic, Oral mucosa is moist, No pharyngeal erythema, No sinus tenderness.  Neck: Supple, No lymphadenopathy.  Respiratory: Lungs with diminished air entry right side  RIGHT side chest tube in place  Cardiovascular: Normal rate, Regular rhythm,   Gastrointestinal: Soft, Non-tender, Non-distended, Normal bowel sounds.  Genitourinary: No costovertebral angle tenderness.  Lymphatics: No lymphadenopathy neck,   Musculoskeletal: contracted extremities  Integumentary: No rash.  Neurologic: Alert, Oriented, No focal deficits, Cranial Nerves II-XII are grossly intact.  Psychiatric: Appropriate mood & affect.    =======================================================  Labs:                        8.5    9.44  )-----------( 493      ( 14 Jul 2020 06:56 )             27.9      07-14    132<L>  |  95<L>  |  21.0<H>  ----------------------------<  111<H>  4.3   |  24.0  |  0.57    Ca    7.4<L>      14 Jul 2020 06:56  Phos  2.6     07-14  Mg     1.6     07-14    TPro  5.8<L>  /  Alb  1.9<L>  /  TBili  <0.2<L>  /  DBili  0.1  /  AST  21  /  ALT  22  /  AlkPhos  117  07-12      Culture - Urine (collected 07-13-20 @ 08:15)  Source: .Urine Catheterized    Culture - Fungal, Body Fluid (collected 07-13-20 @ 03:07)  Source: .Body Fluid Pleural Fluid    Culture - Body Fluid with Gram Stain (07.13.20 @ 03:07)    Gram Stain:   Numerous polymorphonuclear leukocytes seen per low power field  Numerous Gram Negative Rods seen per oil power field    Specimen Source: .Body Fluid Pleural Fluid    Culture Results:   Few Candida albicans  Rare Enterococcus faecalis  Numerous Pseudomonas aeruginosa    Creatinine, Serum: 0.57 mg/dL (07-14-20 @ 06:56)  Creatinine, Serum: 0.50 mg/dL (07-13-20 @ 06:47)  Creatinine, Serum: 0.54 mg/dL (07-12-20 @ 17:00)     COVID-19 PCR: NotDetec (07-12-20 @ 14:06)

## 2020-07-17 NOTE — CONSULT NOTE ADULT - ASSESSMENT
70yr woman, hx of MS, metastatic breast cancer, recent empyema requiring chest tube found to have esophageal perforation, s/p R chest washout, decortication and esophageal stent placement 5/13, subsequent open G-J tube. sacral wound decline diverting colostomy admitted from Rehab for dislodge CT
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 plastic surgery was consulted and following for her unstageable sacral wound. pt required rectal tube to prevent contamination. At that time, diverting colostomy was offered but family refused.  She completed IV antibiotics for her empyema and was discharged to rehab 6/9 with 2 CTs in place.  She now is sent to ED from rehab after one of her chest tubes was dislodged while turning the pt.  Thoracic surgery asked to consult. CXR without obvious pneumothorax.  Upon evaluation, noted that SOURAV was not holding self suction.  Notable air leak in SOURAV bulb.  Decision made to admit pt for further monitoring/work up, and possible removal of CT. Pt without any complaints at this time.  She denies CP, palpitations, SOB, cough, fever, chills, itchiness/rash, diaphoresis, vision changes, HA, dizziness/lightheadedness, numbness/tingling, abd pain, N/V (12 Jul 2020 16:25)    patient was last seen on 6/9/2020 for a polymicrobial Empyema with strep mitis, Klebsiella oxytoca, and CoNS.  At that point, she had completed a course of merrem.     Right SOURAV drain swapped to Pleur-evac.    Fluid culture with Pseudomonas, Enterococcus faecalis and Candida    Impression:  polymicrobial lung infection  multiple sclerosis   chronic ulcers    Plan:  - continue merrem for pseudomonas  - added Linezolid for Enterococcus  - will add Fluconazole for candida found as well  - continue Pleur-evac    - follow up all outstanding cultures  - trend temperature and WBC curve  - repeat cultures from blood and all sources if febrile.

## 2020-07-17 NOTE — CONSULT NOTE ADULT - PROBLEM SELECTOR RECOMMENDATION 9
Per CTS- Continue pleuravac to waterseal.  Pleural fluid +yeast and pseudomonas   cont IV abx -  Flagyl Levaquin  ID following Patient s/p VATS with right thoracic cavity washout and decortication and CT.   Treated on last admission for polymicrobeal infection   Per CTS- Continue pleuravac to waterseal.  Pleural fluid +yeast and pseudomonas   cont IV abx -  Flagyl Levaquin  ID following

## 2020-07-17 NOTE — CHART NOTE - NSCHARTNOTEFT_GEN_A_CORE
Vascular & Interventional Radiology Pre-Procedure Note    Procedure Name: ____GJ tube change___    HPI: 70y Female with ___dislodge GJ tube____    Allergies: Sudafed (Other)    Medications (Abx/Cardiac/Anticoagulation/Blood Products)  enoxaparin Injectable: 40 milliGRAM(s) SubCutaneous (07-16 @ 21:56)  fluconAZOLE IVPB: 100 mL/Hr IV Intermittent (07-17 @ 00:55)  levoFLOXacin IVPB: 100 mL/Hr IV Intermittent (07-15 @ 13:37)  levoFLOXacin IVPB: 100 mL/Hr IV Intermittent (07-16 @ 12:21)  linezolid  IVPB: 300 mL/Hr IV Intermittent (07-15 @ 21:31)  metroNIDAZOLE  IVPB: 100 mL/Hr IV Intermittent (07-15 @ 15:36)  metroNIDAZOLE  IVPB: 100 mL/Hr IV Intermittent (07-17 @ 08:41)    Data:    T(C): 36.6  HR: 84  BP: 104/74  RR: 17  SpO2: 100%      -WBC 9.28 / HgB 8.6 / Hct 26.9 / Plt 546  -Na 131 / Cl 97 / BUN 20.0 / Glucose 75  -K 4.2 / CO2 20.0 / Cr 0.51  -ALT -- / Alk Phos -- / T.Bili --  -INR1.11    Imaging: ___na____    Plan:   -70y Female presents for ____GJ tube change___  -Risks/Benefits/alternatives explained with the patient and witnessed informed consent obtained.

## 2020-07-17 NOTE — CONSULT NOTE ADULT - PROBLEM SELECTOR RECOMMENDATION 5
Patient known to our service. At the time advanced directives were discussed which patient was not decisional, as she wanted to speak to her daughter.   Patient with multiple medical issues- MS, metatatic breast cancer, Patient states has had MS >30 years.    Needs assistance in ADLs for which she reports son lives with her and  helps with some of her care

## 2020-07-18 LAB
ANION GAP SERPL CALC-SCNC: 11 MMOL/L — SIGNIFICANT CHANGE UP (ref 5–17)
BUN SERPL-MCNC: 18 MG/DL — SIGNIFICANT CHANGE UP (ref 8–20)
CALCIUM SERPL-MCNC: 7.2 MG/DL — LOW (ref 8.6–10.2)
CHLORIDE SERPL-SCNC: 101 MMOL/L — SIGNIFICANT CHANGE UP (ref 98–107)
CO2 SERPL-SCNC: 24 MMOL/L — SIGNIFICANT CHANGE UP (ref 22–29)
CREAT SERPL-MCNC: 0.57 MG/DL — SIGNIFICANT CHANGE UP (ref 0.5–1.3)
CULTURE RESULTS: SIGNIFICANT CHANGE UP
GLUCOSE SERPL-MCNC: 83 MG/DL — SIGNIFICANT CHANGE UP (ref 70–99)
HCT VFR BLD CALC: 23.6 % — LOW (ref 34.5–45)
HGB BLD-MCNC: 7.5 G/DL — LOW (ref 11.5–15.5)
MAGNESIUM SERPL-MCNC: 1.6 MG/DL — SIGNIFICANT CHANGE UP (ref 1.6–2.6)
MCHC RBC-ENTMCNC: 28.1 PG — SIGNIFICANT CHANGE UP (ref 27–34)
MCHC RBC-ENTMCNC: 31.8 GM/DL — LOW (ref 32–36)
MCV RBC AUTO: 88.4 FL — SIGNIFICANT CHANGE UP (ref 80–100)
ORGANISM # SPEC MICROSCOPIC CNT: SIGNIFICANT CHANGE UP
PLATELET # BLD AUTO: 513 K/UL — HIGH (ref 150–400)
POTASSIUM SERPL-MCNC: 3.4 MMOL/L — LOW (ref 3.5–5.3)
POTASSIUM SERPL-SCNC: 3.4 MMOL/L — LOW (ref 3.5–5.3)
RBC # BLD: 2.67 M/UL — LOW (ref 3.8–5.2)
RBC # FLD: 19.9 % — HIGH (ref 10.3–14.5)
SODIUM SERPL-SCNC: 136 MMOL/L — SIGNIFICANT CHANGE UP (ref 135–145)
SPECIMEN SOURCE: SIGNIFICANT CHANGE UP
WBC # BLD: 8.41 K/UL — SIGNIFICANT CHANGE UP (ref 3.8–10.5)
WBC # FLD AUTO: 8.41 K/UL — SIGNIFICANT CHANGE UP (ref 3.8–10.5)

## 2020-07-18 PROCEDURE — 71045 X-RAY EXAM CHEST 1 VIEW: CPT | Mod: 26

## 2020-07-18 PROCEDURE — 99024 POSTOP FOLLOW-UP VISIT: CPT

## 2020-07-18 RX ORDER — MAGNESIUM SULFATE 500 MG/ML
2 VIAL (ML) INJECTION ONCE
Refills: 0 | Status: COMPLETED | OUTPATIENT
Start: 2020-07-18 | End: 2020-07-18

## 2020-07-18 RX ORDER — FUROSEMIDE 40 MG
20 TABLET ORAL ONCE
Refills: 0 | Status: COMPLETED | OUTPATIENT
Start: 2020-07-18 | End: 2020-07-18

## 2020-07-18 RX ORDER — POTASSIUM CHLORIDE 20 MEQ
40 PACKET (EA) ORAL ONCE
Refills: 0 | Status: COMPLETED | OUTPATIENT
Start: 2020-07-18 | End: 2020-07-18

## 2020-07-18 RX ADMIN — ENOXAPARIN SODIUM 40 MILLIGRAM(S): 100 INJECTION SUBCUTANEOUS at 21:20

## 2020-07-18 RX ADMIN — NYSTATIN CREAM 1 APPLICATION(S): 100000 CREAM TOPICAL at 17:01

## 2020-07-18 RX ADMIN — Medication 100 MILLIGRAM(S): at 15:30

## 2020-07-18 RX ADMIN — SODIUM CHLORIDE 1 GRAM(S): 9 INJECTION INTRAMUSCULAR; INTRAVENOUS; SUBCUTANEOUS at 21:20

## 2020-07-18 RX ADMIN — Medication 20 MILLIGRAM(S): at 11:22

## 2020-07-18 RX ADMIN — SODIUM CHLORIDE 3 MILLILITER(S): 9 INJECTION INTRAMUSCULAR; INTRAVENOUS; SUBCUTANEOUS at 21:21

## 2020-07-18 RX ADMIN — Medication 1 TABLET(S): at 05:53

## 2020-07-18 RX ADMIN — SODIUM CHLORIDE 1 GRAM(S): 9 INJECTION INTRAMUSCULAR; INTRAVENOUS; SUBCUTANEOUS at 13:21

## 2020-07-18 RX ADMIN — Medication 1 TABLET(S): at 17:00

## 2020-07-18 RX ADMIN — SODIUM CHLORIDE 1 GRAM(S): 9 INJECTION INTRAMUSCULAR; INTRAVENOUS; SUBCUTANEOUS at 05:53

## 2020-07-18 RX ADMIN — FLUCONAZOLE 100 MILLIGRAM(S): 150 TABLET ORAL at 16:55

## 2020-07-18 RX ADMIN — PANTOPRAZOLE SODIUM 40 MILLIGRAM(S): 20 TABLET, DELAYED RELEASE ORAL at 17:00

## 2020-07-18 RX ADMIN — Medication 15 MILLILITER(S): at 11:22

## 2020-07-18 RX ADMIN — Medication 1 MILLIGRAM(S): at 11:22

## 2020-07-18 RX ADMIN — Medication 40 MILLIEQUIVALENT(S): at 11:21

## 2020-07-18 RX ADMIN — Medication 100 MILLIGRAM(S): at 05:53

## 2020-07-18 RX ADMIN — LETROZOLE 2.5 MILLIGRAM(S): 2.5 TABLET, FILM COATED ORAL at 13:20

## 2020-07-18 RX ADMIN — NYSTATIN CREAM 1 APPLICATION(S): 100000 CREAM TOPICAL at 05:53

## 2020-07-18 RX ADMIN — Medication 100 MILLIGRAM(S): at 21:20

## 2020-07-18 RX ADMIN — Medication 300 MILLIGRAM(S): at 11:22

## 2020-07-18 RX ADMIN — Medication 300 MILLIGRAM(S): at 16:55

## 2020-07-18 RX ADMIN — Medication 1 APPLICATION(S): at 17:00

## 2020-07-18 RX ADMIN — Medication 1 APPLICATION(S): at 05:54

## 2020-07-18 RX ADMIN — CHLORHEXIDINE GLUCONATE 1 APPLICATION(S): 213 SOLUTION TOPICAL at 12:54

## 2020-07-18 RX ADMIN — Medication 300 MILLIGRAM(S): at 13:21

## 2020-07-18 RX ADMIN — LATANOPROST 1 DROP(S): 0.05 SOLUTION/ DROPS OPHTHALMIC; TOPICAL at 21:21

## 2020-07-18 RX ADMIN — Medication 50 GRAM(S): at 11:21

## 2020-07-18 RX ADMIN — SODIUM CHLORIDE 3 MILLILITER(S): 9 INJECTION INTRAMUSCULAR; INTRAVENOUS; SUBCUTANEOUS at 05:54

## 2020-07-18 RX ADMIN — Medication 500 MILLIGRAM(S): at 11:22

## 2020-07-18 RX ADMIN — SODIUM CHLORIDE 3 MILLILITER(S): 9 INJECTION INTRAMUSCULAR; INTRAVENOUS; SUBCUTANEOUS at 13:34

## 2020-07-18 RX ADMIN — PANTOPRAZOLE SODIUM 40 MILLIGRAM(S): 20 TABLET, DELAYED RELEASE ORAL at 05:53

## 2020-07-18 NOTE — PROGRESS NOTE ADULT - ASSESSMENT
70F, well known to thoracic surgery service, pmhx of MS (wheelchair bound), and metastatic breast cancer to pelvis, thoracic, lumbar spine, chronic indwelling campos, chronic unstageable sacral wound, with recent empyema requiring chest tube found to have esophageal perforation, s/p R chest washout, decortication and esophageal stent placement 5/13, subsequent open G-J tube placement 5/15. Patient presented from rehab with dislodged chest tube 7/12, CXR without obvious pneumothorax, however, SOURAV not holding self suction concerning for air leak, so patient was admitted to the thoracic surgery service. Right sided vaibhva drain attached to pleurvac with noted airleak and continued purulent drainage. Pleural fluid +yeast and pseudomonas and urine with proteus>100k. ID following patient and after noted drug reaction to Merrem, patient was started on IV Levaquin, flagyl, and diflucan. LUE midline placed 7/15 for proper IV access.  Patient with noted cracked and dislodged G-J tube 7/16 with IR gastrojejunostomy tube replacement performed 7/17.

## 2020-07-18 NOTE — PROGRESS NOTE ADULT - PROBLEM SELECTOR PLAN 3
Patient had completed a course of Merrem (6/9) for polymicrobial Empyema with strep mitis, Klebsiella oxytoca, and CoNS.  Pleural fluid +yeast and pseudomonas and urine with proteus>100k this admission.  ID following, consult appreciated.  Discontinued Meropenum after rash and itchiness.  Continue Linezolid, Levaquin and Diflucan.  Baseline 12 Lead EKG was obtained, normal QTC, will continue to monitor.

## 2020-07-18 NOTE — PROGRESS NOTE ADULT - SUBJECTIVE AND OBJECTIVE BOX
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 plastic surgery was consulted and following for her unstageable sacral wound. pt required rectal tube to prevent contamination. At that time, diverting colostomy was offered but family refused.  She completed IV antibiotics for her empyema and was discharged to rehab 6/9 with 2 CTs in place.  She now is sent to ED from rehab after one of her chest tubes was dislodged while turning the pt.  Thoracic surgery asked to consult. CXR without obvious pneumothorax.  Upon evaluation, noted that SOURAV was not holding self suction.  Notable air leak in SOURAV bulb.  Decision made to admit pt for further monitoring/work up, and possible removal of CT. Pt without any complaints at this time.  She denies CP, palpitations, SOB, cough, fever, chills, itchiness/rash, diaphoresis, vision changes, HA, dizziness/lightheadedness, numbness/tingling, abd pain, N/V (12 Jul 2020 16:25)    PAST MEDICAL & SURGICAL HISTORY:  Breast cancer metastasized to bone  Hypertension  Multiple sclerosis  S/P mastectomy, right  Breast CA  Osteoporosis  MS (mitral stenosis)  Esophageal perforation: s/p stent placement 5/13  H/O pleural empyema: s/p R VATS chest wash out decortication 5/13  History of bowel resection  H/O breast surgery  Encounter for feeding tube placement: open G-Jtube placement 5/15  History of thoracic surgery: right VATS decortication, chest wastout 5/13    FAMILY HISTORY:  FHx: hyperlipidemia    Brief Hospital Course: Right sided vaibhav drain attached to pleurvac with noted continued purulent drainage. Pleural fluid +yeast and pseudomonas and urine with proteus>100k. ID following patient and after noted drug reaction to Merrem, patient was started on IV Levaquin, flagyl, and diflucan. LUE midline placed 7/15 for proper IV access. Patient with noted cracked and dislodged G-J tube 7/16 with IR gastrojejunostomy tube replacement performed 7/17.     Significant recent/past 24 hr events: None    Subjective: Patient lying in bed in no acute distress. Denies fevers, chills, lightheadedness, dizziness, HA, CP, palpitations, SOB, cough, abdominal pain, N/V, or any other acute complaints.  ROS negative x 10 systems except as noted above.    MEDICATIONS  (STANDING):  ascorbic acid 500 milliGRAM(s) Oral daily  chlorhexidine 2% Cloths 1 Application(s) Topical daily  Dakins Solution - 1/2 Strength 1 Application(s) Topical two times a day  enoxaparin Injectable 40 milliGRAM(s) SubCutaneous daily  ferrous    sulfate Liquid 300 milliGRAM(s) Oral three times a day with meals  fluconAZOLE IVPB 400 milliGRAM(s) IV Intermittent every 24 hours  folic acid 1 milliGRAM(s) Oral daily  lactobacillus acidophilus 1 Tablet(s) Oral two times a day  latanoprost 0.005% Ophthalmic Solution 1 Drop(s) Both EYES at bedtime  letrozole 2.5 milliGRAM(s) Oral daily    levoFLOXacin IVPB 750 milliGRAM(s) IV Intermittent every 24 hours  metroNIDAZOLE  IVPB 500 milliGRAM(s) IV Intermittent every 8 hours  multivitamin/minerals/iron Oral Solution (CENTRUM) 15 milliLiter(s) Enteral Tube daily  nystatin Powder 1 Application(s) Topical two times a day  pantoprazole  Injectable 40 milliGRAM(s) IV Push two times a day  sodium chloride 1 Gram(s) Oral three times a day  sodium chloride 0.9% lock flush 3 milliLiter(s) IV Push every 8 hours    MEDICATIONS  (PRN):  acetaminophen    Suspension .. 650 milliGRAM(s) Enteral Tube every 6 hours PRN Temp greater or equal to 38.5C (101.3F), Mild Pain (1 - 3), Moderate Pain (4 - 6), Severe Pain (7 - 10)    Allergies:  Sudafed (Other)    Vitals   T(C): 36.5 (17 Jul 2020 21:55), Max: 36.6 (17 Jul 2020 08:34)  T(F): 97.7 (17 Jul 2020 21:55), Max: 97.8 (17 Jul 2020 08:34)  HR: 93 (17 Jul 2020 21:55) (72 - 93)  BP: 102/68 (17 Jul 2020 21:55) (102/66 - 106/71)  RR: 16 (17 Jul 2020 21:55) (16 - 17)  SpO2: 93% (17 Jul 2020 21:55) (93% - 100%)    I&O's Detail    16 Jul 2020 07:01  -  17 Jul 2020 07:00  --------------------------------------------------------  IN:    Free Water: 250 mL    ns in tub fed  [id number]: 500 mL    Oral Fluid: 400 mL    Solution: 150 mL    Solution: 100 mL  Total IN: 1400 mL    OUT:    Indwelling Catheter - Urethral: 700 mL    Rectal Tube: 200 mL  Total OUT: 900 mL    Total NET: 500 mL      17 Jul 2020 07:01  -  18 Jul 2020 04:41  --------------------------------------------------------  IN:  Total IN: 0 mL    OUT:  Total OUT: 0 mL    Total NET: 0 mL    Physical Exam  Constitutional: No acute distress noted, lying in bed, appears comfortable  Neuro: A+O x 3, non-focal   HEENT: NC/AT, PERRL, EOMI, oral mucosa pink and moist  Neck: supple, no JVD  CV: RRR +S1S2  Pulm/chest: Decreased breath sounds on the Right, left CTA, no accessory muscle use noted  Abd: soft, NT, ND, +BS  : +Campos with yellow urine in bedside bag  Ext: DAVIDSON x 4, Limited LE strength, legs contracted, +LE sensation intact, +2 pedal edema bilaterally, +DP/PT pulses bilaterally.  Skin: warm, well perfused, +Large sacral decub  Psych: calm, appropriate affect  Tubes: G/J tube in place, bile drainage around tube, Right pleural vaibhav attached to pleurvac to waterseal with purulent/pus drainage, no noted airleak this AM, dressing c/d/i, no surrounding crepitus noted       LABS                       8.6    9.28  )-----------( 546      ( 17 Jul 2020 05:24 )             26.9     07-17    131<L>  |  97<L>  |  20.0  ----------------------------<  75  4.2   |  20.0<L>  |  0.51    Ca    7.4<L>      17 Jul 2020 05:24  Mg     1.8     07-16      Last CXR:  < from: Xray Chest 1 View- PORTABLE-Routine (07.17.20 @ 07:31) >  Findings:  A right chest tube is noted. No change in position since the prior exam. No evidence of pneumothorax. Persistent pleural thickening or pleural fluid at the rightlung base. There appears to be an esophageal stent in the mid chest, unchanged..  Impression:  Stable exam without significant change since the previous study..  < end of copied text >

## 2020-07-18 NOTE — PROGRESS NOTE ADULT - PROBLEM SELECTOR PLAN 5
Patient with impaired mobility, long term bedridden.  Stage 4/unstagable sacral decubitus.  Wound care to see patient.   Plastics to see patient.   Will need air flow mattress.  Turn and position Q 2 hrs.  Rectal tube to avoid contamination of the sacral wound.

## 2020-07-18 NOTE — CHART NOTE - NSCHARTNOTEFT_GEN_A_CORE
CTS ACP Addendum    Briefly, 70F h/o MS, esophageal perf s/p PEG/J and R VATS for empyema. Readmitted 7/12 with chest tube 1 of 2 dislodged. POD #1 from IR GJ replacement.     OVERNIGHT: PEG stopped working/not flushing or allowing tube feeds to pass.    Patient seen and examined. Notes, flowsheets, medications, radiologic images and labs reviewed.     Plan:  - attempt to unclog J tube  - ABX for empyema/UTI  - Consider plastics and wound care consults  - Palliative care following, pending patient decision on DNR status etc  - Labs pending    Case to be discussed with Dr. Garcia

## 2020-07-19 ENCOUNTER — TRANSCRIPTION ENCOUNTER (OUTPATIENT)
Age: 70
End: 2020-07-19

## 2020-07-19 LAB
ALBUMIN SERPL ELPH-MCNC: 1.8 G/DL — LOW (ref 3.3–5.2)
ALP SERPL-CCNC: 90 U/L — SIGNIFICANT CHANGE UP (ref 40–120)
ALT FLD-CCNC: 15 U/L — SIGNIFICANT CHANGE UP
ANION GAP SERPL CALC-SCNC: 12 MMOL/L — SIGNIFICANT CHANGE UP (ref 5–17)
AST SERPL-CCNC: 19 U/L — SIGNIFICANT CHANGE UP
BILIRUB SERPL-MCNC: <0.2 MG/DL — LOW (ref 0.4–2)
BUN SERPL-MCNC: 15 MG/DL — SIGNIFICANT CHANGE UP (ref 8–20)
CALCIUM SERPL-MCNC: 7.2 MG/DL — LOW (ref 8.6–10.2)
CHLORIDE SERPL-SCNC: 102 MMOL/L — SIGNIFICANT CHANGE UP (ref 98–107)
CO2 SERPL-SCNC: 22 MMOL/L — SIGNIFICANT CHANGE UP (ref 22–29)
CREAT SERPL-MCNC: 0.5 MG/DL — SIGNIFICANT CHANGE UP (ref 0.5–1.3)
GLUCOSE BLDC GLUCOMTR-MCNC: 114 MG/DL — HIGH (ref 70–99)
GLUCOSE SERPL-MCNC: 104 MG/DL — HIGH (ref 70–99)
HCT VFR BLD CALC: 24.5 % — LOW (ref 34.5–45)
HGB BLD-MCNC: 7.8 G/DL — LOW (ref 11.5–15.5)
MAGNESIUM SERPL-MCNC: 1.8 MG/DL — SIGNIFICANT CHANGE UP (ref 1.6–2.6)
MCHC RBC-ENTMCNC: 28.2 PG — SIGNIFICANT CHANGE UP (ref 27–34)
MCHC RBC-ENTMCNC: 31.8 GM/DL — LOW (ref 32–36)
MCV RBC AUTO: 88.4 FL — SIGNIFICANT CHANGE UP (ref 80–100)
PLATELET # BLD AUTO: 483 K/UL — HIGH (ref 150–400)
POTASSIUM SERPL-MCNC: 3.3 MMOL/L — LOW (ref 3.5–5.3)
POTASSIUM SERPL-SCNC: 3.3 MMOL/L — LOW (ref 3.5–5.3)
PROT SERPL-MCNC: 4.7 G/DL — LOW (ref 6.6–8.7)
RBC # BLD: 2.77 M/UL — LOW (ref 3.8–5.2)
RBC # FLD: 19.2 % — HIGH (ref 10.3–14.5)
SODIUM SERPL-SCNC: 136 MMOL/L — SIGNIFICANT CHANGE UP (ref 135–145)
WBC # BLD: 9.1 K/UL — SIGNIFICANT CHANGE UP (ref 3.8–10.5)
WBC # FLD AUTO: 9.1 K/UL — SIGNIFICANT CHANGE UP (ref 3.8–10.5)

## 2020-07-19 PROCEDURE — 99024 POSTOP FOLLOW-UP VISIT: CPT

## 2020-07-19 PROCEDURE — 71045 X-RAY EXAM CHEST 1 VIEW: CPT | Mod: 26

## 2020-07-19 RX ORDER — POTASSIUM CHLORIDE 20 MEQ
10 PACKET (EA) ORAL ONCE
Refills: 0 | Status: COMPLETED | OUTPATIENT
Start: 2020-07-19 | End: 2020-07-19

## 2020-07-19 RX ORDER — POTASSIUM CHLORIDE 20 MEQ
20 PACKET (EA) ORAL ONCE
Refills: 0 | Status: COMPLETED | OUTPATIENT
Start: 2020-07-19 | End: 2020-07-19

## 2020-07-19 RX ORDER — MAGNESIUM SULFATE 500 MG/ML
2 VIAL (ML) INJECTION ONCE
Refills: 0 | Status: COMPLETED | OUTPATIENT
Start: 2020-07-19 | End: 2020-07-19

## 2020-07-19 RX ORDER — MECLIZINE HCL 12.5 MG
12.5 TABLET ORAL ONCE
Refills: 0 | Status: COMPLETED | OUTPATIENT
Start: 2020-07-19 | End: 2020-07-19

## 2020-07-19 RX ADMIN — Medication 15 MILLILITER(S): at 12:56

## 2020-07-19 RX ADMIN — LATANOPROST 1 DROP(S): 0.05 SOLUTION/ DROPS OPHTHALMIC; TOPICAL at 21:37

## 2020-07-19 RX ADMIN — Medication 650 MILLIGRAM(S): at 03:48

## 2020-07-19 RX ADMIN — Medication 1 TABLET(S): at 17:42

## 2020-07-19 RX ADMIN — Medication 100 MILLIGRAM(S): at 15:10

## 2020-07-19 RX ADMIN — NYSTATIN CREAM 1 APPLICATION(S): 100000 CREAM TOPICAL at 06:02

## 2020-07-19 RX ADMIN — Medication 100 MILLIEQUIVALENT(S): at 22:39

## 2020-07-19 RX ADMIN — Medication 50 MILLIEQUIVALENT(S): at 16:38

## 2020-07-19 RX ADMIN — ENOXAPARIN SODIUM 40 MILLIGRAM(S): 100 INJECTION SUBCUTANEOUS at 21:37

## 2020-07-19 RX ADMIN — PANTOPRAZOLE SODIUM 40 MILLIGRAM(S): 20 TABLET, DELAYED RELEASE ORAL at 06:02

## 2020-07-19 RX ADMIN — Medication 300 MILLIGRAM(S): at 09:37

## 2020-07-19 RX ADMIN — SODIUM CHLORIDE 3 MILLILITER(S): 9 INJECTION INTRAMUSCULAR; INTRAVENOUS; SUBCUTANEOUS at 06:03

## 2020-07-19 RX ADMIN — Medication 1 APPLICATION(S): at 17:43

## 2020-07-19 RX ADMIN — Medication 50 GRAM(S): at 22:39

## 2020-07-19 RX ADMIN — Medication 1 APPLICATION(S): at 04:59

## 2020-07-19 RX ADMIN — Medication 1 TABLET(S): at 06:02

## 2020-07-19 RX ADMIN — PANTOPRAZOLE SODIUM 40 MILLIGRAM(S): 20 TABLET, DELAYED RELEASE ORAL at 17:43

## 2020-07-19 RX ADMIN — SODIUM CHLORIDE 3 MILLILITER(S): 9 INJECTION INTRAMUSCULAR; INTRAVENOUS; SUBCUTANEOUS at 14:49

## 2020-07-19 RX ADMIN — Medication 650 MILLIGRAM(S): at 18:12

## 2020-07-19 RX ADMIN — Medication 650 MILLIGRAM(S): at 17:42

## 2020-07-19 RX ADMIN — Medication 650 MILLIGRAM(S): at 04:30

## 2020-07-19 RX ADMIN — Medication 100 MILLIGRAM(S): at 21:41

## 2020-07-19 RX ADMIN — LETROZOLE 2.5 MILLIGRAM(S): 2.5 TABLET, FILM COATED ORAL at 09:39

## 2020-07-19 RX ADMIN — Medication 500 MILLIGRAM(S): at 09:37

## 2020-07-19 RX ADMIN — Medication 650 MILLIGRAM(S): at 11:30

## 2020-07-19 RX ADMIN — Medication 1 MILLIGRAM(S): at 09:39

## 2020-07-19 RX ADMIN — Medication 650 MILLIGRAM(S): at 23:38

## 2020-07-19 RX ADMIN — Medication 300 MILLIGRAM(S): at 19:06

## 2020-07-19 RX ADMIN — SODIUM CHLORIDE 1 GRAM(S): 9 INJECTION INTRAMUSCULAR; INTRAVENOUS; SUBCUTANEOUS at 23:08

## 2020-07-19 RX ADMIN — SODIUM CHLORIDE 1 GRAM(S): 9 INJECTION INTRAMUSCULAR; INTRAVENOUS; SUBCUTANEOUS at 15:11

## 2020-07-19 RX ADMIN — Medication 12.5 MILLIGRAM(S): at 16:39

## 2020-07-19 RX ADMIN — FLUCONAZOLE 100 MILLIGRAM(S): 150 TABLET ORAL at 17:44

## 2020-07-19 RX ADMIN — Medication 100 MILLIGRAM(S): at 06:03

## 2020-07-19 RX ADMIN — SODIUM CHLORIDE 1 GRAM(S): 9 INJECTION INTRAMUSCULAR; INTRAVENOUS; SUBCUTANEOUS at 06:02

## 2020-07-19 RX ADMIN — Medication 300 MILLIGRAM(S): at 12:56

## 2020-07-19 RX ADMIN — CHLORHEXIDINE GLUCONATE 1 APPLICATION(S): 213 SOLUTION TOPICAL at 12:54

## 2020-07-19 RX ADMIN — SODIUM CHLORIDE 3 MILLILITER(S): 9 INJECTION INTRAMUSCULAR; INTRAVENOUS; SUBCUTANEOUS at 22:11

## 2020-07-19 RX ADMIN — Medication 650 MILLIGRAM(S): at 10:39

## 2020-07-19 RX ADMIN — Medication 650 MILLIGRAM(S): at 23:08

## 2020-07-19 RX ADMIN — NYSTATIN CREAM 1 APPLICATION(S): 100000 CREAM TOPICAL at 17:44

## 2020-07-19 NOTE — DISCHARGE NOTE PROVIDER - NSDCMRMEDTOKEN_GEN_ALL_CORE_FT
acetaminophen 325 mg oral tablet: 2 tab(s) orally every 6 hours, As needed, Temp greater or equal to 38C (100.4F), Mild Pain (1 - 3)  ascorbic acid 500 mg oral tablet: 1 tab(s) orally once a day  enoxaparin: 30 milligram(s) subcutaneous every 12 hours for DVT prophylaxis  ferrous sulfate 325 mg (65 mg elemental iron) oral tablet: 1 tab(s) orally once a day  folic acid 1 mg oral tablet: 1 tab(s) orally once a day  latanoprost 0.005% ophthalmic solution: 1 drop(s) to each affected eye once a day (at bedtime)  letrozole 2.5 mg oral tablet: 1 tab(s) orally once a day  loperamide 2 mg oral capsule: 1 cap(s) orally every 4 hours, As needed, Diarrhea  Multiple Vitamins oral tablet: 1 tab(s) orally once a day  nystatin 100,000 units/g topical cream: 1 application topically 2 times a day  ondansetron 4 mg oral tablet, disintegratin tab(s) orally every 6 hours, As needed, Nausea and/or Vomiting  Protonix 40 mg oral delayed release tablet: 1 tab(s) orally 2 times a day  sodium hypochlorite 0.25% topical solution: 1 application topically 2 times a day acetaminophen 160 mg/5 mL oral suspension: 20.31 milliliter(s) orally every 6 hours, As needed, Temp greater or equal to 38.5C (101.3F), Mild Pain (1 - 3), Moderate Pain (4 - 6), Severe Pain (7 - 10)  ascorbic acid 500 mg oral tablet: 1 tab(s) orally once a day  collagenase 250 units/g topical ointment: 1 application topically once a day  enoxaparin: 30 milligram(s) subcutaneous once a day  ferrous sulfate 325 mg (65 mg elemental iron) oral tablet: 1 tab(s) orally once a day  fluconazole: 400 mEq/kg injectable once a day, infuse over 2 hours  folic acid 1 mg oral tablet: 1 tab(s) orally once a day  lactobacillus acidophilus oral capsule: 1 tab(s) orally once a day  latanoprost 0.005% ophthalmic solution: 1 drop(s) to each affected eye once a day (at bedtime)  letrozole 2.5 mg oral tablet: 1 tab(s) orally once a day  levoFLOXacin: 750 milligram(s) intravenous once a day  meclizine 12.5 mg oral tablet: 1 tab(s) orally 2 times a day, As needed, Dizziness  metroNIDAZOLE 500 mg/100 mL intravenous solution: 100 milliliter(s) intravenous every 8 hours  Multiple Vitamins oral tablet: 1 tab(s) orally once a day  nystatin 100,000 units/g topical cream: 1 application topically 2 times a day  ondansetron 4 mg oral tablet, disintegratin tab(s) orally every 6 hours, As needed, Nausea and/or Vomiting  Protonix 40 mg oral delayed release tablet: 1 tab(s) orally 2 times a day  sodium chloride 0.9% injectable solution: 3 milliliter(s) injectable 3 times a day  sodium hypochlorite 0.25% topical solution: 1 application topically 2 times a day

## 2020-07-19 NOTE — DISCHARGE NOTE PROVIDER - NSDCFUADDINST_GEN_ALL_CORE_FT
Please call the Cardiothoracic Surgery office at [phone] if you are experiencing any shortness of breath, chest pain, fevers or chills, drainage from the incisions, persistent nausea, vomiting or if you have any questions about your medications. If the symptoms are severe, call 911 and go to the nearest hospital. You can also call [phone] for an emergency [facility] ambulance, which will take you to the closest [facility].    If you need any assistance for making any appointments for a new consult or referral in any specialty, please call our [facility] Clinical Coordination Center at [phone].

## 2020-07-19 NOTE — DISCHARGE NOTE PROVIDER - NSDCCPCAREPLAN_GEN_ALL_CORE_FT
PRINCIPAL DISCHARGE DIAGNOSIS  Diagnosis: Hydropneumothorax  Assessment and Plan of Treatment: Contuinue Right chest tube/SOURAV  to waterseal  Encourage cough/deep breathing and incentive spirometry  Continue chest PT        SECONDARY DISCHARGE DIAGNOSES  Diagnosis: Esophageal perforation  Assessment and Plan of Treatment: s/p Esophageal stent placement  Continue feeds via J tube      Diagnosis: Sacral wound  Assessment and Plan of Treatment: Turn and position every 2 hours  Wound care as ordered  Continue tube feeds as tolerated  Continue mutlivitamins PRINCIPAL DISCHARGE DIAGNOSIS  Diagnosis: Hydropneumothorax  Assessment and Plan of Treatment: Contuinue Right chest tube/SOURAV  to waterseal  Encourage cough/deep breathing and incentive spirometry  Continue chest PT        SECONDARY DISCHARGE DIAGNOSES  Diagnosis: Sacral wound  Assessment and Plan of Treatment: Turn and position every 2 hours  Wound care as ordered  Continue tube feeds as tolerated  Continue mutlivitamins      Diagnosis: Esophageal perforation  Assessment and Plan of Treatment: s/p Esophageal stent placement  Continue feeds via J tube PRINCIPAL DISCHARGE DIAGNOSIS  Diagnosis: Hydropneumothorax  Assessment and Plan of Treatment: Contuinue Right chest tube/SOURAV  to waterseal  Encourage cough/deep breathing and incentive spirometry  Continue chest PT        SECONDARY DISCHARGE DIAGNOSES  Diagnosis: Esophageal perforation  Assessment and Plan of Treatment: s/p Esophageal stent placement5/13  Continue feeds via J tube  coninue sips of clear liquids orally  Continue vitain supplements as ordered      Diagnosis: Empyema  Assessment and Plan of Treatment: Chest tube to waterseal  Continue IV atibiotics as ordered  Continue chest PT, pulmonary toileting, caugh deep breathing and incentive spirometry    Diagnosis: Sacral wound  Assessment and Plan of Treatment: Turn and position every 2 hours  Wound care as ordered  Continue tube feeds as tolerated  Continue vitamin supplements  Wond care as per order PRINCIPAL DISCHARGE DIAGNOSIS  Diagnosis: Hydropneumothorax  Assessment and Plan of Treatment: Continue Right chest tube to pleurivac (waterseal - no suction)  Encourage cough/deep breathing and incentive spirometry  Continue chest PT        SECONDARY DISCHARGE DIAGNOSES  Diagnosis: Empyema  Assessment and Plan of Treatment: Chest tube to waterseal  Continue IV atibiotics as ordered  Continue chest PT, pulmonary toileting, caugh deep breathing and incentive spirometry    Diagnosis: Sacral wound  Assessment and Plan of Treatment: Turn and position every 2 hours  Wound care as ordered  Continue tube feeds as tolerated  Continue vitamin supplements  Wond care as per order      Diagnosis: Esophageal perforation  Assessment and Plan of Treatment: s/p Esophageal stent placement5/13  Continue feeds via J tube  coninue sips of clear liquids orally  Continue vitain supplements as ordered PRINCIPAL DISCHARGE DIAGNOSIS  Diagnosis: Hydropneumothorax  Assessment and Plan of Treatment: Continue Right chest tube to pleurivac (waterseal - no suction)  Please call Dr. Murcia's office at [phone]  to make a followup appointment.   Maintain clean dry dressing over chest tube site. Monitor chest drain connections daily for loosening or disconnection.   Use J tube for tube feeds and G tube for meds (well crushed). Check G tube residuals twice daily. Rectal tube for diarrhea.  Maintain chest drain to waterseal. Do NOT clamp chest tube.  Take all medications as ordered. Continue a stool softener as needed. Continue to use your incentive spirometer and increase your activity as tolerated. No baths or swimming. You may not return to work or drive until cleared by surgeon. Call the office if you experience any fevers, shortness of breath, chest pain or excessive drainage from the incision, day or night. Go to the emergency room if any of these symptoms are severe.         SECONDARY DISCHARGE DIAGNOSES  Diagnosis: Empyema  Assessment and Plan of Treatment: Chest tube to waterseal  Continue IV atibiotics as ordered  Continue chest PT, pulmonary toileting, caugh deep breathing and incentive spirometry    Diagnosis: Sacral wound  Assessment and Plan of Treatment: Turn and position every 2 hours  Wound care as ordered  Continue tube feeds as tolerated  Continue vitamin supplements  Wond care as per order      Diagnosis: Esophageal perforation  Assessment and Plan of Treatment: s/p Esophageal stent placement5/13  Continue feeds via J tube  coninue sips of clear liquids orally  Continue vitain supplements as ordered

## 2020-07-19 NOTE — CHART NOTE - NSCHARTNOTEFT_GEN_A_CORE
Pt seen with Dr. Garcia.  GJ tube loosened 1 cm and immediately clear serous fluid began draining from around the tube.  Appear to possibly be ascitic fluid.  We will keep tube in current position and continue to monitor drainage while receiving tube feeds (currently at 30cc/hr).  Fluid does not appear to be related to feeding.    Called by nurse this morning to assess patient for temp of 93.3 orally and rectally.  Pt is constantly mouth breathing and pt also has dignicare rectal tube.  Temp was 96.4 measured with temporal infrared thermometer. She denies any symptoms except a "sensation of falling" reported earlier today that has since resolved.  She denies chills, sweats, rigors. does not appear toxic.      Will continue right chest tube to pleurevac for now. (100cc last 24 hours).

## 2020-07-19 NOTE — DISCHARGE NOTE PROVIDER - HOSPITAL COURSE
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 Plastic surgery was consulted and following for her unstageable sacral wound. Patient required rectal tube to prevent contamination. At that time, diverting colostomy was offered but family refused.  She completed IV antibiotics for her empyema and was discharged to rehab 6/9 with 2 CTs in place.  On 07/12 patient was sent to ED from rehab after one of her chest tubes was dislodged while turning the patient.  Thoracic surgery asked to consult. CXR without obvious pneumothorax.  Upon evaluation, noted that SOURAV was not holding self suction.  Notable air leak in SOURAV bulb.  Decision made to admit patient for further monitoring/work up, and possible removal of CT. Cultures (07/13) revealed Pleural fluid +yeast and pseudomonas and urine with proteus>100k. ID was consulted, originally started on Linezolid, Meropenum, Flagyl and Diflucan. Meropenum was d/c secondary to allergic reaction and started Levofloxacin which was discontinued 7/16. LUE midline placed 7/15 for proper IV access. Patient with noted cracked and dislodged G-J tube 7/16 with IR gastrojejunostomy tube replacement performed 7/17. 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 Plastic surgery was consulted and following for her unstageable sacral wound. Patient required rectal tube to prevent contamination. At that time, diverting colostomy was offered but family refused.  She completed IV antibiotics for her empyema and was discharged to rehab 6/9 with 2 CTs in place.  On 07/12 patient was sent to ED from rehab after one of her chest tubes was dislodged while turning the patient.  Thoracic surgery asked to consult. CXR without obvious pneumothorax.  Upon evaluation, noted that SOURAV was not holding self suction.  Notable air leak in SOURAV bulb.  Decision made to admit patient for further monitoring/work up, and possible removal of CT. Cultures (07/13) revealed Pleural fluid +yeast and pseudomonas and urine with proteus>100k. ID was consulted, originally started on Linezolid, Meropenum, Flagyl and Diflucan. Meropenum was d/c secondary to allergic reaction and started Levofloxacin which was discontinued 7/16. LUE midline placed 7/15 for proper IV access. Patient with noted cracked and dislodged G-J tube 7/16, replacement of Gastrojejunostomy tube on 7/17. 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 Plastic surgery was consulted and following for her unstageable sacral wound. Patient required rectal tube to prevent contamination. At that time, diverting colostomy was offered but family refused.  She completed IV antibiotics for her empyema and was discharged to rehab 6/9 with 2 CTs in place.  On 07/12 patient was sent to ED from rehab after one of her chest tubes was dislodged while turning the patient.  Thoracic surgery asked to consult. CXR without obvious pneumothorax.  Upon evaluation, noted that SOURAV was not holding self suction.  Notable air leak in SOURAV bulb.  Decision made to admit patient for further monitoring/work up, and possible removal of CT. Cultures (07/13) revealed Pleural fluid +yeast and pseudomonas and urine with proteus>100k. ID was consulted, originally started on Linezolid, Meropenum, Flagyl and Diflucan. Meropenum was d/c secondary to allergic reaction and started Levofloxacin which was discontinued 7/16. LUE midline placed 7/15 for proper IV access. Patient with noted cracked and dislodged G-J tube 7/16, now s/p replacement of Gastrojejunostomy tube on 7/17. 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 Plastic surgery was consulted and following for her unstageable sacral wound. Patient required rectal tube to prevent contamination. At that time, diverting colostomy was offered but family refused.  She completed IV antibiotics for her empyema and was discharged to rehab 6/9 with 2 CTs in place.  On 07/12 patient was sent to ED from rehab after one of her chest tubes was dislodged while turning the patient.  Thoracic surgery asked to consult. CXR without obvious pneumothorax.  Upon evaluation, noted that SOURAV was not holding self suction.  Notable air leak in SOURAV bulb.  Decision made to admit patient for further monitoring/work up, and possible removal of CT. Cultures (07/13) revealed Pleural fluid +yeast and pseudomonas and urine with proteus>100k. ID was consulted, originally started on Linezolid, Meropenum, Flagyl and Diflucan. Meropenum was d/c secondary to allergic reaction and started Levofloxacin which was discontinued 7/16. LUE midline placed 7/15 for proper IV access. Patient with noted cracked and dislodged G-J tube 7/16, now s/p replacement of Gastrojejunostomy tube on 7/17. 7/19-7/22 vertigo improved with meclizine. CT head + 6 mm lesion. Patient will need further imaging as outpatient.

## 2020-07-19 NOTE — PROGRESS NOTE ADULT - PROBLEM SELECTOR PLAN 5
Patient with impaired mobility, long term bedridden.  Stage 4/unstagable sacral decubitus.  Wound care to see patient.   Plastics to see patient.   Refusing air flow mattress as it is not comfortable to her.  Turn and position Q 2 hrs.  Rectal tube to avoid contamination of the sacral wound.  Palliative care following patient for Goals of Care.

## 2020-07-19 NOTE — PROGRESS NOTE ADULT - SUBJECTIVE AND OBJECTIVE BOX
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 plastic surgery was consulted and following for her unstageable sacral wound. pt required rectal tube to prevent contamination. At that time, diverting colostomy was offered but family refused.  She completed IV antibiotics for her empyema and was discharged to rehab 6/9 with 2 CTs in place.  She now is sent to ED from rehab after one of her chest tubes was dislodged while turning the pt.  Thoracic surgery asked to consult. CXR without obvious pneumothorax.  Upon evaluation, noted that SOURAV was not holding self suction.  Notable air leak in SOURAV bulb.  Decision made to admit pt for further monitoring/work up, and possible removal of CT. Pt without any complaints at this time.  She denies CP, palpitations, SOB, cough, fever, chills, itchiness/rash, diaphoresis, vision changes, HA, dizziness/lightheadedness, numbness/tingling, abd pain, N/V (12 Jul 2020 16:25)    PAST MEDICAL & SURGICAL HISTORY:  Breast cancer metastasized to bone  Hypertension  Multiple sclerosis  S/P mastectomy, right  Breast CA  Osteoporosis  MS (mitral stenosis)  Esophageal perforation: s/p stent placement 5/13  H/O pleural empyema: s/p R VATS chest wash out decortication 5/13  History of bowel resection  H/O breast surgery  Encounter for feeding tube placement: open G-Jtube placement 5/15  History of thoracic surgery: right VATS decortication, chest wastout 5/13    FAMILY HISTORY:  FHx: hyperlipidemia    Brief Hospital Course: Right sided vaibhav drain attached to pleurvac with noted continued purulent drainage. Pleural fluid +yeast and pseudomonas and urine with proteus>100k. ID following patient and after noted drug reaction to Merrem, patient was started on IV Levaquin, flagyl, and diflucan. LUE midline placed 7/15 for proper IV access. Patient with noted cracked and dislodged G-J tube 7/16 with IR gastrojejunostomy tube replacement performed 7/17.     Significant recent/past 24 hr events: None    Subjective: "I'm not comfortable". Patient tried to be repositioned multiple times in her inflatable bed. Requesting regular hospital bed back. Denies chills, lightheadedness, dizziness, HA, CP, palpitations, SOB, cough, abdominal pain, N/V, new numbness/tingling in extremities, or any other acute complaints.  ROS negative x 10 systems except as noted above    MEDICATIONS  (STANDING):  ascorbic acid 500 milliGRAM(s) Oral daily  chlorhexidine 2% Cloths 1 Application(s) Topical daily  Dakins Solution - 1/2 Strength 1 Application(s) Topical two times a day  enoxaparin Injectable 40 milliGRAM(s) SubCutaneous daily  ferrous    sulfate Liquid 300 milliGRAM(s) Oral three times a day with meals  fluconAZOLE IVPB 400 milliGRAM(s) IV Intermittent every 24 hours  folic acid 1 milliGRAM(s) Oral daily  lactobacillus acidophilus 1 Tablet(s) Oral two times a day  latanoprost 0.005% Ophthalmic Solution 1 Drop(s) Both EYES at bedtime  letrozole 2.5 milliGRAM(s) Oral daily  levoFLOXacin IVPB 750 milliGRAM(s) IV Intermittent every 24 hours  metroNIDAZOLE  IVPB 500 milliGRAM(s) IV Intermittent every 8 hours  multivitamin/minerals/iron Oral Solution (CENTRUM) 15 milliLiter(s) Enteral Tube daily  nystatin Powder 1 Application(s) Topical two times a day  pantoprazole  Injectable 40 milliGRAM(s) IV Push two times a day  sodium chloride 1 Gram(s) Oral three times a day  sodium chloride 0.9% lock flush 3 milliLiter(s) IV Push every 8 hours    MEDICATIONS  (PRN):  acetaminophen    Suspension .. 650 milliGRAM(s) Enteral Tube every 6 hours PRN Temp greater or equal to 38.5C (101.3F), Mild Pain (1 - 3), Moderate Pain (4 - 6), Severe Pain (7 - 10)    Allergies:  Sudafed (Other)    Vitals   T(C): 36.4 (18 Jul 2020 21:17), Max: 36.7 (18 Jul 2020 11:45)  T(F): 97.5 (18 Jul 2020 21:17), Max: 98 (18 Jul 2020 11:45)  HR: 86 (18 Jul 2020 21:17) (86 - 96)  BP: 115/75 (18 Jul 2020 21:17) (99/66 - 125/54)  RR: 20 (18 Jul 2020 21:17) (16 - 20)  SpO2: 98% (18 Jul 2020 21:17) (95% - 99%)    I&O's Detail    17 Jul 2020 07:01  -  18 Jul 2020 07:00  --------------------------------------------------------  IN:    ns in tub fed  [id number]: 325 mL  Total IN: 325 mL    OUT:    Chest Tube: 10 mL    Rectal Tube: 500 mL  Total OUT: 510 mL    Total NET: -185 mL      18 Jul 2020 07:01  -  19 Jul 2020 02:03  --------------------------------------------------------  IN:    Enteral Tube Flush: 120 mL    Free Water: 480 mL    ns in tub fed  [id number]: 90 mL  Total IN: 690 mL    OUT:  Total OUT: 0 mL    Total NET: 690 mL    Physical Exam  Constitutional: No acute distress noted, lying in bed, appears comfortable  Neuro: A+O x 3, non-focal   HEENT: NC/AT, PERRL, EOMI, oral mucosa pink and moist  Neck: supple, no JVD  CV: RRR +S1S2  Pulm/chest: Decreased breath sounds on the Right, left CTA, no accessory muscle use noted  Abd: soft, NT, ND, +BS  : +Campos with yellow urine in bedside bag, +Rectal rube in place  Ext: DAVIDSON x 4, Limited LE strength, legs contracted, +LE sensation intact, +2 pedal edema bilaterally, +DP/PT pulses bilaterally.  Skin: warm, well perfused, +Large sacral decub  Psych: calm, appropriate affect  Tubes: G/J tube in place, bile drainage around tube, Right pleural vaibhav attached to pleurvac to waterseal with purulent/pus drainage, no noted airleak this AM, dressing c/d/i, no surrounding crepitus noted    LABS                        7.5    8.41  )-----------( 513      ( 18 Jul 2020 09:14 )             23.6     07-18    136  |  101  |  18.0  ----------------------------<  83  3.4<L>   |  24.0  |  0.57    Ca    7.2<L>      18 Jul 2020 09:14  Mg     1.6     07-18    Last CXR:  < from: Xray Chest 1 View- PORTABLE-Routine (07.18.20 @ 05:43) >  IMPRESSION:  Unchanged small bilateral pleural effusions. No evidence of pneumothorax.  < end of copied text >

## 2020-07-19 NOTE — PROGRESS NOTE ADULT - ASSESSMENT
70F, well known to thoracic surgery service, pmhx of MS (wheelchair bound), and metastatic breast cancer to pelvis, thoracic, lumbar spine, chronic indwelling campos, chronic unstageable sacral wound, with recent empyema requiring chest tube found to have esophageal perforation, s/p R chest washout, decortication and esophageal stent placement 5/13, subsequent open G-J tube placement 5/15. Patient presented from rehab with dislodged chest tube 7/12, CXR without obvious pneumothorax, however, SOURAV not holding self suction concerning for air leak, so patient was admitted to the thoracic surgery service. Right sided vaibhav drain attached to pleurvac with noted airleak (resolving) and continued purulent drainage. Pleural fluid +yeast and pseudomonas and urine with proteus>100k. ID following patient and after noted drug reaction to Merrem, patient was started on IV Levaquin, flagyl, and diflucan. LUE midline placed 7/15 for proper IV access.  Patient with noted cracked and dislodged G-J tube 7/16 with IR gastrojejunostomy tube replacement performed 7/17.

## 2020-07-19 NOTE — DISCHARGE NOTE PROVIDER - NSDCFUADDAPPT_GEN_ALL_CORE_FT
Upon discharge from rehab, make a follow up appointment with Dr Murcia__ by calling [phone]  .  Follow up with your cardiologist and primary care doctor within 7-10 days after discharge. Upon discharge from rehab, make a follow up appointment with Dr Murcia  by calling [phone]  .  Follow up with your cardiologist and primary care doctor within 7-10 days after discharge.

## 2020-07-19 NOTE — DISCHARGE NOTE PROVIDER - CARE PROVIDER_API CALL
Candy Murcia  THORACIC AND CARDIAC SURGERY  [address] [place] [address]  Phone: [phone]  Fax: [phone]  Follow Up Time:

## 2020-07-20 LAB
ALBUMIN SERPL ELPH-MCNC: 1.7 G/DL — LOW (ref 3.3–5.2)
ALP SERPL-CCNC: 92 U/L — SIGNIFICANT CHANGE UP (ref 40–120)
ALT FLD-CCNC: 14 U/L — SIGNIFICANT CHANGE UP
ANION GAP SERPL CALC-SCNC: 12 MMOL/L — SIGNIFICANT CHANGE UP (ref 5–17)
AST SERPL-CCNC: 18 U/L — SIGNIFICANT CHANGE UP
BILIRUB SERPL-MCNC: <0.2 MG/DL — LOW (ref 0.4–2)
BUN SERPL-MCNC: 19 MG/DL — SIGNIFICANT CHANGE UP (ref 8–20)
CALCIUM SERPL-MCNC: 7.8 MG/DL — LOW (ref 8.6–10.2)
CHLORIDE SERPL-SCNC: 104 MMOL/L — SIGNIFICANT CHANGE UP (ref 98–107)
CO2 SERPL-SCNC: 21 MMOL/L — LOW (ref 22–29)
CREAT SERPL-MCNC: 0.47 MG/DL — LOW (ref 0.5–1.3)
GLUCOSE SERPL-MCNC: 95 MG/DL — SIGNIFICANT CHANGE UP (ref 70–99)
HCT VFR BLD CALC: 26.1 % — LOW (ref 34.5–45)
HGB BLD-MCNC: 8.1 G/DL — LOW (ref 11.5–15.5)
MAGNESIUM SERPL-MCNC: 2.1 MG/DL — SIGNIFICANT CHANGE UP (ref 1.6–2.6)
MCHC RBC-ENTMCNC: 27.6 PG — SIGNIFICANT CHANGE UP (ref 27–34)
MCHC RBC-ENTMCNC: 31 GM/DL — LOW (ref 32–36)
MCV RBC AUTO: 89.1 FL — SIGNIFICANT CHANGE UP (ref 80–100)
PLATELET # BLD AUTO: 456 K/UL — HIGH (ref 150–400)
POTASSIUM SERPL-MCNC: 4.1 MMOL/L — SIGNIFICANT CHANGE UP (ref 3.5–5.3)
POTASSIUM SERPL-SCNC: 4.1 MMOL/L — SIGNIFICANT CHANGE UP (ref 3.5–5.3)
PROT SERPL-MCNC: 5.1 G/DL — LOW (ref 6.6–8.7)
RBC # BLD: 2.93 M/UL — LOW (ref 3.8–5.2)
RBC # FLD: 19.9 % — HIGH (ref 10.3–14.5)
SODIUM SERPL-SCNC: 137 MMOL/L — SIGNIFICANT CHANGE UP (ref 135–145)
WBC # BLD: 11.65 K/UL — HIGH (ref 3.8–10.5)
WBC # FLD AUTO: 11.65 K/UL — HIGH (ref 3.8–10.5)

## 2020-07-20 PROCEDURE — 99232 SBSQ HOSP IP/OBS MODERATE 35: CPT

## 2020-07-20 PROCEDURE — 99024 POSTOP FOLLOW-UP VISIT: CPT

## 2020-07-20 PROCEDURE — 71045 X-RAY EXAM CHEST 1 VIEW: CPT | Mod: 26

## 2020-07-20 RX ADMIN — Medication 650 MILLIGRAM(S): at 06:42

## 2020-07-20 RX ADMIN — Medication 100 MILLIGRAM(S): at 06:39

## 2020-07-20 RX ADMIN — CHLORHEXIDINE GLUCONATE 1 APPLICATION(S): 213 SOLUTION TOPICAL at 13:32

## 2020-07-20 RX ADMIN — LATANOPROST 1 DROP(S): 0.05 SOLUTION/ DROPS OPHTHALMIC; TOPICAL at 21:55

## 2020-07-20 RX ADMIN — Medication 300 MILLIGRAM(S): at 10:19

## 2020-07-20 RX ADMIN — SODIUM CHLORIDE 1 GRAM(S): 9 INJECTION INTRAMUSCULAR; INTRAVENOUS; SUBCUTANEOUS at 06:41

## 2020-07-20 RX ADMIN — NYSTATIN CREAM 1 APPLICATION(S): 100000 CREAM TOPICAL at 18:12

## 2020-07-20 RX ADMIN — Medication 1 TABLET(S): at 06:41

## 2020-07-20 RX ADMIN — FLUCONAZOLE 100 MILLIGRAM(S): 150 TABLET ORAL at 16:56

## 2020-07-20 RX ADMIN — SODIUM CHLORIDE 3 MILLILITER(S): 9 INJECTION INTRAMUSCULAR; INTRAVENOUS; SUBCUTANEOUS at 05:36

## 2020-07-20 RX ADMIN — ENOXAPARIN SODIUM 40 MILLIGRAM(S): 100 INJECTION SUBCUTANEOUS at 21:55

## 2020-07-20 RX ADMIN — Medication 1 APPLICATION(S): at 18:15

## 2020-07-20 RX ADMIN — SODIUM CHLORIDE 1 GRAM(S): 9 INJECTION INTRAMUSCULAR; INTRAVENOUS; SUBCUTANEOUS at 21:55

## 2020-07-20 RX ADMIN — LETROZOLE 2.5 MILLIGRAM(S): 2.5 TABLET, FILM COATED ORAL at 10:19

## 2020-07-20 RX ADMIN — SODIUM CHLORIDE 3 MILLILITER(S): 9 INJECTION INTRAMUSCULAR; INTRAVENOUS; SUBCUTANEOUS at 15:57

## 2020-07-20 RX ADMIN — Medication 650 MILLIGRAM(S): at 22:25

## 2020-07-20 RX ADMIN — Medication 650 MILLIGRAM(S): at 21:55

## 2020-07-20 RX ADMIN — SODIUM CHLORIDE 3 MILLILITER(S): 9 INJECTION INTRAMUSCULAR; INTRAVENOUS; SUBCUTANEOUS at 21:54

## 2020-07-20 RX ADMIN — PANTOPRAZOLE SODIUM 40 MILLIGRAM(S): 20 TABLET, DELAYED RELEASE ORAL at 18:15

## 2020-07-20 RX ADMIN — PANTOPRAZOLE SODIUM 40 MILLIGRAM(S): 20 TABLET, DELAYED RELEASE ORAL at 06:40

## 2020-07-20 RX ADMIN — Medication 1 MILLIGRAM(S): at 10:19

## 2020-07-20 RX ADMIN — Medication 100 MILLIGRAM(S): at 22:09

## 2020-07-20 RX ADMIN — Medication 500 MILLIGRAM(S): at 10:19

## 2020-07-20 RX ADMIN — Medication 300 MILLIGRAM(S): at 16:59

## 2020-07-20 RX ADMIN — Medication 15 MILLILITER(S): at 13:42

## 2020-07-20 RX ADMIN — NYSTATIN CREAM 1 APPLICATION(S): 100000 CREAM TOPICAL at 06:40

## 2020-07-20 RX ADMIN — SODIUM CHLORIDE 1 GRAM(S): 9 INJECTION INTRAMUSCULAR; INTRAVENOUS; SUBCUTANEOUS at 15:57

## 2020-07-20 RX ADMIN — Medication 300 MILLIGRAM(S): at 13:40

## 2020-07-20 RX ADMIN — Medication 100 MILLIGRAM(S): at 15:56

## 2020-07-20 RX ADMIN — Medication 1 TABLET(S): at 18:15

## 2020-07-20 RX ADMIN — Medication 1 APPLICATION(S): at 06:41

## 2020-07-20 RX ADMIN — Medication 650 MILLIGRAM(S): at 07:12

## 2020-07-20 NOTE — PROGRESS NOTE ADULT - ASSESSMENT
70yr woman, hx of MS, metastatic breast cancer, recent empyema requiring chest tube found to have esophageal perforation, s/p R chest washout, decortication and esophageal stent placement 5/13, subsequent open G-J tube. sacral wound decline diverting colostomy admitted from Rehab for dislodge CT     Problem/Recommendation - 1:  Problem: Empyema. Recommendation: Patient s/p VATS with right thoracic cavity washout and decortication and CT.   Treated on last admission for polymicrobeal infection   Per CTS- Continue pleuravac to waterseal.  Pleural fluid +yeast and pseudomonas   cont IV abx -  Flagyl Levaquin Fluconazole til 8/9 per ID       Problem/Recommendation - 2:  ·  Problem: Esophageal perforation.  Recommendation: S/p esophageal stent 5/13  s/p change in GJ tube this am.      Problem/Recommendation - 3:  ·  Problem: Breast cancer metastasized to bone.  Recommendation: Patient under the care of Dr. Fairbanks.    Patient states she has been in Rehab for some time  and has not had treatement.      Problem/Recommendation - 4:  ·  Problem: Sacral wound. Recommendation: Patient declined diverting colostomy on last admission.     Problem/Recommendation - 5:  ·  Problem: Multiple sclerosis. Recommendation: Patient states has had MS >30 years.    Needs assistance in ADLs for which she reports son lives with her and  helps with some of her care.     Problem/Recommendation - 6:  Problem: Encounter for palliative care.   Recommendation:  Patient known to our service from previous admission. At the time advanced directives were discussed for which patient wished to discuss with her daughter.   She states daughter is main HCP.  She states she has not had  the chance to have such discussions with her daughter. She did not seem open to further discussions. I did try to encourage her to speak with her daughter regarding her GOC given her medical issues.   She wishes to go to rehab upon discharge.

## 2020-07-20 NOTE — PROGRESS NOTE ADULT - PROBLEM SELECTOR PLAN 3
Patient had completed a course of Merrem (6/9) for polymicrobial Empyema with strep mitis, Klebsiella oxytoca, and CoNS.  Pleural fluid +yeast and pseudomonas and urine with proteus>100k this admission.  ID following, consult appreciated.  Discontinued Meropenum after rash and itchiness.  Linezolid d/c on 07/16  Continue Flagyl, Levaquin and Diflucan.  Baseline 12 Lead EKG was obtained, normal QTC, will continue to monitor.

## 2020-07-20 NOTE — PROGRESS NOTE ADULT - SUBJECTIVE AND OBJECTIVE BOX
Subjective: Patient lying in bed, no acute distress noted, stated "feeling okay, dizziness is still there" Patient denies chest pain, palpitation, fever, chills, numbness, tingling, shortness of breath, abdominal pain, N/V.      Tele:  Sinus rhytm                           T(C): 35.9 (07-19-20 @ 21:58), Max: 36.6 (07-19-20 @ 06:03)  HR: 78 (07-19-20 @ 21:58) (78 - 90)  BP: 100/68 (07-19-20 @ 21:58) (100/68 - 119/81)  RR: 18 (07-19-20 @ 21:58) (18 - 20)  SpO2: 99% (07-19-20 @ 21:58) (98% - 99%) on room air        07-19    136  |  102  |  15.0  ----------------------------<  104<H>  3.3<L>   |  22.0  |  0.50    Ca    7.2<L>      19 Jul 2020 06:38  Mg     1.8     07-19    TPro  4.7<L>  /  Alb  1.8<L>  /  TBili  <0.2<L>  /  DBili  x   /  AST  19  /  ALT  15  /  AlkPhos  90  07-19                               7.8    9.10  )-----------( 483      ( 19 Jul 2020 06:38 )             24.5            CAPILLARY BLOOD GLUCOSE      POCT Blood Glucose.: 114 mg/dL (19 Jul 2020 17:08)           CXR:    < from: Xray Chest 1 View- PORTABLE-Routine (07.19.20 @ 05:10) >  indings:    There is an esophageal stent in place. There is a right-sided chest tube. Small bilateral pleural effusions are again noted and essentially unchanged. Heart size cannot be adequately evaluated. There is diffuse osseous metastatic disease. Surgical clips are seen in the right axillary region.    Impression:    Unchanged small bilateral pleural effusions. Right-sided chest tube. No evidence    < end of copied text >    < from: CT Chest No Cont (07.12.20 @ 17:07) >    Right chest tube is identified with a mild right hydropneumothorax with associated right lower lobe compressive atelectasis. Moderate left pleural effusion with near complete atelectasis of the left lower lobe. Esophageal stent again identified.    Mild thickening of the rectosigmoid colon, correlate for proctitis. No bowel obstruction.    No hydronephrosis.    Large soft tissue defect adjacent to the posterior right inferior pubic ramus bone with adjacent subcutaneous edema    Extensive blastic metastasis.    < end of copied text >    < from: Xray Esophagram Single Contrast (07.15.20 @ 15:32) >    Leak of contrast just inferior to the esophageal stent on the left and collecting along the left lateral aspect of the stent; contrast either between the esophageal lumen and stent or contained within the mediastinum adjacent to the esophagus; no contrast extravasated outside the mediastinum at the time of the study.    Residual contrast in the esophagus and adjacent to the stent at the conclusion of the study; a chest x-ray is recommended to assess for clearing of contrast from the chest.    The findings were discussed with MOLLY Reeves at 5:03 PM on 7/15/2020.        < end of copied text >    Assessment:  Constitutional: No acute distress noted, lying in bed, appears comfortable  Neuro: A+O x 3, non-focal   HEENT: NC/AT, PERRL, EOMI, oral mucosa pink and moist  Neck: supple, no JVD  CV: RRR +S1S2  Pulm/chest: Decreased breath sounds on the Right, left CTA, no accessory muscle use noted  Abd: soft, NT, ND, +BS  : +Starks with yellow urine in bedside bag, +Rectal rube in place  Ext: DAVIDSON x 4, Limited LE strength, legs contracted, +LE sensation intact, +2 pedal edema bilaterally, +DP/PT pulses bilaterally.  Skin: warm, well perfused, +Large sacral decub  Psych: calm, appropriate affect  Tubes: G/J tube in place, minimal serous drainage around tube, Right pleural vaibhav attached to pleurvac to waterseal with purulent/pus drainage, no noted airleak this AM, dressing c/d/i, no surrounding crepitus noted        MEDICATIONS  (STANDING):  ascorbic acid 500 milliGRAM(s) Oral daily  chlorhexidine 2% Cloths 1 Application(s) Topical daily  Dakins Solution - 1/2 Strength 1 Application(s) Topical two times a day  enoxaparin Injectable 40 milliGRAM(s) SubCutaneous daily  ferrous    sulfate Liquid 300 milliGRAM(s) Oral three times a day with meals  fluconAZOLE IVPB 400 milliGRAM(s) IV Intermittent every 24 hours  folic acid 1 milliGRAM(s) Oral daily  lactobacillus acidophilus 1 Tablet(s) Oral two times a day  latanoprost 0.005% Ophthalmic Solution 1 Drop(s) Both EYES at bedtime  letrozole 2.5 milliGRAM(s) Oral daily  levoFLOXacin IVPB      levoFLOXacin IVPB 750 milliGRAM(s) IV Intermittent every 24 hours  metroNIDAZOLE  IVPB      metroNIDAZOLE  IVPB 500 milliGRAM(s) IV Intermittent every 8 hours  multivitamin/minerals/iron Oral Solution (CENTRUM) 15 milliLiter(s) Enteral Tube daily  nystatin Powder 1 Application(s) Topical two times a day  pantoprazole  Injectable 40 milliGRAM(s) IV Push two times a day  sodium chloride 1 Gram(s) Oral three times a day  sodium chloride 0.9% lock flush 3 milliLiter(s) IV Push every 8 hours       PAST MEDICAL & SURGICAL HISTORY:  Esophageal perforation: s/p stent placement 5/13  H/O pleural empyema: s/p R VATS chest wash out decortication 5/13  Breast cancer metastasized to bone  Hypertension  Multiple sclerosis  S/P mastectomy, right  Breast CA  Osteoporosis  MS (mitral stenosis)  Encounter for feeding tube placement: open G-Jtube placement 5/15  History of thoracic surgery: right VATS decortication, chest wastout 5/13  History of bowel resection  H/O breast surgery

## 2020-07-20 NOTE — CHART NOTE - NSCHARTNOTEFT_GEN_A_CORE
Called by bedside RN after noticing the SOURAV was disconnected from the Pleurvac. Patient is asymptomatic, denies shortness of breath or any distress. Airleak and increased tidling noted in the Pleurvac. Vital signs stable. Urgent CXR ordered.     Vital Signs Last 24 Hrs  T(C): 36 (20 Jul 2020 06:15), Max: 36 (20 Jul 2020 06:15)  T(F): 96.8 (20 Jul 2020 06:15), Max: 96.8 (20 Jul 2020 06:15)  HR: 81 (20 Jul 2020 06:15) (78 - 84)  BP: 105/70 (20 Jul 2020 06:15) (100/68 - 108/72)  BP(mean): --  RR: 18 (20 Jul 2020 06:15) (18 - 18)  SpO2: 99% (20 Jul 2020 06:15) (99% - 99%) Called by bedside RN after noticing the SOURAV was disconnected from the Pleurvac. Patient is asymptomatic, denies shortness of breath or any distress. Airleak and increased tidling noted in the Pleurvac. Vital signs stable. Urgent CXR ordered.     Vital Signs Last 24 Hrs  T(C): 36 (20 Jul 2020 06:15), Max: 36 (20 Jul 2020 06:15)  T(F): 96.8 (20 Jul 2020 06:15), Max: 96.8 (20 Jul 2020 06:15)  HR: 81 (20 Jul 2020 06:15) (78 - 84)  BP: 105/70 (20 Jul 2020 06:15) (100/68 - 108/72)  RR: 18 (20 Jul 2020 06:15) (18 - 18)  SpO2: 99% (20 Jul 2020 06:15) (99% - 99%)    Dr. Murcia made aware  CXR with no new pneumothorax  Will continue to monitor closely.

## 2020-07-20 NOTE — CHART NOTE - NSCHARTNOTEFT_GEN_A_CORE
Source: Patient [ ]  Family [ ]   other [x ] EMR and staff     Current Diet: Diet, Clear Liquid:   Tube Feeding Modality: Jejunostomy  Pivot 1.5 Derek  Total Volume for 24 Hours (mL): 1320  Continuous  Starting Tube Feed Rate {mL per Hour}: 10  Increase Tube Feed Rate by (mL): 5     Every 2 hours  Until Goal Tube Feed Rate (mL per Hour): 55  Tube Feed Duration (in Hours): 24  Tube Feed Start Time: 12:00 (07-18-20 @ 08:53)    PO intake:  < 50% [x ] (Clears)  50-75%  [ ]   %  [ ]  other :    Source for PO intake [x ] Patient [ ] family [ x] chart [x ] staff [ ] other    Current Weight:   7/20: 71kg  7/17: 65.7kg  7/14: 65.7kg     % Weight Change: 12# wt change over 3 days, unsure of accuracy of wt's, will continue to monitor wt's for trends (Trace edema to B/L legs, 3+ edema to B/L feet)     Pertinent Medications: MEDICATIONS  (STANDING):  ascorbic acid 500 milliGRAM(s) Oral daily  chlorhexidine 2% Cloths 1 Application(s) Topical daily  Dakins Solution - 1/2 Strength 1 Application(s) Topical two times a day  enoxaparin Injectable 40 milliGRAM(s) SubCutaneous daily  ferrous    sulfate Liquid 300 milliGRAM(s) Oral three times a day with meals  fluconAZOLE IVPB 400 milliGRAM(s) IV Intermittent every 24 hours  folic acid 1 milliGRAM(s) Oral daily  lactobacillus acidophilus 1 Tablet(s) Oral two times a day  latanoprost 0.005% Ophthalmic Solution 1 Drop(s) Both EYES at bedtime  letrozole 2.5 milliGRAM(s) Oral daily  levoFLOXacin IVPB      levoFLOXacin IVPB 750 milliGRAM(s) IV Intermittent every 24 hours  metroNIDAZOLE  IVPB      metroNIDAZOLE  IVPB 500 milliGRAM(s) IV Intermittent every 8 hours  multivitamin/minerals/iron Oral Solution (CENTRUM) 15 milliLiter(s) Enteral Tube daily  nystatin Powder 1 Application(s) Topical two times a day  pantoprazole  Injectable 40 milliGRAM(s) IV Push two times a day  sodium chloride 1 Gram(s) Oral three times a day  sodium chloride 0.9% lock flush 3 milliLiter(s) IV Push every 8 hours    MEDICATIONS  (PRN):  acetaminophen    Suspension .. 650 milliGRAM(s) Enteral Tube every 6 hours PRN Temp greater or equal to 38.5C (101.3F), Mild Pain (1 - 3), Moderate Pain (4 - 6), Severe Pain (7 - 10)    Pertinent Labs: CBC Full  -  ( 19 Jul 2020 06:38 )  WBC Count : 9.10 K/uL  RBC Count : 2.77 M/uL  Hemoglobin : 7.8 g/dL  Hematocrit : 24.5 %  Platelet Count - Automated : 483 K/uL  Mean Cell Volume : 88.4 fl  Mean Cell Hemoglobin : 28.2 pg  Mean Cell Hemoglobin Concentration : 31.8 gm/dL  Auto Neutrophil # : x  Auto Lymphocyte # : x  Auto Monocyte # : x  Auto Eosinophil # : x  Auto Basophil # : x  Auto Neutrophil % : x  Auto Lymphocyte % : x  Auto Monocyte % : x  Auto Eosinophil % : x  Auto Basophil % : x        Skin: R buttock stage IV, L shin and L heel unstageable wounds, R heel wound     Nutrition focused physical exam previously conducted - found signs of malnutrition [ ]absent [x ]present    Subcutaneous fat loss: moderate  [ x] Orbital fat pads region, [x ]Buccal fat region, Severe x[ ]Triceps region,  [x ]thoracic   Muscle wasting: Severe [x ]Temples region, [x ]Clavicle region, [x ]Shoulder region, [ ]Scapula region, [ ]Interosseous region,  [ ]thigh region, [ ]Calf region    Estimated Needs:   [x ] no change since previous assessment  [ ] recalculated:     Hospital course:   70F, well known to thoracic surgery service, pmhx of MS (wheelchair bound), and metastatic breast cancer to pelvis, thoracic, lumbar spine, chronic indwelling campos, chronic unstageable sacral wound, with recent empyema requiring chest tube found to have esophageal perforation, s/p R chest washout, decortication and esophageal stent placement 5/13, subsequent open G-J tube placement 5/15. Patient presented from rehab with dislodged chest tube 7/12, CXR without obvious pneumothorax, however, SOURAV not holding self suction concerning for air leak, so patient was admitted to the thoracic surgery service. Right sided vaibhav drain attached to pleurvac with noted airleak (resolving) and continued purulent drainage. Pleural fluid +yeast and pseudomonas and urine with proteus>100k. ID following patient and after noted drug reaction to Merrem, patient was started on IV Levaquin, flagyl, and diflucan. LUE midline placed 7/15 for proper IV access.  Patient with noted cracked and dislodged G-J tube 7/16 with IR gastrojejunostomy tube replacement performed 7/17.     Current Nutrition Diagnosis:  Pt remains at high nutrition risk secondary to severe-chronic protein calorie malnutrition Related to inability to meet sufficient protein energy requirements in setting of healing with multiple areas of skin breakdown and recent hospitalizations w/ multiple complications as evidenced physicals signs of severe muscle mass and fat loss, wt loss  and fluid accumulation in B/L lower extremities Pt now receiving enteral feeds of Pivot @ 55ml/hr (x20 hours) 1100ml/day; 1650 kcal, 103gm protein, + Clear liquid diet PO. Pt with fair to poor intake of fluids at this time secondary to not feeling well. Pt denied nausea. Recommendations below:       Recommendations:   1. Continue with MVI and Vit. C daily   2. Check wt daily to monitor trends  3. Continue with enteral regime at this time  4. Clear liquids as tolerated   5. Ensure Clear TID     Monitoring and Evaluation:   [x ] PO intake [x ] Tolerance to diet prescription [X] Weights  [X] Follow up per protocol [X] Labs:

## 2020-07-20 NOTE — PROGRESS NOTE ADULT - SUBJECTIVE AND OBJECTIVE BOX
[facility]  INFECTIOUS DISEASES AND INTERNAL MEDICINE at [place]  =======================================================  Phong Wang MD  Diplomates American Board of Internal Medicine and Infectious Diseases  Tel  [phone]  Fax [phone]  =======================================================    [id number]  GEORGE STANLEY  follow up:  chest infection    no fevers  still with chest tube  rash resolved      =======================================================  REVIEW OF SYSTEMS:  CONSTITUTIONAL:  No Fever or chills  HEENT:  No diplopia or blurred vision.  No earache, sore throat or runny nose.  CARDIOVASCULAR:  No pressure, squeezing, strangling, tightness, heaviness or aching about the chest, neck, axilla or epigastrium.  RESPIRATORY:  No cough, shortness of breath  GASTROINTESTINAL:  No nausea, vomiting or diarrhea.  GENITOURINARY:  No dysuria, frequency or urgency. No Blood in urine  MUSCULOSKELETAL:  no joint aches, no muscle pain  SKIN:  No change in skin, hair or nails.  NEUROLOGIC:  No Headaches, seizures or weakness.  PSYCHIATRIC:  No disorder of thought or mood.  ENDOCRINE:  No heat or cold intolerance  HEMATOLOGICAL:  No easy bruising or bleeding.   =======================================================  Allergies  Sudafed (Other)    ======================================================  Physical Exam:  ============  (see vitals section below)    General:  No acute distress. FRAIL  Eye: Pupils are equal, round and reactive to light, Extraocular movements are intact, Normal conjunctiva.  HENT: Normocephalic, Oral mucosa is moist, No pharyngeal erythema, No sinus tenderness.  Neck: Supple, No lymphadenopathy.  Respiratory: Lungs with diminished air entry right side  RIGHT side chest tube in place  Cardiovascular: Normal rate, Regular rhythm,   Gastrointestinal: Soft, Non-tender, Non-distended, Normal bowel sounds.  Genitourinary: No costovertebral angle tenderness.  Lymphatics: No lymphadenopathy neck,   Musculoskeletal: contracted extremities  Integumentary: RASH on body, face  Neurologic: Alert, Oriented, No focal deficits, Cranial Nerves II-XII are grossly intact.  Psychiatric: Appropriate mood & affect.    =======================================================      Vitals:  ============  T(F): 96.8 (20 Jul 2020 06:15), Max: 96.8 (20 Jul 2020 06:15)  HR: 81 (20 Jul 2020 06:15)  BP: 105/70 (20 Jul 2020 06:15)  RR: 18 (20 Jul 2020 06:15)  SpO2: 99% (20 Jul 2020 06:15) (99% - 99%)  temp max in last 48H T(F): , Max: 97.9 (07-19-20 @ 06:03)    =======================================================  Current Antibiotics:  fluconAZOLE IVPB 400 milliGRAM(s) IV Intermittent every 24 hours  levoFLOXacin IVPB      levoFLOXacin IVPB 750 milliGRAM(s) IV Intermittent every 24 hours  metroNIDAZOLE  IVPB      metroNIDAZOLE  IVPB 500 milliGRAM(s) IV Intermittent every 8 hours    Other medications:  ascorbic acid 500 milliGRAM(s) Oral daily  chlorhexidine 2% Cloths 1 Application(s) Topical daily  Dakins Solution - 1/2 Strength 1 Application(s) Topical two times a day  enoxaparin Injectable 40 milliGRAM(s) SubCutaneous daily  ferrous    sulfate Liquid 300 milliGRAM(s) Oral three times a day with meals  folic acid 1 milliGRAM(s) Oral daily  lactobacillus acidophilus 1 Tablet(s) Oral two times a day  latanoprost 0.005% Ophthalmic Solution 1 Drop(s) Both EYES at bedtime  letrozole 2.5 milliGRAM(s) Oral daily  multivitamin/minerals/iron Oral Solution (CENTRUM) 15 milliLiter(s) Enteral Tube daily  nystatin Powder 1 Application(s) Topical two times a day  pantoprazole  Injectable 40 milliGRAM(s) IV Push two times a day  sodium chloride 1 Gram(s) Oral three times a day  sodium chloride 0.9% lock flush 3 milliLiter(s) IV Push every 8 hours      =======================================================  Labs:                        8.1    11.65 )-----------( 456      ( 20 Jul 2020 09:46 )             26.1      07-20    137  |  104  |  19.0  ----------------------------<  95  4.1   |  21.0<L>  |  0.47<L>    Ca    7.8<L>      20 Jul 2020 10:10  Mg     2.1     07-20    TPro  5.1<L>  /  Alb  1.7<L>  /  TBili  <0.2<L>  /  DBili  x   /  AST  18  /  ALT  14  /  AlkPhos  92  07-20      Culture - Urine (collected 07-13-20 @ 08:15)  Source: .Urine Catheterized  Final Report (07-15-20 @ 14:17):    >100,000 CFU/ml Proteus mirabilis    <10,000 CFU/ml Normal Urogenital vince present  Organism: Proteus mirabilis (07-15-20 @ 14:17)  Organism: Proteus mirabilis (07-15-20 @ 14:17)    Sensitivities:      -  Amikacin: S <=16      -  Amoxicillin/Clavulanic Acid: S <=8/4      -  Ampicillin: R >16 These ampicillin results predict results for amoxicillin      -  Ampicillin/Sulbactam: S <=4/2 Enterobacter, Citrobacter, and Serratia may develop resistance during prolonged therapy (3-4 days)      -  Aztreonam: S <=4      -  Cefazolin: S 4 ([id number]) For uncomplicated UTI with K. pneumoniae, E. coli, or P. mirablis: PAWAN <=16 is sensitive and PAWAN >=32 is resistant. This also predicts results for oral agents cefaclor, cefdinir, cefpodoxime, cefprozil, cefuroxime axetil, cephalexin and locarbef for uncomplicated UTI. Note that some isolates may be susceptible to these agents while testing resistant to cefazolin.      -  Cefepime: S <=2      -  Cefoxitin: S <=8      -  Ceftriaxone: S <=1 Enterobacter, Citrobacter, and Serratia may develop resistance during prolonged therapy      -  Ciprofloxacin: R 2      -  Ertapenem: S <=0.5      -  Gentamicin: R >8      -  Levofloxacin: R 2      -  Meropenem: S <=1      -  Nitrofurantoin: R 64 Should not be used to treat pyelonephritis      -  Piperacillin/Tazobactam: S <=8      -  Tobramycin: R >8      -  Trimethoprim/Sulfamethoxazole: S <=0.5/9.5      Method Type: PAWAN    Culture - Fungal, Body Fluid (collected 07-13-20 @ 03:07)  Source: .Body Fluid Pleural Fluid    Culture - Body Fluid with Gram Stain (collected 07-13-20 @ 03:07)  Source: .Body Fluid Pleural Fluid  Gram Stain (07-13-20 @ 05:06):    Numerous polymorphonuclear leukocytes seen per low power field    Numerous Gram Negative Rods seen per oil power field  Final Report (07-18-20 @ 07:57):    Numerous Pseudomonas aeruginosa    Few Cassidy albicans "Susceptibilities not performed"    Few Klebsiella pneumoniae    Rare Enterococcus faecalis    Numerous Bacteroides fragilis "Susceptibilities not performed"  Organism: Pseudomonas aeruginosa  Klebsiella pneumoniae  Enterococcus faecalis (07-18-20 @ 07:57)  Organism: Enterococcus faecalis (07-18-20 @ 07:57)    Sensitivities:      -  Ampicillin: S <=2 Predicts results to ampicillin/sulbactam, amoxacillin-clavulanate and  piperacillin-tazobactam.      -  Levofloxacin: S 1      -  Tetra/Doxy: R >8      -  Vancomycin: S 2      Method Type: PAWAN  Organism: Klebsiella pneumoniae (07-18-20 @ 07:57)    Sensitivities:      -  Amikacin: S <=16      -  Amoxicillin/Clavulanic Acid: S <=8/4      -  Ampicillin: R >16 These ampicillin results predict results for amoxicillin      -  Ampicillin/Sulbactam: S <=4/2 Enterobacter, Citrobacter, and Serratia may develop resistance during prolonged therapy (3-4 days)      -  Aztreonam: S <=4      -  Cefazolin: S <=2 Enterobacter, Citrobacter, and Serratia may develop resistance during prolonged therapy (3-4 days)      -  Cefepime: S <=2      -  Cefoxitin: S <=8      -  Ceftriaxone: S <=1 Enterobacter, Citrobacter, and Serratia may develop resistance during prolonged therapy      -  Ciprofloxacin: S <=0.25      -  Ertapenem: S <=0.5      -  Gentamicin: S <=2      -  Imipenem: S <=1      -  Levofloxacin: S <=0.5      -  Meropenem: S <=1      -  Piperacillin/Tazobactam: S <=8      -  Tobramycin: S <=2      -  Trimethoprim/Sulfamethoxazole: S <=0.5/9.5      Method Type: PAWAN  Organism: Pseudomonas aeruginosa (07-18-20 @ 07:57)    Sensitivities:      -  Amikacin: S <=16      -  Aztreonam: S <=4      -  Cefepime: S <=2      -  Ceftazidime: S <=1      -  Ciprofloxacin: S <=0.25      -  Gentamicin: S <=2      -  Imipenem: S <=1      -  Levofloxacin: S <=0.5      -  Meropenem: S <=1      -  Piperacillin/Tazobactam: S <=8      -  Tobramycin: S <=2      Method Type: PAWAN    Culture - Blood (collected 07-12-20 @ 22:43)  Source: .Blood Blood-Venous  Final Report (07-17-20 @ 23:01):    No growth at 5 days.    Culture - Blood (collected 07-12-20 @ 22:24)  Source: .Blood Blood-Venous  Final Report (07-17-20 @ 23:01):    No growth at 5 days.      Creatinine, Serum: 0.47 mg/dL (07-20-20 @ 10:10)  Creatinine, Serum: 0.50 mg/dL (07-19-20 @ 06:38)  Creatinine, Serum: 0.57 mg/dL (07-18-20 @ 09:14)  Creatinine, Serum: 0.51 mg/dL (07-17-20 @ 05:24)  Creatinine, Serum: 0.58 mg/dL (07-16-20 @ 08:00)       WBC Count: 11.65 K/uL (07-20-20 @ 09:46)  WBC Count: 9.10 K/uL (07-19-20 @ 06:38)  WBC Count: 8.41 K/uL (07-18-20 @ 09:14)  WBC Count: 9.28 K/uL (07-17-20 @ 05:24)  WBC Count: 8.71 K/uL (07-16-20 @ 09:05)  WBC Count: 9.09 K/uL (07-16-20 @ 08:00)      COVID-19 PCR: NotDetec (07-12-20 @ 14:06)    Lactate Dehydrogenase, Serum: 268 U/L (07-12-20 @ 22:20)  Lactate Dehydrogenase, Serum: 199 U/L (07-12-20 @ 17:00)    Alkaline Phosphatase, Serum: 92 U/L (07-20-20 @ 10:10)  Alkaline Phosphatase, Serum: 90 U/L (07-19-20 @ 06:38)  Alanine Aminotransferase (ALT/SGPT): 14 U/L (07-20-20 @ 10:10)  Alanine Aminotransferase (ALT/SGPT): 15 U/L (07-19-20 @ 06:38)  Aspartate Aminotransferase (AST/SGOT): 18 U/L (07-20-20 @ 10:10)  Aspartate Aminotransferase (AST/SGOT): 19 U/L (07-19-20 @ 06:38)  Bilirubin Total, Serum: <0.2 mg/dL (07-20-20 @ 10:10)  Bilirubin Total, Serum: <0.2 mg/dL (07-19-20 @ 06:38)

## 2020-07-20 NOTE — PROGRESS NOTE ADULT - SUBJECTIVE AND OBJECTIVE BOX
CC:  follow up GOC  INTERVAL HPI/OVERNIGHT EVENTS:    PRESENT SYMPTOMS: SOURCE:  Patient/Family/Team    PAIN SCALE:  0 = none  1 = mild   2 = moderate  3 = severe    Pain: denies    Dyspnea:  [ ] YES [x ] NO  Anxiety:  [ ] YES [ x] NO  Fatigue: [ x] YES [ ] NO  Nausea: [ ] YES [x ] NO  Loss of Appetite: [x ] YES [ ] NO  Other symptoms: __________    MEDICATIONS  (STANDING):  ascorbic acid 500 milliGRAM(s) Oral daily  chlorhexidine 2% Cloths 1 Application(s) Topical daily  Dakins Solution - 1/2 Strength 1 Application(s) Topical two times a day  enoxaparin Injectable 40 milliGRAM(s) SubCutaneous daily  ferrous    sulfate Liquid 300 milliGRAM(s) Oral three times a day with meals  fluconAZOLE IVPB 400 milliGRAM(s) IV Intermittent every 24 hours  folic acid 1 milliGRAM(s) Oral daily  lactobacillus acidophilus 1 Tablet(s) Oral two times a day  latanoprost 0.005% Ophthalmic Solution 1 Drop(s) Both EYES at bedtime  letrozole 2.5 milliGRAM(s) Oral daily  levoFLOXacin IVPB      levoFLOXacin IVPB 750 milliGRAM(s) IV Intermittent every 24 hours  metroNIDAZOLE  IVPB      metroNIDAZOLE  IVPB 500 milliGRAM(s) IV Intermittent every 8 hours  multivitamin/minerals/iron Oral Solution (CENTRUM) 15 milliLiter(s) Enteral Tube daily  nystatin Powder 1 Application(s) Topical two times a day  pantoprazole  Injectable 40 milliGRAM(s) IV Push two times a day  sodium chloride 1 Gram(s) Oral three times a day  sodium chloride 0.9% lock flush 3 milliLiter(s) IV Push every 8 hours    MEDICATIONS  (PRN):  acetaminophen    Suspension .. 650 milliGRAM(s) Enteral Tube every 6 hours PRN Temp greater or equal to 38.5C (101.3F), Mild Pain (1 - 3), Moderate Pain (4 - 6), Severe Pain (7 - 10)      Allergies    Sudafed (Other)    Intolerances      Karnofsky Performance Score/Palliative Performance Status Version 2:  40    %    Vital Signs Last 24 Hrs  T(C): 36 (20 Jul 2020 06:15), Max: 36 (20 Jul 2020 06:15)  T(F): 96.8 (20 Jul 2020 06:15), Max: 96.8 (20 Jul 2020 06:15)  HR: 81 (20 Jul 2020 06:15) (78 - 81)  BP: 105/70 (20 Jul 2020 06:15) (100/68 - 108/72)  BP(mean): --  RR: 18 (20 Jul 2020 06:15) (18 - 18)  SpO2: 99% (20 Jul 2020 06:15) (99% - 99%)    PHYSICAL EXAM:    General: awake alert NAD  HEENT: [x ] normal  [ ] dry mouth  [ ] ET tube/trach    Lungs: [x ] comfortable [ ] tachypnea/labored breathing  [ ] excessive secretions    CV: [x ] normal  [ ] tachycardia    GI: [ ] normal  [ ] distended  [ ] tender  [ ] no BS               [x ] PEG/NG/OG tube    : [ x] normal  [ ] incontinent  [ ] oliguria/anuria  [ ] campos    MSK: [ ] normal  [ x] weakness  [ ] edema             [ ] ambulatory  [ x] bedbound/wheelchair bound    Skin: [ ] normal  [ ] pressure ulcers- Stage_____  [ ] no rash    LABS:                        8.1    11.65 )-----------( 456      ( 20 Jul 2020 09:46 )             26.1     07-20    137  |  104  |  19.0  ----------------------------<  95  4.1   |  21.0<L>  |  0.47<L>    Ca    7.8<L>      20 Jul 2020 10:10  Mg     2.1     07-20    TPro  5.1<L>  /  Alb  1.7<L>  /  TBili  <0.2<L>  /  DBili  x   /  AST  18  /  ALT  14  /  AlkPhos  92  07-20        I&O's Summary    19 Jul 2020 07:01  -  20 Jul 2020 07:00  --------------------------------------------------------  IN: 1185 mL / OUT: 1085 mL / NET: 100 mL        RADIOLOGY & ADDITIONAL STUDIES:  < from: Xray Chest 1 View- PORTABLE-Urgent (07.20.20 @ 07:12) >   EXAM:  XR CHEST PORTABLE URGENT 1V                          PROCEDURE DATE:  07/20/2020          INTERPRETATION:  TECHNIQUE: Single portable view of the chest.    COMPARISON: 7/19/2020    CLINICAL HISTORY: chest tube disconnected from the pleurvacEmphysema    FINDINGS:    Single frontal view of the chest demonstrates small to moderate bilateral pleural effusions, unchanged. Esophageal stent. Diffuse osseous metastasis. Right-sided chest tube. No large pneumothorax. The cardiomediastinal silhouette is normal. No acute osseous abnormalities. Overlying EKG leads and wires are noted.    IMPRESSION: No interval change.    < end of copied text >

## 2020-07-20 NOTE — PROGRESS NOTE ADULT - ASSESSMENT
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 plastic surgery was consulted and following for her unstageable sacral wound. pt required rectal tube to prevent contamination. At that time, diverting colostomy was offered but family refused.  She completed IV antibiotics for her empyema and was discharged to rehab 6/9 with 2 CTs in place.  She now is sent to ED from rehab after one of her chest tubes was dislodged while turning the pt.  Thoracic surgery asked to consult. CXR without obvious pneumothorax.  Upon evaluation, noted that SOURAV was not holding self suction.  Notable air leak in SOURAV bulb.  Decision made to admit pt for further monitoring/work up, and possible removal of CT. Pt without any complaints at this time.  She denies CP, palpitations, SOB, cough, fever, chills, itchiness/rash, diaphoresis, vision changes, HA, dizziness/lightheadedness, numbness/tingling, abd pain, N/V (12 Jul 2020 16:25)    patient was last seen on 6/9/2020 for a polymicrobial Empyema with strep mitis, Klebsiella oxytoca, and CoNS.  At that point, she had completed a course of merrem.     Right SOURAV drain swapped to Pleur-evac.    Fluid culture with Pseudomonas, Enterococcus faecalis and Candida    Impression:  polymicrobial lung infection  multiple sclerosis   chronic ulcers  Drug rash    Plan:    Rash may be related to Merrem  - improving off MERREM    s/p MIDLINE 7/15/2020  Pseudomonas susceptible to all tested agents  Klebsiella and Enterococcus also found on the fluid cx  all are SS to levaquin  Bacteroides in cultures as well. - continue Flagyl  - continue  Fluconazole for candida found as well    continue all Antibiotics:  fluconAZOLE IVPB 400 milliGRAM(s) IV Intermittent every 24 hours  levoFLOXacin IVPB 750 milliGRAM(s) IV Intermittent every 24 hours  metroNIDAZOLE  IVPB 500 milliGRAM(s) IV Intermittent every 8 hours  THRU 8/9/2020 for a 4 week course  suggest weekly CBC CMP ESR CRP to follow/ trend       Urine with Proteus; will not treat at this time, ? colonization    - continue Pleur-evac      - will follow with you while here.     no contraindications to discharge to community from ID standpoint  OK to discharge when antibiotics arranged.

## 2020-07-20 NOTE — GOALS OF CARE CONVERSATION - ADVANCED CARE PLANNING - CONVERSATION DETAILS
Sw met with patient whom SW familiar with from prior admission. Patient pleasant and engaging and reviewed stressors with dx . Patient reports no symptom issues at this time and only complaints with liquid diet. Patient minimized severity of illness . Patient receptive to SW visit and palliative care to follow.

## 2020-07-21 DIAGNOSIS — C50.911 MALIGNANT NEOPLASM OF UNSPECIFIED SITE OF RIGHT FEMALE BREAST: ICD-10-CM

## 2020-07-21 LAB
ANION GAP SERPL CALC-SCNC: 10 MMOL/L — SIGNIFICANT CHANGE UP (ref 5–17)
BUN SERPL-MCNC: 21 MG/DL — HIGH (ref 8–20)
CALCIUM SERPL-MCNC: 7.9 MG/DL — LOW (ref 8.6–10.2)
CHLORIDE SERPL-SCNC: 107 MMOL/L — SIGNIFICANT CHANGE UP (ref 98–107)
CO2 SERPL-SCNC: 22 MMOL/L — SIGNIFICANT CHANGE UP (ref 22–29)
CREAT SERPL-MCNC: 0.42 MG/DL — LOW (ref 0.5–1.3)
GLUCOSE SERPL-MCNC: 82 MG/DL — SIGNIFICANT CHANGE UP (ref 70–99)
HCT VFR BLD CALC: 25.7 % — LOW (ref 34.5–45)
HGB BLD-MCNC: 8 G/DL — LOW (ref 11.5–15.5)
MAGNESIUM SERPL-MCNC: 1.8 MG/DL — SIGNIFICANT CHANGE UP (ref 1.6–2.6)
MCHC RBC-ENTMCNC: 28.2 PG — SIGNIFICANT CHANGE UP (ref 27–34)
MCHC RBC-ENTMCNC: 31.1 GM/DL — LOW (ref 32–36)
MCV RBC AUTO: 90.5 FL — SIGNIFICANT CHANGE UP (ref 80–100)
PLATELET # BLD AUTO: 437 K/UL — HIGH (ref 150–400)
POTASSIUM SERPL-MCNC: 3.2 MMOL/L — LOW (ref 3.5–5.3)
POTASSIUM SERPL-SCNC: 3.2 MMOL/L — LOW (ref 3.5–5.3)
PROCALCITONIN SERPL-MCNC: 0.19 NG/ML — HIGH (ref 0.02–0.1)
RBC # BLD: 2.84 M/UL — LOW (ref 3.8–5.2)
RBC # FLD: 19.9 % — HIGH (ref 10.3–14.5)
SODIUM SERPL-SCNC: 138 MMOL/L — SIGNIFICANT CHANGE UP (ref 135–145)
WBC # BLD: 12.93 K/UL — HIGH (ref 3.8–10.5)
WBC # FLD AUTO: 12.93 K/UL — HIGH (ref 3.8–10.5)

## 2020-07-21 PROCEDURE — 99024 POSTOP FOLLOW-UP VISIT: CPT

## 2020-07-21 PROCEDURE — 99497 ADVNCD CARE PLAN 30 MIN: CPT | Mod: 25

## 2020-07-21 PROCEDURE — 99233 SBSQ HOSP IP/OBS HIGH 50: CPT

## 2020-07-21 PROCEDURE — 71045 X-RAY EXAM CHEST 1 VIEW: CPT | Mod: 26

## 2020-07-21 PROCEDURE — 99232 SBSQ HOSP IP/OBS MODERATE 35: CPT

## 2020-07-21 RX ORDER — MAGNESIUM SULFATE 500 MG/ML
2 VIAL (ML) INJECTION ONCE
Refills: 0 | Status: COMPLETED | OUTPATIENT
Start: 2020-07-21 | End: 2020-07-21

## 2020-07-21 RX ORDER — MECLIZINE HCL 12.5 MG
12.5 TABLET ORAL
Refills: 0 | Status: DISCONTINUED | OUTPATIENT
Start: 2020-07-21 | End: 2020-07-23

## 2020-07-21 RX ORDER — POTASSIUM CHLORIDE 20 MEQ
40 PACKET (EA) ORAL EVERY 4 HOURS
Refills: 0 | Status: COMPLETED | OUTPATIENT
Start: 2020-07-21 | End: 2020-07-21

## 2020-07-21 RX ADMIN — Medication 1 TABLET(S): at 18:01

## 2020-07-21 RX ADMIN — SODIUM CHLORIDE 3 MILLILITER(S): 9 INJECTION INTRAMUSCULAR; INTRAVENOUS; SUBCUTANEOUS at 05:38

## 2020-07-21 RX ADMIN — Medication 100 MILLIGRAM(S): at 14:41

## 2020-07-21 RX ADMIN — SODIUM CHLORIDE 3 MILLILITER(S): 9 INJECTION INTRAMUSCULAR; INTRAVENOUS; SUBCUTANEOUS at 23:15

## 2020-07-21 RX ADMIN — SODIUM CHLORIDE 1 GRAM(S): 9 INJECTION INTRAMUSCULAR; INTRAVENOUS; SUBCUTANEOUS at 22:57

## 2020-07-21 RX ADMIN — Medication 12.5 MILLIGRAM(S): at 18:02

## 2020-07-21 RX ADMIN — SODIUM CHLORIDE 1 GRAM(S): 9 INJECTION INTRAMUSCULAR; INTRAVENOUS; SUBCUTANEOUS at 05:40

## 2020-07-21 RX ADMIN — Medication 40 MILLIEQUIVALENT(S): at 13:39

## 2020-07-21 RX ADMIN — Medication 300 MILLIGRAM(S): at 11:06

## 2020-07-21 RX ADMIN — LATANOPROST 1 DROP(S): 0.05 SOLUTION/ DROPS OPHTHALMIC; TOPICAL at 22:57

## 2020-07-21 RX ADMIN — SODIUM CHLORIDE 1 GRAM(S): 9 INJECTION INTRAMUSCULAR; INTRAVENOUS; SUBCUTANEOUS at 13:39

## 2020-07-21 RX ADMIN — SODIUM CHLORIDE 3 MILLILITER(S): 9 INJECTION INTRAMUSCULAR; INTRAVENOUS; SUBCUTANEOUS at 13:40

## 2020-07-21 RX ADMIN — CHLORHEXIDINE GLUCONATE 1 APPLICATION(S): 213 SOLUTION TOPICAL at 11:05

## 2020-07-21 RX ADMIN — PANTOPRAZOLE SODIUM 40 MILLIGRAM(S): 20 TABLET, DELAYED RELEASE ORAL at 18:01

## 2020-07-21 RX ADMIN — Medication 300 MILLIGRAM(S): at 18:01

## 2020-07-21 RX ADMIN — Medication 300 MILLIGRAM(S): at 08:14

## 2020-07-21 RX ADMIN — LETROZOLE 2.5 MILLIGRAM(S): 2.5 TABLET, FILM COATED ORAL at 11:04

## 2020-07-21 RX ADMIN — Medication 15 MILLILITER(S): at 11:03

## 2020-07-21 RX ADMIN — Medication 1 APPLICATION(S): at 05:39

## 2020-07-21 RX ADMIN — Medication 500 MILLIGRAM(S): at 11:04

## 2020-07-21 RX ADMIN — PANTOPRAZOLE SODIUM 40 MILLIGRAM(S): 20 TABLET, DELAYED RELEASE ORAL at 05:40

## 2020-07-21 RX ADMIN — Medication 100 MILLIGRAM(S): at 05:40

## 2020-07-21 RX ADMIN — NYSTATIN CREAM 1 APPLICATION(S): 100000 CREAM TOPICAL at 05:39

## 2020-07-21 RX ADMIN — Medication 40 MILLIEQUIVALENT(S): at 11:03

## 2020-07-21 RX ADMIN — Medication 100 MILLIGRAM(S): at 22:59

## 2020-07-21 RX ADMIN — ENOXAPARIN SODIUM 40 MILLIGRAM(S): 100 INJECTION SUBCUTANEOUS at 22:57

## 2020-07-21 RX ADMIN — FLUCONAZOLE 100 MILLIGRAM(S): 150 TABLET ORAL at 18:01

## 2020-07-21 RX ADMIN — Medication 1 TABLET(S): at 05:40

## 2020-07-21 RX ADMIN — NYSTATIN CREAM 1 APPLICATION(S): 100000 CREAM TOPICAL at 17:06

## 2020-07-21 RX ADMIN — Medication 1 MILLIGRAM(S): at 11:04

## 2020-07-21 RX ADMIN — Medication 50 GRAM(S): at 11:04

## 2020-07-21 NOTE — ADVANCED PRACTICE NURSE CONSULT - REASON FOR CONSULT
Patient seen on skin care rounds after wound care referral received for assessment of skin impairment and recommendations of topical management.  Chart reviewed:  BMI (23.4), Noah (12), Serum albumin (1.7 g/dL) and Total Protein (5.1 g/dL). Patient H/O metastatic breast cancer, recent empyema requiring chest tube found to have esophageal perforation, s/p R chest washout, decortication and esophageal stent placement 5/13, subsequent open G-J tube, admitted from Rehab for dislodge CT.

## 2020-07-21 NOTE — PROGRESS NOTE ADULT - SUBJECTIVE AND OBJECTIVE BOX
[facility]  INFECTIOUS DISEASES AND INTERNAL MEDICINE at [place]  =======================================================  Phong Wang MD  Diplomates American Board of Internal Medicine and Infectious Diseases  Tel  [phone]  Fax [phone]  =======================================================    [id number]  GEORGE STANLEY  follow up:  chest infection    Labs reviewed. still with WBC elevation,      =======================================================  REVIEW OF SYSTEMS:  CONSTITUTIONAL:  No Fever or chills  HEENT:  No diplopia or blurred vision.  No earache, sore throat or runny nose.  CARDIOVASCULAR:  No pressure, squeezing, strangling, tightness, heaviness or aching about the chest, neck, axilla or epigastrium.  RESPIRATORY:  No cough, shortness of breath  GASTROINTESTINAL:  No nausea, vomiting or diarrhea.  GENITOURINARY:  No dysuria, frequency or urgency. No Blood in urine  MUSCULOSKELETAL:  no joint aches, no muscle pain  SKIN:  No change in skin, hair or nails.  NEUROLOGIC:  No Headaches, seizures or weakness.  PSYCHIATRIC:  No disorder of thought or mood.  ENDOCRINE:  No heat or cold intolerance  HEMATOLOGICAL:  No easy bruising or bleeding.   =======================================================  Allergies  Sudafed (Other)    ======================================================  Physical Exam:  ============  (see vitals section below)    General:  No acute distress. FRAIL  Eye: Pupils are equal, round and reactive to light, Extraocular movements are intact, Normal conjunctiva.  HENT: Normocephalic, Oral mucosa is moist, No pharyngeal erythema, No sinus tenderness.  Neck: Supple, No lymphadenopathy.  Respiratory: Lungs with diminished air entry right side  RIGHT side chest tube in place  Cardiovascular: Normal rate, Regular rhythm, LOWER extremity edema  Gastrointestinal: Soft, Non-tender, Non-distended, Normal bowel sounds.  Genitourinary: No costovertebral angle tenderness.  Lymphatics: No lymphadenopathy neck,   Musculoskeletal: contracted extremities  Integumentary: resolution of rash  Neurologic:  No focal deficits, Cranial Nerves II-XII are grossly intact.  Psychiatric: Appropriate mood & affect.    =======================================================    Vitals:  ============  T(F): 98 (21 Jul 2020 09:53), Max: 98 (21 Jul 2020 09:53)  HR: 90 (21 Jul 2020 09:53)  BP: 110/68 (21 Jul 2020 09:53)  RR: 17 (21 Jul 2020 09:53)  SpO2: 96% (21 Jul 2020 09:53) (96% - 99%)  temp max in last 48H T(F): , Max: 98 (07-21-20 @ 09:53)    =======================================================  Current Antibiotics:  fluconAZOLE IVPB 400 milliGRAM(s) IV Intermittent every 24 hours  levoFLOXacin IVPB      levoFLOXacin IVPB 750 milliGRAM(s) IV Intermittent every 24 hours  metroNIDAZOLE  IVPB      metroNIDAZOLE  IVPB 500 milliGRAM(s) IV Intermittent every 8 hours    Other medications:  ascorbic acid 500 milliGRAM(s) Oral daily  chlorhexidine 2% Cloths 1 Application(s) Topical daily  Dakins Solution - 1/2 Strength 1 Application(s) Topical two times a day  enoxaparin Injectable 40 milliGRAM(s) SubCutaneous daily  ferrous    sulfate Liquid 300 milliGRAM(s) Oral three times a day with meals  folic acid 1 milliGRAM(s) Oral daily  lactobacillus acidophilus 1 Tablet(s) Oral two times a day  latanoprost 0.005% Ophthalmic Solution 1 Drop(s) Both EYES at bedtime  letrozole 2.5 milliGRAM(s) Oral daily  multivitamin/minerals/iron Oral Solution (CENTRUM) 15 milliLiter(s) Enteral Tube daily  nystatin Powder 1 Application(s) Topical two times a day  pantoprazole  Injectable 40 milliGRAM(s) IV Push two times a day  potassium chloride   Powder 40 milliEquivalent(s) Oral every 4 hours  sodium chloride 1 Gram(s) Oral three times a day  sodium chloride 0.9% lock flush 3 milliLiter(s) IV Push every 8 hours      =======================================================  Labs:                        8.0    12.93 )-----------( 437      ( 21 Jul 2020 06:45 )             25.7      07-21    138  |  107  |  21.0<H>  ----------------------------<  82  3.2<L>   |  22.0  |  0.42<L>    Ca    7.9<L>      21 Jul 2020 06:45  Mg     1.8     07-21    TPro  5.1<L>  /  Alb  1.7<L>  /  TBili  <0.2<L>  /  DBili  x   /  AST  18  /  ALT  14  /  AlkPhos  92  07-20      Culture - Urine (collected 07-13-20 @ 08:15)  Source: .Urine Catheterized  Final Report (07-15-20 @ 14:17):    >100,000 CFU/ml Proteus mirabilis    <10,000 CFU/ml Normal Urogenital vince present  Organism: Proteus mirabilis (07-15-20 @ 14:17)  Organism: Proteus mirabilis (07-15-20 @ 14:17)    Sensitivities:      -  Amikacin: S <=16      -  Amoxicillin/Clavulanic Acid: S <=8/4      -  Ampicillin: R >16 These ampicillin results predict results for amoxicillin      -  Ampicillin/Sulbactam: S <=4/2 Enterobacter, Citrobacter, and Serratia may develop resistance during prolonged therapy (3-4 days)      -  Aztreonam: S <=4      -  Cefazolin: S 4 (MIC_CL_COM_ENTERIC_[id number]) For uncomplicated UTI with K. pneumoniae, E. coli, or P. mirablis: PAWAN <=16 is sensitive and PAWAN >=32 is resistant. This also predicts results for oral agents cefaclor, cefdinir, cefpodoxime, cefprozil, cefuroxime axetil, cephalexin and locarbef for uncomplicated UTI. Note that some isolates may be susceptible to these agents while testing resistant to cefazolin.      -  Cefepime: S <=2      -  Cefoxitin: S <=8      -  Ceftriaxone: S <=1 Enterobacter, Citrobacter, and Serratia may develop resistance during prolonged therapy      -  Ciprofloxacin: R 2      -  Ertapenem: S <=0.5      -  Gentamicin: R >8      -  Levofloxacin: R 2      -  Meropenem: S <=1      -  Nitrofurantoin: R 64 Should not be used to treat pyelonephritis      -  Piperacillin/Tazobactam: S <=8      -  Tobramycin: R >8      -  Trimethoprim/Sulfamethoxazole: S <=0.5/9.5      Method Type: PAWAN    Culture - Fungal, Body Fluid (collected 07-13-20 @ 03:07)  Source: .Body Fluid Pleural Fluid    Culture - Body Fluid with Gram Stain (collected 07-13-20 @ 03:07)  Source: .Body Fluid Pleural Fluid  Gram Stain (07-13-20 @ 05:06):    Numerous polymorphonuclear leukocytes seen per low power field    Numerous Gram Negative Rods seen per oil power field  Final Report (07-18-20 @ 07:57):    Numerous Pseudomonas aeruginosa    Few Cassidy albicans "Susceptibilities not performed"    Few Klebsiella pneumoniae    Rare Enterococcus faecalis    Numerous Bacteroides fragilis "Susceptibilities not performed"  Organism: Pseudomonas aeruginosa  Klebsiella pneumoniae  Enterococcus faecalis (07-18-20 @ 07:57)  Organism: Enterococcus faecalis (07-18-20 @ 07:57)    Sensitivities:      -  Ampicillin: S <=2 Predicts results to ampicillin/sulbactam, amoxacillin-clavulanate and  piperacillin-tazobactam.      -  Levofloxacin: S 1      -  Tetra/Doxy: R >8      -  Vancomycin: S 2      Method Type: PAWAN  Organism: Klebsiella pneumoniae (07-18-20 @ 07:57)    Sensitivities:      -  Amikacin: S <=16      -  Amoxicillin/Clavulanic Acid: S <=8/4      -  Ampicillin: R >16 These ampicillin results predict results for amoxicillin      -  Ampicillin/Sulbactam: S <=4/2 Enterobacter, Citrobacter, and Serratia may develop resistance during prolonged therapy (3-4 days)      -  Aztreonam: S <=4      -  Cefazolin: S <=2 Enterobacter, Citrobacter, and Serratia may develop resistance during prolonged therapy (3-4 days)      -  Cefepime: S <=2      -  Cefoxitin: S <=8      -  Ceftriaxone: S <=1 Enterobacter, Citrobacter, and Serratia may develop resistance during prolonged therapy      -  Ciprofloxacin: S <=0.25      -  Ertapenem: S <=0.5      -  Gentamicin: S <=2      -  Imipenem: S <=1      -  Levofloxacin: S <=0.5      -  Meropenem: S <=1      -  Piperacillin/Tazobactam: S <=8      -  Tobramycin: S <=2      -  Trimethoprim/Sulfamethoxazole: S <=0.5/9.5      Method Type: PAWAN  Organism: Pseudomonas aeruginosa (07-18-20 @ 07:57)    Sensitivities:      -  Amikacin: S <=16      -  Aztreonam: S <=4      -  Cefepime: S <=2      -  Ceftazidime: S <=1      -  Ciprofloxacin: S <=0.25      -  Gentamicin: S <=2      -  Imipenem: S <=1      -  Levofloxacin: S <=0.5      -  Meropenem: S <=1      -  Piperacillin/Tazobactam: S <=8      -  Tobramycin: S <=2      Method Type: PAWAN    Culture - Blood (collected 07-12-20 @ 22:43)  Source: .Blood Blood-Venous  Final Report (07-17-20 @ 23:01):    No growth at 5 days.    Culture - Blood (collected 07-12-20 @ 22:24)  Source: .Blood Blood-Venous  Final Report (07-17-20 @ 23:01):    No growth at 5 days.      Creatinine, Serum: 0.42 mg/dL (07-21-20 @ 06:45)  Creatinine, Serum: 0.47 mg/dL (07-20-20 @ 10:10)  Creatinine, Serum: 0.50 mg/dL (07-19-20 @ 06:38)  Creatinine, Serum: 0.57 mg/dL (07-18-20 @ 09:14)  Creatinine, Serum: 0.51 mg/dL (07-17-20 @ 05:24)    Procalcitonin, Serum: 0.19 ng/mL (07-21-20 @ 06:45)    WBC Count: 12.93 K/uL (07-21-20 @ 06:45)  WBC Count: 11.65 K/uL (07-20-20 @ 09:46)  WBC Count: 9.10 K/uL (07-19-20 @ 06:38)  WBC Count: 8.41 K/uL (07-18-20 @ 09:14)  WBC Count: 9.28 K/uL (07-17-20 @ 05:24)      COVID-19 PCR: NotDetec (07-12-20 @ 14:06)    Lactate Dehydrogenase, Serum: 268 U/L (07-12-20 @ 22:20)  Lactate Dehydrogenase, Serum: 199 U/L (07-12-20 @ 17:00)    Alkaline Phosphatase, Serum: 92 U/L (07-20-20 @ 10:10)  Alkaline Phosphatase, Serum: 90 U/L (07-19-20 @ 06:38)  Alanine Aminotransferase (ALT/SGPT): 14 U/L (07-20-20 @ 10:10)  Alanine Aminotransferase (ALT/SGPT): 15 U/L (07-19-20 @ 06:38)  Aspartate Aminotransferase (AST/SGOT): 18 U/L (07-20-20 @ 10:10)  Aspartate Aminotransferase (AST/SGOT): 19 U/L (07-19-20 @ 06:38)  Bilirubin Total, Serum: <0.2 mg/dL (07-20-20 @ 10:10)  Bilirubin Total, Serum: <0.2 mg/dL (07-19-20 @ 06:38)

## 2020-07-21 NOTE — PROGRESS NOTE ADULT - PROBLEM SELECTOR PLAN 5
Patient with impaired mobility, long term bedridden.  Stage 4/unstagable sacral decubitus.  Wound care consult  Patient refusing air flow mattress as it is not comfortable to her.  Turn and position Q 2 hrs.  Rectal tube to avoid contamination of the sacral wound, patient deferred diversional colostomy   Palliative care following patient for Goals of Care.

## 2020-07-21 NOTE — PROGRESS NOTE ADULT - ASSESSMENT
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 plastic surgery was consulted and following for her unstageable sacral wound. pt required rectal tube to prevent contamination. At that time, diverting colostomy was offered but family refused.  She completed IV antibiotics for her empyema and was discharged to rehab 6/9 with 2 CTs in place.  She now is sent to ED from rehab after one of her chest tubes was dislodged while turning the pt.  Thoracic surgery asked to consult. CXR without obvious pneumothorax.  Upon evaluation, noted that SOURAV was not holding self suction.  Notable air leak in SOURAV bulb.  Decision made to admit pt for further monitoring/work up, and possible removal of CT. Pt without any complaints at this time.  She denies CP, palpitations, SOB, cough, fever, chills, itchiness/rash, diaphoresis, vision changes, HA, dizziness/lightheadedness, numbness/tingling, abd pain, N/V (12 Jul 2020 16:25)    patient was last seen on 6/9/2020 for a polymicrobial Empyema with strep mitis, Klebsiella oxytoca, and CoNS.  At that point, she had completed a course of merrem.     Right SOURAV drain swapped to Pleur-evac.    Fluid culture with Pseudomonas, Enterococcus faecalis and Candida    Impression:  polymicrobial lung infection  multiple sclerosis   chronic ulcers  Drug rash    Plan:    Rash nearly resolved - may be related to Merrem    - continue Pleur-evac    s/p MIDLINE 7/15/2020  Pseudomonas susceptible to all tested agents  Klebsiella and Enterococcus also found on the fluid cx  Bacteroides in cultures as well    continue all Antibiotics:  fluconAZOLE IVPB 400 milliGRAM(s) IV Intermittent every 24 hours  levoFLOXacin IVPB 750 milliGRAM(s) IV Intermittent every 24 hours  metroNIDAZOLE  IVPB 500 milliGRAM(s) IV Intermittent every 8 hours  THRU 8/9/2020 for a 4 week course  suggest weekly CBC CMP ESR CRP to follow/ trend    WBC elevation  - procalcitonin is not markedly elevated,   WBC Count: 12.93 K/uL (07-21-20 @ 06:45)  WBC Count: 11.65 K/uL (07-20-20 @ 09:46)  WBC Count: 9.10 K/uL (07-19-20 @ 06:38)  WBC Count: 8.41 K/uL (07-18-20 @ 09:14)  WBC Count: 9.28 K/uL (07-17-20 @ 05:24)  - Will watch

## 2020-07-21 NOTE — PROGRESS NOTE ADULT - PROBLEM SELECTOR PLAN 8
Protonix for PUD prophylaxis.   Lovenox /SCDs for DVT prophylaxis.    Discharge planning to return back to rehab possibly today    Care and plan needs to discuss with thoracic team in AM rounds.

## 2020-07-21 NOTE — PROGRESS NOTE ADULT - PROBLEM SELECTOR PLAN 3
Patient had completed a course of Merrem (6/9) for polymicrobial Empyema with strep mitis, Klebsiella oxytoca, and CoNS.  Pleural fluid +yeast and pseudomonas and urine with proteus>100k this admission.  ID following, consult appreciated.  Discontinued Meropenum after rash and itchiness.  Linezolid d/c on 07/16  Currently on Flagyl, Levaquin and Diflucan  Continue IV antibiotics for 4 weeks (end date 8/09/2020) as per ID   Trend/ follow weekly CBC, CMP, ESR and CRP  as per ID

## 2020-07-21 NOTE — PROGRESS NOTE ADULT - SUBJECTIVE AND OBJECTIVE BOX
CC: follow up symptoms and GOC  INTERVAL HPI/OVERNIGHT EVENTS:    PRESENT SYMPTOMS: SOURCE:  Patient/Family/Team    PAIN SCALE:  0 = none  1 = mild   2 = moderate  3 = severe    Pain: 1    Dyspnea:  [ ] YES [x ] NO  Anxiety:  [ ] YES [ x] NO  Fatigue: [ x] YES [ ] NO  Nausea: [ ] YES [x ] NO  Loss of Appetite: [x ] YES [ ] NO  Other symptoms: __________    MEDICATIONS  (STANDING):  ascorbic acid 500 milliGRAM(s) Oral daily  chlorhexidine 2% Cloths 1 Application(s) Topical daily  Dakins Solution - 1/2 Strength 1 Application(s) Topical two times a day  enoxaparin Injectable 40 milliGRAM(s) SubCutaneous daily  ferrous    sulfate Liquid 300 milliGRAM(s) Oral three times a day with meals  fluconAZOLE IVPB 400 milliGRAM(s) IV Intermittent every 24 hours  folic acid 1 milliGRAM(s) Oral daily  lactobacillus acidophilus 1 Tablet(s) Oral two times a day  latanoprost 0.005% Ophthalmic Solution 1 Drop(s) Both EYES at bedtime  letrozole 2.5 milliGRAM(s) Oral daily  levoFLOXacin IVPB      levoFLOXacin IVPB 750 milliGRAM(s) IV Intermittent every 24 hours  metroNIDAZOLE  IVPB      metroNIDAZOLE  IVPB 500 milliGRAM(s) IV Intermittent every 8 hours  multivitamin/minerals/iron Oral Solution (CENTRUM) 15 milliLiter(s) Enteral Tube daily  nystatin Powder 1 Application(s) Topical two times a day  pantoprazole  Injectable 40 milliGRAM(s) IV Push two times a day  sodium chloride 1 Gram(s) Oral three times a day  sodium chloride 0.9% lock flush 3 milliLiter(s) IV Push every 8 hours    MEDICATIONS  (PRN):  acetaminophen    Suspension .. 650 milliGRAM(s) Enteral Tube every 6 hours PRN Temp greater or equal to 38.5C (101.3F), Mild Pain (1 - 3), Moderate Pain (4 - 6), Severe Pain (7 - 10)  meclizine 12.5 milliGRAM(s) Oral two times a day PRN Dizziness      Allergies    Sudafed (Other)    Intolerances    Karnofsky Performance Score/Palliative Performance Status Version 2: 40   %    Vital Signs Last 24 Hrs  T(C): 36.7 (21 Jul 2020 09:53), Max: 36.7 (21 Jul 2020 09:53)  T(F): 98 (21 Jul 2020 09:53), Max: 98 (21 Jul 2020 09:53)  HR: 90 (21 Jul 2020 09:53) (83 - 103)  BP: 110/68 (21 Jul 2020 09:53) (93/61 - 112/70)  BP(mean): --  RR: 17 (21 Jul 2020 09:53) (17 - 18)  SpO2: 96% (21 Jul 2020 09:53) (96% - 99%)    PHYSICAL EXAM:    General: awake alert NAD    HEENT: [x ] normal  [ ] dry mouth  [ ] ET tube/trach    Lungs: [ x] comfortable + CT    CV: [ x] normal  [ ] tachycardia    GI: [x ] normal  [ ] distended  [ ] tender  [ ] no BS               [x ] PEG/NG/OG tube    : [ x] normal  [ ] incontinent  [ ] oliguria/anuria  [x ] campos    MSK: [ ] normal  [x ] weakness  [ ] edema             [ ] ambulatory  [ x] bedbound/wheelchair bound    Skin: [ ] normal  [ ] pressure ulcers- Stage_____  [x ] no rash    LABS:                        8.0    12.93 )-----------( 437      ( 21 Jul 2020 06:45 )             25.7     07-21    138  |  107  |  21.0<H>  ----------------------------<  82  3.2<L>   |  22.0  |  0.42<L>    Ca    7.9<L>      21 Jul 2020 06:45  Mg     1.8     07-21    TPro  5.1<L>  /  Alb  1.7<L>  /  TBili  <0.2<L>  /  DBili  x   /  AST  18  /  ALT  14  /  AlkPhos  92  07-20        I&O's Summary    20 Jul 2020 07:01  -  21 Jul 2020 07:00  --------------------------------------------------------  IN: 2045 mL / OUT: 1880 mL / NET: 165 mL    21 Jul 2020 07:01  -  21 Jul 2020 13:57  --------------------------------------------------------  IN: 860 mL / OUT: 0 mL / NET: 860 mL        RADIOLOGY & ADDITIONAL STUDIES:  < from: Xray Chest 1 View- PORTABLE-Urgent (07.20.20 @ 07:12) >     EXAM:  XR CHEST PORTABLE URGENT 1V                          PROCEDURE DATE:  07/20/2020          INTERPRETATION:  TECHNIQUE: Single portable view of the chest.    COMPARISON: 7/19/2020    CLINICAL HISTORY: chest tube disconnected from the pleurvacEmphysema    FINDINGS:    Single frontal view of the chest demonstrates small to moderate bilateral pleural effusions, unchanged. Esophageal stent. Diffuse osseous metastasis. Right-sided chest tube. No large pneumothorax. The cardiomediastinal silhouette is normal. No acute osseous abnormalities. Overlying EKG leads and wires are noted.    IMPRESSION: No interval change.    < end of copied text >

## 2020-07-21 NOTE — ADVANCED PRACTICE NURSE CONSULT - ASSESSMENT
Lethargic, urethral and dignishield rectal tube in place.  Skin warm, fragile and dry with fair turgor, scattered areas of hyperpigmentation and hypopigmentation.    Patient is noted to have multiple pressure injuries as follows:  An Unstageable Pressure Injury to Left heel 3.5 x 3.0, the wound bed is covered with 100% intact eschar, no drainage.  No erythema, warmth or induration noted to periwound.  An Unstageable Pressure Injury to Right heel 3.5 x 5.5, the wound bed is covered with 50% eschar and 50% agranular tissue, scant serous drainage.  No erythema, warmth or induration noted to periwound.  Stage 4 Pressure Injury to Right Ischium Tuberosity (RIT) measuring 9.5 x 5.0 x 5.7 cm with a tunnel at 12 o’clock measuring 5.5 cm, the wound bed is 100% agranular, moderate amount of serous drainage.  No erythema, warmth or induration noted to periwound.  Severe incontinence associated dermatitis (IAD) to right buttocks with two open areas areas of exposed dermis measuring 3.5 x 1.5 cm (distal) and 1.5 x 1.5 cm (proximal).  Weeping full thickness venous stasis ulcer to Left Shin measuring 4.0 x 2.0 x 1.5 cm.

## 2020-07-21 NOTE — CHART NOTE - NSCHARTNOTEFT_GEN_A_CORE
Thoracic ACP Addendum    Briefly, 70F long-standing MS, metastatic breast CA, esophageal perforation with empyema s/p VATS presented with dislodged chest tube on 7/12, subsequently treated for recurrent empyema and UTI, GJ replaced, most recently c/o dizziness/vertigo.    Patient seen and examined. Notes, flowsheets, medications, radiologic images and labs reviewed.     Plan:  - GJ @ 3.5 cm (snugged, per Dr Murcia)  - No need for daily cxr  - Rising WBC, procalcitonin 0.19 not overly impressive  - Continue antibiotics until 8/9  - Meclizine added for vertigo  - Possible discharge tomorrow     Case discussed with Dr. Murcia

## 2020-07-21 NOTE — ADVANCED PRACTICE NURSE CONSULT - RECOMMEDATIONS
Recommendation for Left Heel:  Daily paint area with Cavilon skin barrier and leave open to air    Goal of Care:  To prevent further deterioration.    Recommendation for Right Heel:  Daily cleanse wound and periwound with Normal Saline, apply collagenase (Santyl), nickel thickness, to entire base of wound cover with dry dressing and Tape.    Goal of Care:  Enzymatic debridement    Recommendation for Right Ischium Tuberosity (RIT):  Pack wound depth and dead space (tunnel) with Aquacel cover with foam dressing (Allevyn).    Goal of Care:  Promote moist wound healing    Recommendation for Incontinence Associated Dermatitis:  Daily apply nickel thickness of Triad to area and leave open to air    Goal of Care:  To promote moist wound healing    Recommendation for Venous Stasis Ulcer to left shin:  Daily apply nickel thickness of Triad to area cover with dry dressing, Pranay and tape.    Goal of Care:  Promote moist wound healing    Continue low air loss bed therapy, continue to turn & reposition q2h with Z-tarsha fluidized positioning device, Z-Tarsha Heel boots to bilateral feet and single breathable pad, please continue measures to decrease friction/shear/pressure.     Plan discussed with patient’s primary nurse, Mireille Kaiser RN.  Please call wound care service line at [phone], if further assistance is needed. Recommendation for Left Heel:  Daily paint area with Cavilon skin barrier and leave open to air    Goal of Care:  To prevent further deterioration.    Recommendation for Right Heel:  Daily cleanse wound and periwound with Normal Saline, apply collagenase (Santyl), nickel thickness, to entire base of wound cover with dry dressing and Tape.    Goal of Care:  Enzymatic debridement    Recommendation for Right Ischium Tuberosity (RIT):  Pack wound depth and dead space (tunnel) with Aquacel cover with foam dressing (Allevyn).    Goal of Care:  Promote moist wound healing    Recommendation for Incontinence Associated Dermatitis:  Daily apply nickel thickness of Triad to area and leave open to air    Goal of Care:  To promote moist wound healing    Recommendation for Venous Stasis Ulcer to left shin:  Daily apply nickel thickness of Triad to area cover with dry dressing, Pranay and tape.    Goal of Care:  Promote moist wound healing    Continue low air loss bed therapy, continue to turn & reposition q2h with Z-tarsha fluidized positioning device, Z-Tarsha Heel boots to bilateral feet and single breathable pad, please continue measures to decrease friction/shear/pressure.     Plan discussed with patient’s primary nurse, Mireille Kaiser RN and message left for RENEE Krishnamurthy.  Please call wound care service line at [phone], if further assistance is needed.

## 2020-07-21 NOTE — PROGRESS NOTE ADULT - ASSESSMENT
70F, well known to thoracic surgery service, pmhx of MS (wheelchair bound), and metastatic breast cancer to pelvis, thoracic, lumbar spine, chronic indwelling campos, chronic unstageable sacral wound, with recent empyema requiring chest tube found to have esophageal perforation, s/p R chest washout, decortication and esophageal stent placement 5/13, subsequent open G-J tube placement 5/15. Patient presented from rehab with dislodged chest tube 7/12, CXR without obvious pneumothorax, however, SOURAV not holding self suction concerning for air leak, so patient was admitted to the thoracic surgery service. Right sided vaibhav drain attached to pleurvac with noted airleak (resolving) and continued purulent drainage. Pleural fluid +yeast and pseudomonas and urine with proteus>100k. ID was consulted started antibiotics and antifungal agents (7/14). Noted drug reaction (rashes and itchiness), Meropenum discontinued started on Levofloxacin. Linezolid was d/c on 7/15. Currently on IV Levaquin, flagyl, and diflucan, needed to complete 4 week course (end date 8/9/2020) per ID. LUE midline placed 7/15 for proper IV access.  Patient with noted cracked and dislodged G-J tube 7/16, now s/p IR gastrojejunostomy tube replacement 7/17.

## 2020-07-21 NOTE — PROGRESS NOTE ADULT - ASSESSMENT
70yr woman, hx of MS, metastatic breast cancer, recent empyema requiring chest tube found to have esophageal perforation, s/p R chest washout, decortication and esophageal stent placement 5/13, subsequent open G-J tube. sacral wound decline diverting colostomy admitted from Rehab for dislodge CT     Problem/Recommendation - 1:  Problem: Empyema. Recommendation: Patient s/p VATS with right thoracic cavity washout and decortication and CT.   Treated on last admission for polymicrobial infection   Per CTS- Continue pleuravac to waterseal.  Pleural fluid +yeast and pseudomonas   cont IV abx -  Flagyl Levaquin Fluconazole til 8/9 per ID       Problem/Recommendation - 2:  ·  Problem: Esophageal perforation.  Recommendation: S/p esophageal stent 5/13  s/p change in GJ tube  Cont GJ feedings     Problem/Recommendation - 3:  ·  Problem: Breast cancer metastasized to bone.  Recommendation: Patient under the care of Dr. Fairbanks.    Patient states she has been in Rehab for some time  and has not had treatement.        Problem/Recommendation - 4:  ·  Problem: Sacral wound. Recommendation: Patient declined diverting colostomy on last admission.     Problem/Recommendation - 5:  ·  Problem: Multiple sclerosis. Recommendation: Patient states has had MS >30 years.    Needs assistance in ADLs for which she reports son lives with her and  helps with some of her care.       Problem/Recommendation - 6:  Problem: Encounter for palliative care.  Patient has been guarded regarding discussing directives and GOC. 70yr woman, hx of MS, metastatic breast cancer, recent empyema requiring chest tube found to have esophageal perforation, s/p R chest washout, decortication and esophageal stent placement 5/13, subsequent open G-J tube. sacral wound decline diverting colostomy admitted from Rehab for dislodge CT     Problem/Recommendation - 1:  Problem: Empyema. Recommendation: Patient s/p VATS with right thoracic cavity washout and decortication and CT.   Treated on last admission for polymicrobial infection   Per CTS- Continue pleuravac to waterseal.  Pleural fluid +yeast and pseudomonas   cont IV abx -  Flagyl Levaquin Fluconazole til 8/9 per ID       Problem/Recommendation - 2:  ·  Problem: Esophageal perforation.  Recommendation: S/p esophageal stent 5/13  s/p change in GJ tube  Cont GJ feedings     Problem/Recommendation - 3:  ·  Problem: Breast cancer metastasized to bone.  Recommendation: Patient under the care of Dr. Fairbanks.    Patient states she has been in Rehab for some time  and has not had treatement.        Problem/Recommendation - 4:  ·  Problem: Sacral wound. Recommendation: Patient declined diverting colostomy on last admission.     Problem/Recommendation - 5:  ·  Problem: Multiple sclerosis. Recommendation: Patient states has had MS >30 years.    Needs assistance in ADLs for which she reports son lives with her and  helps with some of her care.    Problem/Recommendation 6  Advance Care Planning  AD discussed - see below       Problem/Recommendation - 6:  Problem: Encounter for palliative care.  Patient has been guarded regarding discussing directives and GOC.   Spoke to daughter Roma.   She states that when mother gets overwhelmed she " checks out".   At baseline, mother had a HHA prior to her hospitalization in May.  She is concerned that mother has not received chemotherapy for some time now.   Discussed mother's overall outlook given current condition and multiple medical issues. Informed of high risk for recurrent hospitalization Discussed advance directives - CPR. Limited benefit if to undergo resuscitation and intubation given her debility medical comorbidities s and advanced age.   Roma became emotional - offered my support  Encourage her to have discussion with mother regarding advance directives since she is frail and can easily decline. Informed daughter mother has stated she hoped to go home soon, but would not be surprised if she was rehospitalized from rehab.

## 2020-07-21 NOTE — PROGRESS NOTE ADULT - PROBLEM SELECTOR PLAN 7
Chronic debility. +Chronic decubitus ulcer on right ischium  Non-ambulatory for 4-5 years as per patient.   Interferon to be held per neuro as patient is actively infected, and not indicated as per neuro.   Continue Fall risk protocol  Continue supportive care, continue tube feeds, vitamin supplements

## 2020-07-21 NOTE — PROGRESS NOTE ADULT - SUBJECTIVE AND OBJECTIVE BOX
Subjective: Patient lying in bed, no acute distress noted, stated "I am okay, the room was so hot today, dizziness is little better but still there, get worse with movements". Patient denies chest pain, pressure, palpitation, headache, numbness, tingling, shortness of breath, abdominal pain, N/V.    V/S  T(C): 36.3 (07-20-20 @ 21:02), Max: 36.3 (07-20-20 @ 15:30)  HR: 83 (07-20-20 @ 21:02) (81 - 103)  BP: 93/61 (07-20-20 @ 21:02) (93/61 - 105/70)  RR: 18 (07-20-20 @ 21:02) (18 - 18)  SpO2: 99% (07-20-20 @ 21:02) (96% - 99%) on room air                                                 Tele: Sinus rhythm    CHEST TUBE: Right vaibhav connected to pleuravac  to waterseal    AIR LEAKS:  [ x] YES [ ] NO      MEDICATIONS  (STANDING):  ascorbic acid 500 milliGRAM(s) Oral daily  chlorhexidine 2% Cloths 1 Application(s) Topical daily  Dakins Solution - 1/2 Strength 1 Application(s) Topical two times a day  enoxaparin Injectable 40 milliGRAM(s) SubCutaneous daily  ferrous    sulfate Liquid 300 milliGRAM(s) Oral three times a day with meals  fluconAZOLE IVPB 400 milliGRAM(s) IV Intermittent every 24 hours  folic acid 1 milliGRAM(s) Oral daily  lactobacillus acidophilus 1 Tablet(s) Oral two times a day  latanoprost 0.005% Ophthalmic Solution 1 Drop(s) Both EYES at bedtime  letrozole 2.5 milliGRAM(s) Oral daily  levoFLOXacin IVPB      levoFLOXacin IVPB 750 milliGRAM(s) IV Intermittent every 24 hours  metroNIDAZOLE  IVPB      metroNIDAZOLE  IVPB 500 milliGRAM(s) IV Intermittent every 8 hours  multivitamin/minerals/iron Oral Solution (CENTRUM) 15 milliLiter(s) Enteral Tube daily  nystatin Powder 1 Application(s) Topical two times a day  pantoprazole  Injectable 40 milliGRAM(s) IV Push two times a day  sodium chloride 1 Gram(s) Oral three times a day  sodium chloride 0.9% lock flush 3 milliLiter(s) IV Push every 8 hours      07-20    137  |  104  |  19.0  ----------------------------<  95  4.1   |  21.0<L>  |  0.47<L>    Ca    7.8<L>      20 Jul 2020 10:10  Mg     2.1     07-20    TPro  5.1<L>  /  Alb  1.7<L>  /  TBili  <0.2<L>  /  DBili  x   /  AST  18  /  ALT  14  /  AlkPhos  92  07-20                               8.1    11.65 )-----------( 456      ( 20 Jul 2020 09:46 )             26.1                           CXR:    < from: Xray Chest 1 View- PORTABLE-Urgent (07.20.20 @ 07:12) >    FINDINGS:    Single frontal view of the chest demonstrates small to moderate bilateral pleural effusions, unchanged. Esophageal stent. Diffuse osseous metastasis. Right-sided chest tube. No large pneumothorax. The cardiomediastinal silhouette is normal. No acute osseous abnormalities. Overlying EKG leads and wires are noted.    IMPRESSION: No interval change.          < end of copied text >    < from: CT Abdomen and Pelvis No Cont (07.12.20 @ 17:06) >    IMPRESSION:     Right chest tube is identified with a mild right hydropneumothorax with associated right lower lobe compressive atelectasis. Moderate left pleural effusion with near complete atelectasis of the left lower lobe. Esophageal stent again identified.    Mild thickening of the rectosigmoid colon, correlate for proctitis. No bowel obstruction.    No hydronephrosis.    Large soft tissue defect adjacent to the posterior right inferior pubic ramus bone with adjacent subcutaneous edema      < end of copied text >    < from: 12 Lead ECG (07.15.20 @ 13:50) >    Ventricular Rate 92 BPM    Atrial Rate 92 BPM    P-R Interval 144 ms    QRS Duration 64 ms    Q-T Interval 360 ms    QTC Calculation(Bezet) 445 ms    P Axis 7 degrees    R Axis 45 degrees    T Axis 74 degrees    Diagnosis Line Sinus rhythm with Premature atrial complexes      < end of copied text >    Physical Exam:  Constitutional: Well developed, frail, appears comfortable, no acute distress noted  Neuro: A+O x 3, non-focal   HEENT: PERRL, EOMI  Neck: supple, no JVD  CV: RRR +S1S2, no murmur or gallops  Pulm/chest: Decreased breath sounds on the Right, left CTA, no accessory muscle use noted  Abd: ascitic, soft, NT, ND, +BS  : +Starks with yellow urine in bedside bag, +Rectal rube in place  Ext: DAVIDSON x 4, Limited LE strength, legs contracted, +LE sensation intact, +4 pedal edema bilaterally, +DP/PT pulses bilaterally.  Skin: warm, well perfused, +Large sacral decub  Psych: calm, appropriate affect  Tubes: G/J tube in place, minimal serous drainage around tube, Right pleural vaibhav attached to pleuravac to waterseal with purulent/pus drainage, + minimal airleak , dressing c/d/i, no surrounding crepitus noted      PAST MEDICAL & SURGICAL HISTORY:  Esophageal perforation: s/p stent placement 5/13  H/O pleural empyema: s/p R VATS chest wash out decortication 5/13  Breast cancer metastasized to bone  Hypertension  Multiple sclerosis  S/P mastectomy, right  Breast CA  Osteoporosis  MS (mitral stenosis)  Encounter for feeding tube placement: open G-Jtube placement 5/15  History of thoracic surgery: right VATS decortication, chest wastout 5/13  History of bowel resection  H/O breast surgery

## 2020-07-22 LAB
ALBUMIN SERPL ELPH-MCNC: 1.8 G/DL — LOW (ref 3.3–5.2)
ALP SERPL-CCNC: 94 U/L — SIGNIFICANT CHANGE UP (ref 40–120)
ALT FLD-CCNC: 12 U/L — SIGNIFICANT CHANGE UP
ANION GAP SERPL CALC-SCNC: 14 MMOL/L — SIGNIFICANT CHANGE UP (ref 5–17)
AST SERPL-CCNC: 24 U/L — SIGNIFICANT CHANGE UP
BILIRUB SERPL-MCNC: <0.2 MG/DL — LOW (ref 0.4–2)
BUN SERPL-MCNC: 24 MG/DL — HIGH (ref 8–20)
CALCIUM SERPL-MCNC: 8.2 MG/DL — LOW (ref 8.6–10.2)
CHLORIDE SERPL-SCNC: 108 MMOL/L — HIGH (ref 98–107)
CO2 SERPL-SCNC: 19 MMOL/L — LOW (ref 22–29)
CREAT SERPL-MCNC: 0.43 MG/DL — LOW (ref 0.5–1.3)
GLUCOSE SERPL-MCNC: 89 MG/DL — SIGNIFICANT CHANGE UP (ref 70–99)
HCT VFR BLD CALC: 26 % — LOW (ref 34.5–45)
HGB BLD-MCNC: 8.2 G/DL — LOW (ref 11.5–15.5)
MAGNESIUM SERPL-MCNC: 2 MG/DL — SIGNIFICANT CHANGE UP (ref 1.6–2.6)
MCHC RBC-ENTMCNC: 28.4 PG — SIGNIFICANT CHANGE UP (ref 27–34)
MCHC RBC-ENTMCNC: 31.5 GM/DL — LOW (ref 32–36)
MCV RBC AUTO: 90 FL — SIGNIFICANT CHANGE UP (ref 80–100)
PHOSPHATE SERPL-MCNC: 2.2 MG/DL — LOW (ref 2.4–4.7)
PLATELET # BLD AUTO: 414 K/UL — HIGH (ref 150–400)
POTASSIUM SERPL-MCNC: 4.3 MMOL/L — SIGNIFICANT CHANGE UP (ref 3.5–5.3)
POTASSIUM SERPL-SCNC: 4.3 MMOL/L — SIGNIFICANT CHANGE UP (ref 3.5–5.3)
PROT SERPL-MCNC: 5 G/DL — LOW (ref 6.6–8.7)
RBC # BLD: 2.89 M/UL — LOW (ref 3.8–5.2)
RBC # FLD: 20.7 % — HIGH (ref 10.3–14.5)
SODIUM SERPL-SCNC: 141 MMOL/L — SIGNIFICANT CHANGE UP (ref 135–145)
WBC # BLD: 14.19 K/UL — HIGH (ref 3.8–10.5)
WBC # FLD AUTO: 14.19 K/UL — HIGH (ref 3.8–10.5)

## 2020-07-22 PROCEDURE — 99232 SBSQ HOSP IP/OBS MODERATE 35: CPT

## 2020-07-22 PROCEDURE — 70460 CT HEAD/BRAIN W/DYE: CPT | Mod: 26

## 2020-07-22 PROCEDURE — 99024 POSTOP FOLLOW-UP VISIT: CPT

## 2020-07-22 PROCEDURE — 71045 X-RAY EXAM CHEST 1 VIEW: CPT | Mod: 26

## 2020-07-22 RX ORDER — SODIUM CHLORIDE 9 MG/ML
1000 INJECTION, SOLUTION INTRAVENOUS
Refills: 0 | Status: COMPLETED | OUTPATIENT
Start: 2020-07-22 | End: 2020-07-22

## 2020-07-22 RX ORDER — COLLAGENASE CLOSTRIDIUM HIST. 250 UNIT/G
1 OINTMENT (GRAM) TOPICAL DAILY
Refills: 0 | Status: DISCONTINUED | OUTPATIENT
Start: 2020-07-22 | End: 2020-07-23

## 2020-07-22 RX ORDER — SODIUM,POTASSIUM PHOSPHATES 278-250MG
1 POWDER IN PACKET (EA) ORAL ONCE
Refills: 0 | Status: COMPLETED | OUTPATIENT
Start: 2020-07-22 | End: 2020-07-22

## 2020-07-22 RX ADMIN — SODIUM CHLORIDE 1 GRAM(S): 9 INJECTION INTRAMUSCULAR; INTRAVENOUS; SUBCUTANEOUS at 05:37

## 2020-07-22 RX ADMIN — SODIUM CHLORIDE 1 GRAM(S): 9 INJECTION INTRAMUSCULAR; INTRAVENOUS; SUBCUTANEOUS at 21:56

## 2020-07-22 RX ADMIN — Medication 1 TABLET(S): at 21:56

## 2020-07-22 RX ADMIN — CHLORHEXIDINE GLUCONATE 1 APPLICATION(S): 213 SOLUTION TOPICAL at 10:00

## 2020-07-22 RX ADMIN — NYSTATIN CREAM 1 APPLICATION(S): 100000 CREAM TOPICAL at 07:00

## 2020-07-22 RX ADMIN — ENOXAPARIN SODIUM 40 MILLIGRAM(S): 100 INJECTION SUBCUTANEOUS at 21:57

## 2020-07-22 RX ADMIN — SODIUM CHLORIDE 3 MILLILITER(S): 9 INJECTION INTRAMUSCULAR; INTRAVENOUS; SUBCUTANEOUS at 14:40

## 2020-07-22 RX ADMIN — Medication 100 MILLIGRAM(S): at 05:36

## 2020-07-22 RX ADMIN — Medication 15 MILLILITER(S): at 12:48

## 2020-07-22 RX ADMIN — FLUCONAZOLE 100 MILLIGRAM(S): 150 TABLET ORAL at 18:52

## 2020-07-22 RX ADMIN — Medication 100 MILLIGRAM(S): at 22:10

## 2020-07-22 RX ADMIN — Medication 1 MILLIGRAM(S): at 12:48

## 2020-07-22 RX ADMIN — LETROZOLE 2.5 MILLIGRAM(S): 2.5 TABLET, FILM COATED ORAL at 21:56

## 2020-07-22 RX ADMIN — NYSTATIN CREAM 1 APPLICATION(S): 100000 CREAM TOPICAL at 21:57

## 2020-07-22 RX ADMIN — SODIUM CHLORIDE 3 MILLILITER(S): 9 INJECTION INTRAMUSCULAR; INTRAVENOUS; SUBCUTANEOUS at 07:00

## 2020-07-22 RX ADMIN — SODIUM CHLORIDE 1 GRAM(S): 9 INJECTION INTRAMUSCULAR; INTRAVENOUS; SUBCUTANEOUS at 14:42

## 2020-07-22 RX ADMIN — Medication 300 MILLIGRAM(S): at 21:56

## 2020-07-22 RX ADMIN — PANTOPRAZOLE SODIUM 40 MILLIGRAM(S): 20 TABLET, DELAYED RELEASE ORAL at 05:37

## 2020-07-22 RX ADMIN — LATANOPROST 1 DROP(S): 0.05 SOLUTION/ DROPS OPHTHALMIC; TOPICAL at 21:56

## 2020-07-22 RX ADMIN — Medication 300 MILLIGRAM(S): at 12:48

## 2020-07-22 RX ADMIN — Medication 1 APPLICATION(S): at 12:48

## 2020-07-22 RX ADMIN — Medication 1 TABLET(S): at 05:37

## 2020-07-22 RX ADMIN — PANTOPRAZOLE SODIUM 40 MILLIGRAM(S): 20 TABLET, DELAYED RELEASE ORAL at 18:31

## 2020-07-22 RX ADMIN — Medication 500 MILLIGRAM(S): at 12:48

## 2020-07-22 RX ADMIN — Medication 100 MILLIGRAM(S): at 14:42

## 2020-07-22 RX ADMIN — SODIUM CHLORIDE 3 MILLILITER(S): 9 INJECTION INTRAMUSCULAR; INTRAVENOUS; SUBCUTANEOUS at 21:53

## 2020-07-22 RX ADMIN — Medication 1 PACKET(S): at 22:28

## 2020-07-22 RX ADMIN — SODIUM CHLORIDE 50 MILLILITER(S): 9 INJECTION, SOLUTION INTRAVENOUS at 22:17

## 2020-07-22 NOTE — PROGRESS NOTE ADULT - ASSESSMENT
70F, well known to thoracic surgery service, pmhx of MS (wheelchair bound), and metastatic breast cancer to pelvis, thoracic, lumbar spine, chronic indwelling campos, chronic unstageable sacral wound, with recent empyema requiring chest tube found to have esophageal perforation, s/p R chest washout, decortication and esophageal stent placement 5/13, subsequent open G-J tube placement 5/15. Patient presented from rehab with dislodged chest tube 7/12, CXR without obvious pneumothorax, however, SOURAV not holding self suction concerning for air leak, so patient was admitted to the thoracic surgery service. Right sided vaibhav drain attached to pleurvac with noted airleak (resolving) and continued purulent drainage. Pleural fluid +yeast and pseudomonas and urine with proteus>100k. ID was consulted started antibiotics and antifungal agents (7/14). Noted drug reaction (rashes and itchiness), Meropenum discontinued started on Levofloxacin. Linezolid was d/c on 7/15. Currently on IV Levaquin, flagyl, and diflucan, needed to complete 4 week course (end date 8/9/2020) per ID. LUE midline placed 7/15 for proper IV access.  Patient with noted cracked and dislodged G-J tube 7/16, now s/p IR gastrojejunostomy tube replacement 7/17. 7/18 Fluid leak around GJ tube, GJ snugged down. 7/20 Snugged GJ tube, currently at 3.5 cm. 7/21: Meclizine started for repeated dizziness/ vertigo. CT head with IV contrast ordered to r/o metastasis to brain, pending.

## 2020-07-22 NOTE — PROGRESS NOTE ADULT - ASSESSMENT
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 plastic surgery was consulted and following for her unstageable sacral wound. pt required rectal tube to prevent contamination. At that time, diverting colostomy was offered but family refused.  She completed IV antibiotics for her empyema and was discharged to rehab 6/9 with 2 CTs in place.  She now is sent to ED from rehab after one of her chest tubes was dislodged while turning the pt.  Thoracic surgery asked to consult. CXR without obvious pneumothorax.  Upon evaluation, noted that SOURAV was not holding self suction.  Notable air leak in SOURAV bulb.  Decision made to admit pt for further monitoring/work up, and possible removal of CT. Pt without any complaints at this time.  She denies CP, palpitations, SOB, cough, fever, chills, itchiness/rash, diaphoresis, vision changes, HA, dizziness/lightheadedness, numbness/tingling, abd pain, N/V (12 Jul 2020 16:25)    patient was last seen on 6/9/2020 for a polymicrobial Empyema with strep mitis, Klebsiella oxytoca, and CoNS.  At that point, she had completed a course of merrem.     Right SOURAV drain swapped to Pleur-evac.    Fluid culture with Pseudomonas, Enterococcus faecalis and Candida    Impression:  polymicrobial lung infection  multiple sclerosis   chronic ulcers  Drug rash - resolved    Plan:    Rash resolved - may be related to Merrem    - continue Pleur-evac    s/p MIDLINE 7/15/2020  Pseudomonas susceptible to all tested agents  Klebsiella and Enterococcus also found on the fluid cx  Bacteroides in cultures as well    continue all Antibiotics:  fluconAZOLE IVPB 400 milliGRAM(s) IV Intermittent every 24 hours  levoFLOXacin IVPB 750 milliGRAM(s) IV Intermittent every 24 hours  metroNIDAZOLE  IVPB 500 milliGRAM(s) IV Intermittent every 8 hours  THRU 8/9/2020 for a 4 week course  suggest weekly CBC CMP ESR CRP to follow/ trend    WBC elevation  - reactive, will follow        Consider goals of care discussion

## 2020-07-22 NOTE — PROGRESS NOTE ADULT - PROBLEM SELECTOR PLAN 3
Patient had completed a course of Merrem (6/9) for polymicrobial Empyema with strep mitis, Klebsiella oxytoca, and CoNS.  Pleural fluid +yeast and pseudomonas and urine with proteus>100k this admission.  ID following, consult appreciated.  Discontinued Meropenum after rash and itchiness.  Linezolid d/c on 07/16  Currently on Flagyl, Levaquin and Diflucan, continue for 4 weeks (end date 8/09/2020) as per ID   Trend/ follow weekly CBC, CMP, ESR and CRP  as per ID

## 2020-07-22 NOTE — PROGRESS NOTE ADULT - SUBJECTIVE AND OBJECTIVE BOX
[facility]  INFECTIOUS DISEASES AND INTERNAL MEDICINE at [place]  =======================================================  Phong Wang MD  Diplomates American Board of Internal Medicine and Infectious Diseases  Tel  [phone]  Fax [phone]  =======================================================    [id number]  GEORGE STANLEY  follow up:  chest infection    no new complaints today  no fevers    =======================================================  REVIEW OF SYSTEMS:  CONSTITUTIONAL:  No Fever or chills  HEENT:  No diplopia or blurred vision.  No earache, sore throat or runny nose.  CARDIOVASCULAR:  No pressure, squeezing, strangling, tightness, heaviness or aching about the chest, neck, axilla or epigastrium.  RESPIRATORY:  No cough, shortness of breath  GASTROINTESTINAL:  No nausea, vomiting or diarrhea.  GENITOURINARY:  No dysuria, frequency or urgency. No Blood in urine  MUSCULOSKELETAL:  no joint aches, no muscle pain  SKIN:  No change in skin, hair or nails.  NEUROLOGIC:  No Headaches, seizures or weakness.  PSYCHIATRIC:  No disorder of thought or mood.  ENDOCRINE:  No heat or cold intolerance  HEMATOLOGICAL:  No easy bruising or bleeding.   =======================================================  Allergies  Sudafed (Other)    ======================================================  Physical Exam:  ============  (see vitals section below)    General:  No acute distress. FRAIL  Eye: Pupils are equal, round and reactive to light, Extraocular movements are intact, Normal conjunctiva.  HENT: Normocephalic, Oral mucosa is moist, No pharyngeal erythema, No sinus tenderness.  Neck: Supple, No lymphadenopathy.  Respiratory: Lungs with diminished air entry right side  RIGHT side chest tube in place  Cardiovascular: Normal rate, Regular rhythm, LOWER extremity edema  Gastrointestinal: Soft, Non-tender, Non-distended, Normal bowel sounds.  Genitourinary: No costovertebral angle tenderness.  Lymphatics: No lymphadenopathy neck,   Musculoskeletal: contracted extremities  Integumentary: resolution of rash  Neurologic:  No focal deficits, Cranial Nerves II-XII are grossly intact.  Psychiatric: Appropriate mood & affect.    =======================================================    Vitals:  ============  T(F): 97.3 (22 Jul 2020 09:47), Max: 98 (21 Jul 2020 22:50)  HR: 87 (22 Jul 2020 09:47)  BP: 97/68 (22 Jul 2020 09:47)  RR: 18 (22 Jul 2020 09:47)  SpO2: 96% (22 Jul 2020 09:47) (95% - 98%)  temp max in last 48H T(F): , Max: 98 (07-21-20 @ 09:53)    =======================================================  Current Antibiotics:  fluconAZOLE IVPB 400 milliGRAM(s) IV Intermittent every 24 hours  levoFLOXacin IVPB      levoFLOXacin IVPB 750 milliGRAM(s) IV Intermittent every 24 hours  metroNIDAZOLE  IVPB      metroNIDAZOLE  IVPB 500 milliGRAM(s) IV Intermittent every 8 hours    Other medications:  ascorbic acid 500 milliGRAM(s) Oral daily  chlorhexidine 2% Cloths 1 Application(s) Topical daily  collagenase Ointment 1 Application(s) Topical daily  Dakins Solution - 1/2 Strength 1 Application(s) Topical two times a day  enoxaparin Injectable 40 milliGRAM(s) SubCutaneous daily  ferrous    sulfate Liquid 300 milliGRAM(s) Oral three times a day with meals  folic acid 1 milliGRAM(s) Oral daily  lactobacillus acidophilus 1 Tablet(s) Oral two times a day  latanoprost 0.005% Ophthalmic Solution 1 Drop(s) Both EYES at bedtime  letrozole 2.5 milliGRAM(s) Oral daily  multivitamin/minerals/iron Oral Solution (CENTRUM) 15 milliLiter(s) Enteral Tube daily  nystatin Powder 1 Application(s) Topical two times a day  pantoprazole  Injectable 40 milliGRAM(s) IV Push two times a day  sodium chloride 1 Gram(s) Oral three times a day  sodium chloride 0.9% lock flush 3 milliLiter(s) IV Push every 8 hours      =======================================================  Labs:                        8.0    12.93 )-----------( 437      ( 21 Jul 2020 06:45 )             25.7      07-21    138  |  107  |  21.0<H>  ----------------------------<  82  3.2<L>   |  22.0  |  0.42<L>    Ca    7.9<L>      21 Jul 2020 06:45  Mg     1.8     07-21        Culture - Urine (collected 07-13-20 @ 08:15)  Source: .Urine Catheterized  Final Report (07-15-20 @ 14:17):    >100,000 CFU/ml Proteus mirabilis    <10,000 CFU/ml Normal Urogenital vince present  Organism: Proteus mirabilis (07-15-20 @ 14:17)  Organism: Proteus mirabilis (07-15-20 @ 14:17)    Sensitivities:      -  Amikacin: S <=16      -  Amoxicillin/Clavulanic Acid: S <=8/4      -  Ampicillin: R >16 These ampicillin results predict results for amoxicillin      -  Ampicillin/Sulbactam: S <=4/2 Enterobacter, Citrobacter, and Serratia may develop resistance during prolonged therapy (3-4 days)      -  Aztreonam: S <=4      -  Cefazolin: S 4 (MIC_CL_COM_ENTERIC_[id number]) For uncomplicated UTI with K. pneumoniae, E. coli, or P. mirablis: PAWAN <=16 is sensitive and PAWAN >=32 is resistant. This also predicts results for oral agents cefaclor, cefdinir, cefpodoxime, cefprozil, cefuroxime axetil, cephalexin and locarbef for uncomplicated UTI. Note that some isolates may be susceptible to these agents while testing resistant to cefazolin.      -  Cefepime: S <=2      -  Cefoxitin: S <=8      -  Ceftriaxone: S <=1 Enterobacter, Citrobacter, and Serratia may develop resistance during prolonged therapy      -  Ciprofloxacin: R 2      -  Ertapenem: S <=0.5      -  Gentamicin: R >8      -  Levofloxacin: R 2      -  Meropenem: S <=1      -  Nitrofurantoin: R 64 Should not be used to treat pyelonephritis      -  Piperacillin/Tazobactam: S <=8      -  Tobramycin: R >8      -  Trimethoprim/Sulfamethoxazole: S <=0.5/9.5      Method Type: PAWAN    Culture - Fungal, Body Fluid (collected 07-13-20 @ 03:07)  Source: .Body Fluid Pleural Fluid    Culture - Body Fluid with Gram Stain (collected 07-13-20 @ 03:07)  Source: .Body Fluid Pleural Fluid  Gram Stain (07-13-20 @ 05:06):    Numerous polymorphonuclear leukocytes seen per low power field    Numerous Gram Negative Rods seen per oil power field  Final Report (07-18-20 @ 07:57):    Numerous Pseudomonas aeruginosa    Few Cassidy albicans "Susceptibilities not performed"    Few Klebsiella pneumoniae    Rare Enterococcus faecalis    Numerous Bacteroides fragilis "Susceptibilities not performed"  Organism: Pseudomonas aeruginosa  Klebsiella pneumoniae  Enterococcus faecalis (07-18-20 @ 07:57)  Organism: Enterococcus faecalis (07-18-20 @ 07:57)    Sensitivities:      -  Ampicillin: S <=2 Predicts results to ampicillin/sulbactam, amoxacillin-clavulanate and  piperacillin-tazobactam.      -  Levofloxacin: S 1      -  Tetra/Doxy: R >8      -  Vancomycin: S 2      Method Type: PAWAN  Organism: Klebsiella pneumoniae (07-18-20 @ 07:57)    Sensitivities:      -  Amikacin: S <=16      -  Amoxicillin/Clavulanic Acid: S <=8/4      -  Ampicillin: R >16 These ampicillin results predict results for amoxicillin      -  Ampicillin/Sulbactam: S <=4/2 Enterobacter, Citrobacter, and Serratia may develop resistance during prolonged therapy (3-4 days)      -  Aztreonam: S <=4      -  Cefazolin: S <=2 Enterobacter, Citrobacter, and Serratia may develop resistance during prolonged therapy (3-4 days)      -  Cefepime: S <=2      -  Cefoxitin: S <=8      -  Ceftriaxone: S <=1 Enterobacter, Citrobacter, and Serratia may develop resistance during prolonged therapy      -  Ciprofloxacin: S <=0.25      -  Ertapenem: S <=0.5      -  Gentamicin: S <=2      -  Imipenem: S <=1      -  Levofloxacin: S <=0.5      -  Meropenem: S <=1      -  Piperacillin/Tazobactam: S <=8      -  Tobramycin: S <=2      -  Trimethoprim/Sulfamethoxazole: S <=0.5/9.5      Method Type: PAWAN  Organism: Pseudomonas aeruginosa (07-18-20 @ 07:57)    Sensitivities:      -  Amikacin: S <=16      -  Aztreonam: S <=4      -  Cefepime: S <=2      -  Ceftazidime: S <=1      -  Ciprofloxacin: S <=0.25      -  Gentamicin: S <=2      -  Imipenem: S <=1      -  Levofloxacin: S <=0.5      -  Meropenem: S <=1      -  Piperacillin/Tazobactam: S <=8      -  Tobramycin: S <=2      Method Type: PAWAN    Culture - Blood (collected 07-12-20 @ 22:43)  Source: .Blood Blood-Venous  Final Report (07-17-20 @ 23:01):    No growth at 5 days.    Culture - Blood (collected 07-12-20 @ 22:24)  Source: .Blood Blood-Venous  Final Report (07-17-20 @ 23:01):    No growth at 5 days.      Creatinine, Serum: 0.42 mg/dL (07-21-20 @ 06:45)  Creatinine, Serum: 0.47 mg/dL (07-20-20 @ 10:10)  Creatinine, Serum: 0.50 mg/dL (07-19-20 @ 06:38)  Creatinine, Serum: 0.57 mg/dL (07-18-20 @ 09:14)    Procalcitonin, Serum: 0.19 ng/mL (07-21-20 @ 06:45)         COVID-19 PCR: NotDetec (07-12-20 @ 14:06)    Lactate Dehydrogenase, Serum: 268 U/L (07-12-20 @ 22:20)  Lactate Dehydrogenase, Serum: 199 U/L (07-12-20 @ 17:00)    Alkaline Phosphatase, Serum: 92 U/L (07-20-20 @ 10:10)  Alkaline Phosphatase, Serum: 90 U/L (07-19-20 @ 06:38)  Alanine Aminotransferase (ALT/SGPT): 14 U/L (07-20-20 @ 10:10)  Alanine Aminotransferase (ALT/SGPT): 15 U/L (07-19-20 @ 06:38)  Aspartate Aminotransferase (AST/SGOT): 18 U/L (07-20-20 @ 10:10)  Aspartate Aminotransferase (AST/SGOT): 19 U/L (07-19-20 @ 06:38)  Bilirubin Total, Serum: <0.2 mg/dL (07-20-20 @ 10:10)  Bilirubin Total, Serum: <0.2 mg/dL (07-19-20 @ 06:38)

## 2020-07-22 NOTE — PROGRESS NOTE ADULT - ASSESSMENT
70yr woman, hx of MS, metastatic breast cancer, recent empyema requiring chest tube found to have esophageal perforation, s/p R chest washout, decortication and esophageal stent placement 5/13, subsequent open G-J tube. sacral wound decline diverting colostomy admitted from Rehab for dislodge CT     Problem/Recommendation - 1:  Problem: Empyema. Recommendation: Patient s/p VATS with right thoracic cavity washout and decortication and CT.   Treated on last admission for polymicrobial infection   Per CTS- Continue pleura vac to waterseal.  Pleural fluid +yeast and pseudomonas   cont IV abx -  Flagyl Levaquin Fluconazole til 8/9 per ID  slight increase in WBC - monitor       Problem/Recommendation - 2:  ·  Problem: Esophageal perforation.  Recommendation: S/p esophageal stent 5/13  s/p change in GJ tube  Cont GJ feedings     Problem/Recommendation - 3:  ·  Problem: Breast cancer metastasized to bone.  Recommendation: Patient under the care of Dr. Fairbanks.    Patient states she has been in Rehab for some time  and has not had treatement.        Problem/Recommendation - 4:  ·  Problem: Sacral wound. Recommendation: Patient declined diverting colostomy on last admission.     Problem/Recommendation - 5:  ·  Problem: Multiple sclerosis. Recommendation: Patient states has had MS >30 years.    Needs assistance in ADLs for which she reports son lives with her and  helps with some of her care.  Patient previously needed HHA     Problem/Recommendation 6  Advance Care Planning   Multiple discussions with patient and daughter regarding AD/CPR.       Problem/Recommendation - 6:  Problem: Encounter for palliative care.  Patient has been very guarded with discussions of GOC including advance directives.  Daughter has been discussed long term outlook given mother's multiple medical issues including advanced cancer, MS, baseline debility sacral wounds ( for which patient refused diverting colostomy).   Daughter was encouraged to speak to mother regarding advance directives/CPR, wishes for EOL. Tried to prepare daughter mother likely to continue to decline and at risk for rehospitalization

## 2020-07-22 NOTE — PROGRESS NOTE ADULT - SUBJECTIVE AND OBJECTIVE BOX
CC: follow up GOC  INTERVAL HPI/OVERNIGHT EVENTS:  feeling better  reports less dizzy    PRESENT SYMPTOMS: SOURCE:  Patient/Family/Team    PAIN SCALE:  0 = none  1 = mild   2 = moderate  3 = severe    Pain: denies    Dyspnea:  [ ] YES [x ] NO  Anxiety:  [ ] YES x[ ] NO  Fatigue: [x] YES [ ] NO  Nausea: [ ] YES [x ] NO  Loss of Appetite: [x ] YES [ ] NO  Other symptoms: __________    MEDICATIONS  (STANDING):  ascorbic acid 500 milliGRAM(s) Oral daily  chlorhexidine 2% Cloths 1 Application(s) Topical daily  collagenase Ointment 1 Application(s) Topical daily  Dakins Solution - 1/2 Strength 1 Application(s) Topical two times a day  enoxaparin Injectable 40 milliGRAM(s) SubCutaneous daily  ferrous    sulfate Liquid 300 milliGRAM(s) Oral three times a day with meals  fluconAZOLE IVPB 400 milliGRAM(s) IV Intermittent every 24 hours  folic acid 1 milliGRAM(s) Oral daily  lactobacillus acidophilus 1 Tablet(s) Oral two times a day  latanoprost 0.005% Ophthalmic Solution 1 Drop(s) Both EYES at bedtime  letrozole 2.5 milliGRAM(s) Oral daily  levoFLOXacin IVPB      levoFLOXacin IVPB 750 milliGRAM(s) IV Intermittent every 24 hours  metroNIDAZOLE  IVPB      metroNIDAZOLE  IVPB 500 milliGRAM(s) IV Intermittent every 8 hours  multivitamin/minerals/iron Oral Solution (CENTRUM) 15 milliLiter(s) Enteral Tube daily  nystatin Powder 1 Application(s) Topical two times a day  pantoprazole  Injectable 40 milliGRAM(s) IV Push two times a day  sodium chloride 1 Gram(s) Oral three times a day  sodium chloride 0.9% lock flush 3 milliLiter(s) IV Push every 8 hours    MEDICATIONS  (PRN):  acetaminophen    Suspension .. 650 milliGRAM(s) Enteral Tube every 6 hours PRN Temp greater or equal to 38.5C (101.3F), Mild Pain (1 - 3), Moderate Pain (4 - 6), Severe Pain (7 - 10)  meclizine 12.5 milliGRAM(s) Oral two times a day PRN Dizziness      Allergies    Sudafed (Other)    Intolerances    Karnofsky Performance Score/Palliative Performance Status Version 2:  30%    Vital Signs Last 24 Hrs  T(C): 36.3 (22 Jul 2020 09:47), Max: 36.7 (21 Jul 2020 22:50)  T(F): 97.3 (22 Jul 2020 09:47), Max: 98 (21 Jul 2020 22:50)  HR: 87 (22 Jul 2020 09:47) (85 - 102)  BP: 97/68 (22 Jul 2020 09:47) (97/68 - 123/85)  BP(mean): --  RR: 18 (22 Jul 2020 09:47) (18 - 18)  SpO2: 96% (22 Jul 2020 09:47) (95% - 98%)    PHYSICAL EXAM:    General: awake alert nad  HEENT: [ x] normal  [ ] dry mouth  [ ] ET tube/trach    Lungs: [ x] comfortable [ ] tachypnea/labored breathing  [ ] excessive secretions    CV: [ x] normal  [ ] tachycardia    GI: [ x] normal  [ ] distended  [ ] tender  [ ] no BS               [ x] PEG/  : x[x ] normal  [ ] incontinent  [ ] oliguria/anuria  [ ] campos    MSK: [ ] normal  [x ] weakness  [ ] edema             [ ] ambulatory  [x ] bedbound/wheelchair bound    Skin: [ ] normal  [ ] pressure ulcers- Stage_____  [ ] no rash    LABS:                        8.0    12.93 )-----------( 437      ( 21 Jul 2020 06:45 )             25.7     07-21    138  |  107  |  21.0<H>  ----------------------------<  82  3.2<L>   |  22.0  |  0.42<L>    Ca    7.9<L>      21 Jul 2020 06:45  Mg     1.8     07-21      I&O's Summary    21 Jul 2020 07:01  -  22 Jul 2020 07:00  --------------------------------------------------------  IN: 3035 mL / OUT: 1300 mL / NET: 1735 mL        RADIOLOGY & ADDITIONAL STUDIES:

## 2020-07-22 NOTE — PROGRESS NOTE ADULT - PROBLEM SELECTOR PLAN 5
Patient with impaired mobility, long term bedridden.  Stage 4/unstagable sacral decubitus, and multiple decubitus as documented  Wound care following, input appreciated  Patient refusing air flow mattress as it is not comfortable to her.  Turn and position Q 2 hrs,  Z tarsha fluidized positioning device and heel device to reduce pressure  Santyl and Nickel thickness triad as ordered  Rectal tube to avoid contamination of the sacral wound, patient deferred diversional colostomy   Palliative care following patient for Goals of Care. Patient with impaired mobility, long term bedridden.  Stage 4/unstagable sacral decubitus, and multiple decubitus ulcer as documented by Wound care advance practice nurse  Wound care following, input appreciated  Patient refusing air flow mattress as it is not comfortable to her.  Turn and position Q 2 hrs,  Z tarsha fluidized positioning device and heel device to reduce pressure  Santyl Nickel thickness triad as ordered  Rectal tube to avoid contamination of the sacral wound, patient deferred diversional colostomy   Palliative care following patient for Goals of Care.

## 2020-07-22 NOTE — CHART NOTE - NSCHARTNOTEFT_GEN_A_CORE
Thoracic     70F, well known to thoracic surgery service, pmhx of MS (wheelchair bound), and metastatic breast cancer to pelvis, thoracic, lumbar spine, chronic indwelling campos, chronic unstageable sacral wound, with recent empyema requiring chest tube found to have esophageal perforation, s/p R chest washout, decortication and esophageal stent placement 5/13, subsequent open G-J tube placement 5/15. Patient presented from rehab with dislodged chest tube 7/12, CXR without obvious pneumothorax, however, SOURAV not holding self suction concerning for air leak, so patient was admitted to the thoracic surgery service. Right sided vaibhav drain attached to pleurvac with noted airleak (resolving) and continued purulent drainage. Pleural fluid +yeast and pseudomonas and urine with proteus>100k. ID was consulted started antibiotics and antifungal agents (7/14). Noted drug reaction (rashes and itchiness), Meropenum discontinued started on Levofloxacin. Linezolid was d/c on 7/15. Currently on IV Levaquin, flagyl, and diflucan, needed to complete 4 week course (end date 8/9/2020) per ID. LUE midline placed 7/15 for proper IV access.  Patient with noted cracked and dislodged G-J tube 7/16, now s/p IR gastrojejunostomy tube replacement 7/17. 7/18 Fluid leak around GJ tube, GJ snugged down. 7/20 Snugged GJ tube, currently at 3.5 cm. 7/21: Meclizine started for repeated dizziness/ vertigo. CT head with IV contrast ordered to r/o metastasis to brain, pending.       Pt seen at bedside.  Confused.  Awaiting CT of head     DW Team in am rounds

## 2020-07-22 NOTE — PROGRESS NOTE ADULT - SUBJECTIVE AND OBJECTIVE BOX
Subjective: Patient is lying in bed, no acute distress noted, stated "I am feeling better today, dizziness is less now". Patient denies fever, chills, chest pain, palpitation, shortness of breath, abdominal pain, N/V.    V/S  T(C): 36.7 (07-21-20 @ 22:50), Max: 36.7 (07-21-20 @ 09:53)  HR: 102 (07-21-20 @ 22:50) (85 - 102)  BP: 117/75 (07-21-20 @ 22:50) (110/68 - 123/85)  RR: 18 (07-21-20 @ 22:50) (17 - 18)  SpO2: 97% (07-21-20 @ 22:50) (95% - 98%) on room air                                              Tele: Sinus rhythm    CHEST TUBE:  Right black to waterseal    OUTPUT:             per 24 hours     Drainage:  purulent   AIR LEAKS:  [x ] YES [ ] NO      MEDICATIONS  (STANDING):  ascorbic acid 500 milliGRAM(s) Oral daily  chlorhexidine 2% Cloths 1 Application(s) Topical daily  Dakins Solution - 1/2 Strength 1 Application(s) Topical two times a day  enoxaparin Injectable 40 milliGRAM(s) SubCutaneous daily  ferrous    sulfate Liquid 300 milliGRAM(s) Oral three times a day with meals  fluconAZOLE IVPB 400 milliGRAM(s) IV Intermittent every 24 hours  folic acid 1 milliGRAM(s) Oral daily  lactobacillus acidophilus 1 Tablet(s) Oral two times a day  latanoprost 0.005% Ophthalmic Solution 1 Drop(s) Both EYES at bedtime  letrozole 2.5 milliGRAM(s) Oral daily  levoFLOXacin IVPB      levoFLOXacin IVPB 750 milliGRAM(s) IV Intermittent every 24 hours  metroNIDAZOLE  IVPB      metroNIDAZOLE  IVPB 500 milliGRAM(s) IV Intermittent every 8 hours  multivitamin/minerals/iron Oral Solution (CENTRUM) 15 milliLiter(s) Enteral Tube daily  nystatin Powder 1 Application(s) Topical two times a day  pantoprazole  Injectable 40 milliGRAM(s) IV Push two times a day  sodium chloride 1 Gram(s) Oral three times a day  sodium chloride 0.9% lock flush 3 milliLiter(s) IV Push every 8 hours      07-21    138  |  107  |  21.0<H>  ----------------------------<  82  3.2<L>   |  22.0  |  0.42<L>    Ca    7.9<L>      21 Jul 2020 06:45  Mg     1.8     07-21    TPro  5.1<L>  /  Alb  1.7<L>  /  TBili  <0.2<L>  /  DBili  x   /  AST  18  /  ALT  14  /  AlkPhos  92  07-20                               8.0    12.93 )-----------( 437      ( 21 Jul 2020 06:45 )             25.7                   CXR:    < from: Xray Chest 1 View- PORTABLE-Routine (07.21.20 @ 07:28) >    FINDINGS:   No interval change.  There is bilateral lung basilar pleural thickening and/or LEFT pleural effusion. A RIGHT chest tube within the RIGHT lung base extrapleural air-filled cavity.  Esophageal stent in place.  Heart size normal.  Diffuse osseous metastasis..  IMPRESSION: No interval change.     < end of copied text >    < from: CT Chest No Cont (07.12.20 @ 17:07) >    IMPRESSION:     Right chest tube is identified with a mild right hydropneumothorax with associated right lower lobe compressive atelectasis. Moderate left pleural effusion with near complete atelectasis of the left lower lobe. Esophageal stent again identified.    Mild thickening of the rectosigmoid colon, correlate for proctitis. No bowel obstruction.    No hydronephrosis.    Large soft tissue defect adjacent to the posterior right inferior pubic ramus bone with adjacent subcutaneous edema    Extensive blastic metastasis.      < end of copied text >    < from: Xray Esophagram Single Contrast (07.15.20 @ 15:32) >  IMPRESSION:      Leak of contrast just inferior to the esophageal stent on the left and collecting along the left lateral aspect of the stent; contrast either between the esophageal lumen and stent or contained within the mediastinum adjacent to the esophagus; no contrast extravasated outside the mediastinum at the time of the study.    Residual contrast in the esophagus and adjacent to the stent at the conclusion of the study; a chest x-ray is recommended to assess for clearing of contrast from the chest.    The findings were discussed with MOLLY Reeves at 5:03 PM on 7/15/2020.        < end of copied text >      Physical Exam:  Constitutional: Well developed, frail, appears comfortable, no acute distress noted  Neuro: A+O x 3, non-focal   HEENT: PERRL, EOMI  Neck: supple, no JVD  CV: RRR +S1S2, no murmur or gallops  Pulm/chest: Decreased breath sounds on the Right, left CTA, no accessory muscle use noted  Abd: ascitic, soft, NT, ND, +BS  : +Starks with yellow urine in bedside bag, +Rectal tube to drainage bag  Ext: DAVIDSON x 4, Limited LE strength, legs contracted, +LE sensation intact, +4 pedal edema bilaterally, +DP/PT pulses bilaterally.  Skin: warm, well perfused, +Large sacral decub, multiple pressure injury areas  Psych: calm, appropriate affect  Tubes: G/J tube in place, suture intact. Right pleural vaibhav attached to pleuravac to waterseal with purulent drainage, + minimal airleak , dressing c/d/i, no surrounding crepitus noted        PAST MEDICAL & SURGICAL HISTORY:  Esophageal perforation: s/p stent placement 5/13  H/O pleural empyema: s/p R VATS chest wash out decortication 5/13  Breast cancer metastasized to bone  Hypertension  Multiple sclerosis  S/P mastectomy, right  Breast CA  Osteoporosis  MS (mitral stenosis)  Encounter for feeding tube placement: open G-Jtube placement 5/15  History of thoracic surgery: right VATS decortication, chest wastout 5/13  History of bowel resection  H/O breast surgery

## 2020-07-23 ENCOUNTER — TRANSCRIPTION ENCOUNTER (OUTPATIENT)
Age: 70
End: 2020-07-23

## 2020-07-23 VITALS
HEART RATE: 98 BPM | OXYGEN SATURATION: 95 % | TEMPERATURE: 98 F | SYSTOLIC BLOOD PRESSURE: 94 MMHG | DIASTOLIC BLOOD PRESSURE: 56 MMHG | RESPIRATION RATE: 15 BRPM

## 2020-07-23 DIAGNOSIS — R42 DIZZINESS AND GIDDINESS: ICD-10-CM

## 2020-07-23 LAB
ANION GAP SERPL CALC-SCNC: 10 MMOL/L — SIGNIFICANT CHANGE UP (ref 5–17)
BUN SERPL-MCNC: 25 MG/DL — HIGH (ref 8–20)
CALCIUM SERPL-MCNC: 7.9 MG/DL — LOW (ref 8.6–10.2)
CHLORIDE SERPL-SCNC: 110 MMOL/L — HIGH (ref 98–107)
CO2 SERPL-SCNC: 19 MMOL/L — LOW (ref 22–29)
CREAT SERPL-MCNC: 0.48 MG/DL — LOW (ref 0.5–1.3)
GLUCOSE SERPL-MCNC: 101 MG/DL — HIGH (ref 70–99)
HCT VFR BLD CALC: 24.8 % — LOW (ref 34.5–45)
HGB BLD-MCNC: 7.5 G/DL — LOW (ref 11.5–15.5)
MAGNESIUM SERPL-MCNC: 1.7 MG/DL — SIGNIFICANT CHANGE UP (ref 1.6–2.6)
MCHC RBC-ENTMCNC: 27.8 PG — SIGNIFICANT CHANGE UP (ref 27–34)
MCHC RBC-ENTMCNC: 30.2 GM/DL — LOW (ref 32–36)
MCV RBC AUTO: 91.9 FL — SIGNIFICANT CHANGE UP (ref 80–100)
PHOSPHATE SERPL-MCNC: 2.8 MG/DL — SIGNIFICANT CHANGE UP (ref 2.4–4.7)
PLATELET # BLD AUTO: 360 K/UL — SIGNIFICANT CHANGE UP (ref 150–400)
POTASSIUM SERPL-MCNC: 4.3 MMOL/L — SIGNIFICANT CHANGE UP (ref 3.5–5.3)
POTASSIUM SERPL-SCNC: 4.3 MMOL/L — SIGNIFICANT CHANGE UP (ref 3.5–5.3)
RBC # BLD: 2.7 M/UL — LOW (ref 3.8–5.2)
RBC # FLD: 20.9 % — HIGH (ref 10.3–14.5)
SODIUM SERPL-SCNC: 139 MMOL/L — SIGNIFICANT CHANGE UP (ref 135–145)
WBC # BLD: 10.58 K/UL — HIGH (ref 3.8–10.5)
WBC # FLD AUTO: 10.58 K/UL — HIGH (ref 3.8–10.5)

## 2020-07-23 PROCEDURE — 87040 BLOOD CULTURE FOR BACTERIA: CPT

## 2020-07-23 PROCEDURE — 87075 CULTR BACTERIA EXCEPT BLOOD: CPT

## 2020-07-23 PROCEDURE — 83880 ASSAY OF NATRIURETIC PEPTIDE: CPT

## 2020-07-23 PROCEDURE — 84443 ASSAY THYROID STIM HORMONE: CPT

## 2020-07-23 PROCEDURE — 86901 BLOOD TYPING SEROLOGIC RH(D): CPT

## 2020-07-23 PROCEDURE — 84100 ASSAY OF PHOSPHORUS: CPT

## 2020-07-23 PROCEDURE — 82945 GLUCOSE OTHER FLUID: CPT

## 2020-07-23 PROCEDURE — 80048 BASIC METABOLIC PNL TOTAL CA: CPT

## 2020-07-23 PROCEDURE — 86902 BLOOD TYPE ANTIGEN DONOR EA: CPT

## 2020-07-23 PROCEDURE — 74176 CT ABD & PELVIS W/O CONTRAST: CPT

## 2020-07-23 PROCEDURE — 85610 PROTHROMBIN TIME: CPT

## 2020-07-23 PROCEDURE — 74220 X-RAY XM ESOPHAGUS 1CNTRST: CPT

## 2020-07-23 PROCEDURE — 85730 THROMBOPLASTIN TIME PARTIAL: CPT

## 2020-07-23 PROCEDURE — 71250 CT THORAX DX C-: CPT

## 2020-07-23 PROCEDURE — 83735 ASSAY OF MAGNESIUM: CPT

## 2020-07-23 PROCEDURE — C1769: CPT

## 2020-07-23 PROCEDURE — 83986 ASSAY PH BODY FLUID NOS: CPT

## 2020-07-23 PROCEDURE — 87102 FUNGUS ISOLATION CULTURE: CPT

## 2020-07-23 PROCEDURE — 84157 ASSAY OF PROTEIN OTHER: CPT

## 2020-07-23 PROCEDURE — 87086 URINE CULTURE/COLONY COUNT: CPT

## 2020-07-23 PROCEDURE — 99024 POSTOP FOLLOW-UP VISIT: CPT

## 2020-07-23 PROCEDURE — 82962 GLUCOSE BLOOD TEST: CPT

## 2020-07-23 PROCEDURE — 83615 LACTATE (LD) (LDH) ENZYME: CPT

## 2020-07-23 PROCEDURE — 86769 SARS-COV-2 COVID-19 ANTIBODY: CPT

## 2020-07-23 PROCEDURE — 99285 EMERGENCY DEPT VISIT HI MDM: CPT

## 2020-07-23 PROCEDURE — 82150 ASSAY OF AMYLASE: CPT

## 2020-07-23 PROCEDURE — 36430 TRANSFUSION BLD/BLD COMPNT: CPT

## 2020-07-23 PROCEDURE — 80053 COMPREHEN METABOLIC PANEL: CPT

## 2020-07-23 PROCEDURE — 99232 SBSQ HOSP IP/OBS MODERATE 35: CPT

## 2020-07-23 PROCEDURE — 80076 HEPATIC FUNCTION PANEL: CPT

## 2020-07-23 PROCEDURE — L8699: CPT

## 2020-07-23 PROCEDURE — 87205 SMEAR GRAM STAIN: CPT

## 2020-07-23 PROCEDURE — 93005 ELECTROCARDIOGRAM TRACING: CPT

## 2020-07-23 PROCEDURE — 81001 URINALYSIS AUTO W/SCOPE: CPT

## 2020-07-23 PROCEDURE — 86922 COMPATIBILITY TEST ANTIGLOB: CPT

## 2020-07-23 PROCEDURE — 70460 CT HEAD/BRAIN W/DYE: CPT

## 2020-07-23 PROCEDURE — 84145 PROCALCITONIN (PCT): CPT

## 2020-07-23 PROCEDURE — 83690 ASSAY OF LIPASE: CPT

## 2020-07-23 PROCEDURE — 86850 RBC ANTIBODY SCREEN: CPT

## 2020-07-23 PROCEDURE — 84478 ASSAY OF TRIGLYCERIDES: CPT

## 2020-07-23 PROCEDURE — 87186 SC STD MICRODIL/AGAR DIL: CPT

## 2020-07-23 PROCEDURE — P9040: CPT

## 2020-07-23 PROCEDURE — 36415 COLL VENOUS BLD VENIPUNCTURE: CPT

## 2020-07-23 PROCEDURE — 84436 ASSAY OF TOTAL THYROXINE: CPT

## 2020-07-23 PROCEDURE — 82042 OTHER SOURCE ALBUMIN QUAN EA: CPT

## 2020-07-23 PROCEDURE — 87070 CULTURE OTHR SPECIMN AEROBIC: CPT

## 2020-07-23 PROCEDURE — 86900 BLOOD TYPING SEROLOGIC ABO: CPT

## 2020-07-23 PROCEDURE — 84134 ASSAY OF PREALBUMIN: CPT

## 2020-07-23 PROCEDURE — 85027 COMPLETE CBC AUTOMATED: CPT

## 2020-07-23 PROCEDURE — 87635 SARS-COV-2 COVID-19 AMP PRB: CPT

## 2020-07-23 PROCEDURE — 71045 X-RAY EXAM CHEST 1 VIEW: CPT

## 2020-07-23 RX ORDER — LACTOBACILLUS ACIDOPHILUS 100MM CELL
1 CAPSULE ORAL
Qty: 0 | Refills: 0 | DISCHARGE
Start: 2020-07-23

## 2020-07-23 RX ORDER — ACETAMINOPHEN 500 MG
20.31 TABLET ORAL
Qty: 0 | Refills: 0 | DISCHARGE
Start: 2020-07-23

## 2020-07-23 RX ORDER — MECLIZINE HCL 12.5 MG
1 TABLET ORAL
Qty: 0 | Refills: 0 | DISCHARGE
Start: 2020-07-23

## 2020-07-23 RX ORDER — COLLAGENASE CLOSTRIDIUM HIST. 250 UNIT/G
1 OINTMENT (GRAM) TOPICAL
Qty: 0 | Refills: 0 | DISCHARGE
Start: 2020-07-23

## 2020-07-23 RX ORDER — METRONIDAZOLE 500 MG
100 TABLET ORAL
Qty: 0 | Refills: 0 | DISCHARGE
Start: 2020-07-23 | End: 2020-08-09

## 2020-07-23 RX ORDER — FLUCONAZOLE 150 MG/1
400 TABLET ORAL
Qty: 0 | Refills: 0 | DISCHARGE
Start: 2020-07-23 | End: 2020-08-09

## 2020-07-23 RX ORDER — SODIUM CHLORIDE 9 MG/ML
3 INJECTION INTRAMUSCULAR; INTRAVENOUS; SUBCUTANEOUS
Qty: 0 | Refills: 0 | DISCHARGE
Start: 2020-07-23

## 2020-07-23 RX ADMIN — Medication 100 MILLIGRAM(S): at 12:50

## 2020-07-23 RX ADMIN — Medication 1 TABLET(S): at 06:30

## 2020-07-23 RX ADMIN — Medication 300 MILLIGRAM(S): at 10:25

## 2020-07-23 RX ADMIN — Medication 15 MILLILITER(S): at 10:28

## 2020-07-23 RX ADMIN — SODIUM CHLORIDE 1 GRAM(S): 9 INJECTION INTRAMUSCULAR; INTRAVENOUS; SUBCUTANEOUS at 12:50

## 2020-07-23 RX ADMIN — Medication 1 APPLICATION(S): at 10:25

## 2020-07-23 RX ADMIN — CHLORHEXIDINE GLUCONATE 1 APPLICATION(S): 213 SOLUTION TOPICAL at 10:25

## 2020-07-23 RX ADMIN — SODIUM CHLORIDE 3 MILLILITER(S): 9 INJECTION INTRAMUSCULAR; INTRAVENOUS; SUBCUTANEOUS at 06:42

## 2020-07-23 RX ADMIN — Medication 1 MILLIGRAM(S): at 10:28

## 2020-07-23 RX ADMIN — NYSTATIN CREAM 1 APPLICATION(S): 100000 CREAM TOPICAL at 06:42

## 2020-07-23 RX ADMIN — SODIUM CHLORIDE 3 MILLILITER(S): 9 INJECTION INTRAMUSCULAR; INTRAVENOUS; SUBCUTANEOUS at 13:37

## 2020-07-23 RX ADMIN — LETROZOLE 2.5 MILLIGRAM(S): 2.5 TABLET, FILM COATED ORAL at 12:50

## 2020-07-23 RX ADMIN — Medication 100 MILLIGRAM(S): at 06:30

## 2020-07-23 RX ADMIN — SODIUM CHLORIDE 1 GRAM(S): 9 INJECTION INTRAMUSCULAR; INTRAVENOUS; SUBCUTANEOUS at 06:30

## 2020-07-23 RX ADMIN — Medication 500 MILLIGRAM(S): at 10:28

## 2020-07-23 RX ADMIN — PANTOPRAZOLE SODIUM 40 MILLIGRAM(S): 20 TABLET, DELAYED RELEASE ORAL at 06:31

## 2020-07-23 RX ADMIN — Medication 300 MILLIGRAM(S): at 12:49

## 2020-07-23 NOTE — DISCHARGE NOTE NURSING/CASE MANAGEMENT/SOCIAL WORK - NSDCFUADDAPPT_GEN_ALL_CORE_FT
Upon discharge from rehab, make a follow up appointment with Dr Murcia__ by calling [phone]  .  Follow up with your cardiologist and primary care doctor within 7-10 days after discharge.

## 2020-07-23 NOTE — PROGRESS NOTE ADULT - PROBLEM SELECTOR PROBLEM 2
Esophageal perforation

## 2020-07-23 NOTE — DISCHARGE NOTE NURSING/CASE MANAGEMENT/SOCIAL WORK - PATIENT PORTAL LINK FT
You can access the FollowMyHealth Patient Portal offered by [facility] by registering at the following website: [email or web address]. By joining [facility]’s FollowMyHealth portal, you will also be able to view your health information using other applications (apps) compatible with our system.

## 2020-07-23 NOTE — PROGRESS NOTE ADULT - SUBJECTIVE AND OBJECTIVE BOX
Subjective: Patient is lying in bed, no acute distress noted, stated "I am feeling better today, dizziness is less now". Patient denies fever, chills, chest pain, palpitation, shortness of breath, abdominal pain, N/V.    V/S  T(C): 36.8 (07-22-20 @ 22:22), Max: 36.8 (07-22-20 @ 22:22)  HR: 90 (07-22-20 @ 22:22) (87 - 99)  BP: 92/61 (07-22-20 @ 22:22) (92/61 - 112/75)  RR: 18 (07-22-20 @ 22:22) (18 - 18)  SpO2: 97% (07-22-20 @ 22:22) (96% - 99%) on room air                                      Tele: SR    CHEST TUBE: Right chest tube to waterseal.     OUTPUT:             per 24 hours     Drainage:  Purulent  AIR LEAKS:  [ ] YES [ x] NO      MEDICATIONS  (STANDING):  ascorbic acid 500 milliGRAM(s) Oral daily   chlorhexidine 2% Cloths 1 Application(s) Topical daily  collagenase Ointment 1 Application(s) Topical daily  Dakins Solution - 1/2 Strength 1 Application(s) Topical two times a day  enoxaparin Injectable 40 milliGRAM(s) SubCutaneous daily  ferrous    sulfate Liquid 300 milliGRAM(s) Oral three times a day with meals  fluconAZOLE IVPB 400 milliGRAM(s) IV Intermittent every 24 hours  folic acid 1 milliGRAM(s) Oral daily  lactobacillus acidophilus 1 Tablet(s) Oral two times a day  latanoprost 0.005% Ophthalmic Solution 1 Drop(s) Both EYES at bedtime  letrozole 2.5 milliGRAM(s) Oral daily  levoFLOXacin IVPB      levoFLOXacin IVPB 750 milliGRAM(s) IV Intermittent every 24 hours  metroNIDAZOLE  IVPB      metroNIDAZOLE  IVPB 500 milliGRAM(s) IV Intermittent every 8 hours  multivitamin/minerals/iron Oral Solution (CENTRUM) 15 milliLiter(s) Enteral Tube daily  nystatin Powder 1 Application(s) Topical two times a day  pantoprazole  Injectable 40 milliGRAM(s) IV Push two times a day  sodium chloride 1 Gram(s) Oral three times a day  sodium chloride 0.9% lock flush 3 milliLiter(s) IV Push every 8 hours      07-22    141  |  108<H>  |  24.0<H>  ----------------------------<  89  4.3   |  19.0<L>  |  0.43<L>    Ca    8.2<L>      22 Jul 2020 12:27  Phos  2.2     07-22  Mg     2.0     07-22    TPro  5.0<L>  /  Alb  1.8<L>  /  TBili  <0.2<L>  /  DBili  x   /  AST  24  /  ALT  12  /  AlkPhos  94  07-22                               8.2    14.19 )-----------( 414      ( 22 Jul 2020 12:27 )             26.0              < from: CT Head w/ IV Cont (07.22.20 @ 20:27) >    IMPRESSION:  mild periventricular and deep LEFT frontal and parietal white matter ischemia. 6 mm enhancing lesion at the foramen of Leroy, recommend MRI with gadolinium for further evaluation.  Global atrophy.      < end of copied text >      < from: Xray Chest 1 View- PORTABLE-Routine (07.22.20 @ 05:22) >    Findings:  Lines and tubes are unchanged. Persistent bilateral pleural effusions, unchanged. No evidence of pneumothorax..    Impression:  Stable exam without significant change since the previous study..      < end of copied text >    < from: CT Chest No Cont (05.09.20 @ 15:53) >    IMPRESSION:     Large right-sided pneumothorax including small amount of high attenuation fluid within the pleural space likely reflecting hemothorax.  Right-sided chest tube appears to traverse the major fissure.    Right lower lobe airspace consolidation, may reflect pneumonia in the appropriate clinical setting.    Diffuse osseous metastatic disease.    Other findings as discussed above.    < end of copied text >    < from: Xray Esophagram Single Contrast (05.18.20 @ 12:54) >      IMPRESSION:      No leak of contrast from the esophagus.    Narrowing of the distal esophagus/gastroesophageal junction just distal to the stent, approximately 2.2 cm in length with delayed emptying of the esophagus; residual Gastrografin in the esophagus at the conclusion of the study.      < end of copied text >    Physical Exam:  Constitutional: Well developed, frail, appears comfortable, no acute distress noted  Neuro: A+O x 3, non-focal   HEENT: PERRL, EOMI  Neck: supple, no JVD  CV: RRR +S1S2, no murmur or gallops  Pulm/chest: Decreased breath sounds on the Right, left CTA, no accessory muscle use noted  Abd: ascitic, soft, NT, ND, +BS. GJ tube to tube feeds. Well healed surgical incision.   : +Starks with yellow urine in bedside bag, +Rectal tube to drainage bag  Ext: Limited LE strength, legs contracted, +LE sensation intact, +3 pedal edema bilaterally, +DP/PT pulses bilaterally. + LUE Midline for IV medications  Skin: warm, well perfused, +Large sacral decub, multiple pressure injury areas  Psych: calm, appropriate affect  Tubes: G/J tube in place, suture intact. Right pleural vaibhav attached to pleuravac to waterseal with purulent drainage, no air leak noted, dressing c/d/i, no surrounding crepitus noted          PAST MEDICAL & SURGICAL HISTORY:  Esophageal perforation: s/p stent placement 5/13  H/O pleural empyema: s/p R VATS chest wash out decortication 5/13  Breast cancer metastasized to bone  Hypertension  Multiple sclerosis  S/P mastectomy, right  Breast CA  Osteoporosis  MS (mitral stenosis)  Encounter for feeding tube placement: open G-Jtube placement 5/15  History of thoracic surgery: right VATS decortication, chest wastout 5/13  History of bowel resection  H/O breast surgery

## 2020-07-23 NOTE — PROGRESS NOTE ADULT - PROBLEM SELECTOR PLAN 8
Patient c/o dizziness and vertigo  Meclizine started (7/21)  CT head with IV contrast (7/22) done to r/o mets o brain,  result as below  Mild periventricular and deep LEFT frontal and parietal white matter ischemia.   6 mm enhancing lesion at the foramen of Leroy, recommend MRI with gadolinium for further evaluation.  Global atrophy.  LR x 1L ordered post IV contrast to prevent contrast induced kidney injury

## 2020-07-23 NOTE — PROGRESS NOTE ADULT - PROBLEM SELECTOR PLAN 5
Patient with impaired mobility, long term bedridden.  Stage 4/unstagable sacral decubitus, and multiple decubitus ulcer as documented by Wound care advance practice nurse  Wound care following, input appreciated  Patient refusing air flow mattress as it is not comfortable to her.  Turn and position Q 2 hrs,  Z tarsha fluidized positioning device and heel device to reduce pressure  Santyl Nickel thickness triad as ordered  Rectal tube to avoid contamination of the sacral wound, patient deferred diversional colostomy   Palliative care following patient for Goals of Care.

## 2020-07-23 NOTE — PROGRESS NOTE ADULT - ASSESSMENT
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 plastic surgery was consulted and following for her unstageable sacral wound. pt required rectal tube to prevent contamination. At that time, diverting colostomy was offered but family refused.  She completed IV antibiotics for her empyema and was discharged to rehab 6/9 with 2 CTs in place.  She now is sent to ED from rehab after one of her chest tubes was dislodged while turning the pt.  Thoracic surgery asked to consult. CXR without obvious pneumothorax.  Upon evaluation, noted that SOURAV was not holding self suction.  Notable air leak in SOURAV bulb.  Decision made to admit pt for further monitoring/work up, and possible removal of CT. Pt without any complaints at this time.  She denies CP, palpitations, SOB, cough, fever, chills, itchiness/rash, diaphoresis, vision changes, HA, dizziness/lightheadedness, numbness/tingling, abd pain, N/V (12 Jul 2020 16:25)    patient was last seen on 6/9/2020 for a polymicrobial Empyema with strep mitis, Klebsiella oxytoca, and CoNS.  At that point, she had completed a course of merrem.     Right SOURAV drain swapped to Pleur-evac.    Fluid culture with Pseudomonas, Enterococcus faecalis and Candida    Impression:  polymicrobial lung infection  multiple sclerosis   chronic ulcers  Drug rash - resolved    Plan:    Rash resolved - may be related to Merrem    - continue Pleur-evac    s/p MIDLINE 7/15/2020  Pseudomonas susceptible to all tested agents  Klebsiella and Enterococcus also found on the fluid cx  Bacteroides in cultures as well    continue all Antibiotics:  fluconAZOLE IVPB 400 milliGRAM(s) IV Intermittent every 24 hours  levoFLOXacin IVPB 750 milliGRAM(s) IV Intermittent every 24 hours  metroNIDAZOLE  IVPB 500 milliGRAM(s) IV Intermittent every 8 hours  THRU 8/9/2020 for a 4 week course  suggest weekly CBC CMP ESR CRP to follow/ trend    WBC elevation  - reactive, IMPROVING;  will follow    WBC Count: 10.58 K/uL (07-23-20 @ 06:17)  WBC Count: 14.19 K/uL (07-22-20 @ 12:27)  WBC Count: 12.93 K/uL (07-21-20 @ 06:45)  WBC Count: 11.65 K/uL (07-20-20 @ 09:46)  WBC Count: 9.10 K/uL (07-19-20 @ 06:38)      Consider goals of care discussion

## 2020-07-23 NOTE — PROGRESS NOTE ADULT - PROBLEM SELECTOR PLAN 6
Protonix for PUD prophylaxis.   Lovenox /SCDs for DVT prophylaxis.    Discharge planning to return back to rehab when medically stable, possible for tomorrow.    Care and plan needs to discuss with thoracic team in AM rounds.
Protonix for PUD  Lovenox /SCDs for DVT prophylaxis    Care and plan needs to discuss with thoracic team in AM rounds
Protonix for PUD prophylaxis.   Lovenox /SCDs for DVT prophylaxis.    Discharge planning to return back to rehab when medically stable, possible for tomorrow.    Care and plan needs to discuss with thoracic team in AM rounds.
Protonix for PUD prophylaxis.   Lovenox /SCDs for DVT prophylaxis.    Discharge planning to return back to rehab when medically stable.   Care and plan needs to discuss with thoracic team in AM rounds.
Protonix for PUD prophylaxis.   Lovenox /SCDs for DVT prophylaxis.    Discharge planning to return back to rehab when medically stable. IR to evaluate patient for possible G-J tube exchange.   Care and plan needs to discuss with thoracic team in AM rounds.
Right breast cancer with mets to bone, multiple lytic lesion on the spine  Currently receiving chemotherapy, Letrozole  Follow up with oncologist Dr. Veliz [place]
Right breast cancer with mets to bone, multiple lytic lesion on the spine  Currently receiving chemotherapy, Letrozole  Follow up with oncologist Dr. Veliz [place]
Right breast cancer with mets to bone, multiple lytic lesion on the spine  Currently receiving chemotherapy, Letrozole  Follow up with oncologist Dr. Veliz [place]

## 2020-07-23 NOTE — PROGRESS NOTE ADULT - ASSESSMENT
70F, well known to thoracic surgery service, pmhx of MS (wheelchair bound), and metastatic breast cancer to pelvis, thoracic, lumbar spine, chronic indwelling campos, chronic unstageable sacral wound, with recent empyema requiring chest tube found to have esophageal perforation, s/p R chest washout, decortication and esophageal stent placement 5/13, subsequent open G-J tube placement 5/15. Patient presented from rehab with dislodged chest tube 7/12, CXR without obvious pneumothorax, however, SOURAV not holding self suction concerning for air leak, so patient was admitted to the thoracic surgery service. Right sided vaibhav drain attached to pleurvac with noted airleak (resolving) and continued purulent drainage. Pleural fluid +yeast and pseudomonas and urine with proteus>100k. ID was consulted started antibiotics and antifungal agents (7/14). Noted drug reaction (rashes and itchiness), Meropenum discontinued started on Levofloxacin. Linezolid was d/c on 7/15. Currently on IV Levaquin, flagyl, and diflucan, needed to complete 4 week course (end date 8/9/2020) per ID. LUE midline placed 7/15 for proper IV access.  Patient with noted cracked and dislodged G-J tube 7/16, now s/p IR gastrojejunostomy tube replacement 7/17. 7/18 Fluid leak around GJ tube, GJ snugged down. 7/20 Snugged GJ tube, currently at 3.5 cm. 7/21: Meclizine started for repeated dizziness/ vertigo. 7/22 CT head with IV contrast to r/o metastasis to brain: Mild periventricular and deep LEFT frontal and parietal white matter ischemia, 6 mm enhancing lesion at the foramen of Leroy. Global atrophy.

## 2020-07-23 NOTE — PROGRESS NOTE ADULT - PROVIDER SPECIALTY LIST ADULT
Infectious Disease
Palliative Care
Palliative Care
Thoracic Surgery
Infectious Disease
Palliative Care
Thoracic Surgery
CT Surgery

## 2020-07-23 NOTE — PROGRESS NOTE ADULT - PROBLEM SELECTOR PROBLEM 5
Impaired gait and mobility
Need for prophylactic measure

## 2020-07-23 NOTE — PROGRESS NOTE ADULT - PROBLEM SELECTOR PROBLEM 4
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

## 2020-07-23 NOTE — PROGRESS NOTE ADULT - REASON FOR ADMISSION
dislodged chest tube

## 2020-07-23 NOTE — PROGRESS NOTE ADULT - PROBLEM SELECTOR PLAN 1
SOURAV bulb to Pleuravac container.   Continue pleuravac to waterseal.  Airleak from possible skin breakdown around tube (resolving).  Repeat CXR in AM.
SOURAV bulb to Pleuravac container. Minimal air leak observed in pleuravac container  Continue pleuravac to waterseal  Repeat CXR in AM
Right Dl to Pleuravac container.   Continue pleuravac to waterseal.  Continue deep breathing, cough an incentive spirometry  Continue chest PT  No need for daily CXR as per Dr. Murcia
Right Dl to Pleuravac container.   Continue pleuravac to waterseal.  Repeat CXR in AM.  Continue deep breathing, cough an incentive spirometry  Continue chest PT
Right Dl to Pleuravac container.   Continue pleuravac to waterseal.  Repeat CXR in AM.  Continue deep breathing, cough an incentive spirometry  Continue chest PT
SOURAV bulb to Pleuravac container.   Continue pleuravac to waterseal.  Airleak from possible skin breakdown around tube (resolving).  Repeat CXR in AM.
SOURAV bulb to Pleuravac container.   Continue pleuravac to waterseal.  Airleak from possible skin breakdown around tube.  Repeat CXR in AM.
SOURAV bulb to Pleuravac container.   Continue pleuravac to waterseal.  Airleak from possible skin breakdown around tube.  Repeat CXR in AM.
SOURAV bulb to Pleuravac container. Minimal air leak observed in pleuravac container  Continue pleuravac to waterseal  Will repeat CXR in AM
SOURAV bulb to Pleuravac container. Minimal air leak observed in pleuravac container  Dr. Murcia aware   Continue pleuravac to waterseal  Will repeat CXR in AM
SOURAV bulb swapped to Pleuravac container. Minimal air leak observed in pleuravac container today.     Air like most likely result of skin breakdown around chest tube, rather than primary lung issue.    d/w Dr. Murcia

## 2020-07-23 NOTE — PROGRESS NOTE ADULT - SUBJECTIVE AND OBJECTIVE BOX
[facility]  INFECTIOUS DISEASES AND INTERNAL MEDICINE at [place]  =======================================================  Phong Wang MD  Diplomates American Board of Internal Medicine and Infectious Diseases  Tel  [phone]  Fax [phone]  =======================================================    [id number]  GEORGE STANLEY  follow up:  chest infection    no new complaints.     =======================================================  REVIEW OF SYSTEMS:  CONSTITUTIONAL:  No Fever or chills  HEENT:  No diplopia or blurred vision.  No earache, sore throat or runny nose.  CARDIOVASCULAR:  No pressure, squeezing, strangling, tightness, heaviness or aching about the chest, neck, axilla or epigastrium.  RESPIRATORY:  No cough, shortness of breath  GASTROINTESTINAL:  No nausea, vomiting or diarrhea.  GENITOURINARY:  No dysuria, frequency or urgency. No Blood in urine  MUSCULOSKELETAL:  no joint aches, no muscle pain  SKIN:  No change in skin, hair or nails.  NEUROLOGIC:  No Headaches, seizures or weakness.  PSYCHIATRIC:  No disorder of thought or mood.  ENDOCRINE:  No heat or cold intolerance  HEMATOLOGICAL:  No easy bruising or bleeding.   =======================================================  Allergies  Sudafed (Other)    ======================================================  Physical Exam:  ============  (see vitals section below)    General:  No acute distress. FRAIL  Eye: Pupils are equal, round and reactive to light, Extraocular movements are intact, Normal conjunctiva.  HENT: Normocephalic, Oral mucosa is moist, No pharyngeal erythema, No sinus tenderness.  Neck: Supple, No lymphadenopathy.  Respiratory: Lungs with diminished air entry right side  RIGHT side chest tube in place  Cardiovascular: Normal rate, Regular rhythm, LOWER extremity edema  Gastrointestinal: Soft, Non-tender, Non-distended, Normal bowel sounds.  Genitourinary: No costovertebral angle tenderness.  + ELIZONDO with clear urine  Lymphatics: No lymphadenopathy neck,   Musculoskeletal: contracted extremities  Integumentary: resolution of rash  Neurologic:  No focal deficits, Cranial Nerves II-XII are grossly intact.  Psychiatric: Appropriate mood & affect.    =======================================================    Vitals:  ============  T(F): 98 (23 Jul 2020 09:14), Max: 98.2 (22 Jul 2020 22:22)  HR: 99 (23 Jul 2020 09:14)  BP: 102/68 (23 Jul 2020 09:14)  RR: 15 (23 Jul 2020 09:14)  SpO2: 96% (23 Jul 2020 09:14) (96% - 99%)  temp max in last 48H T(F): , Max: 98.2 (07-22-20 @ 22:22)    =======================================================  Current Antibiotics:  fluconAZOLE IVPB 400 milliGRAM(s) IV Intermittent every 24 hours  levoFLOXacin IVPB 750 milliGRAM(s) IV Intermittent every 24 hours  metroNIDAZOLE  IVPB 500 milliGRAM(s) IV Intermittent every 8 hours    Other medications:  ascorbic acid 500 milliGRAM(s) Oral daily  chlorhexidine 2% Cloths 1 Application(s) Topical daily  collagenase Ointment 1 Application(s) Topical daily  Dakins Solution - 1/2 Strength 1 Application(s) Topical two times a day  enoxaparin Injectable 40 milliGRAM(s) SubCutaneous daily  ferrous    sulfate Liquid 300 milliGRAM(s) Oral three times a day with meals  folic acid 1 milliGRAM(s) Oral daily  lactobacillus acidophilus 1 Tablet(s) Oral two times a day  latanoprost 0.005% Ophthalmic Solution 1 Drop(s) Both EYES at bedtime  letrozole 2.5 milliGRAM(s) Oral daily  multivitamin/minerals/iron Oral Solution (CENTRUM) 15 milliLiter(s) Enteral Tube daily  nystatin Powder 1 Application(s) Topical two times a day  pantoprazole  Injectable 40 milliGRAM(s) IV Push two times a day  sodium chloride 1 Gram(s) Oral three times a day  sodium chloride 0.9% lock flush 3 milliLiter(s) IV Push every 8 hours      =======================================================  Labs:                        7.5    10.58 )-----------( 360      ( 23 Jul 2020 06:17 )             24.8      07-23    139  |  110<H>  |  25.0<H>  ----------------------------<  101<H>  4.3   |  19.0<L>  |  0.48<L>    Ca    7.9<L>      23 Jul 2020 06:17  Phos  2.8     07-23  Mg     1.7     07-23    TPro  5.0<L>  /  Alb  1.8<L>  /  TBili  <0.2<L>  /  DBili  x   /  AST  24  /  ALT  12  /  AlkPhos  94  07-22      Culture - Urine (collected 07-13-20 @ 08:15)  Source: .Urine Catheterized  Final Report (07-15-20 @ 14:17):    >100,000 CFU/ml Proteus mirabilis    <10,000 CFU/ml Normal Urogenital vince present  Organism: Proteus mirabilis (07-15-20 @ 14:17)  Organism: Proteus mirabilis (07-15-20 @ 14:17)    Sensitivities:      -  Amikacin: S <=16      -  Amoxicillin/Clavulanic Acid: S <=8/4      -  Ampicillin: R >16 These ampicillin results predict results for amoxicillin      -  Ampicillin/Sulbactam: S <=4/2 Enterobacter, Citrobacter, and Serratia may develop resistance during prolonged therapy (3-4 days)      -  Aztreonam: S <=4      -  Cefazolin: S 4 (MIC_CL_COM_ENTERIC_CEFAZU) For uncomplicated UTI with K. pneumoniae, E. coli, or P. mirablis: PAWAN <=16 is sensitive and PAWAN >=32 is resistant. This also predicts results for oral agents cefaclor, cefdinir, cefpodoxime, cefprozil, cefuroxime axetil, cephalexin and locarbef for uncomplicated UTI. Note that some isolates may be susceptible to these agents while testing resistant to cefazolin.      -  Cefepime: S <=2      -  Cefoxitin: S <=8      -  Ceftriaxone: S <=1 Enterobacter, Citrobacter, and Serratia may develop resistance during prolonged therapy      -  Ciprofloxacin: R 2      -  Ertapenem: S <=0.5      -  Gentamicin: R >8      -  Levofloxacin: R 2      -  Meropenem: S <=1      -  Nitrofurantoin: R 64 Should not be used to treat pyelonephritis      -  Piperacillin/Tazobactam: S <=8      -  Tobramycin: R >8      -  Trimethoprim/Sulfamethoxazole: S <=0.5/9.5      Method Type: PAWAN    Culture - Fungal, Body Fluid (collected 07-13-20 @ 03:07)  Source: .Body Fluid Pleural Fluid    Culture - Body Fluid with Gram Stain (collected 07-13-20 @ 03:07)  Source: .Body Fluid Pleural Fluid  Gram Stain (07-13-20 @ 05:06):    Numerous polymorphonuclear leukocytes seen per low power field    Numerous Gram Negative Rods seen per oil power field  Final Report (07-18-20 @ 07:57):    Numerous Pseudomonas aeruginosa    Few Cassidy albicans "Susceptibilities not performed"    Few Klebsiella pneumoniae    Rare Enterococcus faecalis    Numerous Bacteroides fragilis "Susceptibilities not performed"  Organism: Pseudomonas aeruginosa  Klebsiella pneumoniae  Enterococcus faecalis (07-18-20 @ 07:57)  Organism: Enterococcus faecalis (07-18-20 @ 07:57)    Sensitivities:      -  Ampicillin: S <=2 Predicts results to ampicillin/sulbactam, amoxacillin-clavulanate and  piperacillin-tazobactam.      -  Levofloxacin: S 1      -  Tetra/Doxy: R >8      -  Vancomycin: S 2      Method Type: PAWAN  Organism: Klebsiella pneumoniae (07-18-20 @ 07:57)    Sensitivities:      -  Amikacin: S <=16      -  Amoxicillin/Clavulanic Acid: S <=8/4      -  Ampicillin: R >16 These ampicillin results predict results for amoxicillin      -  Ampicillin/Sulbactam: S <=4/2 Enterobacter, Citrobacter, and Serratia may develop resistance during prolonged therapy (3-4 days)      -  Aztreonam: S <=4      -  Cefazolin: S <=2 Enterobacter, Citrobacter, and Serratia may develop resistance during prolonged therapy (3-4 days)      -  Cefepime: S <=2      -  Cefoxitin: S <=8      -  Ceftriaxone: S <=1 Enterobacter, Citrobacter, and Serratia may develop resistance during prolonged therapy      -  Ciprofloxacin: S <=0.25      -  Ertapenem: S <=0.5      -  Gentamicin: S <=2      -  Imipenem: S <=1      -  Levofloxacin: S <=0.5      -  Meropenem: S <=1      -  Piperacillin/Tazobactam: S <=8      -  Tobramycin: S <=2      -  Trimethoprim/Sulfamethoxazole: S <=0.5/9.5      Method Type: PAWAN  Organism: Pseudomonas aeruginosa (07-18-20 @ 07:57)    Sensitivities:      -  Amikacin: S <=16      -  Aztreonam: S <=4      -  Cefepime: S <=2      -  Ceftazidime: S <=1      -  Ciprofloxacin: S <=0.25      -  Gentamicin: S <=2      -  Imipenem: S <=1      -  Levofloxacin: S <=0.5      -  Meropenem: S <=1      -  Piperacillin/Tazobactam: S <=8      -  Tobramycin: S <=2      Method Type: PAWAN    Culture - Blood (collected 07-12-20 @ 22:43)  Source: .Blood Blood-Venous  Final Report (07-17-20 @ 23:01):    No growth at 5 days.    Culture - Blood (collected 07-12-20 @ 22:24)  Source: .Blood Blood-Venous  Final Report (07-17-20 @ 23:01):    No growth at 5 days.      Creatinine, Serum: 0.48 mg/dL (07-23-20 @ 06:17)  Creatinine, Serum: 0.43 mg/dL (07-22-20 @ 12:27)  Creatinine, Serum: 0.42 mg/dL (07-21-20 @ 06:45)  Creatinine, Serum: 0.47 mg/dL (07-20-20 @ 10:10)  Creatinine, Serum: 0.50 mg/dL (07-19-20 @ 06:38)    Procalcitonin, Serum: 0.19 ng/mL (07-21-20 @ 06:45)      WBC Count: 10.58 K/uL (07-23-20 @ 06:17)  WBC Count: 14.19 K/uL (07-22-20 @ 12:27)  WBC Count: 12.93 K/uL (07-21-20 @ 06:45)  WBC Count: 11.65 K/uL (07-20-20 @ 09:46)  WBC Count: 9.10 K/uL (07-19-20 @ 06:38)      COVID-19 PCR: NotDetec (07-12-20 @ 14:06)    Lactate Dehydrogenase, Serum: 268 U/L (07-12-20 @ 22:20)  Lactate Dehydrogenase, Serum: 199 U/L (07-12-20 @ 17:00)    Alkaline Phosphatase, Serum: 94 U/L (07-22-20 @ 12:27)  Alkaline Phosphatase, Serum: 92 U/L (07-20-20 @ 10:10)  Alanine Aminotransferase (ALT/SGPT): 12 U/L (07-22-20 @ 12:27)  Alanine Aminotransferase (ALT/SGPT): 14 U/L (07-20-20 @ 10:10)  Aspartate Aminotransferase (AST/SGOT): 24 U/L (07-22-20 @ 12:27)  Aspartate Aminotransferase (AST/SGOT): 18 U/L (07-20-20 @ 10:10)  Bilirubin Total, Serum: <0.2 mg/dL (07-22-20 @ 12:27)  Bilirubin Total, Serum: <0.2 mg/dL (07-20-20 @ 10:10)

## 2020-07-23 NOTE — PROGRESS NOTE ADULT - PROBLEM SELECTOR PLAN 4
Plan as above.  Pleural fluid triglyceride level 268 on 7/12.
as above  Pleural fluid triglyceride level 268 on (7/12)
as above

## 2020-07-23 NOTE — PROGRESS NOTE ADULT - PROBLEM SELECTOR PLAN 2
S/p esophageal stent 5/13.  Clear liquid PO resumed 7/13. Patient tolerating well.   Repeat esophagogram 7/15 shows residual contrast in the esophagus and adjacent to the stent, repeat CXR film reviewed by Dr. Murcia.  Will continue monitor.  Repeat CXR in AM.  Patient tolerating tube feeds and clears PO.  Monitoring for drainage from around G-J tube site.
S/p esophageal stent 5/13.  Clear liquid PO resumed 7/13, tolerating  Repeat esophagogram 7/15 shows residual contrast in the esophagus and adjacent to the stent, repeat CXR with improvements   Will continue monitor.  Patient tolerating tube feeds via J tube and clears orally.  GJ tube snugged for leak, currently at 3.5 cm
S/p esophageal stent 5/13.  Clear liquid PO resumed 7/13, tolerating well.   Repeat esophagogram 7/15 shows residual contrast in the esophagus and adjacent to the stent, repeat CXR with improvements   Will continue monitor.  CXR in AM.  Patient tolerating tube feeds via J tube and clears orally.  GJ tube snugged for leak, currently at 3.5 cm
S/p esophageal stent 5/13.  Clear liquid PO resumed 7/13. Patient tolerating well.   Repeat esophagogram 7/15 shows residual contrast in the esophagus and adjacent to the stent, repeat CXR film reviewed by Dr. Murcia.  Will continue monitor.  Repeat CXR in AM.  Patient tolerating tube feeds and clears PO.
S/p esophageal stent 5/13.  Clear liquid PO resumed 7/13. Patient tolerating well.   Repeat esophagogram 7/15 shows residual contrast in the esophagus and adjacent to the stent, repeat CXR film reviewed by Dr. Murcia.  Will continue monitor.  Repeat CXR in AM.  Patient tolerating tube feeds and clears PO.  Monitoring for drainage from around G-J tube site.
S/p esophageal stent 5/13.  Clear liquid PO resumed 7/13. Patient tolerating well.   Repeat esophagogram 7/15 shows residual contrast in the esophagus and adjacent to the stent, repeat CXR film reviewed by Dr. Murcia.  Will continue monitor.  Repeat CXR in AM.  Patient was tolerating tube feeds, now G-J tube dislodged.   IR consult placed for possible tube exchange.
S/p esophageal stent 5/13.  Clear liquid PO resumed 7/13. Patient tolerating well.   Repeat esophagogram 7/15 shows residual contrast in the esophagus and adjacent to the stent, repeat CXR with improvements   Will continue monitor.  CXR in AM.  Patient tolerating tube feeds via J tube and clears orally.  Monitoring for drainage from around G-J tube site.
s/p esophageal stent 5/13  Tube feeds resumed, tolerating  Clear liquid resumed 7/13, tolerating, no fluid collection noted in pleuravac after patient consumes   po diet   Will continue to monitor pleuravac drain to determine if there is any residual perforation.
s/p esophageal stent 5/13  Tube feeds resumed, tolerating  Clear liquid resumed 7/13, tolerating, no fluid collection noted in pleuravac after patient consumes po diet   Repeat esophagogram (7/15) shows residual contrast in the esophagus and adjacent to the stent, repeat CXR film reviewed by Dr. Murcia  Will continue monitor  Repeat CXR in AM
s/p esophageal stent 5/13  Tube feeds resumed, tolerating  Clear liquid resumed 7/13, tolerating, no fluid collection noted in pleuravac after patient consumes po diet   Will continue to monitor pleuravac drain to determine if there is any residual perforation.
s/p esophageal stent 5/13    Tube feeds resumed yesterday PM. Tolerating well. To resume clear diet today. Plan to monitor pleuravac chamber to see if any further fluid collection after patient consumes clear diet in order to determine if there is any residual perforation.

## 2020-07-23 NOTE — PROGRESS NOTE ADULT - PROBLEM SELECTOR PROBLEM 6
Need for prophylactic measure
Carcinoma of right breast metastatic to bone
Need for prophylactic measure

## 2020-07-29 NOTE — CDI QUERY NOTE - NSCDIOTHERTXTBX_GEN_ALL_CORE_HH
The patient received a nutrition assessment by a Registered Dietician on 7/16.  The documentation of this assessment states:  Severe Protein Calorie Malnutrition       Please specify the clinical significance of these findings based on your clinical judgement such as:    -	 Severe Protein Calorie Malnutrition  -	Other (please specify)  -	Clinically unable to be determined      Please document your response in your progress notes and/or discharge summary as appropriate.      Chart Documentation:    Nutrition diagnosis yes...     Nutrition Diagnostic Terminology #1 Malnutrition...     Malnutrition Severe-Chronic.     Etiology Related to inability to meet sufficient protein energy requirements in setting of healing with multiple areas of skin breakdown and recent hospitalizations w/ multiple complications.     Signs/Symptoms as evidenced physicals signs of muscle mass and fat loss, wt loss  and fluid accumulation.       · Note Type  Nutrition Services      Upon Nutritional Assessment by the Registered Dietitian your patient was determined to meet criteria / has evidence of the following diagnosis/diagnoses:          [ ]  Mild Protein Calorie Malnutrition        [ ]  Moderate Protein Calorie Malnutrition        [x ] Severe Protein Calorie Malnutrition        [ ] Unspecified Protein Calorie Malnutrition        [ ] Underweight / BMI <19        [ ] Morbid Obesity / BMI > 40      Findings as based on:  •  Comprehensive nutrition assessment and consultation  •  Calorie counts (nutrient intake analysis)  •  Food acceptance and intake status from observations by staff  •  Follow up  •  Patient education  •  Intervention secondary to interdisciplinary rounds  •   concerns      Treatment:    The following diet has been recommended:    Change enteral formula to Pivot @ 55ml/hr

## 2020-08-12 LAB
CULTURE RESULTS: SIGNIFICANT CHANGE UP
SPECIMEN SOURCE: SIGNIFICANT CHANGE UP

## 2020-09-25 NOTE — ED ADULT TRIAGE NOTE - STATUS:
Intact Implemented All Universal Safety Interventions:  [place] to call system. Call bell, personal items and telephone within reach. Instruct patient to call for assistance. Room bathroom lighting operational. Non-slip footwear when patient is off stretcher. Physically safe environment: no spills, clutter or unnecessary equipment. Stretcher in lowest position, wheels locked, appropriate side rails in place.

## 2020-10-20 NOTE — ED ADULT NURSE NOTE - ED STAT RN HANDOFF DETAILS
What Type Of Note Output Would You Prefer (Optional)?: Standard Output
What Is The Reason For Today's Visit?: Full Body Skin Examination
What Is The Reason For Today's Visit? (Being Monitored For X): concerning skin lesions on an annual basis
How Severe Are Your Spot(S)?: mild
Handoff report given to Akin ALDRICH

## 2020-11-06 NOTE — PROGRESS NOTE ADULT - PROVIDER SPECIALTY LIST ADULT
CT Surgery
Infectious Disease
Intervent Radiology
Intervent Radiology
Palliative Care
Plastic Surgery
Rehab Medicine
Thoracic Surgery
No oral lesions; no gross abnormalities

## 2020-11-10 NOTE — H&P ADULT - PMH
Patient has a cough and all she wants to do is lay around and sleep. Requesting MD order chest xray to make sure she doesn't have a blood clot.  
Returned call to patient's daughter Jeanne,  States patient has not been feeling well since over the weekend,  Is groggy and sleeping a lot,  Has been coughing and having body aches,  Denies SOB or fevers, denies chest pain,  Has hx of blood clots in lungs and also UTI, which she tends to act this way when she gets these,  Instructed daughter to take her to the ER for evaluation,  MD informed,  No further concerns or questions.      
Breast CA    MS (mitral stenosis)    Multiple sclerosis    Osteoporosis    S/P mastectomy, right

## 2021-02-24 NOTE — ED ADULT NURSE NOTE - CHIEF COMPLAINT QUOTE
If you had a biopsy, you should not take aspirin or aspirin like products for the next 10 days unless instructed to do so by your doctor. If you had a biopsy, check with your doctor before taking any blood thinners such as warfarin (Coumadin). s/p multiple fall hx MS  c/o pain rt ankle bilat arms

## 2021-05-05 NOTE — PROGRESS NOTE ADULT - SUBJECTIVE AND OBJECTIVE BOX
no lesions,  no deformities,  no traumatic injuries,  no significant scars are present,  chest wall non-tender,  no masses present, breathing is unlabored without accessory muscle use, normal breath sounds Patient is a 66y old  Female who presents with a chief complaint of Nausea and vomiting (12 May 2017 16:16)      INTERVAL HPI/OVERNIGHT EVENTS: Patient seen and examined. NAD. No complaints. Feels better today.      Vital Signs Last 24 Hrs  T(C): 36.9, Max: 37.4 ( @ 22:09)  T(F): 98.4, Max: 99.3 ( @ 22:09)  HR: 78 (73 - 82)  BP: 133/64 (118/66 - 134/64)  BP(mean): --  RR: 17 (16 - 17)  SpO2: 98% (92% - 99%)I&O's Summary    I & Os for current day (as of 14 May 2017 09:57)  =============================================  IN: 765 ml / OUT: 2250 ml / NET: -1485 ml      LABS:                        10.3   8.7   )-----------( 319      ( 14 May 2017 07:42 )             32.4     05-    141  |  99  |  26<H>  ----------------------------<  78  3.2<L>   |  29  |  0.77    Ca    8.9      14 May 2017 07:42  Mg     1.8             Urinalysis Basic - ( 12 May 2017 20:42 )    Color: Yellow / Appearance: Clear / S.015 / pH: x  Gluc: x / Ketone: Negative  / Bili: Negative / Urobili: Negative   Blood: x / Protein: 25 mg/dL / Nitrite: Negative   Leuk Esterase: Negative / RBC: 0-2 /HPF / WBC 6-10   Sq Epi: x / Non Sq Epi: Few / Bacteria: Occasional      CAPILLARY BLOOD GLUCOSE    blood culture --   @ 00:05     urine culture --   @ 00:05  results   No growth        latanoprost 0.005% Ophthalmic Solution 1Drop(s) Both EYES at bedtime  interferon beta-1a Injectable (AVONEX) 30MICROGram(s) IntraMuscular every 7 days  sodium chloride 0.9%. 1000milliLiter(s) IV Continuous <Continuous>  enalaprilat Injectable 1.25milliGRAM(s) IV Push every 8 hours PRN  cefTRIAXone   IVPB  IV Intermittent   cefTRIAXone   IVPB 1Gram(s) IV Intermittent every 24 hours  enoxaparin Injectable 40milliGRAM(s) SubCutaneous daily  potassium chloride  10 mEq/100 mL IVPB 10milliEquivalent(s) IV Intermittent every 1 hour      REVIEW OF SYSTEMS:  CONSTITUTIONAL: No fever, weight loss, or fatigue  NECK: No pain or stiffness  RESPIRATORY: No cough, wheezing, chills or hemoptysis; No shortness of breath  CARDIOVASCULAR: No chest pain, palpitations, dizziness, or leg swelling  GASTROINTESTINAL: No abdominal or epigastric pain. No nausea, vomiting, or hematemesis; No diarrhea or constipation. No melena or hematochezia.  GENITOURINARY: No dysuria, frequency, hematuria, or incontinence  NEUROLOGICAL: No headaches, loss of strength, numbness, or tremors  SKIN: No itching, burning  MUSCULOSKELETAL: No joint pain or swelling; No muscle, back, or extremity pain  PSYCHIATRIC: No depression, mood swings, HEME/LYMPH: No easy bruising, or bleeding gums  ALLERY AND IMMUNOLOGIC: No hives     RADIOLOGY & ADDITIONAL TESTS:    Imaging Personally Reviewed:  [ ] YES  [ ] NO    Consultant(s) Notes Reviewed:  [x ] YES  [ ] NO    PHYSICAL EXAM:  GENERAL: NAD, well-groomed, well-developed  HEAD:  Atraumatic, Normocephalic, NGT in place  EYES: EOMI, PERRLA, conjunctiva and sclera clear  ENMT: No tonsillar erythema, exudates, or enlargement; Moist mucous membranes  NECK: Supple, No JVD  NERVOUS SYSTEM:  Awake & alert, Good concentration  CHEST/LUNG: Clear to auscultation bilaterally; No rales, rhonchi, wheezing,  HEART: Regular rate and rhythm  ABDOMEN: Soft, Nontender, Nondistended; Bowel sounds present  EXTREMITIES:  No clubbing, cyanosis, or edema  LYMPH: No lymphadenopathy noted  SKIN: No rashes    Care Discussed with Consultants/Other Providers [ ] YES  [ ] NO

## 2021-09-21 NOTE — ED ADULT TRIAGE NOTE - TEMPERATURE IN CELSIUS (DEGREES C)
[FreeTextEntry1] : headache [de-identified] : here for headache, not new onset. Has had for many years but has become more frequent with covid last year. Associated with flasing lights. Improved with 2 motrin 200mg but only for a few hours.\par \par Also c/o peristent LUQ pain and burning up chest 36.9

## 2021-09-27 NOTE — PROGRESS NOTE ADULT - PROBLEM SELECTOR PLAN 5
Wound care consult / plastic surgery consult appreciated.   Wound care recommends Santyl and plastics eval.  Dr. Parrish from plastics recommends Dakins soaked packing to clean wound bid.  Air flow mattress.  Turn and position q2 hours.  Optimize nutrition via feeding tube, vitamins supplements to add when tolerating PO Health Care Proxy (HCP)

## 2022-03-30 NOTE — PATIENT PROFILE ADULT. - FUNCTIONAL SCREEN CURRENT LEVEL: BATHING, MLM
Other (Free Text): Only hands affected now. Mostly left palm.
Note Text (......Xxx Chief Complaint.): This diagnosis correlates with the
Detail Level: Zone
(2) assistive person
Render Risk Assessment In Note?: no
Other (Free Text): Labs next visit

## 2022-04-29 NOTE — ED ADULT NURSE NOTE - OBJECTIVE STATEMENT
Pharmacy Vancomycin Consult     Vancomycin Day: 3  Current Dosinmg q24h  Current indication: Bone/Joint infection    Temp max:      Recent Labs     22  0518 22  0425   BUN 46* 39*   CREATININE 1.7* 1.6*   WBC 8.6 8.3       Intake/Output Summary (Last 24 hours) at 2022 1518  Last data filed at 2022 1252  Gross per 24 hour   Intake 590 ml   Output --   Net 590 ml     Culture Date      Source                       Results  Wound : MRSA    Ht Readings from Last 1 Encounters:   22 6' 1\" (1.854 m)        Wt Readings from Last 1 Encounters:   22 248 lb 1.6 oz (112.5 kg)       Body mass index is 32.73 kg/m². Estimated Creatinine Clearance: 62 mL/min (A) (based on SCr of 1.6 mg/dL (H)).     Level: 8.5 ()    Assessment/Plan:  Will increase Vanc to 1500mg q24h, , get level on 1st at 1500 pt was recently discharged from [place] [facility] while she was there back in July she had a fall off a Obdulia lift- family state that they did xrays at the time but were never given the results- pt cont to c/o back and knee pain - MD sent here for Xrays - pt with indwelling catheter -d/demarco from rehab with it -does not remember when it was placed- pt with MS and is unable to bear weight -braces to both lower legs -

## 2022-05-09 NOTE — H&P ADULT - PROBLEM SELECTOR PLAN 5
OB 1    CHIEF COMPLAINT:    Chief Complaint   Patient presents with   • Initial Prenatal Visit         HISTORY OF PRESENT ILLNESS:  Patient is a 35 year old  alert female who presents today for her first OB visit at 20w1d by ultrasound @12w2d.  Her SALLY is 2022. [facility] seeking [facility]ifery care. She reports no concerns today. She completed FOB labs and dating ultrasound.     Patient's last menstrual period was 2021.    ALLERGIES:   Allergen Reactions   • Penicillins RASH     Other reaction(s): SKIN - rash         Current Outpatient Medications   Medication Sig   • Prenatal Vit-Fe Fumarate-FA (multivitamin & mineral w/folic acid- PRENATAL) 27-1 MG Tab Take 1 tablet by mouth daily.   • aspirin (ECOTRIN) 81 MG EC tablet Take 81 mg by mouth daily.   • nalTREXone (REVIA) 50 MG tablet Take 1 tablet by mouth daily.   • traZODone (DESYREL) 50 MG tablet Take 1 tablet by mouth nightly. Indications: Trouble Sleeping   • DULoxetine (CYMBALTA) 20 MG capsule Take 1 capsule by mouth daily. Indications: Major Depressive Disorder   • ferrous sulfate 325 (65 FE) MG tablet Take 1 tablet by mouth daily (with breakfast).   • hydrOXYzine (ATARAX) 50 MG tablet Take 1 tablet by mouth every 8 hours as needed for Anxiety.   • ergocalciferol (DRISDOL) 1.25 mg (50,000 units) capsule Take 1 capsule by mouth 1 day a week.     Current Facility-Administered Medications   Medication   • divalproex (DEPAKOTE ER) 24 hr ER tablet 250 mg       Past Medical History:   Diagnosis Date   • Allergy    • Contact dermatitis and other eczema due to other specified agent     eczema   • RAD (reactive airway disease)    • Varicella without mention of complication             Past Surgical History:   Procedure Laterality Date   • Past surgical history      None       Social History     Socioeconomic History   • Marital status: Single     Spouse name: Not on file   • Number of children: 0   • Years of  education: Not on file   • Highest education level: Not on file   Occupational History   • Occupation: [employer]   Tobacco Use   • Smoking status: Never Smoker   • Smokeless tobacco: Never Used   Vaping Use   • Vaping Use: never used   Substance and Sexual Activity   • Alcohol use: Not Currently   • Drug use: No   • Sexual activity: Not Currently   Other Topics Concern   • Not on file   Social History Narrative   • Not on file     Social Determinants of Health     Financial Resource Strain: Not on file   Food Insecurity: Not on file   Transportation Needs: Not on file   Physical Activity: Not on file   Stress: Not on file   Social Connections: Not on file   Intimate Partner Violence: At Risk   • Social Determinants: Intimate Partner Violence Past Fear: Yes   • Social Determinants: Intimate Partner Violence Current Fear: No                     Family History   Problem Relation Age of Onset   • Asthma Mother    • Allergies Mother    • Thyroid Mother    • Diabetes Brother    • Asthma Sister    • Diabetes Sister        OB History    Para Term  AB Living   1 0 0 0 0 0   SAB IAB Ectopic Molar Multiple Live Births   0 0 0 0 0 0     Gynecological History:     Menses are 28-30 days apart, lasting 5-7 days and moderate in consistency.   Abilio Whittington has positive history of sexually transmitted diseases, Gonorrhea.  Abilio Whittington has no history of abnormal Pap smears.   Last Pap result was Normal.    GInfection Risk History:  High Risk Hepatitis B  NO  Immunized against Hepatitis B  YES  Exposure to TB  NO  Patient with history of Genital Herpes  NO  Sexual partner with history of Genital Herpes  NO  History of sexually transmitted disease (GC, Chlamydia, Syphilis, HPV)  YES  Rash, Viral, or Febrile Illness since last menstrual period  NO  Exposure to Cat Litter  NO  Chicken Pox Immune Status  Hx of Disease:  Immune  History of Parvovirus (Fifth Disease)  NO  Occupational Exposure to Children   none    Environmental Exposures  Xray exposure  NO  Medication, drug or alcohol use since last menstrual period YES  Chemical/Other Exposure  NO    Low Dose Aspirin Therapy:   Abilio Whittington has no prior history of pregnancy, first pregnancy. She does not have a prior condition qualifying her for aspirin therapy. Currently taking.     Early Gestational Diabetes Mellitus Screening:   Abilio Whittington's risk factors for GDM include AMA status.  She does qualify for early GDM screening.    REVIEW OF SYSTEMS:  See HPI  Constitutional:  Denies Headache.  No weight changes.  No fevers or chills.    HEENT:  Denies vision changes or hearing changes. No sinus problems.  Breasts:  Denies breast masses, pain or nipple discharge.    Respiratory:  No breathing issues, cough or shortness of breath  Cardiovascular:  Denies chest pain, syncope or palpitations.    GI:  Denies nausea, vomiting, diarrhea, or constipation  Endocrine:  Denies hot flashes, night sweats, heat or cold intolerance.  Hematologic:  Denies easy bruising or bleeding disorders.  Allergies/Immunologic:  Denies seasonal allergies or any history of immunologic disorders  Neurologic:  Normal sensation and motor control.  No history of seizures or syncope.  Musculoskeletal:  Denies joint pain, swelling, or erythema  Skin:  Denies rashes, significant lesions or pruritus.  Psychiatric:  Denies anxiety, depression, memory deficits, and appetite or sleep changes.    PHYSICAL EXAM  Visit Vitals  /68 (BP Location: RUE - Right upper extremity, Patient Position: Sitting, Cuff Size: Regular)   Pulse (!) 105   Wt 71.5 kg (157 lb 11.2 oz)   LMP 2021   BMI 28.84 kg/m²     Labs: negative AFP; negative NIPT; Antibody screen negative; Hep B neg; Rubella/Varicella immune; RPR nonreactive; O positive; HIV nonreactive; Hgb electro negative; Ct negative/GC pos    ASSESSMENT:  35 year old  at 20w1d weeks by Patient's last menstrual period was 2021.  here for  first OB visit.  Patient Active Problem List   Diagnosis   • ECZEMA   • Pityriasis rosea   • Allergic rhinitis   • Severe episode of recurrent major depressive disorder, without psychotic features (CMS/HCC)   • Alcohol withdrawal syndrome with complication (CMS/HCC)   • At high risk for breast cancer   • FH: breast cancer in first degree relative when <50 years old   • AMA (advanced maternal age) primigravida 35+       PLAN:  Discussed [facility] Certified Nurse Midwife group as well as how we collaborate with our physician colleagues, including transfer of care protocols.   Discussed timing of visits, prenatal labs and ultrasounds during pregnancy, our group's call schedule, the option to meet other CNMs by scheduling some prenatal visits with other CNMs,the number to call to speak with a CNM after clinic hours, OB Triage and the location of the Birth Center.   Continue prenatal vitamin.    Patient pregnancy folder given and stressed for her to review information on:  Balanced diet, limited fish, avoidance of exposure to cat feces, safe food, excesses in diet, vitamins or exercise, safe medications, high risk sexual partners.  Anatomy US at [facility] on 05/13    Advanced maternal age   -Plan for Growth US @32weeks   -Taaking 81 mg daily ASA    GBS bacteruria   -Plan for PCN sensitivity screen as she has PCN allergy   -Plan for Vancomyin if true PCN allergy     Positive gonorrhea   -DEREK collected and sent today     Bev Montiel CNM     cont Ceftin patient is currently asymptomatic   as Per Dr Oropeza wbc count elevation is likely reactive   no abx at this point

## 2022-06-10 NOTE — ED PROVIDER NOTE - CLINICAL SUMMARY MEDICAL DECISION MAKING FREE TEXT BOX
[Care Plan reviewed and provided to patient/caregiver] : Care plan reviewed and provided to patient/caregiver indwelling cath- ro uti change cath  wound care evaluation for decub  in pt with ms wheel chair bound unable to ambulate

## 2022-06-13 NOTE — BRIEF OPERATIVE NOTE - VENOUS THROMBOEMBOLISM PROPHYLAXIS THERAPY
Epidural Block      Patient reassessed immediately prior to procedure    Patient location during procedure: OB  Start Time: 6/13/2022 11:50 AM  Stop Time: 6/13/2022 12:04 PM  Performed By  CRNA/NIMESH: Amrit Kim CRNASRNA: Rachael Olivares SRNA  Preanesthetic Checklist  Completed: patient identified, IV checked, site marked, risks and benefits discussed, surgical consent, monitors and equipment checked, pre-op evaluation and timeout performed  Prep:  Pt Position:sitting  Sterile Tech:cap, gloves, mask and sterile barrier  Prep:chlorhexidine gluconate and isopropyl alcohol  Monitoring:blood pressure monitoring, continuous pulse oximetry and EKG  Epidural Block Procedure:  Approach:midline  Guidance:landmark technique and palpation technique  Location:lumbar  Level:3-4  Needle Type:Tuohy  Needle Gauge:17 G  Loss of Resistance Medium: saline  Loss of Resistance: 6cm  Cath Depth at skin:12 cm  Paresthesia: none  Aspiration:negative  Test Dose:negative  Post Assessment:  Dressing:occlusive dressing applied and secured with tape  Pt Tolerance:patient tolerated the procedure well with no apparent complications and no signs or symtoms of SAB or intravascular injection  Complications:no            
SCDs
SCDs

## 2022-08-16 NOTE — PROGRESS NOTE ADULT - ASSESSMENT
HPI: This 69 year old non-ambulatory Female with a PMHx significant for multiple sclerosis, Breast Cancer s/p R mastectomy, Osteoporosis, Mitral stenosis, right ankle fracture, chronic right sided sacral ulcer, chronic midline back pain since being dropped from a jame lift (7/17/29), admitted to [place] after presenting with sepsis in the setting of a UTI with chronic campos use and known large right ischial decubitus ulcer. Patient found to have a Moderately large Right hydropneumothorax resulting in partial right lung collapse and slight cardiomediastinal shift to the left on CXR with chest tube placed 5/8/20. Large bore chest tube placed 5/10/20 for persistent Right pneumothorax. Patient was followed by ID and was given Vanco and Zosyn originally for her presumed urosepsis, Caspofungin was added after pleural fluid was + for fungal elements. CT chest confirmed +Empyema in the Right pleural space. Patient ultimately transferred to [facility] late 5/11/20 after she was found to have a distal esophageal perforation on CT chest and Esophogram.     Of note, patient admitted 10/9/2019 for lower back and bilateral knee pain, found to have compression fracture of L2 with imaging subsequently found to have multiple lytic lesions on the spine and pelvis.  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 Matthew Fairbanks. (12 May 2020 02:24)    patient is admitted to surgical service, pre-operatively planned for an esophageal stent 5/13.  Patient's labs/ cultures from [facility] reviewed.     Empyema with polymicrobial infection:  Strep mitis infection  Klebsiella oxytoca infection  CoNS infection  Perforated esophagus  Fevers    Plan:  s/p esophageal stent  and VATS 5/13  s/p PEJ on 5/15    YEAST from fluid culture being worked up  NOT Candida auris; specimen sent to [facility] labs      COMPLETED CASPO/ VANCO         - Continue MERREM   original end date was  6/9/2020;    BLOOD CX sent 5/28; REPEAT blood cx 5/29  WILL MAINTAIN same end date of 6/9/2020    - continue Chest tubes    - regarding DECUBITUS Ulcer, present prior to admission in this bed-ridden patient, CLINICAL osteomyelitis  - continue wound care even after course of Antibiotics /

## 2022-12-19 NOTE — ED PROVIDER NOTE - CPE EDP GASTRO NORM
Last visit: 1103/2022  Last Med refill: 10/04/2022  Does patient have enough medication for 72 hours: No:       Next Visit Date:  Future Appointments   Date Time Provider Veena Stanley   2/3/2023  1:45 PM VIVIENNE Morgan NP [address] Maintenance   Topic Date Due    Diabetic foot exam  Never done    HIV screen  Never done    Diabetic microalbuminuria test  Never done    Diabetic retinal exam  Never done    Hepatitis C screen  Never done    A1C test (Diabetic or Prediabetic)  12/29/2022    COVID-19 Vaccine (2 - Pfizer series) 12/30/2022 (Originally 5/20/2021)    Flu vaccine (1) 09/29/2023 (Originally 8/1/2022)    Hepatitis B vaccine (1 of 3 - Risk 3-dose series) 11/03/2023 (Originally 6/15/1997)    Pneumococcal 0-64 years Vaccine (1 - PCV) 11/03/2023 (Originally 6/15/1984)    Lipids  09/29/2023    Depression Monitoring  09/29/2023    DTaP/Tdap/Td vaccine (4 - Td or Tdap) 04/08/2028    Hepatitis A vaccine  Aged Out    Hib vaccine  Aged Out    Meningococcal (ACWY) vaccine  Aged Out       Hemoglobin A1C (%)   Date Value   09/29/2022 13.1 (H)             ( goal A1C is < 7)   No results found for: LABMICR  LDL Cholesterol (mg/dL)   Date Value   09/29/2022 134 (H)       (goal LDL is <100)   AST (U/L)   Date Value   09/29/2022 28     ALT (U/L)   Date Value   09/29/2022 20     BUN (mg/dL)   Date Value   09/29/2022 11     BP Readings from Last 3 Encounters:   11/03/22 124/80   09/29/22 128/82   04/21/22 (!) 142/84          (goal 120/80)    All Future Testing planned in [facility]              There is no problem list on file for this patient. - - -

## 2023-04-26 NOTE — PROGRESS NOTE ADULT - ASSESSMENT
HPI: This 69 year old non-ambulatory Female with a PMHx significant for multiple sclerosis, Breast Cancer s/p R mastectomy, Osteoporosis, Mitral stenosis, right ankle fracture, chronic right sided sacral ulcer, chronic midline back pain since being dropped from a jame lift (7/17/29), admitted to [place] after presenting with sepsis in the setting of a UTI with chronic campos use and known large right ischial decubitus ulcer. Patient found to have a Moderately large Right hydropneumothorax resulting in partial right lung collapse and slight cardiomediastinal shift to the left on CXR with chest tube placed 5/8/20. Large bore chest tube placed 5/10/20 for persistent Right pneumothorax. Patient was followed by ID and was given Vanco and Zosyn originally for her presumed urosepsis, Caspofungin was added after pleural fluid was + for fungal elements. CT chest confirmed +Empyema in the Right pleural space. Patient ultimately transferred to [facility] late 5/11/20 after she was found to have a distal esophageal perforation on CT chest and Esophogram.     Of note, patient admitted 10/9/2019 for lower back and bilateral knee pain, found to have compression fracture of L2 with imaging subsequently found to have multiple lytic lesions on the spine and pelvis.  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 Matthew Fairbanks. (12 May 2020 02:24)    patient is admitted to surgical service, pre-operatively planned for an esophageal stent 5/13.  Patient's labs/ cultures from [facility] reviewed.     Empyema with polymicrobial infection:  Strep mitis infection  Klebsiella oxytoca infection  CoNS infection  Perforated esophagus  Fevers    Plan:  s/p esophageal stent  and VATS 5/13  s/p PEJ on 5/15    YEAST from fluid culture being worked up  NOT Candida auris; specimen sent to [facility] labs      COMPLETED CASPO/ VANCO      BLOOD CX sent 5/28; REPEAT blood cx 5/29  completed a course MERREM  on 6/9/2020    - continue Chest tubes per CT surgery    - regarding DECUBITUS Ulcer, present prior to admission in this bed-ridden patient, CLINICAL osteomyelitis  - continue wound care even after course of Antibiotics   - CONSIDER surgical evaluation for DIVERTING COLOSTOMY  - osteomyelitis with little chance of improvement if FECAL contamination persists      no contraindications to discharge to community from ID standpoint  high likelihood of readmission due to baseline chronic medical/ neurological conditions. Yes Detail Level: Detailed Detail Level: Simple

## 2023-05-18 NOTE — PROGRESS NOTE ADULT - PROBLEM SELECTOR PLAN 3
- Maintain 2 CTs to suction for now.   - Follow up AM CXR. Methotrexate Counseling:  Patient counseled regarding adverse effects of methotrexate including but not limited to nausea, vomiting, abnormalities in liver function tests. Patients may develop mouth sores, rash, diarrhea, and abnormalities in blood counts. The patient understands that monitoring is required including LFT's and blood counts.  There is a rare possibility of scarring of the liver and lung problems that can occur when taking methotrexate. Persistent nausea, loss of appetite, pale stools, dark urine, cough, and shortness of breath should be reported immediately. Patient advised to discontinue methotrexate treatment at least three months before attempting to become pregnant.  I discussed the need for folate supplements while taking methotrexate.  These supplements can decrease side effects during methotrexate treatment. The patient verbalized understanding of the proper use and possible adverse effects of methotrexate.  All of the patient's questions and concerns were addressed.

## 2023-08-26 NOTE — PROGRESS NOTE ADULT - PROBLEM SELECTOR PLAN 4
Chronic - doubt acute exacerbation, falls likely mechanical  - continue baclofen  - continue tizanidine, avonex (once/week on Tuesday, pt to bring both from home)  - hx of frequent UTI, will hold methenamine for now, monitor for any urinary symptoms while admitted 26-Aug-2023 13:32

## 2023-11-06 NOTE — PROGRESS NOTE ADULT - SUBJECTIVE AND OBJECTIVE BOX
Quality 226: Preventive Care And Screening: Tobacco Use: Screening And Cessation Intervention: Patient screened for tobacco use and is an ex/non-smoker Detail Level: Detailed Patient is a 68y old  Female who presents with a chief complaint of cellulitis/ankle fx (13 Mar 2019 10:02)      INTERVAL HPI/OVERNIGHT EVENTS: Patient seen and examined at bedside. Patient had one time fever 100.5 that since resolved. Denies chills, worsening leg pain or any other complaints.      T(C): 36.9 (03-14-19 @ 12:02), Max: 38.1 (03-13-19 @ 20:09)  HR: 66 (03-14-19 @ 12:02) (53 - 67)  BP: 126/66 (03-14-19 @ 12:02) (95/59 - 126/66)  RR: 16 (03-14-19 @ 12:02) (16 - 18)  SpO2: 98% (03-14-19 @ 12:02) (96% - 98%)  Wt(kg): --  I&O's Summary    13 Mar 2019 07:01  -  14 Mar 2019 07:00  --------------------------------------------------------  IN: 1200 mL / OUT: 3500 mL / NET: -2300 mL    14 Mar 2019 07:01  -  14 Mar 2019 14:37  --------------------------------------------------------  IN: 250 mL / OUT: 700 mL / NET: -450 mL        LABS:                        9.5    7.42  )-----------( 555      ( 14 Mar 2019 06:24 )             29.1     03-14    138  |  104  |  19  ----------------------------<  102<H>  4.2   |  27  |  0.79    Ca    8.5      14 Mar 2019 06:24          CAPILLARY BLOOD GLUCOSE      MEDICATIONS  (STANDING):  baclofen 10 milliGRAM(s) Oral every 12 hours  enoxaparin Injectable 40 milliGRAM(s) SubCutaneous daily  interferon beta-1a Injectable (AVONEX) 30 MICROGram(s) IntraMuscular every 7 days  latanoprost 0.005% Ophthalmic Solution 1 Drop(s) Both EYES at bedtime  lisinopril 20 milliGRAM(s) Oral daily  minocycline 100 milliGRAM(s) Oral every 12 hours  multivitamin/minerals 1 Tablet(s) Oral daily  oxybutynin 10 milliGRAM(s) Oral daily    MEDICATIONS  (PRN):  zolpidem 5 milliGRAM(s) Oral at bedtime PRN Insomnia    REVIEW OF SYSTEMS:  CONSTITUTIONAL: No fever, weight loss, or fatigue  RESPIRATORY: No cough, wheezing, chills or hemoptysis; No shortness of breath  CARDIOVASCULAR: No chest pain, palpitations, dizziness  GASTROINTESTINAL: No abdominal or epigastric pain. No nausea, vomiting, or hematemesis; No diarrhea or constipation. No melena or hematochezia.  GENITOURINARY: patient self catheterizes for 15 years   NEUROLOGICAL: No headaches, memory loss, new loss of strength, numbness, or tremor    RADIOLOGY & ADDITIONAL TESTS:    Imaging Personally Reviewed:  [ ] YES  [ ] NO    Consultant(s) Notes Reviewed:  [X ] YES  [ ] NO    PHYSICAL EXAM:  GENERAL: NAD, well-groomed, well-developed  HEAD:  Atraumatic, Normocephalic  NERVOUS SYSTEM:  Alert & Oriented X3, Good concentration  CHEST/LUNG: Clear to percussion bilaterally; No rales, rhonchi, wheezing, or rubs  HEART: Regular rate and rhythm; No murmurs, rubs, or gallops  ABDOMEN: Soft, Nontender, Nondistended; Bowel sounds present  EXTREMITIES:  R leg with brace, erythema improved from yesterday  Care Discussed with Consultants/Other Providers [ ] YES  [ ] NO

## 2023-11-21 NOTE — PATIENT PROFILE ADULT. - CAREGIVER RELATION TO PATIENT
Quality 402: Tobacco Use And Help With Quitting Among Adolescents: Patient screened for tobacco and never smoked Quality 431: Preventive Care And Screening: Unhealthy Alcohol Use - Screening: Patient not identified as an unhealthy alcohol user when screened for unhealthy alcohol use using a systematic screening method Quality 130: Documentation Of Current Medications In The Medical Record: Current Medications Documented Detail Level: Detailed Son

## 2024-02-08 NOTE — BRIEF OPERATIVE NOTE - OPERATION/FINDINGS
EGD, placement of esophageal stent.  I assisted in Esophagoscopy and positioning/deploying stent
Gastric tube inserted 10cm proximal to pylorus. Tip of tube hand guided to jejunum. Gastric ports flushed and confirmed to be in stomach. Balloon inflated and palpated in stomach. Hemostasis obtained.
EGD performed, esophageal perforation identified approximately 2cm above EG junction. 15cm esophageal covered stent placed with fluoroscopy, approximately 2cm of stent distal to EG junction. EGD performed and stent in place, NGT guided to stomach. VATS, right thoracic cavity washout with pulmonary decortication. Right posterior chest tube placed in superior/posterior aspect of lung. Right medial chest tube in inferior/posterior aspect of lung. Dl drain in place.
Yes

## 2024-03-25 NOTE — PROGRESS NOTE ADULT - SUBJECTIVE AND OBJECTIVE BOX
All interim records and events noted.    resting comfortably      MEDICATIONS  (STANDING):  baclofen 20 milliGRAM(s) Oral two times a day  enoxaparin Injectable 40 milliGRAM(s) SubCutaneous daily  interferon beta-1a Injectable (AVONEX) 30 MICROGram(s) IntraMuscular every 7 days  latanoprost 0.005% Ophthalmic Solution 1 Drop(s) Both EYES at bedtime  lidocaine   Patch 1 Patch Transdermal daily  lisinopril 20 milliGRAM(s) Oral daily  multivitamin/minerals 1 Tablet(s) Oral daily  oxybutynin 10 milliGRAM(s) Oral daily    MEDICATIONS  (PRN):  acetaminophen   Tablet .. 650 milliGRAM(s) Oral every 6 hours PRN Temp greater or equal to 38C (100.4F), Mild Pain (1 - 3)  oxyCODONE    5 mG/acetaminophen 325 mG 1 Tablet(s) Oral every 4 hours PRN Moderate Pain (4 - 6)      Vital Signs Last 24 Hrs  T(C): 36.9 (13 Oct 2019 07:22), Max: 39 (12 Oct 2019 23:40)  T(F): 98.5 (13 Oct 2019 07:22), Max: 102.2 (12 Oct 2019 23:40)  HR: 62 (13 Oct 2019 07:35) (55 - 80)  BP: 96/62 (13 Oct 2019 07:35) (70/40 - 161/85)  BP(mean): --  RR: 18 (13 Oct 2019 07:22) (18 - 18)  SpO2: 99% (13 Oct 2019 07:22) (96% - 99%)    PHYSICAL EXAM  General: well developed  well nourished, in no acute distress  Head: atraumatic, normocephalic  Neck: supple, trachea midline  CV: S1 S2, regular rate and rhythm  Lungs: clear to auscultation, no wheezes/rhonchi  Abdomen: soft, nontender, bowel sounds present, no palpable masses, pouchy  Skin: no significant increased ecchymosis/petechiae        LABS:             8.3    7.41  )-----------( 445      ( 10-13 @ 08:30 )             26.5                9.0    7.74  )-----------( 478      ( 10-12 @ 07:59 )             28.3                8.8    5.01  )-----------( 486      ( 10-11 @ 06:45 )             28.3       10-13    134<L>  |  101  |  21  ----------------------------<  103<H>  4.6   |  27  |  0.79    Ca    8.6      13 Oct 2019 08:30      10-09 @ 19:28  PT12.9 INR1.14  PTT29.9      RADIOLOGY & ADDITIONAL STUDIES:    IMPRESSION/RECOMMENDATIONS: Clear

## 2024-03-26 NOTE — ED PROVIDER NOTE - PMH
Breast CA    Hypertension    MS (mitral stenosis)    Multiple sclerosis    Osteoporosis    S/P mastectomy, right vegetables the base of your diet.    Choose heart-healthy fats such as canola, olive, and flaxseed oil, and foods high in heart-healthy fats, such as nuts, seeds, soybeans, tofu, and fish.    Eat fish at least twice per week; the fish highest in omega-3 fatty acids and lowest in mercury include salmon, herring, mackerel, sardines, and canned chunk light tuna. If you eat fish less than twice per week or have high triglycerides, talk to your doctor about taking fish oil supplements.    Read food labels.   For products low in fat and cholesterol, look for fat free, low-fat, cholesterol free, saturated fat free, and trans fat freeAlso scan the Nutrition Facts Label, which lists saturated fat, trans fat, and cholesterol amounts.   For products low in sodium, look for sodium free, very low sodium, low sodium, no added salt, and unsalted   Skip the salt when cooking or at the table; if food needs more flavor, get creative and try out different herbs and spices. Garlic and onion also add substantial flavor to foods.    Trim any visible fat off meat and poultry before cooking, and drain the fat off after melendrez.    Use cooking methods that require little or no added fat, such as grilling, boiling, baking, poaching, broiling, roasting, steaming, stir-frying, and sauting.    Avoid fast food and convenience food. They tend to be high in saturated and trans fat and have a lot of added salt.    Talk to a registered dietitian for individualized diet advice.      Last Reviewed: March 2011 Dayna Yadav MS, MPH, RD   Updated: 3/29/2011

## 2024-04-19 NOTE — PROGRESS NOTE ADULT - PROBLEM SELECTOR PLAN 6
yes -Continue oxybutynin 10mg ER qd, methenamine hippurate 1g BID  - campos in place (patient straight caths at home) Minoxidil Counseling: Minoxidil is a topical medication which can increase blood flow where it is applied. It is uncertain how this medication increases hair growth. Side effects are uncommon and include stinging and allergic reactions.

## 2024-08-19 NOTE — H&P ADULT - NSICDXPASTSURGICALHX_GEN_ALL_CORE_FT
PAST SURGICAL HISTORY:  Encounter for feeding tube placement open G-Jtube placement 5/15    H/O breast surgery     History of bowel resection     History of thoracic surgery right VATS decortication, chest wastout 5/13
yes

## 2025-01-04 NOTE — H&P ADULT - PROBLEM SELECTOR PLAN 1
- Admit to Dr. Murcia.   - Continuous telemetry, .   - Follow up CXR.   - Follow up labs.  - Plan for OR tomorrow for Esophageal stent and feeding tube placement.  - Case and plan discussed with Thoracic Surgeon Dr. Murcia. Normal rate, regular rhythm.  Heart sounds S1, S2.  No murmurs, rubs or gallops.

## 2025-03-06 NOTE — PHARMACOTHERAPY INTERVENTION NOTE - COMMENTS
In an effort to ensure that our patients [employer], a Team Member has reviewed your chart and identified an opportunity to provide the best care possible. An attempt was made to discuss or schedule due or overdue Preventive or Chronic Condition care.Care Gaps identified: Diabetes A1c Testing, Urine Testing, Eye Exam, and Foot Exam.    The Outcome was Contact was made, appointment declined.   Type of Appointment needed: Primary Care Visit    Pt will do walkin labs before OV 4/16/2025.     vanco level is pending collection. 750 mg x1 ordered. discussed with nurse, will hold dose if level > 21.

## 2025-06-12 NOTE — ED PROVIDER NOTE - EYES, MLM
[Routine Follow-Up] : routine follow-up visit for [Other: ___] : [unfilled] Clear bilaterally, pupils equal, round and reactive to light.

## 2025-06-18 NOTE — PATIENT PROFILE ADULT. - NS PRO AD PATIENT TYPE
6/18/2025      Oscar Zhao  [address] [place] [facility] [address]      Dear Colleague,    Thank you for referring your patient, Oscar Zhao, to the [facility] [place]. Please see a copy of my visit note below.    HPI: Pt is here for follow up of a robotic hiatal hernia repair with toupee fundoplication.  She is doing well.  Pain is well controlled.  No difficulties with the surgical wound/wounds.  she is eating well and denies fever and chills.         LMP  (LMP Unknown)     EXAM:  GENERAL:Appears well  ABDOMEN:  Soft, +BS  SURGICAL WOUNDS:  Incisions healing well, no enduration or drainage.          Assessment/Plan:  Doing well after surgery and should follow up as needed.    Santiago Hinojosa MD ,MD  [facility]: Department of Surgery     Again, thank you for allowing me to participate in the care of your patient.        Sincerely,        Santiago Hinojosa MD    Electronically signed Medical Orders for Life-Sustaining Treatment (MOLST)/Health Care Proxy (HCP)